# Patient Record
Sex: FEMALE | Race: WHITE | NOT HISPANIC OR LATINO | URBAN - METROPOLITAN AREA
[De-identification: names, ages, dates, MRNs, and addresses within clinical notes are randomized per-mention and may not be internally consistent; named-entity substitution may affect disease eponyms.]

---

## 2017-05-12 ENCOUNTER — EMERGENCY (EMERGENCY)
Facility: HOSPITAL | Age: 72
LOS: 0 days | Discharge: HOME | End: 2017-05-12
Admitting: INTERNAL MEDICINE

## 2017-06-28 DIAGNOSIS — Z79.899 OTHER LONG TERM (CURRENT) DRUG THERAPY: ICD-10-CM

## 2017-06-28 DIAGNOSIS — S69.91XA UNSPECIFIED INJURY OF RIGHT WRIST, HAND AND FINGER(S), INITIAL ENCOUNTER: ICD-10-CM
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
## 2017-06-28 DIAGNOSIS — E11.9 TYPE 2 DIABETES MELLITUS WITHOUT COMPLICATIONS: ICD-10-CM

## 2017-06-28 DIAGNOSIS — S52.501A UNSPECIFIED FRACTURE OF THE LOWER END OF RIGHT RADIUS, INITIAL ENCOUNTER FOR CLOSED FRACTURE: ICD-10-CM

## 2017-06-28 DIAGNOSIS — I10 ESSENTIAL (PRIMARY) HYPERTENSION: ICD-10-CM

## 2017-06-28 DIAGNOSIS — E78.00 PURE HYPERCHOLESTEROLEMIA, UNSPECIFIED: ICD-10-CM

## 2017-06-28 DIAGNOSIS — S52.614A NONDISPLACED FRACTURE OF RIGHT ULNA STYLOID PROCESS, INITIAL ENCOUNTER FOR CLOSED FRACTURE: ICD-10-CM
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
## 2017-11-09 ENCOUNTER — INPATIENT (INPATIENT)
Facility: HOSPITAL | Age: 72
LOS: 1 days | Discharge: HOME | End: 2017-11-11
Attending: INTERNAL MEDICINE

## 2017-11-09 DIAGNOSIS — M54.5 LOW BACK PAIN: ICD-10-CM

## 2017-11-09 DIAGNOSIS — E03.9 HYPOTHYROIDISM, UNSPECIFIED: ICD-10-CM

## 2017-11-09 DIAGNOSIS — R41.82 ALTERED MENTAL STATUS, UNSPECIFIED: ICD-10-CM

## 2017-11-09 DIAGNOSIS — M06.9 RHEUMATOID ARTHRITIS, UNSPECIFIED: ICD-10-CM

## 2017-11-09 DIAGNOSIS — E78.5 HYPERLIPIDEMIA, UNSPECIFIED: ICD-10-CM

## 2017-11-09 DIAGNOSIS — R50.9 FEVER, UNSPECIFIED: ICD-10-CM

## 2017-11-09 DIAGNOSIS — E11.9 TYPE 2 DIABETES MELLITUS WITHOUT COMPLICATIONS: ICD-10-CM

## 2017-11-09 DIAGNOSIS — I10 ESSENTIAL (PRIMARY) HYPERTENSION: ICD-10-CM

## 2017-11-14 DIAGNOSIS — A87.9 VIRAL MENINGITIS, UNSPECIFIED: ICD-10-CM

## 2017-11-14 DIAGNOSIS — Z79.4 LONG TERM (CURRENT) USE OF INSULIN: ICD-10-CM

## 2017-11-14 DIAGNOSIS — E87.1 HYPO-OSMOLALITY AND HYPONATREMIA: ICD-10-CM

## 2017-11-14 DIAGNOSIS — E03.9 HYPOTHYROIDISM, UNSPECIFIED: ICD-10-CM

## 2017-11-14 DIAGNOSIS — R41.82 ALTERED MENTAL STATUS, UNSPECIFIED: ICD-10-CM

## 2017-11-14 DIAGNOSIS — G89.29 OTHER CHRONIC PAIN: ICD-10-CM

## 2017-11-14 DIAGNOSIS — N18.3 CHRONIC KIDNEY DISEASE, STAGE 3 (MODERATE): ICD-10-CM

## 2017-11-14 DIAGNOSIS — F11.20 OPIOID DEPENDENCE, UNCOMPLICATED: ICD-10-CM

## 2017-11-14 DIAGNOSIS — E11.22 TYPE 2 DIABETES MELLITUS WITH DIABETIC CHRONIC KIDNEY DISEASE: ICD-10-CM

## 2017-11-14 DIAGNOSIS — M06.9 RHEUMATOID ARTHRITIS, UNSPECIFIED: ICD-10-CM

## 2017-11-14 DIAGNOSIS — I12.9 HYPERTENSIVE CHRONIC KIDNEY DISEASE WITH STAGE 1 THROUGH STAGE 4 CHRONIC KIDNEY DISEASE, OR UNSPECIFIED CHRONIC KIDNEY DISEASE: ICD-10-CM

## 2017-11-14 DIAGNOSIS — M54.9 DORSALGIA, UNSPECIFIED: ICD-10-CM

## 2017-12-08 ENCOUNTER — OUTPATIENT (OUTPATIENT)
Dept: OUTPATIENT SERVICES | Facility: HOSPITAL | Age: 72
LOS: 1 days | Discharge: HOME | End: 2017-12-08

## 2017-12-08 DIAGNOSIS — E78.2 MIXED HYPERLIPIDEMIA: ICD-10-CM

## 2017-12-08 DIAGNOSIS — D50.8 OTHER IRON DEFICIENCY ANEMIAS: ICD-10-CM

## 2017-12-08 DIAGNOSIS — I10 ESSENTIAL (PRIMARY) HYPERTENSION: ICD-10-CM

## 2017-12-08 DIAGNOSIS — E78.00 PURE HYPERCHOLESTEROLEMIA, UNSPECIFIED: ICD-10-CM

## 2017-12-08 DIAGNOSIS — N39.0 URINARY TRACT INFECTION, SITE NOT SPECIFIED: ICD-10-CM

## 2017-12-08 DIAGNOSIS — E03.9 HYPOTHYROIDISM, UNSPECIFIED: ICD-10-CM

## 2017-12-08 DIAGNOSIS — M54.5 LOW BACK PAIN: ICD-10-CM

## 2017-12-08 DIAGNOSIS — E11.9 TYPE 2 DIABETES MELLITUS WITHOUT COMPLICATIONS: ICD-10-CM

## 2017-12-08 DIAGNOSIS — D50.9 IRON DEFICIENCY ANEMIA, UNSPECIFIED: ICD-10-CM

## 2017-12-08 DIAGNOSIS — Z79.899 OTHER LONG TERM (CURRENT) DRUG THERAPY: ICD-10-CM

## 2017-12-08 DIAGNOSIS — M06.4 INFLAMMATORY POLYARTHROPATHY: ICD-10-CM

## 2017-12-08 DIAGNOSIS — R80.9 PROTEINURIA, UNSPECIFIED: ICD-10-CM

## 2017-12-08 DIAGNOSIS — M06.9 RHEUMATOID ARTHRITIS, UNSPECIFIED: ICD-10-CM

## 2017-12-08 DIAGNOSIS — R41.82 ALTERED MENTAL STATUS, UNSPECIFIED: ICD-10-CM

## 2018-02-07 ENCOUNTER — INPATIENT (INPATIENT)
Facility: HOSPITAL | Age: 73
LOS: 1 days | Discharge: HOME | End: 2018-02-09
Attending: INTERNAL MEDICINE

## 2018-02-07 VITALS
OXYGEN SATURATION: 97 % | RESPIRATION RATE: 20 BRPM | HEART RATE: 91 BPM | TEMPERATURE: 96 F | HEIGHT: 60 IN | DIASTOLIC BLOOD PRESSURE: 91 MMHG | WEIGHT: 139.99 LBS | SYSTOLIC BLOOD PRESSURE: 164 MMHG

## 2018-02-07 DIAGNOSIS — E11.649 TYPE 2 DIABETES MELLITUS WITH HYPOGLYCEMIA WITHOUT COMA: ICD-10-CM

## 2018-02-07 DIAGNOSIS — L93.0 DISCOID LUPUS ERYTHEMATOSUS: ICD-10-CM

## 2018-02-07 DIAGNOSIS — E78.00 PURE HYPERCHOLESTEROLEMIA, UNSPECIFIED: ICD-10-CM

## 2018-02-07 DIAGNOSIS — E11.9 TYPE 2 DIABETES MELLITUS WITHOUT COMPLICATIONS: ICD-10-CM
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
## 2018-02-07 DIAGNOSIS — I10 ESSENTIAL (PRIMARY) HYPERTENSION: ICD-10-CM

## 2018-02-07 LAB
ALBUMIN SERPL ELPH-MCNC: 3.2 G/DL — SIGNIFICANT CHANGE UP (ref 3–5.5)
ALP SERPL-CCNC: 86 U/L — SIGNIFICANT CHANGE UP (ref 30–115)
ALT FLD-CCNC: 23 U/L — SIGNIFICANT CHANGE UP (ref 0–41)
ANION GAP SERPL CALC-SCNC: 9 MMOL/L — SIGNIFICANT CHANGE UP (ref 7–14)
APPEARANCE UR: CLEAR — SIGNIFICANT CHANGE UP
AST SERPL-CCNC: 34 U/L — SIGNIFICANT CHANGE UP (ref 0–41)
BASE EXCESS BLDV CALC-SCNC: 6.2 MMOL/L — HIGH (ref -2–2)
BASOPHILS # BLD AUTO: 0.03 K/UL — SIGNIFICANT CHANGE UP (ref 0–0.2)
BASOPHILS NFR BLD AUTO: 0.2 % — SIGNIFICANT CHANGE UP (ref 0–1)
BILIRUB SERPL-MCNC: 0.6 MG/DL — SIGNIFICANT CHANGE UP (ref 0.2–1.2)
BILIRUB UR-MCNC: NEGATIVE — SIGNIFICANT CHANGE UP
BUN SERPL-MCNC: 15 MG/DL — SIGNIFICANT CHANGE UP (ref 10–20)
CA-I SERPL-SCNC: 1.18 MMOL/L — SIGNIFICANT CHANGE UP (ref 1.12–1.3)
CALCIUM SERPL-MCNC: 8.4 MG/DL — LOW (ref 8.5–10.1)
CHLORIDE SERPL-SCNC: 99 MMOL/L — SIGNIFICANT CHANGE UP (ref 98–110)
CK MB BLD-MCNC: 3 % — SIGNIFICANT CHANGE UP (ref 0–4)
CK MB CFR SERPL CALC: 10 NG/ML — HIGH (ref 0.6–6.3)
CK SERPL-CCNC: 323 U/L — HIGH (ref 0–225)
CO2 SERPL-SCNC: 29 MMOL/L — SIGNIFICANT CHANGE UP (ref 17–32)
COLOR SPEC: YELLOW — SIGNIFICANT CHANGE UP
CREAT SERPL-MCNC: 0.7 MG/DL — SIGNIFICANT CHANGE UP (ref 0.7–1.5)
DIFF PNL FLD: SIGNIFICANT CHANGE UP
EOSINOPHIL # BLD AUTO: 0.03 K/UL — SIGNIFICANT CHANGE UP (ref 0–0.7)
EOSINOPHIL NFR BLD AUTO: 0.2 % — SIGNIFICANT CHANGE UP (ref 0–8)
GAS PNL BLDV: 139 MMOL/L — SIGNIFICANT CHANGE UP (ref 136–145)
GAS PNL BLDV: SIGNIFICANT CHANGE UP
GLUCOSE SERPL-MCNC: 113 MG/DL — HIGH (ref 70–110)
GLUCOSE UR QL: NEGATIVE — SIGNIFICANT CHANGE UP
HCO3 BLDV-SCNC: 34 MMOL/L — HIGH (ref 22–29)
HCT VFR BLD CALC: 30.9 % — LOW (ref 37–47)
HGB BLD-MCNC: 9.8 G/DL — LOW (ref 14–18)
IMM GRANULOCYTES NFR BLD AUTO: 0.8 % — HIGH (ref 0.1–0.3)
KETONES UR-MCNC: NEGATIVE — SIGNIFICANT CHANGE UP
LACTATE BLDV-MCNC: 1.9 MMOL/L — HIGH (ref 0.5–1.6)
LEUKOCYTE ESTERASE UR-ACNC: NEGATIVE — SIGNIFICANT CHANGE UP
LYMPHOCYTES # BLD AUTO: 0.72 K/UL — LOW (ref 1.2–3.4)
LYMPHOCYTES # BLD AUTO: 5.6 % — LOW (ref 20.5–51.1)
MCHC RBC-ENTMCNC: 28.5 PG — SIGNIFICANT CHANGE UP (ref 27–31)
MCHC RBC-ENTMCNC: 31.7 G/DL — LOW (ref 32–37)
MCV RBC AUTO: 89.8 FL — SIGNIFICANT CHANGE UP (ref 81–91)
MONOCYTES # BLD AUTO: 0.47 K/UL — SIGNIFICANT CHANGE UP (ref 0.1–0.6)
MONOCYTES NFR BLD AUTO: 3.7 % — SIGNIFICANT CHANGE UP (ref 1.7–9.3)
NEUTROPHILS # BLD AUTO: 11.41 K/UL — HIGH (ref 1.4–6.5)
NEUTROPHILS NFR BLD AUTO: 89.5 % — HIGH (ref 42.2–75.2)
NITRITE UR-MCNC: NEGATIVE — SIGNIFICANT CHANGE UP
NRBC # BLD: 0 /100 WBCS — SIGNIFICANT CHANGE UP (ref 0–0)
PCO2 BLDV: 60 MMHG — HIGH (ref 41–51)
PH BLDV: 7.36 — SIGNIFICANT CHANGE UP (ref 7.26–7.43)
PH UR: 7 — SIGNIFICANT CHANGE UP (ref 5–8)
PLATELET # BLD AUTO: 322 K/UL — SIGNIFICANT CHANGE UP (ref 130–400)
PO2 BLDV: 30 MMHG — SIGNIFICANT CHANGE UP (ref 20–40)
POTASSIUM BLDV-SCNC: 3.5 MMOL/L — SIGNIFICANT CHANGE UP (ref 3.3–5.6)
POTASSIUM SERPL-MCNC: 4 MMOL/L — SIGNIFICANT CHANGE UP (ref 3.5–5)
POTASSIUM SERPL-SCNC: 4 MMOL/L — SIGNIFICANT CHANGE UP (ref 3.5–5)
PROT SERPL-MCNC: 6.3 G/DL — SIGNIFICANT CHANGE UP (ref 6–8)
PROT UR-MCNC: NEGATIVE — SIGNIFICANT CHANGE UP
RBC # BLD: 3.44 M/UL — LOW (ref 4.2–5.4)
RBC # FLD: 15.1 % — HIGH (ref 11.5–14.5)
SAO2 % BLDV: 52 % — SIGNIFICANT CHANGE UP
SODIUM SERPL-SCNC: 137 MMOL/L — SIGNIFICANT CHANGE UP (ref 135–146)
SP GR SPEC: 1.02 — SIGNIFICANT CHANGE UP (ref 1.01–1.03)
TROPONIN I SERPL-MCNC: 0.02 NG/ML — SIGNIFICANT CHANGE UP (ref 0–0.05)
TROPONIN I SERPL-MCNC: <0.02 NG/ML — SIGNIFICANT CHANGE UP (ref 0–0.05)
UROBILINOGEN FLD QL: 0.2 — SIGNIFICANT CHANGE UP (ref 0.2–0.2)
WBC # BLD: 12.76 K/UL — HIGH (ref 4.8–10.8)
WBC # FLD AUTO: 12.76 K/UL — HIGH (ref 4.8–10.8)

## 2018-02-07 RX ORDER — ASPIRIN/CALCIUM CARB/MAGNESIUM 324 MG
81 TABLET ORAL DAILY
Qty: 0 | Refills: 0 | Status: DISCONTINUED | OUTPATIENT
Start: 2018-02-07 | End: 2018-02-09

## 2018-02-07 RX ORDER — DULOXETINE HYDROCHLORIDE 30 MG/1
60 CAPSULE, DELAYED RELEASE ORAL DAILY
Qty: 0 | Refills: 0 | Status: DISCONTINUED | OUTPATIENT
Start: 2018-02-07 | End: 2018-02-09

## 2018-02-07 RX ORDER — ATORVASTATIN CALCIUM 80 MG/1
80 TABLET, FILM COATED ORAL AT BEDTIME
Qty: 0 | Refills: 0 | Status: DISCONTINUED | OUTPATIENT
Start: 2018-02-07 | End: 2018-02-09

## 2018-02-07 RX ORDER — ENOXAPARIN SODIUM 100 MG/ML
40 INJECTION SUBCUTANEOUS DAILY
Qty: 0 | Refills: 0 | Status: DISCONTINUED | OUTPATIENT
Start: 2018-02-07 | End: 2018-02-09

## 2018-02-07 RX ORDER — METHADONE HYDROCHLORIDE 40 MG/1
20 TABLET ORAL EVERY 12 HOURS
Qty: 0 | Refills: 0 | Status: DISCONTINUED | OUTPATIENT
Start: 2018-02-07 | End: 2018-02-09

## 2018-02-07 RX ORDER — DEXTROSE 50 % IN WATER 50 %
25 SYRINGE (ML) INTRAVENOUS ONCE
Qty: 0 | Refills: 0 | Status: COMPLETED | OUTPATIENT
Start: 2018-02-07 | End: 2018-02-07

## 2018-02-07 RX ORDER — LISINOPRIL 2.5 MG/1
20 TABLET ORAL
Qty: 0 | Refills: 0 | Status: DISCONTINUED | OUTPATIENT
Start: 2018-02-07 | End: 2018-02-09

## 2018-02-07 RX ORDER — DICLOFENAC SODIUM 75 MG/1
75 TABLET, DELAYED RELEASE ORAL
Qty: 0 | Refills: 0 | Status: DISCONTINUED | OUTPATIENT
Start: 2018-02-07 | End: 2018-02-09

## 2018-02-07 RX ORDER — LEVOTHYROXINE SODIUM 125 MCG
100 TABLET ORAL DAILY
Qty: 0 | Refills: 0 | Status: DISCONTINUED | OUTPATIENT
Start: 2018-02-07 | End: 2018-02-09

## 2018-02-07 RX ORDER — SODIUM CHLORIDE 9 MG/ML
1000 INJECTION INTRAMUSCULAR; INTRAVENOUS; SUBCUTANEOUS ONCE
Qty: 0 | Refills: 0 | Status: COMPLETED | OUTPATIENT
Start: 2018-02-07 | End: 2018-02-07

## 2018-02-07 RX ORDER — GABAPENTIN 400 MG/1
400 CAPSULE ORAL EVERY 8 HOURS
Qty: 0 | Refills: 0 | Status: DISCONTINUED | OUTPATIENT
Start: 2018-02-07 | End: 2018-02-09

## 2018-02-07 RX ORDER — SODIUM CHLORIDE 9 MG/ML
1000 INJECTION, SOLUTION INTRAVENOUS
Qty: 0 | Refills: 0 | Status: DISCONTINUED | OUTPATIENT
Start: 2018-02-07 | End: 2018-02-08

## 2018-02-07 RX ORDER — HYDROXYCHLOROQUINE SULFATE 200 MG
400 TABLET ORAL
Qty: 0 | Refills: 0 | Status: DISCONTINUED | OUTPATIENT
Start: 2018-02-07 | End: 2018-02-09

## 2018-02-07 RX ADMIN — SODIUM CHLORIDE 100 MILLILITER(S): 9 INJECTION, SOLUTION INTRAVENOUS at 18:40

## 2018-02-07 RX ADMIN — DICLOFENAC SODIUM 75 MILLIGRAM(S): 75 TABLET, DELAYED RELEASE ORAL at 18:35

## 2018-02-07 RX ADMIN — LISINOPRIL 20 MILLIGRAM(S): 2.5 TABLET ORAL at 18:34

## 2018-02-07 RX ADMIN — DICLOFENAC SODIUM 75 MILLIGRAM(S): 75 TABLET, DELAYED RELEASE ORAL at 20:28

## 2018-02-07 RX ADMIN — Medication 400 MILLIGRAM(S): at 18:33

## 2018-02-07 RX ADMIN — SODIUM CHLORIDE 1000 MILLILITER(S): 9 INJECTION INTRAMUSCULAR; INTRAVENOUS; SUBCUTANEOUS at 11:17

## 2018-02-07 RX ADMIN — METHADONE HYDROCHLORIDE 20 MILLIGRAM(S): 40 TABLET ORAL at 20:58

## 2018-02-07 RX ADMIN — Medication 25 MILLILITER(S): at 12:35

## 2018-02-07 NOTE — H&P ADULT - REASON FOR ADMISSION
Patient is a 72y old  Female who presents with a chief complaint of general weakness and falling on floor.

## 2018-02-07 NOTE — H&P ADULT - NSHPPHYSICALEXAM_GEN_ALL_CORE
PHYSICAL EXAM:    Constitutional: general weakness, clammy and diaphoretic    Eyes: EOMI, Vision Intack    ENMT: WNL    Neck: WNL    Back: Chronic LBP, No tenderness    Respiratory: Clear bilateral lungs    Cardiovascular: R/R/R No murmurs    Gastrointestinal: +BS, Soft, NT, ND    Genitourinary: WNL    Rectal: Deffered    Extremities: No edema, clubbing or cyanosis    Vascular: + 2 Pulses bilateral    Neurological: Grossly WNL    Skin: sweating     Lymph Nodes: NO LAD    Musculoskeletal: Lethargic    Psychiatric: WNL

## 2018-02-07 NOTE — H&P ADULT - ASSESSMENT
72F from home with above PMH and HPI p/w generalized weakness / diaphoresis / fall due to Symptomatic Refractory Hypoglycemia while on chronic Novolin N 35Un qAM for her DM.

## 2018-02-07 NOTE — ED PROVIDER NOTE - OBJECTIVE STATEMENT
72 F PMH HTN, HLD, DM type 1, Rheumatoid arthritis on MTX, pw episode of syncope this morning. unclear if witnessed, lives at home with 2 grandsons who called EMS, but not present in ED. EMS noted low FSBG. Patient denies pain anywhere. Complaining of cold feeling. No SOB, chest pain, headache, vision changes, numbness, tingling, focal weakness. + vomitus on clothes - to be assumed she vomited. Feeling in her normal state of health yesterday. States last insulin used yesterday morning - novolin. Repeat FSBG, improved. given juice.

## 2018-02-07 NOTE — ED PROVIDER NOTE - PHYSICAL EXAMINATION
Vital Signs: I have reviewed the initial vital signs.  Constitutional: well-nourished, appears stated age, no acute distress  Cardiovascular: regular rate, regular rhythm, well-perfused extremities. 2+ b/l DP and radial pulses.  Respiratory: unlabored respiratory effort, clear to auscultation bilaterally  Gastrointestinal: soft, non-tender abdomen, no pulsatile mass  Musculoskeletal: supple neck, nl ROM, left posterior chest with focal ecchymosis, left hip with skin discoloration, nontender, patchy, no induration or warmth. Patient unsure if this is chronic. No lower extremity edema  Integumentary: warm, dry, no rash  Neurologic: awake, alert, oriented x 3, cranial nerves II-XII intact, extremities’ motor 5/5 all 4 ext, normal coordination and speech, sensory functions grossly intact all 4 ext.  Psychiatric: appropriate mood, appropriate affect.

## 2018-02-07 NOTE — H&P ADULT - HISTORY OF PRESENT ILLNESS
72F from home with h/x DM on Novolin N 50 units qAM but has been decreasing the dose and now down to 35 units qAM who presents with general weakness, diaphoresis and falls. Pt was found on the floor by grandson and states couldn't get up due to general fatigue. Pt denies fevers, chills, n/v, cp, dyspnea or focal weakness. Pt is a good historian. In the ER pt was found to have FS 35 and treated with D50 IVPushes and dextrose infusion and given juice and food.  Again at 3:30pm pt had an episode of diaphoresis and fs = 39. Pt given Orange Juice and food and also started on D5 infusion.

## 2018-02-07 NOTE — H&P ADULT - PMH
DM (diabetes mellitus)    Fall, initial encounter    Hypercholesteremia    Hypertension    Hypoglycemia  low glucose 39  Lupus

## 2018-02-07 NOTE — H&P ADULT - NSHPLABSRESULTS_GEN_ALL_CORE
9.8    12.76 )-----------( 322      ( 07 Feb 2018 08:36 )             30.9     02-07    137  |  99  |  15  ----------------------------<  113<H>  4.0   |  29  |  0.7    Ca    8.4<L>      07 Feb 2018 08:36    TPro  6.3  /  Alb  3.2  /  TBili  0.6  /  DBili  x   /  AST  34  /  ALT  23  /  AlkPhos  86  02-07    CTH: WNL  CTNeck: WNL  CXR: WNL  Xrays: NO fractures  EKG: NSR no acute findings

## 2018-02-07 NOTE — H&P ADULT - NSHPREVIEWOFSYSTEMS_GEN_ALL_CORE
REVIEW OF SYSTEMS    General:	weakness  	  Ophthalmologic: WNL  	  ENMT:	WNL    Respiratory and Thorax: WNL  	  Cardiovascular:	diaphoresis, denies dyspnea or chest pain    Gastrointestinal:	WNL    Musculoskeletal:	general weakness, unable to ambulate    Neurological:	general weakness    Psychiatric:	WNL    Endocrine:	dry skin (usual)    Allergic/Immunologic:

## 2018-02-07 NOTE — ED PROVIDER NOTE - MEDICAL DECISION MAKING DETAILS
Most concerned for low FSBG to be cause of LOC, unclear reason for low FSBG. Will eval for other causes of syncope, also eval for trauma sustained in episode. Labs, imaging, fluids, pain control as needed, recheck FSBG after juice, reassess.

## 2018-02-07 NOTE — ED PROVIDER NOTE - NS ED ROS FT
Constitutional: (-) fever  Eyes/ENT: (-) blurry vision, (-) epistaxis  Cardiovascular: (-) chest pain, (+) syncope, - palpitations.  Respiratory: (-) cough, (-) shortness of breath  Gastrointestinal: (+) vomiting, (-) diarrhea  Musculoskeletal: (-) neck pain, (-) back pain, (-) joint pain  Integumentary: (-) rash, (-) edema  Neurological: (-) headache, (-) altered mental status  Psychiatric: (-) hallucinations  Allergic/Immunologic: (-) pruritus

## 2018-02-07 NOTE — ED ADULT TRIAGE NOTE - CHIEF COMPLAINT QUOTE
Pt brought in by ambulance.  As per EMT "Jason found her on the floor." Fingerstick at scene 46. Glucagon 1 mg IM administered.

## 2018-02-08 LAB
ANION GAP SERPL CALC-SCNC: 6 MMOL/L — LOW (ref 7–14)
BUN SERPL-MCNC: 12 MG/DL — SIGNIFICANT CHANGE UP (ref 10–20)
CALCIUM SERPL-MCNC: 8.5 MG/DL — SIGNIFICANT CHANGE UP (ref 8.5–10.1)
CHLORIDE SERPL-SCNC: 101 MMOL/L — SIGNIFICANT CHANGE UP (ref 98–110)
CO2 SERPL-SCNC: 30 MMOL/L — SIGNIFICANT CHANGE UP (ref 17–32)
CREAT SERPL-MCNC: 0.6 MG/DL — LOW (ref 0.7–1.5)
CULTURE RESULTS: SIGNIFICANT CHANGE UP
GLUCOSE SERPL-MCNC: 178 MG/DL — HIGH (ref 70–110)
HCT VFR BLD CALC: 27.4 % — LOW (ref 37–47)
HCT VFR BLD CALC: 28.3 % — LOW (ref 37–47)
HGB BLD-MCNC: 8.7 G/DL — LOW (ref 14–18)
HGB BLD-MCNC: 8.9 G/DL — LOW (ref 14–18)
MCHC RBC-ENTMCNC: 28 PG — SIGNIFICANT CHANGE UP (ref 27–31)
MCHC RBC-ENTMCNC: 28.6 PG — SIGNIFICANT CHANGE UP (ref 27–31)
MCHC RBC-ENTMCNC: 31.4 G/DL — LOW (ref 32–37)
MCHC RBC-ENTMCNC: 31.8 G/DL — LOW (ref 32–37)
MCV RBC AUTO: 89 FL — SIGNIFICANT CHANGE UP (ref 81–91)
MCV RBC AUTO: 90.1 FL — SIGNIFICANT CHANGE UP (ref 81–91)
NRBC # BLD: 0 /100 WBCS — SIGNIFICANT CHANGE UP (ref 0–0)
NRBC # BLD: 0 /100 WBCS — SIGNIFICANT CHANGE UP (ref 0–0)
PLATELET # BLD AUTO: 244 K/UL — SIGNIFICANT CHANGE UP (ref 130–400)
PLATELET # BLD AUTO: 270 K/UL — SIGNIFICANT CHANGE UP (ref 130–400)
POTASSIUM SERPL-MCNC: 3.6 MMOL/L — SIGNIFICANT CHANGE UP (ref 3.5–5)
POTASSIUM SERPL-SCNC: 3.6 MMOL/L — SIGNIFICANT CHANGE UP (ref 3.5–5)
RBC # BLD: 3.04 M/UL — LOW (ref 4.2–5.4)
RBC # BLD: 3.18 M/UL — LOW (ref 4.2–5.4)
RBC # FLD: 15.1 % — HIGH (ref 11.5–14.5)
RBC # FLD: 15.2 % — HIGH (ref 11.5–14.5)
SODIUM SERPL-SCNC: 137 MMOL/L — SIGNIFICANT CHANGE UP (ref 135–146)
SPECIMEN SOURCE: SIGNIFICANT CHANGE UP
WBC # BLD: 4.58 K/UL — LOW (ref 4.8–10.8)
WBC # BLD: 5.37 K/UL — SIGNIFICANT CHANGE UP (ref 4.8–10.8)
WBC # FLD AUTO: 4.58 K/UL — LOW (ref 4.8–10.8)
WBC # FLD AUTO: 5.37 K/UL — SIGNIFICANT CHANGE UP (ref 4.8–10.8)

## 2018-02-08 RX ORDER — OXYCODONE AND ACETAMINOPHEN 5; 325 MG/1; MG/1
1 TABLET ORAL EVERY 6 HOURS
Qty: 0 | Refills: 0 | Status: DISCONTINUED | OUTPATIENT
Start: 2018-02-08 | End: 2018-02-09

## 2018-02-08 RX ORDER — OXYCODONE AND ACETAMINOPHEN 5; 325 MG/1; MG/1
1 TABLET ORAL ONCE
Qty: 0 | Refills: 0 | Status: DISCONTINUED | OUTPATIENT
Start: 2018-02-08 | End: 2018-02-08

## 2018-02-08 RX ADMIN — OXYCODONE AND ACETAMINOPHEN 1 TABLET(S): 5; 325 TABLET ORAL at 17:56

## 2018-02-08 RX ADMIN — Medication 100 MICROGRAM(S): at 05:18

## 2018-02-08 RX ADMIN — METHADONE HYDROCHLORIDE 20 MILLIGRAM(S): 40 TABLET ORAL at 05:17

## 2018-02-08 RX ADMIN — ENOXAPARIN SODIUM 40 MILLIGRAM(S): 100 INJECTION SUBCUTANEOUS at 12:48

## 2018-02-08 RX ADMIN — DICLOFENAC SODIUM 75 MILLIGRAM(S): 75 TABLET, DELAYED RELEASE ORAL at 17:28

## 2018-02-08 RX ADMIN — ATORVASTATIN CALCIUM 80 MILLIGRAM(S): 80 TABLET, FILM COATED ORAL at 21:10

## 2018-02-08 RX ADMIN — DICLOFENAC SODIUM 75 MILLIGRAM(S): 75 TABLET, DELAYED RELEASE ORAL at 05:16

## 2018-02-08 RX ADMIN — DICLOFENAC SODIUM 75 MILLIGRAM(S): 75 TABLET, DELAYED RELEASE ORAL at 07:34

## 2018-02-08 RX ADMIN — ATORVASTATIN CALCIUM 80 MILLIGRAM(S): 80 TABLET, FILM COATED ORAL at 01:03

## 2018-02-08 RX ADMIN — OXYCODONE AND ACETAMINOPHEN 1 TABLET(S): 5; 325 TABLET ORAL at 15:44

## 2018-02-08 RX ADMIN — Medication 400 MILLIGRAM(S): at 17:20

## 2018-02-08 RX ADMIN — GABAPENTIN 400 MILLIGRAM(S): 400 CAPSULE ORAL at 21:10

## 2018-02-08 RX ADMIN — Medication 400 MILLIGRAM(S): at 09:33

## 2018-02-08 RX ADMIN — DULOXETINE HYDROCHLORIDE 60 MILLIGRAM(S): 30 CAPSULE, DELAYED RELEASE ORAL at 12:48

## 2018-02-08 RX ADMIN — GABAPENTIN 400 MILLIGRAM(S): 400 CAPSULE ORAL at 00:15

## 2018-02-08 RX ADMIN — GABAPENTIN 400 MILLIGRAM(S): 400 CAPSULE ORAL at 14:15

## 2018-02-08 RX ADMIN — LISINOPRIL 20 MILLIGRAM(S): 2.5 TABLET ORAL at 05:19

## 2018-02-08 RX ADMIN — GABAPENTIN 400 MILLIGRAM(S): 400 CAPSULE ORAL at 05:21

## 2018-02-08 RX ADMIN — METHADONE HYDROCHLORIDE 20 MILLIGRAM(S): 40 TABLET ORAL at 17:26

## 2018-02-08 RX ADMIN — LISINOPRIL 20 MILLIGRAM(S): 2.5 TABLET ORAL at 17:21

## 2018-02-08 RX ADMIN — Medication 81 MILLIGRAM(S): at 12:49

## 2018-02-08 NOTE — PROGRESS NOTE ADULT - SUBJECTIVE AND OBJECTIVE BOX
LINDSAY ALBERT  72y  Female    Patient is a 72y old  Female who presents with a chief complaint of Patient is a 72y old  Female who presents with a chief complaint of general weakness and falling on floor. (2018 16:55)    INTERVAL HPI/OVERNIGHT EVENTS: pt did okay her  - 120    PAST MEDICAL & SURGICAL HISTORY:  Fall, initial encounter  Hypoglycemia: low glucose 39  Lupus  Hypertension  Hypercholesteremia  DM (diabetes mellitus)  No significant past surgical history      REVIEW OF SYSTEMS:  CONSTITUTIONAL: No fever, weight loss, or fatigue  RESPIRATORY: No cough, wheezing, chills or hemoptysis; No shortness of breath  CARDIOVASCULAR: No chest pain, palpitations, dizziness, or leg swelling  GASTROINTESTINAL: No abdominal or epigastric pain. No nausea, vomiting, or hematemesis; No diarrhea or constipation. No melena or hematochezia.  GENITOURINARY: No dysuria, frequency, hematuria, or incontinence  NEUROLOGICAL: No headaches, memory loss, loss of strength, numbness, or tremors  SKIN: No itching, burning, rashes, or lesions   MUSCULOSKELETAL: No joint pain or swelling; No muscle, back, or extremity pain  PSYCHIATRIC: No depression, anxiety, mood swings, or difficulty sleeping    Vital Signs Last 24 Hrs  T(C): 36.5 (2018 15:31), Max: 37.6 (2018 20:00)  T(F): 97.7 (2018 15:31), Max: 99.6 (2018 20:00)  HR: 75 (2018 15:31) (74 - 83)  BP: 143/67 (2018 15:31) (143/67 - 160/-)  BP(mean): --  RR: 14 (2018 07:00) (14 - 18)    PHYSICAL EXAM:  GENERAL: NAD, well-groomed, well-developed  EYES: EOMI, PERRLA, conjunctiva and sclera clear  NECK: Supple, No JVD, Normal thyroid  NERVOUS SYSTEM:  Alert & Oriented X3, Good concentration; Motor Strength 5/5 B/L upper and lower extremities; DTRs 2+ intact and symmetric  CHEST/LUNG: Clear to percussion bilaterally; No rales, rhonchi, wheezing, or rubs  HEART: Regular rate and rhythm; No murmurs, rubs, or gallops  ABDOMEN: Soft, Nontender, Nondistended; Bowel sounds present  EXTREMITIES:  2+ Peripheral Pulses, No clubbing, cyanosis, or edema  LYMPH: No lymphadenopathy noted  SKIN: No rashes or lesions    Consultant(s) Notes Reviewed:  [x ] YES  Care Discussed with Consultants/Other Providers [ x] YES     LABS:                          8.7    5.37  )-----------( 270      ( 2018 05:21 )             27.4     02    137  |  101  |  12  ----------------------------<  178<H>  3.6   |  30  |  0.6<L>    Ca    8.5      2018 05:21    TPro  6.3  /  Alb  3.2  /  TBili  0.6  /  DBili  x   /  AST  34  /  ALT  23  /  AlkPhos  86  02-07    Urinalysis Basic - ( 2018 17:59 )    Color: Yellow / Appearance: Clear / S.025 / pH: x  Gluc: x / Ketone: Negative  / Bili: Negative / Urobili: 0.2   Blood: x / Protein: Negative / Nitrite: Negative   Leuk Esterase: Negative / RBC: x / WBC x   Sq Epi: x / Non Sq Epi: x / Bacteria: x    PROBLEM LIST:  Fall, initial encounter: Fall, initial encounter  Hypoglycemia associated with diabetes: Hypoglycemia associated with diabetes  Lupus erythematosus, unspecified form: Lupus erythematosus, unspecified form  Essential hypertension: Essential hypertension  Hypercholesteremia: Hypercholesteremia  DM (diabetes mellitus): DM (diabetes mellitus)
patient takes hydrocodone at home. will oder dose of percocet now

## 2018-02-08 NOTE — PROGRESS NOTE ADULT - ASSESSMENT
Pt admitted for Refractory Hypoglycemia for a few days or close 1-2 weeks.  Pt fell down and was unable to get up due to weakness. Pt found with grandsons on floor. FS 30's to now 100-120.

## 2018-02-09 ENCOUNTER — TRANSCRIPTION ENCOUNTER (OUTPATIENT)
Age: 73
End: 2018-02-09

## 2018-02-09 VITALS
DIASTOLIC BLOOD PRESSURE: 80 MMHG | SYSTOLIC BLOOD PRESSURE: 143 MMHG | HEART RATE: 75 BPM | TEMPERATURE: 97 F | RESPIRATION RATE: 16 BRPM

## 2018-02-09 LAB
ANION GAP SERPL CALC-SCNC: 8 MMOL/L — SIGNIFICANT CHANGE UP (ref 7–14)
BUN SERPL-MCNC: 13 MG/DL — SIGNIFICANT CHANGE UP (ref 10–20)
CALCIUM SERPL-MCNC: 8.4 MG/DL — LOW (ref 8.5–10.1)
CHLORIDE SERPL-SCNC: 98 MMOL/L — SIGNIFICANT CHANGE UP (ref 98–110)
CO2 SERPL-SCNC: 28 MMOL/L — SIGNIFICANT CHANGE UP (ref 17–32)
CREAT SERPL-MCNC: 0.8 MG/DL — SIGNIFICANT CHANGE UP (ref 0.7–1.5)
GLUCOSE SERPL-MCNC: 302 MG/DL — HIGH (ref 70–110)
HCT VFR BLD CALC: 27.1 % — LOW (ref 37–47)
HGB BLD-MCNC: 8.7 G/DL — LOW (ref 14–18)
MCHC RBC-ENTMCNC: 28.3 PG — SIGNIFICANT CHANGE UP (ref 27–31)
MCHC RBC-ENTMCNC: 32.1 G/DL — SIGNIFICANT CHANGE UP (ref 32–37)
MCV RBC AUTO: 88.3 FL — SIGNIFICANT CHANGE UP (ref 81–91)
NRBC # BLD: 0 /100 WBCS — SIGNIFICANT CHANGE UP (ref 0–0)
PLATELET # BLD AUTO: 244 K/UL — SIGNIFICANT CHANGE UP (ref 130–400)
POTASSIUM SERPL-MCNC: 3.9 MMOL/L — SIGNIFICANT CHANGE UP (ref 3.5–5)
POTASSIUM SERPL-SCNC: 3.9 MMOL/L — SIGNIFICANT CHANGE UP (ref 3.5–5)
RBC # BLD: 3.07 M/UL — LOW (ref 4.2–5.4)
RBC # FLD: 14.9 % — HIGH (ref 11.5–14.5)
SODIUM SERPL-SCNC: 134 MMOL/L — LOW (ref 135–146)
WBC # BLD: 3.92 K/UL — LOW (ref 4.8–10.8)
WBC # FLD AUTO: 3.92 K/UL — LOW (ref 4.8–10.8)

## 2018-02-09 RX ORDER — DICLOFENAC SODIUM 75 MG/1
1 TABLET, DELAYED RELEASE ORAL
Qty: 0 | Refills: 0 | COMMUNITY

## 2018-02-09 RX ORDER — GABAPENTIN 400 MG/1
400 CAPSULE ORAL
Qty: 0 | Refills: 0 | COMMUNITY

## 2018-02-09 RX ORDER — LEVOTHYROXINE SODIUM 125 MCG
1 TABLET ORAL
Qty: 0 | Refills: 0 | DISCHARGE
Start: 2018-02-09

## 2018-02-09 RX ORDER — INSULIN ASPART 100 [IU]/ML
100 INJECTION, SOLUTION SUBCUTANEOUS
Qty: 1 | Refills: 0
Start: 2018-02-09

## 2018-02-09 RX ORDER — HYDROXYCHLOROQUINE SULFATE 200 MG
2 TABLET ORAL
Qty: 0 | Refills: 0 | DISCHARGE
Start: 2018-02-09

## 2018-02-09 RX ORDER — INSULIN GLARGINE 100 [IU]/ML
20 INJECTION, SOLUTION SUBCUTANEOUS ONCE
Qty: 0 | Refills: 0 | Status: COMPLETED | OUTPATIENT
Start: 2018-02-09 | End: 2018-02-09

## 2018-02-09 RX ORDER — INSULIN ASPART 100 [IU]/ML
35 INJECTION, SOLUTION SUBCUTANEOUS
Qty: 0 | Refills: 0 | COMMUNITY

## 2018-02-09 RX ADMIN — OXYCODONE AND ACETAMINOPHEN 1 TABLET(S): 5; 325 TABLET ORAL at 01:43

## 2018-02-09 RX ADMIN — DICLOFENAC SODIUM 75 MILLIGRAM(S): 75 TABLET, DELAYED RELEASE ORAL at 06:35

## 2018-02-09 RX ADMIN — Medication 400 MILLIGRAM(S): at 08:36

## 2018-02-09 RX ADMIN — DULOXETINE HYDROCHLORIDE 60 MILLIGRAM(S): 30 CAPSULE, DELAYED RELEASE ORAL at 11:22

## 2018-02-09 RX ADMIN — GABAPENTIN 400 MILLIGRAM(S): 400 CAPSULE ORAL at 13:06

## 2018-02-09 RX ADMIN — OXYCODONE AND ACETAMINOPHEN 1 TABLET(S): 5; 325 TABLET ORAL at 00:43

## 2018-02-09 RX ADMIN — OXYCODONE AND ACETAMINOPHEN 1 TABLET(S): 5; 325 TABLET ORAL at 09:03

## 2018-02-09 RX ADMIN — OXYCODONE AND ACETAMINOPHEN 1 TABLET(S): 5; 325 TABLET ORAL at 08:38

## 2018-02-09 RX ADMIN — INSULIN GLARGINE 20 UNIT(S): 100 INJECTION, SOLUTION SUBCUTANEOUS at 13:06

## 2018-02-09 RX ADMIN — LISINOPRIL 20 MILLIGRAM(S): 2.5 TABLET ORAL at 05:35

## 2018-02-09 RX ADMIN — ENOXAPARIN SODIUM 40 MILLIGRAM(S): 100 INJECTION SUBCUTANEOUS at 11:22

## 2018-02-09 RX ADMIN — METHADONE HYDROCHLORIDE 20 MILLIGRAM(S): 40 TABLET ORAL at 05:34

## 2018-02-09 RX ADMIN — Medication 81 MILLIGRAM(S): at 11:22

## 2018-02-09 RX ADMIN — GABAPENTIN 400 MILLIGRAM(S): 400 CAPSULE ORAL at 05:35

## 2018-02-09 RX ADMIN — Medication 100 MICROGRAM(S): at 05:35

## 2018-02-09 RX ADMIN — DICLOFENAC SODIUM 75 MILLIGRAM(S): 75 TABLET, DELAYED RELEASE ORAL at 05:35

## 2018-02-09 NOTE — DISCHARGE NOTE ADULT - SECONDARY DIAGNOSIS.
Fall, initial encounter Essential hypertension Hypercholesteremia Lupus erythematosus, unspecified form

## 2018-02-09 NOTE — DISCHARGE NOTE ADULT - PATIENT PORTAL LINK FT
You can access the [facility] Patient Portal, offered by [facility], by registering with the following website: [email or web address]

## 2018-02-09 NOTE — DISCHARGE NOTE ADULT - CARE PLAN
Principal Discharge DX:	Hypoglycemia associated with diabetes  Goal:	stable blood sugar 100  Assessment and plan of treatment:	Take meds as prescribed  Secondary Diagnosis:	Fall, initial encounter  Goal:	no more falls  Assessment and plan of treatment:	control blood sugar  Secondary Diagnosis:	Essential hypertension  Goal:	good bp 120/70  Assessment and plan of treatment:	take blood pressure meds as prescribed  Secondary Diagnosis:	Hypercholesteremia  Goal:	good levels  Assessment and plan of treatment:	take cholesterol meds as prescribed  Secondary Diagnosis:	Lupus erythematosus, unspecified form  Goal:	controlled  Assessment and plan of treatment:	Hydrochloroquine  follow with rheumatology routinely

## 2018-02-09 NOTE — DISCHARGE NOTE ADULT - CARE PROVIDER_API CALL
Janina Richard), Internal Medicine  [address] [place] [address]  Phone: [phone]  Fax: [phone]

## 2018-02-09 NOTE — DISCHARGE NOTE ADULT - PLAN OF CARE
stable blood sugar 100 Take meds as prescribed no more falls control blood sugar good bp 120/70 take blood pressure meds as prescribed good levels take cholesterol meds as prescribed controlled Hydrochloroquine  follow with rheumatology routinely

## 2018-02-09 NOTE — CONSULT NOTE ADULT - ASSESSMENT
patient admitted with severe neuroglycopenia due to nph insulin, will change to tresiba u-100 20 units in am, i have given her a sliding scale of novolog to take before meals in addition to tresiba. in am, give 20 units  lantus as a stat dose today. she can go home today. pharmacy . please call me with problems .

## 2018-02-09 NOTE — DISCHARGE NOTE ADULT - MEDICATION SUMMARY - MEDICATIONS TO STOP TAKING
I will STOP taking the medications listed below when I get home from the hospital:    NovoLOG 100 units/mL subcutaneous solution  -- 35 unit(s) subcutaneous once a day

## 2018-02-09 NOTE — DISCHARGE NOTE ADULT - MEDICATION SUMMARY - MEDICATIONS TO TAKE
I will START or STAY ON the medications listed below when I get home from the hospital:    HYDROcodone  -- 5 milligram(s) by mouth every 6 hours  -- Indication: For Low back pain - chronic    methadone 5 mg oral tablet  -- 20 milligram(s) by mouth 2 times a day (with meals)  -- Indication: For Low back pain - chronic    aspirin 81 mg oral tablet  -- 1 tab(s) by mouth once a day  -- Indication: For Heart disease prevention    diclofenac sodium 75 mg oral delayed release tablet  -- 1 tab(s) by mouth 2 times a day  -- Indication: For Lupus erythematosus, unspecified form    lisinopril 20 mg oral tablet  -- 1 tab(s) by mouth 2 times a day  -- Indication: For Essential hypertension    gabapentin 400 mg oral capsule  -- 1 cap(s) by mouth 3 times a day  -- Indication: For Lupus erythematosus, unspecified form    Cymbalta 60 mg oral delayed release capsule  -- 1 cap(s) by mouth once a day  -- Indication: For Lupus erythematosus, unspecified form / depression / pain    simvastatin 80 mg oral tablet  -- 1 tab(s) by mouth once a day (at bedtime)  -- Indication: For Hypercholesteremia    hydroxychloroquine 200 mg oral tablet  -- 2 tab(s) by mouth 2 times a day (with meals)  -- Indication: For Lupus erythematosus, unspecified form    methotrexate 2.5 mg oral tablet  -- 12.5 milligram(s) by mouth once a week  -- Indication: For Arthritis    levothyroxine 100 mcg (0.1 mg) oral tablet  -- 1 tab(s) by mouth once a day  -- Indication: For Hypothyroid    folic acid 1 mg oral tablet  -- 1 tab(s) by mouth once a day  -- Indication: For supplement

## 2018-02-09 NOTE — DISCHARGE NOTE ADULT - HOSPITAL COURSE
The patient was admitted for fall due to recurrent hypoglycemia while on NPH at home. Pt was treated with D50s, IV infusion of dextrose solution, and FS checked q2hrs. Sugars improved to 100's - 200's - 300's. Pt was seen by Endocrinology service and recommendations being carried out. Lantus 20Un stat given now and will go home on Triceba 20Un qam and Novolog flex pen per SS with meals.   Pt doing well wants to go home   Will follow with Endocrine next week and record her blood sugars for f/u.   All home meds were continued  NPH was discontinued

## 2018-02-09 NOTE — CONSULT NOTE ADULT - SUBJECTIVE AND OBJECTIVE BOX
Reason for Endocrinology Consult: Diabetes    HPI: 72y Female    Diabetes Type: 1   Duration of Diabetes: many years  Diabetes Complications:  History of DKA?  Eye doctor within past year?  Current Therapy:      Home FSG:  Fasting:  Lunch:  Dinner:  Bedtime:    Hypoglycemia?    Neuroglycopenia within past year?    Outpatient Endocrinologist?  none  PAST MEDICAL & SURGICAL HISTORY:  Fall, initial encounter  Hypoglycemia: low glucose 39  Lupus  Hypertension  Hypercholesteremia  DM (diabetes mellitus)  No significant past surgical history    FAMILY HISTORY:  No pertinent family history in first degree relatives      SH:  Smoking  Etoh:  Recreational Drugs:    Home Medications:  aspirin 81 mg oral tablet: 1 tab(s) orally once a day (2018 17:20)  Cymbalta 60 mg oral delayed release capsule: 1 cap(s) orally once a day (2018 17:20)  diclofenac sodium 75 mg oral delayed release tablet: 1 tab(s) orally 2 times a day (2018 17:20)  folic acid 1 mg oral tablet: 1 tab(s) orally once a day (2018 17:20)  gabapentin 400 mg oral capsule: 1 cap(s) orally 3 times a day (2018 17:20)  gabapentin extended release: 400 milligram(s) orally 3 times a day (before meals) (2018 17:20)  HYDROcodone: 5 milligram(s) orally every 6 hours (2018 17:20)  lisinopril 20 mg oral tablet: 1 tab(s) orally 2 times a day (2018 17:20)  methadone 5 mg oral tablet: 20 milligram(s) orally 2 times a day (with meals) (2018 17:20)  methotrexate 2.5 mg oral tablet: 12.5 milligram(s) orally once a week (2018 17:20)  NovoLin n 35 units daily  simvastatin 80 mg oral tablet: 1 tab(s) orally once a day (at bedtime) (2018 17:20)      Current (Non-Endocrine) Meds:  aspirin  chewable 81 milliGRAM(s) Oral daily  diclofenac 75 milliGRAM(s) Oral two times a day  DULoxetine 60 milliGRAM(s) Oral daily  enoxaparin Injectable 40 milliGRAM(s) SubCutaneous daily  gabapentin 400 milliGRAM(s) Oral every 8 hours  hydroxychloroquine 400 milliGRAM(s) Oral two times a day with meals  lisinopril 20 milliGRAM(s) Oral two times a day  methadone    Tablet 20 milliGRAM(s) Oral every 12 hours  oxyCODONE    5 mG/acetaminophen 325 mG 1 Tablet(s) Oral every 6 hours PRN      Current Endocrine Meds:   atorvastatin 80 milliGRAM(s) Oral at bedtime  levothyroxine 100 MICROGram(s) Oral daily      Allergies:  No Known Allergies      ROS:  Denies the following except as indicated.    General: weight loss/weight gain, decreased appetite, fatigue, fever  Eyes: blurry vision, double vision  ENT: neck swelling, dysphagia, voice changes   CV: palpitations, SOB, chest pain, cough  GI: nausea, vomiting, diarrhea, constipation, abdominal pain  : nocturia,  polyuria, dysuria  Endo: decreased libido, heat/cold intolerance, jitteriness  MSK: arthralgias, myalgias  Skin: rash, dryness, diaphoresis  Neuro: pedal numbness,pedal paresthesias, pedal pain    Height (cm): 152.4 ( @ 20:00)  Weight (kg): 67.4 ( @ 20:00)  BMI (kg/m2): 29 ( @ 20:00)    Vital Signs Last 24 Hrs  T(C): 36.1 (2018 07:00), Max: 36.5 (2018 15:31)  T(F): 96.9 (2018 07:00), Max: 97.7 (2018 15:31)  HR: 75 (2018 07:00) (75 - 80)  BP: 143/80 (2018 07:00) (124/75 - 143/80)  BP(mean): --  RR: 16 (2018 07:00) (16 - 16)  SpO2: --  Constitutional: WN/WD in NAD.   Neck: no thyromegaly or palpable thyroid nodules   Respiratory: lungs CTAB.  Cardiovascular: regular rate and rhythm, normal S1 and S2, no audible murmurs  GI: soft, NT/ND, no masses/HSM appreciated.  Ext: no edema, no ulcers, pedal pulses palpable bilaterally  Neurology: no tremor, monofilament sensation intact in feet  Psychiatric: A&O x 3, normal affect/mood.    CAPILLARY BLOOD GLUCOSE  291 (2018 06:00)  333 (2018 21:15)  273 (2018 16:05)  175 (2018 06:09)  155 (2018 04:21)  140 (2018 02:10)  265 (2018 21:14)  254 (2018 20:00)          LABS:                        8.7    3.92  )-----------( 244      ( 2018 06:19 )             27.1         137  |  101  |  12  ----------------------------<  178<H>  3.6   |  30  |  0.6<L>    Ca    8.5      2018 05:21        Urinalysis Basic - ( 2018 17:59 )    Color: Yellow / Appearance: Clear / S.025 / pH: x  Gluc: x / Ketone: Negative  / Bili: Negative / Urobili: 0.2   Blood: x / Protein: Negative / Nitrite: Negative   Leuk Esterase: Negative / RBC: x / WBC x   Sq Epi: x / Non Sq Epi: x / Bacteria: x      Hemoglobin A1C, Whole Blood: 6.6 ( @ 05:21)                                 RADIOLOGY & ADDITIONAL STUDIES:    A/P:72yFemale    1.  DM 1   For now:  Glargine-  20  units in am  Lispro 6-    units three times a day before meals   Will continue to monitor     At discharge, recommend:      Pt can follow up at discharge with:    Above discussed with resident.

## 2018-02-10 RX ORDER — INSULIN DEGLUDEC 100 U/ML
20 INJECTION, SOLUTION SUBCUTANEOUS
Qty: 1 | Refills: 1
Start: 2018-02-10 | End: 2018-04-10

## 2018-02-13 DIAGNOSIS — L93.0 DISCOID LUPUS ERYTHEMATOSUS: ICD-10-CM

## 2018-02-13 DIAGNOSIS — E78.5 HYPERLIPIDEMIA, UNSPECIFIED: ICD-10-CM

## 2018-02-13 DIAGNOSIS — M06.9 RHEUMATOID ARTHRITIS, UNSPECIFIED: ICD-10-CM

## 2018-02-13 DIAGNOSIS — E10.649 TYPE 1 DIABETES MELLITUS WITH HYPOGLYCEMIA WITHOUT COMA: ICD-10-CM

## 2018-02-13 DIAGNOSIS — Z79.4 LONG TERM (CURRENT) USE OF INSULIN: ICD-10-CM

## 2018-02-13 DIAGNOSIS — I10 ESSENTIAL (PRIMARY) HYPERTENSION: ICD-10-CM

## 2018-02-13 LAB
CULTURE RESULTS: SIGNIFICANT CHANGE UP
CULTURE RESULTS: SIGNIFICANT CHANGE UP
SPECIMEN SOURCE: SIGNIFICANT CHANGE UP
SPECIMEN SOURCE: SIGNIFICANT CHANGE UP

## 2018-02-26 ENCOUNTER — EMERGENCY (EMERGENCY)
Facility: HOSPITAL | Age: 73
LOS: 0 days | Discharge: HOME | End: 2018-02-26
Attending: EMERGENCY MEDICINE

## 2018-02-26 VITALS
DIASTOLIC BLOOD PRESSURE: 74 MMHG | OXYGEN SATURATION: 98 % | RESPIRATION RATE: 18 BRPM | TEMPERATURE: 97 F | SYSTOLIC BLOOD PRESSURE: 158 MMHG | HEART RATE: 86 BPM

## 2018-02-26 VITALS
SYSTOLIC BLOOD PRESSURE: 173 MMHG | OXYGEN SATURATION: 99 % | HEIGHT: 60 IN | HEART RATE: 77 BPM | WEIGHT: 139.99 LBS | RESPIRATION RATE: 19 BRPM | DIASTOLIC BLOOD PRESSURE: 71 MMHG | TEMPERATURE: 96 F

## 2018-02-26 DIAGNOSIS — Z79.82 LONG TERM (CURRENT) USE OF ASPIRIN: ICD-10-CM

## 2018-02-26 DIAGNOSIS — I10 ESSENTIAL (PRIMARY) HYPERTENSION: ICD-10-CM

## 2018-02-26 DIAGNOSIS — Z79.899 OTHER LONG TERM (CURRENT) DRUG THERAPY: ICD-10-CM

## 2018-02-26 DIAGNOSIS — E11.649 TYPE 2 DIABETES MELLITUS WITH HYPOGLYCEMIA WITHOUT COMA: ICD-10-CM

## 2018-02-26 DIAGNOSIS — E78.00 PURE HYPERCHOLESTEROLEMIA, UNSPECIFIED: ICD-10-CM

## 2018-02-26 DIAGNOSIS — Z79.891 LONG TERM (CURRENT) USE OF OPIATE ANALGESIC: ICD-10-CM

## 2018-02-26 DIAGNOSIS — Z79.4 LONG TERM (CURRENT) USE OF INSULIN: ICD-10-CM

## 2018-02-26 RX ORDER — SODIUM CHLORIDE 9 MG/ML
1000 INJECTION, SOLUTION INTRAVENOUS
Qty: 0 | Refills: 0 | Status: DISCONTINUED | OUTPATIENT
Start: 2018-02-26 | End: 2018-02-26

## 2018-02-26 RX ADMIN — SODIUM CHLORIDE 100 MILLILITER(S): 9 INJECTION, SOLUTION INTRAVENOUS at 16:47

## 2018-02-26 NOTE — ED PROVIDER NOTE - CARE PLAN
Principal Discharge DX:	Hypoglycemia  Assessment and plan of treatment:	pt presently on tresiba 20 units a day with sliding scale of novolog pen.  will maintain sliding scale, but will decrease basal tresiba to 16 units

## 2018-02-26 NOTE — ED ADULT TRIAGE NOTE - CHIEF COMPLAINT QUOTE
EMS brought pt in for blood glucose 26. EMS inserted left foot # 20. EMS administered 1 mg of glucagon and 1 amp of D50

## 2018-02-26 NOTE — ED PROVIDER NOTE - PLAN OF CARE
pt presently on tresiba 20 units a day with sliding scale of novolog pen.  will maintain sliding scale, but will decrease basal tresiba to 16 units

## 2018-02-26 NOTE — ED PROVIDER NOTE - PHYSICAL EXAMINATION
VITAL SIGNS: I have reviewed nursing notes and confirm.  CONSTITUTIONAL: Well-developed; well-nourished; in no acute distress.  SKIN: Skin exam is warm and dry, no acute rash.  HEAD: Normocephalic; atraumatic.  EYES: PERRL, EOM intact; conjunctiva and sclera clear.  ENT: No nasal discharge; airway clear. Throat clear.  NECK: Supple; non tender.  No lymphadenopathy.  CARD: S1, S2 normal; no murmurs, gallops, or rubs. Regular rate and rhythm.  RESP: No wheezes, rales or rhonchi.  ABD: Normal bowel sounds; soft; non-distended; non-tender; no hepatosplenomegaly.  EXT: Normal ROM. No clubbing, cyanosis or edema.  NEURO: Alert, oriented. Grossly unremarkable. No focal deficits.  PSYCH: Cooperative, appropriate.

## 2018-02-26 NOTE — ED PROVIDER NOTE - OBJECTIVE STATEMENT
pt BIBA after found unresponsive.  medics gave D50 and pt quickly regained normal sensorium.  pt denies, pain, injury, chest pain, SOB, dizziness.

## 2018-02-26 NOTE — ED PROVIDER NOTE - NS ED ROS FT
Review of Systems:  	•	CONSTITUTIONAL - no fever, no diaphoresis, no weight change  	•	SKIN - no rash  	•	HEMATOLOGIC - no bleeding, no bruising  	•	EYES - no eye pain, no blurred vision  	•	ENT - no change in hearing, no pain  	•	RESPIRATORY - no shortness of breath, no cough  	•	CARDIAC - see HPI  	•	GI - no abd pain, no nausea, no vomiting, no diarrhea, no constipation, no bleeding  	•	GENITO-URINARY - no discharge, no dysuria; no hematuria, LMP-   	•	ENDO - no polydipsia, no polyuria, no heat/no cold intolerance  	•	MUSCULOSKELETAL - no joint paint, no swelling, no redness  	•	NEUROLOGIC - no weakness, no headache, no anesthesia, no paresthesias  	•	PSYCH - no anxiety, non suicidal, non homicidal, no hallucination, no depression  	•	ALLERGY - no rhinitis

## 2018-04-04 ENCOUNTER — OUTPATIENT (OUTPATIENT)
Dept: OUTPATIENT SERVICES | Facility: HOSPITAL | Age: 73
LOS: 1 days | Discharge: HOME | End: 2018-04-04

## 2018-04-04 DIAGNOSIS — M32.9 SYSTEMIC LUPUS ERYTHEMATOSUS, UNSPECIFIED: ICD-10-CM

## 2018-04-04 DIAGNOSIS — M32.10 SYSTEMIC LUPUS ERYTHEMATOSUS, ORGAN OR SYSTEM INVOLVEMENT UNSPECIFIED: ICD-10-CM

## 2018-04-04 DIAGNOSIS — E78.2 MIXED HYPERLIPIDEMIA: ICD-10-CM

## 2018-04-04 DIAGNOSIS — D50.8 OTHER IRON DEFICIENCY ANEMIAS: ICD-10-CM

## 2018-04-04 DIAGNOSIS — N19 UNSPECIFIED KIDNEY FAILURE: ICD-10-CM

## 2018-09-10 ENCOUNTER — RESULT REVIEW (OUTPATIENT)
Age: 73
End: 2018-09-10

## 2018-09-10 ENCOUNTER — OUTPATIENT (OUTPATIENT)
Dept: OUTPATIENT SERVICES | Facility: HOSPITAL | Age: 73
LOS: 1 days | Discharge: HOME | End: 2018-09-10

## 2018-09-10 DIAGNOSIS — M06.9 RHEUMATOID ARTHRITIS, UNSPECIFIED: ICD-10-CM

## 2018-09-10 DIAGNOSIS — M32.10 SYSTEMIC LUPUS ERYTHEMATOSUS, ORGAN OR SYSTEM INVOLVEMENT UNSPECIFIED: ICD-10-CM

## 2018-09-10 DIAGNOSIS — E53.8 DEFICIENCY OF OTHER SPECIFIED B GROUP VITAMINS: ICD-10-CM

## 2018-09-10 DIAGNOSIS — E11.9 TYPE 2 DIABETES MELLITUS WITHOUT COMPLICATIONS: ICD-10-CM

## 2018-09-10 DIAGNOSIS — D50.8 OTHER IRON DEFICIENCY ANEMIAS: ICD-10-CM

## 2018-09-10 DIAGNOSIS — M32.9 SYSTEMIC LUPUS ERYTHEMATOSUS, UNSPECIFIED: ICD-10-CM

## 2018-09-10 DIAGNOSIS — E78.2 MIXED HYPERLIPIDEMIA: ICD-10-CM

## 2018-09-18 ENCOUNTER — OUTPATIENT (OUTPATIENT)
Dept: OUTPATIENT SERVICES | Facility: HOSPITAL | Age: 73
LOS: 1 days | Discharge: HOME | End: 2018-09-18

## 2018-09-18 ENCOUNTER — RESULT REVIEW (OUTPATIENT)
Age: 73
End: 2018-09-18

## 2018-09-18 DIAGNOSIS — M06.4 INFLAMMATORY POLYARTHROPATHY: ICD-10-CM

## 2018-09-18 DIAGNOSIS — E53.8 DEFICIENCY OF OTHER SPECIFIED B GROUP VITAMINS: ICD-10-CM

## 2018-09-18 DIAGNOSIS — D64.9 ANEMIA, UNSPECIFIED: ICD-10-CM

## 2018-09-18 DIAGNOSIS — E11.9 TYPE 2 DIABETES MELLITUS WITHOUT COMPLICATIONS: ICD-10-CM

## 2018-09-24 ENCOUNTER — OUTPATIENT (OUTPATIENT)
Dept: OUTPATIENT SERVICES | Facility: HOSPITAL | Age: 73
LOS: 1 days | Discharge: HOME | End: 2018-09-24

## 2018-09-24 ENCOUNTER — RESULT REVIEW (OUTPATIENT)
Age: 73
End: 2018-09-24

## 2018-09-24 DIAGNOSIS — E11.9 TYPE 2 DIABETES MELLITUS WITHOUT COMPLICATIONS: ICD-10-CM

## 2018-09-24 DIAGNOSIS — E53.8 DEFICIENCY OF OTHER SPECIFIED B GROUP VITAMINS: ICD-10-CM

## 2018-09-24 DIAGNOSIS — M06.9 RHEUMATOID ARTHRITIS, UNSPECIFIED: ICD-10-CM

## 2018-09-24 DIAGNOSIS — D64.9 ANEMIA, UNSPECIFIED: ICD-10-CM

## 2018-09-26 PROBLEM — Z00.00 ENCOUNTER FOR PREVENTIVE HEALTH EXAMINATION: Status: ACTIVE | Noted: 2018-09-26

## 2018-10-26 ENCOUNTER — APPOINTMENT (OUTPATIENT)
Dept: HEMATOLOGY ONCOLOGY | Facility: CLINIC | Age: 73
End: 2018-10-26

## 2018-10-26 ENCOUNTER — LABORATORY RESULT (OUTPATIENT)
Age: 73
End: 2018-10-26

## 2018-10-26 ENCOUNTER — OUTPATIENT (OUTPATIENT)
Dept: OUTPATIENT SERVICES | Facility: HOSPITAL | Age: 73
LOS: 1 days | Discharge: HOME | End: 2018-10-26

## 2018-10-26 VITALS
WEIGHT: 141 LBS | HEIGHT: 60 IN | SYSTOLIC BLOOD PRESSURE: 128 MMHG | BODY MASS INDEX: 27.68 KG/M2 | HEART RATE: 101 BPM | RESPIRATION RATE: 14 BRPM | DIASTOLIC BLOOD PRESSURE: 60 MMHG | TEMPERATURE: 97.9 F

## 2018-10-26 DIAGNOSIS — E10.9 TYPE 1 DIABETES MELLITUS W/OUT COMPLICATIONS: ICD-10-CM

## 2018-10-26 DIAGNOSIS — Z86.2 PERSONAL HISTORY OF DISEASES OF THE BLOOD AND BLOOD-FORMING ORGANS AND CERTAIN DISORDERS INVOLVING THE IMMUNE MECHANISM: ICD-10-CM

## 2018-10-26 DIAGNOSIS — Z63.4 DISAPPEARANCE AND DEATH OF FAMILY MEMBER: ICD-10-CM

## 2018-10-26 DIAGNOSIS — E78.00 PURE HYPERCHOLESTEROLEMIA, UNSPECIFIED: ICD-10-CM

## 2018-10-26 DIAGNOSIS — M32.9 SYSTEMIC LUPUS ERYTHEMATOSUS, UNSPECIFIED: ICD-10-CM

## 2018-10-26 DIAGNOSIS — Z86.73 PERSONAL HISTORY OF TRANSIENT ISCHEMIC ATTACK (TIA), AND CEREBRAL INFARCTION W/OUT RESIDUAL DEFICITS: ICD-10-CM

## 2018-10-26 DIAGNOSIS — I10 ESSENTIAL (PRIMARY) HYPERTENSION: ICD-10-CM

## 2018-10-26 LAB
HCT VFR BLD CALC: 29.4 %
HGB BLD-MCNC: 8.9 G/DL
MCHC RBC-ENTMCNC: 25.9 PG
MCHC RBC-ENTMCNC: 30.3 G/DL
MCV RBC AUTO: 85.7 FL
PLATELET # BLD AUTO: 237 K/UL
PMV BLD: 10.5 FL
RBC # BLD: 3.43 M/UL
RBC # FLD: 16.4 %
RETICS # AUTO: 1.5 %
RETICS AGGREG/RBC NFR: 50.4 K/UL
WBC # FLD AUTO: 5.3 K/UL

## 2018-10-26 SDOH — SOCIAL STABILITY - SOCIAL INSECURITY: DISSAPEARANCE AND DEATH OF FAMILY MEMBER: Z63.4

## 2018-10-29 DIAGNOSIS — D64.9 ANEMIA, UNSPECIFIED: ICD-10-CM

## 2018-10-29 LAB
ALBUMIN MFR SERPL ELPH: 52.9 %
ALBUMIN SERPL ELPH-MCNC: 3.6 G/DL
ALBUMIN SERPL-MCNC: 3.5 G/DL
ALBUMIN/GLOB SERPL: 1.1 RATIO
ALP BLD-CCNC: 147 U/L
ALPHA1 GLOB MFR SERPL ELPH: 5.5 %
ALPHA1 GLOB SERPL ELPH-MCNC: 0.4 G/DL
ALPHA2 GLOB MFR SERPL ELPH: 10.4 %
ALPHA2 GLOB SERPL ELPH-MCNC: 0.7 G/DL
ALT SERPL-CCNC: 13 U/L
ANION GAP SERPL CALC-SCNC: 14 MMOL/L
AST SERPL-CCNC: 17 U/L
B-GLOBULIN MFR SERPL ELPH: 11.6 %
B-GLOBULIN SERPL ELPH-MCNC: 0.8 G/DL
BILIRUB SERPL-MCNC: 0.2 MG/DL
BUN SERPL-MCNC: 10 MG/DL
CALCIUM SERPL-MCNC: 8.8 MG/DL
CHLORIDE SERPL-SCNC: 97 MMOL/L
CO2 SERPL-SCNC: 29 MMOL/L
CREAT SERPL-MCNC: 0.7 MG/DL
DEPRECATED KAPPA LC FREE/LAMBDA SER: 1.51 RATIO
FERRITIN SERPL-MCNC: 24 NG/ML
FOLATE SERPL-MCNC: >20 NG/ML
GAMMA GLOB FLD ELPH-MCNC: 1.3 G/DL
GAMMA GLOB MFR SERPL ELPH: 19.6 %
GLUCOSE SERPL-MCNC: 200 MG/DL
INTERPRETATION SERPL IEP-IMP: NORMAL
KAPPA LC CSF-MCNC: 3.33 MG/DL
KAPPA LC SERPL-MCNC: 5.04 MG/DL
LDH SERPL-CCNC: 241
M PROTEIN SPEC IFE-MCNC: NORMAL
POTASSIUM SERPL-SCNC: 3.4 MMOL/L
PROT SERPL-MCNC: 6.7 G/DL
RHEUMATOID FACT SER QL: <10 IU/ML
SODIUM SERPL-SCNC: 140 MMOL/L
VIT B12 SERPL-MCNC: 608 PG/ML

## 2018-10-30 LAB — METHYLMALONATE SERPL-SCNC: 200 NMOL/L

## 2019-05-09 ENCOUNTER — OUTPATIENT (OUTPATIENT)
Dept: OUTPATIENT SERVICES | Facility: HOSPITAL | Age: 74
LOS: 1 days | Discharge: HOME | End: 2019-05-09

## 2019-05-09 DIAGNOSIS — D50.8 OTHER IRON DEFICIENCY ANEMIAS: ICD-10-CM

## 2019-05-09 DIAGNOSIS — E78.2 MIXED HYPERLIPIDEMIA: ICD-10-CM

## 2019-05-09 DIAGNOSIS — M32.10 SYSTEMIC LUPUS ERYTHEMATOSUS, ORGAN OR SYSTEM INVOLVEMENT UNSPECIFIED: ICD-10-CM

## 2019-06-12 ENCOUNTER — OUTPATIENT (OUTPATIENT)
Dept: OUTPATIENT SERVICES | Facility: HOSPITAL | Age: 74
LOS: 1 days | Discharge: HOME | End: 2019-06-12

## 2019-06-12 DIAGNOSIS — E03.9 HYPOTHYROIDISM, UNSPECIFIED: ICD-10-CM

## 2019-06-12 DIAGNOSIS — D50.9 IRON DEFICIENCY ANEMIA, UNSPECIFIED: ICD-10-CM

## 2019-06-19 ENCOUNTER — INPATIENT (INPATIENT)
Facility: HOSPITAL | Age: 74
LOS: 5 days | Discharge: HOME | End: 2019-06-25
Attending: SURGERY | Admitting: SURGERY
Payer: MEDICARE

## 2019-06-19 VITALS
TEMPERATURE: 98 F | DIASTOLIC BLOOD PRESSURE: 79 MMHG | HEIGHT: 60 IN | WEIGHT: 145.06 LBS | HEART RATE: 86 BPM | RESPIRATION RATE: 18 BRPM | OXYGEN SATURATION: 98 % | SYSTOLIC BLOOD PRESSURE: 183 MMHG

## 2019-06-19 LAB
ALBUMIN SERPL ELPH-MCNC: 3.6 G/DL — SIGNIFICANT CHANGE UP (ref 3.5–5.2)
ALP SERPL-CCNC: 117 U/L — HIGH (ref 30–115)
ALT FLD-CCNC: 11 U/L — SIGNIFICANT CHANGE UP (ref 0–41)
ANION GAP SERPL CALC-SCNC: 10 MMOL/L — SIGNIFICANT CHANGE UP (ref 7–14)
ANION GAP SERPL CALC-SCNC: 11 MMOL/L — SIGNIFICANT CHANGE UP (ref 7–14)
ANION GAP SERPL CALC-SCNC: 9 MMOL/L — SIGNIFICANT CHANGE UP (ref 7–14)
APTT BLD: 30.7 SEC — SIGNIFICANT CHANGE UP (ref 27–39.2)
APTT BLD: 32.1 SEC — SIGNIFICANT CHANGE UP (ref 27–39.2)
APTT BLD: 33.7 SEC — SIGNIFICANT CHANGE UP (ref 27–39.2)
AST SERPL-CCNC: 23 U/L — SIGNIFICANT CHANGE UP (ref 0–41)
BASOPHILS # BLD AUTO: 0.02 K/UL — SIGNIFICANT CHANGE UP (ref 0–0.2)
BASOPHILS # BLD AUTO: 0.02 K/UL — SIGNIFICANT CHANGE UP (ref 0–0.2)
BASOPHILS NFR BLD AUTO: 0.3 % — SIGNIFICANT CHANGE UP (ref 0–1)
BASOPHILS NFR BLD AUTO: 0.3 % — SIGNIFICANT CHANGE UP (ref 0–1)
BILIRUB SERPL-MCNC: 0.3 MG/DL — SIGNIFICANT CHANGE UP (ref 0.2–1.2)
BLD GP AB SCN SERPL QL: SIGNIFICANT CHANGE UP
BUN SERPL-MCNC: 11 MG/DL — SIGNIFICANT CHANGE UP (ref 10–20)
BUN SERPL-MCNC: 13 MG/DL — SIGNIFICANT CHANGE UP (ref 10–20)
BUN SERPL-MCNC: 9 MG/DL — LOW (ref 10–20)
CALCIUM SERPL-MCNC: 8.6 MG/DL — SIGNIFICANT CHANGE UP (ref 8.5–10.1)
CALCIUM SERPL-MCNC: 8.8 MG/DL — SIGNIFICANT CHANGE UP (ref 8.5–10.1)
CALCIUM SERPL-MCNC: 8.8 MG/DL — SIGNIFICANT CHANGE UP (ref 8.5–10.1)
CHLORIDE SERPL-SCNC: 101 MMOL/L — SIGNIFICANT CHANGE UP (ref 98–110)
CHLORIDE SERPL-SCNC: 96 MMOL/L — LOW (ref 98–110)
CHLORIDE SERPL-SCNC: 99 MMOL/L — SIGNIFICANT CHANGE UP (ref 98–110)
CK MB CFR SERPL CALC: 15 NG/ML — HIGH (ref 0.6–6.3)
CK SERPL-CCNC: 416 U/L — HIGH (ref 0–225)
CO2 SERPL-SCNC: 29 MMOL/L — SIGNIFICANT CHANGE UP (ref 17–32)
CO2 SERPL-SCNC: 30 MMOL/L — SIGNIFICANT CHANGE UP (ref 17–32)
CO2 SERPL-SCNC: 31 MMOL/L — SIGNIFICANT CHANGE UP (ref 17–32)
CREAT SERPL-MCNC: 0.5 MG/DL — LOW (ref 0.7–1.5)
CREAT SERPL-MCNC: 0.6 MG/DL — LOW (ref 0.7–1.5)
CREAT SERPL-MCNC: 0.7 MG/DL — SIGNIFICANT CHANGE UP (ref 0.7–1.5)
EOSINOPHIL # BLD AUTO: 0.01 K/UL — SIGNIFICANT CHANGE UP (ref 0–0.7)
EOSINOPHIL # BLD AUTO: 0.03 K/UL — SIGNIFICANT CHANGE UP (ref 0–0.7)
EOSINOPHIL NFR BLD AUTO: 0.1 % — SIGNIFICANT CHANGE UP (ref 0–8)
EOSINOPHIL NFR BLD AUTO: 0.4 % — SIGNIFICANT CHANGE UP (ref 0–8)
ESTIMATED AVERAGE GLUCOSE: 169 MG/DL — HIGH (ref 68–114)
ETHANOL SERPL-MCNC: <10 MG/DL — SIGNIFICANT CHANGE UP
GLUCOSE BLDC GLUCOMTR-MCNC: 112 MG/DL — HIGH (ref 70–99)
GLUCOSE BLDC GLUCOMTR-MCNC: 89 MG/DL — SIGNIFICANT CHANGE UP (ref 70–99)
GLUCOSE SERPL-MCNC: 140 MG/DL — HIGH (ref 70–99)
GLUCOSE SERPL-MCNC: 190 MG/DL — HIGH (ref 70–99)
GLUCOSE SERPL-MCNC: 378 MG/DL — HIGH (ref 70–99)
HBA1C BLD-MCNC: 7.5 % — HIGH (ref 4–5.6)
HCT VFR BLD CALC: 30.4 % — LOW (ref 37–47)
HCT VFR BLD CALC: 32 % — LOW (ref 37–47)
HCT VFR BLD CALC: 33.5 % — LOW (ref 37–47)
HGB BLD-MCNC: 10.3 G/DL — LOW (ref 12–16)
HGB BLD-MCNC: 10.8 G/DL — LOW (ref 12–16)
HGB BLD-MCNC: 9.6 G/DL — LOW (ref 12–16)
IMM GRANULOCYTES NFR BLD AUTO: 0.3 % — SIGNIFICANT CHANGE UP (ref 0.1–0.3)
IMM GRANULOCYTES NFR BLD AUTO: 0.4 % — HIGH (ref 0.1–0.3)
INR BLD: 1.02 RATIO — SIGNIFICANT CHANGE UP (ref 0.65–1.3)
INR BLD: 1.04 RATIO — SIGNIFICANT CHANGE UP (ref 0.65–1.3)
INR BLD: 1.1 RATIO — SIGNIFICANT CHANGE UP (ref 0.65–1.3)
LACTATE SERPL-SCNC: 1.2 MMOL/L — SIGNIFICANT CHANGE UP (ref 0.5–2.2)
LIDOCAIN IGE QN: 7 U/L — SIGNIFICANT CHANGE UP (ref 7–60)
LYMPHOCYTES # BLD AUTO: 0.74 K/UL — LOW (ref 1.2–3.4)
LYMPHOCYTES # BLD AUTO: 1.22 K/UL — SIGNIFICANT CHANGE UP (ref 1.2–3.4)
LYMPHOCYTES # BLD AUTO: 16.9 % — LOW (ref 20.5–51.1)
LYMPHOCYTES # BLD AUTO: 9.5 % — LOW (ref 20.5–51.1)
MAGNESIUM SERPL-MCNC: 1.6 MG/DL — LOW (ref 1.8–2.4)
MAGNESIUM SERPL-MCNC: 1.7 MG/DL — LOW (ref 1.8–2.4)
MCHC RBC-ENTMCNC: 28.2 PG — SIGNIFICANT CHANGE UP (ref 27–31)
MCHC RBC-ENTMCNC: 28.8 PG — SIGNIFICANT CHANGE UP (ref 27–31)
MCHC RBC-ENTMCNC: 29.3 PG — SIGNIFICANT CHANGE UP (ref 27–31)
MCHC RBC-ENTMCNC: 31.6 G/DL — LOW (ref 32–37)
MCHC RBC-ENTMCNC: 32.2 G/DL — SIGNIFICANT CHANGE UP (ref 32–37)
MCHC RBC-ENTMCNC: 32.2 G/DL — SIGNIFICANT CHANGE UP (ref 32–37)
MCV RBC AUTO: 89.4 FL — SIGNIFICANT CHANGE UP (ref 81–99)
MCV RBC AUTO: 89.4 FL — SIGNIFICANT CHANGE UP (ref 81–99)
MCV RBC AUTO: 91 FL — SIGNIFICANT CHANGE UP (ref 81–99)
MONOCYTES # BLD AUTO: 0.26 K/UL — SIGNIFICANT CHANGE UP (ref 0.1–0.6)
MONOCYTES # BLD AUTO: 0.32 K/UL — SIGNIFICANT CHANGE UP (ref 0.1–0.6)
MONOCYTES NFR BLD AUTO: 3.6 % — SIGNIFICANT CHANGE UP (ref 1.7–9.3)
MONOCYTES NFR BLD AUTO: 4.1 % — SIGNIFICANT CHANGE UP (ref 1.7–9.3)
NEUTROPHILS # BLD AUTO: 5.67 K/UL — SIGNIFICANT CHANGE UP (ref 1.4–6.5)
NEUTROPHILS # BLD AUTO: 6.62 K/UL — HIGH (ref 1.4–6.5)
NEUTROPHILS NFR BLD AUTO: 78.8 % — HIGH (ref 42.2–75.2)
NEUTROPHILS NFR BLD AUTO: 85.3 % — HIGH (ref 42.2–75.2)
NRBC # BLD: 0 /100 WBCS — SIGNIFICANT CHANGE UP (ref 0–0)
PHOSPHATE SERPL-MCNC: 2.7 MG/DL — SIGNIFICANT CHANGE UP (ref 2.1–4.9)
PHOSPHATE SERPL-MCNC: 3.1 MG/DL — SIGNIFICANT CHANGE UP (ref 2.1–4.9)
PLATELET # BLD AUTO: 219 K/UL — SIGNIFICANT CHANGE UP (ref 130–400)
PLATELET # BLD AUTO: 235 K/UL — SIGNIFICANT CHANGE UP (ref 130–400)
PLATELET # BLD AUTO: 238 K/UL — SIGNIFICANT CHANGE UP (ref 130–400)
POTASSIUM SERPL-MCNC: 3.4 MMOL/L — LOW (ref 3.5–5)
POTASSIUM SERPL-MCNC: 3.6 MMOL/L — SIGNIFICANT CHANGE UP (ref 3.5–5)
POTASSIUM SERPL-MCNC: 3.9 MMOL/L — SIGNIFICANT CHANGE UP (ref 3.5–5)
POTASSIUM SERPL-SCNC: 3.4 MMOL/L — LOW (ref 3.5–5)
POTASSIUM SERPL-SCNC: 3.6 MMOL/L — SIGNIFICANT CHANGE UP (ref 3.5–5)
POTASSIUM SERPL-SCNC: 3.9 MMOL/L — SIGNIFICANT CHANGE UP (ref 3.5–5)
PROT SERPL-MCNC: 6.3 G/DL — SIGNIFICANT CHANGE UP (ref 6–8)
PROTHROM AB SERPL-ACNC: 11.7 SEC — SIGNIFICANT CHANGE UP (ref 9.95–12.87)
PROTHROM AB SERPL-ACNC: 11.9 SEC — SIGNIFICANT CHANGE UP (ref 9.95–12.87)
PROTHROM AB SERPL-ACNC: 12.6 SEC — SIGNIFICANT CHANGE UP (ref 9.95–12.87)
RBC # BLD: 3.4 M/UL — LOW (ref 4.2–5.4)
RBC # BLD: 3.58 M/UL — LOW (ref 4.2–5.4)
RBC # BLD: 3.68 M/UL — LOW (ref 4.2–5.4)
RBC # FLD: 14 % — SIGNIFICANT CHANGE UP (ref 11.5–14.5)
RBC # FLD: 14 % — SIGNIFICANT CHANGE UP (ref 11.5–14.5)
RBC # FLD: 14.1 % — SIGNIFICANT CHANGE UP (ref 11.5–14.5)
SODIUM SERPL-SCNC: 135 MMOL/L — SIGNIFICANT CHANGE UP (ref 135–146)
SODIUM SERPL-SCNC: 140 MMOL/L — SIGNIFICANT CHANGE UP (ref 135–146)
SODIUM SERPL-SCNC: 141 MMOL/L — SIGNIFICANT CHANGE UP (ref 135–146)
TROPONIN T SERPL-MCNC: <0.01 NG/ML — SIGNIFICANT CHANGE UP
WBC # BLD: 6.24 K/UL — SIGNIFICANT CHANGE UP (ref 4.8–10.8)
WBC # BLD: 7.2 K/UL — SIGNIFICANT CHANGE UP (ref 4.8–10.8)
WBC # BLD: 7.76 K/UL — SIGNIFICANT CHANGE UP (ref 4.8–10.8)
WBC # FLD AUTO: 6.24 K/UL — SIGNIFICANT CHANGE UP (ref 4.8–10.8)
WBC # FLD AUTO: 7.2 K/UL — SIGNIFICANT CHANGE UP (ref 4.8–10.8)
WBC # FLD AUTO: 7.76 K/UL — SIGNIFICANT CHANGE UP (ref 4.8–10.8)

## 2019-06-19 PROCEDURE — 99291 CRITICAL CARE FIRST HOUR: CPT | Mod: 25

## 2019-06-19 PROCEDURE — 99291 CRITICAL CARE FIRST HOUR: CPT

## 2019-06-19 PROCEDURE — 12001 RPR S/N/AX/GEN/TRNK 2.5CM/<: CPT

## 2019-06-19 PROCEDURE — 70450 CT HEAD/BRAIN W/O DYE: CPT | Mod: 26

## 2019-06-19 PROCEDURE — 72125 CT NECK SPINE W/O DYE: CPT | Mod: 26

## 2019-06-19 PROCEDURE — 71045 X-RAY EXAM CHEST 1 VIEW: CPT | Mod: 26

## 2019-06-19 PROCEDURE — 93880 EXTRACRANIAL BILAT STUDY: CPT | Mod: 26

## 2019-06-19 PROCEDURE — 71046 X-RAY EXAM CHEST 2 VIEWS: CPT | Mod: 26

## 2019-06-19 PROCEDURE — 72170 X-RAY EXAM OF PELVIS: CPT | Mod: 26

## 2019-06-19 PROCEDURE — 99292 CRITICAL CARE ADDL 30 MIN: CPT | Mod: 25

## 2019-06-19 RX ORDER — LEVETIRACETAM 250 MG/1
500 TABLET, FILM COATED ORAL EVERY 12 HOURS
Refills: 0 | Status: DISCONTINUED | OUTPATIENT
Start: 2019-06-19 | End: 2019-06-25

## 2019-06-19 RX ORDER — HYDROCHLOROTHIAZIDE 25 MG
50 TABLET ORAL ONCE
Refills: 0 | Status: COMPLETED | OUTPATIENT
Start: 2019-06-19 | End: 2019-06-19

## 2019-06-19 RX ORDER — PANTOPRAZOLE SODIUM 20 MG/1
40 TABLET, DELAYED RELEASE ORAL
Refills: 0 | Status: DISCONTINUED | OUTPATIENT
Start: 2019-06-19 | End: 2019-06-25

## 2019-06-19 RX ORDER — ATORVASTATIN CALCIUM 80 MG/1
40 TABLET, FILM COATED ORAL AT BEDTIME
Refills: 0 | Status: DISCONTINUED | OUTPATIENT
Start: 2019-06-19 | End: 2019-06-25

## 2019-06-19 RX ORDER — POTASSIUM PHOSPHATE, MONOBASIC POTASSIUM PHOSPHATE, DIBASIC 236; 224 MG/ML; MG/ML
15 INJECTION, SOLUTION INTRAVENOUS ONCE
Refills: 0 | Status: COMPLETED | OUTPATIENT
Start: 2019-06-19 | End: 2019-06-19

## 2019-06-19 RX ORDER — LEVOTHYROXINE SODIUM 125 MCG
100 TABLET ORAL DAILY
Refills: 0 | Status: DISCONTINUED | OUTPATIENT
Start: 2019-06-19 | End: 2019-06-25

## 2019-06-19 RX ORDER — MAGNESIUM SULFATE 500 MG/ML
1 VIAL (ML) INJECTION ONCE
Refills: 0 | Status: COMPLETED | OUTPATIENT
Start: 2019-06-19 | End: 2019-06-19

## 2019-06-19 RX ORDER — HYDROCHLOROTHIAZIDE 25 MG
0 TABLET ORAL
Qty: 0 | Refills: 0 | DISCHARGE

## 2019-06-19 RX ORDER — FOLIC ACID 0.8 MG
1 TABLET ORAL DAILY
Refills: 0 | Status: DISCONTINUED | OUTPATIENT
Start: 2019-06-19 | End: 2019-06-25

## 2019-06-19 RX ORDER — TETANUS TOXOID, REDUCED DIPHTHERIA TOXOID AND ACELLULAR PERTUSSIS VACCINE, ADSORBED 5; 2.5; 8; 8; 2.5 [IU]/.5ML; [IU]/.5ML; UG/.5ML; UG/.5ML; UG/.5ML
0.5 SUSPENSION INTRAMUSCULAR ONCE
Refills: 0 | Status: COMPLETED | OUTPATIENT
Start: 2019-06-19 | End: 2019-06-19

## 2019-06-19 RX ORDER — GABAPENTIN 400 MG/1
400 CAPSULE ORAL THREE TIMES A DAY
Refills: 0 | Status: DISCONTINUED | OUTPATIENT
Start: 2019-06-19 | End: 2019-06-25

## 2019-06-19 RX ORDER — GABAPENTIN 400 MG/1
400 CAPSULE ORAL ONCE
Refills: 0 | Status: COMPLETED | OUTPATIENT
Start: 2019-06-19 | End: 2019-06-19

## 2019-06-19 RX ORDER — METHADONE HYDROCHLORIDE 40 MG/1
20 TABLET ORAL
Refills: 0 | Status: DISCONTINUED | OUTPATIENT
Start: 2019-06-19 | End: 2019-06-20

## 2019-06-19 RX ORDER — ACETAMINOPHEN 500 MG
650 TABLET ORAL EVERY 6 HOURS
Refills: 0 | Status: DISCONTINUED | OUTPATIENT
Start: 2019-06-19 | End: 2019-06-20

## 2019-06-19 RX ORDER — INSULIN LISPRO 100/ML
VIAL (ML) SUBCUTANEOUS EVERY 4 HOURS
Refills: 0 | Status: DISCONTINUED | OUTPATIENT
Start: 2019-06-19 | End: 2019-06-21

## 2019-06-19 RX ORDER — LISINOPRIL 2.5 MG/1
20 TABLET ORAL DAILY
Refills: 0 | Status: DISCONTINUED | OUTPATIENT
Start: 2019-06-19 | End: 2019-06-20

## 2019-06-19 RX ORDER — HYDROXYCHLOROQUINE SULFATE 200 MG
200 TABLET ORAL
Refills: 0 | Status: DISCONTINUED | OUTPATIENT
Start: 2019-06-19 | End: 2019-06-25

## 2019-06-19 RX ORDER — HYDROCODONE BITARTRATE 50 MG/1
5 CAPSULE, EXTENDED RELEASE ORAL
Qty: 0 | Refills: 0 | DISCHARGE

## 2019-06-19 RX ORDER — METHADONE HYDROCHLORIDE 40 MG/1
5 TABLET ORAL ONCE
Refills: 0 | Status: DISCONTINUED | OUTPATIENT
Start: 2019-06-19 | End: 2019-06-19

## 2019-06-19 RX ORDER — SENNA PLUS 8.6 MG/1
2 TABLET ORAL AT BEDTIME
Refills: 0 | Status: DISCONTINUED | OUTPATIENT
Start: 2019-06-19 | End: 2019-06-25

## 2019-06-19 RX ORDER — SODIUM CHLORIDE 9 MG/ML
1000 INJECTION, SOLUTION INTRAVENOUS
Refills: 0 | Status: DISCONTINUED | OUTPATIENT
Start: 2019-06-19 | End: 2019-06-21

## 2019-06-19 RX ORDER — OXYCODONE HYDROCHLORIDE 5 MG/1
5 TABLET ORAL ONCE
Refills: 0 | Status: DISCONTINUED | OUTPATIENT
Start: 2019-06-19 | End: 2019-06-20

## 2019-06-19 RX ORDER — DULOXETINE HYDROCHLORIDE 30 MG/1
60 CAPSULE, DELAYED RELEASE ORAL DAILY
Refills: 0 | Status: DISCONTINUED | OUTPATIENT
Start: 2019-06-19 | End: 2019-06-25

## 2019-06-19 RX ORDER — INSULIN HUMAN 100 [IU]/ML
6 INJECTION, SOLUTION SUBCUTANEOUS ONCE
Refills: 0 | Status: COMPLETED | OUTPATIENT
Start: 2019-06-19 | End: 2019-06-19

## 2019-06-19 RX ORDER — INSULIN LISPRO 100/ML
VIAL (ML) SUBCUTANEOUS
Refills: 0 | Status: DISCONTINUED | OUTPATIENT
Start: 2019-06-19 | End: 2019-06-19

## 2019-06-19 RX ORDER — CHLORHEXIDINE GLUCONATE 213 G/1000ML
1 SOLUTION TOPICAL
Refills: 0 | Status: DISCONTINUED | OUTPATIENT
Start: 2019-06-19 | End: 2019-06-25

## 2019-06-19 RX ORDER — METHOTREXATE 2.5 MG/1
2.5 TABLET ORAL
Refills: 0 | Status: DISCONTINUED | OUTPATIENT
Start: 2019-06-21 | End: 2019-06-25

## 2019-06-19 RX ORDER — HYDROXYCHLOROQUINE SULFATE 200 MG
200 TABLET ORAL ONCE
Refills: 0 | Status: COMPLETED | OUTPATIENT
Start: 2019-06-19 | End: 2019-06-19

## 2019-06-19 RX ADMIN — Medication 650 MILLIGRAM(S): at 23:30

## 2019-06-19 RX ADMIN — Medication 200 MILLIGRAM(S): at 14:53

## 2019-06-19 RX ADMIN — INSULIN HUMAN 6 UNIT(S): 100 INJECTION, SOLUTION SUBCUTANEOUS at 10:40

## 2019-06-19 RX ADMIN — SODIUM CHLORIDE 100 MILLILITER(S): 9 INJECTION, SOLUTION INTRAVENOUS at 21:23

## 2019-06-19 RX ADMIN — TETANUS TOXOID, REDUCED DIPHTHERIA TOXOID AND ACELLULAR PERTUSSIS VACCINE, ADSORBED 0.5 MILLILITER(S): 5; 2.5; 8; 8; 2.5 SUSPENSION INTRAMUSCULAR at 10:42

## 2019-06-19 RX ADMIN — Medication 50 GRAM(S): at 18:42

## 2019-06-19 RX ADMIN — Medication 650 MILLIGRAM(S): at 23:01

## 2019-06-19 RX ADMIN — GABAPENTIN 400 MILLIGRAM(S): 400 CAPSULE ORAL at 22:48

## 2019-06-19 RX ADMIN — LEVETIRACETAM 420 MILLIGRAM(S): 250 TABLET, FILM COATED ORAL at 14:55

## 2019-06-19 RX ADMIN — METHADONE HYDROCHLORIDE 5 MILLIGRAM(S): 40 TABLET ORAL at 14:58

## 2019-06-19 RX ADMIN — ATORVASTATIN CALCIUM 40 MILLIGRAM(S): 80 TABLET, FILM COATED ORAL at 22:48

## 2019-06-19 RX ADMIN — Medication 1 MILLIGRAM(S): at 14:53

## 2019-06-19 RX ADMIN — SENNA PLUS 2 TABLET(S): 8.6 TABLET ORAL at 22:49

## 2019-06-19 RX ADMIN — METHADONE HYDROCHLORIDE 20 MILLIGRAM(S): 40 TABLET ORAL at 18:41

## 2019-06-19 RX ADMIN — DULOXETINE HYDROCHLORIDE 60 MILLIGRAM(S): 30 CAPSULE, DELAYED RELEASE ORAL at 14:52

## 2019-06-19 RX ADMIN — LISINOPRIL 20 MILLIGRAM(S): 2.5 TABLET ORAL at 14:52

## 2019-06-19 RX ADMIN — Medication 200 MILLIGRAM(S): at 18:41

## 2019-06-19 RX ADMIN — POTASSIUM PHOSPHATE, MONOBASIC POTASSIUM PHOSPHATE, DIBASIC 63.75 MILLIMOLE(S): 236; 224 INJECTION, SOLUTION INTRAVENOUS at 21:22

## 2019-06-19 RX ADMIN — SODIUM CHLORIDE 100 MILLILITER(S): 9 INJECTION, SOLUTION INTRAVENOUS at 18:41

## 2019-06-19 RX ADMIN — Medication 100 MICROGRAM(S): at 14:53

## 2019-06-19 RX ADMIN — Medication 50 MILLIGRAM(S): at 14:52

## 2019-06-19 RX ADMIN — GABAPENTIN 400 MILLIGRAM(S): 400 CAPSULE ORAL at 14:53

## 2019-06-19 NOTE — H&P ADULT - ASSESSMENT
73F w/ PMHx of HTN, HLD, HLD, TIA, Lupus/RA BIBA s/p fall from standing in the bathroom this am, walking to bathroom, bumped into sink and fell backwards, striking back of head on the tile floor. + HT, No LOC, +ASA 81mg,  Pt found by family standing after fall. Use cane to ambulate. Take 81mg ASA daily. Trauma work up with CTH demonstrating Rt inferior frontal subdural hematoma.     PLAN:  - SICU for q1h neurochecks  - NSx: repeat head ct in am or if MS changes, hold ASA 81mg, No need for platelets,  neuro checks q1h, Keppra 500mg BID.  - Reg diet, IVL  - Restarted home meds. 73F w/ PMHx of HTN, HLD, HLD, TIA, Lupus/RA BIBA s/p fall from standing in the bathroom this am, walking to bathroom, bumped into sink and fell backwards, striking back of head on the tile floor. + HT, No LOC, +ASA 81mg,  Pt found by family standing after fall. Use cane to ambulate. Take 81mg ASA daily. Trauma work up with CTH demonstrating Rt inferior frontal subdural hematoma.     PLAN:  - SICU for q1h neurochecks  - NSx: repeat head ct in am or if MS changes, hold ASA 81mg, No need for platelets,  neuro checks q1h, Keppra 500mg BID.  - f/u C. Duplex due to hx of TIA  - Reg diet, IVL  - Restarted home meds.

## 2019-06-19 NOTE — ED PROVIDER NOTE - OBJECTIVE STATEMENT
Patient fell this am, hit head on sink. No LOC, Found by family standing after fall. patient  denies HA, Chest pain, SOB,. H/o chronic back and leg pain,. Use cane to ambulate. Take 81mg ASA daily. Patient fell this am, hit head on sink. No LOC, Found by family standing after fall. patient  denies HA, Chest pain, SOB,. H/o chronic back and leg pain,. Use cane to ambulate. Take 81mg ASA daily. Patient was walking to bathroom, bumped into sink and fell backwards, striking back of head.

## 2019-06-19 NOTE — H&P ADULT - NSHPPHYSICALEXAM_GEN_ALL_CORE
PHYSICAL EXAM:  General: NAD, AAOx3, calm and cooperative  Cardiac: RRR S1, S2, no Murmurs, rubs or gallops  Respiratory: CTAB, normal respiratory effort, breath sounds equal BL, no wheeze, rhonchi or crackles  Abdomen: Soft, non-distended, non-tender, no rebound, no guarding. +BS.  Musculoskeletal: Strength 5/5 BL UE/LE, ROM intact.  Neuro: Sensation grossly intact and equal throughout, no focal deficits

## 2019-06-19 NOTE — ED PROVIDER NOTE - NSRISKOFTRANSFER_ED_A_ED
Transportation Risk (There is always a risk of traffic delays resulting in deterioration of condition.)/Deterioration of Condition En Route/Death or Disability

## 2019-06-19 NOTE — ED ADULT NURSE REASSESSMENT NOTE - NS ED NURSE REASSESS COMMENT FT1
Received pt at 12:00pm via ambulance from [facility] ER s/p mechanical fall without LOC. Report received from Paramedics. Pt A&O x 4, calm, cooperative, conversing in clear, coherent and age-appropriate speech. Pt c/o pain to b/l shoulder and back of head. Denies dizziness. As per paramedic, pt was given Insulin R 6 units at [facility] ER for serum glucose 378. Repeat FS upon arrival = 238. 18gauge IV catheter x 2 present on right forearm, (+) good blood return, no s/sx of infection/infiltration. AROM x 4 WNL. Neuro VS stable. Pt's grandson at bedside. Fall prevention plan in place. Will continue to monitor pt and provide care as planned.

## 2019-06-19 NOTE — CONSULT NOTE ADULT - SUBJECTIVE AND OBJECTIVE BOX
SICU Consultation Note  =====================================================  HPI: 73y Female with significant PMH listed below, fell this am from standing +HT, - LOC on ASA. Pt reports no HA, Chest pain, SOB palpitations, lightheadedness, dizziness preceding fall. Pt states she falls often 2/2 her knee pain, and that she has a walked but does not use it. Pt has history of TIA in the past with no deficits. Pt has small posterior scalp lac 1-2cm with sutures in place. CT head reveal contrecoup injury with small 0.7cm wide right frontal SDH. No other acute injuries noted. SICU consult for q1 neurochecks.       PAST MEDICAL & SURGICAL HISTORY:  Herniated lumbar intervertebral disc  Stroke: TIA 2002, no deficits  Fall, initial encounter  Lupus  Hypertension  Hypercholesteremia  DM (diabetes mellitus)  No significant past surgical history    Home Meds: Home Medications:  acetaminophen-oxycodone:  (19 Jun 2019 10:04)  aspirin 81 mg oral tablet: 1 tab(s) orally once a day (19 Jun 2019 10:04)  Cymbalta 60 mg oral delayed release capsule: 1 cap(s) orally once a day (19 Jun 2019 10:04)  diclofenac sodium 75 mg oral delayed release tablet: 1 tab(s) orally 2 times a day (19 Jun 2019 10:04)  DULoxetine:  (19 Jun 2019 10:04)  folic acid 1 mg oral tablet: 1 tab(s) orally once a day (19 Jun 2019 10:04)  gabapentin 400 mg oral capsule: 1 cap(s) orally 3 times a day (19 Jun 2019 10:04)  hydroCHLOROthiazide: 50 milligram(s) orally once a day (19 Jun 2019 13:31)  hydroxychloroquine 200 mg oral tablet: 2 tab(s) orally 2 times a day (with meals) (19 Jun 2019 10:04)  levothyroxine 100 mcg (0.1 mg) oral tablet: 1 tab(s) orally once a day (19 Jun 2019 10:04)  lisinopril 20 mg oral tablet: 1 tab(s) orally 2 times a day (19 Jun 2019 10:04)  methadone 5 mg oral tablet: 20 milligram(s) orally 2 times a day (with meals) (19 Jun 2019 10:04)  methotrexate 2.5 mg oral tablet: 12.5 milligram(s) orally once a week (19 Jun 2019 10:04)  simvastatin 80 mg oral tablet: 1 tab(s) orally once a day (at bedtime) (19 Jun 2019 10:04)    Allergies: Allergies    No Known Allergies    Intolerances      Soc:   Advanced Directives: Presumed Full Code     ROS:    REVIEW OF SYSTEMS    [ x] A ten-point review of systems was otherwise negative except as noted.  [ ] Due to altered mental status/intubation, subjective information were not able to be obtained from the patient. History was obtained, to the extent possible, from review of the chart and collateral sources of information.      CURRENT MEDICATIONS:   --------------------------------------------------------------------------------------  Neurologic Medications  acetaminophen   Tablet .. 650 milliGRAM(s) Oral every 6 hours  DULoxetine 60 milliGRAM(s) Oral daily  gabapentin 400 milliGRAM(s) Oral Once  levETIRAcetam  IVPB 500 milliGRAM(s) IV Intermittent every 12 hours  methadone    Tablet 5 milliGRAM(s) Oral Once  oxyCODONE    IR 5 milliGRAM(s) Oral Once PRN Moderate Pain (4 - 6)    Respiratory Medications    Cardiovascular Medications  hydrochlorothiazide 50 milliGRAM(s) Oral Once  lisinopril 20 milliGRAM(s) Oral daily    Gastrointestinal Medications  folic acid 1 milliGRAM(s) Oral daily  pantoprazole    Tablet 40 milliGRAM(s) Oral before breakfast    Genitourinary Medications    Hematologic/Oncologic Medications    Antimicrobial/Immunologic Medications  hydroxychloroquine 200 milliGRAM(s) Oral Once    Endocrine/Metabolic Medications  atorvastatin 40 milliGRAM(s) Oral at bedtime  insulin lispro (HumaLOG) corrective regimen sliding scale   SubCutaneous three times a day before meals  levothyroxine 100 MICROGram(s) Oral daily    Topical/Other Medications  chlorhexidine 4% Liquid 1 Application(s) Topical <User Schedule>    --------------------------------------------------------------------------------------    VITAL SIGNS, INS/OUTS (last 24 hours):  --------------------------------------------------------------------------------------  ICU Vital Signs Last 24 Hrs  T(C): 37.6 (19 Jun 2019 12:00), Max: 37.6 (19 Jun 2019 12:00)  T(F): 99.6 (19 Jun 2019 12:00), Max: 99.6 (19 Jun 2019 12:00)  HR: 80 (19 Jun 2019 13:19) (80 - 86)  BP: 167/73 (19 Jun 2019 13:19) (167/73 - 188/81)  BP(mean): 110 (19 Jun 2019 12:15) (110 - 117)  ABP: --  ABP(mean): --  RR: 18 (19 Jun 2019 12:38) (15 - 18)  SpO2: 98% (19 Jun 2019 12:38) (98% - 99%)    I&O's Summary    --------------------------------------------------------------------------------------    EXAM:  General/Neuro  GCS: 15  Exam: Normal, NAD, alert, oriented x 3, no focal deficits. PERRL  neuro intact, 5/5 motor x4     Respiratory  Exam: Lungs clear to auscultation, Normal expansion/effort.      Cardiovascular  Exam: S1, S2.  Regular rate and rhythm.  no Peripheral edema   Cardiac Rhythm: Normal Sinus Rhythm  ECHO: none    GI  Exam: Abdomen soft, Non-tender, Non-distended.   Current Diet:  NPO    Tubes/Lines/Drains  ***  [x] Peripheral IV    Extremities  Exam: Extremities warm, pink, well-perfused.        Derm:  Exam: Good skin turgor, no skin breakdown.      :   Exam: no Wise catheter in place.     LABS  --------------------------------------------------------------------------------------  Labs:  CAPILLARY BLOOD GLUCOSE      POCT Blood Glucose.: 238 mg/dL (19 Jun 2019 12:37)  POCT Blood Glucose.: 342 mg/dL (19 Jun 2019 09:59)                          10.3   7.20  )-----------( 235      ( 19 Jun 2019 14:08 )             32.0       Auto Neutrophil %: 78.8 % (06-19-19 @ 14:08)  Auto Immature Granulocyte %: 0.3 % (06-19-19 @ 14:08)  Auto Neutrophil %: 85.3 % (06-19-19 @ 11:08)  Auto Immature Granulocyte %: 0.4 % (06-19-19 @ 11:08)    06-19    135  |  96<L>  |  13  ----------------------------<  378<H>  3.9   |  29  |  0.7      Calcium, Total Serum: 8.8 mg/dL (06-19-19 @ 11:08)      LFTs:             6.3  | 0.3  | 23       ------------------[117     ( 19 Jun 2019 11:08 )  3.6  | x    | 11          Lipase:7      Amylase:x         Lactate, Blood: 1.2 mmol/L (06-19-19 @ 11:08)      Coags:     11.90  ----< 1.04    ( 19 Jun 2019 11:08 )     32.1              Alcohol, Blood: <10 mg/dL (06-19-19 @ 11:08)          --------------------------------------------------------------------------------------    OTHER LABS    IMAGING RESULTS      < from: CT Head No Cont (06.19.19 @ 10:23) >  IMPRESSION:     1.  Left posterior parietal extra calvarial soft tissue   swelling/subcutaneous scalp hematoma with contrecoup injury and right   inferior frontal 0.7 cm wide subdural hematoma layering along the right   anterior cranial fossa.    2.  Periventricular and subcortical white matter chronic small vessel   ischemic changes.    3.  No significant acute mass effect or midline shift.    4.  Follow-up as clinically indicated.

## 2019-06-19 NOTE — ED PROVIDER NOTE - PROGRESS NOTE DETAILS
Dr Rees notified of transfer, [facility] TOMMY Min aware, will see patient in ED. Dr Pollack trauma service aware of transfer Authored by Dr Rees: The patient arrived in ED via EMS, appears well, neurologically intact, A&Ox3.  [facility]urelio consult neurosurgery and trauma. Patient seen by PA fellow; neuro exam grossly intact. lunge clear, abd nontender. Patient asymptomatic. HUA. 1.5 cm lac to back of head with stitched in place, well approximated. No midline tenderness. Seen by trauma  and neurology service, admit for observation and further treatment.  patient remains awake and alert, neurologically intact.

## 2019-06-19 NOTE — ED PROVIDER NOTE - ATTENDING CONTRIBUTION TO CARE
Pt asa presenting sp fall with posterior head laceration. ambulatory after. Head struck sink on way down. no loc, no midline neck pain, no change in vision, no focal numbness or weakness, no radicular pain, no chest pain, no abdominal pain, no dyspnea, no pleuritic pain, no dysphagia, no odynophagia,   VITAL SIGNS: I have reviewed nursing notes and confirm.  CONSTITUTIONAL: non-toxic, well appearing, + airway intact, GCS 15  SKIN: no rash, no petechiae. no ecchymosis, +posterior scalp laceration  EYES: PERRL, EOMI,  ENT: nares patent. tongue midline, mucosa atraumatic  NECK: Supple; no cervical-thoracic-lumbar spine tenderness  CARD: RRR, equal radial pulses bilaterally 2+  RESP: CTAB, no respiratory distress, no crepitus over chest wall  ABD: Soft, non-tender, non-distended  PELVIS: stable  EXT: Normal ROM x4. no bony tenderness, equal strength bilaterally  NEURO: Alert, oriented. CN2-12 intact, equal strength bilaterally, nl gait.  PSYCH: Cooperative, appropriate.

## 2019-06-19 NOTE — H&P ADULT - HISTORY OF PRESENT ILLNESS
73F w/ PMHx of HTN, HLD, HLD, TIA, Lupus/RA BIBA s/p fall from standing in the bathroom this am, walking to bathroom, bumped into sink and fell backwards, striking back of head on the tile floor. + HT, No LOC, +ASA 81mg,  Pt found by family standing after fall. Use cane to ambulate. Take 81mg ASA daily.

## 2019-06-19 NOTE — H&P ADULT - NSICDXPASTMEDICALHX_GEN_ALL_CORE_FT
PAST MEDICAL HISTORY:  DM (diabetes mellitus)     Fall, initial encounter     Herniated lumbar intervertebral disc     Hypercholesteremia     Hypertension     Lupus     Stroke TIA 2002, no deficits

## 2019-06-19 NOTE — H&P ADULT - NSHPLABSRESULTS_GEN_ALL_CORE
LAB/STUDIES:                        10.8   7.76  )-----------( 238      ( 19 Jun 2019 11:08 )             33.5     06-19    135  |  96<L>  |  13  ----------------------------<  378<H>  3.9   |  29  |  0.7    Ca    8.8      19 Jun 2019 11:08    TPro  6.3  /  Alb  3.6  /  TBili  0.3  /  DBili  x   /  AST  23  /  ALT  11  /  AlkPhos  117<H>  06-19    PT/INR - ( 19 Jun 2019 11:08 )   PT: 11.90 sec;   INR: 1.04 ratio         PTT - ( 19 Jun 2019 11:08 )  PTT:32.1 sec  LIVER FUNCTIONS - ( 19 Jun 2019 11:08 )  Alb: 3.6 g/dL / Pro: 6.3 g/dL / ALK PHOS: 117 U/L / ALT: 11 U/L / AST: 23 U/L / GGT: x           IMAGING:    CT Head No Cont (06.19.19 @ 10:23) >    1.  Left posterior parietal extra calvarial soft tissue   swelling/subcutaneous scalp hematoma with contrecoup injury and right inferior frontal 0.7 cm wide subdural hematoma layering along the right anterior cranial fossa.  2.  Periventricular and subcortical white matter chronic small vessel   ischemic changes.  3.  No significant acute mass effect or midline shift.  4.  Follow-up as clinically indicated.    CT Cervical Spine No Cont (06.19.19 @ 10:23) >    1.  Diffuse osteoporosis and degenerative changes of the cervical spine C3-4 through C6-7 worse at C5-6 and C6-7 as described above.  2.  Straightening of normal cervical lordosis with mild anterolisthesis   of C4 on C5, C7 on T1 and T1 on T2 with mild retrolisthesis of C6 on C7.  3. No acute fractures or dislocations.

## 2019-06-19 NOTE — CONSULT NOTE ADULT - ASSESSMENT
ASSESSMENT:  73y Female s/p mechanical fall with + small Right frontal SDH and contrecoup injury    PLAN:   Neurologic: s/p fall with + small right frontal SDH, q1 neuro checks, keppra for sz ppx, rpt CTH in am or if mental status change  pain control with ATC tylenol and oxy 5mg prn  hx chronic pain, on home methadone, cymbalta    Respiratory: no acute dx, satting well RA, encourage IS, CXR in am    Cardiovascular: no acute dx  hx HTN/HLD, on home lisinoprol, statin     Gastrointestinal/Nutrition: NPO w/meds/sips/chips until CTH in am, PPI for gi ppx, senna for BR     Renal/Genitourinary: BUN/Cr baseline 13/0.7, monitor UO, monitor/replete elytes prn    Hematologic: Hg stable, monitor h/h, holding sqh and asa per neurosx for SDH    Infectious Disease: no dx, aleukocytosis, afebrile, monitor     Lines/Tubes: PIV    Endocrine: hx DM1 on home insulin, q4 FS while NPO, hx of hypoglycemia, start 1/2 dose basal insulin since NPO tonight  hx of RA vs SLE, on methotrexate and Plaquenil    hx hypothyroidism, on home synthroid     Disposition: upgrade to SICU for q1 neurochecks    --------------------------------------------------------------------------------------    Critical Care Diagnoses: ASSESSMENT:  73y Female s/p mechanical fall with + small Right frontal SDH and contrecoup injury    PLAN:   Neurologic: s/p fall with + small right frontal SDH, q1 neuro checks, keppra for sz ppx, rpt CTH in am or if mental status change  pain control with ATC tylenol and oxy 5mg prn  hx chronic pain, on home methadone, cymbalta    Respiratory: no acute dx, satting well RA, encourage IS, CXR in am    Cardiovascular: no acute dx  hx HTN/HLD, on home lisinoprol, statin   keep normotensive, monitor for tachycardia  cardiac enzymes x3    Gastrointestinal/Nutrition: NPO w/meds/sips/chips until CTH in am, PPI for gi ppx, senna for BR     Renal/Genitourinary: BUN/Cr baseline 13/0.7, monitor UO, monitor/replete elytes prn    Hematologic: Hg stable, monitor h/h, holding sqh and asa per neurosx for SDH    Infectious Disease: no dx, aleukocytosis, afebrile, monitor     Lines/Tubes: PIV    Endocrine: hx DM1 on home insulin, q4 FS while NPO, hx of hypoglycemia, start 1/2 dose basal insulin since NPO tonight  hx of RA vs SLE, on methotrexate and Plaquenil    hx hypothyroidism, on home synthroid     Disposition: upgrade to SICU for q1 neurochecks    --------------------------------------------------------------------------------------    Critical Care Diagnoses:

## 2019-06-19 NOTE — ED PROVIDER NOTE - CRITICAL CARE PROVIDED
interpretation of diagnostic studies/consultation with other physicians/additional history taking/direct patient care (not related to procedure)/documentation/consult w/ pt's family directly relating to pts condition direct patient care (not related to procedure)/consultation with other physicians/additional history taking/interpretation of diagnostic studies/documentation

## 2019-06-19 NOTE — ED ADULT NURSE NOTE - PMH
DM (diabetes mellitus)    Fall, initial encounter    Herniated lumbar intervertebral disc    Hypercholesteremia    Hypertension    Hypoglycemia  low glucose 39  Lupus    Stroke

## 2019-06-19 NOTE — ED PROVIDER NOTE - CROS ED RESP ALL NEG
Patient had gotten a refill of atenolol 50 mg and did not realize she was to take just 1/2  Pill, and she ended up taking a full pill for about a week of the 50's. She has not had any  Side effects. She is now going to run out early. Called her pharmacy and they do not have any  Atenolol on hand. Checked with clinic pharmacy, and we do have some of each strength here.  She can fill here if she would like.  Called patient to notify her this would be sent to clinic pharmacy. (#14) her insurance should cover per pharmacy.  Left message at her contact number this is done.      Marta Ramirez, RN  Triage Nurse   negative...

## 2019-06-19 NOTE — CONSULT NOTE ADULT - SUBJECTIVE AND OBJECTIVE BOX
HPI:  Patient fell this am, hit head on sink. No LOC, Found by family standing after fall. patient  denies HA, Chest pain, SOB,. H/o chronic back and leg pain,. Use cane to ambulate. Take 81mg ASA daily. Patient was walking to bathroom, bumped into sink and fell backwards, striking back of head.      PAST MEDICAL & SURGICAL HISTORY:  Herniated lumbar intervertebral disc  Stroke  Fall, initial encounter  Hypoglycemia: low glucose 39  Lupus  Hypertension  Hypercholesteremia  DM (diabetes mellitus)  No significant past surgical history      Home Medications:  acetaminophen-oxycodone:  (19 Jun 2019 10:04)  aspirin 81 mg oral tablet: 1 tab(s) orally once a day (19 Jun 2019 10:04)  Cymbalta 60 mg oral delayed release capsule: 1 cap(s) orally once a day (19 Jun 2019 10:04)  diclofenac sodium 75 mg oral delayed release tablet: 1 tab(s) orally 2 times a day (19 Jun 2019 10:04)  DULoxetine:  (19 Jun 2019 10:04)  folic acid 1 mg oral tablet: 1 tab(s) orally once a day (19 Jun 2019 10:04)  gabapentin 400 mg oral capsule: 1 cap(s) orally 3 times a day (19 Jun 2019 10:04)  hydroCHLOROthiazide:  (19 Jun 2019 10:04)  hydroxychloroquine 200 mg oral tablet: 2 tab(s) orally 2 times a day (with meals) (19 Jun 2019 10:04)  levothyroxine 100 mcg (0.1 mg) oral tablet: 1 tab(s) orally once a day (19 Jun 2019 10:04)  lisinopril 20 mg oral tablet: 1 tab(s) orally 2 times a day (19 Jun 2019 10:04)  methadone 5 mg oral tablet: 20 milligram(s) orally 2 times a day (with meals) (19 Jun 2019 10:04)  methotrexate 2.5 mg oral tablet: 12.5 milligram(s) orally once a week (19 Jun 2019 10:04)  simvastatin 80 mg oral tablet: 1 tab(s) orally once a day (at bedtime) (19 Jun 2019 10:04)      Allergies    No Known Allergies    Intolerances        MEDICATIONS  (STANDING):    MEDICATIONS  (PRN):      ICU Vital Signs Last 24 Hrs  T(C): 37.6 (19 Jun 2019 12:00), Max: 37.6 (19 Jun 2019 12:00)  T(F): 99.6 (19 Jun 2019 12:00), Max: 99.6 (19 Jun 2019 12:00)  HR: 82 (19 Jun 2019 12:38) (82 - 86)  BP: 169/74 (19 Jun 2019 12:38) (169/74 - 188/81)  BP(mean): 110 (19 Jun 2019 12:15) (110 - 117)  ABP: --  ABP(mean): --  RR: 18 (19 Jun 2019 12:38) (15 - 18)  SpO2: 98% (19 Jun 2019 12:38) (98% - 99%)      I&O's Detail      CBC Full  -  ( 19 Jun 2019 11:08 )  WBC Count : 7.76 K/uL  RBC Count : 3.68 M/uL  Hemoglobin : 10.8 g/dL  Hematocrit : 33.5 %  Platelet Count - Automated : 238 K/uL  Mean Cell Volume : 91.0 fL  Mean Cell Hemoglobin : 29.3 pg  Mean Cell Hemoglobin Concentration : 32.2 g/dL  Auto Neutrophil # : 6.62 K/uL  Auto Lymphocyte # : 0.74 K/uL  Auto Monocyte # : 0.32 K/uL  Auto Eosinophil # : 0.03 K/uL  Auto Basophil # : 0.02 K/uL  Auto Neutrophil % : 85.3 %  Auto Lymphocyte % : 9.5 %  Auto Monocyte % : 4.1 %  Auto Eosinophil % : 0.4 %  Auto Basophil % : 0.3 %    06-19    135  |  96<L>  |  13  ----------------------------<  378<H>  3.9   |  29  |  0.7    Ca    8.8      19 Jun 2019 11:08    TPro  6.3  /  Alb  3.6  /  TBili  0.3  /  DBili  x   /  AST  23  /  ALT  11  /  AlkPhos  117<H>  06-19          Neuro exam:                    Imaging:  < from: CT Head No Cont (06.19.19 @ 10:23) >  IMPRESSION:     1.  Left posterior parietal extra calvarial soft tissue   swelling/subcutaneous scalp hematoma with contrecoup injury and right   inferior frontal 0.7 cm wide subdural hematoma layering along the right   anterior cranial fossa.    2.  Periventricular and subcortical white matter chronic small vessel   ischemic changes.    3.  No significant acute mass effect or midline shift.    4.  Follow-up as clinically indicated.      Spoke with ARIADNA ZARAGOZA on 6/19/2019 at 10:37 AM with readback.    < end of copied text >      Assessment/Plan  neuro checks q1h  hold ASA 81mg  repeat head ct in am or if MS changes  d/w attending HPI:  Patient fell this am, hit head on sink. No LOC, Found by family standing after fall. patient  denies HA, Chest pain, SOB,. H/o chronic back and leg pain,. Use cane to ambulate. Take 81mg ASA daily. Patient was walking to bathroom, bumped into sink and fell backwards, striking back of head.      Pt seen and examine at bedside. Pt denies headache, dizziness, or visual changes at this time. She recalls the fall and what happened after.     PAST MEDICAL & SURGICAL HISTORY:  Herniated lumbar intervertebral disc  Stroke  Fall, initial encounter  Hypoglycemia: low glucose 39  Lupus  Hypertension  Hypercholesteremia  DM (diabetes mellitus)  No significant past surgical history      Home Medications:  acetaminophen-oxycodone:  (19 Jun 2019 10:04)  aspirin 81 mg oral tablet: 1 tab(s) orally once a day (19 Jun 2019 10:04)  Cymbalta 60 mg oral delayed release capsule: 1 cap(s) orally once a day (19 Jun 2019 10:04)  diclofenac sodium 75 mg oral delayed release tablet: 1 tab(s) orally 2 times a day (19 Jun 2019 10:04)  DULoxetine:  (19 Jun 2019 10:04)  folic acid 1 mg oral tablet: 1 tab(s) orally once a day (19 Jun 2019 10:04)  gabapentin 400 mg oral capsule: 1 cap(s) orally 3 times a day (19 Jun 2019 10:04)  hydroCHLOROthiazide:  (19 Jun 2019 10:04)  hydroxychloroquine 200 mg oral tablet: 2 tab(s) orally 2 times a day (with meals) (19 Jun 2019 10:04)  levothyroxine 100 mcg (0.1 mg) oral tablet: 1 tab(s) orally once a day (19 Jun 2019 10:04)  lisinopril 20 mg oral tablet: 1 tab(s) orally 2 times a day (19 Jun 2019 10:04)  methadone 5 mg oral tablet: 20 milligram(s) orally 2 times a day (with meals) (19 Jun 2019 10:04)  methotrexate 2.5 mg oral tablet: 12.5 milligram(s) orally once a week (19 Jun 2019 10:04)  simvastatin 80 mg oral tablet: 1 tab(s) orally once a day (at bedtime) (19 Jun 2019 10:04)      Allergies    No Known Allergies    Intolerances        MEDICATIONS  (STANDING):    MEDICATIONS  (PRN):      ICU Vital Signs Last 24 Hrs  T(C): 37.6 (19 Jun 2019 12:00), Max: 37.6 (19 Jun 2019 12:00)  T(F): 99.6 (19 Jun 2019 12:00), Max: 99.6 (19 Jun 2019 12:00)  HR: 82 (19 Jun 2019 12:38) (82 - 86)  BP: 169/74 (19 Jun 2019 12:38) (169/74 - 188/81)  BP(mean): 110 (19 Jun 2019 12:15) (110 - 117)  ABP: --  ABP(mean): --  RR: 18 (19 Jun 2019 12:38) (15 - 18)  SpO2: 98% (19 Jun 2019 12:38) (98% - 99%)      I&O's Detail      CBC Full  -  ( 19 Jun 2019 11:08 )  WBC Count : 7.76 K/uL  RBC Count : 3.68 M/uL  Hemoglobin : 10.8 g/dL  Hematocrit : 33.5 %  Platelet Count - Automated : 238 K/uL  Mean Cell Volume : 91.0 fL  Mean Cell Hemoglobin : 29.3 pg  Mean Cell Hemoglobin Concentration : 32.2 g/dL  Auto Neutrophil # : 6.62 K/uL  Auto Lymphocyte # : 0.74 K/uL  Auto Monocyte # : 0.32 K/uL  Auto Eosinophil # : 0.03 K/uL  Auto Basophil # : 0.02 K/uL  Auto Neutrophil % : 85.3 %  Auto Lymphocyte % : 9.5 %  Auto Monocyte % : 4.1 %  Auto Eosinophil % : 0.4 %  Auto Basophil % : 0.3 %    06-19    135  |  96<L>  |  13  ----------------------------<  378<H>  3.9   |  29  |  0.7    Ca    8.8      19 Jun 2019 11:08    TPro  6.3  /  Alb  3.6  /  TBili  0.3  /  DBili  x   /  AST  23  /  ALT  11  /  AlkPhos  117<H>  06-19          Neuro exam:  AAOX3. Verbal function intact  tongue midline, facial motions symmetric  PERRLA, EOMI  Pronator Drift: none  Finger to Nose intact  Motor: MAEx4, 5/5 power in b/l UE and LE  Sensation: intact to touch in all extremities          Imaging:  < from: CT Head No Cont (06.19.19 @ 10:23) >  IMPRESSION:     1.  Left posterior parietal extra calvarial soft tissue   swelling/subcutaneous scalp hematoma with contrecoup injury and right   inferior frontal 0.7 cm wide subdural hematoma layering along the right   anterior cranial fossa.    2.  Periventricular and subcortical white matter chronic small vessel   ischemic changes.    3.  No significant acute mass effect or midline shift.    4.  Follow-up as clinically indicated.      Spoke with ARIADNA ZARAGOZA on 6/19/2019 at 10:37 AM with readback.    < end of copied text >      Assessment/Plan  neuro checks q1h  hold ASA 81mg  keppra [id number]  repeat head ct in am or if MS changes  d/w attending

## 2019-06-19 NOTE — H&P ADULT - ATTENDING COMMENTS
neurochecks, keppra  icu   Assessment and plan above were modified and discussed with residents, physician assistants, and nurses.  I have provided 35  min of Critical Care to this patient.

## 2019-06-20 LAB
ANION GAP SERPL CALC-SCNC: 12 MMOL/L — SIGNIFICANT CHANGE UP (ref 7–14)
BUN SERPL-MCNC: 5 MG/DL — LOW (ref 10–20)
CALCIUM SERPL-MCNC: 9.2 MG/DL — SIGNIFICANT CHANGE UP (ref 8.5–10.1)
CHLORIDE SERPL-SCNC: 100 MMOL/L — SIGNIFICANT CHANGE UP (ref 98–110)
CK MB CFR SERPL CALC: 4.7 NG/ML — SIGNIFICANT CHANGE UP (ref 0.6–6.3)
CK MB CFR SERPL CALC: 8.6 NG/ML — HIGH (ref 0.6–6.3)
CK SERPL-CCNC: 287 U/L — HIGH (ref 0–225)
CK SERPL-CCNC: 351 U/L — HIGH (ref 0–225)
CO2 SERPL-SCNC: 28 MMOL/L — SIGNIFICANT CHANGE UP (ref 17–32)
CREAT SERPL-MCNC: 0.5 MG/DL — LOW (ref 0.7–1.5)
GLUCOSE BLDC GLUCOMTR-MCNC: 101 MG/DL — HIGH (ref 70–99)
GLUCOSE BLDC GLUCOMTR-MCNC: 102 MG/DL — HIGH (ref 70–99)
GLUCOSE BLDC GLUCOMTR-MCNC: 108 MG/DL — HIGH (ref 70–99)
GLUCOSE BLDC GLUCOMTR-MCNC: 119 MG/DL — HIGH (ref 70–99)
GLUCOSE BLDC GLUCOMTR-MCNC: 128 MG/DL — HIGH (ref 70–99)
GLUCOSE BLDC GLUCOMTR-MCNC: 76 MG/DL — SIGNIFICANT CHANGE UP (ref 70–99)
GLUCOSE BLDC GLUCOMTR-MCNC: 89 MG/DL — SIGNIFICANT CHANGE UP (ref 70–99)
GLUCOSE BLDC GLUCOMTR-MCNC: 92 MG/DL — SIGNIFICANT CHANGE UP (ref 70–99)
GLUCOSE SERPL-MCNC: 79 MG/DL — SIGNIFICANT CHANGE UP (ref 70–99)
HCV AB S/CO SERPL IA: 0.1 S/CO — SIGNIFICANT CHANGE UP (ref 0–0.99)
HCV AB SERPL-IMP: SIGNIFICANT CHANGE UP
MAGNESIUM SERPL-MCNC: 2 MG/DL — SIGNIFICANT CHANGE UP (ref 1.8–2.4)
POTASSIUM SERPL-MCNC: 4.3 MMOL/L — SIGNIFICANT CHANGE UP (ref 3.5–5)
POTASSIUM SERPL-SCNC: 4.3 MMOL/L — SIGNIFICANT CHANGE UP (ref 3.5–5)
SODIUM SERPL-SCNC: 140 MMOL/L — SIGNIFICANT CHANGE UP (ref 135–146)
TROPONIN T SERPL-MCNC: <0.01 NG/ML — SIGNIFICANT CHANGE UP
TROPONIN T SERPL-MCNC: <0.01 NG/ML — SIGNIFICANT CHANGE UP

## 2019-06-20 PROCEDURE — 99233 SBSQ HOSP IP/OBS HIGH 50: CPT

## 2019-06-20 PROCEDURE — 99223 1ST HOSP IP/OBS HIGH 75: CPT

## 2019-06-20 PROCEDURE — 70450 CT HEAD/BRAIN W/O DYE: CPT | Mod: 26

## 2019-06-20 PROCEDURE — 93306 TTE W/DOPPLER COMPLETE: CPT | Mod: 26

## 2019-06-20 PROCEDURE — 71045 X-RAY EXAM CHEST 1 VIEW: CPT | Mod: 26

## 2019-06-20 RX ORDER — POTASSIUM CHLORIDE 20 MEQ
20 PACKET (EA) ORAL ONCE
Refills: 0 | Status: COMPLETED | OUTPATIENT
Start: 2019-06-20 | End: 2019-06-20

## 2019-06-20 RX ORDER — MAGNESIUM SULFATE 500 MG/ML
1 VIAL (ML) INJECTION ONCE
Refills: 0 | Status: COMPLETED | OUTPATIENT
Start: 2019-06-20 | End: 2019-06-20

## 2019-06-20 RX ORDER — HEPARIN SODIUM 5000 [USP'U]/ML
5000 INJECTION INTRAVENOUS; SUBCUTANEOUS EVERY 8 HOURS
Refills: 0 | Status: DISCONTINUED | OUTPATIENT
Start: 2019-06-20 | End: 2019-06-25

## 2019-06-20 RX ORDER — ACETAMINOPHEN 500 MG
650 TABLET ORAL EVERY 6 HOURS
Refills: 0 | Status: COMPLETED | OUTPATIENT
Start: 2019-06-20 | End: 2019-06-23

## 2019-06-20 RX ORDER — LISINOPRIL 2.5 MG/1
40 TABLET ORAL DAILY
Refills: 0 | Status: DISCONTINUED | OUTPATIENT
Start: 2019-06-21 | End: 2019-06-25

## 2019-06-20 RX ORDER — METHADONE HYDROCHLORIDE 40 MG/1
10 TABLET ORAL
Refills: 0 | Status: DISCONTINUED | OUTPATIENT
Start: 2019-06-21 | End: 2019-06-25

## 2019-06-20 RX ORDER — LISINOPRIL 2.5 MG/1
10 TABLET ORAL ONCE
Refills: 0 | Status: COMPLETED | OUTPATIENT
Start: 2019-06-20 | End: 2019-06-20

## 2019-06-20 RX ADMIN — Medication 650 MILLIGRAM(S): at 17:27

## 2019-06-20 RX ADMIN — LEVETIRACETAM 420 MILLIGRAM(S): 250 TABLET, FILM COATED ORAL at 05:18

## 2019-06-20 RX ADMIN — SENNA PLUS 2 TABLET(S): 8.6 TABLET ORAL at 22:20

## 2019-06-20 RX ADMIN — LEVETIRACETAM 420 MILLIGRAM(S): 250 TABLET, FILM COATED ORAL at 17:28

## 2019-06-20 RX ADMIN — HEPARIN SODIUM 5000 UNIT(S): 5000 INJECTION INTRAVENOUS; SUBCUTANEOUS at 22:22

## 2019-06-20 RX ADMIN — Medication 1 MILLIGRAM(S): at 12:12

## 2019-06-20 RX ADMIN — Medication 650 MILLIGRAM(S): at 05:30

## 2019-06-20 RX ADMIN — Medication 650 MILLIGRAM(S): at 12:12

## 2019-06-20 RX ADMIN — GABAPENTIN 400 MILLIGRAM(S): 400 CAPSULE ORAL at 05:18

## 2019-06-20 RX ADMIN — Medication 50 MILLIEQUIVALENT(S): at 07:38

## 2019-06-20 RX ADMIN — CHLORHEXIDINE GLUCONATE 1 APPLICATION(S): 213 SOLUTION TOPICAL at 05:18

## 2019-06-20 RX ADMIN — METHADONE HYDROCHLORIDE 20 MILLIGRAM(S): 40 TABLET ORAL at 05:18

## 2019-06-20 RX ADMIN — ATORVASTATIN CALCIUM 40 MILLIGRAM(S): 80 TABLET, FILM COATED ORAL at 22:19

## 2019-06-20 RX ADMIN — SODIUM CHLORIDE 100 MILLILITER(S): 9 INJECTION, SOLUTION INTRAVENOUS at 17:29

## 2019-06-20 RX ADMIN — Medication 650 MILLIGRAM(S): at 14:22

## 2019-06-20 RX ADMIN — Medication 650 MILLIGRAM(S): at 05:19

## 2019-06-20 RX ADMIN — Medication 650 MILLIGRAM(S): at 20:23

## 2019-06-20 RX ADMIN — SODIUM CHLORIDE 100 MILLILITER(S): 9 INJECTION, SOLUTION INTRAVENOUS at 12:12

## 2019-06-20 RX ADMIN — Medication 50 MILLIEQUIVALENT(S): at 05:46

## 2019-06-20 RX ADMIN — Medication 100 MICROGRAM(S): at 05:18

## 2019-06-20 RX ADMIN — LISINOPRIL 20 MILLIGRAM(S): 2.5 TABLET ORAL at 05:18

## 2019-06-20 RX ADMIN — Medication 200 MILLIGRAM(S): at 17:27

## 2019-06-20 RX ADMIN — Medication 100 GRAM(S): at 05:46

## 2019-06-20 RX ADMIN — GABAPENTIN 400 MILLIGRAM(S): 400 CAPSULE ORAL at 22:20

## 2019-06-20 RX ADMIN — Medication 200 MILLIGRAM(S): at 05:18

## 2019-06-20 RX ADMIN — LISINOPRIL 10 MILLIGRAM(S): 2.5 TABLET ORAL at 17:27

## 2019-06-20 RX ADMIN — PANTOPRAZOLE SODIUM 40 MILLIGRAM(S): 20 TABLET, DELAYED RELEASE ORAL at 05:18

## 2019-06-20 RX ADMIN — DULOXETINE HYDROCHLORIDE 60 MILLIGRAM(S): 30 CAPSULE, DELAYED RELEASE ORAL at 12:12

## 2019-06-20 NOTE — PROGRESS NOTE ADULT - SUBJECTIVE AND OBJECTIVE BOX
LINDSAY ALBERT  [id number]  73y Female    Indication for ICU admission: fall with small frontal sdh  Admit Date:06-19-19   ICU Date: 6/19    No Known Allergies    PAST MEDICAL & SURGICAL HISTORY:  Herniated lumbar intervertebral disc  Stroke: TIA 2002, no deficits  Fall, initial encounter  Lupus  Hypertension  Hypercholesteremia  DM (diabetes mellitus)  No significant past surgical history    Home Medications:  acetaminophen-oxycodone:  (19 Jun 2019 10:04)  aspirin 81 mg oral tablet: 1 tab(s) orally once a day (19 Jun 2019 10:04)  Cymbalta 60 mg oral delayed release capsule: 1 cap(s) orally once a day (19 Jun 2019 10:04)  diclofenac sodium 75 mg oral delayed release tablet: 1 tab(s) orally 2 times a day (19 Jun 2019 10:04)  DULoxetine:  (19 Jun 2019 10:04)  folic acid 1 mg oral tablet: 1 tab(s) orally once a day (19 Jun 2019 10:04)  gabapentin 400 mg oral capsule: 1 cap(s) orally 3 times a day (19 Jun 2019 10:04)  hydroCHLOROthiazide: 50 milligram(s) orally once a day (19 Jun 2019 13:31)  hydroxychloroquine 200 mg oral tablet: 2 tab(s) orally 2 times a day (with meals) (19 Jun 2019 10:04)  levothyroxine 100 mcg (0.1 mg) oral tablet: 1 tab(s) orally once a day (19 Jun 2019 10:04)  lisinopril 20 mg oral tablet: 1 tab(s) orally 2 times a day (19 Jun 2019 10:04)  methadone 5 mg oral tablet: 20 milligram(s) orally 2 times a day (with meals) (19 Jun 2019 10:04)  methotrexate 2.5 mg oral tablet: 12.5 milligram(s) orally once a week (19 Jun 2019 10:04)  simvastatin 80 mg oral tablet: 1 tab(s) orally once a day (at bedtime) (19 Jun 2019 10:04)        24HRS EVENT:  Neurologic: s/p fall with + small right frontal SDH, q1 neuro checks, keppra for sz ppx, rpt CTH in am or if mental status change  pain control with ATC tylenol and oxy 5mg prn  hx chronic pain, on home methadone, cymbalta, gabapentin     Respiratory: no acute dx, satting well RA, encourage IS, CXR in am    Cardiovascular: no acute dx  hx HTN/HLD, on home lisinoprol, statin   keep normotensive, monitor for tachycardia   CE x 3, first 2 sets negative    Gastrointestinal/Nutrition: NPO w/meds/sips/chips until CTH in am, PPI for gi ppx, senna for BR     Renal/Genitourinary: BUN/Cr baseline 13/0.7, monitor UO, monitor/replete elytes prn    Hematologic: Hg stable, monitor h/h, holding sqh and asa per neurosx for SDH    Infectious Disease: no dx, aleukocytosis, afebrile, monitor     Lines/Tubes: PIV    Endocrine: hx DM1 on home insulin, q4 FS while NPO, hx of hypoglycemia, start 1/2 dose basal insulin since NPO tonight  hx of RA vs SLE, on methotrexate and Plaquenil    hx hypothyroidism, on home synthroid       DVT PTX: holding per nsx for sdh    GI PTX: protonix po    ***Tubes/Lines/Drains  ***  Peripheral IV    REVIEW OF SYSTEMS    [ x] A ten-point review of systems was otherwise negative except as noted. LINDSAY ALBERT  [id number]  73y Female    Indication for ICU admission: fall with 0.7cm frontal sdh  Admit Date:19   ICU Date:     No Known Allergies    PAST MEDICAL & SURGICAL HISTORY:  Herniated lumbar intervertebral disc  Stroke: TIA , no deficits  Fall, initial encounter  Lupus  Hypertension  Hypercholesteremia  DM (diabetes mellitus)  No significant past surgical history    Home Medications:  acetaminophen-oxycodone:  (2019 10:04)  aspirin 81 mg oral tablet: 1 tab(s) orally once a day (2019 10:04)  Cymbalta 60 mg oral delayed release capsule: 1 cap(s) orally once a day (2019 10:04)  diclofenac sodium 75 mg oral delayed release tablet: 1 tab(s) orally 2 times a day (2019 10:04)  DULoxetine:  (2019 10:04)  folic acid 1 mg oral tablet: 1 tab(s) orally once a day (2019 10:04)  gabapentin 400 mg oral capsule: 1 cap(s) orally 3 times a day (2019 10:04)  hydroCHLOROthiazide: 50 milligram(s) orally once a day (2019 13:31)  hydroxychloroquine 200 mg oral tablet: 2 tab(s) orally 2 times a day (with meals) (2019 10:04)  levothyroxine 100 mcg (0.1 mg) oral tablet: 1 tab(s) orally once a day (2019 10:04)  lisinopril 20 mg oral tablet: 1 tab(s) orally 2 times a day (2019 10:04)  methadone 5 mg oral tablet: 20 milligram(s) orally 2 times a day (with meals) (2019 10:04)  methotrexate 2.5 mg oral tablet: 12.5 milligram(s) orally once a week (2019 10:04)  simvastatin 80 mg oral tablet: 1 tab(s) orally once a day (at bedtime) (2019 10:04)        24HRS EVENT: overnight no acute events. rpt CTH thi mahesh    Neurologic: s/p fall with + small right frontal SDH, q1 neuro checks, keppra for sz ppx, rpt CTH in am or if mental status change  pain control with ATC tylenol and oxy 5mg prn - no complaints of acute pain  hx chronic pain, on home methadone, cymbalta, gabapentin     Respiratory: no acute dx, satting well RA, encourage IS, CXR in am    Cardiovascular: no acute dx  hx HTN/HLD, on home lisinoprol, statin   keep normotensive, monitor for tachycardia   CE x 3, first 2 sets negative    Gastrointestinal/Nutrition: NPO w/meds/sips/chips until CTH in am, PPI for gi ppx, senna for BR     Renal/Genitourinary: BUN/Cr baseline 13/0.7> 9/0.5, monitor UO, monitor/replete elytes prn  hypomag 1.6 repleted   hypoK 3.4 repleted   voiding freely     Hematologic: Hg stable, monitor h/h, holding sqh and asa per neurosx for SDH    Infectious Disease: no dx, aleukocytosis, afebrile, monitor     Lines/Tubes: PIV    Endocrine: hx DM1 on home insulin, q4 FS while NPO, hx of hypoglycemia, start 1/2 dose basal insulin since NPO tonight  hx of RA vs SLE, on methotrexate and Plaquenil    hx hypothyroidism, on home synthroid       DVT PTX: holding per nsx for sdh    GI PTX: protonix po    ***Tubes/Lines/Drains  ***  Peripheral IV    REVIEW OF SYSTEMS    [ x] A ten-point review of systems was otherwise negative except as noted.      Daily Height in cm: 152.4 (2019 21:00)    Daily Weight in k.3 (2019 00:00)    Diet, NPO:   Except Medications  With Ice Chips/Sips of Water (19 @ 14:56)      CURRENT MEDS:  Neurologic Medications  acetaminophen   Tablet .. 650 milliGRAM(s) Oral every 6 hours  DULoxetine 60 milliGRAM(s) Oral daily  gabapentin 400 milliGRAM(s) Oral three times a day  levETIRAcetam  IVPB 500 milliGRAM(s) IV Intermittent every 12 hours  methadone    Tablet 20 milliGRAM(s) Oral two times a day  oxyCODONE    IR 5 milliGRAM(s) Oral Once PRN Moderate Pain (4 - 6)    Respiratory Medications    Cardiovascular Medications  lisinopril 20 milliGRAM(s) Oral daily    Gastrointestinal Medications  folic acid 1 milliGRAM(s) Oral daily  lactated ringers. 1000 milliLiter(s) IV Continuous <Continuous>  pantoprazole    Tablet 40 milliGRAM(s) Oral before breakfast  potassium chloride  20 mEq/100 mL IVPB 20 milliEquivalent(s) IV Intermittent once  senna 2 Tablet(s) Oral at bedtime    Genitourinary Medications    Hematologic/Oncologic Medications    Antimicrobial/Immunologic Medications  hydroxychloroquine 200 milliGRAM(s) Oral two times a day    Endocrine/Metabolic Medications  atorvastatin 40 milliGRAM(s) Oral at bedtime  insulin lispro (HumaLOG) corrective regimen sliding scale   SubCutaneous every 4 hours  levothyroxine 100 MICROGram(s) Oral daily    Topical/Other Medications  chlorhexidine 4% Liquid 1 Application(s) Topical <User Schedule>      ICU Vital Signs Last 24 Hrs  T(C): 36.8 (2019 00:00), Max: 37.6 (2019 12:00)  T(F): 98.2 (2019 00:00), Max: 99.6 (2019 12:00)  HR: 62 (2019 04:00) (62 - 86)  BP: 141/65 (2019 04:00) (121/66 - 188/81)  BP(mean): 89 (2019 04:00) (83 - 117)  ABP: --  ABP(mean): --  RR: 11 (2019 04:00) (9 - 20)  SpO2: 96% (2019 04:00) (93% - 99%)      Adult Advanced Hemodynamics Last 24 Hrs  CVP(mm Hg): --  CVP(cm H2O): --  CO: --  CI: --  PA: --  PA(mean): --  PCWP: --  SVR: --  SVRI: --  PVR: --  PVRI: --          I&O's Summary    2019 07:  -  2019 07:00  --------------------------------------------------------  IN: 1150 mL / OUT: 450 mL / NET: 700 mL      I&O's Detail    2019 07:  -  2019 07:00  --------------------------------------------------------  IN:    IV PiggyBack: 250 mL    lactated ringers.: 900 mL  Total IN: 1150 mL    OUT:    Voided: 450 mL  Total OUT: 450 mL    Total NET: 700 mL          PHYSICAL EXAM:    General/Neuro  RASS:             GCS:     = 15  Deficits:                             alert & oriented x 3, no focal deficits, neuro intact  Pupils: PERRL      Lungs:      clear to auscultation, Normal expansion/effort.     Cardiovascular : S1, S2.  Regular rate and rhythm.  Peripheral edema   Cardiac Rhythm: Normal Sinus Rhythm    GI: Abdomen soft, Non-tender, Non-distended.  +flatus    Extremities: Extremities warm, pink, well-perfused. Pulses:Rt     Lt    Derm: Good skin turgor, no skin breakdown.      :     no   Wise catheter in place.      CXR:     LABS:  CAPILLARY BLOOD GLUCOSE      POCT Blood Glucose.: 102 mg/dL (2019 06:50)  POCT Blood Glucose.: 92 mg/dL (2019 05:32)  POCT Blood Glucose.: 108 mg/dL (2019 02:58)  POCT Blood Glucose.: 76 mg/dL (2019 01:57)  POCT Blood Glucose.: 89 mg/dL (2019 22:53)  POCT Blood Glucose.: 112 mg/dL (2019 18:12)  POCT Blood Glucose.: 238 mg/dL (2019 12:37)  POCT Blood Glucose.: 342 mg/dL (2019 09:59)                          9.6    6.24  )-----------( 219      ( 2019 16:35 )             30.4           141  |  101  |  9<L>  ----------------------------<  140<H>  3.4<L>   |  31  |  0.5<L>    Ca    8.6      2019 16:35  Phos  3.1       Mg     1.6         TPro  6.3  /  Alb  3.6  /  TBili  0.3  /  DBili  x   /  AST  23  /  ALT  11  /  AlkPhos  117<H>        PT/INR - ( 2019 16:35 )   PT: 12.60 sec;   INR: 1.10 ratio         PTT - ( 2019 16:35 )  PTT:30.7 sec  CARDIAC MARKERS ( 2019 23:46 )  x     / <0.01 ng/mL / 351 U/L / x     / 8.6 ng/mL  CARDIAC MARKERS ( 2019 16:35 )  x     / <0.01 ng/mL / 416 U/L / x     / 15.0 ng/mL

## 2019-06-20 NOTE — PROGRESS NOTE ADULT - SUBJECTIVE AND OBJECTIVE BOX
Reason for admission: s/p fall with right subdural hemorrhage and contracoux injury    Pt seen and examined at bedside. Pt is alert and neurologically intact. Pt denies any headaches, dizziness, changes in vision or weakness.     PMH:  Herniated lumbar intervertebral disc  Stroke  Fall, initial encounter  Lupus  Hypertension  Hypercholesteremia  DM (diabetes mellitus)  Subdural hematoma  No significant past surgical history      PHYSICAL EXAM:  AO x 3, PERRL, EOM intact  Tongue midline, no facial droop  No pronator drift, Finger to nose movement intact  RAMIREZ x4  Strength in UE 5/5 BL  Strength in LE 5/5 BL                                                 Labs:                        9.6    6.24  )-----------( 219      ( 19 Jun 2019 16:35 )             30.4     06-19    141  |  101  |  9<L>  ----------------------------<  140<H>  3.4<L>   |  31  |  0.5<L>    Ca    8.6      19 Jun 2019 16:35  Phos  3.1     06-19  Mg     1.6     06-19    TPro  6.3  /  Alb  3.6  /  TBili  0.3  /  DBili  x   /  AST  23  /  ALT  11  /  AlkPhos  117<H>  06-19    PT/INR - ( 19 Jun 2019 16:35 )   PT: 12.60 sec;   INR: 1.10 ratio         PTT - ( 19 Jun 2019 16:35 )  PTT:30.7 sec    CARDIAC MARKERS ( 19 Jun 2019 23:46 )  x     / <0.01 ng/mL / 351 U/L / x     / 8.6 ng/mL  CARDIAC MARKERS ( 19 Jun 2019 16:35 )  x     / <0.01 ng/mL / 416 U/L / x     / 15.0 ng/mL      CAPILLARY BLOOD GLUCOSE      POCT Blood Glucose.: 89 mg/dL (20 Jun 2019 09:29)  POCT Blood Glucose.: 102 mg/dL (20 Jun 2019 06:50)  POCT Blood Glucose.: 92 mg/dL (20 Jun 2019 05:32)  POCT Blood Glucose.: 108 mg/dL (20 Jun 2019 02:58)  POCT Blood Glucose.: 76 mg/dL (20 Jun 2019 01:57)  POCT Blood Glucose.: 89 mg/dL (19 Jun 2019 22:53)  POCT Blood Glucose.: 112 mg/dL (19 Jun 2019 18:12)  POCT Blood Glucose.: 238 mg/dL (19 Jun 2019 12:37)    Allergies  No Known Allergies      MEDICATIONS:  Antibiotics:  hydroxychloroquine 200 milliGRAM(s) Oral two times a day    Neuro:  acetaminophen   Tablet .. 650 milliGRAM(s) Oral every 6 hours  DULoxetine 60 milliGRAM(s) Oral daily  gabapentin 400 milliGRAM(s) Oral three times a day  levETIRAcetam  IVPB 500 milliGRAM(s) IV Intermittent every 12 hours  methadone    Tablet 20 milliGRAM(s) Oral two times a day  oxyCODONE    IR 5 milliGRAM(s) Oral Once PRN    Anticoagulation:    OTHER:  atorvastatin 40 milliGRAM(s) Oral at bedtime  chlorhexidine 4% Liquid 1 Application(s) Topical <User Schedule>  insulin lispro (HumaLOG) corrective regimen sliding scale   SubCutaneous every 4 hours  levothyroxine 100 MICROGram(s) Oral daily  lisinopril 20 milliGRAM(s) Oral daily  pantoprazole    Tablet 40 milliGRAM(s) Oral before breakfast  senna 2 Tablet(s) Oral at bedtime    IVF:  folic acid 1 milliGRAM(s) Oral daily  lactated ringers. 1000 milliLiter(s) IV Continuous <Continuous>    CULTURES:      RADIOLOGY & ADDITIONAL TESTS:  < from: CT Head No Cont (06.19.19 @ 10:23) >  IMPRESSION:     1.  Left posterior parietal extra calvarial soft tissue   swelling/subcutaneous scalp hematoma with contrecoup injury and right   inferior frontal 0.7 cm wide subdural hematoma layering along the right   anterior cranial fossa.    2.  Periventricular and subcortical white matter chronic small vessel   ischemic changes.    3.  No significant acute mass effect or midline shift.    4.  Follow-up as clinically indicated.    < end of copied text >      ASSESSMENT:  73y Female s/p fall on ASA 81, CT shows small R subdural with contrecoup injury. No neuro deficits on exam.     PLAN:  f/u repeat head CT  continue Keppra   continue to hold ASA   neuro checks q1   d/w attending

## 2019-06-20 NOTE — PROGRESS NOTE ADULT - ASSESSMENT
ASSESSMENT:  73y Female s/p mechanical fall with + small Right frontal SDH and contrecoup injury    PLAN:   Neurologic: s/p fall with + small right frontal SDH, q1 neuro checks, keppra for sz ppx, rpt CTH   pain control with ATC tylenol and oxy 5mg prn  hx chronic pain, on home methadone, cymbalta    Respiratory: no acute dx, satting well RA, encourage IS,     Cardiovascular: no acute dx  hx HTN/HLD, on home lisinoprol, statin   keep normotensive, monitor for tachycardia  f/u final set CE    Gastrointestinal/Nutrition: NPO w/meds/sips/chips until CTH read and then advance diet, PPI for gi ppx, senna for BR     Renal/Genitourinary: Monitor UO, strict is and os monitor/replete elytes prn    Hematologic: Hg stable, monitor h/h, holding sqh and asa per neurosx for SDH    Infectious Disease: no dx, WBC wnl, afebrile, monitor     Lines/Tubes: PIV    Endocrine: hx DM1 on home insulin, q4 FS while NPO, hx of hypoglycemia, plan to start 1/2 dose basal insulin   hx of RA vs SLE, on methotrexate and Plaquenil    hx hypothyroidism, on home synthroid     Disposition: SICU for neurochecks ASSESSMENT:  73y Female s/p mechanical fall with + small Right frontal SDH and contrecoup injury    PLAN:   Neurologic: s/p fall with + small right frontal SDH, q1 neuro checks, keppra for sz ppx, rpt CTH   pain control with ATC tylenol and oxy 5mg prn  hx chronic pain, on home methadone, cymbalta    Respiratory: no acute dx, satting well RA, encourage IS,     Cardiovascular: no acute dx  hx HTN/HLD, on home lisinoprol, statin   keep normotensive, monitor for tachycardia  CE negative x3, f/u 3rd set    Gastrointestinal/Nutrition: NPO w/meds/sips/chips until CTH read and then advance diet, PPI for gi ppx, senna for BR   IVL when tolerating     Renal/Genitourinary: Monitor UO, strict is and os monitor/replete elytes prn  hypomagnesemia repleted  hypokalemia repleted  f/u 11am labs    Hematologic: Hg stable, monitor h/h, holding sqh and asa per neurosx for SDH    Infectious Disease: no dx, WBC wnl, afebrile, monitor     Lines/Tubes: PIV    Endocrine: hx DM1 on home insulin, q4 FS while NPO, hx of hypoglycemia, plan to start 1/2 dose basal insulin   hx of RA vs SLE, on methotrexate and Plaquenil    hx hypothyroidism, on home synthroid     Disposition: SICU for neurochecks

## 2019-06-20 NOTE — PROGRESS NOTE ADULT - SUBJECTIVE AND OBJECTIVE BOX
Pt seen and examined with PA, please see their note. No gross neurodeficit. Repeat CTH stable. No neurosurgical intervention. Pt should f/u in office in 2 weeks. Please reconsult as needed.

## 2019-06-21 LAB
ANION GAP SERPL CALC-SCNC: 10 MMOL/L — SIGNIFICANT CHANGE UP (ref 7–14)
ANION GAP SERPL CALC-SCNC: 14 MMOL/L — SIGNIFICANT CHANGE UP (ref 7–14)
APTT BLD: 33 SEC — SIGNIFICANT CHANGE UP (ref 27–39.2)
BASOPHILS # BLD AUTO: 0.02 K/UL — SIGNIFICANT CHANGE UP (ref 0–0.2)
BASOPHILS NFR BLD AUTO: 0.3 % — SIGNIFICANT CHANGE UP (ref 0–1)
BUN SERPL-MCNC: 15 MG/DL — SIGNIFICANT CHANGE UP (ref 10–20)
BUN SERPL-MCNC: 8 MG/DL — LOW (ref 10–20)
CALCIUM SERPL-MCNC: 8.3 MG/DL — LOW (ref 8.5–10.1)
CALCIUM SERPL-MCNC: 8.9 MG/DL — SIGNIFICANT CHANGE UP (ref 8.5–10.1)
CHLORIDE SERPL-SCNC: 92 MMOL/L — LOW (ref 98–110)
CHLORIDE SERPL-SCNC: 94 MMOL/L — LOW (ref 98–110)
CO2 SERPL-SCNC: 27 MMOL/L — SIGNIFICANT CHANGE UP (ref 17–32)
CO2 SERPL-SCNC: 28 MMOL/L — SIGNIFICANT CHANGE UP (ref 17–32)
CREAT SERPL-MCNC: 0.6 MG/DL — LOW (ref 0.7–1.5)
CREAT SERPL-MCNC: 0.6 MG/DL — LOW (ref 0.7–1.5)
EOSINOPHIL # BLD AUTO: 0.02 K/UL — SIGNIFICANT CHANGE UP (ref 0–0.7)
EOSINOPHIL NFR BLD AUTO: 0.3 % — SIGNIFICANT CHANGE UP (ref 0–8)
GLUCOSE BLDC GLUCOMTR-MCNC: 112 MG/DL — HIGH (ref 70–99)
GLUCOSE BLDC GLUCOMTR-MCNC: 173 MG/DL — HIGH (ref 70–99)
GLUCOSE BLDC GLUCOMTR-MCNC: 288 MG/DL — HIGH (ref 70–99)
GLUCOSE BLDC GLUCOMTR-MCNC: 379 MG/DL — HIGH (ref 70–99)
GLUCOSE BLDC GLUCOMTR-MCNC: 94 MG/DL — SIGNIFICANT CHANGE UP (ref 70–99)
GLUCOSE BLDC GLUCOMTR-MCNC: 97 MG/DL — SIGNIFICANT CHANGE UP (ref 70–99)
GLUCOSE SERPL-MCNC: 100 MG/DL — HIGH (ref 70–99)
GLUCOSE SERPL-MCNC: 368 MG/DL — HIGH (ref 70–99)
HCT VFR BLD CALC: 27.5 % — LOW (ref 37–47)
HCT VFR BLD CALC: 32.2 % — LOW (ref 37–47)
HGB BLD-MCNC: 10.3 G/DL — LOW (ref 12–16)
HGB BLD-MCNC: 8.9 G/DL — LOW (ref 12–16)
IMM GRANULOCYTES NFR BLD AUTO: 0.4 % — HIGH (ref 0.1–0.3)
INR BLD: 1.18 RATIO — SIGNIFICANT CHANGE UP (ref 0.65–1.3)
LYMPHOCYTES # BLD AUTO: 0.88 K/UL — LOW (ref 1.2–3.4)
LYMPHOCYTES # BLD AUTO: 13.2 % — LOW (ref 20.5–51.1)
MAGNESIUM SERPL-MCNC: 1.7 MG/DL — LOW (ref 1.8–2.4)
MAGNESIUM SERPL-MCNC: 1.9 MG/DL — SIGNIFICANT CHANGE UP (ref 1.8–2.4)
MCHC RBC-ENTMCNC: 28.6 PG — SIGNIFICANT CHANGE UP (ref 27–31)
MCHC RBC-ENTMCNC: 28.7 PG — SIGNIFICANT CHANGE UP (ref 27–31)
MCHC RBC-ENTMCNC: 32 G/DL — SIGNIFICANT CHANGE UP (ref 32–37)
MCHC RBC-ENTMCNC: 32.4 G/DL — SIGNIFICANT CHANGE UP (ref 32–37)
MCV RBC AUTO: 88.4 FL — SIGNIFICANT CHANGE UP (ref 81–99)
MCV RBC AUTO: 89.7 FL — SIGNIFICANT CHANGE UP (ref 81–99)
MONOCYTES # BLD AUTO: 0.51 K/UL — SIGNIFICANT CHANGE UP (ref 0.1–0.6)
MONOCYTES NFR BLD AUTO: 7.6 % — SIGNIFICANT CHANGE UP (ref 1.7–9.3)
NEUTROPHILS # BLD AUTO: 5.21 K/UL — SIGNIFICANT CHANGE UP (ref 1.4–6.5)
NEUTROPHILS NFR BLD AUTO: 78.2 % — HIGH (ref 42.2–75.2)
NRBC # BLD: 0 /100 WBCS — SIGNIFICANT CHANGE UP (ref 0–0)
NRBC # BLD: 0 /100 WBCS — SIGNIFICANT CHANGE UP (ref 0–0)
PHOSPHATE SERPL-MCNC: 2.5 MG/DL — SIGNIFICANT CHANGE UP (ref 2.1–4.9)
PHOSPHATE SERPL-MCNC: 3.9 MG/DL — SIGNIFICANT CHANGE UP (ref 2.1–4.9)
PLATELET # BLD AUTO: 165 K/UL — SIGNIFICANT CHANGE UP (ref 130–400)
PLATELET # BLD AUTO: 205 K/UL — SIGNIFICANT CHANGE UP (ref 130–400)
POTASSIUM SERPL-MCNC: 3.8 MMOL/L — SIGNIFICANT CHANGE UP (ref 3.5–5)
POTASSIUM SERPL-MCNC: 4.3 MMOL/L — SIGNIFICANT CHANGE UP (ref 3.5–5)
POTASSIUM SERPL-SCNC: 3.8 MMOL/L — SIGNIFICANT CHANGE UP (ref 3.5–5)
POTASSIUM SERPL-SCNC: 4.3 MMOL/L — SIGNIFICANT CHANGE UP (ref 3.5–5)
PROTHROM AB SERPL-ACNC: 13.5 SEC — HIGH (ref 9.95–12.87)
RBC # BLD: 3.11 M/UL — LOW (ref 4.2–5.4)
RBC # BLD: 3.59 M/UL — LOW (ref 4.2–5.4)
RBC # FLD: 14.2 % — SIGNIFICANT CHANGE UP (ref 11.5–14.5)
RBC # FLD: 14.2 % — SIGNIFICANT CHANGE UP (ref 11.5–14.5)
SODIUM SERPL-SCNC: 131 MMOL/L — LOW (ref 135–146)
SODIUM SERPL-SCNC: 134 MMOL/L — LOW (ref 135–146)
WBC # BLD: 10.71 K/UL — SIGNIFICANT CHANGE UP (ref 4.8–10.8)
WBC # BLD: 6.67 K/UL — SIGNIFICANT CHANGE UP (ref 4.8–10.8)
WBC # FLD AUTO: 10.71 K/UL — SIGNIFICANT CHANGE UP (ref 4.8–10.8)
WBC # FLD AUTO: 6.67 K/UL — SIGNIFICANT CHANGE UP (ref 4.8–10.8)

## 2019-06-21 PROCEDURE — 99233 SBSQ HOSP IP/OBS HIGH 50: CPT

## 2019-06-21 PROCEDURE — 70450 CT HEAD/BRAIN W/O DYE: CPT | Mod: 26

## 2019-06-21 PROCEDURE — 95819 EEG AWAKE AND ASLEEP: CPT | Mod: 26

## 2019-06-21 RX ORDER — INSULIN LISPRO 100/ML
VIAL (ML) SUBCUTANEOUS
Refills: 0 | Status: DISCONTINUED | OUTPATIENT
Start: 2019-06-21 | End: 2019-06-25

## 2019-06-21 RX ORDER — MAGNESIUM SULFATE 500 MG/ML
2 VIAL (ML) INJECTION ONCE
Refills: 0 | Status: COMPLETED | OUTPATIENT
Start: 2019-06-21 | End: 2019-06-21

## 2019-06-21 RX ADMIN — LEVETIRACETAM 420 MILLIGRAM(S): 250 TABLET, FILM COATED ORAL at 19:01

## 2019-06-21 RX ADMIN — Medication 650 MILLIGRAM(S): at 00:25

## 2019-06-21 RX ADMIN — SENNA PLUS 2 TABLET(S): 8.6 TABLET ORAL at 21:39

## 2019-06-21 RX ADMIN — Medication 10: at 21:38

## 2019-06-21 RX ADMIN — HEPARIN SODIUM 5000 UNIT(S): 5000 INJECTION INTRAVENOUS; SUBCUTANEOUS at 06:45

## 2019-06-21 RX ADMIN — DULOXETINE HYDROCHLORIDE 60 MILLIGRAM(S): 30 CAPSULE, DELAYED RELEASE ORAL at 11:17

## 2019-06-21 RX ADMIN — Medication 200 MILLIGRAM(S): at 06:43

## 2019-06-21 RX ADMIN — Medication 650 MILLIGRAM(S): at 11:17

## 2019-06-21 RX ADMIN — CHLORHEXIDINE GLUCONATE 1 APPLICATION(S): 213 SOLUTION TOPICAL at 06:45

## 2019-06-21 RX ADMIN — Medication 1 MILLIGRAM(S): at 11:18

## 2019-06-21 RX ADMIN — HEPARIN SODIUM 5000 UNIT(S): 5000 INJECTION INTRAVENOUS; SUBCUTANEOUS at 14:14

## 2019-06-21 RX ADMIN — Medication 650 MILLIGRAM(S): at 06:41

## 2019-06-21 RX ADMIN — Medication 200 MILLIGRAM(S): at 17:45

## 2019-06-21 RX ADMIN — LEVETIRACETAM 420 MILLIGRAM(S): 250 TABLET, FILM COATED ORAL at 07:43

## 2019-06-21 RX ADMIN — GABAPENTIN 400 MILLIGRAM(S): 400 CAPSULE ORAL at 14:14

## 2019-06-21 RX ADMIN — PANTOPRAZOLE SODIUM 40 MILLIGRAM(S): 20 TABLET, DELAYED RELEASE ORAL at 06:44

## 2019-06-21 RX ADMIN — Medication 6: at 17:44

## 2019-06-21 RX ADMIN — Medication 25 GRAM(S): at 02:25

## 2019-06-21 RX ADMIN — GABAPENTIN 400 MILLIGRAM(S): 400 CAPSULE ORAL at 21:39

## 2019-06-21 RX ADMIN — LISINOPRIL 40 MILLIGRAM(S): 2.5 TABLET ORAL at 07:43

## 2019-06-21 RX ADMIN — HEPARIN SODIUM 5000 UNIT(S): 5000 INJECTION INTRAVENOUS; SUBCUTANEOUS at 21:39

## 2019-06-21 RX ADMIN — METHADONE HYDROCHLORIDE 10 MILLIGRAM(S): 40 TABLET ORAL at 17:45

## 2019-06-21 RX ADMIN — Medication 650 MILLIGRAM(S): at 17:45

## 2019-06-21 RX ADMIN — METHADONE HYDROCHLORIDE 10 MILLIGRAM(S): 40 TABLET ORAL at 06:44

## 2019-06-21 RX ADMIN — GABAPENTIN 400 MILLIGRAM(S): 400 CAPSULE ORAL at 06:42

## 2019-06-21 RX ADMIN — Medication 650 MILLIGRAM(S): at 01:00

## 2019-06-21 RX ADMIN — Medication 100 MICROGRAM(S): at 06:43

## 2019-06-21 RX ADMIN — METHOTREXATE 2.5 MILLIGRAM(S): 2.5 TABLET ORAL at 11:17

## 2019-06-21 RX ADMIN — Medication 650 MILLIGRAM(S): at 07:01

## 2019-06-21 RX ADMIN — ATORVASTATIN CALCIUM 40 MILLIGRAM(S): 80 TABLET, FILM COATED ORAL at 21:39

## 2019-06-21 NOTE — CHART NOTE - NSCHARTNOTEFT_GEN_A_CORE
SICU Transfer Note    LINDSAY ALBERT  73y (1945)  [id number]      Transfer from: SICU  Transfer to: Surgery-      SICU COURSE:  73y Female HD# 2d    s/p  fall, sdh    Overnight patient's mental exam was waxing and waining, A&ox1, repeat head CT done  < from: CT Head No Cont (06.21.19 @ 00:48) >  In comparison to the previous head CT 6/20/2019:  No significant interval change in the right cerebral convexity and subfrontal subdural hematoma measuring up to 7 mm, with slight interhemispheric fissure extension. Stable slight (2-3 mm) right to left midline shift.  Neurosurgery team aware of overnight event, no neurosurgical intervention at the current time, to f/u outpatient. Patient cleared by NSGY team for downgrade. Patients mental status during 6/21 AM rounds A&Ox2, cleared for downgrade from ICU.        PAST MEDICAL & SURGICAL HISTORY:  Herniated lumbar intervertebral disc  Stroke: TIA 2002, no deficits  Fall, initial encounter  Lupus  Hypertension  Hypercholesteremia  DM (diabetes mellitus)  No significant past surgical history    Allergies    No Known Allergies    Intolerances      MEDICATIONS  (STANDING):  acetaminophen   Tablet .. 650 milliGRAM(s) Oral every 6 hours  atorvastatin 40 milliGRAM(s) Oral at bedtime  chlorhexidine 4% Liquid 1 Application(s) Topical <User Schedule>  DULoxetine 60 milliGRAM(s) Oral daily  folic acid 1 milliGRAM(s) Oral daily  gabapentin 400 milliGRAM(s) Oral three times a day  heparin  Injectable 5000 Unit(s) SubCutaneous every 8 hours  hydroxychloroquine 200 milliGRAM(s) Oral two times a day  insulin lispro (HumaLOG) corrective regimen sliding scale   SubCutaneous every 4 hours  levETIRAcetam  IVPB 500 milliGRAM(s) IV Intermittent every 12 hours  levothyroxine 100 MICROGram(s) Oral daily  lisinopril 40 milliGRAM(s) Oral daily  methadone    Tablet 10 milliGRAM(s) Oral two times a day  methotrexate 2.5 milliGRAM(s) Oral every week  pantoprazole    Tablet 40 milliGRAM(s) Oral before breakfast  senna 2 Tablet(s) Oral at bedtime    MEDICATIONS  (PRN):      Vital Signs Last 24 Hrs  T(C): 37.5 (21 Jun 2019 07:00), Max: 38.1 (20 Jun 2019 16:00)  T(F): 99.5 (21 Jun 2019 07:00), Max: 100.6 (20 Jun 2019 16:00)  HR: 72 (21 Jun 2019 10:00) (72 - 84)  BP: 139/58 (21 Jun 2019 10:00) (126/58 - 191/85)  BP(mean): 87 (21 Jun 2019 10:00) (73 - 124)  RR: 10 (21 Jun 2019 10:00) (10 - 29)  SpO2: 96% (21 Jun 2019 10:00) (93% - 99%)  I&O's Summary    20 Jun 2019 07:01  -  21 Jun 2019 07:00  --------------------------------------------------------  IN: 1950 mL / OUT: 600 mL / NET: 1350 mL        LABS  LABS:  CAPILLARY BLOOD GLUCOSE      POCT Blood Glucose.: 97 mg/dL (21 Jun 2019 10:20)  POCT Blood Glucose.: 94 mg/dL (21 Jun 2019 05:57)  POCT Blood Glucose.: 112 mg/dL (21 Jun 2019 01:57)  POCT Blood Glucose.: 101 mg/dL (20 Jun 2019 22:17)  POCT Blood Glucose.: 119 mg/dL (20 Jun 2019 17:14)  POCT Blood Glucose.: 128 mg/dL (20 Jun 2019 14:34)                          10.3   10.71 )-----------( 205      ( 21 Jun 2019 00:22 )             32.2       06-21    134<L>  |  92<L>  |  8<L>  ----------------------------<  100<H>  4.3   |  28  |  0.6<L>    Ca    8.9      21 Jun 2019 00:22  Phos  3.9     06-21  Mg     1.7     06-21        PT/INR - ( 21 Jun 2019 00:22 )   PT: 13.50 sec;   INR: 1.18 ratio         PTT - ( 21 Jun 2019 00:22 )  PTT:33.0 sec  CARDIAC MARKERS ( 20 Jun 2019 11:01 )  x     / <0.01 ng/mL / 287 U/L / x     / 4.7 ng/mL  CARDIAC MARKERS ( 19 Jun 2019 23:46 )  x     / <0.01 ng/mL / 351 U/L / x     / 8.6 ng/mL  CARDIAC MARKERS ( 19 Jun 2019 16:35 )  x     / <0.01 ng/mL / 416 U/L / x     / 15.0 ng/mL                Assessment & Plan:      73y Female s/p mechanical fall with + small Right frontal SDH and contrecoup injury    PLAN:   Neurologic: s/p fall with + small right frontal SDH, q4 neuro checks, keppra for sz ppx, rpt CTH   pain control with ATC tylenol and oxy 5mg prn  hx chronic pain, on home methadone, cymbalta  Overnight: change in mental status, STAT head CT performed, no increase in midline shift, evolving hyperdensities in SDH. Neurosx called and notified, spoke with TOMMY Santiago.   6/20 afternoon, spoke with Dr. Pierre (radiology) regarding 2nd CT which showed new mls 2-3mm shift. CT stable from intial no acute changes noted.    Respiratory: no acute dx, satting well RA, encourage IS,     Cardiovascular: no acute dx  hx HTN/HLD, on home lisinoprol, statin   keep normotensive, monitor for tachycardia  CE negative x3    Gastrointestinal/Nutrition: Carb consistent diet  PPI    Renal/Genitourinary: Monitor UO, strict is and os monitor/replete elytes prn  hypomagnesemia repleted  Sodium, Serum: 134 mmol/L (06.21.19 @ 00:22)  Potassium, Serum: 4.3: Slighty Hemolyzed use with Caution mmol/L (06.21.19 @ 00:22)  Magnesium, Serum: 1.7 mg/dL (06.21.19 @ 00:22)  Phosphorus Level, Serum: 3.9 mg/dL (06.21.19 @ 00:22)  Renal function Creatinine, Serum: 0.6 mg/dL (06.21.19 @ 00:22)  Blood Urea Nitrogen, Serum: 8 mg/dL (06.21.19 @ 00:22)                  Hematologic: Hg stable, monitor h/h, holding sqh and asa per neurosx for SDH  Hgb 9-->10    Infectious Disease: no dx, WBC wnl, afebrile, monitor   WBC 10  UA pending    Lines/Tubes: PIV    Endocrine: hx DM1 on home insulin, q4 FS while NPO, hx of hypoglycemia, Glu 94  hx of RA vs SLE, on methotrexate and Plaquenil    hx hypothyroidism, on home synthroid     Disposition: Downgrade if no intervention as per NSGY vs transfer to medicine        Follow Up:        Signed out to:  Date:  Time: SICU Transfer Note    LINDSAY ALBERT  73y (1945)  [id number]      Transfer from: SICU  Transfer to: Surgery-Trauma      SICU COURSE:  73y Female HD# 2d    s/p  fall, sdh    Overnight patient's mental exam was waxing and waining, A&ox1, repeat head CT done  < from: CT Head No Cont (06.21.19 @ 00:48) >  In comparison to the previous head CT 6/20/2019:  No significant interval change in the right cerebral convexity and subfrontal subdural hematoma measuring up to 7 mm, with slight interhemispheric fissure extension. Stable slight (2-3 mm) right to left midline shift.  Neurosurgery team aware of overnight event, no neurosurgical intervention at the current time, to f/u outpatient. Patient cleared by NSGY team for downgrade. Patients mental status during 6/21 AM rounds A&Ox3, cleared for downgrade from ICU.        PAST MEDICAL & SURGICAL HISTORY:  Herniated lumbar intervertebral disc  Stroke: TIA 2002, no deficits  Fall, initial encounter  Lupus  Hypertension  Hypercholesteremia  DM (diabetes mellitus)  No significant past surgical history    Allergies    No Known Allergies    Intolerances      MEDICATIONS  (STANDING):  acetaminophen   Tablet .. 650 milliGRAM(s) Oral every 6 hours  atorvastatin 40 milliGRAM(s) Oral at bedtime  chlorhexidine 4% Liquid 1 Application(s) Topical <User Schedule>  DULoxetine 60 milliGRAM(s) Oral daily  folic acid 1 milliGRAM(s) Oral daily  gabapentin 400 milliGRAM(s) Oral three times a day  heparin  Injectable 5000 Unit(s) SubCutaneous every 8 hours  hydroxychloroquine 200 milliGRAM(s) Oral two times a day  insulin lispro (HumaLOG) corrective regimen sliding scale   SubCutaneous every 4 hours  levETIRAcetam  IVPB 500 milliGRAM(s) IV Intermittent every 12 hours  levothyroxine 100 MICROGram(s) Oral daily  lisinopril 40 milliGRAM(s) Oral daily  methadone    Tablet 10 milliGRAM(s) Oral two times a day  methotrexate 2.5 milliGRAM(s) Oral every week  pantoprazole    Tablet 40 milliGRAM(s) Oral before breakfast  senna 2 Tablet(s) Oral at bedtime    MEDICATIONS  (PRN):      Vital Signs Last 24 Hrs  T(C): 37.5 (21 Jun 2019 07:00), Max: 38.1 (20 Jun 2019 16:00)  T(F): 99.5 (21 Jun 2019 07:00), Max: 100.6 (20 Jun 2019 16:00)  HR: 72 (21 Jun 2019 10:00) (72 - 84)  BP: 139/58 (21 Jun 2019 10:00) (126/58 - 191/85)  BP(mean): 87 (21 Jun 2019 10:00) (73 - 124)  RR: 10 (21 Jun 2019 10:00) (10 - 29)  SpO2: 96% (21 Jun 2019 10:00) (93% - 99%)  I&O's Summary    20 Jun 2019 07:01  -  21 Jun 2019 07:00  --------------------------------------------------------  IN: 1950 mL / OUT: 600 mL / NET: 1350 mL        LABS  LABS:  CAPILLARY BLOOD GLUCOSE      POCT Blood Glucose.: 97 mg/dL (21 Jun 2019 10:20)  POCT Blood Glucose.: 94 mg/dL (21 Jun 2019 05:57)  POCT Blood Glucose.: 112 mg/dL (21 Jun 2019 01:57)  POCT Blood Glucose.: 101 mg/dL (20 Jun 2019 22:17)  POCT Blood Glucose.: 119 mg/dL (20 Jun 2019 17:14)  POCT Blood Glucose.: 128 mg/dL (20 Jun 2019 14:34)                          10.3   10.71 )-----------( 205      ( 21 Jun 2019 00:22 )             32.2       06-21    134<L>  |  92<L>  |  8<L>  ----------------------------<  100<H>  4.3   |  28  |  0.6<L>    Ca    8.9      21 Jun 2019 00:22  Phos  3.9     06-21  Mg     1.7     06-21        PT/INR - ( 21 Jun 2019 00:22 )   PT: 13.50 sec;   INR: 1.18 ratio         PTT - ( 21 Jun 2019 00:22 )  PTT:33.0 sec  CARDIAC MARKERS ( 20 Jun 2019 11:01 )  x     / <0.01 ng/mL / 287 U/L / x     / 4.7 ng/mL  CARDIAC MARKERS ( 19 Jun 2019 23:46 )  x     / <0.01 ng/mL / 351 U/L / x     / 8.6 ng/mL  CARDIAC MARKERS ( 19 Jun 2019 16:35 )  x     / <0.01 ng/mL / 416 U/L / x     / 15.0 ng/mL        Assessment & Plan:      73y Female s/p mechanical fall with + small Right frontal SDH and contrecoup injury    PLAN:   Neurologic: s/p fall with + small right frontal SDH, q4 neuro checks, keppra for sz ppx, rpt CTH   pain control with ATC tylenol and oxy 5mg prn  hx chronic pain, on home methadone, cymbalta  Overnight: change in mental status, STAT head CT performed, no increase in midline shift, evolving hyperdensities in SDH. Neurosx called and notified, spoke with TOMMY Santiago.   6/20 afternoon, spoke with Dr. Pierre (radiology) regarding 2nd CT which showed mls 2-3mm shift. Not documented on previous CT. CT stable from initial no acute changes noted.    Respiratory: no acute dx, satting well RA, encourage IS,     Cardiovascular: no acute dx  hx HTN/HLD, on home lisinoprol, statin   keep normotensive, monitor for tachycardia  CE negative x3    Gastrointestinal/Nutrition: Carb consistent diet,PPI    Renal/Genitourinary: Monitor UO,voiding on own    Hematologic: Hg stable, monitor h/h, holding asa as per nsgy, HSQ  Hgb 9-->10    Infectious Disease: no dx, WBC wnl, afebrile, monitor   WBC 10  UA pending    Lines/Tubes: PIV    Endocrine: hx DM1, fsg achs, on iss  hx of RA vs SLE, on methotrexate and Plaquenil    hx hypothyroidism, on home synthroid     Disposition: Downgrade if no intervention as per NSGY, d/w TOMMY Min in AM, NSGY post 3rd ct no intervention, patient will be downgraded to 4b/4c, does not require 3E      Follow Up:  -UA  -2330 labs  -monitor for acute mental status changes      Signed out to: Dr. Juno Mcnair  Date: 6/21/19  Time: 1400

## 2019-06-21 NOTE — PROGRESS NOTE ADULT - SUBJECTIVE AND OBJECTIVE BOX
LINDSAY ALBERT  [id number]  73y Female    Indication for ICU admission: fall with small frontal sdh with contre coup injury  Admit Date:06-19-19   ICU Date: 6/19   OR Date: -    No Known Allergies    PAST MEDICAL & SURGICAL HISTORY:  Herniated lumbar intervertebral disc  Stroke: TIA 2002, no deficits  Fall, initial encounter  Lupus  Hypertension  Hypercholesteremia  DM (diabetes mellitus)  No significant past surgical history    Home Medications:  acetaminophen-oxycodone:  (19 Jun 2019 10:04)  aspirin 81 mg oral tablet: 1 tab(s) orally once a day (19 Jun 2019 10:04)  Cymbalta 60 mg oral delayed release capsule: 1 cap(s) orally once a day (19 Jun 2019 10:04)  diclofenac sodium 75 mg oral delayed release tablet: 1 tab(s) orally 2 times a day (19 Jun 2019 10:04)  DULoxetine:  (19 Jun 2019 10:04)  folic acid 1 mg oral tablet: 1 tab(s) orally once a day (19 Jun 2019 10:04)  gabapentin 400 mg oral capsule: 1 cap(s) orally 3 times a day (19 Jun 2019 10:04)  hydroCHLOROthiazide: 50 milligram(s) orally once a day (19 Jun 2019 13:31)  hydroxychloroquine 200 mg oral tablet: 2 tab(s) orally 2 times a day (with meals) (19 Jun 2019 10:04)  levothyroxine 100 mcg (0.1 mg) oral tablet: 1 tab(s) orally once a day (19 Jun 2019 10:04)  lisinopril 20 mg oral tablet: 1 tab(s) orally 2 times a day (19 Jun 2019 10:04)  methadone 5 mg oral tablet: 20 milligram(s) orally 2 times a day (with meals) (19 Jun 2019 10:04)  methotrexate 2.5 mg oral tablet: 12.5 milligram(s) orally once a week (19 Jun 2019 10:04)  simvastatin 80 mg oral tablet: 1 tab(s) orally once a day (at bedtime) (19 Jun 2019 10:04)        24HRS EVENT:    Overnight: Change in mental status, patient no longer A&Ox3. Abreu not follow commands. Stat head CT performed, no increase in midline shift, but new hyperdensities found on head CT suggesting possible new bleed. Neurosx notified. Repleted Mg. Hemodynamically stable otherwise.     Neurologic: s/p fall with + small right frontal SDH, q1 neuro checks, keppra for sz ppx, rpt CTH in am or if mental status change  +AMS in pm with STAT CTH - STABLE PER OFFICIAL READ AND NSX READ   syncope w/u - EEG / Echo pending read, Carotid duplex negative  pain control with ATC tylenol and oxy 5mg prn - not using d/c'd  hx chronic pain, on home methadone, cymbalta, gabapentin     Respiratory: no acute dx, satting well RA, encourage IS, CXR in am    Cardiovascular: no acute dx  hx HTN/HLD, on home lisinoprol, statin   keep normotensive, monitor for tachycardia   CE x 3 negative  echo 55-60%  mod MR  mild-mod TR  G1DD    Gastrointestinal/Nutrition: NPO w/meds/sips/chips until CTH in am, PPI for gi ppx, senna for BR     Renal/Genitourinary: BUN/Cr baseline 7/0.5, monitor UO,     Hematologic: Hg stable, monitor h/h, holding sqh and asa per neurosx for SDH    Infectious Disease: no dx, leukocytosis, afebrile, monitor     Lines/Tubes: PIV    Endocrine: hx DM1 on home insulin, q4 FS while NPO, hx of hypoglycemia, monitor closely  hx of RA vs SLE, on methotrexate and Plaquenil    hx hypothyroidism, on home synthroid       DVT PTX: holding per nsx for sdh    GI PTX: protonix po    ***Tubes/Lines/Drains  ***  Peripheral IV    REVIEW OF SYSTEMS    [ x] A ten-point review of systems was otherwise negative except as noted. LINDSAY ALBERT  [id number]  73y Female    Indication for ICU admission: fall with small frontal sdh with contre coup injury  Admit Date:19   ICU Date:    OR Date: -    No Known Allergies    PAST MEDICAL & SURGICAL HISTORY:  Herniated lumbar intervertebral disc  Stroke: TIA , no deficits  Fall, initial encounter  Lupus  Hypertension  Hypercholesteremia  DM (diabetes mellitus)  No significant past surgical history    Home Medications:  acetaminophen-oxycodone:  (2019 10:04)  aspirin 81 mg oral tablet: 1 tab(s) orally once a day (2019 10:04)  Cymbalta 60 mg oral delayed release capsule: 1 cap(s) orally once a day (2019 10:04)  diclofenac sodium 75 mg oral delayed release tablet: 1 tab(s) orally 2 times a day (2019 10:04)  DULoxetine:  (2019 10:04)  folic acid 1 mg oral tablet: 1 tab(s) orally once a day (2019 10:04)  gabapentin 400 mg oral capsule: 1 cap(s) orally 3 times a day (2019 10:04)  hydroCHLOROthiazide: 50 milligram(s) orally once a day (2019 13:31)  hydroxychloroquine 200 mg oral tablet: 2 tab(s) orally 2 times a day (with meals) (2019 10:04)  levothyroxine 100 mcg (0.1 mg) oral tablet: 1 tab(s) orally once a day (2019 10:04)  lisinopril 20 mg oral tablet: 1 tab(s) orally 2 times a day (2019 10:04)  methadone 5 mg oral tablet: 20 milligram(s) orally 2 times a day (with meals) (2019 10:04)  methotrexate 2.5 mg oral tablet: 12.5 milligram(s) orally once a week (2019 10:04)  simvastatin 80 mg oral tablet: 1 tab(s) orally once a day (at bedtime) (2019 10:04)        24HRS EVENT:    Overnight: Change in mental status, patient no longer A&Ox3. Abreu not follow commands. Stat head CT performed, no increase in midline shift, but new hyperdensities found on head CT suggesting possible new bleed. Neurosx notified. Repleted Mg. Hemodynamically stable otherwise.     Neurologic: s/p fall with + small right frontal SDH, q1 neuro checks, keppra for sz ppx, rpt CTH in am or if mental status change  +AMS in pm with STAT CTH - STABLE PER OFFICIAL READ AND NSX READ   syncope w/u - EEG / Echo pending read, Carotid duplex negative  pain control with ATC tylenol and oxy 5mg prn - not using d/c'd  hx chronic pain, on home methadone, cymbalta, gabapentin     Respiratory: no acute dx, satting well RA, encourage IS, CXR in am    Cardiovascular: no acute dx  hx HTN/HLD, on home lisinoprol, statin   keep normotensive, monitor for tachycardia   CE x 3 negative  echo 55-60%  mod MR  mild-mod TR  G1DD    Gastrointestinal/Nutrition: NPO w/meds/sips/chips until CTH in am, PPI for gi ppx, senna for BR     Renal/Genitourinary: BUN/Cr baseline 7/0.5, monitor UO,     Hematologic: Hg stable, monitor h/h, holding sqh and asa per neurosx for SDH    Infectious Disease: no dx, leukocytosis, afebrile, monitor     Lines/Tubes: PIV    Endocrine: hx DM1 on home insulin, q4 FS while NPO, hx of hypoglycemia, monitor closely  hx of RA vs SLE, on methotrexate and Plaquenil    hx hypothyroidism, on home synthroid       DVT PTX: holding per nsx for sdh    GI PTX: protonix po    ***Tubes/Lines/Drains  ***  Peripheral IV    REVIEW OF SYSTEMS    Daily     Daily Weight in k.9 (2019 06:00)    Diet, NPO:   Except Medications (19 @ 07:20)      CURRENT MEDS:  Neurologic Medications  acetaminophen   Tablet .. 650 milliGRAM(s) Oral every 6 hours  DULoxetine 60 milliGRAM(s) Oral daily  gabapentin 400 milliGRAM(s) Oral three times a day  levETIRAcetam  IVPB 500 milliGRAM(s) IV Intermittent every 12 hours  methadone    Tablet 10 milliGRAM(s) Oral two times a day    Respiratory Medications    Cardiovascular Medications  lisinopril 40 milliGRAM(s) Oral daily    Gastrointestinal Medications  folic acid 1 milliGRAM(s) Oral daily  lactated ringers. 1000 milliLiter(s) IV Continuous <Continuous>  pantoprazole    Tablet 40 milliGRAM(s) Oral before breakfast  senna 2 Tablet(s) Oral at bedtime    Genitourinary Medications    Hematologic/Oncologic Medications  heparin  Injectable 5000 Unit(s) SubCutaneous every 8 hours  methotrexate 2.5 milliGRAM(s) Oral every week    Antimicrobial/Immunologic Medications  hydroxychloroquine 200 milliGRAM(s) Oral two times a day    Endocrine/Metabolic Medications  atorvastatin 40 milliGRAM(s) Oral at bedtime  insulin lispro (HumaLOG) corrective regimen sliding scale   SubCutaneous every 4 hours  levothyroxine 100 MICROGram(s) Oral daily    Topical/Other Medications  chlorhexidine 4% Liquid 1 Application(s) Topical <User Schedule>      ICU Vital Signs Last 24 Hrs  T(C): 37.1 (2019 04:00), Max: 38.1 (2019 16:00)  T(F): 98.8 (2019 04:00), Max: 100.6 (2019 16:00)  HR: 74 (2019 07:00) (64 - 84)  BP: 157/64 (2019 07:00) (126/58 - 191/85)  BP(mean): 94 (2019 07:00) (73 - 124)  ABP: --  ABP(mean): --  RR: 15 (2019 07:00) (13 - 29)  SpO2: 99% (2019 07:00) (93% - 99%)      Adult Advanced Hemodynamics Last 24 Hrs  CVP(mm Hg): --  CVP(cm H2O): --  CO: --  CI: --  PA: --  PA(mean): --  PCWP: --  SVR: --  SVRI: --  PVR: --  PVRI: --          I&O's Summary    2019 07:  -  2019 07:00  --------------------------------------------------------  IN: 1950 mL / OUT: 600 mL / NET: 1350 mL      I&O's Detail    2019 07:  -  2019 07:00  --------------------------------------------------------  IN:    IV PiggyBack: 50 mL    lactated ringers.: 1900 mL  Total IN: 1950 mL    OUT:    Voided: 600 mL  Total OUT: 600 mL    Total NET: 1350 mL          PHYSICAL EXAM:    General/Neuro  gcs 15, waxing waning  Deficits:                             alert & oriented x 3,   Pupils:    Lungs:      RA no acute distress    Cardiovascular : normotensive    GI: Abdomen soft, Non-tender, Non-distended.        Extremities: Extremities warm, pink, well-perfused. Pulses:b/l radial pulses    Derm: Good skin turgor, no skin breakdown.      : free void    < from: CT Head No Cont (19 @ 00:48) >  IMPRESSION:  In comparison to the previous head CT 2019:    No significant interval change in the right cerebral convexity and   subfrontal subdural hematoma measuring up to 7 mm, with slight   interhemispheric fissure extension. Stable slight (2-3 mm) right to left   midline shift.    < end of copied text >                LABS:  CAPILLARY BLOOD GLUCOSE      POCT Blood Glucose.: 94 mg/dL (2019 05:57)  POCT Blood Glucose.: 112 mg/dL (2019 01:57)  POCT Blood Glucose.: 101 mg/dL (2019 22:17)  POCT Blood Glucose.: 119 mg/dL (2019 17:14)  POCT Blood Glucose.: 128 mg/dL (2019 14:34)  POCT Blood Glucose.: 89 mg/dL (2019 09:29)                          10.3   10.71 )-----------( 205      ( 2019 00:22 )             32.2       PT  13.50 (-)  12.60 (-)  11.70 (-)    INR  1.18 ()  1.10 (-)  1.02 ()    PTT  33.0 (-)  30.7 (-)  33.7 (-)      -    134<L>  |  92<L>  |  8<L>  ----------------------------<  100<H>  4.3   |  28  |  0.6<L>    Ca    8.9      2019 00:22  Phos  3.9     -  Mg     1.7         TPro  6.3  /  Alb  3.6  /  TBili  0.3  /  DBili  x   /  AST  23  /  ALT  11  /  AlkPhos  117<H>  -19      PT/INR - ( 2019 00:22 )   PT: 13.50 sec;   INR: 1.18 ratio         PTT - ( 2019 00:22 )  PTT:33.0 sec  CARDIAC MARKERS ( 2019 11:01 )  x     / <0.01 ng/mL / 287 U/L / x     / 4.7 ng/mL  CARDIAC MARKERS ( 2019 23:46 )  x     / <0.01 ng/mL / 351 U/L / x     / 8.6 ng/mL  CARDIAC MARKERS ( 2019 16:35 )  x     / <0.01 ng/mL / 416 U/L / x     / 15.0 ng/mL              [ x] A ten-point review of systems was otherwise negative except as noted.

## 2019-06-21 NOTE — PROGRESS NOTE ADULT - ASSESSMENT
ASSESSMENT:  73y Female s/p mechanical fall with + small Right frontal SDH and contrecoup injury    PLAN:   Neurologic: s/p fall with + small right frontal SDH, q1 neuro checks, keppra for sz ppx, rpt CTH   pain control with ATC tylenol and oxy 5mg prn  hx chronic pain, on home methadone, cymbalta  Overnight: change in mental status, STAT head CT performed, no increase in midline shift, evolving hyperdensities in SDH. Neurosx called and notified, spoke with TOMMY Santiago.     Respiratory: no acute dx, satting well RA, encourage IS,     Cardiovascular: no acute dx  hx HTN/HLD, on home lisinoprol, statin   keep normotensive, monitor for tachycardia  CE negative x3    Gastrointestinal/Nutrition: Carb consistent diet. IVL. PPI Karla.     Renal/Genitourinary: Monitor UO, strict is and os monitor/replete elytes prn  hypomagnesemia repleted    Hematologic: Hg stable, monitor h/h, holding sqh and asa per neurosx for SDH    Infectious Disease: no dx, WBC wnl, afebrile, monitor     Lines/Tubes: PIV    Endocrine: hx DM1 on home insulin, q4 FS while NPO, hx of hypoglycemia, plan to start 1/2 dose basal insulin   hx of RA vs SLE, on methotrexate and Plaquenil    hx hypothyroidism, on home synthroid     Disposition: SICU for neurochecks ASSESSMENT:  73y Female s/p mechanical fall with + small Right frontal SDH and contrecoup injury    PLAN:   Neurologic: s/p fall with + small right frontal SDH, q4 neuro checks, keppra for sz ppx, rpt CTH   pain control with ATC tylenol and oxy 5mg prn  hx chronic pain, on home methadone, cymbalta  Overnight: change in mental status, STAT head CT performed, no increase in midline shift, evolving hyperdensities in SDH. Neurosx called and notified, spoke with TOMMY Santiago.   6/20 afternoon, spoke with Dr. Pierre (radiology) regarding 2nd CT which showed new mls 2-3mm shift. CT stable from intial no acute changes noted.    Respiratory: no acute dx, satting well RA, encourage IS,     Cardiovascular: no acute dx  hx HTN/HLD, on home lisinoprol, statin   keep normotensive, monitor for tachycardia  CE negative x3    Gastrointestinal/Nutrition: NPO until AM rounds regarding d/w team about new CT    Renal/Genitourinary: Monitor UO, strict is and os monitor/replete elytes prn  hypomagnesemia repleted  Sodium, Serum: 134 mmol/L (06.21.19 @ 00:22)  Potassium, Serum: 4.3: Slighty Hemolyzed use with Caution mmol/L (06.21.19 @ 00:22)  Magnesium, Serum: 1.7 mg/dL (06.21.19 @ 00:22)  Phosphorus Level, Serum: 3.9 mg/dL (06.21.19 @ 00:22)  Renal function Creatinine, Serum: 0.6 mg/dL (06.21.19 @ 00:22)  Blood Urea Nitrogen, Serum: 8 mg/dL (06.21.19 @ 00:22)                  Hematologic: Hg stable, monitor h/h, holding sqh and asa per neurosx for SDH  Hgb 9-->10    Infectious Disease: no dx, WBC wnl, afebrile, monitor   WBC 10  UA pending    Lines/Tubes: PIV    Endocrine: hx DM1 on home insulin, q4 FS while NPO, hx of hypoglycemia, Glu 94  hx of RA vs SLE, on methotrexate and Plaquenil    hx hypothyroidism, on home synthroid     Disposition: Downgrade if no intervention as per NSGY vs transfer to medicine

## 2019-06-22 LAB
ANION GAP SERPL CALC-SCNC: 8 MMOL/L — SIGNIFICANT CHANGE UP (ref 7–14)
BUN SERPL-MCNC: 18 MG/DL — SIGNIFICANT CHANGE UP (ref 10–20)
CALCIUM SERPL-MCNC: 8.2 MG/DL — LOW (ref 8.5–10.1)
CHLORIDE SERPL-SCNC: 96 MMOL/L — LOW (ref 98–110)
CO2 SERPL-SCNC: 29 MMOL/L — SIGNIFICANT CHANGE UP (ref 17–32)
CREAT SERPL-MCNC: 0.8 MG/DL — SIGNIFICANT CHANGE UP (ref 0.7–1.5)
GLUCOSE BLDC GLUCOMTR-MCNC: 301 MG/DL — HIGH (ref 70–99)
GLUCOSE BLDC GLUCOMTR-MCNC: 339 MG/DL — HIGH (ref 70–99)
GLUCOSE BLDC GLUCOMTR-MCNC: 373 MG/DL — HIGH (ref 70–99)
GLUCOSE BLDC GLUCOMTR-MCNC: 380 MG/DL — HIGH (ref 70–99)
GLUCOSE SERPL-MCNC: 312 MG/DL — HIGH (ref 70–99)
HCT VFR BLD CALC: 28.6 % — LOW (ref 37–47)
HCT VFR BLD CALC: 28.6 % — LOW (ref 37–47)
HGB BLD-MCNC: 9.1 G/DL — LOW (ref 12–16)
HGB BLD-MCNC: 9.2 G/DL — LOW (ref 12–16)
MAGNESIUM SERPL-MCNC: 1.8 MG/DL — SIGNIFICANT CHANGE UP (ref 1.8–2.4)
MCHC RBC-ENTMCNC: 28.3 PG — SIGNIFICANT CHANGE UP (ref 27–31)
MCHC RBC-ENTMCNC: 28.4 PG — SIGNIFICANT CHANGE UP (ref 27–31)
MCHC RBC-ENTMCNC: 31.8 G/DL — LOW (ref 32–37)
MCHC RBC-ENTMCNC: 32.2 G/DL — SIGNIFICANT CHANGE UP (ref 32–37)
MCV RBC AUTO: 88.3 FL — SIGNIFICANT CHANGE UP (ref 81–99)
MCV RBC AUTO: 89.1 FL — SIGNIFICANT CHANGE UP (ref 81–99)
NRBC # BLD: 0 /100 WBCS — SIGNIFICANT CHANGE UP (ref 0–0)
NRBC # BLD: 0 /100 WBCS — SIGNIFICANT CHANGE UP (ref 0–0)
PHOSPHATE SERPL-MCNC: 2.4 MG/DL — SIGNIFICANT CHANGE UP (ref 2.1–4.9)
PLATELET # BLD AUTO: 174 K/UL — SIGNIFICANT CHANGE UP (ref 130–400)
PLATELET # BLD AUTO: 189 K/UL — SIGNIFICANT CHANGE UP (ref 130–400)
POTASSIUM SERPL-MCNC: 3.3 MMOL/L — LOW (ref 3.5–5)
POTASSIUM SERPL-SCNC: 3.3 MMOL/L — LOW (ref 3.5–5)
RBC # BLD: 3.21 M/UL — LOW (ref 4.2–5.4)
RBC # BLD: 3.24 M/UL — LOW (ref 4.2–5.4)
RBC # FLD: 14 % — SIGNIFICANT CHANGE UP (ref 11.5–14.5)
RBC # FLD: 14.2 % — SIGNIFICANT CHANGE UP (ref 11.5–14.5)
SODIUM SERPL-SCNC: 133 MMOL/L — LOW (ref 135–146)
WBC # BLD: 7.1 K/UL — SIGNIFICANT CHANGE UP (ref 4.8–10.8)
WBC # BLD: 7.54 K/UL — SIGNIFICANT CHANGE UP (ref 4.8–10.8)
WBC # FLD AUTO: 7.1 K/UL — SIGNIFICANT CHANGE UP (ref 4.8–10.8)
WBC # FLD AUTO: 7.54 K/UL — SIGNIFICANT CHANGE UP (ref 4.8–10.8)

## 2019-06-22 RX ADMIN — METHADONE HYDROCHLORIDE 10 MILLIGRAM(S): 40 TABLET ORAL at 17:23

## 2019-06-22 RX ADMIN — Medication 10: at 08:08

## 2019-06-22 RX ADMIN — PANTOPRAZOLE SODIUM 40 MILLIGRAM(S): 20 TABLET, DELAYED RELEASE ORAL at 05:46

## 2019-06-22 RX ADMIN — GABAPENTIN 400 MILLIGRAM(S): 400 CAPSULE ORAL at 05:42

## 2019-06-22 RX ADMIN — ATORVASTATIN CALCIUM 40 MILLIGRAM(S): 80 TABLET, FILM COATED ORAL at 21:57

## 2019-06-22 RX ADMIN — GABAPENTIN 400 MILLIGRAM(S): 400 CAPSULE ORAL at 21:57

## 2019-06-22 RX ADMIN — HEPARIN SODIUM 5000 UNIT(S): 5000 INJECTION INTRAVENOUS; SUBCUTANEOUS at 21:57

## 2019-06-22 RX ADMIN — Medication 650 MILLIGRAM(S): at 02:10

## 2019-06-22 RX ADMIN — Medication 8: at 17:14

## 2019-06-22 RX ADMIN — Medication 650 MILLIGRAM(S): at 11:42

## 2019-06-22 RX ADMIN — Medication 650 MILLIGRAM(S): at 11:46

## 2019-06-22 RX ADMIN — Medication 650 MILLIGRAM(S): at 23:18

## 2019-06-22 RX ADMIN — HEPARIN SODIUM 5000 UNIT(S): 5000 INJECTION INTRAVENOUS; SUBCUTANEOUS at 05:41

## 2019-06-22 RX ADMIN — SENNA PLUS 2 TABLET(S): 8.6 TABLET ORAL at 21:57

## 2019-06-22 RX ADMIN — Medication 1 MILLIGRAM(S): at 11:42

## 2019-06-22 RX ADMIN — Medication 200 MILLIGRAM(S): at 17:16

## 2019-06-22 RX ADMIN — Medication 650 MILLIGRAM(S): at 17:15

## 2019-06-22 RX ADMIN — HEPARIN SODIUM 5000 UNIT(S): 5000 INJECTION INTRAVENOUS; SUBCUTANEOUS at 13:21

## 2019-06-22 RX ADMIN — DULOXETINE HYDROCHLORIDE 60 MILLIGRAM(S): 30 CAPSULE, DELAYED RELEASE ORAL at 11:43

## 2019-06-22 RX ADMIN — LISINOPRIL 40 MILLIGRAM(S): 2.5 TABLET ORAL at 05:42

## 2019-06-22 RX ADMIN — LEVETIRACETAM 420 MILLIGRAM(S): 250 TABLET, FILM COATED ORAL at 05:46

## 2019-06-22 RX ADMIN — METHADONE HYDROCHLORIDE 10 MILLIGRAM(S): 40 TABLET ORAL at 05:46

## 2019-06-22 RX ADMIN — CHLORHEXIDINE GLUCONATE 1 APPLICATION(S): 213 SOLUTION TOPICAL at 08:08

## 2019-06-22 RX ADMIN — Medication 8: at 21:56

## 2019-06-22 RX ADMIN — Medication 10: at 11:43

## 2019-06-22 RX ADMIN — Medication 650 MILLIGRAM(S): at 05:42

## 2019-06-22 RX ADMIN — GABAPENTIN 400 MILLIGRAM(S): 400 CAPSULE ORAL at 13:20

## 2019-06-22 RX ADMIN — Medication 650 MILLIGRAM(S): at 17:56

## 2019-06-22 RX ADMIN — LEVETIRACETAM 420 MILLIGRAM(S): 250 TABLET, FILM COATED ORAL at 17:23

## 2019-06-22 RX ADMIN — Medication 100 MICROGRAM(S): at 05:42

## 2019-06-22 RX ADMIN — Medication 200 MILLIGRAM(S): at 05:42

## 2019-06-22 NOTE — PROGRESS NOTE ADULT - ASSESSMENT
Assessment:  73y Female patient admitted S/P  Right inferior frontal SDH/ posterior scalp laceration    Plan:  Diet CC  Downgraded   Hypokalemia - repleted  Continue keppra  F/u neurosurgery  Encourage incentive spirometer use

## 2019-06-22 NOTE — PROGRESS NOTE ADULT - SUBJECTIVE AND OBJECTIVE BOX
Patient: LINDSAY ALBERT , 73y (1945)Female     Procedure/Diagnosis: Right inferior frontal SDH/ posterior scalp laceration  Events over 24h: No acute overnight events, patient seen and examined bedside with no active complaints.     Vitals: T(F): 98.9 (06-21-19 @ 23:57), Max: 99.5 (06-21-19 @ 07:00)  HR: 78 (06-21-19 @ 23:57)  BP: 146/65 (06-21-19 @ 23:57) (126/58 - 170/77)  RR: 18 (06-21-19 @ 23:57)  SpO2: 94% (06-21-19 @ 15:25)    In:   06-20-19 @ 07:01  -  06-21-19 @ 07:00  --------------------------------------------------------  IN: 1950 mL    06-21-19 @ 07:01  -  06-22-19 @ 02:20  --------------------------------------------------------  IN: 0 mL      Out:   06-20-19 @ 07:01  -  06-21-19 @ 07:00  --------------------------------------------------------  OUT:    Voided: 600 mL  Total OUT: 600 mL      06-21-19 @ 07:01  -  06-22-19 @ 02:20  --------------------------------------------------------  OUT:    Stool: 1 mL  Total OUT: 1 mL        Net:   06-20-19 @ 07:01  -  06-21-19 @ 07:00  --------------------------------------------------------  NET: 1350 mL    06-21-19 @ 07:01  -  06-22-19 @ 02:20  --------------------------------------------------------  NET: -1 mL      Diet: Diet, Consistent Carbohydrate w/Evening Snack (06-21-19 @ 10:29)    IV Fluids: Type: folic acid 1 milliGRAM(s) Oral daily    Physical Examination:  General Appearance: NAD, alert and cooperative  HEENT: NCAT, WNL  Heart: S1 and S2. No murmurs. RRR  Lungs: CTABL  Abdomen: Soft, nondistended, nontender  Extremities: peripheral pulses 2+, no peripheral edema, full ROM    Medications: [Standing]  acetaminophen   Tablet .. 650 milliGRAM(s) Oral every 6 hours  atorvastatin 40 milliGRAM(s) Oral at bedtime  chlorhexidine 4% Liquid 1 Application(s) Topical <User Schedule>  DULoxetine 60 milliGRAM(s) Oral daily  folic acid 1 milliGRAM(s) Oral daily  gabapentin 400 milliGRAM(s) Oral three times a day  heparin  Injectable 5000 Unit(s) SubCutaneous every 8 hours  hydroxychloroquine 200 milliGRAM(s) Oral two times a day  insulin lispro (HumaLOG) corrective regimen sliding scale   SubCutaneous Before meals and at bedtime  levETIRAcetam  IVPB 500 milliGRAM(s) IV Intermittent every 12 hours  levothyroxine 100 MICROGram(s) Oral daily  lisinopril 40 milliGRAM(s) Oral daily  methadone    Tablet 10 milliGRAM(s) Oral two times a day  methotrexate 2.5 milliGRAM(s) Oral every week  pantoprazole    Tablet 40 milliGRAM(s) Oral before breakfast  senna 2 Tablet(s) Oral at bedtime    DVT Prophylaxis: heparin  Injectable 5000 Unit(s) SubCutaneous every 8 hours    GI Prophylaxis: pantoprazole    Tablet 40 milliGRAM(s) Oral before breakfast    Antibiotics: hydroxychloroquine 200 milliGRAM(s) Oral two times a day    Anticoagulation:   Medications:[PRN]    Labs:                        9.2    7.54  )-----------( 174      ( 22 Jun 2019 01:22 )             28.6     06-21    131<L>  |  94<L>  |  15  ----------------------------<  368<H>  3.8   |  27  |  0.6<L>    Ca    8.3<L>      21 Jun 2019 21:34  Phos  2.5     06-21  Mg     1.9     06-21        PT/INR - ( 21 Jun 2019 00:22 )   PT: 13.50 sec;   INR: 1.18 ratio         PTT - ( 21 Jun 2019 00:22 )  PTT:33.0 sec    CARDIAC MARKERS ( 20 Jun 2019 11:01 )  x     / <0.01 ng/mL / 287 U/L / x     / 4.7 ng/mL

## 2019-06-23 LAB
ANION GAP SERPL CALC-SCNC: 9 MMOL/L — SIGNIFICANT CHANGE UP (ref 7–14)
BASOPHILS # BLD AUTO: 0.03 K/UL — SIGNIFICANT CHANGE UP (ref 0–0.2)
BASOPHILS NFR BLD AUTO: 0.5 % — SIGNIFICANT CHANGE UP (ref 0–1)
BUN SERPL-MCNC: 12 MG/DL — SIGNIFICANT CHANGE UP (ref 10–20)
CALCIUM SERPL-MCNC: 8.4 MG/DL — LOW (ref 8.5–10.1)
CHLORIDE SERPL-SCNC: 99 MMOL/L — SIGNIFICANT CHANGE UP (ref 98–110)
CO2 SERPL-SCNC: 27 MMOL/L — SIGNIFICANT CHANGE UP (ref 17–32)
CREAT SERPL-MCNC: 0.6 MG/DL — LOW (ref 0.7–1.5)
EOSINOPHIL # BLD AUTO: 0.27 K/UL — SIGNIFICANT CHANGE UP (ref 0–0.7)
EOSINOPHIL NFR BLD AUTO: 4.4 % — SIGNIFICANT CHANGE UP (ref 0–8)
GLUCOSE BLDC GLUCOMTR-MCNC: 145 MG/DL — HIGH (ref 70–99)
GLUCOSE BLDC GLUCOMTR-MCNC: 272 MG/DL — HIGH (ref 70–99)
GLUCOSE BLDC GLUCOMTR-MCNC: 290 MG/DL — HIGH (ref 70–99)
GLUCOSE BLDC GLUCOMTR-MCNC: 370 MG/DL — HIGH (ref 70–99)
GLUCOSE BLDC GLUCOMTR-MCNC: 376 MG/DL — HIGH (ref 70–99)
GLUCOSE SERPL-MCNC: 293 MG/DL — HIGH (ref 70–99)
HCT VFR BLD CALC: 29 % — LOW (ref 37–47)
HGB BLD-MCNC: 9.1 G/DL — LOW (ref 12–16)
IMM GRANULOCYTES NFR BLD AUTO: 0.2 % — SIGNIFICANT CHANGE UP (ref 0.1–0.3)
LYMPHOCYTES # BLD AUTO: 1.55 K/UL — SIGNIFICANT CHANGE UP (ref 1.2–3.4)
LYMPHOCYTES # BLD AUTO: 25.3 % — SIGNIFICANT CHANGE UP (ref 20.5–51.1)
MAGNESIUM SERPL-MCNC: 1.6 MG/DL — LOW (ref 1.8–2.4)
MCHC RBC-ENTMCNC: 28.3 PG — SIGNIFICANT CHANGE UP (ref 27–31)
MCHC RBC-ENTMCNC: 31.4 G/DL — LOW (ref 32–37)
MCV RBC AUTO: 90.1 FL — SIGNIFICANT CHANGE UP (ref 81–99)
MONOCYTES # BLD AUTO: 0.63 K/UL — HIGH (ref 0.1–0.6)
MONOCYTES NFR BLD AUTO: 10.3 % — HIGH (ref 1.7–9.3)
NEUTROPHILS # BLD AUTO: 3.63 K/UL — SIGNIFICANT CHANGE UP (ref 1.4–6.5)
NEUTROPHILS NFR BLD AUTO: 59.3 % — SIGNIFICANT CHANGE UP (ref 42.2–75.2)
NRBC # BLD: 0 /100 WBCS — SIGNIFICANT CHANGE UP (ref 0–0)
PHOSPHATE SERPL-MCNC: 2.1 MG/DL — SIGNIFICANT CHANGE UP (ref 2.1–4.9)
PLATELET # BLD AUTO: 206 K/UL — SIGNIFICANT CHANGE UP (ref 130–400)
POTASSIUM SERPL-MCNC: 4.3 MMOL/L — SIGNIFICANT CHANGE UP (ref 3.5–5)
POTASSIUM SERPL-SCNC: 4.3 MMOL/L — SIGNIFICANT CHANGE UP (ref 3.5–5)
RBC # BLD: 3.22 M/UL — LOW (ref 4.2–5.4)
RBC # FLD: 14.2 % — SIGNIFICANT CHANGE UP (ref 11.5–14.5)
SODIUM SERPL-SCNC: 135 MMOL/L — SIGNIFICANT CHANGE UP (ref 135–146)
WBC # BLD: 6.12 K/UL — SIGNIFICANT CHANGE UP (ref 4.8–10.8)
WBC # FLD AUTO: 6.12 K/UL — SIGNIFICANT CHANGE UP (ref 4.8–10.8)

## 2019-06-23 PROCEDURE — 99232 SBSQ HOSP IP/OBS MODERATE 35: CPT

## 2019-06-23 RX ORDER — INSULIN LISPRO 100/ML
10 VIAL (ML) SUBCUTANEOUS ONCE
Refills: 0 | Status: COMPLETED | OUTPATIENT
Start: 2019-06-23 | End: 2019-06-23

## 2019-06-23 RX ORDER — DOCUSATE SODIUM 100 MG
100 CAPSULE ORAL
Refills: 0 | Status: DISCONTINUED | OUTPATIENT
Start: 2019-06-23 | End: 2019-06-25

## 2019-06-23 RX ORDER — POTASSIUM CHLORIDE 20 MEQ
20 PACKET (EA) ORAL
Refills: 0 | Status: COMPLETED | OUTPATIENT
Start: 2019-06-23 | End: 2019-06-23

## 2019-06-23 RX ADMIN — ATORVASTATIN CALCIUM 40 MILLIGRAM(S): 80 TABLET, FILM COATED ORAL at 21:26

## 2019-06-23 RX ADMIN — HEPARIN SODIUM 5000 UNIT(S): 5000 INJECTION INTRAVENOUS; SUBCUTANEOUS at 13:09

## 2019-06-23 RX ADMIN — PANTOPRAZOLE SODIUM 40 MILLIGRAM(S): 20 TABLET, DELAYED RELEASE ORAL at 05:44

## 2019-06-23 RX ADMIN — LISINOPRIL 40 MILLIGRAM(S): 2.5 TABLET ORAL at 05:44

## 2019-06-23 RX ADMIN — Medication 50 MILLIEQUIVALENT(S): at 17:23

## 2019-06-23 RX ADMIN — SENNA PLUS 2 TABLET(S): 8.6 TABLET ORAL at 21:27

## 2019-06-23 RX ADMIN — GABAPENTIN 400 MILLIGRAM(S): 400 CAPSULE ORAL at 05:44

## 2019-06-23 RX ADMIN — Medication 10: at 08:11

## 2019-06-23 RX ADMIN — HEPARIN SODIUM 5000 UNIT(S): 5000 INJECTION INTRAVENOUS; SUBCUTANEOUS at 21:27

## 2019-06-23 RX ADMIN — Medication 50 MILLIEQUIVALENT(S): at 10:39

## 2019-06-23 RX ADMIN — LEVETIRACETAM 420 MILLIGRAM(S): 250 TABLET, FILM COATED ORAL at 05:44

## 2019-06-23 RX ADMIN — METHADONE HYDROCHLORIDE 10 MILLIGRAM(S): 40 TABLET ORAL at 05:47

## 2019-06-23 RX ADMIN — Medication 6: at 12:47

## 2019-06-23 RX ADMIN — DULOXETINE HYDROCHLORIDE 60 MILLIGRAM(S): 30 CAPSULE, DELAYED RELEASE ORAL at 13:09

## 2019-06-23 RX ADMIN — Medication 6: at 21:27

## 2019-06-23 RX ADMIN — Medication 100 MICROGRAM(S): at 05:44

## 2019-06-23 RX ADMIN — LEVETIRACETAM 420 MILLIGRAM(S): 250 TABLET, FILM COATED ORAL at 17:20

## 2019-06-23 RX ADMIN — Medication 650 MILLIGRAM(S): at 05:44

## 2019-06-23 RX ADMIN — Medication 200 MILLIGRAM(S): at 17:21

## 2019-06-23 RX ADMIN — Medication 10 UNIT(S): at 10:40

## 2019-06-23 RX ADMIN — Medication 100 MILLIGRAM(S): at 17:21

## 2019-06-23 RX ADMIN — Medication 50 MILLIEQUIVALENT(S): at 13:09

## 2019-06-23 RX ADMIN — GABAPENTIN 400 MILLIGRAM(S): 400 CAPSULE ORAL at 21:27

## 2019-06-23 RX ADMIN — GABAPENTIN 400 MILLIGRAM(S): 400 CAPSULE ORAL at 13:09

## 2019-06-23 RX ADMIN — METHADONE HYDROCHLORIDE 10 MILLIGRAM(S): 40 TABLET ORAL at 17:23

## 2019-06-23 RX ADMIN — HEPARIN SODIUM 5000 UNIT(S): 5000 INJECTION INTRAVENOUS; SUBCUTANEOUS at 05:45

## 2019-06-23 RX ADMIN — Medication 200 MILLIGRAM(S): at 05:44

## 2019-06-23 RX ADMIN — Medication 1 MILLIGRAM(S): at 13:09

## 2019-06-23 NOTE — PROGRESS NOTE ADULT - ASSESSMENT
73y Female patient admitted S/P  Right inferior frontal SDH/ posterior scalp laceration    Plan:  Diet CC  Continue keppra  dispo  f/u neuro

## 2019-06-23 NOTE — PROGRESS NOTE ADULT - SUBJECTIVE AND OBJECTIVE BOX
GENERAL SURGERY PROGRESS NOTE     LINDSAY ALBERT  73y  Female  Hospital day :4d  POD:  Procedure:   OVERNIGHT EVENTS:  no acute events overnight.     T(F): 98.9 (06-23-19 @ 07:28), Max: 98.9 (06-23-19 @ 07:28)  HR: 75 (06-23-19 @ 07:28) (72 - 76)  BP: 148/70 (06-23-19 @ 07:28) (113/36 - 153/67)  RR: 187 (06-23-19 @ 07:28) (16 - 187)    DIET/FLUIDS: folic acid 1 milliGRAM(s) Oral daily      BM:   06-22-19 @ 07:01  -  06-23-19 @ 07:00  --------------------------------------------------------  OUT: 0 mL       GI proph:  pantoprazole    Tablet 40 milliGRAM(s) Oral before breakfast    AC/ proph: heparin  Injectable 5000 Unit(s) SubCutaneous every 8 hours    ABx: hydroxychloroquine 200 milliGRAM(s) Oral two times a day      PHYSICAL EXAM:  GENERAL: NAD, well-appearing  CHEST/LUNG: Clear to auscultation bilaterally  HEART: Regular rate and rhythm  ABDOMEN: Soft, Nontender, Nondistended;   EXTREMITIES:  No clubbing, cyanosis, or edema      LABS  Labs:  CAPILLARY BLOOD GLUCOSE      POCT Blood Glucose.: 376 mg/dL (23 Jun 2019 08:05)  POCT Blood Glucose.: 301 mg/dL (22 Jun 2019 21:11)  POCT Blood Glucose.: 339 mg/dL (22 Jun 2019 16:16)  POCT Blood Glucose.: 380 mg/dL (22 Jun 2019 11:42)                          9.1    7.10  )-----------( 189      ( 22 Jun 2019 20:48 )             28.6         06-22    133<L>  |  96<L>  |  18  ----------------------------<  312<H>  3.3<L>   |  29  |  0.8      Calcium, Total Serum: 8.2 mg/dL (06-22-19 @ 20:48)      RADIOLOGY & ADDITIONAL TESTS:  < from: CT Head No Cont (06.21.19 @ 00:48) >  IMPRESSION:  In comparison to the previous head CT 6/20/2019:    No significant interval change in the right cerebral convexity and   subfrontal subdural hematoma measuring up to 7 mm, with slight   interhemispheric fissure extension. Stable slight (2-3 mm) right to left   midline shift.      Dr. Abad Marie discussed preliminary findings with NAYELY REAL PA-C   on 6/21/2019 1:33 AM with readback.    < end of copied text >      A/P

## 2019-06-24 LAB
ANION GAP SERPL CALC-SCNC: 8 MMOL/L — SIGNIFICANT CHANGE UP (ref 7–14)
BUN SERPL-MCNC: 10 MG/DL — SIGNIFICANT CHANGE UP (ref 10–20)
CALCIUM SERPL-MCNC: 8.5 MG/DL — SIGNIFICANT CHANGE UP (ref 8.5–10.1)
CHLORIDE SERPL-SCNC: 102 MMOL/L — SIGNIFICANT CHANGE UP (ref 98–110)
CO2 SERPL-SCNC: 29 MMOL/L — SIGNIFICANT CHANGE UP (ref 17–32)
CREAT SERPL-MCNC: 0.7 MG/DL — SIGNIFICANT CHANGE UP (ref 0.7–1.5)
GLUCOSE BLDC GLUCOMTR-MCNC: 115 MG/DL — HIGH (ref 70–99)
GLUCOSE BLDC GLUCOMTR-MCNC: 202 MG/DL — HIGH (ref 70–99)
GLUCOSE BLDC GLUCOMTR-MCNC: 207 MG/DL — HIGH (ref 70–99)
GLUCOSE BLDC GLUCOMTR-MCNC: 377 MG/DL — HIGH (ref 70–99)
GLUCOSE SERPL-MCNC: 208 MG/DL — HIGH (ref 70–99)
HCT VFR BLD CALC: 28.4 % — LOW (ref 37–47)
HGB BLD-MCNC: 8.8 G/DL — LOW (ref 12–16)
MAGNESIUM SERPL-MCNC: 1.9 MG/DL — SIGNIFICANT CHANGE UP (ref 1.8–2.4)
MCHC RBC-ENTMCNC: 27.9 PG — SIGNIFICANT CHANGE UP (ref 27–31)
MCHC RBC-ENTMCNC: 31 G/DL — LOW (ref 32–37)
MCV RBC AUTO: 90.2 FL — SIGNIFICANT CHANGE UP (ref 81–99)
NRBC # BLD: 0 /100 WBCS — SIGNIFICANT CHANGE UP (ref 0–0)
PHOSPHATE SERPL-MCNC: 3.2 MG/DL — SIGNIFICANT CHANGE UP (ref 2.1–4.9)
PLATELET # BLD AUTO: 243 K/UL — SIGNIFICANT CHANGE UP (ref 130–400)
POTASSIUM SERPL-MCNC: 4.2 MMOL/L — SIGNIFICANT CHANGE UP (ref 3.5–5)
POTASSIUM SERPL-SCNC: 4.2 MMOL/L — SIGNIFICANT CHANGE UP (ref 3.5–5)
RBC # BLD: 3.15 M/UL — LOW (ref 4.2–5.4)
RBC # FLD: 14.6 % — HIGH (ref 11.5–14.5)
SODIUM SERPL-SCNC: 139 MMOL/L — SIGNIFICANT CHANGE UP (ref 135–146)
WBC # BLD: 6.1 K/UL — SIGNIFICANT CHANGE UP (ref 4.8–10.8)
WBC # FLD AUTO: 6.1 K/UL — SIGNIFICANT CHANGE UP (ref 4.8–10.8)

## 2019-06-24 PROCEDURE — 99232 SBSQ HOSP IP/OBS MODERATE 35: CPT

## 2019-06-24 PROCEDURE — 99222 1ST HOSP IP/OBS MODERATE 55: CPT

## 2019-06-24 RX ORDER — MAGNESIUM SULFATE 500 MG/ML
2 VIAL (ML) INJECTION ONCE
Refills: 0 | Status: COMPLETED | OUTPATIENT
Start: 2019-06-24 | End: 2019-06-24

## 2019-06-24 RX ORDER — INSULIN GLARGINE 100 [IU]/ML
20 INJECTION, SOLUTION SUBCUTANEOUS EVERY MORNING
Refills: 0 | Status: DISCONTINUED | OUTPATIENT
Start: 2019-06-24 | End: 2019-06-25

## 2019-06-24 RX ADMIN — GABAPENTIN 400 MILLIGRAM(S): 400 CAPSULE ORAL at 05:27

## 2019-06-24 RX ADMIN — Medication 10: at 08:13

## 2019-06-24 RX ADMIN — LEVETIRACETAM 420 MILLIGRAM(S): 250 TABLET, FILM COATED ORAL at 05:26

## 2019-06-24 RX ADMIN — HEPARIN SODIUM 5000 UNIT(S): 5000 INJECTION INTRAVENOUS; SUBCUTANEOUS at 14:42

## 2019-06-24 RX ADMIN — LEVETIRACETAM 420 MILLIGRAM(S): 250 TABLET, FILM COATED ORAL at 17:41

## 2019-06-24 RX ADMIN — Medication 4: at 21:33

## 2019-06-24 RX ADMIN — SENNA PLUS 2 TABLET(S): 8.6 TABLET ORAL at 21:34

## 2019-06-24 RX ADMIN — Medication 100 MILLIGRAM(S): at 17:40

## 2019-06-24 RX ADMIN — Medication 200 MILLIGRAM(S): at 05:27

## 2019-06-24 RX ADMIN — PANTOPRAZOLE SODIUM 40 MILLIGRAM(S): 20 TABLET, DELAYED RELEASE ORAL at 05:29

## 2019-06-24 RX ADMIN — HEPARIN SODIUM 5000 UNIT(S): 5000 INJECTION INTRAVENOUS; SUBCUTANEOUS at 21:34

## 2019-06-24 RX ADMIN — Medication 4: at 12:43

## 2019-06-24 RX ADMIN — DULOXETINE HYDROCHLORIDE 60 MILLIGRAM(S): 30 CAPSULE, DELAYED RELEASE ORAL at 12:43

## 2019-06-24 RX ADMIN — Medication 200 MILLIGRAM(S): at 17:41

## 2019-06-24 RX ADMIN — GABAPENTIN 400 MILLIGRAM(S): 400 CAPSULE ORAL at 21:34

## 2019-06-24 RX ADMIN — METHADONE HYDROCHLORIDE 10 MILLIGRAM(S): 40 TABLET ORAL at 17:41

## 2019-06-24 RX ADMIN — LISINOPRIL 40 MILLIGRAM(S): 2.5 TABLET ORAL at 05:26

## 2019-06-24 RX ADMIN — HEPARIN SODIUM 5000 UNIT(S): 5000 INJECTION INTRAVENOUS; SUBCUTANEOUS at 05:27

## 2019-06-24 RX ADMIN — Medication 100 MICROGRAM(S): at 05:27

## 2019-06-24 RX ADMIN — Medication 1 MILLIGRAM(S): at 12:44

## 2019-06-24 RX ADMIN — Medication 100 MILLIGRAM(S): at 05:27

## 2019-06-24 RX ADMIN — Medication 50 GRAM(S): at 09:15

## 2019-06-24 RX ADMIN — METHADONE HYDROCHLORIDE 10 MILLIGRAM(S): 40 TABLET ORAL at 05:29

## 2019-06-24 RX ADMIN — INSULIN GLARGINE 20 UNIT(S): 100 INJECTION, SOLUTION SUBCUTANEOUS at 12:42

## 2019-06-24 RX ADMIN — GABAPENTIN 400 MILLIGRAM(S): 400 CAPSULE ORAL at 14:42

## 2019-06-24 RX ADMIN — ATORVASTATIN CALCIUM 40 MILLIGRAM(S): 80 TABLET, FILM COATED ORAL at 21:34

## 2019-06-24 NOTE — PROVIDER CONTACT NOTE (OTHER) - SITUATION
Order for 20units of lantus for stat dose. Patient is already scheduled to receive 4 units of insulin as patient's blood glucose is 207

## 2019-06-24 NOTE — PROGRESS NOTE ADULT - SUBJECTIVE AND OBJECTIVE BOX
Progress Note: Surgery  Patient: LINDSAY ALBERT , 73y (1945)Female   MRN: [id number]  Location: [address]  Visit: 06-19-19 Inpatient  Date: 06-24-19 @ 04:20    Procedure/Injury: s/p fall with subacute SDH    Events over 24h: ADÁN, no complaints, afebrile vitals wnl    Vitals: T(F): 98.4 (06-23-19 @ 23:56), Max: 98.9 (06-23-19 @ 07:28)  HR: 78 (06-23-19 @ 23:56)  BP: 147/70 (06-23-19 @ 23:56) (137/64 - 153/67)  RR: 18 (06-23-19 @ 23:56)  SpO2: --    Diet: Diet, Consistent Carbohydrate w/Evening Snack (06-21-19 @ 10:29)    In:   06-22-19 @ 07:01  -  06-23-19 @ 07:00  --------------------------------------------------------  IN: 690 mL    06-23-19 @ 07:01  -  06-24-19 @ 04:20  --------------------------------------------------------  IN: 450 mL      Out:   06-22-19 @ 07:01  -  06-23-19 @ 07:00  --------------------------------------------------------  OUT:    Voided: 1300 mL  Total OUT: 1300 mL      06-23-19 @ 07:01  -  06-24-19 @ 04:20  --------------------------------------------------------  OUT:    Stool: 1 mL  Total OUT: 1 mL        Net:   06-22-19 @ 07:01 - 06-23-19 @ 07:00  --------------------------------------------------------  NET: -610 mL    06-23-19 @ 07:01  -  06-24-19 @ 04:20  --------------------------------------------------------  NET: 449 mL        Physical Examination:  General: NAD, lying in bed comfortably   HEENT: posterior scalp lac s/p repair  Heart: S1 S2, No MRG RRR   Lungs: CTABL +BS Equal BL, No WRC      Medications: [Standing]  atorvastatin 40 milliGRAM(s) Oral at bedtime  chlorhexidine 4% Liquid 1 Application(s) Topical <User Schedule>  docusate sodium 100 milliGRAM(s) Oral two times a day  DULoxetine 60 milliGRAM(s) Oral daily  folic acid 1 milliGRAM(s) Oral daily  gabapentin 400 milliGRAM(s) Oral three times a day  heparin  Injectable 5000 Unit(s) SubCutaneous every 8 hours  hydroxychloroquine 200 milliGRAM(s) Oral two times a day  insulin lispro (HumaLOG) corrective regimen sliding scale   SubCutaneous Before meals and at bedtime  levETIRAcetam  IVPB 500 milliGRAM(s) IV Intermittent every 12 hours  levothyroxine 100 MICROGram(s) Oral daily  lisinopril 40 milliGRAM(s) Oral daily  methadone    Tablet 10 milliGRAM(s) Oral two times a day  methotrexate 2.5 milliGRAM(s) Oral every week  pantoprazole    Tablet 40 milliGRAM(s) Oral before breakfast  senna 2 Tablet(s) Oral at bedtime    Medications:[PRN]    Labs:                        9.1    6.12  )-----------( 206      ( 23 Jun 2019 22:14 )             29.0     06-23    135  |  99  |  12  ----------------------------<  293<H>  4.3   |  27  |  0.6<L>    Ca    8.4<L>      23 Jun 2019 22:14  Phos  2.1     06-23  Mg     1.6     06-23    Date/Time: 06-24-19 @ 04:20

## 2019-06-24 NOTE — CONSULT NOTE ADULT - ASSESSMENT
73F w/ PMHx of HTN, HLD, HLD, TIA, Lupus/RA BIBA s/p fall from standing in the bathroom this am, walking to bathroom, bumped into sink and fell backwards, striking back of head on the tile floor. + HT, No LOC, +ASA 81mg,  Pt found by family standing after fall. Use cane to ambulate. Take 81mg ASA daily. (19 Jun 2019 13:19)    Assessment:    1. Fall 73F w/ PMHx of HTN, HLD, HLD, TIA, Lupus/RA BIBA s/p fall from standing in the bathroom this am, walking to bathroom, bumped into sink and fell backwards, striking back of head on the tile floor. + HT, No LOC, +ASA 81mg,  Pt found by family standing after fall. Use cane to ambulate. Take 81mg ASA daily. (19 Jun 2019 13:19)    Assessment:    1. status post mechanical fall-  - recommend cane to walk  - physical therapy recommended   - continue keppra   - follow up with neurology outpatient in 6 weeks.  - Repeat CT in one month. 73F w/ PMHx of HTN, HLD, HLD, TIA, Lupus/RA BIBA s/p fall from standing in the bathroom this am, walking to bathroom, bumped into sink and fell backwards, striking back of head on the tile floor. + HT, No LOC, +ASA 81mg,  Pt found by family standing after fall. Use cane to ambulate. Take 81mg ASA daily. (19 Jun 2019 13:19)    Assessment:    1. status post mechanical fall-  - recommend cane to walk  - physical therapy recommended   - continue keppra until resolution of subdural  - follow up with neurology outpatient in 6 weeks.  - Repeat CT in one month.

## 2019-06-24 NOTE — PROGRESS NOTE ADULT - ASSESSMENT
73y Female patient admitted S/P  Right inferior frontal SDH/ posterior scalp laceration    Plan:  Diet CC  Continue keppra  dispo  continue blood sugar control   bowel reg  f/u neuro

## 2019-06-24 NOTE — CONSULT NOTE ADULT - ATTENDING COMMENTS
agree with sicu admission for risk of neurologic deterioration  I discussed the patient with the sicu pa by phone and performed my exam next day, confirming the above findings
Abnormal EEG may be related to subdural and irritation of cortex.  No apparently LOC with fall.  Some underlying gait instability and distal sensory loss may be present so falls precautions, use of cane, night lights,  grab bars in bathroom may be warranted.    Outpatient neurology f/u in 6 weeks.  Will likely be able to taper off keppra at that time.  F/u head CT in 4 weeks unless needed sooner due to clinical worsening.

## 2019-06-24 NOTE — CONSULT NOTE ADULT - SUBJECTIVE AND OBJECTIVE BOX
Neurology Consult    Patient is a 73y old  Female who presents with a chief complaint of SDH (21 Jun 2019 02:06)      HPI:  73F w/ PMHx of HTN, HLD, HLD, TIA, Lupus/RA BIBA s/p fall from standing in the bathroom this am, walking to bathroom, bumped into sink and fell backwards, striking back of head on the tile floor. + HT, No LOC, +ASA 81mg,  Pt found by family standing after fall. Use cane to ambulate. Take 81mg ASA daily. (19 Jun 2019 13:19)    Interval History:  Patient is alert and conscious, has not experienced any LOC or similar episode during the interval. Patient denies tongue bite, urinary incontinence, headache, dizziness or any experiencing any symptoms before the fall.     PAST MEDICAL & SURGICAL HISTORY:  Herniated lumbar intervertebral disc  Stroke: TIA 2002, no deficits  Fall, initial encounter  Lupus  Hypertension  Hypercholesteremia  DM (diabetes mellitus)  No significant past surgical history      FAMILY HISTORY:  No pertinent family history in first degree relatives  Hypertension in both parents      Social History: (-) x 3    Allergies    No Known Allergies    Intolerances        MEDICATIONS  (STANDING):  atorvastatin 40 milliGRAM(s) Oral at bedtime  chlorhexidine 4% Liquid 1 Application(s) Topical <User Schedule>  docusate sodium 100 milliGRAM(s) Oral two times a day  DULoxetine 60 milliGRAM(s) Oral daily  folic acid 1 milliGRAM(s) Oral daily  gabapentin 400 milliGRAM(s) Oral three times a day  heparin  Injectable 5000 Unit(s) SubCutaneous every 8 hours  hydroxychloroquine 200 milliGRAM(s) Oral two times a day  insulin lispro (HumaLOG) corrective regimen sliding scale   SubCutaneous Before meals and at bedtime  levETIRAcetam  IVPB 500 milliGRAM(s) IV Intermittent every 12 hours  levothyroxine 100 MICROGram(s) Oral daily  lisinopril 40 milliGRAM(s) Oral daily  methadone    Tablet 10 milliGRAM(s) Oral two times a day  methotrexate 2.5 milliGRAM(s) Oral every week  pantoprazole    Tablet 40 milliGRAM(s) Oral before breakfast  senna 2 Tablet(s) Oral at bedtime    MEDICATIONS  (PRN):      Review of systems:    Constitutional: as per HPI  Eyes: No eye pain or discharge  ENMT:  No difficulty hearing; No sinus or throat pain  Neck: No pain or stiffness  Respiratory: No cough, wheezing, chills or hemoptysis  Cardiovascular: No chest pain, palpitations, shortness of breath, dyspnea on exertion  Gastrointestinal: No abdominal pain, nausea, vomiting or hematemesis; No diarrhea or constipation.   Genitourinary: No dysuria, frequency, hematuria or incontinence  Neurological: As per HPI  Skin: No rashes or lesions   Endocrine: No heat or cold intolerance; No hair loss  Musculoskeletal: No joint pain or swelling  Psychiatric: No depression, anxiety, mood swings  Heme/Lymph: No easy bruising or bleeding gums    Vital Signs Last 24 Hrs  T(C): 36.4 (24 Jun 2019 08:00), Max: 36.9 (23 Jun 2019 23:56)  T(F): 97.6 (24 Jun 2019 08:00), Max: 98.4 (23 Jun 2019 23:56)  HR: 82 (24 Jun 2019 08:00) (78 - 82)  BP: 168/72 (24 Jun 2019 08:00) (137/64 - 168/72)  BP(mean): --  RR: 19 (24 Jun 2019 08:00) (18 - 19)  SpO2: --    Examination:  General:  Appearance is consistent with chronologic age.  No abnormal facies.  Gross skin survey within normal limits.    Cognitive/Language:  The patient is oriented to person, place, time and date.  Recent and remote memory intact.  Fund of knowledge is intact and normal.  Language with normal repetition, comprehension and naming.  Nondysarthric.    Eyes: intact VA, VFF.  EOMI w/o nystagmus, skew or reported double vision.  PERRL.  No ptosis/weakness of eyelid closure.    Face: no facial asymmetry.    Ears/Nose/Throat:  Hearing grossly intact b/l.  .   Motor examination:   Normal tone, bulk and range of motion.  No tenderness, twitching, tremors or involuntary movements.  Formal Muscle Strength Testing: (MRC grade R/L) 5/5 UE; 5/5 LE.        Respiratory:  no audible wheezing or inspiratory stridor.  no use of accessory muscles.   Cardiac: pulse palpable, no audible bruits  Abdomen: supple, no guarding, no TTP    Labs:   CBC Full  -  ( 23 Jun 2019 22:14 )  WBC Count : 6.12 K/uL  RBC Count : 3.22 M/uL  Hemoglobin : 9.1 g/dL  Hematocrit : 29.0 %  Platelet Count - Automated : 206 K/uL  Mean Cell Volume : 90.1 fL  Mean Cell Hemoglobin : 28.3 pg  Mean Cell Hemoglobin Concentration : 31.4 g/dL  Auto Neutrophil # : 3.63 K/uL  Auto Lymphocyte # : 1.55 K/uL  Auto Monocyte # : 0.63 K/uL  Auto Eosinophil # : 0.27 K/uL  Auto Basophil # : 0.03 K/uL  Auto Neutrophil % : 59.3 %  Auto Lymphocyte % : 25.3 %  Auto Monocyte % : 10.3 %  Auto Eosinophil % : 4.4 %  Auto Basophil % : 0.5 %      06-23    135  |  99  |  12  ----------------------------<  293<H>  4.3   |  27  |  0.6<L>    Ca    8.4<L>      23 Jun 2019 22:14  Phos  2.1     06-23  Mg     1.6     06-23                Neuroimaging:  [facility]:     In comparison to the previous head CT 6/20/2019:    No significant interval change in the right cerebral convexity and   subfrontal subdural hematoma measuring up to 7 mm, with slight   interhemispheric fissure extension. Stable slight (2-3 mm) right to left   midline shift.      CT Cervical spine:   (06.19.19 @ 10:23)   1.  Diffuse osteoporosis and degenerative changes of the cervical spine   C3-4 through C6-7 worse at C5-6 and C6-7 as described above.    2.  Straightening of normal cervical lordosis with mild anterolisthesis   of C4 on C5, C7 on T1 and T1 on T2 with mild retrolisthesis of C6 on C7.    3. No acute fractures or dislocations.    < end of copied text >              06-24-19 @ 10:22 Neurology Consult    Patient is a 73y old  Female who presents with a chief complaint of SDH (21 Jun 2019 02:06)      HPI:  73F w/ PMHx of HTN, HLD, HLD, TIA, Lupus/RA BIBA s/p fall from standing in the bathroom this am, walking to bathroom, bumped into sink and fell backwards, striking back of head on the tile floor. + HT, No LOC, +ASA 81mg,  Pt found by family standing after fall. Use cane to ambulate. Take 81mg ASA daily. (19 Jun 2019 13:19)    Interval History:  Patient is alert, oriented and conscious, has not experienced any LOC or similar episode during the interval. Patient denies tongue bite, urinary incontinence, headache, dizziness or any experiencing any symptoms before the fall.     PAST MEDICAL & SURGICAL HISTORY:  Herniated lumbar intervertebral disc  Stroke: TIA 2002, no deficits  Fall, initial encounter  Lupus  Hypertension  Hypercholesteremia  DM (diabetes mellitus)  No significant past surgical history      FAMILY HISTORY:  No pertinent family history in first degree relatives  Hypertension in both parents      Social History: (-) x 3    Allergies    No Known Allergies    Intolerances        MEDICATIONS  (STANDING):  atorvastatin 40 milliGRAM(s) Oral at bedtime  chlorhexidine 4% Liquid 1 Application(s) Topical <User Schedule>  docusate sodium 100 milliGRAM(s) Oral two times a day  DULoxetine 60 milliGRAM(s) Oral daily  folic acid 1 milliGRAM(s) Oral daily  gabapentin 400 milliGRAM(s) Oral three times a day  heparin  Injectable 5000 Unit(s) SubCutaneous every 8 hours  hydroxychloroquine 200 milliGRAM(s) Oral two times a day  insulin lispro (HumaLOG) corrective regimen sliding scale   SubCutaneous Before meals and at bedtime  levETIRAcetam  IVPB 500 milliGRAM(s) IV Intermittent every 12 hours  levothyroxine 100 MICROGram(s) Oral daily  lisinopril 40 milliGRAM(s) Oral daily  methadone    Tablet 10 milliGRAM(s) Oral two times a day  methotrexate 2.5 milliGRAM(s) Oral every week  pantoprazole    Tablet 40 milliGRAM(s) Oral before breakfast  senna 2 Tablet(s) Oral at bedtime    MEDICATIONS  (PRN):      Review of systems:    Constitutional: as per HPI  Eyes: No eye pain or discharge  ENMT:  No difficulty hearing; No sinus or throat pain  Neck: No pain or stiffness  Respiratory: No cough, wheezing, chills or hemoptysis  Cardiovascular: No chest pain, palpitations, shortness of breath, dyspnea on exertion  Gastrointestinal: No abdominal pain, nausea, vomiting or hematemesis; No diarrhea or constipation.   Genitourinary: No dysuria, frequency, hematuria or incontinence  Neurological: As per HPI  Skin: No rashes or lesions   Endocrine: No heat or cold intolerance; No hair loss  Musculoskeletal: No joint pain or swelling  Psychiatric: No depression, anxiety, mood swings  Heme/Lymph: No easy bruising or bleeding gums    Vital Signs Last 24 Hrs  T(C): 36.4 (24 Jun 2019 08:00), Max: 36.9 (23 Jun 2019 23:56)  T(F): 97.6 (24 Jun 2019 08:00), Max: 98.4 (23 Jun 2019 23:56)  HR: 82 (24 Jun 2019 08:00) (78 - 82)  BP: 168/72 (24 Jun 2019 08:00) (137/64 - 168/72)  BP(mean): --  RR: 19 (24 Jun 2019 08:00) (18 - 19)  SpO2: --    Examination:  General:  Appearance is consistent with chronologic age.  No abnormal facies.  Gross skin survey within normal limits.    Cognitive/Language:  The patient is oriented to person, place, time and date.  Recent and remote memory intact.  Fund of knowledge is intact and normal.  Language with normal repetition, comprehension and naming.  Nondysarthric.    Eyes: intact VA, VFF.  EOMI w/o nystagmus, skew or reported double vision.  PERRL.  No ptosis/weakness of eyelid closure.    Face: no facial asymmetry.    Ears/Nose/Throat:  Hearing grossly intact b/l.  .   Motor examination:   Normal tone, bulk and range of motion.  No tenderness, twitching, tremors or involuntary movements.  Formal Muscle Strength Testing: (MRC grade R/L) 5/5 UE; 5/5 LE.        Respiratory:  no audible wheezing or inspiratory stridor.  no use of accessory muscles.   Cardiac: pulse palpable, no audible bruits  Abdomen: supple, no guarding, no TTP    Labs:   CBC Full  -  ( 23 Jun 2019 22:14 )  WBC Count : 6.12 K/uL  RBC Count : 3.22 M/uL  Hemoglobin : 9.1 g/dL  Hematocrit : 29.0 %  Platelet Count - Automated : 206 K/uL  Mean Cell Volume : 90.1 fL  Mean Cell Hemoglobin : 28.3 pg  Mean Cell Hemoglobin Concentration : 31.4 g/dL  Auto Neutrophil # : 3.63 K/uL  Auto Lymphocyte # : 1.55 K/uL  Auto Monocyte # : 0.63 K/uL  Auto Eosinophil # : 0.27 K/uL  Auto Basophil # : 0.03 K/uL  Auto Neutrophil % : 59.3 %  Auto Lymphocyte % : 25.3 %  Auto Monocyte % : 10.3 %  Auto Eosinophil % : 4.4 %  Auto Basophil % : 0.5 %      06-23    135  |  99  |  12  ----------------------------<  293<H>  4.3   |  27  |  0.6<L>    Ca    8.4<L>      23 Jun 2019 22:14  Phos  2.1     06-23  Mg     1.6     06-23                Neuroimaging:  [facility]:     In comparison to the previous head CT 6/20/2019:    No significant interval change in the right cerebral convexity and   subfrontal subdural hematoma measuring up to 7 mm, with slight   interhemispheric fissure extension. Stable slight (2-3 mm) right to left   midline shift.      CT Cervical spine:   (06.19.19 @ 10:23)   1.  Diffuse osteoporosis and degenerative changes of the cervical spine   C3-4 through C6-7 worse at C5-6 and C6-7 as described above.    2.  Straightening of normal cervical lordosis with mild anterolisthesis   of C4 on C5, C7 on T1 and T1 on T2 with mild retrolisthesis of C6 on C7.    3. No acute fractures or dislocations.    < end of copied text >              06-24-19 @ 10:22 Neurology Consult    Patient is a 73y old  Female who presents with a chief complaint of SDH (21 Jun 2019 02:06)      HPI:  73F w/ PMHx of HTN, HLD, HLD, TIA, Lupus/RA BIBA s/p fall from standing in the bathroom this am, walking to bathroom, bumped into sink and fell backwards, striking back of head on the tile floor. + HT, No LOC, +ASA 81mg,  Pt found by family standing after fall. Use cane to ambulate. Take 81mg ASA daily. (19 Jun 2019 13:19)    Interval History:  Patient is alert, oriented and conscious, has not experienced any LOC or similar episode during the interval. Patient denies tongue bite, urinary incontinence, headache, dizziness or any experiencing any symptoms before the fall.     PAST MEDICAL & SURGICAL HISTORY:  Herniated lumbar intervertebral disc  Stroke: TIA 2002, no deficits  Fall, initial encounter  Lupus  Hypertension  Hypercholesteremia  DM (diabetes mellitus)  No significant past surgical history      FAMILY HISTORY:  No pertinent family history in first degree relatives  Hypertension in both parents      Social History: (-) x 3    Allergies    No Known Allergies    Intolerances        MEDICATIONS  (STANDING):  atorvastatin 40 milliGRAM(s) Oral at bedtime  chlorhexidine 4% Liquid 1 Application(s) Topical <User Schedule>  docusate sodium 100 milliGRAM(s) Oral two times a day  DULoxetine 60 milliGRAM(s) Oral daily  folic acid 1 milliGRAM(s) Oral daily  gabapentin 400 milliGRAM(s) Oral three times a day  heparin  Injectable 5000 Unit(s) SubCutaneous every 8 hours  hydroxychloroquine 200 milliGRAM(s) Oral two times a day  insulin lispro (HumaLOG) corrective regimen sliding scale   SubCutaneous Before meals and at bedtime  levETIRAcetam  IVPB 500 milliGRAM(s) IV Intermittent every 12 hours  levothyroxine 100 MICROGram(s) Oral daily  lisinopril 40 milliGRAM(s) Oral daily  methadone    Tablet 10 milliGRAM(s) Oral two times a day  methotrexate 2.5 milliGRAM(s) Oral every week  pantoprazole    Tablet 40 milliGRAM(s) Oral before breakfast  senna 2 Tablet(s) Oral at bedtime    MEDICATIONS  (PRN):      Review of systems:    Constitutional: as per HPI  Eyes: No eye pain or discharge  ENMT:  No difficulty hearing; No sinus or throat pain  Neck: No pain or stiffness  Respiratory: No cough, wheezing, chills or hemoptysis  Cardiovascular: No chest pain, palpitations, shortness of breath, dyspnea on exertion  Gastrointestinal: No abdominal pain, nausea, vomiting or hematemesis; No diarrhea or constipation.   Genitourinary: No dysuria, frequency, hematuria or incontinence  Neurological: As per HPI  Skin: No rashes or lesions   Endocrine: No heat or cold intolerance; No hair loss  Musculoskeletal: No joint pain or swelling  Psychiatric: No depression, anxiety, mood swings  Heme/Lymph: No easy bruising or bleeding gums    Vital Signs Last 24 Hrs  T(C): 36.4 (24 Jun 2019 08:00), Max: 36.9 (23 Jun 2019 23:56)  T(F): 97.6 (24 Jun 2019 08:00), Max: 98.4 (23 Jun 2019 23:56)  HR: 82 (24 Jun 2019 08:00) (78 - 82)  BP: 168/72 (24 Jun 2019 08:00) (137/64 - 168/72)  BP(mean): --  RR: 19 (24 Jun 2019 08:00) (18 - 19)  SpO2: --    Examination:  General:  Appearance is consistent with chronologic age.  No abnormal facies.  Gross skin survey within normal limits.    Cognitive/Language:  The patient is oriented to person, place, time and date.  Recent and remote memory intact.  Fund of knowledge is intact and normal.  Language with normal repetition, comprehension and naming.  Nondysarthric.    Eyes: intact VA, VFF.  EOMI w/o nystagmus, skew or reported double vision.  PERRL.  No ptosis/weakness of eyelid closure.    Face: no facial asymmetry.    Ears/Nose/Throat:  Hearing grossly intact b/l.  .   Motor examination:   Normal tone, bulk and range of motion.  No tenderness, twitching, tremors or involuntary movements.  Formal Muscle Strength Testing: (MRC grade R/L) 5/5 UE; 5/5 LE.        Respiratory:  no audible wheezing or inspiratory stridor.  no use of accessory muscles.   Cardiac: pulse palpable, no audible bruits  Abdomen: supple, no guarding, no TTP    Labs:   CBC Full  -  ( 23 Jun 2019 22:14 )  WBC Count : 6.12 K/uL  RBC Count : 3.22 M/uL  Hemoglobin : 9.1 g/dL  Hematocrit : 29.0 %  Platelet Count - Automated : 206 K/uL  Mean Cell Volume : 90.1 fL  Mean Cell Hemoglobin : 28.3 pg  Mean Cell Hemoglobin Concentration : 31.4 g/dL  Auto Neutrophil # : 3.63 K/uL  Auto Lymphocyte # : 1.55 K/uL  Auto Monocyte # : 0.63 K/uL  Auto Eosinophil # : 0.27 K/uL  Auto Basophil # : 0.03 K/uL  Auto Neutrophil % : 59.3 %  Auto Lymphocyte % : 25.3 %  Auto Monocyte % : 10.3 %  Auto Eosinophil % : 4.4 %  Auto Basophil % : 0.5 %      06-23    135  |  99  |  12  ----------------------------<  293<H>  4.3   |  27  |  0.6<L>    Ca    8.4<L>      23 Jun 2019 22:14  Phos  2.1     06-23  Mg     1.6     06-23                Neuroimaging:  [facility]CT:     In comparison to the previous head CT 6/20/2019:    No significant interval change in the right cerebral convexity and   subfrontal subdural hematoma measuring up to 7 mm, with slight   interhemispheric fissure extension. Stable slight (2-3 mm) right to left   midline shift.      CT Cervical spine:   (06.19.19 @ 10:23)   1.  Diffuse osteoporosis and degenerative changes of the cervical spine   C3-4 through C6-7 worse at C5-6 and C6-7 as described above.    2.  Straightening of normal cervical lordosis with mild anterolisthesis   of C4 on C5, C7 on T1 and T1 on T2 with mild retrolisthesis of C6 on C7.    3. No acute fractures or dislocations.    < end of copied text >    EEG: (06.20.19 @ 14:00) >  Abnormal routine EEG due to the presence of  1-focal and generalized slowing e  2-abnormal interictal activity     < end of copied text >                06-24-19 @ 10:22 Neurology Consult    Patient is a 73y old  Female who presents with a chief complaint of SDH (21 Jun 2019 02:06)      HPI:  73F w/ PMHx of HTN, HLD, HLD, TIA, Lupus/RA BIBA s/p fall from standing in the bathroom this am, walking to bathroom, bumped into sink and fell backwards, striking back of head on the tile floor. + HT, No LOC, +ASA 81mg,  Pt found by family standing after fall. Use cane to ambulate. Take 81mg ASA daily. (19 Jun 2019 13:19)    Interval History:  Patient is alert, oriented and conscious, has not experienced any LOC or similar episode during the interval. Patient denies tongue bite, urinary incontinence, headache, dizziness or any experiencing any symptoms before the fall.     PAST MEDICAL & SURGICAL HISTORY:  Herniated lumbar intervertebral disc  Stroke: TIA 2002, no deficits  Fall, initial encounter  Lupus  Hypertension  Hypercholesteremia  DM (diabetes mellitus)  No significant past surgical history      FAMILY HISTORY:  No pertinent family history in first degree relatives  Hypertension in both parents      Social History: (-) x 3    Allergies    No Known Allergies    Intolerances        MEDICATIONS  (STANDING):  atorvastatin 40 milliGRAM(s) Oral at bedtime  chlorhexidine 4% Liquid 1 Application(s) Topical <User Schedule>  docusate sodium 100 milliGRAM(s) Oral two times a day  DULoxetine 60 milliGRAM(s) Oral daily  folic acid 1 milliGRAM(s) Oral daily  gabapentin 400 milliGRAM(s) Oral three times a day  heparin  Injectable 5000 Unit(s) SubCutaneous every 8 hours  hydroxychloroquine 200 milliGRAM(s) Oral two times a day  insulin lispro (HumaLOG) corrective regimen sliding scale   SubCutaneous Before meals and at bedtime  levETIRAcetam  IVPB 500 milliGRAM(s) IV Intermittent every 12 hours  levothyroxine 100 MICROGram(s) Oral daily  lisinopril 40 milliGRAM(s) Oral daily  methadone    Tablet 10 milliGRAM(s) Oral two times a day  methotrexate 2.5 milliGRAM(s) Oral every week  pantoprazole    Tablet 40 milliGRAM(s) Oral before breakfast  senna 2 Tablet(s) Oral at bedtime    MEDICATIONS  (PRN):      Review of systems:    Constitutional: as per HPI  Eyes: No eye pain or discharge  ENMT:  No difficulty hearing; No sinus or throat pain  Neck: No pain or stiffness  Respiratory: No cough, wheezing, chills or hemoptysis  Cardiovascular: No chest pain, palpitations, shortness of breath, dyspnea on exertion  Gastrointestinal: No abdominal pain, nausea, vomiting or hematemesis; No diarrhea or constipation.   Genitourinary: No dysuria, frequency, hematuria or incontinence  Neurological: As per HPI  Skin: No rashes or lesions   Endocrine: No heat or cold intolerance; No hair loss  Musculoskeletal: No joint pain or swelling  Psychiatric: No depression, anxiety, mood swings  Heme/Lymph: No easy bruising or bleeding gums    Vital Signs Last 24 Hrs  T(C): 36.4 (24 Jun 2019 08:00), Max: 36.9 (23 Jun 2019 23:56)  T(F): 97.6 (24 Jun 2019 08:00), Max: 98.4 (23 Jun 2019 23:56)  HR: 82 (24 Jun 2019 08:00) (78 - 82)  BP: 168/72 (24 Jun 2019 08:00) (137/64 - 168/72)  BP(mean): --  RR: 19 (24 Jun 2019 08:00) (18 - 19)  SpO2: --    Examination:  General:  Appearance is consistent with chronologic age.  No abnormal facies.  Gross skin survey within normal limits.    Cognitive/Language:  The patient is oriented to person, place, time and date.  Recent and remote memory intact.  Fund of knowledge is intact and normal.  Language with normal repetition, comprehension and naming.  Nondysarthric.    Eyes: intact VA, VFF.  EOMI w/o nystagmus, skew or reported double vision.  PERRL.  No ptosis/weakness of eyelid closure.    Face: no facial asymmetry.    Ears/Nose/Throat:  Hearing grossly intact b/l.  .   Motor examination:   Normal tone, bulk and range of motion.  No tenderness, twitching, tremors or involuntary movements.  Formal Muscle Strength Testing: (MRC grade R/L) 5/5 UE; 5/5 LE.        Respiratory:  no audible wheezing or inspiratory stridor.  no use of accessory muscles.   Cardiac: pulse palpable, no audible bruits  Abdomen: supple, no guarding, no TTP    Labs:   CBC Full  -  ( 23 Jun 2019 22:14 )  WBC Count : 6.12 K/uL  RBC Count : 3.22 M/uL  Hemoglobin : 9.1 g/dL  Hematocrit : 29.0 %  Platelet Count - Automated : 206 K/uL  Mean Cell Volume : 90.1 fL  Mean Cell Hemoglobin : 28.3 pg  Mean Cell Hemoglobin Concentration : 31.4 g/dL  Auto Neutrophil # : 3.63 K/uL  Auto Lymphocyte # : 1.55 K/uL  Auto Monocyte # : 0.63 K/uL  Auto Eosinophil # : 0.27 K/uL  Auto Basophil # : 0.03 K/uL  Auto Neutrophil % : 59.3 %  Auto Lymphocyte % : 25.3 %  Auto Monocyte % : 10.3 %  Auto Eosinophil % : 4.4 %  Auto Basophil % : 0.5 %      06-23    135  |  99  |  12  ----------------------------<  293<H>  4.3   |  27  |  0.6<L>    Ca    8.4<L>      23 Jun 2019 22:14  Phos  2.1     06-23  Mg     1.6     06-23                Neuroimaging:  [facility]CT:     In comparison to the previous head CT 6/20/2019:    No significant interval change in the right cerebral convexity and   subfrontal subdural hematoma measuring up to 7 mm, with slight   interhemispheric fissure extension. Stable slight (2-3 mm) right to left   midline shift.      CT Cervical spine:   (06.19.19 @ 10:23)   1.  Diffuse osteoporosis and degenerative changes of the cervical spine   C3-4 through C6-7 worse at C5-6 and C6-7 as described above.    2.  Straightening of normal cervical lordosis with mild anterolisthesis   of C4 on C5, C7 on T1 and T1 on T2 with mild retrolisthesis of C6 on C7.    3. No acute fractures or dislocations.    < end of copied text >    EEG: (06.20.19 @ 14:00) >  Abnormal routine EEG due to the presence of  1-focal and generalized slowing e  2-abnormal interictal activity     < end of copied text >      06-24-19 @ 10:22

## 2019-06-25 ENCOUNTER — TRANSCRIPTION ENCOUNTER (OUTPATIENT)
Age: 74
End: 2019-06-25

## 2019-06-25 VITALS
SYSTOLIC BLOOD PRESSURE: 136 MMHG | HEART RATE: 82 BPM | DIASTOLIC BLOOD PRESSURE: 63 MMHG | TEMPERATURE: 99 F | RESPIRATION RATE: 18 BRPM

## 2019-06-25 LAB
GLUCOSE BLDC GLUCOMTR-MCNC: 177 MG/DL — HIGH (ref 70–99)
GLUCOSE BLDC GLUCOMTR-MCNC: 184 MG/DL — HIGH (ref 70–99)
GLUCOSE BLDC GLUCOMTR-MCNC: 306 MG/DL — HIGH (ref 70–99)

## 2019-06-25 RX ORDER — OXYCODONE AND ACETAMINOPHEN 5; 325 MG/1; MG/1
2 TABLET ORAL EVERY 4 HOURS
Refills: 0 | Status: DISCONTINUED | OUTPATIENT
Start: 2019-06-25 | End: 2019-06-25

## 2019-06-25 RX ORDER — MORPHINE SULFATE 50 MG/1
2 CAPSULE, EXTENDED RELEASE ORAL EVERY 4 HOURS
Refills: 0 | Status: DISCONTINUED | OUTPATIENT
Start: 2019-06-25 | End: 2019-06-25

## 2019-06-25 RX ORDER — SODIUM CHLORIDE 9 MG/ML
1000 INJECTION INTRAMUSCULAR; INTRAVENOUS; SUBCUTANEOUS
Refills: 0 | Status: DISCONTINUED | OUTPATIENT
Start: 2019-06-25 | End: 2019-06-25

## 2019-06-25 RX ORDER — METHOCARBAMOL 500 MG/1
500 TABLET, FILM COATED ORAL EVERY 6 HOURS
Refills: 0 | Status: DISCONTINUED | OUTPATIENT
Start: 2019-06-25 | End: 2019-06-25

## 2019-06-25 RX ORDER — ONDANSETRON 8 MG/1
4 TABLET, FILM COATED ORAL EVERY 6 HOURS
Refills: 0 | Status: DISCONTINUED | OUTPATIENT
Start: 2019-06-25 | End: 2019-06-25

## 2019-06-25 RX ORDER — CEFAZOLIN SODIUM 1 G
1000 VIAL (EA) INJECTION EVERY 8 HOURS
Refills: 0 | Status: DISCONTINUED | OUTPATIENT
Start: 2019-06-25 | End: 2019-06-25

## 2019-06-25 RX ADMIN — Medication 2: at 10:53

## 2019-06-25 RX ADMIN — HEPARIN SODIUM 5000 UNIT(S): 5000 INJECTION INTRAVENOUS; SUBCUTANEOUS at 13:19

## 2019-06-25 RX ADMIN — CHLORHEXIDINE GLUCONATE 1 APPLICATION(S): 213 SOLUTION TOPICAL at 06:07

## 2019-06-25 RX ADMIN — DULOXETINE HYDROCHLORIDE 60 MILLIGRAM(S): 30 CAPSULE, DELAYED RELEASE ORAL at 12:10

## 2019-06-25 RX ADMIN — Medication 100 MILLIGRAM(S): at 06:07

## 2019-06-25 RX ADMIN — PANTOPRAZOLE SODIUM 40 MILLIGRAM(S): 20 TABLET, DELAYED RELEASE ORAL at 06:06

## 2019-06-25 RX ADMIN — INSULIN GLARGINE 20 UNIT(S): 100 INJECTION, SOLUTION SUBCUTANEOUS at 08:31

## 2019-06-25 RX ADMIN — Medication 8: at 08:31

## 2019-06-25 RX ADMIN — Medication 1 MILLIGRAM(S): at 12:10

## 2019-06-25 RX ADMIN — Medication 100 MICROGRAM(S): at 06:06

## 2019-06-25 RX ADMIN — Medication 2: at 16:30

## 2019-06-25 RX ADMIN — HEPARIN SODIUM 5000 UNIT(S): 5000 INJECTION INTRAVENOUS; SUBCUTANEOUS at 06:06

## 2019-06-25 RX ADMIN — Medication 100 MILLIGRAM(S): at 17:47

## 2019-06-25 RX ADMIN — LISINOPRIL 40 MILLIGRAM(S): 2.5 TABLET ORAL at 06:07

## 2019-06-25 RX ADMIN — METHADONE HYDROCHLORIDE 10 MILLIGRAM(S): 40 TABLET ORAL at 17:47

## 2019-06-25 RX ADMIN — Medication 200 MILLIGRAM(S): at 06:07

## 2019-06-25 RX ADMIN — LEVETIRACETAM 420 MILLIGRAM(S): 250 TABLET, FILM COATED ORAL at 06:05

## 2019-06-25 RX ADMIN — Medication 200 MILLIGRAM(S): at 17:47

## 2019-06-25 RX ADMIN — GABAPENTIN 400 MILLIGRAM(S): 400 CAPSULE ORAL at 06:07

## 2019-06-25 RX ADMIN — METHADONE HYDROCHLORIDE 10 MILLIGRAM(S): 40 TABLET ORAL at 06:10

## 2019-06-25 RX ADMIN — GABAPENTIN 400 MILLIGRAM(S): 400 CAPSULE ORAL at 13:19

## 2019-06-25 RX ADMIN — LEVETIRACETAM 420 MILLIGRAM(S): 250 TABLET, FILM COATED ORAL at 17:46

## 2019-06-25 NOTE — PROGRESS NOTE ADULT - ASSESSMENT
73y Female patient admitted s/p right inferior frontal SDH/ posterior scalp laceration    Plan:  Diet CC  Continue keppra  dispo  continue blood sugar control, now on lantus 20  bowel reg  outpatient neurology in 6 weeks  discharge today

## 2019-06-25 NOTE — PHYSICAL THERAPY INITIAL EVALUATION ADULT - AMBULATION SKILLS, REHAB EVAL
Pt has RW however states she does not use it often. PT explained importance of RW to pt./needs device

## 2019-06-25 NOTE — PHYSICAL THERAPY INITIAL EVALUATION ADULT - GAIT DEVIATIONS NOTED, PT EVAL
pt demonstrated BL genu varum during gait R>L, pt reports this has been baseline for her. PT further explained importance of RW for support.

## 2019-06-25 NOTE — DISCHARGE NOTE PROVIDER - HOSPITAL COURSE
73F, PMHx HTN, HLD, TIA, Lupus/RA s/p fall from standing in the bathroom, pt was walking to the bathroom when she bumped into sink and fell backwards, hitting head, no LOC, on ASA. Pt was admitted to the ICU. Patient had one episode of change in mental status, stat CT head showed midline shift 2-3mm. Third CT head is stable. Neurosurgery plan: Continue Keppra for 7 days, and to follow up in 2 weeks in office. Neurology was consulted for abnormal EEG findings, recommended: outpatient follow up in 6 week, and follow up head CT in 4 weeks. Patient is being discharged home. Patient completed their 7 day course of Keppra inpatient.

## 2019-06-25 NOTE — DISCHARGE NOTE PROVIDER - NSDCCPCAREPLAN_GEN_ALL_CORE_FT
PRINCIPAL DISCHARGE DIAGNOSIS  Diagnosis: Subdural hematoma  Assessment and Plan of Treatment:       SECONDARY DISCHARGE DIAGNOSES  Diagnosis: Fall  Assessment and Plan of Treatment:

## 2019-06-25 NOTE — PROGRESS NOTE ADULT - SUBJECTIVE AND OBJECTIVE BOX
GENERAL SURGERY PROGRESS NOTE     LINDSAY ALBERT  73y  Female  Hospital day :6d  POD:  Procedure:   OVERNIGHT EVENTS:  no acute events.    T(F): 97.9 (06-25-19 @ 08:00), Max: 99 (06-24-19 @ 16:00)  HR: 78 (06-25-19 @ 08:00) (78 - 80)  BP: 175/81 (06-25-19 @ 08:00) (132/72 - 175/81)  RR: 17 (06-25-19 @ 08:00) (15 - 19)    DIET/FLUIDS: folic acid 1 milliGRAM(s) Oral daily    GI proph:  pantoprazole    Tablet 40 milliGRAM(s) Oral before breakfast    AC/ proph: heparin  Injectable 5000 Unit(s) SubCutaneous every 8 hours    ABx: hydroxychloroquine 200 milliGRAM(s) Oral two times a day      PHYSICAL EXAM:  GENERAL: NAD, well-appearing  CHEST/LUNG: Clear to auscultation bilaterally  HEART: Regular rate and rhythm  ABDOMEN: Soft, Nontender, Nondistended;   EXTREMITIES:  No clubbing, cyanosis, or edema      LABS  Labs:  CAPILLARY BLOOD GLUCOSE      POCT Blood Glucose.: 177 mg/dL (25 Jun 2019 10:49)  POCT Blood Glucose.: 306 mg/dL (25 Jun 2019 08:28)  POCT Blood Glucose.: 202 mg/dL (24 Jun 2019 21:08)  POCT Blood Glucose.: 115 mg/dL (24 Jun 2019 16:59)  POCT Blood Glucose.: 207 mg/dL (24 Jun 2019 11:59)                          8.8    6.10  )-----------( 243      ( 24 Jun 2019 21:59 )             28.4         06-24    139  |  102  |  10  ----------------------------<  208<H>  4.2   |  29  |  0.7      Calcium, Total Serum: 8.5 mg/dL (06-24-19 @ 21:59)      RADIOLOGY & ADDITIONAL TESTS:  < from: CT Head No Cont (06.21.19 @ 00:48) >  IMPRESSION:  In comparison to the previous head CT 6/20/2019:    No significant interval change in the right cerebral convexity and   subfrontal subdural hematoma measuring up to 7 mm, with slight   interhemispheric fissure extension. Stable slight (2-3 mm) right to left   midline shift.      Dr. Abad Marie discussed preliminary findings with NAYELY REAL PA-C   on 6/21/2019 1:33 AM with readback.    < end of copied text >      A/P

## 2019-06-25 NOTE — DISCHARGE NOTE PROVIDER - CARE PROVIDER_API CALL
Dylan Lau)  [facility]  [address] [place] [address], [address]  [place] [address]  Phone: [phone]  Fax: [phone]  Follow Up Time:     Sony Bill)  Neurology  [facility], [address]  [place] [address]  Phone: [phone]  Fax: [phone]  Follow Up Time:

## 2019-06-25 NOTE — DISCHARGE NOTE NURSING/CASE MANAGEMENT/SOCIAL WORK - NSDCPEPTSTRK_GEN_ALL_CORE
Stroke warning signs and symptoms/Stroke support groups for patients, families, and friends/Need for follow up after discharge/Prescribed medications/Signs and symptoms of stroke/Risk factors for stroke/Stroke education booklet/Call 911 for stroke

## 2019-06-25 NOTE — PROVIDER CONTACT NOTE (OTHER) - ACTION/TREATMENT ORDERED:
Please administer 20 units of lantus in addition to the 4 units of insulin
Please administer scheduled insulin and continue to monitor blood pressure-no intervention at this time

## 2019-06-25 NOTE — DISCHARGE NOTE NURSING/CASE MANAGEMENT/SOCIAL WORK - NSDCDPATPORTLINK_GEN_ALL_CORE
You can access the [facility] Patient Portal, offered by [facility], by registering with the following website: [email or web address]

## 2019-06-25 NOTE — DISCHARGE NOTE PROVIDER - NSDCFUADDINST_GEN_ALL_CORE_FT
Follow up with Dr. Lau (Neurosurgery) in office in 2 weeks. Please call to schedule an appointment. Number is provided below.   Follow up with Dr. Bill (Neurology) in 6 weeks. Please call to schedule an appointment. Number is provided below.   Follow up Head CT in 4 weeks.

## 2019-06-25 NOTE — PHYSICAL THERAPY INITIAL EVALUATION ADULT - GENERAL OBSERVATIONS, REHAB EVAL
pt encountered in bathroom with PCA, pt in NAD, AOx3. discussed the benefits of using RW for fall prevention. pt stated she fell multiple times in past 6 months. pt agreed to use her RW

## 2019-06-25 NOTE — PROGRESS NOTE ADULT - ATTENDING COMMENTS
awaiting dispo
hyperglycemia is uncontrolled, will start insulin and continue frequent checks
starting lantus  dispo according to mobility status
n: tbi with repeat head ct to determine dispo, no neurologic sequelae  chronic pain on home meds    p: no dx    c: htn, hld on home lisinopril and statin  ntntc    gi: npo for 24h initial obs period  if ct head f/u stable will advance diet  ppi    gu: no dx  voiding  hypomg 1.6 repleted  hypok 3.4 repleted    h: 9.6 from 10.3 anemia of chronic disease, no need to transfuse  ppx to start today unless nsgy objects    id: no dx    e: type1 dm on home insulin, restarted home regimen  no hypoglycemia  RA plus/minus lupus restarted home meds  hypothyroid restarted home meds    ms: aat
repeat head ct with unconcerning changes and no mental status changes  downgrade to floor with q4 neurologic checks

## 2019-06-28 ENCOUNTER — INPATIENT (INPATIENT)
Facility: HOSPITAL | Age: 74
LOS: 8 days | Discharge: NURSING, REHAB OR LONG-TERM CARE | End: 2019-07-07
Attending: INTERNAL MEDICINE | Admitting: INTERNAL MEDICINE
Payer: MEDICARE

## 2019-06-28 VITALS
SYSTOLIC BLOOD PRESSURE: 196 MMHG | TEMPERATURE: 100 F | DIASTOLIC BLOOD PRESSURE: 85 MMHG | HEART RATE: 96 BPM | OXYGEN SATURATION: 94 % | RESPIRATION RATE: 18 BRPM

## 2019-06-28 DIAGNOSIS — M32.9 SYSTEMIC LUPUS ERYTHEMATOSUS, UNSPECIFIED: ICD-10-CM

## 2019-06-28 DIAGNOSIS — F32.9 MAJOR DEPRESSIVE DISORDER, SINGLE EPISODE, UNSPECIFIED: ICD-10-CM

## 2019-06-28 DIAGNOSIS — Z79.4 LONG TERM (CURRENT) USE OF INSULIN: ICD-10-CM

## 2019-06-28 DIAGNOSIS — F11.20 OPIOID DEPENDENCE, UNCOMPLICATED: ICD-10-CM

## 2019-06-28 DIAGNOSIS — I10 ESSENTIAL (PRIMARY) HYPERTENSION: ICD-10-CM

## 2019-06-28 DIAGNOSIS — S06.5X0A TRAUMATIC SUBDURAL HEMORRHAGE WITHOUT LOSS OF CONSCIOUSNESS, INITIAL ENCOUNTER: ICD-10-CM

## 2019-06-28 DIAGNOSIS — G81.94 HEMIPLEGIA, UNSPECIFIED AFFECTING LEFT NONDOMINANT SIDE: ICD-10-CM
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
## 2019-06-28 DIAGNOSIS — G89.4 CHRONIC PAIN SYNDROME: ICD-10-CM

## 2019-06-28 DIAGNOSIS — E11.65 TYPE 2 DIABETES MELLITUS WITH HYPERGLYCEMIA: ICD-10-CM

## 2019-06-28 DIAGNOSIS — E78.00 PURE HYPERCHOLESTEROLEMIA, UNSPECIFIED: ICD-10-CM

## 2019-06-28 DIAGNOSIS — E87.1 HYPO-OSMOLALITY AND HYPONATREMIA: ICD-10-CM

## 2019-06-28 DIAGNOSIS — G40.909 EPILEPSY, UNSPECIFIED, NOT INTRACTABLE, WITHOUT STATUS EPILEPTICUS: ICD-10-CM
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
## 2019-06-28 DIAGNOSIS — Z79.899 OTHER LONG TERM (CURRENT) DRUG THERAPY: ICD-10-CM
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
## 2019-06-28 DIAGNOSIS — R50.81 FEVER PRESENTING WITH CONDITIONS CLASSIFIED ELSEWHERE: ICD-10-CM

## 2019-06-28 DIAGNOSIS — G83.84 TODD'S PARALYSIS (POSTEPILEPTIC): ICD-10-CM

## 2019-06-28 DIAGNOSIS — R41.82 ALTERED MENTAL STATUS, UNSPECIFIED: ICD-10-CM

## 2019-06-28 PROCEDURE — 99285 EMERGENCY DEPT VISIT HI MDM: CPT | Mod: 25

## 2019-06-28 PROCEDURE — 62270 DX LMBR SPI PNXR: CPT

## 2019-06-28 NOTE — ED ADULT NURSE NOTE - PMH
DM (diabetes mellitus)    Fall, initial encounter    Herniated lumbar intervertebral disc    Hypercholesteremia    Hypertension    Lupus    Stroke  TIA 2002, no deficits

## 2019-06-29 DIAGNOSIS — E03.9 HYPOTHYROIDISM, UNSPECIFIED: ICD-10-CM

## 2019-06-29 DIAGNOSIS — E78.00 PURE HYPERCHOLESTEROLEMIA, UNSPECIFIED: ICD-10-CM

## 2019-06-29 DIAGNOSIS — S06.5X9A TRAUMATIC SUBDURAL HEMORRHAGE WITH LOSS OF CONSCIOUSNESS OF UNSPECIFIED DURATION, INITIAL ENCOUNTER: ICD-10-CM

## 2019-06-29 DIAGNOSIS — R56.9 UNSPECIFIED CONVULSIONS: ICD-10-CM

## 2019-06-29 DIAGNOSIS — R50.9 FEVER, UNSPECIFIED: ICD-10-CM

## 2019-06-29 DIAGNOSIS — E11.9 TYPE 2 DIABETES MELLITUS WITHOUT COMPLICATIONS: ICD-10-CM

## 2019-06-29 DIAGNOSIS — I10 ESSENTIAL (PRIMARY) HYPERTENSION: ICD-10-CM

## 2019-06-29 DIAGNOSIS — F32.9 MAJOR DEPRESSIVE DISORDER, SINGLE EPISODE, UNSPECIFIED: ICD-10-CM

## 2019-06-29 PROBLEM — I63.9 CEREBRAL INFARCTION, UNSPECIFIED: Chronic | Status: ACTIVE | Noted: 2019-06-19

## 2019-06-29 LAB
ALBUMIN SERPL ELPH-MCNC: 3.9 G/DL — SIGNIFICANT CHANGE UP (ref 3.5–5.2)
ALP SERPL-CCNC: 112 U/L — SIGNIFICANT CHANGE UP (ref 30–115)
ALT FLD-CCNC: 10 U/L — SIGNIFICANT CHANGE UP (ref 0–41)
ANION GAP SERPL CALC-SCNC: 18 MMOL/L — HIGH (ref 7–14)
APPEARANCE CSF: CLEAR — SIGNIFICANT CHANGE UP
APPEARANCE SPUN FLD: COLORLESS — SIGNIFICANT CHANGE UP
APPEARANCE UR: CLEAR — SIGNIFICANT CHANGE UP
AST SERPL-CCNC: 14 U/L — SIGNIFICANT CHANGE UP (ref 0–41)
B-OH-BUTYR SERPL-SCNC: >4.5 MMOL/L — HIGH
BACTERIA # UR AUTO: ABNORMAL
BASE EXCESS BLDV CALC-SCNC: -1 MMOL/L — SIGNIFICANT CHANGE UP (ref -2–2)
BASOPHILS # BLD AUTO: 0.03 K/UL — SIGNIFICANT CHANGE UP (ref 0–0.2)
BASOPHILS NFR BLD AUTO: 0.4 % — SIGNIFICANT CHANGE UP (ref 0–1)
BILIRUB SERPL-MCNC: 0.5 MG/DL — SIGNIFICANT CHANGE UP (ref 0.2–1.2)
BILIRUB UR-MCNC: NEGATIVE — SIGNIFICANT CHANGE UP
BUN SERPL-MCNC: 13 MG/DL — SIGNIFICANT CHANGE UP (ref 10–20)
CA-I SERPL-SCNC: 1.19 MMOL/L — SIGNIFICANT CHANGE UP (ref 1.12–1.3)
CALCIUM SERPL-MCNC: 9.2 MG/DL — SIGNIFICANT CHANGE UP (ref 8.5–10.1)
CHLORIDE SERPL-SCNC: 88 MMOL/L — LOW (ref 98–110)
CO2 SERPL-SCNC: 23 MMOL/L — SIGNIFICANT CHANGE UP (ref 17–32)
COLOR CSF: CLEAR — SIGNIFICANT CHANGE UP
COLOR SPEC: YELLOW — SIGNIFICANT CHANGE UP
CREAT SERPL-MCNC: 0.8 MG/DL — SIGNIFICANT CHANGE UP (ref 0.7–1.5)
CSF PCR RESULT: SIGNIFICANT CHANGE UP
DIFF PNL FLD: ABNORMAL
EOSINOPHIL # BLD AUTO: 0.04 K/UL — SIGNIFICANT CHANGE UP (ref 0–0.7)
EOSINOPHIL NFR BLD AUTO: 0.5 % — SIGNIFICANT CHANGE UP (ref 0–8)
EPI CELLS # UR: ABNORMAL /HPF
GAS PNL BLDV: 131 MMOL/L — LOW (ref 136–145)
GAS PNL BLDV: SIGNIFICANT CHANGE UP
GLUCOSE BLDC GLUCOMTR-MCNC: 119 MG/DL — HIGH (ref 70–99)
GLUCOSE BLDC GLUCOMTR-MCNC: 152 MG/DL — HIGH (ref 70–99)
GLUCOSE BLDC GLUCOMTR-MCNC: 210 MG/DL — HIGH (ref 70–99)
GLUCOSE BLDC GLUCOMTR-MCNC: 304 MG/DL — HIGH (ref 70–99)
GLUCOSE BLDC GLUCOMTR-MCNC: 441 MG/DL — HIGH (ref 70–99)
GLUCOSE BLDC GLUCOMTR-MCNC: 80 MG/DL — SIGNIFICANT CHANGE UP (ref 70–99)
GLUCOSE CSF-MCNC: 399 MG/DL — HIGH (ref 45–75)
GLUCOSE SERPL-MCNC: 639 MG/DL — CRITICAL HIGH (ref 70–99)
GLUCOSE UR QL: 500 MG/DL
GRAM STN FLD: SIGNIFICANT CHANGE UP
HCO3 BLDV-SCNC: 26 MMOL/L — SIGNIFICANT CHANGE UP (ref 22–29)
HCT VFR BLD CALC: 31.6 % — LOW (ref 37–47)
HCT VFR BLDA CALC: 34.3 % — SIGNIFICANT CHANGE UP (ref 34–44)
HGB BLD CALC-MCNC: 11.2 G/DL — LOW (ref 14–18)
HGB BLD-MCNC: 10.1 G/DL — LOW (ref 12–16)
HOROWITZ INDEX BLDV+IHG-RTO: 21 — SIGNIFICANT CHANGE UP
IMM GRANULOCYTES NFR BLD AUTO: 0.3 % — SIGNIFICANT CHANGE UP (ref 0.1–0.3)
KETONES UR-MCNC: 80 — SIGNIFICANT CHANGE UP
LACTATE BLDV-MCNC: 1.5 MMOL/L — SIGNIFICANT CHANGE UP (ref 0.5–1.6)
LACTATE SERPL-SCNC: 1.6 MMOL/L — SIGNIFICANT CHANGE UP (ref 0.5–2.2)
LEUKOCYTE ESTERASE UR-ACNC: NEGATIVE — SIGNIFICANT CHANGE UP
LYMPHOCYTES # BLD AUTO: 0.63 K/UL — LOW (ref 1.2–3.4)
LYMPHOCYTES # BLD AUTO: 8.4 % — LOW (ref 20.5–51.1)
LYMPHOCYTES # CSF: 17 % — SIGNIFICANT CHANGE UP (ref 40–80)
MCHC RBC-ENTMCNC: 28.7 PG — SIGNIFICANT CHANGE UP (ref 27–31)
MCHC RBC-ENTMCNC: 32 G/DL — SIGNIFICANT CHANGE UP (ref 32–37)
MCV RBC AUTO: 89.8 FL — SIGNIFICANT CHANGE UP (ref 81–99)
MONOCYTES # BLD AUTO: 0.37 K/UL — SIGNIFICANT CHANGE UP (ref 0.1–0.6)
MONOCYTES NFR BLD AUTO: 4.9 % — SIGNIFICANT CHANGE UP (ref 1.7–9.3)
MONOS+MACROS NFR CSF: 75 % — HIGH (ref 15–45)
NEUTROPHILS # BLD AUTO: 6.39 K/UL — SIGNIFICANT CHANGE UP (ref 1.4–6.5)
NEUTROPHILS # CSF: 8 % — HIGH (ref 0–6)
NEUTROPHILS NFR BLD AUTO: 85.5 % — HIGH (ref 42.2–75.2)
NITRITE UR-MCNC: NEGATIVE — SIGNIFICANT CHANGE UP
NRBC # BLD: 0 /100 WBCS — SIGNIFICANT CHANGE UP (ref 0–0)
NRBC NFR CSF: 25 /UL — HIGH (ref 0–5)
PCO2 BLDV: 52 MMHG — HIGH (ref 41–51)
PH BLDV: 7.31 — SIGNIFICANT CHANGE UP (ref 7.26–7.43)
PH UR: 6 — SIGNIFICANT CHANGE UP (ref 5–8)
PLATELET # BLD AUTO: 367 K/UL — SIGNIFICANT CHANGE UP (ref 130–400)
PO2 BLDV: 28 MMHG — SIGNIFICANT CHANGE UP (ref 20–40)
POTASSIUM BLDV-SCNC: 4.4 MMOL/L — SIGNIFICANT CHANGE UP (ref 3.3–5.6)
POTASSIUM SERPL-MCNC: 4.5 MMOL/L — SIGNIFICANT CHANGE UP (ref 3.5–5)
POTASSIUM SERPL-SCNC: 4.5 MMOL/L — SIGNIFICANT CHANGE UP (ref 3.5–5)
PROT CSF-MCNC: 429 MG/DL — HIGH (ref 15–45)
PROT SERPL-MCNC: 6.9 G/DL — SIGNIFICANT CHANGE UP (ref 6–8)
PROT UR-MCNC: 30
RBC # BLD: 3.52 M/UL — LOW (ref 4.2–5.4)
RBC # CSF: 0 /UL — SIGNIFICANT CHANGE UP (ref 0–0)
RBC # FLD: 13.9 % — SIGNIFICANT CHANGE UP (ref 11.5–14.5)
RBC CASTS # UR COMP ASSIST: ABNORMAL /HPF
SAO2 % BLDV: 47 % — SIGNIFICANT CHANGE UP
SODIUM SERPL-SCNC: 129 MMOL/L — LOW (ref 135–146)
SP GR SPEC: 1.02 — SIGNIFICANT CHANGE UP (ref 1.01–1.03)
SPECIMEN SOURCE: SIGNIFICANT CHANGE UP
TUBE TYPE: SIGNIFICANT CHANGE UP
UROBILINOGEN FLD QL: 0.2 — SIGNIFICANT CHANGE UP (ref 0.2–0.2)
WBC # BLD: 7.48 K/UL — SIGNIFICANT CHANGE UP (ref 4.8–10.8)
WBC # FLD AUTO: 7.48 K/UL — SIGNIFICANT CHANGE UP (ref 4.8–10.8)
WBC UR QL: SIGNIFICANT CHANGE UP /HPF

## 2019-06-29 PROCEDURE — 70498 CT ANGIOGRAPHY NECK: CPT | Mod: 26

## 2019-06-29 PROCEDURE — 70450 CT HEAD/BRAIN W/O DYE: CPT | Mod: 26,59

## 2019-06-29 PROCEDURE — 99223 1ST HOSP IP/OBS HIGH 75: CPT | Mod: AI

## 2019-06-29 PROCEDURE — 74177 CT ABD & PELVIS W/CONTRAST: CPT | Mod: 26

## 2019-06-29 PROCEDURE — 70496 CT ANGIOGRAPHY HEAD: CPT | Mod: 26

## 2019-06-29 PROCEDURE — 99232 SBSQ HOSP IP/OBS MODERATE 35: CPT

## 2019-06-29 PROCEDURE — 99222 1ST HOSP IP/OBS MODERATE 55: CPT

## 2019-06-29 PROCEDURE — 71045 X-RAY EXAM CHEST 1 VIEW: CPT | Mod: 26

## 2019-06-29 RX ORDER — ACYCLOVIR SODIUM 500 MG
360 VIAL (EA) INTRAVENOUS ONCE
Refills: 0 | Status: COMPLETED | OUTPATIENT
Start: 2019-06-29 | End: 2019-06-29

## 2019-06-29 RX ORDER — SODIUM CHLORIDE 9 MG/ML
1000 INJECTION INTRAMUSCULAR; INTRAVENOUS; SUBCUTANEOUS
Refills: 0 | Status: DISCONTINUED | OUTPATIENT
Start: 2019-06-29 | End: 2019-06-29

## 2019-06-29 RX ORDER — INSULIN HUMAN 100 [IU]/ML
10 INJECTION, SOLUTION SUBCUTANEOUS ONCE
Refills: 0 | Status: COMPLETED | OUTPATIENT
Start: 2019-06-29 | End: 2019-06-29

## 2019-06-29 RX ORDER — ACETAMINOPHEN 500 MG
650 TABLET ORAL ONCE
Refills: 0 | Status: COMPLETED | OUTPATIENT
Start: 2019-06-29 | End: 2019-06-29

## 2019-06-29 RX ORDER — CEFTRIAXONE 500 MG/1
2000 INJECTION, POWDER, FOR SOLUTION INTRAMUSCULAR; INTRAVENOUS ONCE
Refills: 0 | Status: COMPLETED | OUTPATIENT
Start: 2019-06-29 | End: 2019-06-29

## 2019-06-29 RX ORDER — OXYCODONE AND ACETAMINOPHEN 5; 325 MG/1; MG/1
1 TABLET ORAL EVERY 6 HOURS
Refills: 0 | Status: DISCONTINUED | OUTPATIENT
Start: 2019-06-29 | End: 2019-07-04

## 2019-06-29 RX ORDER — ONDANSETRON 8 MG/1
4 TABLET, FILM COATED ORAL ONCE
Refills: 0 | Status: COMPLETED | OUTPATIENT
Start: 2019-06-29 | End: 2019-06-29

## 2019-06-29 RX ORDER — SODIUM CHLORIDE 9 MG/ML
1000 INJECTION INTRAMUSCULAR; INTRAVENOUS; SUBCUTANEOUS
Refills: 0 | Status: DISCONTINUED | OUTPATIENT
Start: 2019-06-29 | End: 2019-07-07

## 2019-06-29 RX ORDER — ACYCLOVIR SODIUM 500 MG
VIAL (EA) INTRAVENOUS
Refills: 0 | Status: DISCONTINUED | OUTPATIENT
Start: 2019-06-29 | End: 2019-06-30

## 2019-06-29 RX ORDER — DEXTROSE 50 % IN WATER 50 %
15 SYRINGE (ML) INTRAVENOUS ONCE
Refills: 0 | Status: DISCONTINUED | OUTPATIENT
Start: 2019-06-29 | End: 2019-07-07

## 2019-06-29 RX ORDER — CHLORHEXIDINE GLUCONATE 213 G/1000ML
1 SOLUTION TOPICAL
Refills: 0 | Status: DISCONTINUED | OUTPATIENT
Start: 2019-06-29 | End: 2019-07-07

## 2019-06-29 RX ORDER — SODIUM CHLORIDE 9 MG/ML
1000 INJECTION INTRAMUSCULAR; INTRAVENOUS; SUBCUTANEOUS ONCE
Refills: 0 | Status: COMPLETED | OUTPATIENT
Start: 2019-06-29 | End: 2019-06-29

## 2019-06-29 RX ORDER — LISINOPRIL 2.5 MG/1
20 TABLET ORAL DAILY
Refills: 0 | Status: DISCONTINUED | OUTPATIENT
Start: 2019-06-29 | End: 2019-07-07

## 2019-06-29 RX ORDER — DULOXETINE HYDROCHLORIDE 30 MG/1
60 CAPSULE, DELAYED RELEASE ORAL DAILY
Refills: 0 | Status: DISCONTINUED | OUTPATIENT
Start: 2019-06-29 | End: 2019-07-07

## 2019-06-29 RX ORDER — GLUCAGON INJECTION, SOLUTION 0.5 MG/.1ML
1 INJECTION, SOLUTION SUBCUTANEOUS ONCE
Refills: 0 | Status: DISCONTINUED | OUTPATIENT
Start: 2019-06-29 | End: 2019-07-07

## 2019-06-29 RX ORDER — HYDROXYCHLOROQUINE SULFATE 200 MG
200 TABLET ORAL
Refills: 0 | Status: DISCONTINUED | OUTPATIENT
Start: 2019-06-29 | End: 2019-07-07

## 2019-06-29 RX ORDER — INSULIN GLARGINE 100 [IU]/ML
20 INJECTION, SOLUTION SUBCUTANEOUS EVERY MORNING
Refills: 0 | Status: DISCONTINUED | OUTPATIENT
Start: 2019-06-29 | End: 2019-06-30

## 2019-06-29 RX ORDER — HYDROCHLOROTHIAZIDE 25 MG
50 TABLET ORAL DAILY
Refills: 0 | Status: DISCONTINUED | OUTPATIENT
Start: 2019-06-29 | End: 2019-07-07

## 2019-06-29 RX ORDER — DEXTROSE 50 % IN WATER 50 %
25 SYRINGE (ML) INTRAVENOUS ONCE
Refills: 0 | Status: DISCONTINUED | OUTPATIENT
Start: 2019-06-29 | End: 2019-07-07

## 2019-06-29 RX ORDER — ACETAMINOPHEN 500 MG
325 TABLET ORAL ONCE
Refills: 0 | Status: COMPLETED | OUTPATIENT
Start: 2019-06-29 | End: 2019-06-29

## 2019-06-29 RX ORDER — METHADONE HYDROCHLORIDE 40 MG/1
20 TABLET ORAL EVERY 12 HOURS
Refills: 0 | Status: DISCONTINUED | OUTPATIENT
Start: 2019-06-29 | End: 2019-07-04

## 2019-06-29 RX ORDER — DEXTROSE 50 % IN WATER 50 %
12.5 SYRINGE (ML) INTRAVENOUS ONCE
Refills: 0 | Status: DISCONTINUED | OUTPATIENT
Start: 2019-06-29 | End: 2019-07-07

## 2019-06-29 RX ORDER — LEVOTHYROXINE SODIUM 125 MCG
100 TABLET ORAL DAILY
Refills: 0 | Status: DISCONTINUED | OUTPATIENT
Start: 2019-06-29 | End: 2019-07-07

## 2019-06-29 RX ORDER — SODIUM CHLORIDE 9 MG/ML
1000 INJECTION, SOLUTION INTRAVENOUS
Refills: 0 | Status: DISCONTINUED | OUTPATIENT
Start: 2019-06-29 | End: 2019-07-07

## 2019-06-29 RX ORDER — INSULIN LISPRO 100/ML
3 VIAL (ML) SUBCUTANEOUS
Refills: 0 | Status: DISCONTINUED | OUTPATIENT
Start: 2019-06-29 | End: 2019-06-30

## 2019-06-29 RX ORDER — ASPIRIN/CALCIUM CARB/MAGNESIUM 324 MG
81 TABLET ORAL DAILY
Refills: 0 | Status: DISCONTINUED | OUTPATIENT
Start: 2019-06-29 | End: 2019-06-29

## 2019-06-29 RX ORDER — GABAPENTIN 400 MG/1
400 CAPSULE ORAL THREE TIMES A DAY
Refills: 0 | Status: DISCONTINUED | OUTPATIENT
Start: 2019-06-29 | End: 2019-07-07

## 2019-06-29 RX ORDER — FOLIC ACID 0.8 MG
1 TABLET ORAL DAILY
Refills: 0 | Status: DISCONTINUED | OUTPATIENT
Start: 2019-06-29 | End: 2019-07-07

## 2019-06-29 RX ORDER — ACETAMINOPHEN 500 MG
650 TABLET ORAL EVERY 6 HOURS
Refills: 0 | Status: DISCONTINUED | OUTPATIENT
Start: 2019-06-29 | End: 2019-07-07

## 2019-06-29 RX ORDER — ACYCLOVIR SODIUM 500 MG
360 VIAL (EA) INTRAVENOUS EVERY 8 HOURS
Refills: 0 | Status: DISCONTINUED | OUTPATIENT
Start: 2019-06-30 | End: 2019-06-30

## 2019-06-29 RX ORDER — SIMVASTATIN 20 MG/1
40 TABLET, FILM COATED ORAL AT BEDTIME
Refills: 0 | Status: DISCONTINUED | OUTPATIENT
Start: 2019-06-29 | End: 2019-07-07

## 2019-06-29 RX ORDER — CEFTRIAXONE 500 MG/1
1000 INJECTION, POWDER, FOR SOLUTION INTRAMUSCULAR; INTRAVENOUS EVERY 24 HOURS
Refills: 0 | Status: DISCONTINUED | OUTPATIENT
Start: 2019-06-30 | End: 2019-07-01

## 2019-06-29 RX ORDER — LEVETIRACETAM 250 MG/1
1000 TABLET, FILM COATED ORAL EVERY 12 HOURS
Refills: 0 | Status: DISCONTINUED | OUTPATIENT
Start: 2019-06-29 | End: 2019-07-01

## 2019-06-29 RX ORDER — INSULIN LISPRO 100/ML
VIAL (ML) SUBCUTANEOUS AT BEDTIME
Refills: 0 | Status: DISCONTINUED | OUTPATIENT
Start: 2019-06-29 | End: 2019-06-30

## 2019-06-29 RX ADMIN — Medication 650 MILLIGRAM(S): at 01:34

## 2019-06-29 RX ADMIN — Medication 325 MILLIGRAM(S): at 05:28

## 2019-06-29 RX ADMIN — ONDANSETRON 4 MILLIGRAM(S): 8 TABLET, FILM COATED ORAL at 04:29

## 2019-06-29 RX ADMIN — LEVETIRACETAM 400 MILLIGRAM(S): 250 TABLET, FILM COATED ORAL at 10:05

## 2019-06-29 RX ADMIN — INSULIN HUMAN 10 UNIT(S): 100 INJECTION, SOLUTION SUBCUTANEOUS at 06:55

## 2019-06-29 RX ADMIN — SODIUM CHLORIDE 2000 MILLILITER(S): 9 INJECTION INTRAMUSCULAR; INTRAVENOUS; SUBCUTANEOUS at 02:35

## 2019-06-29 RX ADMIN — SODIUM CHLORIDE 2000 MILLILITER(S): 9 INJECTION INTRAMUSCULAR; INTRAVENOUS; SUBCUTANEOUS at 00:31

## 2019-06-29 RX ADMIN — Medication 1 MILLIGRAM(S): at 11:19

## 2019-06-29 RX ADMIN — CEFTRIAXONE 100 MILLIGRAM(S): 500 INJECTION, POWDER, FOR SOLUTION INTRAMUSCULAR; INTRAVENOUS at 05:16

## 2019-06-29 RX ADMIN — LEVETIRACETAM 400 MILLIGRAM(S): 250 TABLET, FILM COATED ORAL at 17:47

## 2019-06-29 RX ADMIN — Medication 650 MILLIGRAM(S): at 22:09

## 2019-06-29 RX ADMIN — SODIUM CHLORIDE 75 MILLILITER(S): 9 INJECTION INTRAMUSCULAR; INTRAVENOUS; SUBCUTANEOUS at 17:47

## 2019-06-29 RX ADMIN — Medication 650 MILLIGRAM(S): at 06:55

## 2019-06-29 RX ADMIN — Medication 650 MILLIGRAM(S): at 05:15

## 2019-06-29 RX ADMIN — INSULIN HUMAN 10 UNIT(S): 100 INJECTION, SOLUTION SUBCUTANEOUS at 04:29

## 2019-06-29 RX ADMIN — ONDANSETRON 4 MILLIGRAM(S): 8 TABLET, FILM COATED ORAL at 02:05

## 2019-06-29 RX ADMIN — Medication 650 MILLIGRAM(S): at 05:27

## 2019-06-29 RX ADMIN — INSULIN GLARGINE 20 UNIT(S): 100 INJECTION, SOLUTION SUBCUTANEOUS at 08:10

## 2019-06-29 RX ADMIN — Medication 107.2 MILLIGRAM(S): at 23:40

## 2019-06-29 RX ADMIN — Medication 650 MILLIGRAM(S): at 23:40

## 2019-06-29 NOTE — H&P ADULT - PROBLEM SELECTOR PLAN 1
Name band; pt with HX of recent fall with SDH was admitted to hospital and during her treatement went to ICU had a fever was consulted by neurosurgery and infectious disease. Now Fever again.  LP done in Ed F/u result  need to contact ID for urgent consult due to fever from unknown origin and possible neuro related or fall related

## 2019-06-29 NOTE — ED PROVIDER NOTE - ATTENDING CONTRIBUTION TO CARE
pt pw ams, hyperglycemia. recent dc from [facility] for subdural hemm, ems sts she hasn't been taking her insulin. pt is poor historian. unable to provide further details. shes alert, knows her name and . but unable to keep focus on questions. ncat, perrl, eomi, neck sup, ctab, rrr, ab soft, nt, nd. no focal def. FS >600. will get labs, fluids, imaging.

## 2019-06-29 NOTE — ED PROVIDER NOTE - CLINICAL SUMMARY MEDICAL DECISION MAKING FREE TEXT BOX
pt with fever, ams, hyperglycemia, ct head no change from prior, labs done, LP done, ua neg. admitted for further eval.

## 2019-06-29 NOTE — H&P ADULT - ATTENDING COMMENTS
73 year old female discharged from [facility] on June 25 where she was (No family at bedside and no answer or voice mail on calls to emergency contact) 73 year old female discharged from [facility] on June 25 where she was found to have (post fall) SDH was sent to ER due to an altered mental status (lethargic). In the ER she was found to be oriented x 1 but she also had fevers and vomiting. (Currently not vomiting) Work-up in ER included L-P. On current exam patient is very lethargic but does follow commands. Physical HEENT- there is a right sided gaze preference though can look at examiner when prompted. LUNGS- cta ABDOMEN- is soft NEURO- holds left arm flexed at elbow. Lifts right arm about 30 degrees when asked but doesn't move left arm. Also moves right lower extremity slightly upward when asked to but not moving left lower extremity. Labs and Ct scans reviewed (including head and abdomen views) Spoke to neurology on call and based on my description of neuro findings his concern is possible Shayan's paralysis along with thrombosis event. He suggested increase Keppra to 1000 mg BID from 500 mg (I ordered this IVPB) and to get CT angio of head and neck (ordered pending placement of 18 g IV catheter. (NS infusion though hyponatremia probably due to hyperglycemia) In addition to neuro concerns, patient has fever (source not clear but will continue Rocephin started in the ER with ID consult) and poorly controlled Diabetes (Since patient appears too lethargic to eat will not order her Lantus but will order FSBS with coverage as indicated

## 2019-06-29 NOTE — H&P ADULT - HISTORY OF PRESENT ILLNESS
· Chief Complaint: The patient is a 73y Female complaining of hyperglycemia.	  · HPI Objective Statement: 76y F w/ PMH of DM, HTN, HLD, and CVA w/ no residual deficits presents for hyperglycemia. pt had was admitted recently due to  fall on June 19 from standing in the bathroom this am, walking to bathroom, bumped into sink and fell backwards, striking back of head on the tile floor. + HT, No LOC, +ASA 81mg,  Pt found by family standing after fall. Use cane to ambulate. Take 81mg ASA daily. . · Chief Complaint: The patient is a 73y Female complaining of hyperglycemia.	  · HPI Objective Statement: 76y F w/ PMH of DM, HTN, HLD, and CVA w/ no residual deficits presents for hyperglycemia. pt had was admitted recently due to  fall on June 19 from standing in the bathroom this am, walking to bathroom, bumped into sink and fell backwards, striking back of head on the tile floor. + HT, No LOC, +ASA 81mg,  Pt found by family standing after fall. Use cane to ambulate. Take 81mg ASA daily.

## 2019-06-29 NOTE — ED PROVIDER NOTE - OBJECTIVE STATEMENT
76y F w/ PMH of DM, HTN, HLD, and CVA w/ no residual deficits presents for hyperglycemia. Per EMS, pt had not taken insulin and was brought over for evaluation. 76y F w/ PMH of DM, HTN, HLD, and CVA w/ no residual deficits presents for hyperglycemia. Per EMS, pt had not taken insulin and was brought over for evaluation.  pt is poor historian.

## 2019-06-29 NOTE — ED PROVIDER NOTE - PROGRESS NOTE DETAILS
CT shows stable subdural.  UA neg, cxr clear. ab soft, but now she is guarding, moaning when pressing right side. will do ct ap.

## 2019-06-29 NOTE — ED PROVIDER NOTE - PHYSICAL EXAMINATION
CONSTITUTIONAL: Frail appearing  SKIN: hot, dry  HEAD: Normocephalic; atraumatic.  EYES: PERRL, EOMI, no conjunctival erythema  ENT: No nasal discharge; airway clear.  NECK: Supple; non tender.  CARD: S1, S2 normal; no murmurs, gallops, or rubs. Regular rate and rhythm.   RESP: No wheezes, rales or rhonchi.  ABD: soft ntnd  EXT: Normal ROM.  No clubbing, cyanosis or edema. moving all extremities spontaneously.  LYMPH: No acute cervical adenopathy.  NEURO: AO x 1. CONSTITUTIONAL: Frail appearing  SKIN: hot, dry  HEAD: Normocephalic; atraumatic.  EYES: PERRL, EOMI, no conjunctival erythema  ENT: No nasal discharge; airway clear.  NECK: Supple; non tender.  CARD: S1, S2 normal; no murmurs, gallops, or rubs. Regular rate and rhythm.   RESP: No wheezes, rales or rhonchi.  ABD: tenderness to palpation of the RUQ and RLQ.  EXT: Normal ROM.  No clubbing, cyanosis or edema. moving all extremities spontaneously.  LYMPH: No acute cervical adenopathy.  NEURO: AO x 1.

## 2019-06-29 NOTE — PATIENT PROFILE ADULT - HOW PATIENT ADDRESSED, PROFILE
Anna

## 2019-06-29 NOTE — CHART NOTE - NSCHARTNOTEFT_GEN_A_CORE
Patient completed CTA of head and neck and it was negative for large vessel occlusion.  While her symptoms of left sided weakness could still be from a stroke she was outside the window  for intravenous TPA (last known normal at 2:30 p.m.) and without a large vessel occlusion she is not  a neurointervention candidate.  Given her history of recent subdural hematoma (which is stable on CT)  a seizure and Shayan's paralysis is more likely.  Will put patient on video EEG monitoring and increase  anticonvulsants. Continue patient on seizure precautions.  I discussed plan with medical team.    Please give levetiracetam 750 mg IV x 1 now and change standing dose to 750 mg IV q12 hours. Patient completed CTA of head and neck and it was negative for large vessel occlusion.  While her symptoms of left sided weakness could still be from a stroke she was outside the window  for intravenous TPA (last known normal at 2:30 p.m.) and without a large vessel occlusion she is not  a neurointervention candidate.  Given her history of recent subdural hematoma (which is stable on CT)  a seizure and Shayan's paralysis is more likely.  Will put patient on video EEG monitoring.   I discussed plan with medical team.    Note that patient had been on keppra 500 mg bid.  As of this morning dose was increased to 1,000 mg   IV q12H.

## 2019-06-29 NOTE — H&P ADULT - ASSESSMENT
· Chief Complaint: The patient is a 73y Female complaining of hyperglycemia and fever	  · HPI Objective Statement: 76y F w/ PMH of DM, HTN, HLD, and CVA w/ no residual deficits presents for hyperglycemia. Per EMS, pt had not taken insulin and was brought over for evaluation.  pt is poor historian. · Chief Complaint: The patient is a 73y Female complaining of hyperglycemia and fever	  · HPI Objective Statement: 76y F w/ PMH of DM, HTN, HLD, and CVA w/ no residual deficits presents for hyperglycemia.  note from Dr Cheng from June 21 cut and past ewith dx and assement :with new finding on CT Neurologic: s/p fall with + small right frontal SDH, q4 neuro checks, keppra for sz ppx, rpt CTH  pain control with ATC tylenol and oxy 5mg prn hx chronic pain, on home methadone, cymbalta Overnight: change in mental status, STAT head CT performed, no increase in midline shift, evolving hyperdensities in SDH. Neurosx called and notified, spoke with TOMMY Santiago.  6/20 afternoon, spoke with Dr. Pierre (radiology) regarding 2nd CT which showed new mls 2-3mm shift. CT stable from intial no acute changes noted.

## 2019-06-29 NOTE — H&P ADULT - NSHPPHYSICALEXAM_GEN_ALL_CORE
GENERAL:  74y/o Female NAD, resting comfortably.  HEAD:  Atraumatic, Normocephalic  EYES: EOMI, PERRLA, conjunctiva and sclera clear  NECK: Supple, No JVD, no cervical lymphadenopathy, non-tender  CHEST/LUNG: Clear to auscultation bilaterally; No wheeze, rhonchi, or rales  HEART: Regular rate and rhythm; S1&S2  ABDOMEN: Soft, Nontender, Nondistended x 4 quadrants; Bowel sounds present  EXTREMITIES:   Peripheral Pulses Present, No clubbing, no cyanosis, or no edema, no calf tenderness  PSYCH: AAOx3, cooperative, appropriate  NEUROLOGY: WNL  SKIN: WNL GENERAL:  72y/o Female NAD, resting comfortably.  HEAD:  scalp laceraton from fall june 19  EYES: EOMI, PERRLA, conjunctiva and sclera clear  NECK: Supple, No JVD, no cervical lymphadenopathy, non-tender  CHEST/LUNG: Clear to auscultation bilaterally; No wheeze, rhonchi, or rales  HEART: Regular rate and rhythm; S1&S2  ABDOMEN: Soft, Nontender, Nondistended x 4 quadrants; Bowel sounds present  EXTREMITIES:   Peripheral Pulses Present, No clubbing, no cyanosis, or no edema, no calf tenderness  PSYCH: AAOx3, cooperative, appropriate  NEUROLOGY: WNL  SKIN: WNL

## 2019-06-29 NOTE — CHART NOTE - NSCHARTNOTEFT_GEN_A_CORE
Patient cannot confirm medications though she was just discharged from [facility]. Until more awake oral meds may need to be changed to parental form or held as needed

## 2019-06-29 NOTE — CONSULT NOTE ADULT - ASSESSMENT
ASSESSMENT  73y Female DM, arthritis on plaquenil, ?SLE, HTN, HLD, with h/o recent right hemispheric subdural hematoma secondary to fall now presenting with confusion and left sided weakness and fever    PROBLEMS  #Fever, Tm 102.1 with Hypertension, likely central fever. CSF with WBC 25 mostly Macrophages, 8% NP, 17% lymphs. CSF gluc 399 prot 429    WBC 7    UA neg    CT AP neg  #Recent subdural, per Neuro concerning for Shayan's paralysis secondary to right hemispheric seizures.    RECOMMENDATIONS  - F/u CSF PCR  - Monitor off abx  - f/u EEG  - Low suspicion for herpes as cause of findings given recent subdural , if continued fevers could add IV ACV while pending CSF PCR    Spectra [id number] ASSESSMENT  73y Female DM, arthritis on plaquenil, ?SLE, HTN, HLD, with h/o recent right hemispheric subdural hematoma secondary to fall now presenting with confusion and left sided weakness and fever    PROBLEMS  #Fever, Tm 102.1 with Hypertension, likely central fever. CSF with WBC 25 mostly Macrophages, 8% NP, 17% lymphs. CSF gluc 399 prot 429    WBC 7    UA neg    CT AP neg  #Recent subdural, per Neuro concerning for Shayan's paralysis secondary to right hemispheric seizures.  #Severe DKA elevated beta-hydroxybutarate    RECOMMENDATIONS  - F/u CSF PCR  - ACV IV 5mg/kg q8h while pending HSV PCR, If negative--> D/C  - f/u EEG  - FSG control, DKA management     Spectra [id number]

## 2019-06-30 LAB
ALBUMIN SERPL ELPH-MCNC: 3.3 G/DL — LOW (ref 3.5–5.2)
ALP SERPL-CCNC: 83 U/L — SIGNIFICANT CHANGE UP (ref 30–115)
ALT FLD-CCNC: 8 U/L — SIGNIFICANT CHANGE UP (ref 0–41)
ANION GAP SERPL CALC-SCNC: 13 MMOL/L — SIGNIFICANT CHANGE UP (ref 7–14)
AST SERPL-CCNC: 19 U/L — SIGNIFICANT CHANGE UP (ref 0–41)
BILIRUB SERPL-MCNC: 0.3 MG/DL — SIGNIFICANT CHANGE UP (ref 0.2–1.2)
BUN SERPL-MCNC: 15 MG/DL — SIGNIFICANT CHANGE UP (ref 10–20)
CALCIUM SERPL-MCNC: 8.4 MG/DL — LOW (ref 8.5–10.1)
CHLORIDE SERPL-SCNC: 99 MMOL/L — SIGNIFICANT CHANGE UP (ref 98–110)
CO2 SERPL-SCNC: 24 MMOL/L — SIGNIFICANT CHANGE UP (ref 17–32)
CREAT SERPL-MCNC: 0.6 MG/DL — LOW (ref 0.7–1.5)
GLUCOSE BLDC GLUCOMTR-MCNC: 241 MG/DL — HIGH (ref 70–99)
GLUCOSE BLDC GLUCOMTR-MCNC: 252 MG/DL — HIGH (ref 70–99)
GLUCOSE BLDC GLUCOMTR-MCNC: 274 MG/DL — HIGH (ref 70–99)
GLUCOSE BLDC GLUCOMTR-MCNC: 291 MG/DL — HIGH (ref 70–99)
GLUCOSE BLDC GLUCOMTR-MCNC: 356 MG/DL — HIGH (ref 70–99)
GLUCOSE BLDC GLUCOMTR-MCNC: 383 MG/DL — HIGH (ref 70–99)
GLUCOSE SERPL-MCNC: 296 MG/DL — HIGH (ref 70–99)
HCT VFR BLD CALC: 28.4 % — LOW (ref 37–47)
HGB BLD-MCNC: 9.1 G/DL — LOW (ref 12–16)
MCHC RBC-ENTMCNC: 29.3 PG — SIGNIFICANT CHANGE UP (ref 27–31)
MCHC RBC-ENTMCNC: 32 G/DL — SIGNIFICANT CHANGE UP (ref 32–37)
MCV RBC AUTO: 91.3 FL — SIGNIFICANT CHANGE UP (ref 81–99)
NRBC # BLD: 0 /100 WBCS — SIGNIFICANT CHANGE UP (ref 0–0)
PLATELET # BLD AUTO: 324 K/UL — SIGNIFICANT CHANGE UP (ref 130–400)
POTASSIUM SERPL-MCNC: 4 MMOL/L — SIGNIFICANT CHANGE UP (ref 3.5–5)
POTASSIUM SERPL-SCNC: 4 MMOL/L — SIGNIFICANT CHANGE UP (ref 3.5–5)
PROT SERPL-MCNC: 5.8 G/DL — LOW (ref 6–8)
RBC # BLD: 3.11 M/UL — LOW (ref 4.2–5.4)
RBC # FLD: 15.1 % — HIGH (ref 11.5–14.5)
SODIUM SERPL-SCNC: 136 MMOL/L — SIGNIFICANT CHANGE UP (ref 135–146)
WBC # BLD: 10.25 K/UL — SIGNIFICANT CHANGE UP (ref 4.8–10.8)
WBC # FLD AUTO: 10.25 K/UL — SIGNIFICANT CHANGE UP (ref 4.8–10.8)

## 2019-06-30 PROCEDURE — 95951: CPT | Mod: 26

## 2019-06-30 PROCEDURE — 99233 SBSQ HOSP IP/OBS HIGH 50: CPT

## 2019-06-30 RX ORDER — INSULIN LISPRO 100/ML
VIAL (ML) SUBCUTANEOUS
Refills: 0 | Status: DISCONTINUED | OUTPATIENT
Start: 2019-06-30 | End: 2019-07-07

## 2019-06-30 RX ORDER — INSULIN LISPRO 100/ML
8 VIAL (ML) SUBCUTANEOUS ONCE
Refills: 0 | Status: COMPLETED | OUTPATIENT
Start: 2019-06-30 | End: 2019-06-30

## 2019-06-30 RX ORDER — INSULIN GLARGINE 100 [IU]/ML
20 INJECTION, SOLUTION SUBCUTANEOUS EVERY MORNING
Refills: 0 | Status: DISCONTINUED | OUTPATIENT
Start: 2019-06-30 | End: 2019-07-01

## 2019-06-30 RX ORDER — INSULIN GLARGINE 100 [IU]/ML
20 INJECTION, SOLUTION SUBCUTANEOUS ONCE
Refills: 0 | Status: COMPLETED | OUTPATIENT
Start: 2019-06-30 | End: 2019-06-30

## 2019-06-30 RX ORDER — INSULIN LISPRO 100/ML
10 VIAL (ML) SUBCUTANEOUS ONCE
Refills: 0 | Status: COMPLETED | OUTPATIENT
Start: 2019-06-30 | End: 2019-06-30

## 2019-06-30 RX ADMIN — Medication 8 UNIT(S): at 22:00

## 2019-06-30 RX ADMIN — SODIUM CHLORIDE 75 MILLILITER(S): 9 INJECTION INTRAMUSCULAR; INTRAVENOUS; SUBCUTANEOUS at 05:01

## 2019-06-30 RX ADMIN — INSULIN GLARGINE 20 UNIT(S): 100 INJECTION, SOLUTION SUBCUTANEOUS at 17:18

## 2019-06-30 RX ADMIN — Medication 10 UNIT(S): at 14:48

## 2019-06-30 RX ADMIN — Medication 200 MILLIGRAM(S): at 17:26

## 2019-06-30 RX ADMIN — LEVETIRACETAM 400 MILLIGRAM(S): 250 TABLET, FILM COATED ORAL at 17:17

## 2019-06-30 RX ADMIN — Medication 107.2 MILLIGRAM(S): at 05:02

## 2019-06-30 RX ADMIN — Medication 1 MILLIGRAM(S): at 14:49

## 2019-06-30 RX ADMIN — DULOXETINE HYDROCHLORIDE 60 MILLIGRAM(S): 30 CAPSULE, DELAYED RELEASE ORAL at 14:49

## 2019-06-30 RX ADMIN — GABAPENTIN 400 MILLIGRAM(S): 400 CAPSULE ORAL at 21:51

## 2019-06-30 RX ADMIN — CEFTRIAXONE 100 MILLIGRAM(S): 500 INJECTION, POWDER, FOR SOLUTION INTRAMUSCULAR; INTRAVENOUS at 05:03

## 2019-06-30 RX ADMIN — CHLORHEXIDINE GLUCONATE 1 APPLICATION(S): 213 SOLUTION TOPICAL at 05:03

## 2019-06-30 RX ADMIN — SIMVASTATIN 40 MILLIGRAM(S): 20 TABLET, FILM COATED ORAL at 21:51

## 2019-06-30 RX ADMIN — GABAPENTIN 400 MILLIGRAM(S): 400 CAPSULE ORAL at 14:50

## 2019-06-30 RX ADMIN — LEVETIRACETAM 400 MILLIGRAM(S): 250 TABLET, FILM COATED ORAL at 05:02

## 2019-06-30 RX ADMIN — Medication 10: at 17:16

## 2019-06-30 NOTE — SPEECH LANGUAGE PATHOLOGY EVALUATION - SLP PERTINENT HISTORY OF CURRENT PROBLEM
Patient with Hx of a recent fall with SDH and was admitted to the hospital. During her treatment went to ICU due to a fever and was consulted by neurosurgery and infectious disease. Now patient presents with another fever. According to MD, given recent SDH, a seizure and Shayan's paralysis seem more likely.

## 2019-06-30 NOTE — SWALLOW BEDSIDE ASSESSMENT ADULT - ORAL PHASE
Decreased anterior-posterior movement of the bolus/Delayed oral transit time/Lingual stasis/moderate Within functional limits Decreased anterior-posterior movement of the bolus/Delayed oral transit time/Lingual stasis/mild Lingual stasis/Decreased anterior-posterior movement of the bolus/Delayed oral transit time

## 2019-06-30 NOTE — SWALLOW BEDSIDE ASSESSMENT ADULT - SWALLOW EVAL: DIAGNOSIS
Mild to moderate oral impairment with no suspected pharyngeal impairment noted during the clinical bedside swallow evaluation.

## 2019-06-30 NOTE — SWALLOW BEDSIDE ASSESSMENT ADULT - ASR SWALLOW ASPIRATION MONITOR
oral hygiene/pneumonia/change of breathing pattern/position upright (90Y)/cough/gurgly voice/fever/throat clearing/upper respiratory infection

## 2019-06-30 NOTE — SWALLOW BEDSIDE ASSESSMENT ADULT - MODE OF PRESENTATION
cup/self fed spoon/fed by clinician/Patient attempted to feed self, however required more assistance when trialing solids./self fed spoon/fed by clinician self fed

## 2019-06-30 NOTE — PROGRESS NOTE ADULT - SUBJECTIVE AND OBJECTIVE BOX
[id number]  LINDSAY ALBERT  73y    Female    Allergies: No Known Allergies      Medications: acetaminophen  Suppository .. 650 milliGRAM(s) Rectal every 6 hours PRN  cefTRIAXone   IVPB 1000 milliGRAM(s) IV Intermittent every 24 hours  chlorhexidine 4% Liquid 1 Application(s) Topical <User Schedule>  dextrose 40% Gel 15 Gram(s) Oral once PRN  dextrose 5%. 1000 milliLiter(s) IV Continuous <Continuous>  dextrose 50% Injectable 12.5 Gram(s) IV Push once  dextrose 50% Injectable 25 Gram(s) IV Push once  dextrose 50% Injectable 25 Gram(s) IV Push once  DULoxetine 60 milliGRAM(s) Oral daily  folic acid 1 milliGRAM(s) Oral daily  gabapentin 400 milliGRAM(s) Oral three times a day  glucagon  Injectable 1 milliGRAM(s) IntraMuscular once PRN  hydrochlorothiazide 50 milliGRAM(s) Oral daily  hydroxychloroquine 200 milliGRAM(s) Oral two times a day  insulin glargine Injectable (LANTUS) 20 Unit(s) SubCutaneous every morning  insulin lispro (HumaLOG) corrective regimen sliding scale   SubCutaneous at bedtime  insulin lispro Injectable (HumaLOG) 3 Unit(s) SubCutaneous before breakfast  levETIRAcetam  IVPB 1000 milliGRAM(s) IV Intermittent every 12 hours  levothyroxine 100 MICROGram(s) Oral daily  lisinopril 20 milliGRAM(s) Oral daily  methadone    Tablet 20 milliGRAM(s) Oral every 12 hours  oxyCODONE    5 mG/acetaminophen 325 mG 1 Tablet(s) Oral every 6 hours PRN  simvastatin Oral Tab/Cap - Peds 40 milliGRAM(s) Oral at bedtime  sodium chloride 0.9%. 1000 milliLiter(s) IV Continuous <Continuous>  sodium chloride 0.9%. 1000 milliLiter(s) IV Continuous <Continuous>      T(C): 37.8 (06-30-19 @ 05:00), Max: 38.7 (06-29-19 @ 21:45)  HR: 79 (06-30-19 @ 05:00) (79 - 86)  BP: 151/67 (06-30-19 @ 05:00) (131/68 - 151/67)  RR: 16 (06-30-19 @ 05:00) (16 - 18)    Did well overnight. Slept, no episodes    VEEG shows well organized background with right hemispheric slowing. No epileptiform activity or electrographic seizures.    PHYSICAL EXAM:    Awake and alert, in NAD    Neurological: CN II-XII in tact. No nystagmus. Persistent left extremity weakness

## 2019-06-30 NOTE — PROGRESS NOTE ADULT - SUBJECTIVE AND OBJECTIVE BOX
LINDSAY ALBERT  73y  Female    Patient is a 73y old  Female who presents with a chief complaint of complaining of hyperglycemia , fever. (2019 09:52)    INTERVAL HPI/OVERNIGHT EVENTS: Pt denies any pain, sob, rash, itching or having bed-bugs at home. She is on VEEG monitor.     Brief: Pt is here for generalized weakness and AMS initially with negative CTH. + unexplained Lt Antonio. Suspected to have bedbugs? noted by night nurse however bug not caught. ID involved - no evidence of bed bugs. Exam no evidence of bedbugs. Pt had hyperglycemia and fever? on ceftriaxone until Blood cxs report available. s/p ID eval     PAST MEDICAL & SURGICAL HISTORY:  Seizure: keppra  Herniated lumbar intervertebral disc: as per hx  Stroke: TIA , no deficits  Fall, initial encounter: as  hx  Lupus: as per hx  Hypertension: hctz  Hypercholesteremia: statin  DM (diabetes mellitus): insulin  fs achs  No significant past surgical history    Vitals:   T(F): 100 (19 @ 05:00), Max: 101.7 (19 @ 21:45)  HR: 79 (19 @ 05:00) (79 - 86)  BP: 151/67 (19 @ 05:00) (131/68 - 151/67)  RR: 16 (19 @ 05:00) (16 - 18)    PHYSICAL EXAM:  GENERAL: NAD, well-developed  HEAD:  Atraumatic, Normocephalic  EYES: EOMI, PERRLA, conjunctiva and sclera clear  NECK: Supple, No JVD  CHEST/LUNG: Clear to auscultation bilaterally; No wheeze  HEART: Regular rate and rhythm; No murmurs, rubs, or gallops  ABDOMEN: Soft, Nontender, Nondistended; Bowel sounds present  EXTREMITIES:  2+ Peripheral Pulses, No clubbing, cyanosis, or edema  PSYCH: AAOx2  NEUROLOGY: Left Antonio   SKIN: No rashes or lesions    LABS:                          9.1    10.25 )-----------( 324      ( 2019 06:08 )             28.4       06-    136  |  99  |  15  ----------------------------<  296<H>  4.0   |  24  |  0.6<L>    Ca    8.4<L>      2019 06:08    TPro  5.8<L>  /  Alb  3.3<L>  /  TBili  0.3  /  DBili  x   /  AST  19  /  ALT  8   /  AlkPhos  83      Urinalysis Basic - ( 2019 01:45 )    Color: Yellow / Appearance: Clear / S.020 / pH: x  Gluc: x / Ketone: 80  / Bili: Negative / Urobili: 0.2   Blood: x / Protein: 30 / Nitrite: Negative   Leuk Esterase: Negative / RBC: 3-5 /HPF / WBC 1-2 /HPF   Sq Epi: x / Non Sq Epi: Few /HPF / Bacteria: Few    PROBLEM LIST:  HEALTH ISSUES - PROBLEM Dx:  Seizure: Seizure  Hypercholesteremia: Hypercholesteremia  Hypothyroid: Hypothyroid  DM (diabetes mellitus): DM (diabetes mellitus)  Hypertension: Hypertension  Depression: Depression  Fever: Fever  SDH: (subdural hematoma): SDH (subdural hematoma)

## 2019-06-30 NOTE — SPEECH LANGUAGE PATHOLOGY EVALUATION - SLP SUCCESSFUL AUDITORY STRATEGIES
repetition/verbal cues/with commands and yes/no questions as patient presented with decrease attention to task.

## 2019-06-30 NOTE — PROGRESS NOTE ADULT - ASSESSMENT
73F PMH SDH (subdural hematoma).    - HX of recent fall with SDH   - Neurology consulted     # Fever  - r/o Infection  - ID consultation   - Ceftriaxone for now until blood cxs back  - Other wise no source identified for infection at this time       # Depression - cymbalta.       # Hypertension. - Lisinopril / HCTZ         # DM (diabetes mellitus)  - insulin  - fs achs.   - Diabetic Diet  - Lantus / RI         # Hypothyroid - synthroid.      # Hypercholesteremia.    - statin.     Dispo: Acute

## 2019-06-30 NOTE — SPEECH LANGUAGE PATHOLOGY EVALUATION - 2-STEP
required multiple cues and repetition of instructions, was able to get 1 correctly and was unable to participate in further complex directions. Patient presented with decrease in attention to task./no

## 2019-06-30 NOTE — SWALLOW BEDSIDE ASSESSMENT ADULT - ASR SWALLOW LINGUAL MOBILITY
impaired right lateral movement/impaired anterior elevation/decrease ROM and agility/impaired left lateral movement

## 2019-06-30 NOTE — PROVIDER CONTACT NOTE (OTHER) - SITUATION
, Dr. Soliz notified.
, Dr. Soliz notified.
, Pt noted with no active diet order, Dr. Osman notified
Pt confused, pulling at lines and EEG leads, 1:1 sit placed at bedside for safety. Dr. Soliz notified.

## 2019-06-30 NOTE — SWALLOW BEDSIDE ASSESSMENT ADULT - H & P REVIEW
yes/76 y.o. female  admitted for generalized weakness, AMS, initially with negative CTH and unexplained left yareli. CT head 6/29/19 shows interval evolution/redistribution of the right frontal subdural subacute hematoma, now measuring up to 6mm in width. Stable 2mm right to left midline shift and no new areas of hemorrhage. Chest X-ray 6/29/19 show no radiographic evidence of acute cardiopuolmonary disease.

## 2019-06-30 NOTE — SPEECH LANGUAGE PATHOLOGY EVALUATION - SLP CONVERSATIONAL SPEECH
Patient able to answer simple WH questions with one word responses, sometimes phrases, however did not provide any elaboration unless cued by clinician. Overall, patient's responses where slow, required some extra time for processing.

## 2019-06-30 NOTE — SWALLOW BEDSIDE ASSESSMENT ADULT - SLP GENERAL OBSERVATIONS
Patient is AOx2, however weak and lethargic with poor attention to task, and received in bed. Patient was positioned and provided with oral care prior to beginning PO trials.

## 2019-06-30 NOTE — SPEECH LANGUAGE PATHOLOGY EVALUATION - H & P REVIEW
yes/76 y.o. female  admitted for generalized weakness, AMS, initially with negative CTH and unexplained left yareli. CT head 6/29/19 shows interval evolution/redistribution of the right frontal subdural subacute hematoma, now measuring up to 6mm in width. Stable 2mm right to left midline shift and no new areas of hemorrhage.

## 2019-06-30 NOTE — SPEECH LANGUAGE PATHOLOGY EVALUATION - YES/NO QUESTIONS: COMPLEX
no/Patient required repetition of questions. Often repeated the question back without answering, even when provided cues.

## 2019-06-30 NOTE — PROGRESS NOTE ADULT - SUBJECTIVE AND OBJECTIVE BOX
LINDSAY ALBERT  73y, Female  Allergy: No Known Allergies      CHIEF COMPLAINT: complaining of hyperglycemia , fever. (2019 08:58)      INTERVAL EVENTS/HPI  - CSF PCR neg, neg HSV  - T(F): , Max: 101.7 (19 @ 21:45)  - WBC Count: 10.25 K/uL (19 @ 06:08)      ROS  Negative except as per noted above in HPI  Denies cough  Denies diarrhea  Denies dysuria   Denies pain at any IV site     FH noncontributory    VITALS:  T(F): 100, Max: 101.7 (19 @ 21:45)  HR: 79  BP: 151/67  RR: 16Vital Signs Last 24 Hrs  T(C): 37.8 (2019 05:00), Max: 38.7 (2019 21:45)  T(F): 100 (2019 05:00), Max: 101.7 (2019 21:45)  HR: 79 (2019 05:00) (79 - 86)  BP: 151/67 (2019 05:00) (131/68 - 151/67)  BP(mean): --  RR: 16 (2019 05:00) (16 - 18)  SpO2: --    PHYSICAL EXAM:  Gen: NAD, resting in bed EEG  HEENT: Normocephalic, atraumatic  Neck: supple, no lymphadenopathy  CV: Regular rate & regular rhythm  Lungs: decreased BS at bases, no fremitus  Abdomen: Soft, BS present  Ext: Warm, well perfused  Neuro: non focal, more alert today and awake oriented to self and place. L extre weakness  Skin: no rash, no erythema  Lines: no phlebitis      TESTS & MEASUREMENTS:                        9.1    10.25 )-----------( 324      ( 2019 06:08 )             28.4     06-30    136  |  99  |  15  ----------------------------<  296<H>  4.0   |  24  |  0.6<L>    Ca    8.4<L>      2019 06:08    TPro  5.8<L>  /  Alb  3.3<L>  /  TBili  0.3  /  DBili  x   /  AST  19  /  ALT  8   /  AlkPhos  83  -    eGFR if Non African American: 91 mL/min/1.73M2 (19 @ 06:08)  eGFR if : 106 mL/min/1.73M2 (19 @ 06:08)    LIVER FUNCTIONS - ( 2019 06:08 )  Alb: 3.3 g/dL / Pro: 5.8 g/dL / ALK PHOS: 83 U/L / ALT: 8 U/L / AST: 19 U/L / GGT: x           Urinalysis Basic - ( 2019 01:45 )    Color: Yellow / Appearance: Clear / S.020 / pH: x  Gluc: x / Ketone: 80  / Bili: Negative / Urobili: 0.2   Blood: x / Protein: 30 / Nitrite: Negative   Leuk Esterase: Negative / RBC: 3-5 /HPF / WBC 1-2 /HPF   Sq Epi: x / Non Sq Epi: Few /HPF / Bacteria: Few        Culture - CSF with Gram Stain (collected 19 @ 05:00)  Source: .CSF CSF  Gram Stain (19 @ 22:40):    polymorphonuclear leukocytes seen per low power field    No organisms seen by cytocentrifuge        Blood Gas Venous - Lactate: 1.5 mmoL/L (19 @ 00:55)  Lactate, Blood: 1.6 mmol/L (19 @ 00:20)      INFECTIOUS DISEASES TESTING      RADIOLOGY & ADDITIONAL TESTS:  I have personally reviewed the last Chest xray  CXR      CT  CT Abdomen and Pelvis w/ IV Cont:   EXAM:  CT ABDOMEN AND PELVIS IC            PROCEDURE DATE:  2019            INTERPRETATION:  CLINICAL STATEMENT: Abdominal pain.      TECHNIQUE: Contiguous axial CT images were obtained from the lower chest   to the pubic symphysis following administration of 100cc Optiray 320   intravenous contrast.  Oral contrast was not administered.  Reformatted   images in the coronal and sagittal planes were acquired.    Comparison made with CT abdomen and pelvis 2017.    FINDINGS:    LOWER CHEST: Small hiatal hernia..    HEPATOBILIARY: Liver unremarkable. Evidence of gallbladder sludge and/or   layering stones. No biliary dilation.    SPLEEN: Unremarkable.    PANCREAS: Unremarkable.    ADRENAL GLANDS: Unremarkable.    KIDNEYS: No hydronephrosis. Bilateral nonobstructive renal calculi..    ABDOMINOPELVIC NODES: Unremarkable.    PELVIC ORGANS: Calcified uterine fibroids. Urinary bladder unremarkable.    PERITONEUM/MESENTERY/BOWEL: Unremarkable.    BONES/SOFT TISSUES: Degenerative change, lumbar spine.. Grade 1   anterolisthesis of L3 on L4 and L4 on L5.    OTHER: Vascular calcifications.      IMPRESSION:   No evidence of acute intra-abdominal pathology.                  KENTON VELASQUEZ M.D., ATTENDING RADIOLOGIST  This document has been electronically signed. 2019  4:05AM             (19 @ 03:57)      CARDIOLOGY TESTING  12 Lead ECG:   Ventricular Rate 98 BPM    Atrial Rate 98 BPM    P-R Interval 132 ms    QRS Duration 68 ms    Q-T Interval 358 ms    QTC Calculation(Bezet) 457 ms    P Axis 29 degrees    R Axis -41 degrees    T Axis -26 degrees    Diagnosis Line Normal sinus rhythm  Left axis deviation  Incomplete right bundle branch block  Confirmed by LUAN YORK MD ([id number]) on 2019 9:16:34 AM (19 @ 02:29)  Transthoracic Echocardiogram:    EXAM:  2-D ECHO (TTE) COMPLETE        PROCEDURE DATE:  2019      INTERPRETATION:  REPORT:    TRANSTHORACIC ECHOCARDIOGRAM REPORT         Patient Name:   LINDSAY ALBERT Accession #: [id number]  Medical Rec #:  [id number]           Height: 60.0 in 152.4 cm  YOB: 1945          Weight:      145.0 lb 65.77 kg  Patient Age:    73 years           BSA:         1.63 m²  Patient Gender: F                  BP:          171/71 mmHg       Date of Exam:        2019 9:14:29 AM  Referring Physician: [id number] LEA TIERNEY  Sonographer:         Yasmine Madden  Reading Physician:   Mamadou Patel M.D.    Procedure:   2D Echo/Doppler/Color Doppler Complete.  Indications: R55 - Syncope and Collapse  Diagnosis:   Syncope and collapse - R55         Summary:   1. Left ventricular ejection fraction, by visual estimation, is 55 to   60%.   2. Spectral Doppler shows impaired relaxation pattern of left   ventricular myocardial filling (Grade I diastolic dysfunction).   3. Mitral annularcalcification.   4. Moderate mitral valve regurgitation.   5. Thickening and calcification of the anterior and posterior mitral   valve leaflets.   6. Mild-moderate tricuspid regurgitation.   7. Mild aortic regurgitation.    PHYSICIAN INTERPRETATION:  Left Ventricle: The left ventricular internal cavity size is normal. Left   ventricular wall thickness is normal. Left ventricular ejection fraction,   by visual estimation, is 55 to 60%. Spectral Doppler shows impaired   relaxation pattern of left ventricular myocardial filling (Grade I   diastolic dysfunction).  Right Ventricle: Normal right ventricular size and function.  Left Atrium: Left atrial enlargement.  Right Atrium: Normal right atrial size.  Pericardium: There is no evidence of pericardial effusion.  Mitral Valve: The mitral valve is normal in structure. Thickening and   calcification of the anterior and posterior mitral valve leaflets. There   is mitral annular calcification. No evidence of mitral stenosis. Moderate   mitral valve regurgitation is seen.  Tricuspid Valve: The tricuspid valve is normal in structure.   Mild-moderate tricuspid regurgitation is visualized.  Aortic Valve: The aortic valve is trileaflet. No evidence of aortic   stenosis. Aortic valve thickening with normal leaflet opening. Mild   aortic valve regurgitation is seen.  Pulmonic Valve: The pulmonic valve is normal. Trace pulmonic valve   regurgitation.  Aorta: The aortic root and ascending aorta are structurally normal, with   no evidence of dilitation.  Pulmonary Artery: The main pulmonary artery is normal in size.       2D AND M-MODE MEASUREMENTS (normal ranges within parentheses):  Left                  Normal   Aorta/Left             Normal  Ventricle:                     Atrium:  IVSd (2D):  1.31 cm  (0.7-1.1) AoV Cusp       1.32  (1.5-2.6)  LVPWd       1.17 cm  (0.7-1.1) Separation:     cm  (2D):                          Left Atrium    4.51  (1.9-4.0)  LVIDd       4.95 cm  (3.4-5.7) (Mmode):        cm  (2D):                          LA Volume      49.4  LVIDs       3.21 cm            Index         ml/m²  (2D):                          Right  LV FS       35.2 %    (>25%)   Ventricle:  (2D):                          RVd (2D):      2.39 cm  IVSd        0.94 cm  (0.7-1.1)  (Mmode):  LVPWd       1.20 cm  (0.7-1.1)  (Mmode):  LVIDd       4.25 cm  (3.4-5.7)  (Mmode):  LVIDs       2.14 cm  (Mmode):  LV FS       49.6 %    (>25%)  (Mmode):  Relative     0.47     (<0.42)  Wall  Thickness  Rel. Wall    0.57     (<0.42)  Thickness  Mm  LV Mass    94.3 g/m²  Index:  Mmode    SPECTRAL DOPPLER ANALYSIS:  LV DIASTOLIC FUNCTION:  MV Peak E: 1.03 m/s Decel Time: 311 msec  MV Peak A: 1.27 m/s  E/A Ratio: 0.81    Aortic Valve:  AoV VMax:    1.75 m/s  AoV Area, Vmax: 1.93 cm² Vmax Indx: 1.19 cm²/m²  AoV Pk Grad: 12.3 mmHg    LVOT Vmax: 1.03 m/s  LVOT VTI:  0.22 m  LVOT Diam: 2.05 cm    Aortic Insufficiency:  AI Half-time:  581 msec  AI Decel Rate: 2.14 m/s²    Mitral Valve:  MV P1/2 Time: 90.31 msec  MV Area, PHT: 2.44 cm²    Mitral Insufficiency by PISA:  MR Volume: 66.49 ml MR Flow Rate: 182.69 ml/s MR EROA: 32.81 mm²    Tricuspid Valve and PA/RV Systolic Pressure: TR Max Velocity: 2.33 m/s RA   Pressure: 5 mmHg RVSP/PASP: 26.7 mmHg       [id number] Mamadou Patel M.D., Electronically signed on 2019 at 7:30:13 PM              *** Final ***                    MAMADOU PATEL MD  This document has been electronically signed. 2019  9:14AM             (19 @ 09:14)      MEDICATIONS  cefTRIAXone   IVPB 1000  chlorhexidine 4% Liquid 1  dextrose 5%. 1000  dextrose 50% Injectable 12.5  dextrose 50% Injectable 25  dextrose 50% Injectable 25  DULoxetine 60  folic acid 1  gabapentin 400  hydrochlorothiazide 50  hydroxychloroquine 200  insulin glargine Injectable (LANTUS) 20  insulin lispro (HumaLOG) corrective regimen sliding scale   insulin lispro Injectable (HumaLOG) 3  levETIRAcetam  IVPB 1000  levothyroxine 100  lisinopril 20  methadone    Tablet 20  simvastatin Oral Tab/Cap - Peds 40  sodium chloride 0.9%. 1000  sodium chloride 0.9%. 1000      ANTIBIOTICS:  cefTRIAXone   IVPB 1000 milliGRAM(s) IV Intermittent every 24 hours  hydroxychloroquine 200 milliGRAM(s) Oral two times a day LINDSAY ALBERT  73y, Female  Allergy: No Known Allergies      CHIEF COMPLAINT: complaining of hyperglycemia , fever. (2019 08:58)      INTERVAL EVENTS/HPI  - CSF PCR neg, neg HSV  - T(F): , Max: 101.7 (19 @ 21:45)  - WBC Count: 10.25 K/uL (19 @ 06:08)      ROS  Negative except as per noted above in HPI  Denies cough  Denies diarrhea  Denies dysuria   Denies pain at any IV site     FH noncontributory    VITALS:  T(F): 100, Max: 101.7 (19 @ 21:45)  HR: 79  BP: 151/67  RR: 16Vital Signs Last 24 Hrs  T(C): 37.8 (2019 05:00), Max: 38.7 (2019 21:45)  T(F): 100 (2019 05:00), Max: 101.7 (2019 21:45)  HR: 79 (2019 05:00) (79 - 86)  BP: 151/67 (2019 05:00) (131/68 - 151/67)  BP(mean): --  RR: 16 (2019 05:00) (16 - 18)  SpO2: --    PHYSICAL EXAM:  Gen: NAD, resting in bed EEG  HEENT: Normocephalic, atraumatic  Neck: supple, no lymphadenopathy  CV: Regular rate & regular rhythm  Lungs: decreased BS at bases, no fremitus  Abdomen: Soft, BS present  Ext: Warm, well perfused  Neuro: non focal, more alert today and awake oriented to self and place. L extre weakness  Skin: no rash, no erythema, no bug bite marks  Lines: no phlebitis      TESTS & MEASUREMENTS:                        9.1    10.25 )-----------( 324      ( 2019 06:08 )             28.4     06-30    136  |  99  |  15  ----------------------------<  296<H>  4.0   |  24  |  0.6<L>    Ca    8.4<L>      2019 06:08    TPro  5.8<L>  /  Alb  3.3<L>  /  TBili  0.3  /  DBili  x   /  AST  19  /  ALT  8   /  AlkPhos  83  -    eGFR if Non African American: 91 mL/min/1.73M2 (19 @ 06:08)  eGFR if : 106 mL/min/1.73M2 (19 @ 06:08)    LIVER FUNCTIONS - ( 2019 06:08 )  Alb: 3.3 g/dL / Pro: 5.8 g/dL / ALK PHOS: 83 U/L / ALT: 8 U/L / AST: 19 U/L / GGT: x           Urinalysis Basic - ( 2019 01:45 )    Color: Yellow / Appearance: Clear / S.020 / pH: x  Gluc: x / Ketone: 80  / Bili: Negative / Urobili: 0.2   Blood: x / Protein: 30 / Nitrite: Negative   Leuk Esterase: Negative / RBC: 3-5 /HPF / WBC 1-2 /HPF   Sq Epi: x / Non Sq Epi: Few /HPF / Bacteria: Few        Culture - CSF with Gram Stain (collected 19 @ 05:00)  Source: .CSF CSF  Gram Stain (19 @ 22:40):    polymorphonuclear leukocytes seen per low power field    No organisms seen by cytocentrifuge        Blood Gas Venous - Lactate: 1.5 mmoL/L (19 @ 00:55)  Lactate, Blood: 1.6 mmol/L (19 @ 00:20)      INFECTIOUS DISEASES TESTING      RADIOLOGY & ADDITIONAL TESTS:  I have personally reviewed the last Chest xray  CXR      CT  CT Abdomen and Pelvis w/ IV Cont:   EXAM:  CT ABDOMEN AND PELVIS IC            PROCEDURE DATE:  2019            INTERPRETATION:  CLINICAL STATEMENT: Abdominal pain.      TECHNIQUE: Contiguous axial CT images were obtained from the lower chest   to the pubic symphysis following administration of 100cc Optiray 320   intravenous contrast.  Oral contrast was not administered.  Reformatted   images in the coronal and sagittal planes were acquired.    Comparison made with CT abdomen and pelvis 2017.    FINDINGS:    LOWER CHEST: Small hiatal hernia..    HEPATOBILIARY: Liver unremarkable. Evidence of gallbladder sludge and/or   layering stones. No biliary dilation.    SPLEEN: Unremarkable.    PANCREAS: Unremarkable.    ADRENAL GLANDS: Unremarkable.    KIDNEYS: No hydronephrosis. Bilateral nonobstructive renal calculi..    ABDOMINOPELVIC NODES: Unremarkable.    PELVIC ORGANS: Calcified uterine fibroids. Urinary bladder unremarkable.    PERITONEUM/MESENTERY/BOWEL: Unremarkable.    BONES/SOFT TISSUES: Degenerative change, lumbar spine.. Grade 1   anterolisthesis of L3 on L4 and L4 on L5.    OTHER: Vascular calcifications.      IMPRESSION:   No evidence of acute intra-abdominal pathology.                  KENTON VELASQUEZ M.D., ATTENDING RADIOLOGIST  This document has been electronically signed. 2019  4:05AM             (19 @ 03:57)      CARDIOLOGY TESTING  12 Lead ECG:   Ventricular Rate 98 BPM    Atrial Rate 98 BPM    P-R Interval 132 ms    QRS Duration 68 ms    Q-T Interval 358 ms    QTC Calculation(Bezet) 457 ms    P Axis 29 degrees    R Axis -41 degrees    T Axis -26 degrees    Diagnosis Line Normal sinus rhythm  Left axis deviation  Incomplete right bundle branch block  Confirmed by LUAN YORK MD ([id number]) on 2019 9:16:34 AM (19 @ 02:29)  Transthoracic Echocardiogram:    EXAM:  2-D ECHO (TTE) COMPLETE        PROCEDURE DATE:  2019      INTERPRETATION:  REPORT:    TRANSTHORACIC ECHOCARDIOGRAM REPORT         Patient Name:   LINDSAY ALBERT Accession #: [id number]  Medical Rec #:  [id number]           Height: 60.0 in 152.4 cm  YOB: 1945          Weight:      145.0 lb 65.77 kg  Patient Age:    73 years           BSA:         1.63 m²  Patient Gender: F                  BP:          171/71 mmHg       Date of Exam:        2019 9:14:29 AM  Referring Physician: [id number] LEA TIERNEY  Sonographer:         Yasmine Madden  Reading Physician:   Mamadou Patel M.D.    Procedure:   2D Echo/Doppler/Color Doppler Complete.  Indications: R55 - Syncope and Collapse  Diagnosis:   Syncope and collapse - R55         Summary:   1. Left ventricular ejection fraction, by visual estimation, is 55 to   60%.   2. Spectral Doppler shows impaired relaxation pattern of left   ventricular myocardial filling (Grade I diastolic dysfunction).   3. Mitral annularcalcification.   4. Moderate mitral valve regurgitation.   5. Thickening and calcification of the anterior and posterior mitral   valve leaflets.   6. Mild-moderate tricuspid regurgitation.   7. Mild aortic regurgitation.    PHYSICIAN INTERPRETATION:  Left Ventricle: The left ventricular internal cavity size is normal. Left   ventricular wall thickness is normal. Left ventricular ejection fraction,   by visual estimation, is 55 to 60%. Spectral Doppler shows impaired   relaxation pattern of left ventricular myocardial filling (Grade I   diastolic dysfunction).  Right Ventricle: Normal right ventricular size and function.  Left Atrium: Left atrial enlargement.  Right Atrium: Normal right atrial size.  Pericardium: There is no evidence of pericardial effusion.  Mitral Valve: The mitral valve is normal in structure. Thickening and   calcification of the anterior and posterior mitral valve leaflets. There   is mitral annular calcification. No evidence of mitral stenosis. Moderate   mitral valve regurgitation is seen.  Tricuspid Valve: The tricuspid valve is normal in structure.   Mild-moderate tricuspid regurgitation is visualized.  Aortic Valve: The aortic valve is trileaflet. No evidence of aortic   stenosis. Aortic valve thickening with normal leaflet opening. Mild   aortic valve regurgitation is seen.  Pulmonic Valve: The pulmonic valve is normal. Trace pulmonic valve   regurgitation.  Aorta: The aortic root and ascending aorta are structurally normal, with   no evidence of dilitation.  Pulmonary Artery: The main pulmonary artery is normal in size.       2D AND M-MODE MEASUREMENTS (normal ranges within parentheses):  Left                  Normal   Aorta/Left             Normal  Ventricle:                     Atrium:  IVSd (2D):  1.31 cm  (0.7-1.1) AoV Cusp       1.32  (1.5-2.6)  LVPWd       1.17 cm  (0.7-1.1) Separation:     cm  (2D):                          Left Atrium    4.51  (1.9-4.0)  LVIDd       4.95 cm  (3.4-5.7) (Mmode):        cm  (2D):                          LA Volume      49.4  LVIDs       3.21 cm            Index         ml/m²  (2D):                          Right  LV FS       35.2 %    (>25%)   Ventricle:  (2D):                          RVd (2D):      2.39 cm  IVSd        0.94 cm  (0.7-1.1)  (Mmode):  LVPWd       1.20 cm  (0.7-1.1)  (Mmode):  LVIDd       4.25 cm  (3.4-5.7)  (Mmode):  LVIDs       2.14 cm  (Mmode):  LV FS       49.6 %    (>25%)  (Mmode):  Relative     0.47     (<0.42)  Wall  Thickness  Rel. Wall    0.57     (<0.42)  Thickness  Mm  LV Mass    94.3 g/m²  Index:  Mmode    SPECTRAL DOPPLER ANALYSIS:  LV DIASTOLIC FUNCTION:  MV Peak E: 1.03 m/s Decel Time: 311 msec  MV Peak A: 1.27 m/s  E/A Ratio: 0.81    Aortic Valve:  AoV VMax:    1.75 m/s  AoV Area, Vmax: 1.93 cm² Vmax Indx: 1.19 cm²/m²  AoV Pk Grad: 12.3 mmHg    LVOT Vmax: 1.03 m/s  LVOT VTI:  0.22 m  LVOT Diam: 2.05 cm    Aortic Insufficiency:  AI Half-time:  581 msec  AI Decel Rate: 2.14 m/s²    Mitral Valve:  MV P1/2 Time: 90.31 msec  MV Area, PHT: 2.44 cm²    Mitral Insufficiency by PISA:  MR Volume: 66.49 ml MR Flow Rate: 182.69 ml/s MR EROA: 32.81 mm²    Tricuspid Valve and PA/RV Systolic Pressure: TR Max Velocity: 2.33 m/s RA   Pressure: 5 mmHg RVSP/PASP: 26.7 mmHg       [id number] Mamadou Patel M.D., Electronically signed on 2019 at 7:30:13 PM              *** Final ***                    MAMADOU PATEL MD  This document has been electronically signed. 2019  9:14AM             (19 @ 09:14)      MEDICATIONS  cefTRIAXone   IVPB 1000  chlorhexidine 4% Liquid 1  dextrose 5%. 1000  dextrose 50% Injectable 12.5  dextrose 50% Injectable 25  dextrose 50% Injectable 25  DULoxetine 60  folic acid 1  gabapentin 400  hydrochlorothiazide 50  hydroxychloroquine 200  insulin glargine Injectable (LANTUS) 20  insulin lispro (HumaLOG) corrective regimen sliding scale   insulin lispro Injectable (HumaLOG) 3  levETIRAcetam  IVPB 1000  levothyroxine 100  lisinopril 20  methadone    Tablet 20  simvastatin Oral Tab/Cap - Peds 40  sodium chloride 0.9%. 1000  sodium chloride 0.9%. 1000      ANTIBIOTICS:  cefTRIAXone   IVPB 1000 milliGRAM(s) IV Intermittent every 24 hours  hydroxychloroquine 200 milliGRAM(s) Oral two times a day

## 2019-06-30 NOTE — CHART NOTE - NSCHARTNOTEFT_GEN_A_CORE
Patient seen at bedside, clinically much improved then on arrival to medical floor almost 2 days ago. She is wide awake and even moving left sided extremities upon request. As a side note, I had stopped her listed aspirin due to concern about new stroke with recent history of SDH. Would discuss resumption of this medication (and review current medication if not already done since patient is now awake and can take oral meds) during daytime

## 2019-06-30 NOTE — PROGRESS NOTE ADULT - ASSESSMENT
73 year old female history of acute left hemiplegia of unclear etiology, EEG to assess for subclinical seizures. Will continue VEEG and current dose of Keppra.

## 2019-07-01 DIAGNOSIS — S06.5X0A TRAUMATIC SUBDURAL HEMORRHAGE WITHOUT LOSS OF CONSCIOUSNESS, INITIAL ENCOUNTER: ICD-10-CM
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
## 2019-07-01 DIAGNOSIS — Z79.891 LONG TERM (CURRENT) USE OF OPIATE ANALGESIC: ICD-10-CM

## 2019-07-01 DIAGNOSIS — M32.9 SYSTEMIC LUPUS ERYTHEMATOSUS, UNSPECIFIED: ICD-10-CM
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
## 2019-07-01 DIAGNOSIS — S01.01XA LACERATION WITHOUT FOREIGN BODY OF SCALP, INITIAL ENCOUNTER: ICD-10-CM
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
## 2019-07-01 DIAGNOSIS — M89.29 OTHER DISORDERS OF BONE DEVELOPMENT AND GROWTH, MULTIPLE SITES: ICD-10-CM

## 2019-07-01 DIAGNOSIS — E03.9 HYPOTHYROIDISM, UNSPECIFIED: ICD-10-CM

## 2019-07-01 DIAGNOSIS — E10.9 TYPE 1 DIABETES MELLITUS WITHOUT COMPLICATIONS: ICD-10-CM

## 2019-07-01 DIAGNOSIS — E87.6 HYPOKALEMIA: ICD-10-CM

## 2019-07-01 DIAGNOSIS — M51.26 OTHER INTERVERTEBRAL DISC DISPLACEMENT, LUMBAR REGION: ICD-10-CM

## 2019-07-01 DIAGNOSIS — D63.8 ANEMIA IN OTHER CHRONIC DISEASES CLASSIFIED ELSEWHERE: ICD-10-CM

## 2019-07-01 DIAGNOSIS — Z86.73 PERSONAL HISTORY OF TRANSIENT ISCHEMIC ATTACK (TIA), AND CEREBRAL INFARCTION WITHOUT RESIDUAL DEFICITS: ICD-10-CM
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
## 2019-07-01 DIAGNOSIS — Z79.4 LONG TERM (CURRENT) USE OF INSULIN: ICD-10-CM

## 2019-07-01 DIAGNOSIS — E78.00 PURE HYPERCHOLESTEROLEMIA, UNSPECIFIED: ICD-10-CM

## 2019-07-01 DIAGNOSIS — M06.9 RHEUMATOID ARTHRITIS, UNSPECIFIED: ICD-10-CM

## 2019-07-01 DIAGNOSIS — E83.42 HYPOMAGNESEMIA: ICD-10-CM

## 2019-07-01 LAB
ANION GAP SERPL CALC-SCNC: 11 MMOL/L — SIGNIFICANT CHANGE UP (ref 7–14)
BUN SERPL-MCNC: 13 MG/DL — SIGNIFICANT CHANGE UP (ref 10–20)
CALCIUM SERPL-MCNC: 8.2 MG/DL — LOW (ref 8.5–10.1)
CHLORIDE SERPL-SCNC: 100 MMOL/L — SIGNIFICANT CHANGE UP (ref 98–110)
CO2 SERPL-SCNC: 26 MMOL/L — SIGNIFICANT CHANGE UP (ref 17–32)
CREAT SERPL-MCNC: 0.5 MG/DL — LOW (ref 0.7–1.5)
ERYTHROCYTE [SEDIMENTATION RATE] IN BLOOD: 25 MM/HR — HIGH (ref 0–20)
GLUCOSE BLDC GLUCOMTR-MCNC: 122 MG/DL — HIGH (ref 70–99)
GLUCOSE BLDC GLUCOMTR-MCNC: 267 MG/DL — HIGH (ref 70–99)
GLUCOSE BLDC GLUCOMTR-MCNC: 312 MG/DL — HIGH (ref 70–99)
GLUCOSE BLDC GLUCOMTR-MCNC: 341 MG/DL — HIGH (ref 70–99)
GLUCOSE SERPL-MCNC: 109 MG/DL — HIGH (ref 70–99)
HCT VFR BLD CALC: 28.7 % — LOW (ref 37–47)
HGB BLD-MCNC: 9.1 G/DL — LOW (ref 12–16)
MCHC RBC-ENTMCNC: 28.7 PG — SIGNIFICANT CHANGE UP (ref 27–31)
MCHC RBC-ENTMCNC: 31.7 G/DL — LOW (ref 32–37)
MCV RBC AUTO: 90.5 FL — SIGNIFICANT CHANGE UP (ref 81–99)
NRBC # BLD: 0 /100 WBCS — SIGNIFICANT CHANGE UP (ref 0–0)
PLATELET # BLD AUTO: 331 K/UL — SIGNIFICANT CHANGE UP (ref 130–400)
POTASSIUM SERPL-MCNC: 3 MMOL/L — LOW (ref 3.5–5)
POTASSIUM SERPL-SCNC: 3 MMOL/L — LOW (ref 3.5–5)
RBC # BLD: 3.17 M/UL — LOW (ref 4.2–5.4)
RBC # FLD: 14.7 % — HIGH (ref 11.5–14.5)
SODIUM SERPL-SCNC: 137 MMOL/L — SIGNIFICANT CHANGE UP (ref 135–146)
WBC # BLD: 7.38 K/UL — SIGNIFICANT CHANGE UP (ref 4.8–10.8)
WBC # FLD AUTO: 7.38 K/UL — SIGNIFICANT CHANGE UP (ref 4.8–10.8)

## 2019-07-01 PROCEDURE — 95951: CPT | Mod: 26

## 2019-07-01 PROCEDURE — 99232 SBSQ HOSP IP/OBS MODERATE 35: CPT

## 2019-07-01 PROCEDURE — 99233 SBSQ HOSP IP/OBS HIGH 50: CPT

## 2019-07-01 RX ORDER — LEVETIRACETAM 250 MG/1
500 TABLET, FILM COATED ORAL ONCE
Refills: 0 | Status: COMPLETED | OUTPATIENT
Start: 2019-07-01 | End: 2019-07-01

## 2019-07-01 RX ORDER — INSULIN GLARGINE 100 [IU]/ML
16 INJECTION, SOLUTION SUBCUTANEOUS EVERY MORNING
Refills: 0 | Status: DISCONTINUED | OUTPATIENT
Start: 2019-07-01 | End: 2019-07-07

## 2019-07-01 RX ORDER — LEVETIRACETAM 250 MG/1
1250 TABLET, FILM COATED ORAL EVERY 12 HOURS
Refills: 0 | Status: DISCONTINUED | OUTPATIENT
Start: 2019-07-01 | End: 2019-07-02

## 2019-07-01 RX ADMIN — Medication 6: at 11:57

## 2019-07-01 RX ADMIN — GABAPENTIN 400 MILLIGRAM(S): 400 CAPSULE ORAL at 05:09

## 2019-07-01 RX ADMIN — LEVETIRACETAM 420 MILLIGRAM(S): 250 TABLET, FILM COATED ORAL at 09:44

## 2019-07-01 RX ADMIN — CEFTRIAXONE 100 MILLIGRAM(S): 500 INJECTION, POWDER, FOR SOLUTION INTRAMUSCULAR; INTRAVENOUS at 05:08

## 2019-07-01 RX ADMIN — Medication 200 MILLIGRAM(S): at 17:16

## 2019-07-01 RX ADMIN — Medication 1 MILLIGRAM(S): at 11:53

## 2019-07-01 RX ADMIN — DULOXETINE HYDROCHLORIDE 60 MILLIGRAM(S): 30 CAPSULE, DELAYED RELEASE ORAL at 11:52

## 2019-07-01 RX ADMIN — METHADONE HYDROCHLORIDE 20 MILLIGRAM(S): 40 TABLET ORAL at 17:20

## 2019-07-01 RX ADMIN — GABAPENTIN 400 MILLIGRAM(S): 400 CAPSULE ORAL at 14:30

## 2019-07-01 RX ADMIN — Medication 200 MILLIGRAM(S): at 05:09

## 2019-07-01 RX ADMIN — GABAPENTIN 400 MILLIGRAM(S): 400 CAPSULE ORAL at 21:28

## 2019-07-01 RX ADMIN — LEVETIRACETAM 400 MILLIGRAM(S): 250 TABLET, FILM COATED ORAL at 17:20

## 2019-07-01 RX ADMIN — SIMVASTATIN 40 MILLIGRAM(S): 20 TABLET, FILM COATED ORAL at 21:28

## 2019-07-01 RX ADMIN — LEVETIRACETAM 400 MILLIGRAM(S): 250 TABLET, FILM COATED ORAL at 05:09

## 2019-07-01 RX ADMIN — Medication 100 MICROGRAM(S): at 05:09

## 2019-07-01 RX ADMIN — Medication 50 MILLIGRAM(S): at 05:08

## 2019-07-01 RX ADMIN — SODIUM CHLORIDE 75 MILLILITER(S): 9 INJECTION INTRAMUSCULAR; INTRAVENOUS; SUBCUTANEOUS at 05:08

## 2019-07-01 RX ADMIN — Medication 8: at 17:13

## 2019-07-01 RX ADMIN — SODIUM CHLORIDE 75 MILLILITER(S): 9 INJECTION INTRAMUSCULAR; INTRAVENOUS; SUBCUTANEOUS at 21:29

## 2019-07-01 RX ADMIN — CHLORHEXIDINE GLUCONATE 1 APPLICATION(S): 213 SOLUTION TOPICAL at 05:13

## 2019-07-01 RX ADMIN — LISINOPRIL 20 MILLIGRAM(S): 2.5 TABLET ORAL at 05:09

## 2019-07-01 RX ADMIN — INSULIN GLARGINE 16 UNIT(S): 100 INJECTION, SOLUTION SUBCUTANEOUS at 10:10

## 2019-07-01 NOTE — PROGRESS NOTE ADULT - SUBJECTIVE AND OBJECTIVE BOX
Patient is a 73y old  Female who presents with a chief complaint of complaining of hyperglycemia , fever. (01 Jul 2019 16:44)      INTERVAL HPI/OVERNIGHT EVENTS:    REVIEW OF SYSTEMS:  CONSTITUTIONAL: No fever, weight loss, or fatigue  EYES: No eye pain, visual disturbances, or discharge  ENMT:  No difficulty hearing, tinnitus, vertigo; No sinus or throat pain  NECK: No pain or stiffness  BREASTS: No pain, masses, or nipple discharge  RESPIRATORY: No cough, wheezing, chills or hemoptysis; No shortness of breath  CARDIOVASCULAR: No chest pain, palpitations, dizziness, or leg swelling  GASTROINTESTINAL: No abdominal or epigastric pain. No nausea, vomiting, or hematemesis; No diarrhea or constipation. No melena or hematochezia.  GENITOURINARY: No dysuria, frequency, hematuria, or incontinence  NEUROLOGICAL: No headaches, memory loss, loss of strength, numbness, or tremors  SKIN: No itching, burning, rashes, or lesions   LYMPH NODES: No enlarged glands  ENDOCRINE: No heat or cold intolerance; No hair loss  MUSCULOSKELETAL: No joint pain or swelling; No muscle, back, or extremity pain  PSYCHIATRIC: No depression, anxiety, mood swings, or difficulty sleeping  HEME/LYMPH: No easy bruising, or bleeding gums  ALLERGY AND IMMUNOLOGIC: No hives or eczema      T(C): 37.6 (07-01-19 @ 21:00), Max: 37.6 (07-01-19 @ 21:00)  HR: 80 (07-01-19 @ 21:00) (77 - 86)  BP: 143/65 (07-01-19 @ 21:00) (135/74 - 145/66)  RR: 16 (07-01-19 @ 21:00) (14 - 16)  SpO2: 94% (07-01-19 @ 06:20) (94% - 94%)    PHYSICAL EXAM:  GENERAL: NAD, well-groomed, well-developed  HEAD:  Atraumatic, Normocephalic  EYES: EOMI, PERRLA, conjunctiva and sclera clear  ENMT: No tonsillar erythema, exudates, or enlargement; Moist mucous membranes, Good dentition, No lesions  NECK: Supple, No JVD, Normal thyroid  NERVOUS SYSTEM:  Alert & Oriented X3, Good concentration; Motor Strength 5/5 B/L upper and lower extremities; DTRs 2+ intact and symmetric  CHEST/LUNG: Clear to percussion bilaterally; No rales, rhonchi, wheezing, or rubs  HEART: Regular rate and rhythm; No murmurs, rubs, or gallops  ABDOMEN: Soft, Nontender, Nondistended; Bowel sounds present  EXTREMITIES:  2+ Peripheral Pulses, No clubbing, cyanosis, or edema  LYMPH: No lymphadenopathy noted  SKIN: No rashes or lesions      MEDICATIONS  (STANDING):  chlorhexidine 4% Liquid 1 Application(s) Topical <User Schedule>  dextrose 5%. 1000 milliLiter(s) (50 mL/Hr) IV Continuous <Continuous>  dextrose 50% Injectable 12.5 Gram(s) IV Push once  dextrose 50% Injectable 25 Gram(s) IV Push once  dextrose 50% Injectable 25 Gram(s) IV Push once  DULoxetine 60 milliGRAM(s) Oral daily  folic acid 1 milliGRAM(s) Oral daily  gabapentin 400 milliGRAM(s) Oral three times a day  hydrochlorothiazide 50 milliGRAM(s) Oral daily  hydroxychloroquine 200 milliGRAM(s) Oral two times a day  insulin glargine Injectable (LANTUS) 16 Unit(s) SubCutaneous every morning  insulin lispro (HumaLOG) corrective regimen sliding scale   SubCutaneous three times a day before meals  levETIRAcetam  IVPB 1250 milliGRAM(s) IV Intermittent every 12 hours  levothyroxine 100 MICROGram(s) Oral daily  lisinopril 20 milliGRAM(s) Oral daily  methadone    Tablet 20 milliGRAM(s) Oral every 12 hours  simvastatin Oral Tab/Cap - Peds 40 milliGRAM(s) Oral at bedtime  sodium chloride 0.9%. 1000 milliLiter(s) (75 mL/Hr) IV Continuous <Continuous>  sodium chloride 0.9%. 1000 milliLiter(s) (75 mL/Hr) IV Continuous <Continuous>    MEDICATIONS  (PRN):  acetaminophen  Suppository .. 650 milliGRAM(s) Rectal every 6 hours PRN Temp greater or equal to 38C (100.4F), Mild Pain (1 - 3)  dextrose 40% Gel 15 Gram(s) Oral once PRN Blood Glucose LESS THAN 70 milliGRAM(s)/deciliter  glucagon  Injectable 1 milliGRAM(s) IntraMuscular once PRN Glucose LESS THAN 70 milligrams/deciliter  oxyCODONE    5 mG/acetaminophen 325 mG 1 Tablet(s) Oral every 6 hours PRN Severe Pain (7 - 10)        Allergies    No Known Allergies    Intolerances            LABS:                        9.1    7.38  )-----------( 331      ( 01 Jul 2019 07:34 )             28.7     07-01    137  |  100  |  13  ----------------------------<  109<H>  3.0<L>   |  26  |  0.5<L>    Ca    8.2<L>      01 Jul 2019 07:34    TPro  5.8<L>  /  Alb  3.3<L>  /  TBili  0.3  /  DBili  x   /  AST  19  /  ALT  8   /  AlkPhos  83  06-30        CAPILLARY BLOOD GLUCOSE      POCT Blood Glucose.: 269 mg/dL (01 Jul 2019 21:16)  POCT Blood Glucose.: 341 mg/dL (01 Jul 2019 16:31)  POCT Blood Glucose.: 267 mg/dL (01 Jul 2019 11:10)  POCT Blood Glucose.: 122 mg/dL (01 Jul 2019 07:42)  POCT Blood Glucose.: 241 mg/dL (30 Jun 2019 23:47)      RADIOLOGY & ADDITIONAL TESTS:    Imaging Personally Reviewed:  [ ] YES  [ ] NO    Consultant(s) Notes Reviewed:  [ ] YES  [ ] NO Patient is seen and examined at the bed side, is afebrile.        REVIEW OF SYSTEMS: All other review systems are negative         T(C): 37.6 (07-01-19 @ 21:00), Max: 37.6 (07-01-19 @ 21:00)  HR: 80 (07-01-19 @ 21:00) (77 - 86)  BP: 143/65 (07-01-19 @ 21:00) (135/74 - 145/66)  RR: 16 (07-01-19 @ 21:00) (14 - 16)  SpO2: 94% (07-01-19 @ 06:20) (94% - 94%)        PHYSICAL EXAM:  GENERAL: Not in distress  CHEST/LUNG: Air entry bilaterally  HEART: s1 and s2 present   ABDOMEN:  Nontender AND  Nondistended  EXTREMITIES: No pedal  edema  CNS: Awake and alert        MEDICATIONS  (STANDING):  chlorhexidine 4% Liquid 1 Application(s) Topical <User Schedule>  dextrose 5%. 1000 milliLiter(s) (50 mL/Hr) IV Continuous <Continuous>  dextrose 50% Injectable 12.5 Gram(s) IV Push once  dextrose 50% Injectable 25 Gram(s) IV Push once  dextrose 50% Injectable 25 Gram(s) IV Push once  DULoxetine 60 milliGRAM(s) Oral daily  folic acid 1 milliGRAM(s) Oral daily  gabapentin 400 milliGRAM(s) Oral three times a day  hydrochlorothiazide 50 milliGRAM(s) Oral daily  hydroxychloroquine 200 milliGRAM(s) Oral two times a day  insulin glargine Injectable (LANTUS) 16 Unit(s) SubCutaneous every morning  insulin lispro (HumaLOG) corrective regimen sliding scale   SubCutaneous three times a day before meals  levETIRAcetam  IVPB 1250 milliGRAM(s) IV Intermittent every 12 hours  levothyroxine 100 MICROGram(s) Oral daily  lisinopril 20 milliGRAM(s) Oral daily  methadone    Tablet 20 milliGRAM(s) Oral every 12 hours  simvastatin Oral Tab/Cap - Peds 40 milliGRAM(s) Oral at bedtime  sodium chloride 0.9%. 1000 milliLiter(s) (75 mL/Hr) IV Continuous <Continuous>  sodium chloride 0.9%. 1000 milliLiter(s) (75 mL/Hr) IV Continuous <Continuous>    MEDICATIONS  (PRN):  acetaminophen  Suppository .. 650 milliGRAM(s) Rectal every 6 hours PRN Temp greater or equal to 38C (100.4F), Mild Pain (1 - 3)  dextrose 40% Gel 15 Gram(s) Oral once PRN Blood Glucose LESS THAN 70 milliGRAM(s)/deciliter  glucagon  Injectable 1 milliGRAM(s) IntraMuscular once PRN Glucose LESS THAN 70 milligrams/deciliter  oxyCODONE    5 mG/acetaminophen 325 mG 1 Tablet(s) Oral every 6 hours PRN Severe Pain (7 - 10)        Allergies    No Known Allergies          LABS:                        9.1    7.38  )-----------( 331      ( 01 Jul 2019 07:34 )             28.7           07-01    137  |  100  |  13  ----------------------------<  109<H>  3.0<L>   |  26  |  0.5<L>    Ca    8.2<L>      01 Jul 2019 07:34    TPro  5.8<L>  /  Alb  3.3<L>  /  TBili  0.3  /  DBili  x   /  AST  19  /  ALT  8   /  AlkPhos  83  06-30        CAPILLARY BLOOD GLUCOSE  POCT Blood Glucose.: 269 mg/dL (01 Jul 2019 21:16  POCT Blood Glucose.: 341 mg/dL (01 Jul 2019 16:31)  POCT Blood Glucose.: 267 mg/dL (01 Jul 2019 11:10)  POCT Blood Glucose.: 122 mg/dL (01 Jul 2019 07:42)  POCT Blood Glucose.: 241 mg/dL (30 Jun 2019 23:47)      RADIOLOGY & ADDITIONAL TESTS:      < from: CT Angio Neck w/ IV Cont (06.29.19 @ 12:19) >    Unremarkable CTA of the head and neck.    < end of copied text >      < from: CT Abdomen and Pelvis w/ IV Cont (06.29.19 @ 03:57) >  No evidence of acute intra-abdominal pathology.    < end of copied text >

## 2019-07-01 NOTE — PROGRESS NOTE ADULT - SUBJECTIVE AND OBJECTIVE BOX
Epilepsy Attending Note:     JOSE MANUELLINDSAY ALFRED    73y Female  MRN [id number]    Vital Signs Last 24 Hrs  T(C): 35.6 (01 Jul 2019 05:00), Max: 36.6 (30 Jun 2019 22:00)  T(F): 96.1 (01 Jul 2019 05:00), Max: 97.8 (30 Jun 2019 22:00)  HR: 77 (01 Jul 2019 05:00) (75 - 78)  BP: 145/66 (01 Jul 2019 05:00) (142/66 - 147/62)  BP(mean): --  RR: 14 (01 Jul 2019 05:00) (14 - 16)  SpO2: 94% (01 Jul 2019 06:20) (94% - 94%)                          9.1    7.38  )-----------( 331      ( 01 Jul 2019 07:34 )             28.7       07-01    137  |  100  |  13  ----------------------------<  109<H>  3.0<L>   |  26  |  0.5<L>    Ca    8.2<L>      01 Jul 2019 07:34    TPro  5.8<L>  /  Alb  3.3<L>  /  TBili  0.3  /  DBili  x   /  AST  19  /  ALT  8   /  AlkPhos  83  06-30      MEDICATIONS  (STANDING):  cefTRIAXone   IVPB 1000 milliGRAM(s) IV Intermittent every 24 hours  chlorhexidine 4% Liquid 1 Application(s) Topical <User Schedule>  dextrose 5%. 1000 milliLiter(s) (50 mL/Hr) IV Continuous <Continuous>  dextrose 50% Injectable 12.5 Gram(s) IV Push once  dextrose 50% Injectable 25 Gram(s) IV Push once  dextrose 50% Injectable 25 Gram(s) IV Push once  DULoxetine 60 milliGRAM(s) Oral daily  folic acid 1 milliGRAM(s) Oral daily  gabapentin 400 milliGRAM(s) Oral three times a day  hydrochlorothiazide 50 milliGRAM(s) Oral daily  hydroxychloroquine 200 milliGRAM(s) Oral two times a day  insulin glargine Injectable (LANTUS) 16 Unit(s) SubCutaneous every morning  insulin lispro (HumaLOG) corrective regimen sliding scale   SubCutaneous three times a day before meals  levETIRAcetam  IVPB 1250 milliGRAM(s) IV Intermittent every 12 hours  levothyroxine 100 MICROGram(s) Oral daily  lisinopril 20 milliGRAM(s) Oral daily  methadone    Tablet 20 milliGRAM(s) Oral every 12 hours  simvastatin Oral Tab/Cap - Peds 40 milliGRAM(s) Oral at bedtime  sodium chloride 0.9%. 1000 milliLiter(s) (75 mL/Hr) IV Continuous <Continuous>  sodium chloride 0.9%. 1000 milliLiter(s) (75 mL/Hr) IV Continuous <Continuous>    MEDICATIONS  (PRN):  acetaminophen  Suppository .. 650 milliGRAM(s) Rectal every 6 hours PRN Temp greater or equal to 38C (100.4F), Mild Pain (1 - 3)  dextrose 40% Gel 15 Gram(s) Oral once PRN Blood Glucose LESS THAN 70 milliGRAM(s)/deciliter  glucagon  Injectable 1 milliGRAM(s) IntraMuscular once PRN Glucose LESS THAN 70 milligrams/deciliter  oxyCODONE    5 mG/acetaminophen 325 mG 1 Tablet(s) Oral every 6 hours PRN Severe Pain (7 - 10)            VEEG in the last 24 hours:    Background-------7-8 hz. slightly less than optimally organized    Focal and generalized slowing----1--mld to moderate bifrontal Rt>>left focal slowing.                                                   2-mild generalized slowing                                                     Interictal activity----------1-small number of independent left hemispheric sharps                                      2- large to frequent number of right frontal/anterior temporal sharps and spikes     Events-------brief runs of right hemispheric anterior quadrant sharply contoured theta  admixed with spikes    Seizures----brief electrographycal  events as above    Impression: abnormal as above    Plan - Discussed with the neurology           suggested W/U for the etiology R/O encephalitis infectious Vs douglas-immune

## 2019-07-01 NOTE — PROGRESS NOTE ADULT - ASSESSMENT
73F PMH SDH (subdural hematoma).    - HX of recent fall with SDH   - Neurology consulted     # Left Hemiparesis   - Shayan's Paralysis (improved)  - From Rt Hemispheric Seizures - correlation   - Now resolved    # Seizure Disorder (acute dx)  - Epilepsy care and f/u appreciated  - c/w tiration of Keppra  - MRI Brain w/wo contrast  - Autoimmune w/up  - WBC elevated       # Fever   - Infection ruled out   - ID consultation done   - Ceftriaxone for now until blood cxs back with final negative report then will d/c   - Other wise no source identified for infection at this time  - Likely Central Fever   - Possible Autoimmune etiology - Dr. Hampton consulted ?dx of SLE?       # Depression - cymbalta.       # Hypertension. - Lisinopril / HCTZ         # DM (diabetes mellitus)  - insulin  - fs achs.   - Diabetic Diet  - Lantus / RI       # Hypothyroid - synthroid.      # Hypercholesteremia.    - statin.     Dispo: Acute

## 2019-07-01 NOTE — CHART NOTE - NSCHARTNOTEFT_GEN_A_CORE
Night nurse  had advised me last night  that 1:1 is being removed. This morning I received a call from RN to actually discontinue the sit (patient has now purposely pulled off her EEG leads).

## 2019-07-01 NOTE — PROGRESS NOTE ADULT - ASSESSMENT
73y Female DM, arthritis on plaquenil, ?SLE, HTN, HLD, with h/o recent right hemispheric subdural hematoma secondary to fall now presenting with confusion and left sided weakness and fever    PROBLEMS  #Fever, Tm 102.1 with Hypertension, likely central fever. CSF with WBC 25 mostly Macrophages, 8% NP, 17% lymphs. CSF gluc 399 prot 429. CSF PCR is negative    WBC 7    UA neg    CT AP neg  #Acute L hemiplegia. 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    VEEG shows well organized background with right hemispheric slowing. No epileptiform activity or electrographic seizures.  #Severe DKA elevated beta-hydroxybutarate    RECOMMENDATIONS  - D/C ACV as  HSV PCR negative  - FSG control, DKA management   - On Ceftriaxone, consider D/C if BCX NG  - Neurology following  - No bed bugs or bites seen on my exam 73y Female DM, arthritis on plaquenil, ?SLE, HTN, HLD, with h/o recent right hemispheric subdural hematoma secondary to fall now presenting with confusion and left sided weakness and fever    PROBLEMS  #Fever, Tm 102.1 with Hypertension, likely central fever. CSF with WBC 25 mostly Macrophages, 8% NP, 17% lymphs. CSF gluc 399 prot 429. CSF PCR is negative    WBC 7    UA neg    CT AP neg  #Acute L hemiplegia. 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    VEEG shows well organized background with right hemispheric slowing. No epileptiform activity or electrographic seizures.  #Severe DKA elevated beta-hydroxybutarate    Would recommend:    1. Monitor oFF ABx  2. Follow up VEEG result    -will follow up

## 2019-07-01 NOTE — PROGRESS NOTE ADULT - SUBJECTIVE AND OBJECTIVE BOX
LINDSAY ALBERT  73y  Female    Patient is a 73y old  Female who presents with a chief complaint of complaining of hyperglycemia , fever. (2019 09:52)    INTERVAL HPI/OVERNIGHT EVENTS: Pt denies any complaints she feels good and no distress. AOx3 today lying in bed connected to VEEG     Brief: Pt is here for generalized weakness and AMS initially with negative CTH. + unexplained Lt Antonio. Suspected to have bedbugs? noted by night nurse however bug not caught. ID involved - no evidence of bed bugs. Exam no evidence of bedbugs. Pt had hyperglycemia and fever? on ceftriaxone until Blood cxs report available. s/p ID eval     PAST MEDICAL & SURGICAL HISTORY:  Seizure: keppra  Herniated lumbar intervertebral disc: as per hx  Stroke: TIA , no deficits  Fall, initial encounter: as  hx  Lupus: as per hx  Hypertension: hctz  Hypercholesteremia: statin  DM (diabetes mellitus): insulin  fs achs  No significant past surgical history    Vital Signs Last 24 Hrs  T(C): 36.8 (2019 14:55), Max: 36.8 (2019 14:55)  T(F): 98.2 (2019 14:55), Max: 98.2 (2019 14:55)  HR: 86 (2019 14:55) (75 - 86)  BP: 135/74 (2019 14:55) (135/74 - 147/62)  RR: 16 (2019 14:55) (14 - 16)  SpO2: 94% (2019 06:20) (94% - 94%))    PHYSICAL EXAM:  GENERAL: NAD, well-developed  HEAD:  Atraumatic, Normocephalic  EYES: EOMI, PERRLA, conjunctiva and sclera clear  NECK: Supple, No JVD  CHEST/LUNG: Clear to auscultation bilaterally; No wheeze  HEART: Regular rate and rhythm; No murmurs, rubs, or gallops  ABDOMEN: Soft, Nontender, Nondistended; Bowel sounds present  EXTREMITIES:  2+ Peripheral Pulses, No clubbing, cyanosis, or edema  PSYCH: AAOx2  NEUROLOGY: Left Antonio   SKIN: No rashes or lesions    LABS:                          9.1    7.38  )-----------( 331      ( 2019 07:34 )             28.7       07-01    137  |  100  |  13  ----------------------------<  109<H>  3.0<L>   |  26  |  0.5<L>    Ca    8.2<L>      2019 07:34    TPro  5.8<L>  /  Alb  3.3<L>  /  TBili  0.3  /  DBili  x   /  AST  19  /  ALT  8   /  AlkPhos  83  06-30    Urinalysis Basic - ( 2019 01:45 )    Color: Yellow / Appearance: Clear / S.020 / pH: x  Gluc: x / Ketone: 80  / Bili: Negative / Urobili: 0.2   Blood: x / Protein: 30 / Nitrite: Negative   Leuk Esterase: Negative / RBC: 3-5 /HPF / WBC 1-2 /HPF   Sq Epi: x / Non Sq Epi: Few /HPF / Bacteria: Few    PROBLEM LIST:  HEALTH ISSUES - PROBLEM Dx:  Seizure: Seizure  Hypercholesteremia: Hypercholesteremia  Hypothyroid: Hypothyroid  DM (diabetes mellitus): DM (diabetes mellitus)  Hypertension: Hypertension  Depression: Depression  Fever: Fever  SDH: (subdural hematoma): SDH (subdural hematoma)  History of recent dz of SLE?? (Dr Hampton)

## 2019-07-01 NOTE — PROGRESS NOTE ADULT - SUBJECTIVE AND OBJECTIVE BOX
Neurology Follow up note    Name  LINDSAY ALBERT    HPI:  · Chief Complaint: The patient is a 73y Female complaining of hyperglycemia.	  · HPI Objective Statement: 76y F w/ PMH of DM, HTN, HLD, and CVA w/ no residual deficits presents for hyperglycemia. pt had was admitted recently due to  fall on June 19 from standing in the bathroom this am, walking to bathroom, bumped into sink and fell backwards, striking back of head on the tile floor. + HT, No LOC, +ASA 81mg,  Pt found by family standing after fall. Use cane to ambulate. Take 81mg ASA daily. (29 Jun 2019 05:26)      NEURO INTERVAL:  DISCUSSED THE CASE WITH DR BRUCE AFTER REVIEW OF VEEG. EEG IS QUITE ACTIVE WITH R PREDOMINANT SPIKES/POLYSPIKES (PLEASE SEE OFFICIAL REPORT)  THESE FINDINGS IN CONJUNCTION WITH CSF PROFILE OF PROFOUNDLY ELEVATED PROTEIN AND WBC OF 25. DESPITE ELEVATED GLUCOSE,  WOULD BE CONCERNING FOR PICTURE OF ENCEPHALITIS, POSSIBLY NON INFECTIOUS GIVEN HER HX OF SLE        Medications  acetaminophen  Suppository .. 650 milliGRAM(s) Rectal every 6 hours PRN  cefTRIAXone   IVPB 1000 milliGRAM(s) IV Intermittent every 24 hours  chlorhexidine 4% Liquid 1 Application(s) Topical <User Schedule>  dextrose 40% Gel 15 Gram(s) Oral once PRN  dextrose 5%. 1000 milliLiter(s) IV Continuous <Continuous>  dextrose 50% Injectable 12.5 Gram(s) IV Push once  dextrose 50% Injectable 25 Gram(s) IV Push once  dextrose 50% Injectable 25 Gram(s) IV Push once  DULoxetine 60 milliGRAM(s) Oral daily  folic acid 1 milliGRAM(s) Oral daily  gabapentin 400 milliGRAM(s) Oral three times a day  glucagon  Injectable 1 milliGRAM(s) IntraMuscular once PRN  hydrochlorothiazide 50 milliGRAM(s) Oral daily  hydroxychloroquine 200 milliGRAM(s) Oral two times a day  insulin glargine Injectable (LANTUS) 20 Unit(s) SubCutaneous every morning  insulin lispro (HumaLOG) corrective regimen sliding scale   SubCutaneous three times a day before meals  levETIRAcetam  IVPB 1000 milliGRAM(s) IV Intermittent every 12 hours  levothyroxine 100 MICROGram(s) Oral daily  lisinopril 20 milliGRAM(s) Oral daily  methadone    Tablet 20 milliGRAM(s) Oral every 12 hours  oxyCODONE    5 mG/acetaminophen 325 mG 1 Tablet(s) Oral every 6 hours PRN  simvastatin Oral Tab/Cap - Peds 40 milliGRAM(s) Oral at bedtime  sodium chloride 0.9%. 1000 milliLiter(s) IV Continuous <Continuous>  sodium chloride 0.9%. 1000 milliLiter(s) IV Continuous <Continuous>      Lab  06-30    136  |  99  |  15  ----------------------------<  296<H>  4.0   |  24  |  0.6<L>    Ca    8.4<L>      30 Jun 2019 06:08    TPro  5.8<L>  /  Alb  3.3<L>  /  TBili  0.3  /  DBili  x   /  AST  19  /  ALT  8   /  AlkPhos  83  06-30                          9.1    10.25 )-----------( 324      ( 30 Jun 2019 06:08 )             28.4     LIVER FUNCTIONS - ( 30 Jun 2019 06:08 )  Alb: 3.3 g/dL / Pro: 5.8 g/dL / ALK PHOS: 83 U/L / ALT: 8 U/L / AST: 19 U/L / GGT: x                   Radiology      Assessment: 72 YO FEMALE ADM WITH HYPERGLYCEMIA, FEVER. L HP ON EXAM. HX OF RECENT SDH.   VEEG REMAINS EXTREMELY ACTIVE. PATIENT WITH CSF PROFILE AS PER HPI  NEED TO COMPLETE EVALUATION FOR ENCEPHALITIS, INCLUDING NON INFECTIOUS ETIOLOGY    Plan:  INCREASE KEPPRA TO 1250 MG Q 12  MRI BRAIN WWO GADO IF NO CONTRAINDICATION  CONT VEEG MONITORING  ADD WNV STUDY TO CSF IF POSSIBLE AND NOT DONE ALREADY  CHECK SEROLOGY INCLUDING ANTI RENETTA, NMDA, VGKC AND TPO AUTOANTIBODIES, ESR/CRP  PARANEOPLASTIC PANEL  WILL FOLLOW AND PLEASE CALL WITH ANY QUESTIONS

## 2019-07-02 DIAGNOSIS — R50.9 FEVER, UNSPECIFIED: ICD-10-CM

## 2019-07-02 LAB
CRP SERPL-MCNC: 2.14 MG/DL — HIGH (ref 0–0.4)
CULTURE RESULTS: NO GROWTH — SIGNIFICANT CHANGE UP
MYELOPEROXIDASE AB SER-ACNC: <5 UNITS — SIGNIFICANT CHANGE UP
MYELOPEROXIDASE CELLS FLD QL: NEGATIVE — SIGNIFICANT CHANGE UP
SPECIMEN SOURCE: SIGNIFICANT CHANGE UP

## 2019-07-02 PROCEDURE — 99231 SBSQ HOSP IP/OBS SF/LOW 25: CPT

## 2019-07-02 PROCEDURE — 99233 SBSQ HOSP IP/OBS HIGH 50: CPT

## 2019-07-02 PROCEDURE — 95951: CPT | Mod: 26

## 2019-07-02 PROCEDURE — 70553 MRI BRAIN STEM W/O & W/DYE: CPT | Mod: 26

## 2019-07-02 RX ORDER — LEVETIRACETAM 250 MG/1
1500 TABLET, FILM COATED ORAL EVERY 12 HOURS
Refills: 0 | Status: DISCONTINUED | OUTPATIENT
Start: 2019-07-02 | End: 2019-07-05

## 2019-07-02 RX ORDER — QUETIAPINE FUMARATE 200 MG/1
12.5 TABLET, FILM COATED ORAL EVERY 24 HOURS
Refills: 0 | Status: DISCONTINUED | OUTPATIENT
Start: 2019-07-02 | End: 2019-07-02

## 2019-07-02 RX ORDER — LACOSAMIDE 50 MG/1
50 TABLET ORAL
Refills: 0 | Status: DISCONTINUED | OUTPATIENT
Start: 2019-07-02 | End: 2019-07-04

## 2019-07-02 RX ORDER — LEVETIRACETAM 250 MG/1
250 TABLET, FILM COATED ORAL ONCE
Refills: 0 | Status: COMPLETED | OUTPATIENT
Start: 2019-07-02 | End: 2019-07-02

## 2019-07-02 RX ORDER — QUETIAPINE FUMARATE 200 MG/1
12.5 TABLET, FILM COATED ORAL DAILY
Refills: 0 | Status: DISCONTINUED | OUTPATIENT
Start: 2019-07-02 | End: 2019-07-03

## 2019-07-02 RX ORDER — LACOSAMIDE 50 MG/1
50 TABLET ORAL ONCE
Refills: 0 | Status: DISCONTINUED | OUTPATIENT
Start: 2019-07-02 | End: 2019-07-02

## 2019-07-02 RX ADMIN — Medication 6: at 08:00

## 2019-07-02 RX ADMIN — Medication 100 MICROGRAM(S): at 05:35

## 2019-07-02 RX ADMIN — LISINOPRIL 20 MILLIGRAM(S): 2.5 TABLET ORAL at 05:35

## 2019-07-02 RX ADMIN — CHLORHEXIDINE GLUCONATE 1 APPLICATION(S): 213 SOLUTION TOPICAL at 05:34

## 2019-07-02 RX ADMIN — Medication 1 MILLIGRAM(S): at 10:43

## 2019-07-02 RX ADMIN — Medication 2 MILLIGRAM(S): at 16:42

## 2019-07-02 RX ADMIN — GABAPENTIN 400 MILLIGRAM(S): 400 CAPSULE ORAL at 14:17

## 2019-07-02 RX ADMIN — Medication 2: at 12:00

## 2019-07-02 RX ADMIN — SODIUM CHLORIDE 75 MILLILITER(S): 9 INJECTION INTRAMUSCULAR; INTRAVENOUS; SUBCUTANEOUS at 14:31

## 2019-07-02 RX ADMIN — LACOSAMIDE 110 MILLIGRAM(S): 50 TABLET ORAL at 20:41

## 2019-07-02 RX ADMIN — INSULIN GLARGINE 16 UNIT(S): 100 INJECTION, SOLUTION SUBCUTANEOUS at 08:00

## 2019-07-02 RX ADMIN — QUETIAPINE FUMARATE 12.5 MILLIGRAM(S): 200 TABLET, FILM COATED ORAL at 14:16

## 2019-07-02 RX ADMIN — Medication 50 MILLIGRAM(S): at 05:35

## 2019-07-02 RX ADMIN — LEVETIRACETAM 400 MILLIGRAM(S): 250 TABLET, FILM COATED ORAL at 05:34

## 2019-07-02 RX ADMIN — LACOSAMIDE 50 MILLIGRAM(S): 50 TABLET ORAL at 10:41

## 2019-07-02 RX ADMIN — Medication 1 MILLIGRAM(S): at 00:50

## 2019-07-02 RX ADMIN — LEVETIRACETAM 250 MILLIGRAM(S): 250 TABLET, FILM COATED ORAL at 10:41

## 2019-07-02 RX ADMIN — Medication 200 MILLIGRAM(S): at 05:35

## 2019-07-02 RX ADMIN — GABAPENTIN 400 MILLIGRAM(S): 400 CAPSULE ORAL at 05:34

## 2019-07-02 RX ADMIN — METHADONE HYDROCHLORIDE 20 MILLIGRAM(S): 40 TABLET ORAL at 05:39

## 2019-07-02 RX ADMIN — DULOXETINE HYDROCHLORIDE 60 MILLIGRAM(S): 30 CAPSULE, DELAYED RELEASE ORAL at 10:42

## 2019-07-02 RX ADMIN — SODIUM CHLORIDE 75 MILLILITER(S): 9 INJECTION INTRAMUSCULAR; INTRAVENOUS; SUBCUTANEOUS at 05:33

## 2019-07-02 RX ADMIN — LEVETIRACETAM 400 MILLIGRAM(S): 250 TABLET, FILM COATED ORAL at 21:41

## 2019-07-02 NOTE — CONSULT NOTE ADULT - ASSESSMENT
74 yo female with hx as above with recent subdural hematoma s/p fall complicated with fevers, Shayan's paralysis/ seizure activity - now on EEG monitoring. Vague hx of SLE not on meds for years. MIR/ dsDNA both pending already. Please call us with results. Also, please speak to Dr. Hampton to confirm history.

## 2019-07-02 NOTE — PROGRESS NOTE ADULT - SUBJECTIVE AND OBJECTIVE BOX
Epilepsy Attending Note:     LINDSAY ALBERT    73y Female  MRN [id number]    Vital Signs Last 24 Hrs  T(C): 35.9 (02 Jul 2019 05:11), Max: 37.6 (01 Jul 2019 21:00)  T(F): 96.7 (02 Jul 2019 05:11), Max: 99.6 (01 Jul 2019 21:00)  HR: 69 (02 Jul 2019 05:11) (69 - 86)  BP: 143/62 (02 Jul 2019 05:11) (135/74 - 143/65)  BP(mean): --  RR: 16 (02 Jul 2019 05:11) (16 - 16)  SpO2: --                          9.1    7.38  )-----------( 331      ( 01 Jul 2019 07:34 )             28.7       07-01    137  |  100  |  13  ----------------------------<  109<H>  3.0<L>   |  26  |  0.5<L>    Ca    8.2<L>      01 Jul 2019 07:34        MEDICATIONS  (STANDING):  chlorhexidine 4% Liquid 1 Application(s) Topical <User Schedule>  dextrose 5%. 1000 milliLiter(s) (50 mL/Hr) IV Continuous <Continuous>  dextrose 50% Injectable 12.5 Gram(s) IV Push once  dextrose 50% Injectable 25 Gram(s) IV Push once  dextrose 50% Injectable 25 Gram(s) IV Push once  DULoxetine 60 milliGRAM(s) Oral daily  folic acid 1 milliGRAM(s) Oral daily  gabapentin 400 milliGRAM(s) Oral three times a day  hydrochlorothiazide 50 milliGRAM(s) Oral daily  hydroxychloroquine 200 milliGRAM(s) Oral two times a day  insulin glargine Injectable (LANTUS) 16 Unit(s) SubCutaneous every morning  insulin lispro (HumaLOG) corrective regimen sliding scale   SubCutaneous three times a day before meals  levETIRAcetam  IVPB 1250 milliGRAM(s) IV Intermittent every 12 hours  levothyroxine 100 MICROGram(s) Oral daily  lisinopril 20 milliGRAM(s) Oral daily  methadone    Tablet 20 milliGRAM(s) Oral every 12 hours  simvastatin Oral Tab/Cap - Peds 40 milliGRAM(s) Oral at bedtime  sodium chloride 0.9%. 1000 milliLiter(s) (75 mL/Hr) IV Continuous <Continuous>  sodium chloride 0.9%. 1000 milliLiter(s) (75 mL/Hr) IV Continuous <Continuous>    MEDICATIONS  (PRN):  acetaminophen  Suppository .. 650 milliGRAM(s) Rectal every 6 hours PRN Temp greater or equal to 38C (100.4F), Mild Pain (1 - 3)  dextrose 40% Gel 15 Gram(s) Oral once PRN Blood Glucose LESS THAN 70 milliGRAM(s)/deciliter  glucagon  Injectable 1 milliGRAM(s) IntraMuscular once PRN Glucose LESS THAN 70 milligrams/deciliter  oxyCODONE    5 mG/acetaminophen 325 mG 1 Tablet(s) Oral every 6 hours PRN Severe Pain (7 - 10)            VEEG in the last 24 hours: patient is OX 3 follows commands . however at times appear slightly angry  and not very rational(Wet but refusing to change ...)    Background--------7-8 hz    Focal and generalized slowing---mild generalized slowing                                                  mild to moderate right hemispheric focal slowing over the anterior quadrants at times expressed more diffusely over the left hemisphere    Interictal activity-----frequent right anterior quadrants spikes and sharps occasionally i psuedo-periodic pattern    Events---- electrographycal events presented as runs of right or bifrontal rhythmic sharply contoured theta admixed with spikes. This pattern is occasionally  followed by attenuation of the BG    Seizures--as above    Impression:-- abnormal as above    Plan - will DC when ready for MRI           suggest 12.5 mg Seroquel before going for MRI            PRN ativan if agitated for MRI

## 2019-07-02 NOTE — PROGRESS NOTE ADULT - SUBJECTIVE AND OBJECTIVE BOX
Subjective:    Patient is a 73y old  Female who presents with a chief complaint of complaining of hyperglycemia , fever. (02 Jul 2019 09:31). No acute events overnight. Pt is resting comfortably.    HPI:  · Chief Complaint: The patient is a 73y Female complaining of hyperglycemia.	  · HPI Objective Statement: 76y F w/ PMH of DM, HTN, HLD, and CVA w/ no residual deficits presents for hyperglycemia. pt had was admitted recently due to  fall on June 19 from standing in the bathroom this am, walking to bathroom, bumped into sink and fell backwards, striking back of head on the tile floor. + HT, No LOC, +ASA 81mg,  Pt found by family standing after fall. Use cane to ambulate. Take 81mg ASA daily. (29 Jun 2019 05:26)    PAST MEDICAL & SURGICAL HISTORY:  Seizure: keppra  Herniated lumbar intervertebral disc: as per hx  Stroke: TIA 2002, no deficits  Fall, initial encounter: as  hx  Lupus: as per hx  Hypertension: hctz  Hypercholesteremia: statin  DM (diabetes mellitus): insulin  fs achs  No significant past surgical history    MEDICATIONS  (STANDING):  chlorhexidine 4% Liquid 1 Application(s) Topical <User Schedule>  dextrose 5%. 1000 milliLiter(s) (50 mL/Hr) IV Continuous <Continuous>  dextrose 50% Injectable 12.5 Gram(s) IV Push once  dextrose 50% Injectable 25 Gram(s) IV Push once  dextrose 50% Injectable 25 Gram(s) IV Push once  DULoxetine 60 milliGRAM(s) Oral daily  folic acid 1 milliGRAM(s) Oral daily  gabapentin 400 milliGRAM(s) Oral three times a day  hydrochlorothiazide 50 milliGRAM(s) Oral daily  hydroxychloroquine 200 milliGRAM(s) Oral two times a day  insulin glargine Injectable (LANTUS) 16 Unit(s) SubCutaneous every morning  insulin lispro (HumaLOG) corrective regimen sliding scale   SubCutaneous three times a day before meals  lacosamide 50 milliGRAM(s) Oral two times a day  levETIRAcetam  IVPB 1500 milliGRAM(s) IV Intermittent every 12 hours  levothyroxine 100 MICROGram(s) Oral daily  lisinopril 20 milliGRAM(s) Oral daily  methadone    Tablet 20 milliGRAM(s) Oral every 12 hours  QUEtiapine 12.5 milliGRAM(s) Oral daily  simvastatin Oral Tab/Cap - Peds 40 milliGRAM(s) Oral at bedtime  sodium chloride 0.9%. 1000 milliLiter(s) (75 mL/Hr) IV Continuous <Continuous>  sodium chloride 0.9%. 1000 milliLiter(s) (75 mL/Hr) IV Continuous <Continuous>    MEDICATIONS  (PRN):  acetaminophen  Suppository .. 650 milliGRAM(s) Rectal every 6 hours PRN Temp greater or equal to 38C (100.4F), Mild Pain (1 - 3)  dextrose 40% Gel 15 Gram(s) Oral once PRN Blood Glucose LESS THAN 70 milliGRAM(s)/deciliter  glucagon  Injectable 1 milliGRAM(s) IntraMuscular once PRN Glucose LESS THAN 70 milligrams/deciliter  LORazepam   Injectable 2 milliGRAM(s) IV Push once PRN agitation during MRI  oxyCODONE    5 mG/acetaminophen 325 mG 1 Tablet(s) Oral every 6 hours PRN Severe Pain (7 - 10)    Objective:     Vital Signs Last 24 Hrs  T(C): 37.1 (02 Jul 2019 14:06), Max: 37.6 (01 Jul 2019 21:00)  T(F): 98.8 (02 Jul 2019 14:06), Max: 99.6 (01 Jul 2019 21:00)  HR: 89 (02 Jul 2019 14:06) (69 - 89)  BP: 129/59 (02 Jul 2019 14:06) (129/59 - 143/65)  RR: 16 (02 Jul 2019 14:06) (16 - 16)                        9.1    7.38  )-----------( 331      ( 01 Jul 2019 07:34 )             28.7       07-01    137  |  100  |  13  ----------------------------<  109<H>  3.0<L>   |  26  |  0.5<L>    Ca    8.2<L>      01 Jul 2019 07:34    Assessment and Plan:    73F PMH SDH (subdural hematoma).    - HX of recent fall with SDH   - Neurology consulted     # Left Hemiparesis   - Shayan's Paralysis (improved)  - From Rt Hemispheric Seizures - correlation   - Now resolved    # Seizure Disorder (acute dx)  - Epilepsy care and f/u appreciated  - c/w tiration of Keppra  - MRI Brain w/wo contrast ordered - f/u report       # Fever   - Infection ruled out, Blood Cx Neg to date  - Abx D/C  - ID consultation done   - Other wise no source identified for infection at this time  - Likely Central Fever?? Infection ruled out   - Possible Autoimmune etiology - Dr. Hampton consulted ?dx of SLE?  - Autoimmune w/up - sent f/u results   - Rhematology on board pls let them know about w/up results and MIR/dsDNA      # Depression - cymbalta.       # Hypertension. - Lisinopril / HCTZ         # DM (diabetes mellitus)  - insulin  - fs achs.   - Diabetic Diet  - Lantus / RI       # Hypothyroid - synthroid.      # Hypercholesteremia.    - statin.    #Progress Note Handoff  Pending  Consults: Rheumatology and Neurology final dispos   Tests: routine   Test Results: All autoimmune w/up sent / MRI brain w/wo contrast   Family Discussion: Case d/w family Robin son   Future Disposition: SNF

## 2019-07-02 NOTE — PROGRESS NOTE ADULT - SUBJECTIVE AND OBJECTIVE BOX
Subjective:    Patient is a 73y old  Female who presents with a chief complaint of complaining of hyperglycemia , fever. (02 Jul 2019 09:31). No acute events overnight. Pt is resting comfortably.      HPI:  · Chief Complaint: The patient is a 73y Female complaining of hyperglycemia.	  · HPI Objective Statement: 76y F w/ PMH of DM, HTN, HLD, and CVA w/ no residual deficits presents for hyperglycemia. pt had was admitted recently due to  fall on June 19 from standing in the bathroom this am, walking to bathroom, bumped into sink and fell backwards, striking back of head on the tile floor. + HT, No LOC, +ASA 81mg,  Pt found by family standing after fall. Use cane to ambulate. Take 81mg ASA daily. (29 Jun 2019 05:26)      PAST MEDICAL & SURGICAL HISTORY:  Seizure: keppra  Herniated lumbar intervertebral disc: as per hx  Stroke: TIA 2002, no deficits  Fall, initial encounter: as  hx  Lupus: as per hx  Hypertension: hctz  Hypercholesteremia: statin  DM (diabetes mellitus): insulin  fs achs  No significant past surgical history      Allergies    No Known Allergies    Intolerances        MEDICATIONS  (STANDING):  chlorhexidine 4% Liquid 1 Application(s) Topical <User Schedule>  dextrose 5%. 1000 milliLiter(s) (50 mL/Hr) IV Continuous <Continuous>  dextrose 50% Injectable 12.5 Gram(s) IV Push once  dextrose 50% Injectable 25 Gram(s) IV Push once  dextrose 50% Injectable 25 Gram(s) IV Push once  DULoxetine 60 milliGRAM(s) Oral daily  folic acid 1 milliGRAM(s) Oral daily  gabapentin 400 milliGRAM(s) Oral three times a day  hydrochlorothiazide 50 milliGRAM(s) Oral daily  hydroxychloroquine 200 milliGRAM(s) Oral two times a day  insulin glargine Injectable (LANTUS) 16 Unit(s) SubCutaneous every morning  insulin lispro (HumaLOG) corrective regimen sliding scale   SubCutaneous three times a day before meals  lacosamide 50 milliGRAM(s) Oral two times a day  levETIRAcetam  IVPB 1500 milliGRAM(s) IV Intermittent every 12 hours  levothyroxine 100 MICROGram(s) Oral daily  lisinopril 20 milliGRAM(s) Oral daily  methadone    Tablet 20 milliGRAM(s) Oral every 12 hours  QUEtiapine 12.5 milliGRAM(s) Oral daily  simvastatin Oral Tab/Cap - Peds 40 milliGRAM(s) Oral at bedtime  sodium chloride 0.9%. 1000 milliLiter(s) (75 mL/Hr) IV Continuous <Continuous>  sodium chloride 0.9%. 1000 milliLiter(s) (75 mL/Hr) IV Continuous <Continuous>    MEDICATIONS  (PRN):  acetaminophen  Suppository .. 650 milliGRAM(s) Rectal every 6 hours PRN Temp greater or equal to 38C (100.4F), Mild Pain (1 - 3)  dextrose 40% Gel 15 Gram(s) Oral once PRN Blood Glucose LESS THAN 70 milliGRAM(s)/deciliter  glucagon  Injectable 1 milliGRAM(s) IntraMuscular once PRN Glucose LESS THAN 70 milligrams/deciliter  LORazepam   Injectable 2 milliGRAM(s) IV Push once PRN agitation during MRI  oxyCODONE    5 mG/acetaminophen 325 mG 1 Tablet(s) Oral every 6 hours PRN Severe Pain (7 - 10)        Objective:     Vital Signs Last 24 Hrs  T(C): 37.1 (02 Jul 2019 14:06), Max: 37.6 (01 Jul 2019 21:00)  T(F): 98.8 (02 Jul 2019 14:06), Max: 99.6 (01 Jul 2019 21:00)  HR: 89 (02 Jul 2019 14:06) (69 - 89)  BP: 129/59 (02 Jul 2019 14:06) (129/59 - 143/65)  BP(mean): --  RR: 16 (02 Jul 2019 14:06) (16 - 16)  SpO2: --    Physical Exam:  Constitutional:   HEENT:  Heart:  Lungs:  GI:  Extremities:  Neuro:                          9.1    7.38  )-----------( 331      ( 01 Jul 2019 07:34 )             28.7       07-01    137  |  100  |  13  ----------------------------<  109<H>  3.0<L>   |  26  |  0.5<L>    Ca    8.2<L>      01 Jul 2019 07:34              Assessment and Plan:    73F PMH SDH (subdural hematoma).    - HX of recent fall with SDH   - Neurology consulted     # Left Hemiparesis   - Shayan's Paralysis (improved)  - From Rt Hemispheric Seizures - correlation   - Now resolved    # Seizure Disorder (acute dx)  - F/U VEEG  - Epilepsy care and f/u appreciated  - c/w tiration of Keppra  - MRI Brain w/wo contrast  - Autoimmune w/up  - WBC elevated     # Fever   - Infection ruled out, Blood Cx Neg to date  - Abx D/C  - ID consultation done   - Other wise no source identified for infection at this time  - Likely Central Fever   - Possible Autoimmune etiology - Dr. Hampton consulted ?dx of SLE?       # Depression - cymbalta.       # Hypertension. - Lisinopril / HCTZ         # DM (diabetes mellitus)  - insulin  - fs achs.   - Diabetic Diet  - Lantus / RI       # Hypothyroid - synthroid.      # Hypercholesteremia.    - statin.

## 2019-07-02 NOTE — PROGRESS NOTE ADULT - SUBJECTIVE AND OBJECTIVE BOX
LINDSAY ALBERT  73y, Female  Allergy: No Known Allergies      CHIEF COMPLAINT: complaining of hyperglycemia , fever. (01 Jul 2019 22:57)      INTERVAL EVENTS/HPI  - No acute events overnight  - T(F): , Max: 99.6 (07-01-19 @ 21:00)  - Denies any worsening symptoms  - Tolerating medication    ROS  10 system review - neg     SOCIAL HISTORY - not relevant     Substance Use (  ) never used  (  ) IVDU (  ) Other:  Tobacco Usage:  (   ) never smoked   (   ) former smoker   (   ) current smoker   Alcohol Usage: (   ) social  (   ) daily use (   ) denies  Sexual History:       FH noncontributory     VITALS:  T(F): 96.7, Max: 99.6 (07-01-19 @ 21:00)  HR: 69  BP: 143/62  RR: 16Vital Signs Last 24 Hrs  T(C): 35.9 (02 Jul 2019 05:11), Max: 37.6 (01 Jul 2019 21:00)  T(F): 96.7 (02 Jul 2019 05:11), Max: 99.6 (01 Jul 2019 21:00)  HR: 69 (02 Jul 2019 05:11) (69 - 86)  BP: 143/62 (02 Jul 2019 05:11) (135/74 - 143/65)  BP(mean): --  RR: 16 (02 Jul 2019 05:11) (16 - 16)  SpO2: --    PHYSICAL EXAM:  Gen: NAD, resting in bed  HEENT: Normocephalic, atraumatic  Neck: supple, no lymphadenopathy  CV: s1 s2 +   Chest: clear   Abdomen: Soft, BS present  Ext: Warm, well perfused  Neuro: non focal, awake  Skin: no rash      TESTS & MEASUREMENTS:                        9.1    7.38  )-----------( 331      ( 01 Jul 2019 07:34 )             28.7     07-01    137  |  100  |  13  ----------------------------<  109<H>  3.0<L>   |  26  |  0.5<L>    Ca    8.2<L>      01 Jul 2019 07:34      eGFR if Non African American: 97 mL/min/1.73M2 (07-01-19 @ 07:34)  eGFR if : 112 mL/min/1.73M2 (07-01-19 @ 07:34)          Culture - Fungal, CSF (collected 06-29-19 @ 05:00)  Source: .CSF CSF  Preliminary Report (07-01-19 @ 11:05):    Testing in progress    Culture - CSF with Gram Stain (collected 06-29-19 @ 05:00)  Source: .CSF CSF  Gram Stain (06-29-19 @ 22:40):    polymorphonuclear leukocytes seen per low power field    No organisms seen by cytocentrifuge  Preliminary Report (06-30-19 @ 14:46):    No growth    Culture - Blood (collected 06-29-19 @ 02:00)  Source: .Blood Blood  Preliminary Report (06-30-19 @ 21:01):    No growth to date.    Culture - Blood (collected 06-29-19 @ 02:00)  Source: .Blood Blood  Preliminary Report (06-30-19 @ 21:01):    No growth to date.        Blood Gas Venous - Lactate: 1.5 mmoL/L (06-29-19 @ 00:55)  Lactate, Blood: 1.6 mmol/L (06-29-19 @ 00:20)      INFECTIOUS DISEASES TESTING  Hepatitis C Virus Interpretation: Nonreact (06-19-19 @ 23:46)      RADIOLOGY & ADDITIONAL TESTS:      CARDIOLOGY TESTING  12 Lead ECG:   Ventricular Rate 98 BPM    Atrial Rate 98 BPM    P-R Interval 132 ms    QRS Duration 68 ms    Q-T Interval 358 ms    QTC Calculation(Bezet) 457 ms    P Axis 29 degrees    R Axis -41 degrees    T Axis -26 degrees    Diagnosis Line Normal sinus rhythm  Left axis deviation  Incomplete right bundle branch block  Confirmed by LUAN YORK MD ([id number]) on 6/29/2019 9:16:34 AM (06-29-19 @ 02:29)  Transthoracic Echocardiogram:    EXAM:  2-D ECHO (TTE) COMPLETE        PROCEDURE DATE:  06/20/2019      INTERPRETATION:  REPORT:    TRANSTHORACIC ECHOCARDIOGRAM REPORT         Patient Name:   LINDSAY ALBERT Accession #: [id number]  Medical Rec #:  [id number]           Height: 60.0 in 152.4 cm  YOB: 1945          Weight:      145.0 lb 65.77 kg  Patient Age:    73 years           BSA:         1.63 m²  Patient Gender: F                  BP:          171/71 mmHg       Date of Exam:        6/20/2019 9:14:29 AM  Referring Physician: VANGIE MENJIVARELLO  Sonographer:         Yasmine Madden  Reading Physician:   Mamadou Patel M.D.    Procedure:   2D Echo/Doppler/Color Doppler Complete.  Indications: R55 - Syncope and Collapse  Diagnosis:   Syncope and collapse - R55         Summary:   1. Left ventricular ejection fraction, by visual estimation, is 55 to   60%.   2. Spectral Doppler shows impaired relaxation pattern of left   ventricular myocardial filling (Grade I diastolic dysfunction).   3. Mitral annularcalcification.   4. Moderate mitral valve regurgitation.   5. Thickening and calcification of the anterior and posterior mitral   valve leaflets.   6. Mild-moderate tricuspid regurgitation.   7. Mild aortic regurgitation.    PHYSICIAN INTERPRETATION:  Left Ventricle: The left ventricular internal cavity size is normal. Left   ventricular wall thickness is normal. Left ventricular ejection fraction,   by visual estimation, is 55 to 60%. Spectral Doppler shows impaired   relaxation pattern of left ventricular myocardial filling (Grade I   diastolic dysfunction).  Right Ventricle: Normal right ventricular size and function.  Left Atrium: Left atrial enlargement.  Right Atrium: Normal right atrial size.  Pericardium: There is no evidence of pericardial effusion.  Mitral Valve: The mitral valve is normal in structure. Thickening and   calcification of the anterior and posterior mitral valve leaflets. There   is mitral annular calcification. No evidence of mitral stenosis. Moderate   mitral valve regurgitation is seen.  Tricuspid Valve: The tricuspid valve is normal in structure.   Mild-moderate tricuspid regurgitation is visualized.  Aortic Valve: The aortic valve is trileaflet. No evidence of aortic   stenosis. Aortic valve thickening with normal leaflet opening. Mild   aortic valve regurgitation is seen.  Pulmonic Valve: The pulmonic valve is normal. Trace pulmonic valve   regurgitation.  Aorta: The aortic root and ascending aorta are structurally normal, with   no evidence of dilitation.  Pulmonary Artery: The main pulmonary artery is normal in size.       2D AND M-MODE MEASUREMENTS (normal ranges within parentheses):  Left                  Normal   Aorta/Left             Normal  Ventricle:                     Atrium:  IVSd (2D):  1.31 cm  (0.7-1.1) AoV Cusp       1.32  (1.5-2.6)  LVPWd       1.17 cm  (0.7-1.1) Separation:     cm  (2D):                          Left Atrium    4.51  (1.9-4.0)  LVIDd       4.95 cm  (3.4-5.7) (Mmode):        cm  (2D):                          LA Volume      49.4  LVIDs       3.21 cm            Index         ml/m²  (2D):                          Right  LV FS       35.2 %    (>25%)   Ventricle:  (2D):                          RVd (2D):      2.39 cm  IVSd        0.94 cm  (0.7-1.1)  (Mmode):  LVPWd       1.20 cm  (0.7-1.1)  (Mmode):  LVIDd       4.25 cm  (3.4-5.7)  (Mmode):  LVIDs       2.14 cm  (Mmode):  LV FS       49.6 %    (>25%)  (Mmode):  Relative     0.47     (<0.42)  Wall  Thickness  Rel. Wall    0.57     (<0.42)  Thickness  Mm  LV Mass    94.3 g/m²  Index:  Mmode    SPECTRAL DOPPLER ANALYSIS:  LV DIASTOLIC FUNCTION:  MV Peak E: 1.03 m/s Decel Time: 311 msec  MV Peak A: 1.27 m/s  E/A Ratio: 0.81    Aortic Valve:  AoV VMax:    1.75 m/s  AoV Area, Vmax: 1.93 cm² Vmax Indx: 1.19 cm²/m²  AoV Pk Grad: 12.3 mmHg    LVOT Vmax: 1.03 m/s  LVOT VTI:  0.22 m  LVOT Diam: 2.05 cm    Aortic Insufficiency:  AI Half-time:  581 msec  AI Decel Rate: 2.14 m/s²    Mitral Valve:  MV P1/2 Time: 90.31 msec  MV Area, PHT: 2.44 cm²    Mitral Insufficiency by PISA:  MR Volume: 66.49 ml MR Flow Rate: 182.69 ml/s MR EROA: 32.81 mm²    Tricuspid Valve and PA/RV Systolic Pressure: TR Max Velocity: 2.33 m/s RA   Pressure: 5 mmHg RVSP/PASP: 26.7 mmHg       [id number] Mamadou Patel M.D., Electronically signed on 6/20/2019 at 7:30:13 PM              *** Final ***                    MAMADOU PATEL MD  This document has been electronically signed. Jun 20 2019  9:14AM             (06-20-19 @ 09:14)      MEDICATIONS  chlorhexidine 4% Liquid 1  dextrose 5%. 1000  dextrose 50% Injectable 12.5  dextrose 50% Injectable 25  dextrose 50% Injectable 25  DULoxetine 60  folic acid 1  gabapentin 400  hydrochlorothiazide 50  hydroxychloroquine 200  insulin glargine Injectable (LANTUS) 16  insulin lispro (HumaLOG) corrective regimen sliding scale   levETIRAcetam  IVPB 1250  levothyroxine 100  lisinopril 20  methadone    Tablet 20  simvastatin Oral Tab/Cap - Peds 40  sodium chloride 0.9%. 1000  sodium chloride 0.9%. 1000      ANTIBIOTICS:  hydroxychloroquine 200 milliGRAM(s) Oral two times a day

## 2019-07-02 NOTE — PROGRESS NOTE ADULT - SUBJECTIVE AND OBJECTIVE BOX
Neurology Follow up note    Name  LINDSAY ALBERT    HPI:  · Chief Complaint: The patient is a 73y Female complaining of hyperglycemia.	  · HPI Objective Statement: 76y F w/ PMH of DM, HTN, HLD, and CVA w/ no residual deficits presents for hyperglycemia. pt had was admitted recently due to  fall on June 19 from standing in the bathroom this am, walking to bathroom, bumped into sink and fell backwards, striking back of head on the tile floor. + HT, No LOC, +ASA 81mg,  Pt found by family standing after fall. Use cane to ambulate. Take 81mg ASA daily. (29 Jun 2019 05:26)      Interval History:    patient is stable  afebrile  remains intermittently confused  veeg with significant  r sided epileptiform focus , see VEEG report    Vital Signs Last 24 Hrs  T(C): 37.1 (02 Jul 2019 14:06), Max: 37.6 (01 Jul 2019 21:00)  T(F): 98.8 (02 Jul 2019 14:06), Max: 99.6 (01 Jul 2019 21:00)  HR: 89 (02 Jul 2019 16:40) (69 - 89)  BP: 154/74 (02 Jul 2019 16:40) (129/59 - 154/74)  BP(mean): --  RR: 20 (02 Jul 2019 16:40) (16 - 20)  SpO2: --    Neurological Exam:   Mental status: Awake, alert and oriented x3.  decreased speech output follows all commands  Cranial nerves: Pupils equally round and reactive to light, visual fields full, no nystagmus, extraocular muscles intact, V1 through V3 intact bilaterally and symmetric, face symmetric, hearing intact to finger rub, palate elevation symmetric, tongue was midline.  Motor:  mild L hp    Sensation: Intact to light touch, proprioception, and pinprick.   Coordination: No dysmetria on finger-to-nose and heel-to-shin.  No dysdiadokinesia.  Reflexes: 2+ in bilateral UE/LE, downgoing toes bilaterally. (-) Benitez.    Medications  acetaminophen  Suppository .. 650 milliGRAM(s) Rectal every 6 hours PRN  chlorhexidine 4% Liquid 1 Application(s) Topical <User Schedule>  dextrose 40% Gel 15 Gram(s) Oral once PRN  dextrose 5%. 1000 milliLiter(s) IV Continuous <Continuous>  dextrose 50% Injectable 12.5 Gram(s) IV Push once  dextrose 50% Injectable 25 Gram(s) IV Push once  dextrose 50% Injectable 25 Gram(s) IV Push once  DULoxetine 60 milliGRAM(s) Oral daily  folic acid 1 milliGRAM(s) Oral daily  gabapentin 400 milliGRAM(s) Oral three times a day  glucagon  Injectable 1 milliGRAM(s) IntraMuscular once PRN  hydrochlorothiazide 50 milliGRAM(s) Oral daily  hydroxychloroquine 200 milliGRAM(s) Oral two times a day  insulin glargine Injectable (LANTUS) 16 Unit(s) SubCutaneous every morning  insulin lispro (HumaLOG) corrective regimen sliding scale   SubCutaneous three times a day before meals  lacosamide 50 milliGRAM(s) Oral two times a day  levETIRAcetam  IVPB 1500 milliGRAM(s) IV Intermittent every 12 hours  levothyroxine 100 MICROGram(s) Oral daily  lisinopril 20 milliGRAM(s) Oral daily  methadone    Tablet 20 milliGRAM(s) Oral every 12 hours  oxyCODONE    5 mG/acetaminophen 325 mG 1 Tablet(s) Oral every 6 hours PRN  QUEtiapine 12.5 milliGRAM(s) Oral daily  simvastatin Oral Tab/Cap - Peds 40 milliGRAM(s) Oral at bedtime  sodium chloride 0.9%. 1000 milliLiter(s) IV Continuous <Continuous>  sodium chloride 0.9%. 1000 milliLiter(s) IV Continuous <Continuous>      Lab  07-01    137  |  100  |  13  ----------------------------<  109<H>  3.0<L>   |  26  |  0.5<L>    Ca    8.2<L>      01 Jul 2019 07:34                            9.1    7.38  )-----------( 331      ( 01 Jul 2019 07:34 )             28.7               Radiology      Assessment: 72 yo female with acute AMS secondary to sz of unclear etiology  evaluating patient for encephalitis/cerebritis based on csf findings    Plan:  mri wwo gado pending  remainder of recs as per my pervious note  will follow Neurology Follow up note    Name  LINDSAY ALBERT    HPI:  · Chief Complaint: The patient is a 73y Female complaining of hyperglycemia.	  · HPI Objective Statement: 76y F w/ PMH of DM, HTN, HLD, and CVA w/ no residual deficits presents for hyperglycemia. pt had was admitted recently due to  fall on June 19 from standing in the bathroom this am, walking to bathroom, bumped into sink and fell backwards, striking back of head on the tile floor. + HT, No LOC, +ASA 81mg,  Pt found by family standing after fall. Use cane to ambulate. Take 81mg ASA daily. (29 Jun 2019 05:26)      Interval History:    patient is stable  afebrile  remains intermittently confused  veeg with significant  r sided epileptiform focus , see VEEG report    Vital Signs Last 24 Hrs  T(C): 37.1 (02 Jul 2019 14:06), Max: 37.6 (01 Jul 2019 21:00)  T(F): 98.8 (02 Jul 2019 14:06), Max: 99.6 (01 Jul 2019 21:00)  HR: 89 (02 Jul 2019 16:40) (69 - 89)  BP: 154/74 (02 Jul 2019 16:40) (129/59 - 154/74)  BP(mean): --  RR: 20 (02 Jul 2019 16:40) (16 - 20)  SpO2: --    Neurological Exam:   Mental status: Awake, alert and oriented x3.  decreased speech output follows all commands  Cranial nerves: Pupils equally round and reactive to light, visual fields full, no nystagmus, extraocular muscles intact, V1 through V3 intact bilaterally and symmetric, face symmetric, hearing intact to finger rub, palate elevation symmetric, tongue was midline.  Motor:  mild L hp    Sensation: Intact to light touch, proprioception, and pinprick.   Coordination: No dysmetria on finger-to-nose and heel-to-shin.  No dysdiadokinesia.  Reflexes: 2+ in bilateral UE/LE, downgoing toes bilaterally. (-) Benitez.    Medications  acetaminophen  Suppository .. 650 milliGRAM(s) Rectal every 6 hours PRN  chlorhexidine 4% Liquid 1 Application(s) Topical <User Schedule>  dextrose 40% Gel 15 Gram(s) Oral once PRN  dextrose 5%. 1000 milliLiter(s) IV Continuous <Continuous>  dextrose 50% Injectable 12.5 Gram(s) IV Push once  dextrose 50% Injectable 25 Gram(s) IV Push once  dextrose 50% Injectable 25 Gram(s) IV Push once  DULoxetine 60 milliGRAM(s) Oral daily  folic acid 1 milliGRAM(s) Oral daily  gabapentin 400 milliGRAM(s) Oral three times a day  glucagon  Injectable 1 milliGRAM(s) IntraMuscular once PRN  hydrochlorothiazide 50 milliGRAM(s) Oral daily  hydroxychloroquine 200 milliGRAM(s) Oral two times a day  insulin glargine Injectable (LANTUS) 16 Unit(s) SubCutaneous every morning  insulin lispro (HumaLOG) corrective regimen sliding scale   SubCutaneous three times a day before meals  lacosamide 50 milliGRAM(s) Oral two times a day  levETIRAcetam  IVPB 1500 milliGRAM(s) IV Intermittent every 12 hours  levothyroxine 100 MICROGram(s) Oral daily  lisinopril 20 milliGRAM(s) Oral daily  methadone    Tablet 20 milliGRAM(s) Oral every 12 hours  oxyCODONE    5 mG/acetaminophen 325 mG 1 Tablet(s) Oral every 6 hours PRN  QUEtiapine 12.5 milliGRAM(s) Oral daily  simvastatin Oral Tab/Cap - Peds 40 milliGRAM(s) Oral at bedtime  sodium chloride 0.9%. 1000 milliLiter(s) IV Continuous <Continuous>  sodium chloride 0.9%. 1000 milliLiter(s) IV Continuous <Continuous>      Lab  07-01    137  |  100  |  13  ----------------------------<  109<H>  3.0<L>   |  26  |  0.5<L>    Ca    8.2<L>      01 Jul 2019 07:34                            9.1    7.38  )-----------( 331      ( 01 Jul 2019 07:34 )             28.7               Radiology      Assessment: 72 yo female with acute AMS secondary to sz of unclear etiology  evaluating patient for encephalitis/cerebritis based on csf findings    Plan:  mri wwo gado pending  cont keppra and vimpat at current dosage  maintain mg >2  remainder of recs as per my pervious note  will follow

## 2019-07-03 LAB
ANA PAT FLD IF-IMP: ABNORMAL
ANA TITR SER: ABNORMAL
GLUCOSE BLDC GLUCOMTR-MCNC: 136 MG/DL — HIGH (ref 70–99)
GLUCOSE BLDC GLUCOMTR-MCNC: 80 MG/DL — SIGNIFICANT CHANGE UP (ref 70–99)
STREP DNASE B TITR SER: < 95 U/ML — SIGNIFICANT CHANGE UP
THYROPEROXIDASE AB SERPL-ACNC: 728 IU/ML — HIGH (ref 0–34.9)

## 2019-07-03 PROCEDURE — 99233 SBSQ HOSP IP/OBS HIGH 50: CPT

## 2019-07-03 PROCEDURE — 99232 SBSQ HOSP IP/OBS MODERATE 35: CPT

## 2019-07-03 RX ADMIN — SODIUM CHLORIDE 75 MILLILITER(S): 9 INJECTION INTRAMUSCULAR; INTRAVENOUS; SUBCUTANEOUS at 05:43

## 2019-07-03 RX ADMIN — Medication 4: at 12:29

## 2019-07-03 RX ADMIN — LEVETIRACETAM 400 MILLIGRAM(S): 250 TABLET, FILM COATED ORAL at 21:54

## 2019-07-03 RX ADMIN — LACOSAMIDE 50 MILLIGRAM(S): 50 TABLET ORAL at 05:46

## 2019-07-03 RX ADMIN — Medication 100 MICROGRAM(S): at 05:44

## 2019-07-03 RX ADMIN — INSULIN GLARGINE 16 UNIT(S): 100 INJECTION, SOLUTION SUBCUTANEOUS at 12:30

## 2019-07-03 RX ADMIN — Medication 1 MILLIGRAM(S): at 12:30

## 2019-07-03 RX ADMIN — SIMVASTATIN 40 MILLIGRAM(S): 20 TABLET, FILM COATED ORAL at 21:54

## 2019-07-03 RX ADMIN — DULOXETINE HYDROCHLORIDE 60 MILLIGRAM(S): 30 CAPSULE, DELAYED RELEASE ORAL at 12:28

## 2019-07-03 RX ADMIN — Medication 50 MILLIGRAM(S): at 05:44

## 2019-07-03 RX ADMIN — GABAPENTIN 400 MILLIGRAM(S): 400 CAPSULE ORAL at 05:44

## 2019-07-03 RX ADMIN — CHLORHEXIDINE GLUCONATE 1 APPLICATION(S): 213 SOLUTION TOPICAL at 12:26

## 2019-07-03 RX ADMIN — METHADONE HYDROCHLORIDE 20 MILLIGRAM(S): 40 TABLET ORAL at 18:01

## 2019-07-03 RX ADMIN — LISINOPRIL 20 MILLIGRAM(S): 2.5 TABLET ORAL at 05:44

## 2019-07-03 RX ADMIN — GABAPENTIN 400 MILLIGRAM(S): 400 CAPSULE ORAL at 21:54

## 2019-07-03 RX ADMIN — LACOSAMIDE 50 MILLIGRAM(S): 50 TABLET ORAL at 18:01

## 2019-07-03 RX ADMIN — LEVETIRACETAM 400 MILLIGRAM(S): 250 TABLET, FILM COATED ORAL at 14:20

## 2019-07-03 RX ADMIN — Medication 200 MILLIGRAM(S): at 05:44

## 2019-07-03 RX ADMIN — GABAPENTIN 400 MILLIGRAM(S): 400 CAPSULE ORAL at 14:22

## 2019-07-03 RX ADMIN — Medication 200 MILLIGRAM(S): at 17:57

## 2019-07-03 NOTE — PROGRESS NOTE ADULT - SUBJECTIVE AND OBJECTIVE BOX
HPI:  · Chief Complaint: The patient is a 73y Female complaining of hyperglycemia.	  · HPI Objective Statement: 76y F w/ PMH of DM, HTN, HLD, and CVA w/ no residual deficits presents for hyperglycemia. pt had was admitted recently due to  fall on June 19 from standing in the bathroom this am, walking to bathroom, bumped into sink and fell backwards, striking back of head on the tile floor. + HT, No LOC, +ASA 81mg,  Pt found by family standing after fall. Use cane to ambulate. Take 81mg ASA daily. (29 Jun 2019 05:26)    Subjective:  Patient is a 73y old  Female who presents with a chief complaint of complaining of hyperglycemia , fever. (02 Jul 2019 09:31). No acute events overnight. Pt is resting comfortably.    PAST MEDICAL & SURGICAL HISTORY:  Seizure: keppra  Herniated lumbar intervertebral disc: as per hx  Stroke: TIA 2002, no deficits  Fall, initial encounter: as  hx  Lupus: as per hx  Hypertension: hctz  Hypercholesteremia: statin  DM (diabetes mellitus): insulin  fs achs  No significant past surgical history    MEDICATIONS  (STANDING):  chlorhexidine 4% Liquid 1 Application(s) Topical <User Schedule>  dextrose 5%. 1000 milliLiter(s) (50 mL/Hr) IV Continuous <Continuous>  dextrose 50% Injectable 12.5 Gram(s) IV Push once  dextrose 50% Injectable 25 Gram(s) IV Push once  dextrose 50% Injectable 25 Gram(s) IV Push once  DULoxetine 60 milliGRAM(s) Oral daily  folic acid 1 milliGRAM(s) Oral daily  gabapentin 400 milliGRAM(s) Oral three times a day  hydrochlorothiazide 50 milliGRAM(s) Oral daily  hydroxychloroquine 200 milliGRAM(s) Oral two times a day  insulin glargine Injectable (LANTUS) 16 Unit(s) SubCutaneous every morning  insulin lispro (HumaLOG) corrective regimen sliding scale   SubCutaneous three times a day before meals  lacosamide 50 milliGRAM(s) Oral two times a day  levETIRAcetam  IVPB 1500 milliGRAM(s) IV Intermittent every 12 hours  levothyroxine 100 MICROGram(s) Oral daily  lisinopril 20 milliGRAM(s) Oral daily  methadone    Tablet 20 milliGRAM(s) Oral every 12 hours  QUEtiapine 12.5 milliGRAM(s) Oral daily  simvastatin Oral Tab/Cap - Peds 40 milliGRAM(s) Oral at bedtime  sodium chloride 0.9%. 1000 milliLiter(s) (75 mL/Hr) IV Continuous <Continuous>  sodium chloride 0.9%. 1000 milliLiter(s) (75 mL/Hr) IV Continuous <Continuous>    MEDICATIONS  (PRN):  acetaminophen  Suppository .. 650 milliGRAM(s) Rectal every 6 hours PRN Temp greater or equal to 38C (100.4F), Mild Pain (1 - 3)  dextrose 40% Gel 15 Gram(s) Oral once PRN Blood Glucose LESS THAN 70 milliGRAM(s)/deciliter  glucagon  Injectable 1 milliGRAM(s) IntraMuscular once PRN Glucose LESS THAN 70 milligrams/deciliter  LORazepam   Injectable 2 milliGRAM(s) IV Push once PRN agitation during MRI  oxyCODONE    5 mG/acetaminophen 325 mG 1 Tablet(s) Oral every 6 hours PRN Severe Pain (7 - 10)    Objective:     Vital Signs Last 24 Hrs  T(C): 37.1 (02 Jul 2019 14:06), Max: 37.6 (01 Jul 2019 21:00)  T(F): 98.8 (02 Jul 2019 14:06), Max: 99.6 (01 Jul 2019 21:00)  HR: 89 (02 Jul 2019 14:06) (69 - 89)  BP: 129/59 (02 Jul 2019 14:06) (129/59 - 143/65)  RR: 16 (02 Jul 2019 14:06) (16 - 16)    General: elderly lady, NAD  Neurology: A&Ox3, nonfocal,   HEENT: NC/AT, MMM  Respiratory: CTA B/L, No wheezing, rales, rhonchi  CV: RRR, S1S2, no murmurs, rubs or gallops  Abdominal: Soft, NT, ND +BS,   Extremities: No edema, + peripheral pulses  Skin: No Rashes, Hematoma, Ecchymosis    LABS:  none

## 2019-07-03 NOTE — PROGRESS NOTE ADULT - SUBJECTIVE AND OBJECTIVE BOX
Neurology Follow up note    Name  LINDSAY ALBERT    HPI:  · Chief Complaint: The patient is a 73y Female complaining of hyperglycemia.	  · HPI Objective Statement: 76y F w/ PMH of DM, HTN, HLD, and CVA w/ no residual deficits presents for hyperglycemia. pt had was admitted recently due to  fall on June 19 from standing in the bathroom this am, walking to bathroom, bumped into sink and fell backwards, striking back of head on the tile floor. + HT, No LOC, +ASA 81mg,  Pt found by family standing after fall. Use cane to ambulate. Take 81mg ASA daily. (29 Jun 2019 05:26)      Interval History:    patient is stable  was agitated before mri yesterday and needed ativan  now at normal baseline  mri brain:  Subacute subdural hematoma over the right cerebral convexity and   subfrontal region measuring up to 4 mm in width. Stable mild (3 mm) right   to left midline shift. Small amount of subarachnoid extension of   hemorrhage is noted.    2.  Mild smooth asymmetric dural enhancement over the right cerebral   convexity, interhemispheric fissure and tentorium (corresponding to areas   of current and recent subdural hemorrhages), likely reactive to the   hemorrhages. Recommend a follow-up study in 4-6 weeks to confirm   appropriate resolution.          Vital Signs Last 24 Hrs  T(C): 37.1 (03 Jul 2019 05:00), Max: 37.1 (02 Jul 2019 14:06)  T(F): 98.8 (03 Jul 2019 05:00), Max: 98.8 (02 Jul 2019 14:06)  HR: 84 (03 Jul 2019 05:00) (83 - 95)  BP: 147/68 (03 Jul 2019 05:00) (129/59 - 162/79)  BP(mean): --  RR: 18 (03 Jul 2019 05:00) (16 - 20)  SpO2: --    Neurological Exam:   Mental status: Awake, alert and oriented x3.  Recent and remote memory intact.  Naming, repetition and comprehension intact.  Attention/concentration intact.  No dysarthria, no aphasia.  Fund of knowledge appropriate.    Cranial nerves: Pupils equally round and reactive to light, visual fields full, no nystagmus, extraocular muscles intact, V1 through V3 intact bilaterally and symmetric, face symmetric, hearing intact to finger rub, palate elevation symmetric, tongue was midline.  Motor:  MRC grading 5/5 b/l UE/LE.   strength 5/5.  Normal tone and bulk.  No abnormal movements.    Sensation: Intact to light touch, proprioception, and pinprick.   Coordination: No dysmetria on finger-to-nose and heel-to-shin.  No dysdiadokinesia.  Reflexes: 2+ in bilateral UE/LE, downgoing toes bilaterally. (-) Benitez.      Medications  acetaminophen  Suppository .. 650 milliGRAM(s) Rectal every 6 hours PRN  chlorhexidine 4% Liquid 1 Application(s) Topical <User Schedule>  dextrose 40% Gel 15 Gram(s) Oral once PRN  dextrose 5%. 1000 milliLiter(s) IV Continuous <Continuous>  dextrose 50% Injectable 12.5 Gram(s) IV Push once  dextrose 50% Injectable 25 Gram(s) IV Push once  dextrose 50% Injectable 25 Gram(s) IV Push once  DULoxetine 60 milliGRAM(s) Oral daily  folic acid 1 milliGRAM(s) Oral daily  gabapentin 400 milliGRAM(s) Oral three times a day  glucagon  Injectable 1 milliGRAM(s) IntraMuscular once PRN  hydrochlorothiazide 50 milliGRAM(s) Oral daily  hydroxychloroquine 200 milliGRAM(s) Oral two times a day  insulin glargine Injectable (LANTUS) 16 Unit(s) SubCutaneous every morning  insulin lispro (HumaLOG) corrective regimen sliding scale   SubCutaneous three times a day before meals  lacosamide 50 milliGRAM(s) Oral two times a day  levETIRAcetam  IVPB 1500 milliGRAM(s) IV Intermittent every 12 hours  levothyroxine 100 MICROGram(s) Oral daily  lisinopril 20 milliGRAM(s) Oral daily  methadone    Tablet 20 milliGRAM(s) Oral every 12 hours  oxyCODONE    5 mG/acetaminophen 325 mG 1 Tablet(s) Oral every 6 hours PRN  simvastatin Oral Tab/Cap - Peds 40 milliGRAM(s) Oral at bedtime  sodium chloride 0.9%. 1000 milliLiter(s) IV Continuous <Continuous>  sodium chloride 0.9%. 1000 milliLiter(s) IV Continuous <Continuous>      Lab                    Radiology      Assessment: 74 yo female with recent r traumatic sdh p/w acute ams  patient with florid epileptiform discharges on veeg, r sided.  mri as per hpi  patient is at baseline this am but with fluctuating MS    Plan:  RECONNECT TO VEEG TODAY. DO NOT SUSPECT ACTIVE SZ BUT WILL MONITOR  F/U AI SEROLOGIES AS PER MY PREVIOUS NOTE  PARANEOPLASTIC PANEL  WILL LIKELY NEED TO REPEAT LP AS WE DO NOT HAVE AN EXPLANATION FOR ELEVATED WBC in CSF  F/U WNV STUDIES  CONT KEPPRA AND VIMPAT AT CURRENT DOSAGE  SZ PRECAUTIONS AND MAINTAIN MG >2  MAY NEED TO REPEAT MRI AS OUTPT  PLEASE CALL WITH ANY QUESTIONS

## 2019-07-03 NOTE — PROGRESS NOTE ADULT - ASSESSMENT
Assessment and Plan:    73F PMH SDH (subdural hematoma) due to fall, admitted for seizure and on VEEG      # Left Hemiparesis   - Shayan's Paralysis (improved)  - From Rt Hemispheric Seizures - correlation   - Now resolved    # Seizure Disorder (acute dx)  - Epilepsy care and f/u appreciated  - c/w tiration of Keppra  - MRI Brain w/wo contrast reviewed with neurologists and recommendations noted and ordered. Will restart VEEG and AED modification per neuro      # Fever   - Infection ruled out, Blood Cx Neg to date  - Abx D/C  - ID consultation done   - Other wise no source identified for infection at this time  - Likely Central Fever?? Infection ruled out   - Possible Autoimmune etiology - Dr. Hampton consulted ?dx of SLE?  - Autoimmune w/up - sent f/u results   - Rhematology on board pls let them know about w/up results and MIR/dsDNA      # Depression - cymbalta.       # Hypertension. - Lisinopril / HCTZ         # DM (diabetes mellitus)  - insulin  - fs achs.   - Diabetic Diet  - Lantus / RI       # Hypothyroid - synthroid.      # Hypercholesteremia.    - statin.    #Progress Note Handoff  Tests: routine   Test Results: All autoimmune w/up sent  Family Discussion: none today  Future Disposition: SNF

## 2019-07-04 LAB
CULTURE RESULTS: SIGNIFICANT CHANGE UP
CULTURE RESULTS: SIGNIFICANT CHANGE UP
GLUCOSE BLDC GLUCOMTR-MCNC: 150 MG/DL — HIGH (ref 70–99)
GLUCOSE BLDC GLUCOMTR-MCNC: 189 MG/DL — HIGH (ref 70–99)
GLUCOSE BLDC GLUCOMTR-MCNC: 212 MG/DL — HIGH (ref 70–99)
GLUCOSE BLDC GLUCOMTR-MCNC: 305 MG/DL — HIGH (ref 70–99)
SPECIMEN SOURCE: SIGNIFICANT CHANGE UP
SPECIMEN SOURCE: SIGNIFICANT CHANGE UP

## 2019-07-04 PROCEDURE — 99232 SBSQ HOSP IP/OBS MODERATE 35: CPT

## 2019-07-04 PROCEDURE — 95951: CPT | Mod: 26

## 2019-07-04 PROCEDURE — 99231 SBSQ HOSP IP/OBS SF/LOW 25: CPT | Mod: 25

## 2019-07-04 RX ORDER — METHADONE HYDROCHLORIDE 40 MG/1
10 TABLET ORAL EVERY 12 HOURS
Refills: 0 | Status: DISCONTINUED | OUTPATIENT
Start: 2019-07-06 | End: 2019-07-07

## 2019-07-04 RX ORDER — METHADONE HYDROCHLORIDE 40 MG/1
5 TABLET ORAL EVERY 12 HOURS
Refills: 0 | Status: DISCONTINUED | OUTPATIENT
Start: 2019-07-07 | End: 2019-07-07

## 2019-07-04 RX ORDER — METHADONE HYDROCHLORIDE 40 MG/1
TABLET ORAL
Refills: 0 | Status: DISCONTINUED | OUTPATIENT
Start: 2019-07-05 | End: 2019-07-07

## 2019-07-04 RX ORDER — METHADONE HYDROCHLORIDE 40 MG/1
15 TABLET ORAL ONCE
Refills: 0 | Status: DISCONTINUED | OUTPATIENT
Start: 2019-07-04 | End: 2019-07-04

## 2019-07-04 RX ORDER — LACOSAMIDE 50 MG/1
100 TABLET ORAL
Refills: 0 | Status: DISCONTINUED | OUTPATIENT
Start: 2019-07-04 | End: 2019-07-07

## 2019-07-04 RX ORDER — METHADONE HYDROCHLORIDE 40 MG/1
15 TABLET ORAL EVERY 12 HOURS
Refills: 0 | Status: DISCONTINUED | OUTPATIENT
Start: 2019-07-05 | End: 2019-07-05

## 2019-07-04 RX ADMIN — Medication 2: at 07:43

## 2019-07-04 RX ADMIN — SODIUM CHLORIDE 75 MILLILITER(S): 9 INJECTION INTRAMUSCULAR; INTRAVENOUS; SUBCUTANEOUS at 07:00

## 2019-07-04 RX ADMIN — LEVETIRACETAM 400 MILLIGRAM(S): 250 TABLET, FILM COATED ORAL at 21:15

## 2019-07-04 RX ADMIN — LISINOPRIL 20 MILLIGRAM(S): 2.5 TABLET ORAL at 05:25

## 2019-07-04 RX ADMIN — LACOSAMIDE 100 MILLIGRAM(S): 50 TABLET ORAL at 17:01

## 2019-07-04 RX ADMIN — GABAPENTIN 400 MILLIGRAM(S): 400 CAPSULE ORAL at 05:25

## 2019-07-04 RX ADMIN — Medication 50 MILLIGRAM(S): at 05:25

## 2019-07-04 RX ADMIN — LEVETIRACETAM 400 MILLIGRAM(S): 250 TABLET, FILM COATED ORAL at 09:25

## 2019-07-04 RX ADMIN — Medication 200 MILLIGRAM(S): at 17:01

## 2019-07-04 RX ADMIN — Medication 1 MILLIGRAM(S): at 11:19

## 2019-07-04 RX ADMIN — LACOSAMIDE 50 MILLIGRAM(S): 50 TABLET ORAL at 05:26

## 2019-07-04 RX ADMIN — Medication 200 MILLIGRAM(S): at 05:25

## 2019-07-04 RX ADMIN — METHADONE HYDROCHLORIDE 20 MILLIGRAM(S): 40 TABLET ORAL at 05:26

## 2019-07-04 RX ADMIN — DULOXETINE HYDROCHLORIDE 60 MILLIGRAM(S): 30 CAPSULE, DELAYED RELEASE ORAL at 11:19

## 2019-07-04 RX ADMIN — GABAPENTIN 400 MILLIGRAM(S): 400 CAPSULE ORAL at 21:16

## 2019-07-04 RX ADMIN — CHLORHEXIDINE GLUCONATE 1 APPLICATION(S): 213 SOLUTION TOPICAL at 05:25

## 2019-07-04 RX ADMIN — Medication 8: at 16:25

## 2019-07-04 RX ADMIN — SIMVASTATIN 40 MILLIGRAM(S): 20 TABLET, FILM COATED ORAL at 21:15

## 2019-07-04 RX ADMIN — SODIUM CHLORIDE 75 MILLILITER(S): 9 INJECTION INTRAMUSCULAR; INTRAVENOUS; SUBCUTANEOUS at 21:15

## 2019-07-04 RX ADMIN — INSULIN GLARGINE 16 UNIT(S): 100 INJECTION, SOLUTION SUBCUTANEOUS at 07:43

## 2019-07-04 RX ADMIN — GABAPENTIN 400 MILLIGRAM(S): 400 CAPSULE ORAL at 13:41

## 2019-07-04 RX ADMIN — METHADONE HYDROCHLORIDE 15 MILLIGRAM(S): 40 TABLET ORAL at 21:16

## 2019-07-04 RX ADMIN — Medication 100 MICROGRAM(S): at 05:25

## 2019-07-04 NOTE — PROGRESS NOTE ADULT - ASSESSMENT
Assessment and Plan:    73F PMH SDH (subdural hematoma) due to fall, admitted for seizure and on VEEG      # Left Hemiparesis   - Shayan's Paralysis (improved)  - From Rt Hemispheric Seizures - correlation       # Seizure Disorder (acute dx)  -Per neuro; recommend increasing Vimpat to 100 mg BID. Continue current dose Keppra  Will continue VEEG      # Fever   - Infection ruled out, Blood Cx Neg to date  - Likely Central Fever?? Infection ruled out   - Possible Autoimmune etiology       # Depression   - cymbalta.       # Hypertension.   - Lisinopril / HCTZ         # DM (diabetes mellitus)  - insulin  - fsg mon.   - Lantus / RI       # Hypothyroid   - synthroid.      # Hypercholesteremia.    - statin.    # Chronic pain   - methadone  - neuro recommends methadone taper. Detox consulted and recommendations noted.     #Progress Note Handoff  Tests: routine   Test Results: All autoimmune w/up sent  Family Discussion: none today  Future Disposition: SNF [facility]

## 2019-07-04 NOTE — CONSULT NOTE ADULT - REASON FOR ADMISSION
complaining of hyperglycemia , fever.

## 2019-07-04 NOTE — PROGRESS NOTE ADULT - SUBJECTIVE AND OBJECTIVE BOX
HPI:  · Chief Complaint: The patient is a 73y Female complaining of hyperglycemia.	  · HPI Objective Statement: 76y F w/ PMH of DM, HTN, HLD, and CVA w/ no residual deficits presents for hyperglycemia. pt had was admitted recently due to  fall on June 19 from standing in the bathroom this am, walking to bathroom, bumped into sink and fell backwards, striking back of head on the tile floor. + HT, No LOC, +ASA 81mg,  Pt found by family standing after fall. Use cane to ambulate. Take 81mg ASA daily. (29 Jun 2019 05:26)    Subjective:  Patient seen and evaluated earlier this morning, she is more alert and awake and denied any complaint. Still on VEEG.     PAST MEDICAL & SURGICAL HISTORY:  Seizure: keppra  Herniated lumbar intervertebral disc: as per hx  Stroke: TIA 2002, no deficits  Fall, initial encounter: as  hx  Lupus: as per hx  Hypertension: hctz  Hypercholesteremia: statin  DM (diabetes mellitus): insulin  fs achs  No significant past surgical history    MEDICATIONS  (STANDING):  chlorhexidine 4% Liquid 1 Application(s) Topical <User Schedule>  dextrose 5%. 1000 milliLiter(s) (50 mL/Hr) IV Continuous <Continuous>  dextrose 50% Injectable 12.5 Gram(s) IV Push once  dextrose 50% Injectable 25 Gram(s) IV Push once  dextrose 50% Injectable 25 Gram(s) IV Push once  DULoxetine 60 milliGRAM(s) Oral daily  folic acid 1 milliGRAM(s) Oral daily  gabapentin 400 milliGRAM(s) Oral three times a day  hydrochlorothiazide 50 milliGRAM(s) Oral daily  hydroxychloroquine 200 milliGRAM(s) Oral two times a day  insulin glargine Injectable (LANTUS) 16 Unit(s) SubCutaneous every morning  insulin lispro (HumaLOG) corrective regimen sliding scale   SubCutaneous three times a day before meals  lacosamide 50 milliGRAM(s) Oral two times a day  levETIRAcetam  IVPB 1500 milliGRAM(s) IV Intermittent every 12 hours  levothyroxine 100 MICROGram(s) Oral daily  lisinopril 20 milliGRAM(s) Oral daily  methadone    Tablet 20 milliGRAM(s) Oral every 12 hours  QUEtiapine 12.5 milliGRAM(s) Oral daily  simvastatin Oral Tab/Cap - Peds 40 milliGRAM(s) Oral at bedtime  sodium chloride 0.9%. 1000 milliLiter(s) (75 mL/Hr) IV Continuous <Continuous>  sodium chloride 0.9%. 1000 milliLiter(s) (75 mL/Hr) IV Continuous <Continuous>    MEDICATIONS  (PRN):  acetaminophen  Suppository .. 650 milliGRAM(s) Rectal every 6 hours PRN Temp greater or equal to 38C (100.4F), Mild Pain (1 - 3)  dextrose 40% Gel 15 Gram(s) Oral once PRN Blood Glucose LESS THAN 70 milliGRAM(s)/deciliter  glucagon  Injectable 1 milliGRAM(s) IntraMuscular once PRN Glucose LESS THAN 70 milligrams/deciliter  LORazepam   Injectable 2 milliGRAM(s) IV Push once PRN agitation during MRI  oxyCODONE    5 mG/acetaminophen 325 mG 1 Tablet(s) Oral every 6 hours PRN Severe Pain (7 - 10)    Objective:   Vital Signs Last 24 Hrs  T(C): 36.7 (04 Jul 2019 05:00), Max: 37.4 (03 Jul 2019 21:00)  T(F): 98.1 (04 Jul 2019 05:00), Max: 99.3 (03 Jul 2019 21:00)  HR: 87 (04 Jul 2019 05:00) (85 - 90)  BP: 140/69 (04 Jul 2019 05:00) (131/65 - 146/66)  BP(mean): --  RR: 16 (04 Jul 2019 05:00) (16 - 16)  SpO2: --    Physical exam  General: elderly lady, NAD  Neurology: A&Ox3, nonfocal,   HEENT: NC/AT, MMM  Respiratory: CTA B/L, No wheezing, rales, rhonchi  CV: RRR, S1S2, no murmurs, rubs or gallops  Abdominal: Soft, NT, ND +BS,   Extremities: No edema, + peripheral pulses  Skin: No Rashes, Hematoma, Ecchymosis    LABS:  none

## 2019-07-04 NOTE — CONSULT NOTE ADULT - SUBJECTIVE AND OBJECTIVE BOX
LINDSAY ALBERT  73y, Female  Allergy: No Known Allergies      CHIEF COMPLAINT: complaining of hyperglycemia , fever. (2019 10:46)      HPI:  · Chief Complaint: The patient is a 73y Female complaining of hyperglycemia.	  · HPI Objective Statement: 76y F w/ PMH of DM, HTN, HLD, and CVA w/ no residual deficits presents for hyperglycemia. pt had was admitted recently due to  fall on  from standing in the bathroom this am, walking to bathroom, bumped into sink and fell backwards, striking back of head on the tile floor. + HT, No LOC, +ASA 81mg,  Pt found by family standing after fall. Use cane to ambulate. Take 81mg ASA daily. (2019 05:26)    Recent diagnosis of subdural hematoma, on d/c issues with ambulating. Now with worsening mental status, fever. CTH showing stable bleed.     FAMILY HISTORY:  No pertinent family history in first degree relatives    PAST MEDICAL & SURGICAL HISTORY:  Seizure: keppra  Herniated lumbar intervertebral disc: as per hx  Stroke: TIA , no deficits  Fall, initial encounter: as  hx  Lupus: as per hx  Hypertension: hctz  Hypercholesteremia: statin  DM (diabetes mellitus): insulin  fs achs  No significant past surgical history      SOCIAL HISTORY  unable to obtain ROS secondary to sedation or patient's mental status       ROS  unable to obtain ROS secondary to sedation or patient's mental status     VITALS:  T(F): 98.6, Max: 102.4 (19 @ 05:23)  HR: 86  BP: 142/65  RR: 18Vital Signs Last 24 Hrs  T(C): 37 (2019 14:20), Max: 39.1 (2019 05:23)  T(F): 98.6 (2019 14:20), Max: 102.4 (2019 05:23)  HR: 86 (2019 14:20) (86 - 109)  BP: 142/65 (2019 14:20) (141/63 - 196/85)  BP(mean): --  RR: 18 (2019 14:20) (18 - 18)  SpO2: 97% (2019 05:23) (94% - 97%)    PHYSICAL EXAM:  Gen: Elderly F on EEG, obtunded  HEENT: Normocephalic, atraumatic. L pupil >R? reactive  Neck: supple, no stiffness  CV: Regular rate & regular rhythm  Lungs: decreased BS at bases, no fremitus  Abdomen: Soft, BS present  Ext: Warm, well perfused, LUE appears more rigid>RUE, ?droop  Neuro: obtunded  Skin: no rash, no erythema  Lines: no phlebitis    TESTS & MEASUREMENTS:                        10.1   7.48  )-----------( 367      ( 2019 00:20 )             31.6         129<L>  |  88<L>  |  13  ----------------------------<  639<HH>  4.5   |  23  |  0.8    Ca    9.2      2019 00:20    TPro  6.9  /  Alb  3.9  /  TBili  0.5  /  DBili  x   /  AST  14  /  ALT  10  /  AlkPhos  112      eGFR if Non African American: 74 mL/min/1.73M2 (19 @ 00:20)  eGFR if : 85 mL/min/1.73M2 (19 @ 00:20)    LIVER FUNCTIONS - ( 2019 00:20 )  Alb: 3.9 g/dL / Pro: 6.9 g/dL / ALK PHOS: 112 U/L / ALT: 10 U/L / AST: 14 U/L / GGT: x           Urinalysis Basic - ( 2019 01:45 )    Color: Yellow / Appearance: Clear / S.020 / pH: x  Gluc: x / Ketone: 80  / Bili: Negative / Urobili: 0.2   Blood: x / Protein: 30 / Nitrite: Negative   Leuk Esterase: Negative / RBC: 3-5 /HPF / WBC 1-2 /HPF   Sq Epi: x / Non Sq Epi: Few /HPF / Bacteria: Few          Blood Gas Venous - Lactate: 1.5 mmoL/L (19 @ 00:55)  Lactate, Blood: 1.6 mmol/L (19 @ 00:20)      INFECTIOUS DISEASES TESTING      RADIOLOGY & ADDITIONAL TESTS:  I have personally reviewed the last Chest xray  CXR      CT  CT Abdomen and Pelvis w/ IV Cont:   EXAM:  CT ABDOMEN AND PELVIS IC            PROCEDURE DATE:  2019            INTERPRETATION:  CLINICAL STATEMENT: Abdominal pain.      TECHNIQUE: Contiguous axial CT images were obtained from the lower chest   to the pubic symphysis following administration of 100cc Optiray 320   intravenous contrast.  Oral contrast was not administered.  Reformatted   images in the coronal and sagittal planes were acquired.    Comparison made with CT abdomen and pelvis 2017.    FINDINGS:    LOWER CHEST: Small hiatal hernia..    HEPATOBILIARY: Liver unremarkable. Evidence of gallbladder sludge and/or   layering stones. No biliary dilation.    SPLEEN: Unremarkable.    PANCREAS: Unremarkable.    ADRENAL GLANDS: Unremarkable.    KIDNEYS: No hydronephrosis. Bilateral nonobstructive renal calculi..    ABDOMINOPELVIC NODES: Unremarkable.    PELVIC ORGANS: Calcified uterine fibroids. Urinary bladder unremarkable.    PERITONEUM/MESENTERY/BOWEL: Unremarkable.    BONES/SOFT TISSUES: Degenerative change, lumbar spine.. Grade 1   anterolisthesis of L3 on L4 and L4 on L5.    OTHER: Vascular calcifications.      IMPRESSION:   No evidence of acute intra-abdominal pathology.                  KENTON VELASQUEZ M.D., ATTENDING RADIOLOGIST  This document has been electronically signed. 2019  4:05AM             (19 @ 03:57)      CARDIOLOGY TESTING  12 Lead ECG:   Ventricular Rate 98 BPM    Atrial Rate 98 BPM    P-R Interval 132 ms    QRS Duration 68 ms    Q-T Interval 358 ms    QTC Calculation(Bezet) 457 ms    P Axis 29 degrees    R Axis -41 degrees    T Axis -26 degrees    Diagnosis Line Normal sinus rhythm  Left axis deviation  Incomplete right bundle branch block  Confirmed by LUAN YORK MD ([id number]) on 2019 9:16:34 AM (19 @ 02:29)  Transthoracic Echocardiogram:    EXAM:  2-D ECHO (TTE) COMPLETE        PROCEDURE DATE:  2019      INTERPRETATION:  REPORT:    TRANSTHORACIC ECHOCARDIOGRAM REPORT         Patient Name:   LINDSAY ALBERT Accession #: [id number]  Medical Rec #:  [id number]           Height: 60.0 in 152.4 cm  YOB: 1945          Weight:      145.0 lb 65.77 kg  Patient Age:    73 years           BSA:         1.63 m²  Patient Gender: F                  BP:          171/71 mmHg       Date of Exam:        2019 9:14:29 AM  Referring Physician: [id number] LEA TIERNEY  Sonographer:         Yasmine Madden  Reading Physician:   Mamadou Patel M.D.    Procedure:   2D Echo/Doppler/Color Doppler Complete.  Indications: R55 - Syncope and Collapse  Diagnosis:   Syncope and collapse - R55         Summary:   1. Left ventricular ejection fraction, by visual estimation, is 55 to   60%.   2. Spectral Doppler shows impaired relaxation pattern of left   ventricular myocardial filling (Grade I diastolic dysfunction).   3. Mitral annularcalcification.   4. Moderate mitral valve regurgitation.   5. Thickening and calcification of the anterior and posterior mitral   valve leaflets.   6. Mild-moderate tricuspid regurgitation.   7. Mild aortic regurgitation.    PHYSICIAN INTERPRETATION:  Left Ventricle: The left ventricular internal cavity size is normal. Left   ventricular wall thickness is normal. Left ventricular ejection fraction,   by visual estimation, is 55 to 60%. Spectral Doppler shows impaired   relaxation pattern of left ventricular myocardial filling (Grade I   diastolic dysfunction).  Right Ventricle: Normal right ventricular size and function.  Left Atrium: Left atrial enlargement.  Right Atrium: Normal right atrial size.  Pericardium: There is no evidence of pericardial effusion.  Mitral Valve: The mitral valve is normal in structure. Thickening and   calcification of the anterior and posterior mitral valve leaflets. There   is mitral annular calcification. No evidence of mitral stenosis. Moderate   mitral valve regurgitation is seen.  Tricuspid Valve: The tricuspid valve is normal in structure.   Mild-moderate tricuspid regurgitation is visualized.  Aortic Valve: The aortic valve is trileaflet. No evidence of aortic   stenosis. Aortic valve thickening with normal leaflet opening. Mild   aortic valve regurgitation is seen.  Pulmonic Valve: The pulmonic valve is normal. Trace pulmonic valve   regurgitation.  Aorta: The aortic root and ascending aorta are structurally normal, with   no evidence of dilitation.  Pulmonary Artery: The main pulmonary artery is normal in size.       2D AND M-MODE MEASUREMENTS (normal ranges within parentheses):  Left                  Normal   Aorta/Left             Normal  Ventricle:                     Atrium:  IVSd (2D):  1.31 cm  (0.7-1.1) AoV Cusp       1.32  (1.5-2.6)  LVPWd       1.17 cm  (0.7-1.1) Separation:     cm  (2D):                          Left Atrium    4.51  (1.9-4.0)  LVIDd       4.95 cm  (3.4-5.7) (Mmode):        cm  (2D):                          LA Volume      49.4  LVIDs       3.21 cm            Index         ml/m²  (2D):                          Right  LV FS       35.2 %    (>25%)   Ventricle:  (2D):                          RVd (2D):      2.39 cm  IVSd        0.94 cm  (0.7-1.1)  (Mmode):  LVPWd       1.20 cm  (0.7-1.1)  (Mmode):  LVIDd       4.25 cm  (3.4-5.7)  (Mmode):  LVIDs       2.14 cm  (Mmode):  LV FS       49.6 %    (>25%)  (Mmode):  Relative     0.47     (<0.42)  Wall  Thickness  Rel. Wall    0.57     (<0.42)  Thickness  Mm  LV Mass    94.3 g/m²  Index:  Mmode    SPECTRAL DOPPLER ANALYSIS:  LV DIASTOLIC FUNCTION:  MV Peak E: 1.03 m/s Decel Time: 311 msec  MV Peak A: 1.27 m/s  E/A Ratio: 0.81    Aortic Valve:  AoV VMax:    1.75 m/s  AoV Area, Vmax: 1.93 cm² Vmax Indx: 1.19 cm²/m²  AoV Pk Grad: 12.3 mmHg    LVOT Vmax: 1.03 m/s  LVOT VTI:  0.22 m  LVOT Diam: 2.05 cm    Aortic Insufficiency:  AI Half-time:  581 msec  AI Decel Rate: 2.14 m/s²    Mitral Valve:  MV P1/2 Time: 90.31 msec  MV Area, PHT: 2.44 cm²    Mitral Insufficiency by PISA:  MR Volume: 66.49 ml MR Flow Rate: 182.69 ml/s MR EROA: 32.81 mm²    Tricuspid Valve and PA/RV Systolic Pressure: TR Max Velocity: 2.33 m/s RA   Pressure: 5 mmHg RVSP/PASP: 26.7 mmHg       [id number] Mamadou Patel M.D., Electronically signed on 2019 at 7:30:13 PM              *** Final ***                    MAMADOU PATEL MD  This document has been electronically signed. 2019  9:14AM             (19 @ 09:14)      MEDICATIONS  chlorhexidine 4% Liquid 1  dextrose 5%. 1000  dextrose 50% Injectable 12.5  dextrose 50% Injectable 25  dextrose 50% Injectable 25  DULoxetine 60  folic acid 1  gabapentin 400  hydrochlorothiazide 50  hydroxychloroquine 200  insulin glargine Injectable (LANTUS) 20  insulin lispro (HumaLOG) corrective regimen sliding scale   insulin lispro Injectable (HumaLOG) 3  levETIRAcetam  IVPB 1000  levothyroxine 100  lisinopril 20  methadone    Tablet 20  simvastatin Oral Tab/Cap - Peds 40  sodium chloride 0.9%. 1000      ANTIBIOTICS:  hydroxychloroquine 200 milliGRAM(s) Oral two times a day      No Known Allergies
72yo F w/ PMHx of type 2 DM, HTN, HLD, and CVA w/ no residual deficits admitted with hyperglycemia. As per chart notes, pt had a fall on June 19 with resulting subdural hematoma (no surgical intervention) - discharged with keppra. Once home, was found babbling incoherently and taken to ED. CT then showed stable subdural.  Also with fevers, had LP with high protein/ glucose, and elevated BGMs 300s. Pt with fevers Tmax of 102.F with worry of central origin given no infectious etiology identifiable. Did have a CTA of head and neck - was negative for large vessel occlusion. 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  Currently on continuous EEG monitoring.  Last fever 100.7 on june 30th.  Pt carries hx of questionable lupus based on MIR+, was followed by Dr. Hampton for few years, but has not been seen for several years (from what she remembers). Recalls being given plaquenil for joint pain, but again, off for years.  Denies red hot swollen joints, sicca symptoms, patchy hair loss, raynauds, photosensitivity, skin rashes (currently or in the near past).  No DVTs/ PEs. Single child, no fam hx of CTD.       PAST MEDICAL & SURGICAL HISTORY:  Seizure: keppra  Herniated lumbar intervertebral disc: as per hx  Stroke: TIA 2002, no deficits  Fall, initial encounter: as  hx  Lupus: as per hx  Hypertension: hctz  Hypercholesteremia: statin  DM (diabetes mellitus): insulin  fs achs  No significant past surgical history      FAMILY HISTORY:  No pertinent family history in first degree relatives      Social History: (-) x 3  Allergies No Known Allergies    VS  T 96.7  HR 69  / 62  RR 16    PE:   General: elderly female sitting in bed eating breakfast  HEENT: moist mucous membranes, no sores appreciated  Cardio: s1 s2+ RRR  Lungs: CTA b/l  Abd: soft, non tender, non distended  MSK/ skin: no rashes, heberden nodes on digits, no synovitis/ warmth, no TTP of joints    ESR 25  wbc 7.38   H/H 9.1/ 28.7  platelts 331    Na 137/ K 3.0,  BUN/ ct 13/ 0.5   glucose  protein 428, clear, colorless, monocytes 75, neurophils 8, CSF lymphocytes 17, LDH 84  imaging - reviewed      CT of the head dated 6/21/2019:    1.  Interval evolution/redistribution of the right frontal subdural   subacute hematoma, now measuring up to 6 mm in width (previously 7 mm).    2.  Stable 2 mm right to left midline shift.    3.  No new areas of hemorrhage.
Detox consult    Pt interviewed and chart reviewed  Under video EEG  admitted for fever hyperglycemia  Has been on pain meds methadone x few months for chronic pain syndrome/spine disc issues from pain management  H/O lupus, old CVA, sz, s/p recent fall  She wants to stop and get off methadone, is determined about it  Just wants to rely on non opioid meds  Exam   hooked up to VEEG  labs reviewed    Imp: Opioid dependency,         Chr pain syndrome         SLE/Sz    Advised pt that she should undergo a slow taper from her pain management group  over several weeks, otherwise she will be very uncomfortable on a short detox protocol.  She seems adamant about it.  Could place on a meth taper protocol on medical floor.
Neurology Consult    Patient is a 73y old  Female who presents with a chief complaint of complaining of hyperglycemia , fever. (2019 05:26)      HPI:  · Chief Complaint: The patient is a 73y Female complaining of hyperglycemia.	  · HPI Objective Statement: 76y F w/ PMH of DM, HTN, HLD, and CVA w/ no residual deficits presents for hyperglycemia. pt had was admitted recently due to  fall on  from standing in the bathroom this am, walking to bathroom, bumped into sink and fell backwards, striking back of head on the tile floor. + HT, No LOC, +ASA 81mg,  Pt found by family standing after fall. Use cane to ambulate. Take 81mg ASA daily. (2019 05:26)      ***  Called this morning to see patient for left sided weakness.  I was told her eyes were deviated to right and she wasn't moving her left side.  I asked that CT angiogram be performed to rule out large vessel occlusion  but unfortunately she was moving in scanner and they sent her back up w/o completing the study.    I was able to talk to her grandson Navdeep who lives with her and saw her yesterday and he was able to inform me he saw her when he came home from work at 2:30 p.m. and she was at table eating a sandwhich.  He says she is fairly independent able to prepare meals  and do shopping and dress and bath herself.  Only recently due to falls started using a cane.  She had recent subdural hematoma that did not require intervention and was discharged from hospital a few days ago on Keppra.  At 5 p.m. he found her standing by door babbling like  someone with dementia a drastic change from baseline.  She was taken to ED where she had head CT done showing stable small subdural on the right.  She had LP to r/o infection (this showed high protein and glucose) her blood sugars were high in 300's.  This morning medical team rounded and found her to have eyes deviated to right and not moving her left side so contacted neurology. No tonic clonic activity was reported.      PAST MEDICAL & SURGICAL HISTORY:  Seizure: keppra  Herniated lumbar intervertebral disc: as per hx  Stroke: TIA , no deficits  Fall, initial encounter: as  hx  Lupus: as per hx  Hypertension: hctz  Hypercholesteremia: statin  DM (diabetes mellitus): insulin  fs achs  No significant past surgical history      FAMILY HISTORY:  No pertinent family history in first degree relatives      Social History: (-) x 3    Allergies    No Known Allergies    Intolerances        MEDICATIONS  (STANDING):  chlorhexidine 4% Liquid 1 Application(s) Topical <User Schedule>  dextrose 5%. 1000 milliLiter(s) (50 mL/Hr) IV Continuous <Continuous>  dextrose 50% Injectable 12.5 Gram(s) IV Push once  dextrose 50% Injectable 25 Gram(s) IV Push once  dextrose 50% Injectable 25 Gram(s) IV Push once  DULoxetine 60 milliGRAM(s) Oral daily  folic acid 1 milliGRAM(s) Oral daily  gabapentin 400 milliGRAM(s) Oral three times a day  hydrochlorothiazide 50 milliGRAM(s) Oral daily  hydroxychloroquine 200 milliGRAM(s) Oral two times a day  insulin glargine Injectable (LANTUS) 20 Unit(s) SubCutaneous every morning  insulin lispro (HumaLOG) corrective regimen sliding scale   SubCutaneous at bedtime  insulin lispro Injectable (HumaLOG) 3 Unit(s) SubCutaneous before breakfast  levETIRAcetam  IVPB 1000 milliGRAM(s) IV Intermittent every 12 hours  levothyroxine 100 MICROGram(s) Oral daily  lisinopril 20 milliGRAM(s) Oral daily  methadone    Tablet 20 milliGRAM(s) Oral every 12 hours  simvastatin Oral Tab/Cap - Peds 40 milliGRAM(s) Oral at bedtime  sodium chloride 0.9%. 1000 milliLiter(s) (75 mL/Hr) IV Continuous <Continuous>    MEDICATIONS  (PRN):  dextrose 40% Gel 15 Gram(s) Oral once PRN Blood Glucose LESS THAN 70 milliGRAM(s)/deciliter  glucagon  Injectable 1 milliGRAM(s) IntraMuscular once PRN Glucose LESS THAN 70 milligrams/deciliter  oxyCODONE    5 mG/acetaminophen 325 mG 1 Tablet(s) Oral every 6 hours PRN Severe Pain (7 - 10)      Review of systems:    Constitutional: No fever, weight loss or fatigue    Eyes: No eye pain or discharge  ENMT:  No difficulty hearing; No sinus or throat pain  Neck: No pain or stiffness  Respiratory: No cough, wheezing, chills or hemoptysis  Cardiovascular: No chest pain, palpitations, shortness of breath, dyspnea on exertion  Gastrointestinal: No abdominal pain, nausea, vomiting or hematemesis; No diarrhea or constipation.   Genitourinary: No dysuria, frequency, hematuria or incontinence  Neurological: As per HPI  Skin: No rashes or lesions   Endocrine: No heat or cold intolerance; No hair loss  Musculoskeletal: No joint pain or swelling  Psychiatric: No depression, anxiety, mood swings  Heme/Lymph: No easy bruising or bleeding gums    Vital Signs Last 24 Hrs  T(C): 38.9 (2019 06:44), Max: 39.1 (2019 05:23)  T(F): 102.1 (2019 06:44), Max: 102.4 (2019 05:23)  HR: 109 (2019 06:44) (96 - 109)  BP: 141/63 (2019 06:44) (141/63 - 196/85)  BP(mean): --  RR: 18 (2019 06:44) (18 - 18)  SpO2: 97% (2019 05:23) (94% - 97%)    Neurologic Examination:  General:  Appearance is consistent with chronologic age.    General: The patient is unable to speak but can follow a few commands.  Cranial nerves: Decreased respose to threat from left.  + left lower facial droop.  Motor:  mild left arm drift before 10 sec, left leg drift to bed.  RUE, RLE strong  Sensory examination:   very difficult to assess pinprick tested and patient seemed confused and unable to clearly determine left-right sensory changes.  Cerebellum:  no ataxia out of proportion to weakness.    NIH:  16    1a:  1  1b: 2  1c:  0  2:  2  3:  1  4: 2  5a: 1  5b: 0  6a: 2  6b: 0  7: 0  8: 0  9:  2  10: 2  11: 1  ________  16    Labs:   CBC Full  -  ( 2019 00:20 )  WBC Count : 7.48 K/uL  RBC Count : 3.52 M/uL  Hemoglobin : 10.1 g/dL  Hematocrit : 31.6 %  Platelet Count - Automated : 367 K/uL  Mean Cell Volume : 89.8 fL  Mean Cell Hemoglobin : 28.7 pg  Mean Cell Hemoglobin Concentration : 32.0 g/dL  Auto Neutrophil # : 6.39 K/uL  Auto Lymphocyte # : 0.63 K/uL  Auto Monocyte # : 0.37 K/uL  Auto Eosinophil # : 0.04 K/uL  Auto Basophil # : 0.03 K/uL  Auto Neutrophil % : 85.5 %  Auto Lymphocyte % : 8.4 %  Auto Monocyte % : 4.9 %  Auto Eosinophil % : 0.5 %  Auto Basophil % : 0.4 %        129<L>  |  88<L>  |  13  ----------------------------<  639<HH>  4.5   |  23  |  0.8    Ca    9.2      2019 00:20    TPro  6.9  /  Alb  3.9  /  TBili  0.5  /  DBili  x   /  AST  14  /  ALT  10  /  AlkPhos  112      LIVER FUNCTIONS - ( 2019 00:20 )  Alb: 3.9 g/dL / Pro: 6.9 g/dL / ALK PHOS: 112 U/L / ALT: 10 U/L / AST: 14 U/L / GGT: x             Urinalysis Basic - ( 2019 01:45 )    Color: Yellow / Appearance: Clear / S.020 / pH: x  Gluc: x / Ketone: 80  / Bili: Negative / Urobili: 0.2   Blood: x / Protein: 30 / Nitrite: Negative   Leuk Esterase: Negative / RBC: 3-5 /HPF / WBC 1-2 /HPF   Sq Epi: x / Non Sq Epi: Few /HPF / Bacteria: Few      CSF Appearance: Clear (19 @ 05:00)  CSF Color: Clear (19 @ 05:00)  CSF Segmented Neutrophils: 8 % (19 @ 05:00)  CSF Lymphocytes: 17 % (19 @ 05:00)  Glucose, CSF: 399 mg/dL (19 @ 05:00)  Protein, CSF: 429 mg/dL (19 @ 05:00)      Neuroimaging:  NCHCT: CT Head No Cont:   EXAM:  CT BRAIN            PROCEDURE DATE:  2019            INTERPRETATION:  CLINICAL HISTORY / REASON FOR EXAM: Altered mental   status.    TECHNIQUE: Multiple axial CT images of the head were obtained from the   base of the skull to the vertex without the administration of IV   contrast. Sagittal and coronal reformats were submitted.    COMPARISON: CT head without contrast from 2019.      FINDINGS:    Stable enlargement of the ventricles and cortical sulci, consistent with   parenchymal volume loss in a patient of this stated age.    There is interval evolution and redistribution of the right frontal   subdural subacute hematoma, now measuring up to 6 mm in width (previously   7 mm), which appears increasingly hypodense. Stablesmall hyperdense   portion inferiorly.    There is stable 2 mm right to left midline shift.    No new areas of hemorrhage.    Gray-white differentiation is maintained. There are patchy subcortical   white matter hypodensities consistent with mild chronic microvascular   ischemic changes.    The bones are intact. Mastoid air cells are well aerated bilaterally. The   visualized portions of the paranasal sinuses are unremarkable.    Beam hardening artifact is noted overlying the brain stem and posterior   fossa which is inherent to CT in this location.      IMPRESSION:     Since CT of the head dated 2019:    1.  Interval evolution/redistribution of the right frontal subdural   subacute hematoma, now measuring up to 6 mm in width (previously7 mm).    2.  Stable 2 mm right to left midline shift.    3.  No new areas of hemorrhage.        EILEEN EDOUARD M.D., RESIDENT RADIOLOGIST  This document has been electronically signed.  KENTON VELASQUEZ M.D., ATTENDING RADIOLOGIST  This document has been electronically signed. 2019  1:51AM             (19 @ 00:55)    CT Angiography/Perfusion:  MRI Brain NC:  MRA Head/Neck:  EEG:    Assessment:  This is a 73y Female with h/o recent right hemispheric subdural hematoma secondary to fall now presenting with confusion and left sided weakness.  She is ~16 hours out from last known well.  She has likely increased left sided weakness compared to presentation last night but cannot establish this with certainty.  Need to exclude large vessel occlusion which would trigger transfer for neurointervention.  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    Plan:   1.  Stat CTA head and neck to exclude large vessel occlusion.  2.  24 hour video EEG if CTA negative for LVO.  3.  Discussed above with Dr. Navarro and precautions due to patient being on methadone and potential for respiratory suppression.  Ativan being given as necessary to complete CTA as LVO could prompt transfer   [facility] for clot extraction depending on location of occluded vessel.    19 @ 10:47

## 2019-07-04 NOTE — PROGRESS NOTE ADULT - SUBJECTIVE AND OBJECTIVE BOX
Epilepsy Attending Progress Note    [id number]  LINDSAY ALBERT  73y    Female    Allergies: No Known Allergies      Medications:   folic acid 1 milliGRAM(s) Oral daily  gabapentin 400 milliGRAM(s) Oral three times a day  hydrochlorothiazide 50 milliGRAM(s) Oral daily  hydroxychloroquine 200 milliGRAM(s) Oral two times a day  lacosamide 50 milliGRAM(s) Oral two times a day  levETIRAcetam  IVPB 1500 milliGRAM(s) IV Intermittent every 12 hours  levothyroxine 100 MICROGram(s) Oral daily  lisinopril 20 milliGRAM(s) Oral daily  methadone    Tablet 20 milliGRAM(s) Oral every 12 hours  oxyCODONE    5 mG/acetaminophen 325 mG 1 Tablet(s) Oral every 6 hours PRN  simvastatin Oral Tab/Cap - Peds 40 milliGRAM(s) Oral at bedtime        T(C): 36.7 (07-04-19 @ 05:00), Max: 37.4 (07-03-19 @ 21:00)  HR: 87 (07-04-19 @ 05:00) (85 - 90)  BP: 140/69 (07-04-19 @ 05:00) (131/65 - 146/66)  RR: 16 (07-04-19 @ 05:00) (16 - 16)    Did well overnight. States no complaints this AM. Eating breakfast.    VEEG shows well organized background. There are very frequent right frontal and frequent synchronous bifrontal epileptiform sharp waves. No electrographic seizures.    PHYSICAL EXAM:    Awake and conversive. Oriented to person and place but not time. Clear speech. Follows directions well    Neurological: CN II-XII in tact. No nystagmus. Motor full strength x4.

## 2019-07-04 NOTE — PROGRESS NOTE ADULT - ASSESSMENT
73 year old female partial seizures. EEG continues to show significant epileptiform activity in right greater than left frontal region.     Recommend increasing Vimpat to 100 mg BID. Continue current dose Keppra  Will continue VEEG

## 2019-07-05 ENCOUNTER — TRANSCRIPTION ENCOUNTER (OUTPATIENT)
Age: 74
End: 2019-07-05

## 2019-07-05 LAB
GLUCOSE BLDC GLUCOMTR-MCNC: 313 MG/DL — HIGH (ref 70–99)
GLUCOSE BLDC GLUCOMTR-MCNC: 359 MG/DL — HIGH (ref 70–99)

## 2019-07-05 PROCEDURE — 99233 SBSQ HOSP IP/OBS HIGH 50: CPT

## 2019-07-05 PROCEDURE — 95951: CPT | Mod: 26

## 2019-07-05 PROCEDURE — 99232 SBSQ HOSP IP/OBS MODERATE 35: CPT

## 2019-07-05 RX ORDER — LACOSAMIDE 50 MG/1
1 TABLET ORAL
Qty: 60 | Refills: 3
Start: 2019-07-05 | End: 2019-11-01

## 2019-07-05 RX ORDER — LEVETIRACETAM 250 MG/1
1500 TABLET, FILM COATED ORAL EVERY 12 HOURS
Refills: 0 | Status: DISCONTINUED | OUTPATIENT
Start: 2019-07-05 | End: 2019-07-07

## 2019-07-05 RX ORDER — LEVETIRACETAM 250 MG/1
2 TABLET, FILM COATED ORAL
Qty: 120 | Refills: 3
Start: 2019-07-05 | End: 2019-11-01

## 2019-07-05 RX ADMIN — GABAPENTIN 400 MILLIGRAM(S): 400 CAPSULE ORAL at 22:07

## 2019-07-05 RX ADMIN — SIMVASTATIN 40 MILLIGRAM(S): 20 TABLET, FILM COATED ORAL at 22:07

## 2019-07-05 RX ADMIN — Medication 1 MILLIGRAM(S): at 11:14

## 2019-07-05 RX ADMIN — LEVETIRACETAM 1500 MILLIGRAM(S): 250 TABLET, FILM COATED ORAL at 10:05

## 2019-07-05 RX ADMIN — LACOSAMIDE 100 MILLIGRAM(S): 50 TABLET ORAL at 05:52

## 2019-07-05 RX ADMIN — GABAPENTIN 400 MILLIGRAM(S): 400 CAPSULE ORAL at 14:47

## 2019-07-05 RX ADMIN — LISINOPRIL 20 MILLIGRAM(S): 2.5 TABLET ORAL at 05:52

## 2019-07-05 RX ADMIN — LACOSAMIDE 100 MILLIGRAM(S): 50 TABLET ORAL at 17:06

## 2019-07-05 RX ADMIN — Medication 200 MILLIGRAM(S): at 17:06

## 2019-07-05 RX ADMIN — CHLORHEXIDINE GLUCONATE 1 APPLICATION(S): 213 SOLUTION TOPICAL at 05:52

## 2019-07-05 RX ADMIN — Medication 200 MILLIGRAM(S): at 05:52

## 2019-07-05 RX ADMIN — DULOXETINE HYDROCHLORIDE 60 MILLIGRAM(S): 30 CAPSULE, DELAYED RELEASE ORAL at 11:14

## 2019-07-05 RX ADMIN — LEVETIRACETAM 1500 MILLIGRAM(S): 250 TABLET, FILM COATED ORAL at 22:08

## 2019-07-05 RX ADMIN — GABAPENTIN 400 MILLIGRAM(S): 400 CAPSULE ORAL at 05:52

## 2019-07-05 RX ADMIN — INSULIN GLARGINE 16 UNIT(S): 100 INJECTION, SOLUTION SUBCUTANEOUS at 08:23

## 2019-07-05 RX ADMIN — Medication 8: at 12:02

## 2019-07-05 RX ADMIN — Medication 50 MILLIGRAM(S): at 05:52

## 2019-07-05 RX ADMIN — Medication 100 MICROGRAM(S): at 05:52

## 2019-07-05 RX ADMIN — Medication 2: at 08:23

## 2019-07-05 RX ADMIN — Medication 10: at 16:44

## 2019-07-05 NOTE — PHYSICAL THERAPY INITIAL EVALUATION ADULT - GENERAL OBSERVATIONS, REHAB EVAL
14:00 - 14:20. Chart reviewed. Patient available to be seen for physical therapy, confirmed with nurse. Patient encountered already out of bed in chair. Denies pain at rest, agreeable for PT evaluation

## 2019-07-05 NOTE — DISCHARGE NOTE PROVIDER - CARE PROVIDERS DIRECT ADDRESSES
[email or web address] [email or web address]

## 2019-07-05 NOTE — DISCHARGE NOTE PROVIDER - HOSPITAL COURSE
HPI: Chief Complaint: The patient is a 73y Female complaining of hyperglycemia.	    · HPI Objective Statement: 76y F w/ PMH of DM, HTN, HLD, and CVA w/ no residual deficits presents for hyperglycemia. pt had was admitted recently due to  fall on June 19 from standing in the bathroom this am, walking to bathroom, bumped into sink and fell backwards, striking back of head on the tile floor. + HT, No LOC, +ASA 81mg,  Pt found by family standing after fall. Use cane to ambulate. Take 81mg ASA daily. (29 Jun 2019 05:26        On admission she had LP and placed on VEEG monitoring. LP results were inconclusive of any underlying cause of her condition and autoimmune w/u was done and for outpatient f/u with immunologist. Patient had MRI brain that revealed 1.  Subacute subdural hematoma over the right cerebral convexity and     subfrontal region measuring up to 4 mm in width. Stable mild (3 mm) right     to left midline shift. Small amount of subarachnoid extension of     hemorrhage is noted.        2.  Mild smooth asymmetric dural enhancement over the right cerebral     convexity, interhemispheric fissure and tentorium (corresponding to areas     of current and recent subdural hemorrhages), likely reactive to the     hemorrhages. Recommend a follow-up study in 4-6 weeks to confirm     appropriate resolution.        3.  Moderate chronic microvascular changes.        Patient was placed on VEEG monitoring with AED. VEEG showed   VEEG in the last 24 hours:        Background-------reaching frequencies in the range of 8-9 hz        Focal and generalized slowing---right hemispheric focal slowing.            Interictal activity-- large/frequent  number of right anterior quadrants  spikes at times appearing in psuedo-periodic  pattern        Events------none        Seizures---none                    # Left Hemiparesis     - Shayan's Paralysis (improved)    - From Rt Hemispheric Seizures - correlation             # Seizure Disorder (acute dx)    -Per neuro; recommend increasing Vimpat to 100 mg BID. Continue current dose Keppra    -Discontinue VEEG and discharge home today per my discussion with neurologist. recommends outpatient f/u with Dr zepeda in 1 week and outpatient w/u            # Fever     - Infection ruled out, Blood Cx Neg to date    - Likely Central Fever?? Infection ruled out     - Possible Autoimmune etiology             # Depression     - cymbalta.             # Hypertension.     - Lisinopril / HCTZ                 # DM (diabetes mellitus)    - insulin    - fsg mon.     - Lantus / RI             # Hypothyroid     - synthroid.            # Hypercholesteremia.      - statin.        # Chronic pain     - methadone    - neuro recommends methadone taper. Detox consulted and recommendations noted.             Impression:  abnormal as above

## 2019-07-05 NOTE — PROGRESS NOTE ADULT - SUBJECTIVE AND OBJECTIVE BOX
Neurology Follow up note    Name  LINDSAY ALBERT    HPI:  · Chief Complaint: The patient is a 73y Female complaining of hyperglycemia.	  · HPI Objective Statement: 76y F w/ PMH of DM, HTN, HLD, and CVA w/ no residual deficits presents for hyperglycemia. pt had was admitted recently due to  fall on June 19 from standing in the bathroom this am, walking to bathroom, bumped into sink and fell backwards, striking back of head on the tile floor. + HT, No LOC, +ASA 81mg,  Pt found by family standing after fall. Use cane to ambulate. Take 81mg ASA daily. (29 Jun 2019 05:26)      NEURO:  PATIENT IS STABLE WITH NORMAL MS  NOW EVENTS OVERNIGHT  PATIENT UNSTEADY WHEN AMBULATING THIS AM      Vital Signs Last 24 Hrs  T(C): 36.6 (05 Jul 2019 05:00), Max: 38.1 (04 Jul 2019 14:28)  T(F): 97.9 (05 Jul 2019 05:00), Max: 100.5 (04 Jul 2019 14:28)  HR: 80 (05 Jul 2019 05:00) (80 - 95)  BP: 162/74 (05 Jul 2019 05:00) (117/45 - 162/74)  BP(mean): --  RR: 16 (05 Jul 2019 05:00) (16 - 16)  SpO2: --    Neurological Exam:   Mental status: Awake, alert and oriented x3.  Recent and remote memory intact.  Naming, repetition and comprehension intact.  Attention/concentration intact.  No dysarthria, no aphasia.  Fund of knowledge appropriate.    Cranial nerves: Pupils equally round and reactive to light, visual fields full, no nystagmus, extraocular muscles intact, V1 through V3 intact bilaterally and symmetric, face symmetric, hearing intact to finger rub, palate elevation symmetric, tongue was midline.  Motor:  MRC grading 5/5 b/l UE/LE.   strength 5/5.  Normal tone and bulk.  No abnormal movements.    Sensation: Intact to light touch, proprioception, and pinprick.   Coordination: No dysmetria on finger-to-nose and heel-to-shin.  No dysdiadokinesia.  Reflexes: 2+ in bilateral UE/LE, downgoing toes bilaterally. (-) Benitez.  UNSTEADY NEEDS 2 PERSON ASST    Medications  acetaminophen  Suppository .. 650 milliGRAM(s) Rectal every 6 hours PRN  chlorhexidine 4% Liquid 1 Application(s) Topical <User Schedule>  dextrose 40% Gel 15 Gram(s) Oral once PRN  dextrose 5%. 1000 milliLiter(s) IV Continuous <Continuous>  dextrose 50% Injectable 12.5 Gram(s) IV Push once  dextrose 50% Injectable 25 Gram(s) IV Push once  dextrose 50% Injectable 25 Gram(s) IV Push once  DULoxetine 60 milliGRAM(s) Oral daily  folic acid 1 milliGRAM(s) Oral daily  gabapentin 400 milliGRAM(s) Oral three times a day  glucagon  Injectable 1 milliGRAM(s) IntraMuscular once PRN  hydrochlorothiazide 50 milliGRAM(s) Oral daily  hydroxychloroquine 200 milliGRAM(s) Oral two times a day  insulin glargine Injectable (LANTUS) 16 Unit(s) SubCutaneous every morning  insulin lispro (HumaLOG) corrective regimen sliding scale   SubCutaneous three times a day before meals  lacosamide 100 milliGRAM(s) Oral two times a day  levETIRAcetam 1500 milliGRAM(s) Oral every 12 hours  levothyroxine 100 MICROGram(s) Oral daily  lisinopril 20 milliGRAM(s) Oral daily  methadone    Tablet 15 milliGRAM(s) Oral every 12 hours  simvastatin Oral Tab/Cap - Peds 40 milliGRAM(s) Oral at bedtime  sodium chloride 0.9%. 1000 milliLiter(s) IV Continuous <Continuous>  sodium chloride 0.9%. 1000 milliLiter(s) IV Continuous <Continuous>      Lab                    Radiology      Assessment: 72 YO FEMALE WITH RECENT SDH, HX OF LUPUS ADM WITH ACUTE AMS. INITIAL EEG WITH R SIDED EPILEPTIFORM ACTIVITY, BLEED POSSIBLE FOCUS AS THERE WAS ALSO SAH NOTED. PATIENT IMPROVED WITH KEPPRA 1500 MG Q 12 AND VIMPAT 100 MG Q 12. VEEG D/C'D. MRI WITH EVIDENCE OF SUBACUTE SDH. NO EVIDENCE OF OTHER ACUTE CHANGE.   LP ABN WITH HIGH PROTEIN AND MILD PLEOCYTOSIS  TPO Abs elevated at 728. ? HASHIMOTOS ENCEPHALITIS ALTHOUGH LESS LIKELY AS PATIENT DRAMATICALLY IMPROVED WITHOUT STEROID TX  MIR 1:1280    Plan:  PT AND POSSIBLE REHAB  WILL REPEAT MRI BRAIN AND POSSIBLE FUNCTIONAL NEUROIMAGING AS OUTPT  CONSIDER REPEAT LP BUT CAN HOLD FOR NOW AS PATIENT, AGAIN IS IMPROVED  REPEAT TPO ABs  f/u remainder of AI serology  f/u with rheum  please call with any questions

## 2019-07-05 NOTE — DISCHARGE NOTE PROVIDER - NSDCCPCAREPLAN_GEN_ALL_CORE_FT
PRINCIPAL DISCHARGE DIAGNOSIS  Diagnosis: Seizure  Assessment and Plan of Treatment: continue AED as recommended by neurologist and f/u with neuro in 1 week

## 2019-07-05 NOTE — DISCHARGE NOTE PROVIDER - CARE PROVIDER_API CALL
Heidi Aguiar)  Neurology  [address], [address]  [place] [address]  Phone: [phone]  Fax: [phone]  Follow Up Time: 1 week    Clem Camacho)  Internal Medicine  [address] [place] [address]  Phone: [phone]  Fax: [phone]  Follow Up Time: 2 weeks

## 2019-07-05 NOTE — PHYSICAL THERAPY INITIAL EVALUATION ADULT - PHYSICAL ASSIST/NONPHYSICAL ASSIST: GAIT, REHAB EVAL
for step length, posture, parminder, base of support, safety / direction/verbal cues/1 person assist

## 2019-07-05 NOTE — PROGRESS NOTE ADULT - SUBJECTIVE AND OBJECTIVE BOX
HPI:  · Chief Complaint: The patient is a 73y Female complaining of hyperglycemia.	  · HPI Objective Statement: 76y F w/ PMH of DM, HTN, HLD, and CVA w/ no residual deficits presents for hyperglycemia. pt had was admitted recently due to  fall on June 19 from standing in the bathroom this am, walking to bathroom, bumped into sink and fell backwards, striking back of head on the tile floor. + HT, No LOC, +ASA 81mg,  Pt found by family standing after fall. Use cane to ambulate. Take 81mg ASA daily. (29 Jun 2019 05:26)    Subjective:  Patient seen and evaluated earlier this morning, mental status back to baseline and has no complaints.     PAST MEDICAL & SURGICAL HISTORY:  Seizure: keppra  Herniated lumbar intervertebral disc: as per hx  Stroke: TIA 2002, no deficits  Fall, initial encounter: as  hx  Lupus: as per hx  Hypertension: hctz  Hypercholesteremia: statin  DM (diabetes mellitus): insulin  fs achs  No significant past surgical history    MEDICATIONS  (STANDING):  chlorhexidine 4% Liquid 1 Application(s) Topical <User Schedule>  dextrose 5%. 1000 milliLiter(s) (50 mL/Hr) IV Continuous <Continuous>  dextrose 50% Injectable 12.5 Gram(s) IV Push once  dextrose 50% Injectable 25 Gram(s) IV Push once  dextrose 50% Injectable 25 Gram(s) IV Push once  DULoxetine 60 milliGRAM(s) Oral daily  folic acid 1 milliGRAM(s) Oral daily  gabapentin 400 milliGRAM(s) Oral three times a day  hydrochlorothiazide 50 milliGRAM(s) Oral daily  hydroxychloroquine 200 milliGRAM(s) Oral two times a day  insulin glargine Injectable (LANTUS) 16 Unit(s) SubCutaneous every morning  insulin lispro (HumaLOG) corrective regimen sliding scale   SubCutaneous three times a day before meals  lacosamide 50 milliGRAM(s) Oral two times a day  levETIRAcetam  IVPB 1500 milliGRAM(s) IV Intermittent every 12 hours  levothyroxine 100 MICROGram(s) Oral daily  lisinopril 20 milliGRAM(s) Oral daily  methadone    Tablet 20 milliGRAM(s) Oral every 12 hours  QUEtiapine 12.5 milliGRAM(s) Oral daily  simvastatin Oral Tab/Cap - Peds 40 milliGRAM(s) Oral at bedtime  sodium chloride 0.9%. 1000 milliLiter(s) (75 mL/Hr) IV Continuous <Continuous>  sodium chloride 0.9%. 1000 milliLiter(s) (75 mL/Hr) IV Continuous <Continuous>    MEDICATIONS  (PRN):  acetaminophen  Suppository .. 650 milliGRAM(s) Rectal every 6 hours PRN Temp greater or equal to 38C (100.4F), Mild Pain (1 - 3)  dextrose 40% Gel 15 Gram(s) Oral once PRN Blood Glucose LESS THAN 70 milliGRAM(s)/deciliter  glucagon  Injectable 1 milliGRAM(s) IntraMuscular once PRN Glucose LESS THAN 70 milligrams/deciliter  LORazepam   Injectable 2 milliGRAM(s) IV Push once PRN agitation during MRI  oxyCODONE    5 mG/acetaminophen 325 mG 1 Tablet(s) Oral every 6 hours PRN Severe Pain (7 - 10)    Objective:   Vital Signs Last 24 Hrs  T(C): 36.7 (04 Jul 2019 05:00), Max: 37.4 (03 Jul 2019 21:00)  T(F): 98.1 (04 Jul 2019 05:00), Max: 99.3 (03 Jul 2019 21:00)  HR: 87 (04 Jul 2019 05:00) (85 - 90)  BP: 140/69 (04 Jul 2019 05:00) (131/65 - 146/66)  BP(mean): --  RR: 16 (04 Jul 2019 05:00) (16 - 16)  SpO2: --    Physical exam  General: elderly lady, NAD  Neurology: A&Ox3, nonfocal,   HEENT: NC/AT, MMM  Respiratory: CTA B/L, No wheezing, rales, rhonchi  CV: RRR, S1S2, no murmurs, rubs or gallops  Abdominal: Soft, NT, ND +BS,   Extremities: No edema, + peripheral pulses  Skin: No Rashes, Hematoma, Ecchymosis    LABS:  none

## 2019-07-05 NOTE — PROGRESS NOTE ADULT - PROVIDER SPECIALTY LIST ADULT
Hospitalist
Infectious Disease
Infectious Disease
Intervent Cardiology
Neurology

## 2019-07-05 NOTE — PHYSICAL THERAPY INITIAL EVALUATION ADULT - TRANSFER SAFETY CONCERNS NOTED: SIT/STAND, REHAB EVAL
decreased weight-shifting ability/decreased safety awareness/losing balance/decreased balance during turns/decreased step length

## 2019-07-05 NOTE — CHART NOTE - NSCHARTNOTEFT_GEN_A_CORE
Neurology/Epilepsy NP:    Vimpat 100[facility]12hrs [facility] (see confirmation in the chart).  Patient and son made aware.

## 2019-07-05 NOTE — PROGRESS NOTE ADULT - ASSESSMENT
Assessment and Plan:    73F PMH SDH (subdural hematoma) due to fall, admitted for seizure and on VEEG      # Left Hemiparesis   - Shayan's Paralysis (improved)  - From Rt Hemispheric Seizures - correlation       # Seizure Disorder (acute dx)  -Per neuro; recommend increasing Vimpat to 100 mg BID. Continue current dose Keppra  -Discontinue VEEG and discharge home today per my discussion with neurologist. recommends outpatient f/u with Dr zepeda in 1 week and outpatient w/u      # Fever   - Infection ruled out, Blood Cx Neg to date  - Likely Central Fever?? Infection ruled out   - Possible Autoimmune etiology       # Depression   - cymbalta.       # Hypertension.   - Lisinopril / HCTZ         # DM (diabetes mellitus)  - insulin  - fsg mon.   - Lantus / RI       # Hypothyroid   - synthroid.      # Hypercholesteremia.    - statin.    # Chronic pain   - methadone  - neuro recommends methadone taper. Detox consulted and recommendations noted.     #Progress Note Handoff  Tests: routine   Test Results: All autoimmune w/up sent  Family Discussion: none today  Future Disposition: Home

## 2019-07-05 NOTE — PROGRESS NOTE ADULT - SUBJECTIVE AND OBJECTIVE BOX
Epilepsy Attending Note:     LINDSAY ALBERT    73y Female  MRN [id number]    Vital Signs Last 24 Hrs  T(C): 36.6 (05 Jul 2019 05:00), Max: 38.1 (04 Jul 2019 14:28)  T(F): 97.9 (05 Jul 2019 05:00), Max: 100.5 (04 Jul 2019 14:28)  HR: 80 (05 Jul 2019 05:00) (80 - 95)  BP: 162/74 (05 Jul 2019 05:00) (117/45 - 162/74)  BP(mean): --  RR: 16 (05 Jul 2019 05:00) (16 - 16)  SpO2: --                MEDICATIONS  (STANDING):  chlorhexidine 4% Liquid 1 Application(s) Topical <User Schedule>  dextrose 5%. 1000 milliLiter(s) (50 mL/Hr) IV Continuous <Continuous>  dextrose 50% Injectable 12.5 Gram(s) IV Push once  dextrose 50% Injectable 25 Gram(s) IV Push once  dextrose 50% Injectable 25 Gram(s) IV Push once  DULoxetine 60 milliGRAM(s) Oral daily  folic acid 1 milliGRAM(s) Oral daily  gabapentin 400 milliGRAM(s) Oral three times a day  hydrochlorothiazide 50 milliGRAM(s) Oral daily  hydroxychloroquine 200 milliGRAM(s) Oral two times a day  insulin glargine Injectable (LANTUS) 16 Unit(s) SubCutaneous every morning  insulin lispro (HumaLOG) corrective regimen sliding scale   SubCutaneous three times a day before meals  lacosamide 100 milliGRAM(s) Oral two times a day  levETIRAcetam  IVPB 1500 milliGRAM(s) IV Intermittent every 12 hours  levothyroxine 100 MICROGram(s) Oral daily  lisinopril 20 milliGRAM(s) Oral daily  methadone    Tablet 15 milliGRAM(s) Oral every 12 hours  simvastatin Oral Tab/Cap - Peds 40 milliGRAM(s) Oral at bedtime  sodium chloride 0.9%. 1000 milliLiter(s) (75 mL/Hr) IV Continuous <Continuous>  sodium chloride 0.9%. 1000 milliLiter(s) (75 mL/Hr) IV Continuous <Continuous>    MEDICATIONS  (PRN):  acetaminophen  Suppository .. 650 milliGRAM(s) Rectal every 6 hours PRN Temp greater or equal to 38C (100.4F), Mild Pain (1 - 3)  dextrose 40% Gel 15 Gram(s) Oral once PRN Blood Glucose LESS THAN 70 milliGRAM(s)/deciliter  glucagon  Injectable 1 milliGRAM(s) IntraMuscular once PRN Glucose LESS THAN 70 milligrams/deciliter            VEEG in the last 24 hours:    Background-------reaching frequencies in the range of 8-9 hz    Focal and generalized slowing---right hemispheric focal slowing.      Interictal activity-- large/frequent  number of right anterior quadrants  spikes at times appearing in psuedo-periodic  pattern    Events------none    Seizures---none    Impression:  abnormal as above    Plan - Discussed with the patient and the neurology team          will DC the monitoring

## 2019-07-05 NOTE — PHYSICAL THERAPY INITIAL EVALUATION ADULT - GAIT DEVIATIONS NOTED, PT EVAL
decreased parminder/decreased step length/decreased weight-shifting ability/increased time in double stance/decreased heel strike / push off, narrow base of support, stooped posture, unsteady gait

## 2019-07-05 NOTE — DISCHARGE NOTE PROVIDER - PROVIDER TOKENS
PROVIDER:[TOKEN:[[id number]],FOLLOWUP:[1 week]],PROVIDER:[TOKEN:[[id number]],FOLLOWUP:[2 weeks]]

## 2019-07-05 NOTE — PHYSICAL THERAPY INITIAL EVALUATION ADULT - IMPAIRMENTS FOUND, PT EVAL
muscle strength/posture/gait, locomotion, and balance/aerobic capacity/endurance/cognitive impairment

## 2019-07-05 NOTE — PHYSICAL THERAPY INITIAL EVALUATION ADULT - IMPAIRED TRANSFERS: SIT/STAND, REHAB EVAL
impaired postural control/impaired balance/decreased strength/narrow base of support/decreased endurance

## 2019-07-06 LAB
GLUCOSE BLDC GLUCOMTR-MCNC: 319 MG/DL — HIGH (ref 70–99)
GLUCOSE BLDC GLUCOMTR-MCNC: 356 MG/DL — HIGH (ref 70–99)
GLUCOSE BLDC GLUCOMTR-MCNC: 453 MG/DL — CRITICAL HIGH (ref 70–99)
GLUCOSE BLDC GLUCOMTR-MCNC: 507 MG/DL — CRITICAL HIGH (ref 70–99)
GLUCOSE BLDC GLUCOMTR-MCNC: 588 MG/DL — CRITICAL HIGH (ref 70–99)
GLUCOSE BLDC GLUCOMTR-MCNC: 599 MG/DL — CRITICAL HIGH (ref 70–99)
GLUCOSE BLDC GLUCOMTR-MCNC: >600 MG/DL — CRITICAL HIGH (ref 70–99)

## 2019-07-06 PROCEDURE — 99233 SBSQ HOSP IP/OBS HIGH 50: CPT

## 2019-07-06 RX ORDER — POLYETHYLENE GLYCOL 3350 17 G/17G
17 POWDER, FOR SOLUTION ORAL
Qty: 0 | Refills: 0 | DISCHARGE
Start: 2019-07-06

## 2019-07-06 RX ORDER — INSULIN LISPRO 100/ML
12 VIAL (ML) SUBCUTANEOUS ONCE
Refills: 0 | Status: COMPLETED | OUTPATIENT
Start: 2019-07-06 | End: 2019-07-06

## 2019-07-06 RX ORDER — DICLOFENAC SODIUM 75 MG/1
1 TABLET, DELAYED RELEASE ORAL
Qty: 0 | Refills: 0 | DISCHARGE

## 2019-07-06 RX ORDER — METHADONE HYDROCHLORIDE 40 MG/1
20 TABLET ORAL
Qty: 0 | Refills: 0 | DISCHARGE

## 2019-07-06 RX ORDER — NYSTATIN CREAM 100000 [USP'U]/G
1 CREAM TOPICAL THREE TIMES A DAY
Refills: 0 | Status: DISCONTINUED | OUTPATIENT
Start: 2019-07-06 | End: 2019-07-07

## 2019-07-06 RX ORDER — OXYCODONE AND ACETAMINOPHEN 5; 325 MG/1; MG/1
0 TABLET ORAL
Qty: 0 | Refills: 0 | DISCHARGE

## 2019-07-06 RX ORDER — POLYETHYLENE GLYCOL 3350 17 G/17G
17 POWDER, FOR SOLUTION ORAL ONCE
Refills: 0 | Status: COMPLETED | OUTPATIENT
Start: 2019-07-06 | End: 2019-07-06

## 2019-07-06 RX ORDER — POLYETHYLENE GLYCOL 3350 17 G/17G
17 POWDER, FOR SOLUTION ORAL DAILY
Refills: 0 | Status: DISCONTINUED | OUTPATIENT
Start: 2019-07-06 | End: 2019-07-07

## 2019-07-06 RX ADMIN — POLYETHYLENE GLYCOL 3350 17 GRAM(S): 17 POWDER, FOR SOLUTION ORAL at 17:33

## 2019-07-06 RX ADMIN — SIMVASTATIN 40 MILLIGRAM(S): 20 TABLET, FILM COATED ORAL at 21:57

## 2019-07-06 RX ADMIN — DULOXETINE HYDROCHLORIDE 60 MILLIGRAM(S): 30 CAPSULE, DELAYED RELEASE ORAL at 11:12

## 2019-07-06 RX ADMIN — INSULIN GLARGINE 16 UNIT(S): 100 INJECTION, SOLUTION SUBCUTANEOUS at 08:09

## 2019-07-06 RX ADMIN — Medication 12: at 08:09

## 2019-07-06 RX ADMIN — Medication 1 MILLIGRAM(S): at 11:12

## 2019-07-06 RX ADMIN — METHADONE HYDROCHLORIDE 10 MILLIGRAM(S): 40 TABLET ORAL at 11:10

## 2019-07-06 RX ADMIN — Medication 12 UNIT(S): at 21:56

## 2019-07-06 RX ADMIN — Medication 200 MILLIGRAM(S): at 19:24

## 2019-07-06 RX ADMIN — NYSTATIN CREAM 1 APPLICATION(S): 100000 CREAM TOPICAL at 21:57

## 2019-07-06 RX ADMIN — Medication 200 MILLIGRAM(S): at 05:59

## 2019-07-06 RX ADMIN — Medication 100 MICROGRAM(S): at 06:00

## 2019-07-06 RX ADMIN — LEVETIRACETAM 1500 MILLIGRAM(S): 250 TABLET, FILM COATED ORAL at 21:57

## 2019-07-06 RX ADMIN — METHADONE HYDROCHLORIDE 10 MILLIGRAM(S): 40 TABLET ORAL at 21:57

## 2019-07-06 RX ADMIN — GABAPENTIN 400 MILLIGRAM(S): 400 CAPSULE ORAL at 05:59

## 2019-07-06 RX ADMIN — LEVETIRACETAM 1500 MILLIGRAM(S): 250 TABLET, FILM COATED ORAL at 11:11

## 2019-07-06 RX ADMIN — CHLORHEXIDINE GLUCONATE 1 APPLICATION(S): 213 SOLUTION TOPICAL at 05:59

## 2019-07-06 RX ADMIN — GABAPENTIN 400 MILLIGRAM(S): 400 CAPSULE ORAL at 17:34

## 2019-07-06 RX ADMIN — NYSTATIN CREAM 1 APPLICATION(S): 100000 CREAM TOPICAL at 07:08

## 2019-07-06 RX ADMIN — Medication 8: at 16:45

## 2019-07-06 RX ADMIN — Medication 10: at 11:55

## 2019-07-06 RX ADMIN — GABAPENTIN 400 MILLIGRAM(S): 400 CAPSULE ORAL at 21:57

## 2019-07-06 RX ADMIN — LACOSAMIDE 100 MILLIGRAM(S): 50 TABLET ORAL at 06:00

## 2019-07-06 RX ADMIN — Medication 50 MILLIGRAM(S): at 05:59

## 2019-07-06 RX ADMIN — LISINOPRIL 20 MILLIGRAM(S): 2.5 TABLET ORAL at 06:00

## 2019-07-06 RX ADMIN — LACOSAMIDE 100 MILLIGRAM(S): 50 TABLET ORAL at 19:26

## 2019-07-06 RX ADMIN — NYSTATIN CREAM 1 APPLICATION(S): 100000 CREAM TOPICAL at 13:26

## 2019-07-06 NOTE — DIETITIAN INITIAL EVALUATION ADULT. - PHYSICAL APPEARANCE
Patient noted as alert/confused however able to answer questions appropriately during assessment. Skin intact. Patient reports ht 61 inches, wt stable at 140#. BMI 26. Moderate muscle wasting to temporal region.

## 2019-07-06 NOTE — DIETITIAN INITIAL EVALUATION ADULT. - PROBLEM SELECTOR PLAN 1
pt with HX of recent fall with SDH was admitted to hospital and during her treatement went to ICU had a fever was consulted by neurosurgery and infectious disease. Now Fever again.  LP done in Ed F/u result  need to contact ID for urgent consult due to fever from unknown origin and possible neuro related or fall related

## 2019-07-06 NOTE — DIETITIAN INITIAL EVALUATION ADULT. - DIET TYPE
As above Recommend adding DASH/TLC and carb consistent (evening snack) diet modifiers to current diet order.

## 2019-07-06 NOTE — DIETITIAN INITIAL EVALUATION ADULT. - PERTINENT MEDS FT
hydroxychloroquine, IVF, insulin, methadone, keppra, lacosamide, tylenol, cymbalta, folic acid, neurontin, hydrrochlorothiazine, synthroid, lisinopril, simvastatin

## 2019-07-06 NOTE — DIETITIAN INITIAL EVALUATION ADULT. - ENERGY NEEDS
Calories: MSJ x 1.2-1.3 Calories: MSJ x 1.2-1.3= 5738-4090 kcal/day  Protein: 1.0-1.2 g/kg= 64-76 g/day  Fluid: 1 mL/kcal

## 2019-07-06 NOTE — PROGRESS NOTE ADULT - SUBJECTIVE AND OBJECTIVE BOX
Progress Note:  Provider Speciality                            Hospitalist      LINDSAY ALBERT [id number] 73y Female     CHIEF PRESENTING COMPLAINT:  Patient is a 73y old  Female who presents with a chief complaint of complaining of hyperglycemia , fever. (05 Jul 2019 14:13)        SUBJECTIVE:  Patient was seen and examined at bedside. Reports improvement in  presenting complaint. No significant overnight events reported.     HISTORY OF PRESENTING ILLNESS:  HPI:  · Chief Complaint: The patient is a 73y Female complaining of hyperglycemia.	  · HPI Objective Statement: 76y F w/ PMH of DM, HTN, HLD, and CVA w/ no residual deficits presents for hyperglycemia. pt had was admitted recently due to  fall on June 19 from standing in the bathroom this am, walking to bathroom, bumped into sink and fell backwards, striking back of head on the tile floor. + HT, No LOC, +ASA 81mg,  Pt found by family standing after fall. Use cane to ambulate. Take 81mg ASA daily. (29 Jun 2019 05:26)      PAST MEDICAL & SURGICAL HISTORY:  PAST MEDICAL & SURGICAL HISTORY:  Seizure: keppra  Herniated lumbar intervertebral disc: as per hx  Stroke: TIA 2002, no deficits  Fall, initial encounter: as  hx  Lupus: as per hx  Hypertension: hctz  Hypercholesteremia: statin  DM (diabetes mellitus): insulin  fs achs  No significant past surgical history      VITAL SIGNS:  Vital Signs Last 24 Hrs  T(C): 36.7 (06 Jul 2019 05:10), Max: 37.3 (05 Jul 2019 14:21)  T(F): 98.1 (06 Jul 2019 05:10), Max: 99.2 (05 Jul 2019 14:21)  HR: 93 (06 Jul 2019 05:10) (85 - 93)  BP: 145/66 (06 Jul 2019 05:10) (122/59 - 156/72)  BP(mean): --  RR: 16 (06 Jul 2019 05:10) (16 - 16)  SpO2: --    PHYSICAL EXAMINATION:  Not in acute distress  General: No pallor, or icterus, afebrile  HEENT:  DAHLIA, EOMI, no JVD, no Bruit.  Heart: S1+S2 audible, no murmur  Lungs: bilateral  fair air entry, no wheezing, no crepitations.  Abdomen: Soft, non-tender, non-distended , no  rigidity or guarding.  CNS: AAOx3, CN  grossly intact.  Extremities:  No edema          REVIEW OF SYSTEMS:  Patient denies any headache, any vision complaints, runny nose, fever, chills, sore throat. Denies chest pain, shortness of breath, palpitation. Denies nausea, vomiting, abdominal pain, diarrhoea, Denies urinary burning, urgency, frequency, dysuria. Denies weakness in any part of the body or numbness.   At least 10 systems were reviewed in ROS. All systems reviewed  are within normal limits except for the complaints as described in Subjective.    CONSULTS:  Consultant(s) Notes Reviewed by me.   Care Discussed with Consultants/Other Providers where required.        MEDICATIONS:  MEDICATIONS  (STANDING):  chlorhexidine 4% Liquid 1 Application(s) Topical <User Schedule>  dextrose 5%. 1000 milliLiter(s) (50 mL/Hr) IV Continuous <Continuous>  dextrose 50% Injectable 12.5 Gram(s) IV Push once  dextrose 50% Injectable 25 Gram(s) IV Push once  dextrose 50% Injectable 25 Gram(s) IV Push once  DULoxetine 60 milliGRAM(s) Oral daily  folic acid 1 milliGRAM(s) Oral daily  gabapentin 400 milliGRAM(s) Oral three times a day  hydrochlorothiazide 50 milliGRAM(s) Oral daily  hydroxychloroquine 200 milliGRAM(s) Oral two times a day  insulin glargine Injectable (LANTUS) 16 Unit(s) SubCutaneous every morning  insulin lispro (HumaLOG) corrective regimen sliding scale   SubCutaneous three times a day before meals  lacosamide 100 milliGRAM(s) Oral two times a day  levETIRAcetam 1500 milliGRAM(s) Oral every 12 hours  levothyroxine 100 MICROGram(s) Oral daily  lisinopril 20 milliGRAM(s) Oral daily  methadone    Tablet 10 milliGRAM(s) Oral every 12 hours  methadone    Tablet   Oral   nystatin Powder 1 Application(s) Topical three times a day  polyethylene glycol 3350 17 Gram(s) Oral once  simvastatin Oral Tab/Cap - Peds 40 milliGRAM(s) Oral at bedtime  sodium chloride 0.9%. 1000 milliLiter(s) (75 mL/Hr) IV Continuous <Continuous>  sodium chloride 0.9%. 1000 milliLiter(s) (75 mL/Hr) IV Continuous <Continuous>    MEDICATIONS  (PRN):  acetaminophen  Suppository .. 650 milliGRAM(s) Rectal every 6 hours PRN Temp greater or equal to 38C (100.4F), Mild Pain (1 - 3)  dextrose 40% Gel 15 Gram(s) Oral once PRN Blood Glucose LESS THAN 70 milliGRAM(s)/deciliter  glucagon  Injectable 1 milliGRAM(s) IntraMuscular once PRN Glucose LESS THAN 70 milligrams/deciliter  polyethylene glycol 3350 17 Gram(s) Oral daily PRN Constipation      LABOROTORY DATA/MICROBIOLOGY/I & O's:              CAPILLARY BLOOD GLUCOSE      POCT Blood Glucose.: 356 mg/dL (06 Jul 2019 11:24)  POCT Blood Glucose.: 453 mg/dL (06 Jul 2019 09:49)  POCT Blood Glucose.: 599 mg/dL (06 Jul 2019 07:42)  POCT Blood Glucose.: 359 mg/dL (05 Jul 2019 16:16)  POCT Blood Glucose.: 313 mg/dL (05 Jul 2019 11:53)                        ASSESSMENT:    73 year old woman with history of  SDH (subdural hematoma) due to fall, admitted for seizure and on VEEG    ASSESSMENT:  Recent fall with subacute SDH   Seizure Disorder  Hypertension   Diabetes mellitis type 2   depression  Dyslipidemia   Hypothyroidism   Hyperglycemia      PLAN:      MRi with subacute SDH  Left Hemiparesis   -Shayan's Paralysis (improved)      # Seizure Disorder (acute dx)  -Per neuro; recommend increasing Vimpat to 100 mg BID. Continue current dose Keppra  -Discontinue VEEG and discharge home today per my discussion with neurologist. recommends outpatient f/u with Dr zepeda in 1 week and outpatient w/u      # Fever   - Infection ruled out, Blood Cx Neg to date  - Likely Central Fever?? Infection ruled out   - Possible Autoimmune etiology       # Depression   - cymbalta.       # Hypertension.   - Lisinopril / HCTZ         # DM (diabetes mellitus)  - insulin  - fsg mon.   - Lantus / RI       # Hypothyroid   - synthroid.      # Hypercholesteremia.    - statin.    # Chronic pain   - methadone  - neuro recommends methadone taper. Detox consulted and recommendations noted.     #Progress Note Handoff  Tests: routine   Test Results: All autoimmune w/up sent  Family Discussion: none today  Future Disposition: STR

## 2019-07-06 NOTE — DIETITIAN INITIAL EVALUATION ADULT. - ADD RECOMMEND
Goals: Patient to continue to consume >75% of trays upon f/u in 7 days, 1 BM/day. RD to monitor diet order, energy intake, body composition (wt trends), nutrition focused physical findings (BM), glucose/endocrine profile.

## 2019-07-07 ENCOUNTER — TRANSCRIPTION ENCOUNTER (OUTPATIENT)
Age: 74
End: 2019-07-07

## 2019-07-07 VITALS
DIASTOLIC BLOOD PRESSURE: 63 MMHG | RESPIRATION RATE: 16 BRPM | HEART RATE: 84 BPM | SYSTOLIC BLOOD PRESSURE: 145 MMHG | TEMPERATURE: 98 F

## 2019-07-07 LAB
GLUCOSE BLDC GLUCOMTR-MCNC: 231 MG/DL — HIGH (ref 70–99)
GLUCOSE BLDC GLUCOMTR-MCNC: 276 MG/DL — HIGH (ref 70–99)
GLUCOSE BLDC GLUCOMTR-MCNC: 313 MG/DL — HIGH (ref 70–99)
VOLTAGE-GATED K CHANNEL AB RESULT: 17 PMOL/L — SIGNIFICANT CHANGE UP (ref 0–31)

## 2019-07-07 PROCEDURE — 99239 HOSP IP/OBS DSCHRG MGMT >30: CPT

## 2019-07-07 RX ORDER — METHADONE HYDROCHLORIDE 40 MG/1
20 TABLET ORAL
Qty: 0 | Refills: 0 | DISCHARGE

## 2019-07-07 RX ORDER — DULOXETINE HYDROCHLORIDE 30 MG/1
0 CAPSULE, DELAYED RELEASE ORAL
Qty: 0 | Refills: 0 | DISCHARGE

## 2019-07-07 RX ORDER — INSULIN GLARGINE 100 [IU]/ML
10 INJECTION, SOLUTION SUBCUTANEOUS AT BEDTIME
Refills: 0 | Status: DISCONTINUED | OUTPATIENT
Start: 2019-07-07 | End: 2019-07-07

## 2019-07-07 RX ORDER — INSULIN HUMAN 100 [IU]/ML
12 INJECTION, SOLUTION SUBCUTANEOUS ONCE
Refills: 0 | Status: COMPLETED | OUTPATIENT
Start: 2019-07-07 | End: 2019-07-07

## 2019-07-07 RX ORDER — INSULIN LISPRO 100/ML
5 VIAL (ML) SUBCUTANEOUS
Refills: 0 | Status: DISCONTINUED | OUTPATIENT
Start: 2019-07-07 | End: 2019-07-07

## 2019-07-07 RX ADMIN — LACOSAMIDE 100 MILLIGRAM(S): 50 TABLET ORAL at 06:37

## 2019-07-07 RX ADMIN — INSULIN GLARGINE 16 UNIT(S): 100 INJECTION, SOLUTION SUBCUTANEOUS at 08:33

## 2019-07-07 RX ADMIN — DULOXETINE HYDROCHLORIDE 60 MILLIGRAM(S): 30 CAPSULE, DELAYED RELEASE ORAL at 10:33

## 2019-07-07 RX ADMIN — GABAPENTIN 400 MILLIGRAM(S): 400 CAPSULE ORAL at 06:35

## 2019-07-07 RX ADMIN — Medication 4: at 08:31

## 2019-07-07 RX ADMIN — NYSTATIN CREAM 1 APPLICATION(S): 100000 CREAM TOPICAL at 06:35

## 2019-07-07 RX ADMIN — Medication 200 MILLIGRAM(S): at 06:35

## 2019-07-07 RX ADMIN — Medication 1 MILLIGRAM(S): at 10:33

## 2019-07-07 RX ADMIN — METHADONE HYDROCHLORIDE 10 MILLIGRAM(S): 40 TABLET ORAL at 08:49

## 2019-07-07 RX ADMIN — Medication 50 MILLIGRAM(S): at 06:35

## 2019-07-07 RX ADMIN — INSULIN HUMAN 12 UNIT(S): 100 INJECTION, SOLUTION SUBCUTANEOUS at 00:10

## 2019-07-07 RX ADMIN — Medication 100 MICROGRAM(S): at 06:35

## 2019-07-07 RX ADMIN — LISINOPRIL 20 MILLIGRAM(S): 2.5 TABLET ORAL at 06:35

## 2019-07-07 RX ADMIN — Medication 5 UNIT(S): at 11:53

## 2019-07-07 RX ADMIN — LEVETIRACETAM 1500 MILLIGRAM(S): 250 TABLET, FILM COATED ORAL at 10:34

## 2019-07-07 NOTE — PROGRESS NOTE ADULT - REASON FOR ADMISSION
complaining of hyperglycemia , fever.

## 2019-07-07 NOTE — PROGRESS NOTE ADULT - SUBJECTIVE AND OBJECTIVE BOX
Progress Note:  Provider Speciality                            Hospitalist      LINDSAY ALBERT [id number] 73y Female     CHIEF PRESENTING COMPLAINT:  Patient is a 73y old  Female who presents with a chief complaint of complaining of hyperglycemia , fever. (05 Jul 2019 14:13)        SUBJECTIVE:  Patient was seen and examined at bedside. Reports improvement in  presenting complaint. No significant overnight events reported.     HISTORY OF PRESENTING ILLNESS:  HPI:  · Chief Complaint: The patient is a 73y Female complaining of hyperglycemia.	  · HPI Objective Statement: 76y F w/ PMH of DM, HTN, HLD, and CVA w/ no residual deficits presents for hyperglycemia. pt had was admitted recently due to  fall on June 19 from standing in the bathroom this am, walking to bathroom, bumped into sink and fell backwards, striking back of head on the tile floor. + HT, No LOC, +ASA 81mg,  Pt found by family standing after fall. Use cane to ambulate. Take 81mg ASA daily. (29 Jun 2019 05:26)      PAST MEDICAL & SURGICAL HISTORY:  PAST MEDICAL & SURGICAL HISTORY:  Seizure: keppra  Herniated lumbar intervertebral disc: as per hx  Stroke: TIA 2002, no deficits  Fall, initial encounter: as  hx  Lupus: as per hx  Hypertension: hctz  Hypercholesteremia: statin  DM (diabetes mellitus): insulin  fs achs  No significant past surgical history      VITAL SIGNS:  Vital Signs Last 24 Hrs  T(C): 36.7 (06 Jul 2019 05:10), Max: 37.3 (05 Jul 2019 14:21)  T(F): 98.1 (06 Jul 2019 05:10), Max: 99.2 (05 Jul 2019 14:21)  HR: 93 (06 Jul 2019 05:10) (85 - 93)  BP: 145/66 (06 Jul 2019 05:10) (122/59 - 156/72)  BP(mean): --  RR: 16 (06 Jul 2019 05:10) (16 - 16)  SpO2: --    PHYSICAL EXAMINATION:  Not in acute distress  General: No pallor, or icterus, afebrile  HEENT:  DAHLIA, EOMI, no JVD, no Bruit.  Heart: S1+S2 audible, no murmur  Lungs: bilateral  fair air entry, no wheezing, no crepitations.  Abdomen: Soft, non-tender, non-distended , no  rigidity or guarding.  CNS: AAOx3, CN  grossly intact.  Extremities:  No edema          REVIEW OF SYSTEMS:  Patient denies any headache, any vision complaints, runny nose, fever, chills, sore throat. Denies chest pain, shortness of breath, palpitation. Denies nausea, vomiting, abdominal pain, diarrhoea, Denies urinary burning, urgency, frequency, dysuria. Denies weakness in any part of the body or numbness.   At least 10 systems were reviewed in ROS. All systems reviewed  are within normal limits except for the complaints as described in Subjective.    CONSULTS:  Consultant(s) Notes Reviewed by me.   Care Discussed with Consultants/Other Providers where required.        MEDICATIONS:  MEDICATIONS  (STANDING):  chlorhexidine 4% Liquid 1 Application(s) Topical <User Schedule>  dextrose 5%. 1000 milliLiter(s) (50 mL/Hr) IV Continuous <Continuous>  dextrose 50% Injectable 12.5 Gram(s) IV Push once  dextrose 50% Injectable 25 Gram(s) IV Push once  dextrose 50% Injectable 25 Gram(s) IV Push once  DULoxetine 60 milliGRAM(s) Oral daily  folic acid 1 milliGRAM(s) Oral daily  gabapentin 400 milliGRAM(s) Oral three times a day  hydrochlorothiazide 50 milliGRAM(s) Oral daily  hydroxychloroquine 200 milliGRAM(s) Oral two times a day  insulin glargine Injectable (LANTUS) 16 Unit(s) SubCutaneous every morning  insulin lispro (HumaLOG) corrective regimen sliding scale   SubCutaneous three times a day before meals  lacosamide 100 milliGRAM(s) Oral two times a day  levETIRAcetam 1500 milliGRAM(s) Oral every 12 hours  levothyroxine 100 MICROGram(s) Oral daily  lisinopril 20 milliGRAM(s) Oral daily  methadone    Tablet 10 milliGRAM(s) Oral every 12 hours  methadone    Tablet   Oral   nystatin Powder 1 Application(s) Topical three times a day  polyethylene glycol 3350 17 Gram(s) Oral once  simvastatin Oral Tab/Cap - Peds 40 milliGRAM(s) Oral at bedtime  sodium chloride 0.9%. 1000 milliLiter(s) (75 mL/Hr) IV Continuous <Continuous>  sodium chloride 0.9%. 1000 milliLiter(s) (75 mL/Hr) IV Continuous <Continuous>    MEDICATIONS  (PRN):  acetaminophen  Suppository .. 650 milliGRAM(s) Rectal every 6 hours PRN Temp greater or equal to 38C (100.4F), Mild Pain (1 - 3)  dextrose 40% Gel 15 Gram(s) Oral once PRN Blood Glucose LESS THAN 70 milliGRAM(s)/deciliter  glucagon  Injectable 1 milliGRAM(s) IntraMuscular once PRN Glucose LESS THAN 70 milligrams/deciliter  polyethylene glycol 3350 17 Gram(s) Oral daily PRN Constipation      LABOROTORY DATA/MICROBIOLOGY/I & O's:              CAPILLARY BLOOD GLUCOSE      POCT Blood Glucose.: 356 mg/dL (06 Jul 2019 11:24)  POCT Blood Glucose.: 453 mg/dL (06 Jul 2019 09:49)  POCT Blood Glucose.: 599 mg/dL (06 Jul 2019 07:42)  POCT Blood Glucose.: 359 mg/dL (05 Jul 2019 16:16)  POCT Blood Glucose.: 313 mg/dL (05 Jul 2019 11:53)                        ASSESSMENT:    73 year old woman with history of  SDH (subdural hematoma) due to fall, admitted for seizure and on VEEG    ASSESSMENT:  Recent fall with subacute SDH   Seizure Disorder  Hypertension   Diabetes mellitis type 2   depression  Dyslipidemia   Hypothyroidism   Hyperglycemia      PLAN:      MRi with subacute SDH  Left Hemiparesis   -Shayan's Paralysis (improved)      # Seizure Disorder (acute dx)  -Per neuro; recommend increasing Vimpat to 100 mg BID. Continue current dose Keppra  -Discontinue VEEG and discharge home today per my discussion with neurologist. recommends outpatient f/u with Dr zepeda in 1 week and outpatient w/u      # Fever   - Infection ruled out, Blood Cx Neg to date  - Likely Central Fever?? Infection ruled out   - Possible Autoimmune etiology       # Depression   - cymbalta.       # Hypertension.   - Lisinopril / HCTZ         # DM (diabetes mellitus)  - insulin  - fsg mon.   - Lantus / RI       # Hypothyroid   - synthroid.      # Hypercholesteremia.    - statin.    # Chronic pain   - methadone  - neuro recommends methadone taper. Detox consulted and recommendations noted.     #Progress Note Handoff  Tests: routine   Test Results: All autoimmune w/up sent  Family Discussion: none today  Future Disposition: STR  Possibly today

## 2019-07-07 NOTE — DISCHARGE NOTE NURSING/CASE MANAGEMENT/SOCIAL WORK - NSDCDPATPORTLINK_GEN_ALL_CORE
You can access the [facility], offered by [facility], by registering with the following website: [email or web address]

## 2019-07-08 ENCOUNTER — OUTPATIENT (OUTPATIENT)
Dept: OUTPATIENT SERVICES | Facility: HOSPITAL | Age: 74
LOS: 1 days | Discharge: HOME | End: 2019-07-08

## 2019-07-08 PROBLEM — M51.26 OTHER INTERVERTEBRAL DISC DISPLACEMENT, LUMBAR REGION: Chronic | Status: ACTIVE | Noted: 2019-06-19

## 2019-07-08 PROBLEM — I10 ESSENTIAL (PRIMARY) HYPERTENSION: Chronic | Status: ACTIVE | Noted: 2018-02-07

## 2019-07-08 PROBLEM — E78.00 PURE HYPERCHOLESTEROLEMIA, UNSPECIFIED: Chronic | Status: ACTIVE | Noted: 2018-02-07

## 2019-07-08 PROBLEM — L93.0 DISCOID LUPUS ERYTHEMATOSUS: Chronic | Status: ACTIVE | Noted: 2018-02-07

## 2019-07-08 PROBLEM — E11.9 TYPE 2 DIABETES MELLITUS WITHOUT COMPLICATIONS: Chronic | Status: ACTIVE | Noted: 2018-02-07

## 2019-07-08 PROBLEM — R56.9 UNSPECIFIED CONVULSIONS: Chronic | Status: ACTIVE | Noted: 2019-06-29
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
## 2019-07-09 DIAGNOSIS — R79.9 ABNORMAL FINDING OF BLOOD CHEMISTRY, UNSPECIFIED: ICD-10-CM

## 2019-07-13 ENCOUNTER — INPATIENT (INPATIENT)
Facility: HOSPITAL | Age: 74
LOS: 2 days | Discharge: NURSING, REHAB OR LONG-TERM CARE | End: 2019-07-16
Attending: HOSPITALIST | Admitting: HOSPITALIST
Payer: MEDICARE

## 2019-07-13 VITALS
OXYGEN SATURATION: 98 % | SYSTOLIC BLOOD PRESSURE: 180 MMHG | TEMPERATURE: 99 F | HEART RATE: 92 BPM | RESPIRATION RATE: 20 BRPM | DIASTOLIC BLOOD PRESSURE: 83 MMHG

## 2019-07-13 PROCEDURE — 99285 EMERGENCY DEPT VISIT HI MDM: CPT
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
## 2019-07-14 LAB
ALBUMIN SERPL ELPH-MCNC: 3.6 G/DL — SIGNIFICANT CHANGE UP (ref 3.5–5.2)
ALP SERPL-CCNC: 115 U/L — SIGNIFICANT CHANGE UP (ref 30–115)
ALT FLD-CCNC: 23 U/L — SIGNIFICANT CHANGE UP (ref 0–41)
ANION GAP SERPL CALC-SCNC: 12 MMOL/L — SIGNIFICANT CHANGE UP (ref 7–14)
AST SERPL-CCNC: 34 U/L — SIGNIFICANT CHANGE UP (ref 0–41)
BASOPHILS # BLD AUTO: 0.04 K/UL — SIGNIFICANT CHANGE UP (ref 0–0.2)
BASOPHILS NFR BLD AUTO: 0.6 % — SIGNIFICANT CHANGE UP (ref 0–1)
BILIRUB SERPL-MCNC: 0.3 MG/DL — SIGNIFICANT CHANGE UP (ref 0.2–1.2)
BUN SERPL-MCNC: 19 MG/DL — SIGNIFICANT CHANGE UP (ref 10–20)
CALCIUM SERPL-MCNC: 9.4 MG/DL — SIGNIFICANT CHANGE UP (ref 8.5–10.1)
CHLORIDE SERPL-SCNC: 94 MMOL/L — LOW (ref 98–110)
CO2 SERPL-SCNC: 29 MMOL/L — SIGNIFICANT CHANGE UP (ref 17–32)
CREAT SERPL-MCNC: 0.8 MG/DL — SIGNIFICANT CHANGE UP (ref 0.7–1.5)
EOSINOPHIL # BLD AUTO: 0.2 K/UL — SIGNIFICANT CHANGE UP (ref 0–0.7)
EOSINOPHIL NFR BLD AUTO: 3 % — SIGNIFICANT CHANGE UP (ref 0–8)
GLUCOSE BLDC GLUCOMTR-MCNC: 241 MG/DL — HIGH (ref 70–99)
GLUCOSE BLDC GLUCOMTR-MCNC: 269 MG/DL — HIGH (ref 70–99)
GLUCOSE BLDC GLUCOMTR-MCNC: 416 MG/DL — HIGH (ref 70–99)
GLUCOSE SERPL-MCNC: 198 MG/DL — HIGH (ref 70–99)
HCT VFR BLD CALC: 33.7 % — LOW (ref 37–47)
HGB BLD-MCNC: 10.7 G/DL — LOW (ref 12–16)
IMM GRANULOCYTES NFR BLD AUTO: 0.3 % — SIGNIFICANT CHANGE UP (ref 0.1–0.3)
LYMPHOCYTES # BLD AUTO: 1.62 K/UL — SIGNIFICANT CHANGE UP (ref 1.2–3.4)
LYMPHOCYTES # BLD AUTO: 24.4 % — SIGNIFICANT CHANGE UP (ref 20.5–51.1)
MCHC RBC-ENTMCNC: 28.5 PG — SIGNIFICANT CHANGE UP (ref 27–31)
MCHC RBC-ENTMCNC: 31.8 G/DL — LOW (ref 32–37)
MCV RBC AUTO: 89.6 FL — SIGNIFICANT CHANGE UP (ref 81–99)
MONOCYTES # BLD AUTO: 0.46 K/UL — SIGNIFICANT CHANGE UP (ref 0.1–0.6)
MONOCYTES NFR BLD AUTO: 6.9 % — SIGNIFICANT CHANGE UP (ref 1.7–9.3)
NEUTROPHILS # BLD AUTO: 4.3 K/UL — SIGNIFICANT CHANGE UP (ref 1.4–6.5)
NEUTROPHILS NFR BLD AUTO: 64.8 % — SIGNIFICANT CHANGE UP (ref 42.2–75.2)
NRBC # BLD: 0 /100 WBCS — SIGNIFICANT CHANGE UP (ref 0–0)
PLATELET # BLD AUTO: 328 K/UL — SIGNIFICANT CHANGE UP (ref 130–400)
POTASSIUM SERPL-MCNC: 4.2 MMOL/L — SIGNIFICANT CHANGE UP (ref 3.5–5)
POTASSIUM SERPL-SCNC: 4.2 MMOL/L — SIGNIFICANT CHANGE UP (ref 3.5–5)
PROT SERPL-MCNC: 7.1 G/DL — SIGNIFICANT CHANGE UP (ref 6–8)
RBC # BLD: 3.76 M/UL — LOW (ref 4.2–5.4)
RBC # FLD: 14.3 % — SIGNIFICANT CHANGE UP (ref 11.5–14.5)
SODIUM SERPL-SCNC: 135 MMOL/L — SIGNIFICANT CHANGE UP (ref 135–146)
WBC # BLD: 6.64 K/UL — SIGNIFICANT CHANGE UP (ref 4.8–10.8)
WBC # FLD AUTO: 6.64 K/UL — SIGNIFICANT CHANGE UP (ref 4.8–10.8)

## 2019-07-14 PROCEDURE — 70450 CT HEAD/BRAIN W/O DYE: CPT | Mod: 26

## 2019-07-14 PROCEDURE — 93010 ELECTROCARDIOGRAM REPORT: CPT

## 2019-07-14 PROCEDURE — 73080 X-RAY EXAM OF ELBOW: CPT | Mod: 26,LT

## 2019-07-14 PROCEDURE — 72125 CT NECK SPINE W/O DYE: CPT | Mod: 26

## 2019-07-14 PROCEDURE — 73562 X-RAY EXAM OF KNEE 3: CPT | Mod: 26,LT

## 2019-07-14 PROCEDURE — 71045 X-RAY EXAM CHEST 1 VIEW: CPT | Mod: 26

## 2019-07-14 PROCEDURE — 72170 X-RAY EXAM OF PELVIS: CPT | Mod: 26

## 2019-07-14 PROCEDURE — 99223 1ST HOSP IP/OBS HIGH 75: CPT | Mod: AI

## 2019-07-14 RX ORDER — DEXTROSE 50 % IN WATER 50 %
15 SYRINGE (ML) INTRAVENOUS ONCE
Refills: 0 | Status: DISCONTINUED | OUTPATIENT
Start: 2019-07-14 | End: 2019-07-15

## 2019-07-14 RX ORDER — HEPARIN SODIUM 5000 [USP'U]/ML
5000 INJECTION INTRAVENOUS; SUBCUTANEOUS EVERY 12 HOURS
Refills: 0 | Status: DISCONTINUED | OUTPATIENT
Start: 2019-07-14 | End: 2019-07-16

## 2019-07-14 RX ORDER — DEXTROSE 50 % IN WATER 50 %
12.5 SYRINGE (ML) INTRAVENOUS ONCE
Refills: 0 | Status: DISCONTINUED | OUTPATIENT
Start: 2019-07-14 | End: 2019-07-15

## 2019-07-14 RX ORDER — DULOXETINE HYDROCHLORIDE 30 MG/1
60 CAPSULE, DELAYED RELEASE ORAL DAILY
Refills: 0 | Status: DISCONTINUED | OUTPATIENT
Start: 2019-07-14 | End: 2019-07-16

## 2019-07-14 RX ORDER — LISINOPRIL 2.5 MG/1
20 TABLET ORAL EVERY 12 HOURS
Refills: 0 | Status: DISCONTINUED | OUTPATIENT
Start: 2019-07-14 | End: 2019-07-16

## 2019-07-14 RX ORDER — LEVOTHYROXINE SODIUM 125 MCG
100 TABLET ORAL DAILY
Refills: 0 | Status: DISCONTINUED | OUTPATIENT
Start: 2019-07-14 | End: 2019-07-16

## 2019-07-14 RX ORDER — SIMVASTATIN 20 MG/1
80 TABLET, FILM COATED ORAL AT BEDTIME
Refills: 0 | Status: DISCONTINUED | OUTPATIENT
Start: 2019-07-14 | End: 2019-07-16

## 2019-07-14 RX ORDER — DICLOFENAC SODIUM 75 MG/1
75 TABLET, DELAYED RELEASE ORAL
Refills: 0 | Status: DISCONTINUED | OUTPATIENT
Start: 2019-07-14 | End: 2019-07-16

## 2019-07-14 RX ORDER — LEVETIRACETAM 250 MG/1
1500 TABLET, FILM COATED ORAL EVERY 12 HOURS
Refills: 0 | Status: DISCONTINUED | OUTPATIENT
Start: 2019-07-14 | End: 2019-07-16

## 2019-07-14 RX ORDER — INSULIN LISPRO 100/ML
6 VIAL (ML) SUBCUTANEOUS
Refills: 0 | Status: DISCONTINUED | OUTPATIENT
Start: 2019-07-14 | End: 2019-07-15

## 2019-07-14 RX ORDER — HYDROXYCHLOROQUINE SULFATE 200 MG
400 TABLET ORAL DAILY
Refills: 0 | Status: DISCONTINUED | OUTPATIENT
Start: 2019-07-14 | End: 2019-07-16

## 2019-07-14 RX ORDER — GLUCAGON INJECTION, SOLUTION 0.5 MG/.1ML
1 INJECTION, SOLUTION SUBCUTANEOUS ONCE
Refills: 0 | Status: DISCONTINUED | OUTPATIENT
Start: 2019-07-14 | End: 2019-07-15

## 2019-07-14 RX ORDER — LACOSAMIDE 50 MG/1
100 TABLET ORAL EVERY 12 HOURS
Refills: 0 | Status: DISCONTINUED | OUTPATIENT
Start: 2019-07-14 | End: 2019-07-16

## 2019-07-14 RX ORDER — FOLIC ACID 0.8 MG
1 TABLET ORAL DAILY
Refills: 0 | Status: DISCONTINUED | OUTPATIENT
Start: 2019-07-14 | End: 2019-07-16

## 2019-07-14 RX ORDER — DEXTROSE 50 % IN WATER 50 %
25 SYRINGE (ML) INTRAVENOUS ONCE
Refills: 0 | Status: DISCONTINUED | OUTPATIENT
Start: 2019-07-14 | End: 2019-07-15

## 2019-07-14 RX ORDER — INSULIN GLARGINE 100 [IU]/ML
20 INJECTION, SOLUTION SUBCUTANEOUS EVERY MORNING
Refills: 0 | Status: DISCONTINUED | OUTPATIENT
Start: 2019-07-15 | End: 2019-07-15

## 2019-07-14 RX ORDER — INSULIN LISPRO 100/ML
VIAL (ML) SUBCUTANEOUS
Refills: 0 | Status: DISCONTINUED | OUTPATIENT
Start: 2019-07-14 | End: 2019-07-15

## 2019-07-14 RX ORDER — OXYCODONE AND ACETAMINOPHEN 5; 325 MG/1; MG/1
1 TABLET ORAL EVERY 8 HOURS
Refills: 0 | Status: DISCONTINUED | OUTPATIENT
Start: 2019-07-14 | End: 2019-07-16

## 2019-07-14 RX ORDER — HYDROCHLOROTHIAZIDE 25 MG
50 TABLET ORAL DAILY
Refills: 0 | Status: DISCONTINUED | OUTPATIENT
Start: 2019-07-14 | End: 2019-07-16

## 2019-07-14 RX ORDER — GABAPENTIN 400 MG/1
400 CAPSULE ORAL THREE TIMES A DAY
Refills: 0 | Status: DISCONTINUED | OUTPATIENT
Start: 2019-07-14 | End: 2019-07-16

## 2019-07-14 RX ORDER — SODIUM CHLORIDE 9 MG/ML
1000 INJECTION, SOLUTION INTRAVENOUS
Refills: 0 | Status: DISCONTINUED | OUTPATIENT
Start: 2019-07-14 | End: 2019-07-15

## 2019-07-14 RX ORDER — ASPIRIN/CALCIUM CARB/MAGNESIUM 324 MG
81 TABLET ORAL DAILY
Refills: 0 | Status: DISCONTINUED | OUTPATIENT
Start: 2019-07-14 | End: 2019-07-16

## 2019-07-14 RX ADMIN — OXYCODONE AND ACETAMINOPHEN 1 TABLET(S): 5; 325 TABLET ORAL at 16:28

## 2019-07-14 RX ADMIN — DULOXETINE HYDROCHLORIDE 60 MILLIGRAM(S): 30 CAPSULE, DELAYED RELEASE ORAL at 15:23

## 2019-07-14 RX ADMIN — LACOSAMIDE 100 MILLIGRAM(S): 50 TABLET ORAL at 17:30

## 2019-07-14 RX ADMIN — Medication 12: at 16:51

## 2019-07-14 RX ADMIN — Medication 81 MILLIGRAM(S): at 15:23

## 2019-07-14 RX ADMIN — LISINOPRIL 20 MILLIGRAM(S): 2.5 TABLET ORAL at 17:30

## 2019-07-14 RX ADMIN — Medication 100 MICROGRAM(S): at 15:23

## 2019-07-14 RX ADMIN — Medication 400 MILLIGRAM(S): at 15:23

## 2019-07-14 RX ADMIN — GABAPENTIN 400 MILLIGRAM(S): 400 CAPSULE ORAL at 21:40

## 2019-07-14 RX ADMIN — SIMVASTATIN 80 MILLIGRAM(S): 20 TABLET, FILM COATED ORAL at 21:40

## 2019-07-14 RX ADMIN — Medication 50 MILLIGRAM(S): at 15:23

## 2019-07-14 RX ADMIN — Medication 6 UNIT(S): at 16:50

## 2019-07-14 RX ADMIN — HEPARIN SODIUM 5000 UNIT(S): 5000 INJECTION INTRAVENOUS; SUBCUTANEOUS at 17:30

## 2019-07-14 RX ADMIN — GABAPENTIN 400 MILLIGRAM(S): 400 CAPSULE ORAL at 15:23

## 2019-07-14 RX ADMIN — Medication 1 MILLIGRAM(S): at 15:23

## 2019-07-14 RX ADMIN — LEVETIRACETAM 1500 MILLIGRAM(S): 250 TABLET, FILM COATED ORAL at 17:30

## 2019-07-14 RX ADMIN — OXYCODONE AND ACETAMINOPHEN 1 TABLET(S): 5; 325 TABLET ORAL at 15:28

## 2019-07-14 NOTE — ED ADULT NURSE NOTE - CHIEF COMPLAINT QUOTE
Pt sent in by NH for S/P fall with L side pain and skin tear to L elbow [Dear  ___] : Dear  [unfilled], [I had the pleasure of evaluating your patient, [unfilled].] : I had the pleasure of evaluating your patient, [unfilled]. [FreeTextEntry2] : Malinda Mendosa MD\par [address], [address]\[place] [address] [FreeTextEntry1] : \par After evaluating your patient and reviewing the studies presented we have come to a mutually agreeable plan. \par \par Please see my note below.\par \par Should you have further questions, please feel free to call and discuss the care of your patient.\par \par Thank you for the confidence of your referral.\par \par Sincerely, \par \par Rickey Quiroz MD \par Musculoskeletal Oncology \par [address], [address], \[place] [address] \par [phone]

## 2019-07-14 NOTE — CONSULT NOTE ADULT - SUBJECTIVE AND OBJECTIVE BOX
Neurology Consultation note    Name  LINDSAY ALBERT    HPI:  Pt is a 74 y/o female transferred from NH, s/p fall yesterday. Pt states she stood up from recliner, felt weak and fell onto her left side and she did hit her head. Pt denies dizziness or other associated complaints. She reports having an initial fall about 1 month ago and a few since. (14 Jul 2019 11:45)      Interval History:        Vital Signs Last 24 Hrs  T(C): 36.2 (14 Jul 2019 10:00), Max: 37.1 (13 Jul 2019 23:33)  T(F): 97.1 (14 Jul 2019 10:00), Max: 98.7 (13 Jul 2019 23:33)  HR: 80 (14 Jul 2019 10:00) (80 - 92)  BP: 148/65 (14 Jul 2019 10:00) (148/65 - 180/83)  BP(mean): --  RR: 20 (13 Jul 2019 23:33) (20 - 20)  SpO2: 98% (13 Jul 2019 23:33) (98% - 98%)    Neurological Exam:   Mental status: Awake, alert and oriented x3.  Recent and remote memory intact.  Naming, repetition and comprehension intact.  Attention/concentration intact.  No dysarthria, no aphasia.  Fund of knowledge appropriate.    Cranial nerves: Pupils equally round and reactive to light, visual fields full, no nystagmus, extraocular muscles intact, V1 through V3 intact bilaterally and symmetric, face symmetric, hearing intact to finger rub, palate elevation symmetric, tongue was midline.  Motor:  MRC grading 5/5 b/l UE/LE.   strength 5/5.  Normal tone and bulk.  No abnormal movements.    Sensation: Intact to light touch, proprioception, and pinprick.   Coordination: No dysmetria on finger-to-nose and heel-to-shin.  No dysdiadokinesia.  Reflexes: 2+ in bilateral UE/LE, downgoing toes bilaterally. (-) Benitez.  Gait: Narrow and steady. No ataxia.  Romberg negative    Medications  aspirin  chewable 81 milliGRAM(s) Oral daily  diclofenac 75 milliGRAM(s) Oral two times a day PRN  DULoxetine 60 milliGRAM(s) Oral daily  folic acid 1 milliGRAM(s) Oral daily  gabapentin 400 milliGRAM(s) Oral three times a day  hydrochlorothiazide 50 milliGRAM(s) Oral daily  hydroxychloroquine 400 milliGRAM(s) Oral daily  lacosamide 100 milliGRAM(s) Oral every 12 hours  levETIRAcetam 1500 milliGRAM(s) Oral every 12 hours  levothyroxine 100 MICROGram(s) Oral daily  lisinopril 20 milliGRAM(s) Oral every 12 hours  oxyCODONE    5 mG/acetaminophen 325 mG 1 Tablet(s) Oral every 8 hours PRN  simvastatin 80 milliGRAM(s) Oral at bedtime      Lab  07-14    135  |  94<L>  |  19  ----------------------------<  198<H>  4.2   |  29  |  0.8    Ca    9.4      14 Jul 2019 00:30    TPro  7.1  /  Alb  3.6  /  TBili  0.3  /  DBili  x   /  AST  34  /  ALT  23  /  AlkPhos  115  07-14                          10.7   6.64  )-----------( 328      ( 14 Jul 2019 00:30 )             33.7     LIVER FUNCTIONS - ( 14 Jul 2019 00:30 )  Alb: 3.6 g/dL / Pro: 7.1 g/dL / ALK PHOS: 115 U/L / ALT: 23 U/L / AST: 34 U/L / GGT: x                   Radiology  CT Head No Cont:   EXAM:  CT BRAIN            PROCEDURE DATE:  07/14/2019            INTERPRETATION:  Clinical History: Fall. Trauma.    Technique: Multiple contiguous axial CT images of the head were obtained    from the base of the skull to the vertex without administration of   intravenous contrast. Coronal and sagittal reformats were obtained.    Comparison: Noncontrast CT head performed 6/29/2019.      Findings:     The ventricles, basal cisterns and sulcal pattern are prominent   consistent with parenchymalvolume loss.    There are patchy areas of hypoattenuation in the periventricular and   subcortical white matter compatible with chronic microvascular ischemic   changes.    Grey-white differentiation is grossly well preserved.    There is no acute mass effect, midline shift or intracranial hemorrhage.      The calvarium is intact. There are no acute fractures or dislocations. No   significant soft tissue swelling is noted extracranially.    The visualized paranasal sinuses and bilateral mastoid complexes are   unremarkable.       IMPRESSION:     1.  No CT evidence for acute fractures or intracranial pathology.    2.  Chronic microvascular ischemic changes.              BK LIM M.D., RESIDENT RADIOLOGIST  This document has been electronically signed.  CAMRYN KENNEDY M.D., ATTENDING RADIOLOGIST  This document has been electronically signed. Jul 14 2019  8:29AM             (07-14-19 @ 05:50)      Assessment:    Plan: Neurology Consultation note    Name  LINDSAY ALBERT    HPI:  Pt is a 72 y/o female transferred from NH, s/p fall yesterday. Pt states she stood up from recliner, felt weak and fell onto her left side and she did hit her head. Pt denies dizziness or other associated complaints. She reports having an initial fall about 1 month ago and a few since. (14 Jul 2019 11:45)  patient with recent adm for ams 2/2 sz related to SDH.   patient had LP AT THE TIME WITH ELEVATED PROTEIN AND PLEOCYTOSIS OF UNCLEAR ETIOLOGY. CLEARED BY ID.  PATIENT WITH HX OF LUPUS. AI PROFILE SENT AND TPO ABS WERE ELEVATED BUT SUSPICION OF HASHIMOTOS ENCEPHALOPATHY WAS LOW AT THE TIME  THE PLAN FOR HER WAS TO F/U WITH ME AS AN OUTPT FOR REPEAT MRI TO ENSURE RESOLUTION OF SDH AND TPO ABS AND POSSIBLE FUNCTIONAL NEUROIMAGING      Vital Signs Last 24 Hrs  T(C): 36.2 (14 Jul 2019 10:00), Max: 37.1 (13 Jul 2019 23:33)  T(F): 97.1 (14 Jul 2019 10:00), Max: 98.7 (13 Jul 2019 23:33)  HR: 80 (14 Jul 2019 10:00) (80 - 92)  BP: 148/65 (14 Jul 2019 10:00) (148/65 - 180/83)  BP(mean): --  RR: 20 (13 Jul 2019 23:33) (20 - 20)  SpO2: 98% (13 Jul 2019 23:33) (98% - 98%)    Neurological Exam:   Mental status: Awake, alert and oriented x3.  Recent and remote memory intact.  Naming, repetition and comprehension intact.  Attention/concentration intact.  No dysarthria, no aphasia.  Fund of knowledge appropriate.    Cranial nerves: Pupils equally round and reactive to light, visual fields full, no nystagmus, extraocular muscles intact, V1 through V3 intact bilaterally and symmetric, face symmetric, hearing intact to finger rub, palate elevation symmetric, tongue was midline.  Motor:  MRC grading 5/5 b/l UE/LE.   strength 5/5.  Normal tone and bulk.  No abnormal movements.    Sensation: Intact to light touch, proprioception, and pinprick.   Coordination: No dysmetria on finger-to-nose and heel-to-shin.  No dysdiadokinesia.  Reflexes: 2+ in bilateral UE/LE, downgoing toes bilaterally. (-) Benitez.  Gait: Narrow and steady. No ataxia.  Romberg negative    Medications  aspirin  chewable 81 milliGRAM(s) Oral daily  diclofenac 75 milliGRAM(s) Oral two times a day PRN  DULoxetine 60 milliGRAM(s) Oral daily  folic acid 1 milliGRAM(s) Oral daily  gabapentin 400 milliGRAM(s) Oral three times a day  hydrochlorothiazide 50 milliGRAM(s) Oral daily  hydroxychloroquine 400 milliGRAM(s) Oral daily  lacosamide 100 milliGRAM(s) Oral every 12 hours  levETIRAcetam 1500 milliGRAM(s) Oral every 12 hours  levothyroxine 100 MICROGram(s) Oral daily  lisinopril 20 milliGRAM(s) Oral every 12 hours  oxyCODONE    5 mG/acetaminophen 325 mG 1 Tablet(s) Oral every 8 hours PRN  simvastatin 80 milliGRAM(s) Oral at bedtime      Lab  07-14    135  |  94<L>  |  19  ----------------------------<  198<H>  4.2   |  29  |  0.8    Ca    9.4      14 Jul 2019 00:30    TPro  7.1  /  Alb  3.6  /  TBili  0.3  /  DBili  x   /  AST  34  /  ALT  23  /  AlkPhos  115  07-14                          10.7   6.64  )-----------( 328      ( 14 Jul 2019 00:30 )             33.7     LIVER FUNCTIONS - ( 14 Jul 2019 00:30 )  Alb: 3.6 g/dL / Pro: 7.1 g/dL / ALK PHOS: 115 U/L / ALT: 23 U/L / AST: 34 U/L / GGT: x                   Radiology  CT Head No Cont:   EXAM:  CT BRAIN            PROCEDURE DATE:  07/14/2019            INTERPRETATION:  Clinical History: Fall. Trauma.    Technique: Multiple contiguous axial CT images of the head were obtained    from the base of the skull to the vertex without administration of   intravenous contrast. Coronal and sagittal reformats were obtained.    Comparison: Noncontrast CT head performed 6/29/2019.      Findings:     The ventricles, basal cisterns and sulcal pattern are prominent   consistent with parenchymalvolume loss.    There are patchy areas of hypoattenuation in the periventricular and   subcortical white matter compatible with chronic microvascular ischemic   changes.    Grey-white differentiation is grossly well preserved.    There is no acute mass effect, midline shift or intracranial hemorrhage.      The calvarium is intact. There are no acute fractures or dislocations. No   significant soft tissue swelling is noted extracranially.    The visualized paranasal sinuses and bilateral mastoid complexes are   unremarkable.       IMPRESSION:     1.  No CT evidence for acute fractures or intracranial pathology.    2.  Chronic microvascular ischemic changes.                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 DO NOT SUSPECT SZ ON THIS ADM  TPO ABS INCIDENTALLY FOUND TO BE ELEVATED    Plan:  REEG  MRI BRAIN NC  RECHECK TPO ABS  PT/REHAB  PLEASE CALL WITH ANY QUESTIONS Neurology Consultation note    Name  LINDSAY ALBERT    HPI:  Pt is a 74 y/o female transferred from NH, s/p fall yesterday. Pt states she stood up from recliner, felt weak and fell onto her left side and she did hit her head. Pt denies dizziness or other associated complaints. She reports having an initial fall about 1 month ago and a few since. (14 Jul 2019 11:45)  patient with recent adm for ams 2/2 sz related to SDH.   patient had LP AT THE TIME WITH ELEVATED PROTEIN AND PLEOCYTOSIS OF UNCLEAR ETIOLOGY. CLEARED BY ID.  PATIENT WITH HX OF LUPUS. AI PROFILE SENT AND TPO ABS WERE ELEVATED BUT SUSPICION OF HASHIMOTOS ENCEPHALOPATHY WAS LOW AT THE TIME  THE PLAN FOR HER WAS TO F/U WITH ME AS AN OUTPT FOR REPEAT MRI TO ENSURE RESOLUTION OF SDH AND TPO ABS AND POSSIBLE FUNCTIONAL NEUROIMAGING      Vital Signs Last 24 Hrs  T(C): 36.2 (14 Jul 2019 10:00), Max: 37.1 (13 Jul 2019 23:33)  T(F): 97.1 (14 Jul 2019 10:00), Max: 98.7 (13 Jul 2019 23:33)  HR: 80 (14 Jul 2019 10:00) (80 - 92)  BP: 148/65 (14 Jul 2019 10:00) (148/65 - 180/83)  BP(mean): --  RR: 20 (13 Jul 2019 23:33) (20 - 20)  SpO2: 98% (13 Jul 2019 23:33) (98% - 98%)    Neurological Exam:   Mental status: Awake, alert and oriented x3.  Recent and remote memory intact.  Naming, repetition and comprehension intact.  Attention/concentration intact.  No dysarthria, no aphasia.  Fund of knowledge appropriate.    Cranial nerves: Pupils equally round and reactive to light, visual fields full, no nystagmus, extraocular muscles intact, V1 through V3 intact bilaterally and symmetric, face symmetric, hearing intact to finger rub, palate elevation symmetric, tongue was midline.  Motor:  MRC grading 5/5 b/l UE/LE.   strength 5/5.  Normal tone and bulk.  No abnormal movements.    Sensation: Intact to light touch, proprioception, and pinprick.   Coordination: No dysmetria on finger-to-nose and heel-to-shin.  No dysdiadokinesia.  Reflexes: 2+ in bilateral UE/LE, downgoing toes bilaterally. (-) Benitez.  Gait: Narrow and steady. No ataxia.  Romberg negative    Medications  aspirin  chewable 81 milliGRAM(s) Oral daily  diclofenac 75 milliGRAM(s) Oral two times a day PRN  DULoxetine 60 milliGRAM(s) Oral daily  folic acid 1 milliGRAM(s) Oral daily  gabapentin 400 milliGRAM(s) Oral three times a day  hydrochlorothiazide 50 milliGRAM(s) Oral daily  hydroxychloroquine 400 milliGRAM(s) Oral daily  lacosamide 100 milliGRAM(s) Oral every 12 hours  levETIRAcetam 1500 milliGRAM(s) Oral every 12 hours  levothyroxine 100 MICROGram(s) Oral daily  lisinopril 20 milliGRAM(s) Oral every 12 hours  oxyCODONE    5 mG/acetaminophen 325 mG 1 Tablet(s) Oral every 8 hours PRN  simvastatin 80 milliGRAM(s) Oral at bedtime      Lab  07-14    135  |  94<L>  |  19  ----------------------------<  198<H>  4.2   |  29  |  0.8    Ca    9.4      14 Jul 2019 00:30    TPro  7.1  /  Alb  3.6  /  TBili  0.3  /  DBili  x   /  AST  34  /  ALT  23  /  AlkPhos  115  07-14                          10.7   6.64  )-----------( 328      ( 14 Jul 2019 00:30 )             33.7     LIVER FUNCTIONS - ( 14 Jul 2019 00:30 )  Alb: 3.6 g/dL / Pro: 7.1 g/dL / ALK PHOS: 115 U/L / ALT: 23 U/L / AST: 34 U/L / GGT: x                   Radiology  CT Head No Cont:   EXAM:  CT BRAIN            PROCEDURE DATE:  07/14/2019            INTERPRETATION:  Clinical History: Fall. Trauma.    Technique: Multiple contiguous axial CT images of the head were obtained    from the base of the skull to the vertex without administration of   intravenous contrast. Coronal and sagittal reformats were obtained.    Comparison: Noncontrast CT head performed 6/29/2019.      Findings:     The ventricles, basal cisterns and sulcal pattern are prominent   consistent with parenchymalvolume loss.    There are patchy areas of hypoattenuation in the periventricular and   subcortical white matter compatible with chronic microvascular ischemic   changes.    Grey-white differentiation is grossly well preserved.    There is no acute mass effect, midline shift or intracranial hemorrhage.      The calvarium is intact. There are no acute fractures or dislocations. No   significant soft tissue swelling is noted extracranially.    The visualized paranasal sinuses and bilateral mastoid complexes are   unremarkable.       IMPRESSION:     1.  No CT evidence for acute fractures or intracranial pathology.    2.  Chronic microvascular ischemic changes.                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 DO NOT SUSPECT SZ ON THIS ADM  TPO ABS INCIDENTALLY FOUND TO BE ELEVATED    Plan:  REEG  MRI BRAIN NC  RECHECK TPO ABS  cont KEPPRA AND VIMPAT AT CURRENT DOSAGES  PT/REHAB  PLEASE CALL WITH ANY QUESTIONS

## 2019-07-14 NOTE — H&P ADULT - NSHPPHYSICALEXAM_GEN_ALL_CORE
Gen NAD, A&Ox3  CV +S1 and S2, RR  Resp +air entry B/L  Abd soft, NT, ND  Ext mild swelling and eccymosis left knee, No CT, +small abrasion left elbow, Full ROM intact    Vital Signs Last 24 Hrs  T(C): 36.2 (14 Jul 2019 10:00), Max: 37.1 (13 Jul 2019 23:33)  T(F): 97.1 (14 Jul 2019 10:00), Max: 98.7 (13 Jul 2019 23:33)  HR: 80 (14 Jul 2019 10:00) (80 - 92)  BP: 148/65 (14 Jul 2019 10:00) (148/65 - 180/83)  BP(mean): --  RR: 20 (13 Jul 2019 23:33) (20 - 20)  SpO2: 98% (13 Jul 2019 23:33) (98% - 98%) PHYSICAL EXAM:  GENERAL: NAD, well-groomed, well-developed  HEAD:  Atraumatic, Normocephalic  EYES: EOMI, PERRLA, conjunctiva and sclera clear  NERVOUS SYSTEM:  Alert & Oriented X 4, Good concentration; Motor Strength 5/5 B/L upper and lower extremities; DTRs 2+ intact and symmetric  CHEST/LUNG: Clear to percussion bilaterally; No rales, rhonchi, wheezing, or rubs  HEART: Regular rate and rhythm; No murmurs, rubs, or gallops  ABDOMEN: Soft, Nontender, Nondistended; Bowel sounds present  EXTREMITIES:  mild swelling and ecchymosis left knee, +small abrasion left elbow, Full ROM intact  SKIN: No rashes or lesions    Vital Signs Last 24 Hrs  T(C): 36.2 (14 Jul 2019 10:00), Max: 37.1 (13 Jul 2019 23:33)  T(F): 97.1 (14 Jul 2019 10:00), Max: 98.7 (13 Jul 2019 23:33)  HR: 80 (14 Jul 2019 10:00) (80 - 92)  BP: 148/65 (14 Jul 2019 10:00) (148/65 - 180/83)  BP(mean): --  RR: 20 (13 Jul 2019 23:33) (20 - 20)  SpO2: 98% (13 Jul 2019 23:33) (98% - 98%)

## 2019-07-14 NOTE — H&P ADULT - ATTENDING COMMENTS
patient seen and examined independently   agree with pa's assessment and plan except where edited by me

## 2019-07-14 NOTE — H&P ADULT - NSICDXPASTMEDICALHX_GEN_ALL_CORE_FT
PAST MEDICAL HISTORY:  DM (diabetes mellitus) insulin  fs achs    Fall, initial encounter as  hx    Herniated lumbar intervertebral disc as per hx    Hypercholesteremia statin    Hypertension hctz    Lupus as per hx    Seizure keppra    Stroke TIA 2002, no deficits

## 2019-07-14 NOTE — ED PROVIDER NOTE - PHYSICAL EXAMINATION
Constitutional: Well developed, well nourished. NAD. Good general hygiene  Head: No ecchymosis, contusions.  Eyes: PERRLA. EOMI without discomfort.   ENT: No nasal discharge. Mucous membranes moist.  Neck: Supple. Painless ROM.  Cardiovascular: Regular rhythm. Regular rate. Normal S1 and S2. No murmurs. 2+ pulses in all extremities.   Pulmonary: Normal respiratory rate and effort. Lungs clear to auscultation bilaterally. No wheezing, rales, or rhonchi. Bilateral, equal lung expansion.   Abdominal: Soft. Nondistended. Nontender. No rebound or guarding.   Extremities: Abrasion on posterior aspect of proximal forearm. ROM intact w/o difficulty, no TTP. Contusion on left knee, ROM intact, not TTP.  Skin: No rashes.   Neuro: AAOx3. CN 2-12 intact. 5/5 strength all extremities. No pronator drift. moving all extremities.

## 2019-07-14 NOTE — PROVIDER CONTACT NOTE (OTHER) - SITUATION
Pt FS was 216 after lunch, FS AC dinner 416. Pt states she gets 20 units lantus in am and then lispro ac meals.

## 2019-07-14 NOTE — ED PROVIDER NOTE - CLINICAL SUMMARY MEDICAL DECISION MAKING FREE TEXT BOX
pw fall from recliner. head injury on ASA. Imaging negative for acute fracture or intracranial process. Her safe disposition was d/w family and patient at great length. Determined she would like to enter a different [facility]. Patient to be admitted to an inpatient floor at [facility] by pt preference. D/w admitting physician

## 2019-07-14 NOTE — ED ADULT NURSE NOTE - PMH
DM (diabetes mellitus)  insulin  fs achs  Fall, initial encounter  as  hx  Herniated lumbar intervertebral disc  as per hx  Hypercholesteremia  statin  Hypertension  hctz  Lupus  as per hx  Seizure  keppra  Stroke  TIA 2002, no deficits

## 2019-07-14 NOTE — ED ADULT NURSE NOTE - OBJECTIVE STATEMENT
Patient presents to ED from [facility] s/p fall. As per patient she was sitting on a chair and slipped out of chair. Reports hitting her head. Denies any current pain or discomfort. No SOB or distress noted.

## 2019-07-14 NOTE — H&P ADULT - HISTORY OF PRESENT ILLNESS
Pt is a 74 y/o female transferred from NH, s/p fall yesterday. Pt states she stood up from recliner, felt weak and fell onto her left side and she did hit her head. Pt denies dizziness or other associated complaints. She reports having an initial fall about 1 month ago and a few since. Pt is a 72 y/o female transferred from NH, s/p fall yesterday. Pt states she stood up from recliner, felt weak and fell onto her left side and she did hit her head. Pt denies dizziness or other associated complaints. She reports having an initial fall about 1 month ago and a few since.  She further says that she does not want to return to [facility] and wants to try another SNF.

## 2019-07-14 NOTE — H&P ADULT - NSHPLABSRESULTS_GEN_ALL_CORE
10.7   6.64  )-----------( 328      ( 14 Jul 2019 00:30 )             33.7       07-14    135  |  94<L>  |  19  ----------------------------<  198<H>  4.2   |  29  |  0.8    Ca    9.4      14 Jul 2019 00:30    TPro  7.1  /  Alb  3.6  /  TBili  0.3  /  DBili  x   /  AST  34  /  ALT  23  /  AlkPhos  115  07-14                Culture Results:   No growth (06-29 @ 05:00)  Culture Results:   No growth (06-29 @ 05:00)  Culture Results:   No growth at 5 days. (06-29 @ 02:00)  Culture Results:   No growth at 5 days. (06-29 @ 02:00)    < from: CT Cervical Spine No Cont (07.14.19 @ 05:50) >    IMPRESSION:    1.  No evidence of a cervical spine fracture or dislocation.    2.  Moderate multilevel degenerative changes of the cervical spine, not   significantly changed since 6/19/2019.    3.  Straightening of the normal cervical lordosis with mild   anterolisthesis of C2 on C3, C4 on C5 and T1 on T2.    < end of copied text >  < from: CT Head No Cont (07.14.19 @ 05:50) >    IMPRESSION:     1.  No CT evidence for acute fractures or intracranial pathology.    2.  Chronic microvascular ischemic changes.        < end of copied text >    < from: Xray Pelvis AP only (07.14.19 @ 00:51) >    Impression:     No acute fractures or dislocations. Stable degenerative changes of the   bilateral hips and lower lumbar spine.    < from: Xray Knee 3 Views, Left (07.14.19 @ 00:51) >    Impression:    Severe degenerative changes of the left knee, including severe   tricompartment joint space narrowing, with subchondral sclerosis and   osteophytosis most severe within the medial compartment. No evidence of   acute fracture or dislocation. Alignment is normal. No inadvertent   radiopaque foreign body.        < end of copied text > 10.7   6.64  )-----------( 328      ( 14 Jul 2019 00:30 )             33.7       07-14    135  |  94<L>  |  19  ----------------------------<  198<H>  4.2   |  29  |  0.8    Ca    9.4      14 Jul 2019 00:30    TPro  7.1  /  Alb  3.6  /  TBili  0.3  /  DBili  x   /  AST  34  /  ALT  23  /  AlkPhos  115  07-14         Culture Results:   No growth (06-29 @ 05:00)  Culture Results:   No growth (06-29 @ 05:00)  Culture Results:   No growth at 5 days. (06-29 @ 02:00)  Culture Results:   No growth at 5 days. (06-29 @ 02:00)    < from: CT Cervical Spine No Cont (07.14.19 @ 05:50) >    IMPRESSION:    1.  No evidence of a cervical spine fracture or dislocation.    2.  Moderate multilevel degenerative changes of the cervical spine, not   significantly changed since 6/19/2019.    3.  Straightening of the normal cervical lordosis with mild   anterolisthesis of C2 on C3, C4 on C5 and T1 on T2.    < end of copied text >  < from: CT Head No Cont (07.14.19 @ 05:50) >    IMPRESSION:   1.  No CT evidence for acute fractures or intracranial pathology.  2.  Chronic microvascular ischemic changes.  < end of copied text >    < from: Xray Pelvis AP only (07.14.19 @ 00:51) >    Impression:     No acute fractures or dislocations. Stable degenerative changes of the   bilateral hips and lower lumbar spine.    < from: Xray Knee 3 Views, Left (07.14.19 @ 00:51) >    Impression:    Severe degenerative changes of the left knee, including severe   tricompartment joint space narrowing, with subchondral sclerosis and   osteophytosis most severe within the medial compartment. No evidence of   acute fracture or dislocation. Alignment is normal. No inadvertent   radiopaque foreign body.        < end of copied text >

## 2019-07-14 NOTE — ED PROVIDER NOTE - OBJECTIVE STATEMENT
73F h/o subdural, epilepsy, CAD on aspirin, DM Type 2 p/w fall from sitting in recliner. Pt is in [place], fell forward from sitting. Pt doesn't remember how she fell. Denies LOC, vomiting, neck pain, CP, SOB, abd pain. Pt admits to abrasion on left elbow.

## 2019-07-14 NOTE — ED PROVIDER NOTE - ATTENDING CONTRIBUTION TO CARE
73 y F PMH subdural, epilepsy, CAD on aspirin, DM Type 2 p/w fall from sitting in recliner. fell likely 2/2 deconditioning from her recliner as she attempted to rise to standing without assistance, fell onto left side, with some left knee pain, small abrasion to left elbow, and head injury. Denies LOC.   Exam: NAD, NCAT, HEENT: mmm, EOMI, PERRLA, Neck: supple, nontender, nl ROM, Heart: RRR, no murmur, Lungs: BCTA, no signs of increased WOB, Abd: NTND, no guarding or rebound, no hernia palpated, no CVAT. MSK: chest, back, and ext nontender, nl rom, no deformity. Neuro: A&Ox3, normal strength, nl sensation throughout, normal speech.  A/P: Eval for traumatic injury sustained in her fall. Spoke at length with patient and family regarding safe dispo. Patient does not wish to return to [facility], but called multiple family members, unable to manage pt at home given not ambulatory. Will likely need admission in the absence of injury from fall for deconditioning and new [facility] placement.

## 2019-07-14 NOTE — ED PROVIDER NOTE - NS ED ROS FT
General: no fever, chills.  Eyes:  No visual changes, eye pain or discharge.  ENMT:  No hearing changes, pain, no sore throat or runny nose, no difficulty swallowing  Cardiac:  No chest pain, SOB or edema. No chest pain with exertion.  Respiratory:  No cough or respiratory distress. No hemoptysis. No history of asthma or RAD.  GI:  No nausea, vomiting, diarrhea or abdominal pain.  :  No dysuria, frequency or burning.  MS:  No myalgia, muscle weakness, joint pain or back pain.  Neuro:  No headache or weakness.  No LOC.  Skin:  No skin rash.

## 2019-07-14 NOTE — H&P ADULT - ASSESSMENT
Pt is a 72 y/o female s/p recurrent falls/Hx Seizures/DM/HTN/Hypercholesterolemia    Rehab/PT Consult  Neurology Consult  Cont current meds  Monitor FS Pt is a 74 y/o female s/p recurrent falls/Hx Seizures/DM/HTN/Hypercholesterolemia    #falls  Rehab/PT Consult  fall precautions  Neurology Consult as pt has history of seizures and is a poor historian regarding her fall    #HTN/Dyslipidemia  continue current tx  monitor bp    #DMII  start insulin   check fs qac and qhs    #Seizure disorder  seizure precautions  continue keppra and vimpat  neuro eval    #Lupus  -continue methotrexate and plaquenil       Progress Note Handoff  Pending Consults: neuro, pt  Pending Tests: pt  Pending Results: none  Family Discussion: discussed with pt - in agreement  Disposition: Home_____/SNF_x_____/Other_____/Unknown at this time_____

## 2019-07-15 ENCOUNTER — TRANSCRIPTION ENCOUNTER (OUTPATIENT)
Age: 74
End: 2019-07-15

## 2019-07-15 LAB
ANION GAP SERPL CALC-SCNC: 11 MMOL/L — SIGNIFICANT CHANGE UP (ref 7–14)
BUN SERPL-MCNC: 12 MG/DL — SIGNIFICANT CHANGE UP (ref 10–20)
CALCIUM SERPL-MCNC: 8.4 MG/DL — LOW (ref 8.5–10.1)
CHLORIDE SERPL-SCNC: 96 MMOL/L — LOW (ref 98–110)
CO2 SERPL-SCNC: 27 MMOL/L — SIGNIFICANT CHANGE UP (ref 17–32)
CREAT SERPL-MCNC: 0.7 MG/DL — SIGNIFICANT CHANGE UP (ref 0.7–1.5)
GLUCOSE BLDC GLUCOMTR-MCNC: 128 MG/DL — HIGH (ref 70–99)
GLUCOSE BLDC GLUCOMTR-MCNC: 141 MG/DL — HIGH (ref 70–99)
GLUCOSE BLDC GLUCOMTR-MCNC: 149 MG/DL — HIGH (ref 70–99)
GLUCOSE BLDC GLUCOMTR-MCNC: 267 MG/DL — HIGH (ref 70–99)
GLUCOSE BLDC GLUCOMTR-MCNC: 420 MG/DL — HIGH (ref 70–99)
GLUCOSE SERPL-MCNC: 460 MG/DL — CRITICAL HIGH (ref 70–99)
HCT VFR BLD CALC: 32.4 % — LOW (ref 37–47)
HGB BLD-MCNC: 10.4 G/DL — LOW (ref 12–16)
MCHC RBC-ENTMCNC: 28.3 PG — SIGNIFICANT CHANGE UP (ref 27–31)
MCHC RBC-ENTMCNC: 32.1 G/DL — SIGNIFICANT CHANGE UP (ref 32–37)
MCV RBC AUTO: 88 FL — SIGNIFICANT CHANGE UP (ref 81–99)
NRBC # BLD: 0 /100 WBCS — SIGNIFICANT CHANGE UP (ref 0–0)
PLATELET # BLD AUTO: 275 K/UL — SIGNIFICANT CHANGE UP (ref 130–400)
POTASSIUM SERPL-MCNC: 4.8 MMOL/L — SIGNIFICANT CHANGE UP (ref 3.5–5)
POTASSIUM SERPL-SCNC: 4.8 MMOL/L — SIGNIFICANT CHANGE UP (ref 3.5–5)
RBC # BLD: 3.68 M/UL — LOW (ref 4.2–5.4)
RBC # FLD: 14 % — SIGNIFICANT CHANGE UP (ref 11.5–14.5)
SODIUM SERPL-SCNC: 134 MMOL/L — LOW (ref 135–146)
WBC # BLD: 5.86 K/UL — SIGNIFICANT CHANGE UP (ref 4.8–10.8)
WBC # FLD AUTO: 5.86 K/UL — SIGNIFICANT CHANGE UP (ref 4.8–10.8)

## 2019-07-15 PROCEDURE — 99233 SBSQ HOSP IP/OBS HIGH 50: CPT

## 2019-07-15 PROCEDURE — 99222 1ST HOSP IP/OBS MODERATE 55: CPT

## 2019-07-15 RX ORDER — DEXTROSE 50 % IN WATER 50 %
12.5 SYRINGE (ML) INTRAVENOUS ONCE
Refills: 0 | Status: DISCONTINUED | OUTPATIENT
Start: 2019-07-15 | End: 2019-07-16

## 2019-07-15 RX ORDER — INSULIN GLARGINE 100 [IU]/ML
25 INJECTION, SOLUTION SUBCUTANEOUS EVERY MORNING
Refills: 0 | Status: DISCONTINUED | OUTPATIENT
Start: 2019-07-16 | End: 2019-07-16

## 2019-07-15 RX ORDER — DEXTROSE 50 % IN WATER 50 %
15 SYRINGE (ML) INTRAVENOUS ONCE
Refills: 0 | Status: DISCONTINUED | OUTPATIENT
Start: 2019-07-15 | End: 2019-07-16

## 2019-07-15 RX ORDER — DEXTROSE 50 % IN WATER 50 %
25 SYRINGE (ML) INTRAVENOUS ONCE
Refills: 0 | Status: DISCONTINUED | OUTPATIENT
Start: 2019-07-15 | End: 2019-07-16

## 2019-07-15 RX ORDER — INSULIN LISPRO 100/ML
8 VIAL (ML) SUBCUTANEOUS
Refills: 0 | Status: DISCONTINUED | OUTPATIENT
Start: 2019-07-15 | End: 2019-07-16

## 2019-07-15 RX ORDER — SODIUM CHLORIDE 9 MG/ML
1000 INJECTION, SOLUTION INTRAVENOUS
Refills: 0 | Status: DISCONTINUED | OUTPATIENT
Start: 2019-07-15 | End: 2019-07-16

## 2019-07-15 RX ORDER — GLUCAGON INJECTION, SOLUTION 0.5 MG/.1ML
1 INJECTION, SOLUTION SUBCUTANEOUS ONCE
Refills: 0 | Status: DISCONTINUED | OUTPATIENT
Start: 2019-07-15 | End: 2019-07-16

## 2019-07-15 RX ORDER — INSULIN LISPRO 100/ML
VIAL (ML) SUBCUTANEOUS
Refills: 0 | Status: DISCONTINUED | OUTPATIENT
Start: 2019-07-15 | End: 2019-07-16

## 2019-07-15 RX ORDER — NYSTATIN CREAM 100000 [USP'U]/G
1 CREAM TOPICAL
Refills: 0 | Status: DISCONTINUED | OUTPATIENT
Start: 2019-07-15 | End: 2019-07-16

## 2019-07-15 RX ADMIN — Medication 6: at 11:38

## 2019-07-15 RX ADMIN — Medication 100 MICROGRAM(S): at 05:40

## 2019-07-15 RX ADMIN — LEVETIRACETAM 1500 MILLIGRAM(S): 250 TABLET, FILM COATED ORAL at 17:51

## 2019-07-15 RX ADMIN — GABAPENTIN 400 MILLIGRAM(S): 400 CAPSULE ORAL at 21:24

## 2019-07-15 RX ADMIN — SIMVASTATIN 80 MILLIGRAM(S): 20 TABLET, FILM COATED ORAL at 21:24

## 2019-07-15 RX ADMIN — Medication 1 MILLIGRAM(S): at 11:21

## 2019-07-15 RX ADMIN — Medication 8 UNIT(S): at 11:38

## 2019-07-15 RX ADMIN — INSULIN GLARGINE 20 UNIT(S): 100 INJECTION, SOLUTION SUBCUTANEOUS at 07:46

## 2019-07-15 RX ADMIN — Medication 6 UNIT(S): at 07:48

## 2019-07-15 RX ADMIN — LISINOPRIL 20 MILLIGRAM(S): 2.5 TABLET ORAL at 17:51

## 2019-07-15 RX ADMIN — LACOSAMIDE 100 MILLIGRAM(S): 50 TABLET ORAL at 05:43

## 2019-07-15 RX ADMIN — GABAPENTIN 400 MILLIGRAM(S): 400 CAPSULE ORAL at 14:14

## 2019-07-15 RX ADMIN — LEVETIRACETAM 1500 MILLIGRAM(S): 250 TABLET, FILM COATED ORAL at 05:40

## 2019-07-15 RX ADMIN — NYSTATIN CREAM 1 APPLICATION(S): 100000 CREAM TOPICAL at 17:52

## 2019-07-15 RX ADMIN — Medication 12: at 07:48

## 2019-07-15 RX ADMIN — GABAPENTIN 400 MILLIGRAM(S): 400 CAPSULE ORAL at 05:41

## 2019-07-15 RX ADMIN — LISINOPRIL 20 MILLIGRAM(S): 2.5 TABLET ORAL at 05:40

## 2019-07-15 RX ADMIN — LACOSAMIDE 100 MILLIGRAM(S): 50 TABLET ORAL at 17:56

## 2019-07-15 RX ADMIN — Medication 50 MILLIGRAM(S): at 05:41

## 2019-07-15 RX ADMIN — Medication 8 UNIT(S): at 17:50

## 2019-07-15 RX ADMIN — NYSTATIN CREAM 1 APPLICATION(S): 100000 CREAM TOPICAL at 06:46

## 2019-07-15 RX ADMIN — DULOXETINE HYDROCHLORIDE 60 MILLIGRAM(S): 30 CAPSULE, DELAYED RELEASE ORAL at 11:21

## 2019-07-15 RX ADMIN — HEPARIN SODIUM 5000 UNIT(S): 5000 INJECTION INTRAVENOUS; SUBCUTANEOUS at 05:42

## 2019-07-15 RX ADMIN — Medication 400 MILLIGRAM(S): at 11:20

## 2019-07-15 RX ADMIN — Medication 81 MILLIGRAM(S): at 11:20

## 2019-07-15 RX ADMIN — HEPARIN SODIUM 5000 UNIT(S): 5000 INJECTION INTRAVENOUS; SUBCUTANEOUS at 17:51

## 2019-07-15 NOTE — DISCHARGE NOTE PROVIDER - CARE PROVIDER_API CALL
Primary medical doctor,   Phone: (   )    -  Fax: (   )    -  Follow Up Time: 1 week Primary medical doctor,   Phone: (   )    -  Fax: (   )    -  Follow Up Time: 1 week    Endocrinologist,   Phone: (   )    -  Fax: (   )    -  Follow Up Time: 1 week Endocrinologist,   Phone: (   )    -  Fax: (   )    -  Follow Up Time:     Clem Camacho)  Internal Medicine  [address] [place] [address]  Phone: [phone]  Fax: [phone]  Follow Up Time: 1 week

## 2019-07-15 NOTE — DISCHARGE NOTE PROVIDER - HOSPITAL COURSE
Pt is a 74 y/o female transferred from NH, s/p fall yesterday. Pt states she stood up from recliner, felt weak and fell onto her left side and she did hit her head. Pt denies dizziness or other associated complaints. She reports having an initial fall about 1 month ago and a few since.  She further says that she does not want to return to [facility] and wants to try another SNF.        Patient seen by rehab - did well with PT - ambulated 100 feet with supervision.  She did not want to return to a nursing home and wanted to go home.    Patient is medically stable for d/c home today    d/c planning took over 50 min Pt is a 74 y/o female transferred from NH, s/p fall yesterday. Pt states she stood up from recliner, felt weak and fell onto her left side and she did hit her head. Pt denies dizziness or other associated complaints. She reports having an initial fall about 1 month ago and a few since.  She further says that she does not want to return to [facility] and wants to try another SNF.        Patient seen by rehab - did well with PT - ambulated 100 feet with supervision.  She did not want to return to a nursing home and wanted to go home.    Patient was also seen by neuro who recommended an eeg and mri.    EEG and MRI were unremarkable for any acute events.    Patient is medically stable for d/c home today    d/c planning took over 50 min

## 2019-07-15 NOTE — PHYSICAL THERAPY INITIAL EVALUATION ADULT - PLANNED THERAPY INTERVENTIONS, PT EVAL
gait training/motor coordination training/bed mobility training/strengthening/prosthetic fitting/training/transfer training

## 2019-07-15 NOTE — PROGRESS NOTE ADULT - ASSESSMENT
Pt is a 72 y/o female s/p recurrent falls/Hx Seizures/DM/HTN/Hypercholesterolemia    #falls  Rehab/PT Consult appreciated   fall precautions  Neurology Consult as pt has history of seizures and is a poor historian regarding her fall    #HTN/Dyslipidemia  continue current tx  monitor bp    #DMII  increase insulin   follow up HbA1c  check fs qac and qhs    #Seizure disorder  seizure precautions  continue keppra and vimpat  neuro eval    #Lupus  -continue methotrexate and plaquenil       Progress Note Handoff  Pending Consults: neuro  Pending Tests: hba1c  Pending Results: none  Family Discussion: discussed with pt - in agreement - wants to return home  Disposition: Home_____/SNF______/Other_____/Unknown at this time_____

## 2019-07-15 NOTE — DISCHARGE NOTE PROVIDER - PROVIDER TOKENS
FREE:[LAST:[Primary medical doctor],PHONE:[(   )    -],FAX:[(   )    -],FOLLOWUP:[1 week]] FREE:[LAST:[Primary medical doctor],PHONE:[(   )    -],FAX:[(   )    -],FOLLOWUP:[1 week]],FREE:[LAST:[Endocrinologist],PHONE:[(   )    -],FAX:[(   )    -],FOLLOWUP:[1 week]] FREE:[LAST:[Endocrinologist],PHONE:[(   )    -],FAX:[(   )    -]],PROVIDER:[TOKEN:[[id number]],FOLLOWUP:[1 week]]

## 2019-07-15 NOTE — CONSULT NOTE ADULT - SUBJECTIVE AND OBJECTIVE BOX
HPI:  Pt is a 74 y/o female transferred from NH, s/p fall yesterday. Pt states she stood up from recliner, felt weak and fell onto her left side and she did hit her head. Pt denies dizziness or other associated complaints. She reports having an initial fall about 1 month ago and a few since.  She further says that she does not want to return to [facility] and wants to try another SNF. (14 Jul 2019 11:45)      PAST MEDICAL & SURGICAL HISTORY:  Seizure: keppra  Herniated lumbar intervertebral disc: as per hx  Stroke: TIA 2002, no deficits  Fall, initial encounter: as  hx  Lupus: as per hx  Hypertension: hctz  Hypercholesteremia: statin  DM (diabetes mellitus): insulin  fs achs  No significant past surgical history      Hospital Course:  Hospitalized with fall at SNF. Lives with 2 grandsons. Did pretty well with PT amb 100 ft with a walker with sup. having an EEG.   TODAY'S SUBJECTIVE & REVIEW OF SYMPTOMS:     Constitutional WNL   Cardio WNL   Resp WNL   GI WNL  Heme WNL  Endo WNL  Skin WNL  MSK WNL  Neuro WNL  Cognitive WNL  Psych WNL      MEDICATIONS  (STANDING):  aspirin  chewable 81 milliGRAM(s) Oral daily  dextrose 5%. 1000 milliLiter(s) (50 mL/Hr) IV Continuous <Continuous>  dextrose 50% Injectable 12.5 Gram(s) IV Push once  dextrose 50% Injectable 25 Gram(s) IV Push once  dextrose 50% Injectable 25 Gram(s) IV Push once  DULoxetine 60 milliGRAM(s) Oral daily  folic acid 1 milliGRAM(s) Oral daily  gabapentin 400 milliGRAM(s) Oral three times a day  heparin  Injectable 5000 Unit(s) SubCutaneous every 12 hours  hydrochlorothiazide 50 milliGRAM(s) Oral daily  hydroxychloroquine 400 milliGRAM(s) Oral daily  insulin lispro (HumaLOG) corrective regimen sliding scale   SubCutaneous three times a day before meals  insulin lispro Injectable (HumaLOG) 8 Unit(s) SubCutaneous before breakfast  insulin lispro Injectable (HumaLOG) 8 Unit(s) SubCutaneous before lunch  insulin lispro Injectable (HumaLOG) 8 Unit(s) SubCutaneous before dinner  lacosamide 100 milliGRAM(s) Oral every 12 hours  levETIRAcetam 1500 milliGRAM(s) Oral every 12 hours  levothyroxine 100 MICROGram(s) Oral daily  lisinopril 20 milliGRAM(s) Oral every 12 hours  nystatin Powder 1 Application(s) Topical two times a day  simvastatin 80 milliGRAM(s) Oral at bedtime    MEDICATIONS  (PRN):  dextrose 40% Gel 15 Gram(s) Oral once PRN Blood Glucose LESS THAN 70 milliGRAM(s)/deciliter  diclofenac 75 milliGRAM(s) Oral two times a day PRN Moderate Pain (4 - 6)  glucagon  Injectable 1 milliGRAM(s) IntraMuscular once PRN Glucose LESS THAN 70 milligrams/deciliter  oxyCODONE    5 mG/acetaminophen 325 mG 1 Tablet(s) Oral every 8 hours PRN Severe Pain (7 - 10)      FAMILY HISTORY:  No pertinent family history in first degree relatives      Allergies    No Known Allergies    Intolerances        SOCIAL HISTORY:    [  ] Etoh  [  ] Smoking  [  ] Substance abuse     Home Environment:  [  ] Home Alone  [ x ] Lives with Family  [  ] Home Health Aid    Dwelling:  [  ] Apartment  [  ] Private House  [  ] Adult Home  [  ] Skilled Nursing Facility      [  ] Short Term  [  ] Long Term  [  ] Stairs       Elevator [  ]    FUNCTIONAL STATUS PTA: (Check all that apply)  Ambulation: [ x  ]Independent    [  ] Dependent     [  ] Non-Ambulatory  Assistive Device: [  ] SA Cane  [  ]  Q Cane  [x  ] Walker  [  ]  Wheelchair  ADL : [  ] Independent  [  ]  Dependent       Vital Signs Last 24 Hrs  T(C): 36.9 (15 Jul 2019 14:13), Max: 36.9 (15 Jul 2019 14:13)  T(F): 98.5 (15 Jul 2019 14:13), Max: 98.5 (15 Jul 2019 14:13)  HR: 94 (15 Jul 2019 14:13) (64 - 94)  BP: 164/89 (15 Jul 2019 14:13) (121/63 - 176/66)  BP(mean): --  RR: 16 (15 Jul 2019 05:22) (16 - 16)  SpO2: --      PHYSICAL EXAM: Alert & Oriented X3  GENERAL: NAD, well-groomed, well-developed  HEAD:  Atraumatic, Normocephalic  EYES: EOMI, PERRLA, conjunctiva and sclera clear  NECK: Supple, No JVD, Normal thyroid  CHEST/LUNG: Clear to percussion bilaterally; No rales, rhonchi, wheezing, or rubs  HEART: Regular rate and rhythm; No murmurs, rubs, or gallops  ABDOMEN: Soft, Nontender, Nondistended; Bowel sounds present  EXTREMITIES:  2+ Peripheral Pulses, No clubbing, cyanosis, or edema    NERVOUS SYSTEM:  Cranial Nerves 2-12 intact [  ] Abnormal  [  ]  ROM: WFL all extremities [x ]  Abnormal [  ]  Motor Strength: WFL all extremities  [ x ]  Abnormal [  ]  Sensation: intact to light touch [  ] Abnormal [  ]  Reflexes: Symmetric [  ]  Abnormal [  ]    FUNCTIONAL STATUS:  Bed Mobility: Independent [  ]  Supervision [  ]  Needs Assistance [  ]  N/A [  ]  Transfers: Independent [  ]  Supervision [  ]  Needs Assistance [  ]  N/A [  ]   Ambulation: Independent [  ]  Supervision [  ]  Needs Assistance [  ]  N/A [  ]  ADL: Independent [  ] Requires Assistance [  ] N/A [  ]      LABS:                        10.4   5.86  )-----------( 275      ( 15 Jul 2019 07:31 )             32.4     07-15    134<L>  |  96<L>  |  12  ----------------------------<  460<HH>  4.8   |  27  |  0.7    Ca    8.4<L>      15 Jul 2019 07:31    TPro  7.1  /  Alb  3.6  /  TBili  0.3  /  DBili  x   /  AST  34  /  ALT  23  /  AlkPhos  115  07-14          RADIOLOGY & ADDITIONAL STUDIES:    Assesment:

## 2019-07-15 NOTE — CONSULT NOTE ADULT - ASSESSMENT
IMPRESSION: Rehab of ataxia, S/P SAH/SDH, improved imaging study, neuro following, having an EEG    PRECAUTIONS: [ x ] Cardiac  [  ] Respiratory  [  ] Seizures [  ] Contact Isolation  [  ] Droplet Isolation  [  ] Other    Weight Bearing Status:     RECOMMENDATION:    Out of Bed to Chair     DVT/Decubiti Prophylaxis    REHAB PLAN:     [ xx  ] Bedside P/T 3-5 times a week   [   ]   Bedside O/T  2-3 times a week             [   ] No Rehab Therapy Indicated                   [   ]  Speech Therapy   Conditioning/ROM                                    ADL  Bed Mobility                                               Conditioning/ROM  Transfers                                                     Bed Mobility  Sitting /Standing Balance                         Transfers                                        Gait Training                                               Sitting/Standing Balance  Stair Training [   ]Applicable                    Home equipment Eval                                                                        Splinting  [   ] Only      GOALS:   ADL   [ x  ]   Independent                    Transfers  [ x  ] Independent                          Ambulation  [x   ] Independent     [ x   ] With device                            [   ]  CG                                                         [   ]  CG                                                                  [   ] CG                            [    ] Min A                                                   [   ] Min A                                                              [   ] Min  A          DISCHARGE PLAN:   [   ]  Good candidate for Intensive Rehabilitation/Hospital based-4A [facility]                                             Will tolerate 3hrs Intensive Rehab Daily                                       [ xx   ]  Short Term Rehab in Skilled Nursing Facility                                                          VS                                     [ xx   ]  Home with Outpatient or VN services                                         [    ]  Possible Candidate for Intensive Hospital based Rehab

## 2019-07-15 NOTE — DISCHARGE NOTE PROVIDER - NSDCCPCAREPLAN_GEN_ALL_CORE_FT
PRINCIPAL DISCHARGE DIAGNOSIS  Diagnosis: Fall  Assessment and Plan of Treatment: fall precautions PRINCIPAL DISCHARGE DIAGNOSIS  Diagnosis: Fall  Assessment and Plan of Treatment: fall precautions  follow up with pmd  get up slowly from a sitting position

## 2019-07-15 NOTE — DISCHARGE NOTE PROVIDER - CARE PROVIDERS DIRECT ADDRESSES
,DirectAddress_Unknown ,DirectAddress_Unknown,DirectAddress_Unknown ,DirectAddress_Unknown,[email or web address]

## 2019-07-16 ENCOUNTER — TRANSCRIPTION ENCOUNTER (OUTPATIENT)
Age: 74
End: 2019-07-16

## 2019-07-16 VITALS
SYSTOLIC BLOOD PRESSURE: 144 MMHG | TEMPERATURE: 100 F | RESPIRATION RATE: 16 BRPM | DIASTOLIC BLOOD PRESSURE: 60 MMHG | HEART RATE: 74 BPM

## 2019-07-16 LAB
ACRM MODULATING ANTIBODY: SIGNIFICANT CHANGE UP
AMPA-RECEPTOR ANTIBODY BY CBA, SERUM: SIGNIFICANT CHANGE UP
AMPHIPHYSIN AB TITR SER: SIGNIFICANT CHANGE UP
CASPR2-IGG CBA, SERUM: SIGNIFICANT CHANGE UP
CRMP-5-IGG WESTERN BLOT: SIGNIFICANT CHANGE UP
ESTIMATED AVERAGE GLUCOSE: 209 MG/DL — HIGH (ref 68–114)
GABA-B-RECEPTOR ANTIBODY BY CBA, SERUM: SIGNIFICANT CHANGE UP
GAD65 AB SER-MCNC: SIGNIFICANT CHANGE UP
GLIAL NUC TYPE 1 AB TITR SER: SIGNIFICANT CHANGE UP
GLUCOSE BLDC GLUCOMTR-MCNC: 164 MG/DL — HIGH (ref 70–99)
GLUCOSE BLDC GLUCOMTR-MCNC: 164 MG/DL — HIGH (ref 70–99)
GLUCOSE BLDC GLUCOMTR-MCNC: 298 MG/DL — HIGH (ref 70–99)
HBA1C BLD-MCNC: 8.9 % — HIGH (ref 4–5.6)
HU1 AB TITR SER: SIGNIFICANT CHANGE UP
HU2 AB TITR SER IF: SIGNIFICANT CHANGE UP
HU3 AB TITR SER: SIGNIFICANT CHANGE UP
IMMUNOLOGIST REVIEW: SIGNIFICANT CHANGE UP
LGI1-IGG CBA, SERUM: SIGNIFICANT CHANGE UP
NACHR AB SER-SCNC: SIGNIFICANT CHANGE UP
NMDA-RECEPTOR ANTIBODY BY CBA, SERUM: SIGNIFICANT CHANGE UP
P/Q TYPE ANTIBODY: SIGNIFICANT CHANGE UP
PCA-1 AB TITR SER: SIGNIFICANT CHANGE UP
PCA-2 AB TITR SER: SIGNIFICANT CHANGE UP
PCA-TR AB TITR SER: SIGNIFICANT CHANGE UP
REFLEX ADDED: SIGNIFICANT CHANGE UP
VGCC-N BIND AB SER-SCNC: SIGNIFICANT CHANGE UP
VGKC AB SER-SCNC: SIGNIFICANT CHANGE UP

## 2019-07-16 PROCEDURE — 99239 HOSP IP/OBS DSCHRG MGMT >30: CPT

## 2019-07-16 PROCEDURE — 70551 MRI BRAIN STEM W/O DYE: CPT | Mod: 26

## 2019-07-16 RX ORDER — GABAPENTIN 400 MG/1
1 CAPSULE ORAL
Qty: 0 | Refills: 0 | DISCHARGE

## 2019-07-16 RX ORDER — INSULIN DEGLUDEC 100 U/ML
20 INJECTION, SOLUTION SUBCUTANEOUS
Qty: 0 | Refills: 0 | DISCHARGE

## 2019-07-16 RX ORDER — DICLOFENAC SODIUM 75 MG/1
1 TABLET, DELAYED RELEASE ORAL
Qty: 0 | Refills: 0 | DISCHARGE

## 2019-07-16 RX ORDER — DULOXETINE HYDROCHLORIDE 30 MG/1
1 CAPSULE, DELAYED RELEASE ORAL
Qty: 0 | Refills: 0 | DISCHARGE

## 2019-07-16 RX ORDER — METHOTREXATE 2.5 MG/1
12.5 TABLET ORAL
Qty: 0 | Refills: 0 | DISCHARGE

## 2019-07-16 RX ORDER — SIMVASTATIN 20 MG/1
1 TABLET, FILM COATED ORAL
Qty: 0 | Refills: 0 | DISCHARGE

## 2019-07-16 RX ORDER — METHADONE HYDROCHLORIDE 40 MG/1
5 TABLET ORAL
Qty: 0 | Refills: 0 | DISCHARGE

## 2019-07-16 RX ORDER — HYDROXYCHLOROQUINE SULFATE 200 MG
2 TABLET ORAL
Qty: 0 | Refills: 0 | DISCHARGE

## 2019-07-16 RX ORDER — HYDROCHLOROTHIAZIDE 25 MG
50 TABLET ORAL
Qty: 0 | Refills: 0 | DISCHARGE

## 2019-07-16 RX ORDER — LISINOPRIL 2.5 MG/1
1 TABLET ORAL
Qty: 0 | Refills: 0 | DISCHARGE

## 2019-07-16 RX ORDER — FOLIC ACID 0.8 MG
1 TABLET ORAL
Qty: 0 | Refills: 0 | DISCHARGE

## 2019-07-16 RX ORDER — INSULIN ASPART 100 [IU]/ML
0 INJECTION, SOLUTION SUBCUTANEOUS
Qty: 0 | Refills: 0 | DISCHARGE

## 2019-07-16 RX ADMIN — Medication 2: at 14:48

## 2019-07-16 RX ADMIN — Medication 1 MILLIGRAM(S): at 14:45

## 2019-07-16 RX ADMIN — Medication 400 MILLIGRAM(S): at 14:44

## 2019-07-16 RX ADMIN — Medication 8 UNIT(S): at 16:41

## 2019-07-16 RX ADMIN — LEVETIRACETAM 1500 MILLIGRAM(S): 250 TABLET, FILM COATED ORAL at 05:22

## 2019-07-16 RX ADMIN — DULOXETINE HYDROCHLORIDE 60 MILLIGRAM(S): 30 CAPSULE, DELAYED RELEASE ORAL at 14:44

## 2019-07-16 RX ADMIN — Medication 8 UNIT(S): at 07:55

## 2019-07-16 RX ADMIN — INSULIN GLARGINE 25 UNIT(S): 100 INJECTION, SOLUTION SUBCUTANEOUS at 07:56

## 2019-07-16 RX ADMIN — Medication 6: at 07:56

## 2019-07-16 RX ADMIN — LISINOPRIL 20 MILLIGRAM(S): 2.5 TABLET ORAL at 05:22

## 2019-07-16 RX ADMIN — Medication 100 MICROGRAM(S): at 05:22

## 2019-07-16 RX ADMIN — Medication 2: at 16:41

## 2019-07-16 RX ADMIN — LACOSAMIDE 100 MILLIGRAM(S): 50 TABLET ORAL at 18:03

## 2019-07-16 RX ADMIN — Medication 81 MILLIGRAM(S): at 14:45

## 2019-07-16 RX ADMIN — Medication 50 MILLIGRAM(S): at 05:23

## 2019-07-16 RX ADMIN — LEVETIRACETAM 1500 MILLIGRAM(S): 250 TABLET, FILM COATED ORAL at 18:02

## 2019-07-16 RX ADMIN — GABAPENTIN 400 MILLIGRAM(S): 400 CAPSULE ORAL at 14:45

## 2019-07-16 RX ADMIN — LISINOPRIL 20 MILLIGRAM(S): 2.5 TABLET ORAL at 18:03

## 2019-07-16 RX ADMIN — LACOSAMIDE 100 MILLIGRAM(S): 50 TABLET ORAL at 05:29

## 2019-07-16 RX ADMIN — HEPARIN SODIUM 5000 UNIT(S): 5000 INJECTION INTRAVENOUS; SUBCUTANEOUS at 18:02

## 2019-07-16 RX ADMIN — GABAPENTIN 400 MILLIGRAM(S): 400 CAPSULE ORAL at 05:23

## 2019-07-16 RX ADMIN — Medication 8 UNIT(S): at 14:48

## 2019-07-16 RX ADMIN — NYSTATIN CREAM 1 APPLICATION(S): 100000 CREAM TOPICAL at 05:29

## 2019-07-16 RX ADMIN — NYSTATIN CREAM 1 APPLICATION(S): 100000 CREAM TOPICAL at 18:03

## 2019-07-16 RX ADMIN — HEPARIN SODIUM 5000 UNIT(S): 5000 INJECTION INTRAVENOUS; SUBCUTANEOUS at 05:23

## 2019-07-16 NOTE — PROGRESS NOTE ADULT - SUBJECTIVE AND OBJECTIVE BOX
JOSE MANUELBARBARAMONICA LINDSAY  73y  Female      Patient is a 73y old  Female who presents with a chief complaint of s/p falls (14 Jul 2019 13:03)      INTERVAL HPI/OVERNIGHT EVENTS:  Patient seen and examined earlier this morning.  Lying comfortably in bed.  No complaints.  Asking to go home.      REVIEW OF SYSTEMS:  CONSTITUTIONAL: No fever, weight loss, or fatigue  EYES: No eye pain, visual disturbances, or discharge  ENMT:  No difficulty hearing, tinnitus, vertigo; No sinus or throat pain  NECK: No pain or stiffness  RESPIRATORY: No cough, wheezing, chills or hemoptysis; No shortness of breath  CARDIOVASCULAR: No chest pain, palpitations, dizziness, or leg swelling  GASTROINTESTINAL: No abdominal or epigastric pain. No nausea, vomiting, or hematemesis; No diarrhea or constipation. No melena or hematochezia.  GENITOURINARY: No dysuria, frequency, hematuria, or incontinence  NEUROLOGICAL: No headaches, memory loss, loss of strength, numbness, or tremors  SKIN: No itching, burning, rashes, or lesions   LYMPH NODES: No enlarged glands  ENDOCRINE: No heat or cold intolerance; No hair loss  MUSCULOSKELETAL: No joint pain or swelling; No muscle, back, or extremity pain  PSYCHIATRIC: No depression, anxiety, mood swings, or difficulty sleeping  HEME/LYMPH: No easy bruising, or bleeding gums  ALLERY AND IMMUNOLOGIC: No hives or eczema    Vital Signs Last 24 Hrs  T(C): 36.6 (16 Jul 2019 05:05), Max: 36.9 (15 Jul 2019 14:13)  T(F): 97.8 (16 Jul 2019 05:05), Max: 98.5 (15 Jul 2019 14:13)  HR: 82 (16 Jul 2019 05:05) (82 - 94)  BP: 151/80 (16 Jul 2019 05:05) (133/69 - 164/89)  BP(mean): --  RR: 16 (16 Jul 2019 05:05) (16 - 16)  SpO2: --    PHYSICAL EXAM:  GENERAL: NAD, well-groomed, well-developed  HEAD:  Atraumatic, Normocephalic  EYES: EOMI, PERRLA, conjunctiva and sclera clear  ENMT: No tonsillar erythema, exudates, or enlargement; Moist mucous membranes, Good dentition, No lesions  NECK: Supple, No JVD, Normal thyroid  NERVOUS SYSTEM:  Alert & Oriented X3, Good concentration; Motor Strength 5/5 B/L upper and lower extremities; DTRs 2+ intact and symmetric  CHEST/LUNG: Clear to percussion bilaterally; No rales, rhonchi, wheezing, or rubs  HEART: Regular rate and rhythm; No murmurs, rubs, or gallops  ABDOMEN: Soft, Nontender, Nondistended; Bowel sounds present  EXTREMITIES:  2+ Peripheral Pulses, No clubbing, cyanosis, or edema  LYMPH: No lymphadenopathy noted  SKIN: No rashes or lesions    Consultant(s) Notes Reviewed:  [x ] YES  [ ] NO  Care Discussed with Consultants/Other Providers [ x] YES  [ ] NO    LAB:                        10.4   5.86  )-----------( 275      ( 15 Jul 2019 07:31 )             32.4     07-15    134<L>  |  96<L>  |  12  ----------------------------<  460<HH>  4.8   |  27  |  0.7    Ca    8.4<L>      15 Jul 2019 07:31    TPro  7.1  /  Alb  3.6  /  TBili  0.3  /  DBili  x   /  AST  34  /  ALT  23  /  AlkPhos  115  07-14    LIVER FUNCTIONS - ( 14 Jul 2019 00:30 )  Alb: 3.6 g/dL / Pro: 7.1 g/dL / ALK PHOS: 115 U/L / ALT: 23 U/L / AST: 34 U/L / GGT: x             CAPILLARY BLOOD GLUCOSE  POCT Blood Glucose.: 298 mg/dL (16 Jul 2019 07:23)  POCT Blood Glucose.: 149 mg/dL (15 Jul 2019 20:56)  POCT Blood Glucose.: 128 mg/dL (15 Jul 2019 17:46)  POCT Blood Glucose.: 141 mg/dL (15 Jul 2019 16:27)  POCT Blood Glucose.: 267 mg/dL (15 Jul 2019 11:23)    Drug Dosing Weight  Height (cm): 152.4 (14 Jul 2019 10:00)  Weight (kg): 62.3 (14 Jul 2019 10:00)  BMI (kg/m2): 26.8 (14 Jul 2019 10:00)  BSA (m2): 1.59 (14 Jul 2019 10:00)        RADIOLOGY & ADDITIONAL TESTS:  Imaging Personally Reviewed:  [x] YES  [ ] NO    HEALTH ISSUES - PROBLEM Dx:  Fall, sequela: Fall, sequela      MEDICATIONS  (STANDING):  aspirin  chewable 81 milliGRAM(s) Oral daily  dextrose 5%. 1000 milliLiter(s) (50 mL/Hr) IV Continuous <Continuous>  dextrose 50% Injectable 12.5 Gram(s) IV Push once  dextrose 50% Injectable 25 Gram(s) IV Push once  dextrose 50% Injectable 25 Gram(s) IV Push once  DULoxetine 60 milliGRAM(s) Oral daily  folic acid 1 milliGRAM(s) Oral daily  gabapentin 400 milliGRAM(s) Oral three times a day  heparin  Injectable 5000 Unit(s) SubCutaneous every 12 hours  hydrochlorothiazide 50 milliGRAM(s) Oral daily  hydroxychloroquine 400 milliGRAM(s) Oral daily  insulin glargine Injectable (LANTUS) 25 Unit(s) SubCutaneous every morning  insulin lispro (HumaLOG) corrective regimen sliding scale   SubCutaneous three times a day before meals  insulin lispro Injectable (HumaLOG) 8 Unit(s) SubCutaneous before breakfast  insulin lispro Injectable (HumaLOG) 8 Unit(s) SubCutaneous before lunch  insulin lispro Injectable (HumaLOG) 8 Unit(s) SubCutaneous before dinner  lacosamide 100 milliGRAM(s) Oral every 12 hours  levETIRAcetam 1500 milliGRAM(s) Oral every 12 hours  levothyroxine 100 MICROGram(s) Oral daily  lisinopril 20 milliGRAM(s) Oral every 12 hours  nystatin Powder 1 Application(s) Topical two times a day  simvastatin 80 milliGRAM(s) Oral at bedtime    MEDICATIONS  (PRN):  dextrose 40% Gel 15 Gram(s) Oral once PRN Blood Glucose LESS THAN 70 milliGRAM(s)/deciliter  diclofenac 75 milliGRAM(s) Oral two times a day PRN Moderate Pain (4 - 6)  glucagon  Injectable 1 milliGRAM(s) IntraMuscular once PRN Glucose LESS THAN 70 milligrams/deciliter  oxyCODONE    5 mG/acetaminophen 325 mG 1 Tablet(s) Oral every 8 hours PRN Severe Pain (7 - 10)

## 2019-07-16 NOTE — PROGRESS NOTE ADULT - ASSESSMENT
Pt is a 72 y/o female s/p recurrent falls/Hx Seizures/DM/HTN/Hypercholesterolemia    #falls  Rehab/PT Consult appreciated - pt ambulated well - stable for home d/c - pt refusing SNF placement   fall precautions  Neurology Consult appreciated - rec: EEG and MRI - ordered     #HTN/Dyslipidemia  continue current tx  monitor bp    #DMII  increased insulin yesterday   follow up HbA1c  check fs qac and qhs    #Seizure disorder  seizure precautions  continue keppra and vimpat  neuro eval appreciated - MRI brain today    #Lupus  -continue methotrexate and plaquenil       Progress Note Handoff  Pending Consults: none  Pending Tests: hba1c, mri  Pending Results: mri, eeg  Family Discussion: discussed with pt - in agreement - wants to return home and not snf - anticipate d/c in am  Disposition: Home___x__/SNF______/Other_____/Unknown at this time_____

## 2019-07-17 LAB — THYROPEROXIDASE AB SERPL-ACNC: 529 IU/ML — HIGH (ref 0–34.9)

## 2019-07-23 DIAGNOSIS — E11.9 TYPE 2 DIABETES MELLITUS WITHOUT COMPLICATIONS: ICD-10-CM

## 2019-07-23 DIAGNOSIS — R29.6 REPEATED FALLS: ICD-10-CM

## 2019-07-23 DIAGNOSIS — Z79.4 LONG TERM (CURRENT) USE OF INSULIN: ICD-10-CM

## 2019-07-23 DIAGNOSIS — M51.26 OTHER INTERVERTEBRAL DISC DISPLACEMENT, LUMBAR REGION: ICD-10-CM

## 2019-07-23 DIAGNOSIS — E78.5 HYPERLIPIDEMIA, UNSPECIFIED: ICD-10-CM

## 2019-07-23 DIAGNOSIS — R27.0 ATAXIA, UNSPECIFIED: ICD-10-CM

## 2019-07-23 DIAGNOSIS — Z86.73 PERSONAL HISTORY OF TRANSIENT ISCHEMIC ATTACK (TIA), AND CEREBRAL INFARCTION WITHOUT RESIDUAL DEFICITS: ICD-10-CM

## 2019-07-23 DIAGNOSIS — I25.10 ATHEROSCLEROTIC HEART DISEASE OF NATIVE CORONARY ARTERY WITHOUT ANGINA PECTORIS: ICD-10-CM

## 2019-07-23 DIAGNOSIS — I10 ESSENTIAL (PRIMARY) HYPERTENSION: ICD-10-CM

## 2019-07-23 DIAGNOSIS — Z79.82 LONG TERM (CURRENT) USE OF ASPIRIN: ICD-10-CM

## 2019-07-23 DIAGNOSIS — G40.909 EPILEPSY, UNSPECIFIED, NOT INTRACTABLE, WITHOUT STATUS EPILEPTICUS: ICD-10-CM

## 2019-07-23 DIAGNOSIS — L93.2 OTHER LOCAL LUPUS ERYTHEMATOSUS: ICD-10-CM

## 2019-07-31 LAB
CULTURE RESULTS: SIGNIFICANT CHANGE UP
SPECIMEN SOURCE: SIGNIFICANT CHANGE UP

## 2019-08-13 ENCOUNTER — INPATIENT (INPATIENT)
Facility: HOSPITAL | Age: 74
LOS: 2 days | Discharge: NURSING, REHAB OR LONG-TERM CARE | End: 2019-08-16
Attending: STUDENT IN AN ORGANIZED HEALTH CARE EDUCATION/TRAINING PROGRAM | Admitting: STUDENT IN AN ORGANIZED HEALTH CARE EDUCATION/TRAINING PROGRAM
Payer: MEDICARE

## 2019-08-13 VITALS
DIASTOLIC BLOOD PRESSURE: 76 MMHG | TEMPERATURE: 97 F | OXYGEN SATURATION: 98 % | HEART RATE: 76 BPM | RESPIRATION RATE: 20 BRPM | WEIGHT: 119.93 LBS | SYSTOLIC BLOOD PRESSURE: 155 MMHG

## 2019-08-13 DIAGNOSIS — G92 TOXIC ENCEPHALOPATHY: ICD-10-CM

## 2019-08-13 LAB
ALBUMIN SERPL ELPH-MCNC: 3.4 G/DL — LOW (ref 3.5–5.2)
ALP SERPL-CCNC: 94 U/L — SIGNIFICANT CHANGE UP (ref 30–115)
ALT FLD-CCNC: 12 U/L — SIGNIFICANT CHANGE UP (ref 0–41)
ANION GAP SERPL CALC-SCNC: 11 MMOL/L — SIGNIFICANT CHANGE UP (ref 7–14)
APPEARANCE UR: CLEAR — SIGNIFICANT CHANGE UP
APTT BLD: 32.4 SEC — SIGNIFICANT CHANGE UP (ref 27–39.2)
AST SERPL-CCNC: 43 U/L — HIGH (ref 0–41)
BACTERIA # UR AUTO: ABNORMAL
BASOPHILS # BLD AUTO: 0.02 K/UL — SIGNIFICANT CHANGE UP (ref 0–0.2)
BASOPHILS NFR BLD AUTO: 0.3 % — SIGNIFICANT CHANGE UP (ref 0–1)
BILIRUB SERPL-MCNC: 0.3 MG/DL — SIGNIFICANT CHANGE UP (ref 0.2–1.2)
BILIRUB UR-MCNC: ABNORMAL
BUN SERPL-MCNC: 22 MG/DL — HIGH (ref 10–20)
CALCIUM SERPL-MCNC: 8.8 MG/DL — SIGNIFICANT CHANGE UP (ref 8.5–10.1)
CHLORIDE SERPL-SCNC: 99 MMOL/L — SIGNIFICANT CHANGE UP (ref 98–110)
CO2 SERPL-SCNC: 28 MMOL/L — SIGNIFICANT CHANGE UP (ref 17–32)
COLOR SPEC: YELLOW — SIGNIFICANT CHANGE UP
CREAT SERPL-MCNC: 0.8 MG/DL — SIGNIFICANT CHANGE UP (ref 0.7–1.5)
DIFF PNL FLD: ABNORMAL
EOSINOPHIL # BLD AUTO: 0.08 K/UL — SIGNIFICANT CHANGE UP (ref 0–0.7)
EOSINOPHIL NFR BLD AUTO: 1.1 % — SIGNIFICANT CHANGE UP (ref 0–8)
EPI CELLS # UR: ABNORMAL /HPF
GLUCOSE SERPL-MCNC: 173 MG/DL — HIGH (ref 70–99)
GLUCOSE UR QL: NEGATIVE MG/DL — SIGNIFICANT CHANGE UP
HCT VFR BLD CALC: 31.1 % — LOW (ref 37–47)
HGB BLD-MCNC: 9.7 G/DL — LOW (ref 12–16)
IMM GRANULOCYTES NFR BLD AUTO: 0.1 % — SIGNIFICANT CHANGE UP (ref 0.1–0.3)
INR BLD: 1.08 RATIO — SIGNIFICANT CHANGE UP (ref 0.65–1.3)
KETONES UR-MCNC: ABNORMAL
LEUKOCYTE ESTERASE UR-ACNC: ABNORMAL
LYMPHOCYTES # BLD AUTO: 1.57 K/UL — SIGNIFICANT CHANGE UP (ref 1.2–3.4)
LYMPHOCYTES # BLD AUTO: 21.3 % — SIGNIFICANT CHANGE UP (ref 20.5–51.1)
MAGNESIUM SERPL-MCNC: 2 MG/DL — SIGNIFICANT CHANGE UP (ref 1.8–2.4)
MCHC RBC-ENTMCNC: 28 PG — SIGNIFICANT CHANGE UP (ref 27–31)
MCHC RBC-ENTMCNC: 31.2 G/DL — LOW (ref 32–37)
MCV RBC AUTO: 89.9 FL — SIGNIFICANT CHANGE UP (ref 81–99)
MONOCYTES # BLD AUTO: 0.29 K/UL — SIGNIFICANT CHANGE UP (ref 0.1–0.6)
MONOCYTES NFR BLD AUTO: 3.9 % — SIGNIFICANT CHANGE UP (ref 1.7–9.3)
NEUTROPHILS # BLD AUTO: 5.39 K/UL — SIGNIFICANT CHANGE UP (ref 1.4–6.5)
NEUTROPHILS NFR BLD AUTO: 73.3 % — SIGNIFICANT CHANGE UP (ref 42.2–75.2)
NITRITE UR-MCNC: NEGATIVE — SIGNIFICANT CHANGE UP
NRBC # BLD: 0 /100 WBCS — SIGNIFICANT CHANGE UP (ref 0–0)
PH UR: 5.5 — SIGNIFICANT CHANGE UP (ref 5–8)
PLATELET # BLD AUTO: 118 K/UL — LOW (ref 130–400)
POTASSIUM SERPL-MCNC: 4.9 MMOL/L — SIGNIFICANT CHANGE UP (ref 3.5–5)
POTASSIUM SERPL-SCNC: 4.9 MMOL/L — SIGNIFICANT CHANGE UP (ref 3.5–5)
PROT SERPL-MCNC: 6.4 G/DL — SIGNIFICANT CHANGE UP (ref 6–8)
PROT UR-MCNC: 100 MG/DL
PROTHROM AB SERPL-ACNC: 12.4 SEC — SIGNIFICANT CHANGE UP (ref 9.95–12.87)
RBC # BLD: 3.46 M/UL — LOW (ref 4.2–5.4)
RBC # FLD: 15 % — HIGH (ref 11.5–14.5)
RBC CASTS # UR COMP ASSIST: SIGNIFICANT CHANGE UP /HPF
SODIUM SERPL-SCNC: 138 MMOL/L — SIGNIFICANT CHANGE UP (ref 135–146)
SP GR SPEC: >=1.03 (ref 1.01–1.03)
TROPONIN T SERPL-MCNC: <0.01 NG/ML — SIGNIFICANT CHANGE UP
UROBILINOGEN FLD QL: 0.2 MG/DL — SIGNIFICANT CHANGE UP (ref 0.2–0.2)
WBC # BLD: 7.36 K/UL — SIGNIFICANT CHANGE UP (ref 4.8–10.8)
WBC # FLD AUTO: 7.36 K/UL — SIGNIFICANT CHANGE UP (ref 4.8–10.8)
WBC UR QL: ABNORMAL /HPF

## 2019-08-13 PROCEDURE — 99285 EMERGENCY DEPT VISIT HI MDM: CPT

## 2019-08-13 PROCEDURE — 71045 X-RAY EXAM CHEST 1 VIEW: CPT | Mod: 26

## 2019-08-13 PROCEDURE — 70450 CT HEAD/BRAIN W/O DYE: CPT | Mod: 26

## 2019-08-13 RX ORDER — INSULIN LISPRO 100/ML
VIAL (ML) SUBCUTANEOUS
Refills: 0 | Status: DISCONTINUED | OUTPATIENT
Start: 2019-08-13 | End: 2019-08-16

## 2019-08-13 RX ORDER — ATORVASTATIN CALCIUM 80 MG/1
40 TABLET, FILM COATED ORAL AT BEDTIME
Refills: 0 | Status: DISCONTINUED | OUTPATIENT
Start: 2019-08-13 | End: 2019-08-16

## 2019-08-13 RX ORDER — CEFTRIAXONE 500 MG/1
1000 INJECTION, POWDER, FOR SOLUTION INTRAMUSCULAR; INTRAVENOUS ONCE
Refills: 0 | Status: COMPLETED | OUTPATIENT
Start: 2019-08-13 | End: 2019-08-13

## 2019-08-13 RX ORDER — FOLIC ACID 0.8 MG
1 TABLET ORAL DAILY
Refills: 0 | Status: DISCONTINUED | OUTPATIENT
Start: 2019-08-13 | End: 2019-08-16

## 2019-08-13 RX ORDER — LEVOTHYROXINE SODIUM 125 MCG
100 TABLET ORAL DAILY
Refills: 0 | Status: DISCONTINUED | OUTPATIENT
Start: 2019-08-13 | End: 2019-08-16

## 2019-08-13 RX ORDER — ENOXAPARIN SODIUM 100 MG/ML
40 INJECTION SUBCUTANEOUS DAILY
Refills: 0 | Status: DISCONTINUED | OUTPATIENT
Start: 2019-08-13 | End: 2019-08-16

## 2019-08-13 RX ORDER — ASPIRIN/CALCIUM CARB/MAGNESIUM 324 MG
81 TABLET ORAL DAILY
Refills: 0 | Status: DISCONTINUED | OUTPATIENT
Start: 2019-08-13 | End: 2019-08-16

## 2019-08-13 RX ORDER — SODIUM CHLORIDE 9 MG/ML
1000 INJECTION, SOLUTION INTRAVENOUS
Refills: 0 | Status: DISCONTINUED | OUTPATIENT
Start: 2019-08-13 | End: 2019-08-14

## 2019-08-13 RX ORDER — DULOXETINE HYDROCHLORIDE 30 MG/1
60 CAPSULE, DELAYED RELEASE ORAL DAILY
Refills: 0 | Status: DISCONTINUED | OUTPATIENT
Start: 2019-08-13 | End: 2019-08-16

## 2019-08-13 RX ORDER — LISINOPRIL 2.5 MG/1
20 TABLET ORAL DAILY
Refills: 0 | Status: DISCONTINUED | OUTPATIENT
Start: 2019-08-13 | End: 2019-08-16

## 2019-08-13 RX ORDER — CEFTRIAXONE 500 MG/1
1000 INJECTION, POWDER, FOR SOLUTION INTRAMUSCULAR; INTRAVENOUS EVERY 24 HOURS
Refills: 0 | Status: DISCONTINUED | OUTPATIENT
Start: 2019-08-13 | End: 2019-08-15

## 2019-08-13 RX ORDER — SODIUM CHLORIDE 9 MG/ML
1000 INJECTION, SOLUTION INTRAVENOUS
Refills: 0 | Status: DISCONTINUED | OUTPATIENT
Start: 2019-08-13 | End: 2019-08-16

## 2019-08-13 RX ORDER — INSULIN GLARGINE 100 [IU]/ML
5 INJECTION, SOLUTION SUBCUTANEOUS EVERY MORNING
Refills: 0 | Status: DISCONTINUED | OUTPATIENT
Start: 2019-08-13 | End: 2019-08-16

## 2019-08-13 RX ORDER — INSULIN GLARGINE 100 [IU]/ML
5 INJECTION, SOLUTION SUBCUTANEOUS AT BEDTIME
Refills: 0 | Status: DISCONTINUED | OUTPATIENT
Start: 2019-08-13 | End: 2019-08-13

## 2019-08-13 RX ORDER — HYDROCHLOROTHIAZIDE 25 MG
50 TABLET ORAL DAILY
Refills: 0 | Status: DISCONTINUED | OUTPATIENT
Start: 2019-08-13 | End: 2019-08-16

## 2019-08-13 RX ORDER — GABAPENTIN 400 MG/1
400 CAPSULE ORAL THREE TIMES A DAY
Refills: 0 | Status: DISCONTINUED | OUTPATIENT
Start: 2019-08-13 | End: 2019-08-16

## 2019-08-13 RX ORDER — LACOSAMIDE 50 MG/1
100 TABLET ORAL
Refills: 0 | Status: DISCONTINUED | OUTPATIENT
Start: 2019-08-13 | End: 2019-08-16

## 2019-08-13 RX ORDER — HYDROXYCHLOROQUINE SULFATE 200 MG
200 TABLET ORAL
Refills: 0 | Status: DISCONTINUED | OUTPATIENT
Start: 2019-08-13 | End: 2019-08-16

## 2019-08-13 RX ORDER — LEVETIRACETAM 250 MG/1
750 TABLET, FILM COATED ORAL
Refills: 0 | Status: DISCONTINUED | OUTPATIENT
Start: 2019-08-13 | End: 2019-08-14

## 2019-08-13 RX ADMIN — CEFTRIAXONE 100 MILLIGRAM(S): 500 INJECTION, POWDER, FOR SOLUTION INTRAMUSCULAR; INTRAVENOUS at 21:43

## 2019-08-13 NOTE — ED PROVIDER NOTE - OBJECTIVE STATEMENT
74yo F BIBA from home. As per EMS, pt grandson is EMS and noted that she has had slurred speech over the last few days. Pt has no complaints, no CP, SOB, nausea, vomiting, leg pain, weakness, no urinary symptoms. Pt states she usually ambulates on her own but fell about 1 month ago and has not been walking so much. 72yo F BIBA from home. As per EMS, pt grandson is EMS and noted that she has had slurred speech over the last few days. Pt has no complaints, no CP, SOB, nausea, vomiting, leg pain, weakness, no urinary symptoms. Pt states she usually ambulates on her own but fell about 1 month ago and has not been walking so much. patient poor historian. patient with hx of anemia, SLE, diabetes, HTN, CRF, seizure disorder on keppra

## 2019-08-13 NOTE — H&P ADULT - NSHPLABSRESULTS_GEN_ALL_CORE
9.7    7.36  )-----------( 118      ( 13 Aug 2019 18:00 )             31.1     08-13    138  |  99  |  22<H>  ----------------------------<  173<H>  4.9   |  28  |  0.8    Ca    8.8      13 Aug 2019 18:00  Mg     2.0     08-13    TPro  6.4  /  Alb  3.4<L>  /  TBili  0.3  /  DBili  x   /  AST  43<H>  /  ALT  12  /  AlkPhos  94  08-13    Urinalysis Basic - ( 13 Aug 2019 20:23 )    Color: Yellow / Appearance: Clear / SG: >=1.030 / pH: x  Gluc: x / Ketone: Trace  / Bili: Small / Urobili: 0.2 mg/dL   Blood: x / Protein: 100 mg/dL / Nitrite: Negative   Leuk Esterase: Small / RBC: 1-2 /HPF / WBC 10-25 /HPF   Sq Epi: x / Non Sq Epi: Few /HPF / Bacteria: Moderate      EXAM:  CT BRAIN          PROCEDURE DATE:  08/13/2019    INTERPRETATION:  Clinical History / Reason for exam: Altered mental status    Technique: Multiple contiguous axial CT images of the head were obtained   from the base of the skull tothe vertex without administration of   intravenous contrast. Sagittal and coronal reformations were also   obtained.    Comparison: 7/14/2019    FINDINGS:    The ventricles and sulci are prominent, consistent with parenchymal   volume loss.      There is no acute mass effect, territorial infarct or intracranial   hemorrhage.      The grey white differentiation is maintained throughout.    There are patchy white matter hypodensities, consistent with chronic   microvascular ischemic changes.    No evidence of depressed skull fracture.    The visualized paranasal sinuses are well aerated.    The bilateral mastoid complexes, globes and orbits are grossly within   normal limits.    IMPRESSION:     No CT evidence for acute intracranial pathology.

## 2019-08-13 NOTE — H&P ADULT - NSHPSOURCEINFOTX_GEN_ALL_CORE
HX and Physical Limited. PT not accompanied by family at time of admission. HX obtained from healthcare providers and chart. Attempts to reach family members listed in the chart were unsuccessful.

## 2019-08-13 NOTE — H&P ADULT - NSHPPHYSICALEXAM_GEN_ALL_CORE
Vital Signs Last 24 Hrs  T(C): 37.1 (13 Aug 2019 19:20), Max: 37.1 (13 Aug 2019 19:20)  T(F): 98.7 (13 Aug 2019 19:20), Max: 98.7 (13 Aug 2019 19:20)  HR: 78 (13 Aug 2019 21:33) (76 - 78)  BP: 154/70 (13 Aug 2019 21:33) (154/70 - 155/76)  BP(mean): --  RR: 18 (13 Aug 2019 21:33) (18 - 20)  SpO2: 96% (13 Aug 2019 21:33) (96% - 98%)    General: WN/WD NAD  Neurology: A&Ox1 (name), nonfocal, RAMIREZ x 4  Eyes: PERRL/EOMI  ENT/Neck: Neck supple, trachea midline, No JVD  Respiratory: CTA B/L, No wheezing, rales, rhonchi  CV: RRR, S1S2, no murmurs, rubs or gallops  Abdominal: Soft, NT, ND +BS,   Extremities: No edema, + peripheral pulses  Skin: No Rashes, Hematoma, Ecchymosis

## 2019-08-13 NOTE — H&P ADULT - ASSESSMENT
A/P:    1. Weakness and confusion likely related to UTI in the elderly  -CTH NEG  -No new neurological symptoms  -Urinalysis positive for infection  -Send Urine culture  -Start IV ABXs (Ceftriaxone)  -IVFs (LR)    2. Normocytic anemia, chronic  -No signs of overt bleeding  -Repeat CBC in the AM, needs out-pt eval    3. Thrombocytopenia  -Will repeat CBC in the AM  -No signs of bleeding/bruising    4. Mild metabolic alkalosis likely 2/2 dehydration  -Urine with high specific gravity  -IVFs (LR)   -Repeat BMP ion the AM    5. Hypoalbuminemia due to poor oral intake  -Nutrition eval    6. HX of DM  -Insulin sliding-scale while admitted to the hospital with a basal dose  -Hold oral hypoglycemics    7. Hx of SZ DS  -Home meds while in the hospital    8. HX of SLE  -Home meds while in the hospital    GI and DVT PPX  FULL CODE

## 2019-08-13 NOTE — ED ADULT TRIAGE NOTE - CHIEF COMPLAINT QUOTE
per EMS grandbryce sts pt has not been acting normal since yesterday pt answering questions to time place aware of surroundings

## 2019-08-13 NOTE — H&P ADULT - HISTORY OF PRESENT ILLNESS
73y 72 YO F with a PMH of DM, SLE, SZ DS, CVA in 02' with no residual weakness, and HTN who was BIBEMS for eval of increasing weakness and slurring of speech for the past couple days. Of note, pt was recently seen here in July for eval of falls and refused SNF placement. The pt denies any symptoms at present and states that she just wants to go home.     In ED, pt had a CTH which was NEG but they found a UTI to be present.

## 2019-08-13 NOTE — ED PROVIDER NOTE - PHYSICAL EXAMINATION
Vital Signs: I have reviewed the initial vital signs.  Constitutional: well-nourished, no acute distress, normocephalic  Eyes: PERRLA, EOMI, no nystagmus, clear conjunctiva  ENT: MMM, TM b/l clear , no nasal congestion  Cardiovascular: regular rate, regular rhythm, no murmur appreciated  Respiratory: unlabored respiratory effort, clear to auscultation bilaterally  Gastrointestinal: soft, non-tender, non-distended  abdomen, no pulsatile mass  Musculoskeletal: supple neck, no lower extremity edema, no bony tenderness  Integumentary: warm, dry, no rash  Neurologic: awake, alert, cranial nerves II-XII grossly intact, extremities’ motor and sensory functions grossly intact, no focal deficits, GCS 15  Psychiatric: appropriate mood, appropriate affect Vital Signs: I have reviewed the initial vital signs.  Constitutional: well-nourished, no acute distress,   Eyes: PERRLA, EOMI,  ENT: MMM, TM b/l clear ,no tongue deviation  Cardiovascular: regular rate, regular rhythm, no murmur appreciated  Respiratory: unlabored respiratory effort, clear to auscultation bilaterally  Gastrointestinal: soft, non-tender, non-distended  abdomen, no pulsatile mass  Musculoskeletal: supple neck, no lower extremity edema, no bony tenderness  Integumentary: warm, dry, no rash  Neurologic: awake, alert, cranial nerves II-XII grossly intact, extremities’ motor and sensory functions grossly intact, no focal deficits, GCS 15  Psychiatric: appropriate mood, appropriate affect

## 2019-08-13 NOTE — ED ADULT NURSE NOTE - INTERVENTIONS DEFINITIONS
Call bell, personal items and telephone within reach/[place] to call system/Monitor gait and stability/Monitor for mental status changes and reorient to person, place, and time/Instruct patient to call for assistance

## 2019-08-13 NOTE — ED PROVIDER NOTE - NS ED ROS FT
Review of Systems    Constitutional: (-) fever/ chills (-) weight loss  Eyes/ENT: (-) blurry vision, (-) epistaxis (-) sore throat (-) ear pain  Cardiovascular: (-) chest pain, (-) syncope (-) palpitations  Respiratory: (-) cough, (-) shortness of breath  Gastrointestinal: (-) vomiting, (-) diarrhea (-) abdominal pain  Musculoskeletal: (-) neck pain, (-) back pain, (-) joint pain (-) pedal edema   Integumentary: (-) rash, (-) swelling  Neurological: (-) headache, (-) altered mental status

## 2019-08-13 NOTE — ED PROVIDER NOTE - CLINICAL SUMMARY MEDICAL DECISION MAKING FREE TEXT BOX
73f w AMS/confusion for the past few days. nontoxic appearing, n/v intact. Labs, EKG, & imaging reviewed. Abx & IV fluids given. Patient admitted for further care and management.

## 2019-08-13 NOTE — ED PROVIDER NOTE - ATTENDING CONTRIBUTION TO CARE
73f w a hx of HTN, HLD, DM, SLE, prior CVA, & seizures. Pt BIB EMS for eval of AMS and slurred speech for the past few days. Symptoms are moderate, constant, no exacerbating/alleviating. No fall, no trauma. Pt reports "I feel well" and denies all symptoms at this time.    Review of Systems  Constitutional:  No fever or chills.   Eyes:  Negative.   ENMT:  No nasal congestion, discharge, or throat pain.   Cardiac:  No chest pain, syncope, or edema.  Respiratory:  No dyspnea, wheezing, or cough. No hemoptysis.  GI:  No vomiting, diarrhea, or abdominal pain. No melena or hematochezia.  :  No dysuria or hematuria.   Musculoskeletal:  No joint swelling, joint pain, or back pain.  Skin:  No skin rash, jaundice, or lesions.  Neuro:  No headache, loss of sensation, or focal weakness.  +Change in mental status.     Physical Exam  General: Awake, alert, NAD, elderly/frail, non-toxic appearing, NCAT  Eyes: PERRL, EOMI, no icterus, lids and conjunctivae are normal  ENT: External inspection normal, pink/moist membranes, pharynx normal  CV: S1S2, regular rate and rhythm, no murmur/gallops/rubs, no JVD, 2+ pulses b/l, no edema/cords/homans, warm/well-perfused  Respiratory: Normal respiratory rate/effort, no respiratory distress, normal voice, speaking full sentences, lungs clear to auscultation b/l, no wheezing/rales/rhonchi, no retractions, no stridor  Abdomen: Soft abdomen, no tender/distended/guarding/rebound, no CVA tender  Musculoskeletal: FROM all 4 extremities, N/V intact  Neck: FROM neck, supple, no meningismus, trachea midline, no JVD  Integumentary: Color normal for race, warm and dry, no rash  Neuro: Oriented x1 (person), CN 2-12 intact, 5/5 motor, normal sensory, normal cerebellar    73f w AMS/confusion for the past few days. nontoxic appearing, n/v intact. --Labs, EKG, CXR, CT head, IV fluids, Abx as needed, admit

## 2019-08-14 LAB
ANION GAP SERPL CALC-SCNC: 9 MMOL/L — SIGNIFICANT CHANGE UP (ref 7–14)
BASOPHILS # BLD AUTO: 0.03 K/UL — SIGNIFICANT CHANGE UP (ref 0–0.2)
BASOPHILS NFR BLD AUTO: 0.4 % — SIGNIFICANT CHANGE UP (ref 0–1)
BUN SERPL-MCNC: 14 MG/DL — SIGNIFICANT CHANGE UP (ref 10–20)
CALCIUM SERPL-MCNC: 8.5 MG/DL — SIGNIFICANT CHANGE UP (ref 8.5–10.1)
CHLORIDE SERPL-SCNC: 102 MMOL/L — SIGNIFICANT CHANGE UP (ref 98–110)
CO2 SERPL-SCNC: 29 MMOL/L — SIGNIFICANT CHANGE UP (ref 17–32)
CREAT SERPL-MCNC: 0.5 MG/DL — LOW (ref 0.7–1.5)
EOSINOPHIL # BLD AUTO: 0.12 K/UL — SIGNIFICANT CHANGE UP (ref 0–0.7)
EOSINOPHIL NFR BLD AUTO: 1.8 % — SIGNIFICANT CHANGE UP (ref 0–8)
GLUCOSE BLDC GLUCOMTR-MCNC: 170 MG/DL — HIGH (ref 70–99)
GLUCOSE BLDC GLUCOMTR-MCNC: 170 MG/DL — HIGH (ref 70–99)
GLUCOSE BLDC GLUCOMTR-MCNC: 58 MG/DL — LOW (ref 70–99)
GLUCOSE BLDC GLUCOMTR-MCNC: 89 MG/DL — SIGNIFICANT CHANGE UP (ref 70–99)
GLUCOSE SERPL-MCNC: 60 MG/DL — LOW (ref 70–99)
HCT VFR BLD CALC: 29.5 % — LOW (ref 37–47)
HGB BLD-MCNC: 9.5 G/DL — LOW (ref 12–16)
IMM GRANULOCYTES NFR BLD AUTO: 0.1 % — SIGNIFICANT CHANGE UP (ref 0.1–0.3)
LYMPHOCYTES # BLD AUTO: 1.6 K/UL — SIGNIFICANT CHANGE UP (ref 1.2–3.4)
LYMPHOCYTES # BLD AUTO: 23.8 % — SIGNIFICANT CHANGE UP (ref 20.5–51.1)
MCHC RBC-ENTMCNC: 28.7 PG — SIGNIFICANT CHANGE UP (ref 27–31)
MCHC RBC-ENTMCNC: 32.2 G/DL — SIGNIFICANT CHANGE UP (ref 32–37)
MCV RBC AUTO: 89.1 FL — SIGNIFICANT CHANGE UP (ref 81–99)
MONOCYTES # BLD AUTO: 0.43 K/UL — SIGNIFICANT CHANGE UP (ref 0.1–0.6)
MONOCYTES NFR BLD AUTO: 6.4 % — SIGNIFICANT CHANGE UP (ref 1.7–9.3)
NEUTROPHILS # BLD AUTO: 4.53 K/UL — SIGNIFICANT CHANGE UP (ref 1.4–6.5)
NEUTROPHILS NFR BLD AUTO: 67.5 % — SIGNIFICANT CHANGE UP (ref 42.2–75.2)
NRBC # BLD: 0 /100 WBCS — SIGNIFICANT CHANGE UP (ref 0–0)
PLATELET # BLD AUTO: 243 K/UL — SIGNIFICANT CHANGE UP (ref 130–400)
POTASSIUM SERPL-MCNC: 3.2 MMOL/L — LOW (ref 3.5–5)
POTASSIUM SERPL-SCNC: 3.2 MMOL/L — LOW (ref 3.5–5)
RBC # BLD: 3.31 M/UL — LOW (ref 4.2–5.4)
RBC # FLD: 14.7 % — HIGH (ref 11.5–14.5)
SODIUM SERPL-SCNC: 140 MMOL/L — SIGNIFICANT CHANGE UP (ref 135–146)
WBC # BLD: 6.72 K/UL — SIGNIFICANT CHANGE UP (ref 4.8–10.8)
WBC # FLD AUTO: 6.72 K/UL — SIGNIFICANT CHANGE UP (ref 4.8–10.8)

## 2019-08-14 PROCEDURE — 99233 SBSQ HOSP IP/OBS HIGH 50: CPT

## 2019-08-14 PROCEDURE — 99222 1ST HOSP IP/OBS MODERATE 55: CPT

## 2019-08-14 RX ORDER — POTASSIUM CHLORIDE 20 MEQ
20 PACKET (EA) ORAL
Refills: 0 | Status: COMPLETED | OUTPATIENT
Start: 2019-08-14 | End: 2019-08-14

## 2019-08-14 RX ORDER — LEVETIRACETAM 250 MG/1
1000 TABLET, FILM COATED ORAL
Refills: 0 | Status: DISCONTINUED | OUTPATIENT
Start: 2019-08-14 | End: 2019-08-16

## 2019-08-14 RX ORDER — LEVETIRACETAM 250 MG/1
500 TABLET, FILM COATED ORAL
Refills: 0 | Status: DISCONTINUED | OUTPATIENT
Start: 2019-08-14 | End: 2019-08-16

## 2019-08-14 RX ADMIN — Medication 200 MILLIGRAM(S): at 17:14

## 2019-08-14 RX ADMIN — LACOSAMIDE 100 MILLIGRAM(S): 50 TABLET ORAL at 07:12

## 2019-08-14 RX ADMIN — Medication 50 MILLIGRAM(S): at 06:44

## 2019-08-14 RX ADMIN — Medication 20 MILLIEQUIVALENT(S): at 20:27

## 2019-08-14 RX ADMIN — SODIUM CHLORIDE 50 MILLILITER(S): 9 INJECTION, SOLUTION INTRAVENOUS at 00:51

## 2019-08-14 RX ADMIN — Medication 200 MILLIGRAM(S): at 06:44

## 2019-08-14 RX ADMIN — Medication 1 MILLIGRAM(S): at 11:12

## 2019-08-14 RX ADMIN — Medication 81 MILLIGRAM(S): at 11:14

## 2019-08-14 RX ADMIN — Medication 100 MICROGRAM(S): at 06:44

## 2019-08-14 RX ADMIN — GABAPENTIN 400 MILLIGRAM(S): 400 CAPSULE ORAL at 13:22

## 2019-08-14 RX ADMIN — LACOSAMIDE 100 MILLIGRAM(S): 50 TABLET ORAL at 17:59

## 2019-08-14 RX ADMIN — ENOXAPARIN SODIUM 40 MILLIGRAM(S): 100 INJECTION SUBCUTANEOUS at 11:13

## 2019-08-14 RX ADMIN — LEVETIRACETAM 1000 MILLIGRAM(S): 250 TABLET, FILM COATED ORAL at 17:11

## 2019-08-14 RX ADMIN — DULOXETINE HYDROCHLORIDE 60 MILLIGRAM(S): 30 CAPSULE, DELAYED RELEASE ORAL at 11:12

## 2019-08-14 RX ADMIN — INSULIN GLARGINE 5 UNIT(S): 100 INJECTION, SOLUTION SUBCUTANEOUS at 09:02

## 2019-08-14 RX ADMIN — CEFTRIAXONE 100 MILLIGRAM(S): 500 INJECTION, POWDER, FOR SOLUTION INTRAMUSCULAR; INTRAVENOUS at 21:07

## 2019-08-14 RX ADMIN — LISINOPRIL 20 MILLIGRAM(S): 2.5 TABLET ORAL at 06:45

## 2019-08-14 RX ADMIN — Medication 2: at 17:09

## 2019-08-14 RX ADMIN — GABAPENTIN 400 MILLIGRAM(S): 400 CAPSULE ORAL at 06:44

## 2019-08-14 RX ADMIN — Medication 20 MILLIEQUIVALENT(S): at 17:09

## 2019-08-14 RX ADMIN — Medication 2: at 12:46

## 2019-08-14 RX ADMIN — LEVETIRACETAM 750 MILLIGRAM(S): 250 TABLET, FILM COATED ORAL at 06:44

## 2019-08-14 RX ADMIN — LEVETIRACETAM 500 MILLIGRAM(S): 250 TABLET, FILM COATED ORAL at 17:11

## 2019-08-14 NOTE — PHYSICAL THERAPY INITIAL EVALUATION ADULT - GENERAL OBSERVATIONS, REHAB EVAL
9:25 - 9:48. Chart reviewed. Patient available to be seen for physical therapy, confirmed with nurse. Patient encountered semi-reclined in bed, +IV. Denies pain at rest, agreeable for PT.

## 2019-08-14 NOTE — PROGRESS NOTE ADULT - SUBJECTIVE AND OBJECTIVE BOX
Progress Note:  Provider Speciality                            Hospitalist      LINDSAY ALBERT [id number] 73y Female     CHIEF PRESENTING COMPLAINT:  Patient is a 73y old  Female who presents with a chief complaint of Weakness and Confusion (14 Aug 2019 15:46)        SUBJECTIVE:  Patient was seen and examined at bedside.   Reports doing ok / feels gen. weakness/ as per grandson/ patient was very confused on Sunday and concerned about another seizure episode.    No significant overnight events reported.     HISTORY OF PRESENTING ILLNESS:  HPI:  72 YO F with a PMH of DM, SLE, SZ DS, CVA in 02' with no residual weakness, and HTN who was BIBEMS for eval of increasing weakness and slurring of speech for the past couple days. Of note, pt was recently seen here in July for eval of falls and refused SNF placement. The pt denies any symptoms at present and states that she just wants to go home.     In ED, pt had a CTH which was NEG but they found a UTI to be present. (13 Aug 2019 22:10)      PAST MEDICAL & SURGICAL HISTORY:  PAST MEDICAL & SURGICAL HISTORY:  Seizure: keppra  Herniated lumbar intervertebral disc: as per hx  Stroke: TIA 2002, no deficits  Fall, initial encounter: as  hx  Lupus: as per hx  Hypertension: hctz  Hypercholesteremia: statin  DM (diabetes mellitus): insulin  fs achs  No significant past surgical history      VITAL SIGNS:  Vital Signs Last 24 Hrs  T(C): 37.1 (14 Aug 2019 14:44), Max: 37.2 (13 Aug 2019 23:15)  T(F): 98.8 (14 Aug 2019 14:44), Max: 98.9 (13 Aug 2019 23:15)  HR: 81 (14 Aug 2019 14:44) (74 - 81)  BP: 140/77 (14 Aug 2019 14:44) (140/77 - 170/77)  BP(mean): --  RR: 18 (14 Aug 2019 14:44) (16 - 20)  SpO2: 96% (13 Aug 2019 23:15) (96% - 98%)    PHYSICAL EXAMINATION:  General: Awake, alert+ , Not in acute distress  HEENT:  DAHLIA, EOMI  Heart: S1+S2 audible, no murmur  Lungs: bilateral  fair air entry, no wheezing, no crepitations.  Abdomen: Soft, non-tender, non-distended  CNS: no focal deficits   Extremities:  No edema          REVIEW OF SYSTEMS:    At least 10 systems were reviewed in ROS. All systems reviewed  are within normal limits except for the complaints as described in Subjective.    CONSULTS:  Consultant(s) Notes Reviewed by me.   Care Discussed with Consultants/Other Providers where required.        MEDICATIONS:  MEDICATIONS  (STANDING):  aspirin  chewable 81 milliGRAM(s) Oral daily  atorvastatin 40 milliGRAM(s) Oral at bedtime  cefTRIAXone   IVPB 1000 milliGRAM(s) IV Intermittent every 24 hours  dextrose 5%. 1000 milliLiter(s) (50 mL/Hr) IV Continuous <Continuous>  DULoxetine 60 milliGRAM(s) Oral daily  enoxaparin Injectable 40 milliGRAM(s) SubCutaneous daily  folic acid 1 milliGRAM(s) Oral daily  gabapentin 400 milliGRAM(s) Oral three times a day  hydrochlorothiazide 50 milliGRAM(s) Oral daily  hydroxychloroquine 200 milliGRAM(s) Oral two times a day  insulin glargine Injectable (LANTUS) 5 Unit(s) SubCutaneous every morning  insulin lispro (HumaLOG) corrective regimen sliding scale   SubCutaneous three times a day before meals  lacosamide 100 milliGRAM(s) Oral two times a day  levETIRAcetam 1000 milliGRAM(s) Oral two times a day  levETIRAcetam 500 milliGRAM(s) Oral two times a day  levothyroxine 100 MICROGram(s) Oral daily  lisinopril 20 milliGRAM(s) Oral daily  potassium chloride    Tablet ER 20 milliEquivalent(s) Oral every 2 hours    MEDICATIONS  (PRN):      LABOROTORY DATA/MICROBIOLOGY/I & O's:                        9.5    6.72  )-----------( 243      ( 14 Aug 2019 07:10 )             29.5     08-14    140  |  102  |  14  ----------------------------<  60<L>  3.2<L>   |  29  |  0.5<L>    Ca    8.5      14 Aug 2019 07:10  Mg     2.0     08-13    TPro  6.4  /  Alb  3.4<L>  /  TBili  0.3  /  DBili  x   /  AST  43<H>  /  ALT  12  /  AlkPhos  94  08-13    PT/INR - ( 13 Aug 2019 18:00 )   PT: 12.40 sec;   INR: 1.08 ratio         PTT - ( 13 Aug 2019 18:00 )  PTT:32.4 sec  Urinalysis Basic - ( 13 Aug 2019 20:23 )    Color: Yellow / Appearance: Clear / SG: >=1.030 / pH: x  Gluc: x / Ketone: Trace  / Bili: Small / Urobili: 0.2 mg/dL   Blood: x / Protein: 100 mg/dL / Nitrite: Negative   Leuk Esterase: Small / RBC: 1-2 /HPF / WBC 10-25 /HPF   Sq Epi: x / Non Sq Epi: Few /HPF / Bacteria: Moderate      CAPILLARY BLOOD GLUCOSE      POCT Blood Glucose.: 170 mg/dL (14 Aug 2019 16:32)  POCT Blood Glucose.: 170 mg/dL (14 Aug 2019 11:48)  POCT Blood Glucose.: 89 mg/dL (14 Aug 2019 08:38)  POCT Blood Glucose.: 58 mg/dL (14 Aug 2019 07:38)        Urinalysis Basic - ( 13 Aug 2019 20:23 )    Color: Yellow / Appearance: Clear / SG: >=1.030 / pH: x  Gluc: x / Ketone: Trace  / Bili: Small / Urobili: 0.2 mg/dL   Blood: x / Protein: 100 mg/dL / Nitrite: Negative   Leuk Esterase: Small / RBC: 1-2 /HPF / WBC 10-25 /HPF   Sq Epi: x / Non Sq Epi: Few /HPF / Bacteria: Moderate            08-13-19 @ 07:01  -  08-14-19 @ 07:00  --------------------------------------------------------  IN: 0 mL / OUT: 1000 mL / NET: -1000 mL              ASSESSMENT:  72 YO F with a PMH of DM, SLE, SZ DS, CVA in 02' with no residual weakness, and HTN who was BIBEMS for eval of increasing weakness and confusion .     1. Weakness and confusion likely related to UTI in the elderly/ rule out seizure episode  -CTH NEG  -c/w rocephin  fu UCX  neuro consult appreciated/ REEg- ordered  c/w vimpat current dose/ increase keppra to 1500mg bid as per neuro   dc IVF's/ has good oral intake.     2. Normocytic anemia, chronic  -No signs of overt bleeding  H/H stable     3. Thrombocytopenia  platelet count stable with repeat labs     4. Mild metabolic alkalosis likely 2/2 dehydration  improved with hydration     5. Hypoalbuminemia due to poor oral intake  -Nutrition eval    6. HX of DM  FSG monitoring/ ISS+  -Hold oral hypoglycemics    7. Hx of SZ DS  c/w current AED's  REEG  neuro eval appreciated     8. HX of SLE  -Home meds while in the hospital    PT/physiatry consult     GI and DVT PPX  FULL CODE      #Progress Note Handoff  Pending :  UCX/ REEG/ physiatry consult   Disposition:  unknown at this time   Discussion: Discussed with patient  in AM rounds/with Grandson over phone - satisfied

## 2019-08-14 NOTE — PHYSICAL THERAPY INITIAL EVALUATION ADULT - GAIT DEVIATIONS NOTED, PT EVAL
increased time in double stance/decreased parminder/decreased weight-shifting ability/decreased step length/crouched posture, BLE buckling, decreased heel strike / push off

## 2019-08-14 NOTE — PHYSICAL THERAPY INITIAL EVALUATION ADULT - IMPAIRMENTS FOUND, PT EVAL
posture/muscle strength/aerobic capacity/endurance/ergonomics and body mechanics/gait, locomotion, and balance

## 2019-08-15 LAB
ANION GAP SERPL CALC-SCNC: 13 MMOL/L — SIGNIFICANT CHANGE UP (ref 7–14)
BASOPHILS # BLD AUTO: 0.03 K/UL — SIGNIFICANT CHANGE UP (ref 0–0.2)
BASOPHILS NFR BLD AUTO: 0.5 % — SIGNIFICANT CHANGE UP (ref 0–1)
BUN SERPL-MCNC: 13 MG/DL — SIGNIFICANT CHANGE UP (ref 10–20)
CALCIUM SERPL-MCNC: 8.6 MG/DL — SIGNIFICANT CHANGE UP (ref 8.5–10.1)
CHLORIDE SERPL-SCNC: 95 MMOL/L — LOW (ref 98–110)
CO2 SERPL-SCNC: 28 MMOL/L — SIGNIFICANT CHANGE UP (ref 17–32)
CREAT SERPL-MCNC: 0.5 MG/DL — LOW (ref 0.7–1.5)
CULTURE RESULTS: SIGNIFICANT CHANGE UP
EOSINOPHIL # BLD AUTO: 0.1 K/UL — SIGNIFICANT CHANGE UP (ref 0–0.7)
EOSINOPHIL NFR BLD AUTO: 1.5 % — SIGNIFICANT CHANGE UP (ref 0–8)
GLUCOSE BLDC GLUCOMTR-MCNC: 200 MG/DL — HIGH (ref 70–99)
GLUCOSE BLDC GLUCOMTR-MCNC: 232 MG/DL — HIGH (ref 70–99)
GLUCOSE BLDC GLUCOMTR-MCNC: 237 MG/DL — HIGH (ref 70–99)
GLUCOSE BLDC GLUCOMTR-MCNC: 255 MG/DL — HIGH (ref 70–99)
GLUCOSE SERPL-MCNC: 263 MG/DL — HIGH (ref 70–99)
HCT VFR BLD CALC: 31.3 % — LOW (ref 37–47)
HGB BLD-MCNC: 9.9 G/DL — LOW (ref 12–16)
IMM GRANULOCYTES NFR BLD AUTO: 0.3 % — SIGNIFICANT CHANGE UP (ref 0.1–0.3)
LYMPHOCYTES # BLD AUTO: 1.29 K/UL — SIGNIFICANT CHANGE UP (ref 1.2–3.4)
LYMPHOCYTES # BLD AUTO: 19.8 % — LOW (ref 20.5–51.1)
MCHC RBC-ENTMCNC: 27.8 PG — SIGNIFICANT CHANGE UP (ref 27–31)
MCHC RBC-ENTMCNC: 31.6 G/DL — LOW (ref 32–37)
MCV RBC AUTO: 87.9 FL — SIGNIFICANT CHANGE UP (ref 81–99)
MONOCYTES # BLD AUTO: 0.48 K/UL — SIGNIFICANT CHANGE UP (ref 0.1–0.6)
MONOCYTES NFR BLD AUTO: 7.4 % — SIGNIFICANT CHANGE UP (ref 1.7–9.3)
NEUTROPHILS # BLD AUTO: 4.61 K/UL — SIGNIFICANT CHANGE UP (ref 1.4–6.5)
NEUTROPHILS NFR BLD AUTO: 70.5 % — SIGNIFICANT CHANGE UP (ref 42.2–75.2)
NRBC # BLD: 0 /100 WBCS — SIGNIFICANT CHANGE UP (ref 0–0)
PLATELET # BLD AUTO: 258 K/UL — SIGNIFICANT CHANGE UP (ref 130–400)
POTASSIUM SERPL-MCNC: 3.7 MMOL/L — SIGNIFICANT CHANGE UP (ref 3.5–5)
POTASSIUM SERPL-SCNC: 3.7 MMOL/L — SIGNIFICANT CHANGE UP (ref 3.5–5)
RBC # BLD: 3.56 M/UL — LOW (ref 4.2–5.4)
RBC # FLD: 14.1 % — SIGNIFICANT CHANGE UP (ref 11.5–14.5)
SODIUM SERPL-SCNC: 136 MMOL/L — SIGNIFICANT CHANGE UP (ref 135–146)
SPECIMEN SOURCE: SIGNIFICANT CHANGE UP
WBC # BLD: 6.53 K/UL — SIGNIFICANT CHANGE UP (ref 4.8–10.8)
WBC # FLD AUTO: 6.53 K/UL — SIGNIFICANT CHANGE UP (ref 4.8–10.8)

## 2019-08-15 PROCEDURE — 99233 SBSQ HOSP IP/OBS HIGH 50: CPT

## 2019-08-15 RX ORDER — CIPROFLOXACIN LACTATE 400MG/40ML
500 VIAL (ML) INTRAVENOUS EVERY 12 HOURS
Refills: 0 | Status: DISCONTINUED | OUTPATIENT
Start: 2019-08-15 | End: 2019-08-16

## 2019-08-15 RX ADMIN — Medication 50 MILLIGRAM(S): at 05:16

## 2019-08-15 RX ADMIN — ENOXAPARIN SODIUM 40 MILLIGRAM(S): 100 INJECTION SUBCUTANEOUS at 12:44

## 2019-08-15 RX ADMIN — Medication 200 MILLIGRAM(S): at 17:44

## 2019-08-15 RX ADMIN — LISINOPRIL 20 MILLIGRAM(S): 2.5 TABLET ORAL at 05:16

## 2019-08-15 RX ADMIN — Medication 1 MILLIGRAM(S): at 12:45

## 2019-08-15 RX ADMIN — Medication 81 MILLIGRAM(S): at 12:44

## 2019-08-15 RX ADMIN — INSULIN GLARGINE 5 UNIT(S): 100 INJECTION, SOLUTION SUBCUTANEOUS at 09:39

## 2019-08-15 RX ADMIN — GABAPENTIN 400 MILLIGRAM(S): 400 CAPSULE ORAL at 13:12

## 2019-08-15 RX ADMIN — Medication 500 MILLIGRAM(S): at 18:58

## 2019-08-15 RX ADMIN — LACOSAMIDE 100 MILLIGRAM(S): 50 TABLET ORAL at 05:20

## 2019-08-15 RX ADMIN — Medication 2: at 17:11

## 2019-08-15 RX ADMIN — GABAPENTIN 400 MILLIGRAM(S): 400 CAPSULE ORAL at 20:57

## 2019-08-15 RX ADMIN — DULOXETINE HYDROCHLORIDE 60 MILLIGRAM(S): 30 CAPSULE, DELAYED RELEASE ORAL at 12:44

## 2019-08-15 RX ADMIN — GABAPENTIN 400 MILLIGRAM(S): 400 CAPSULE ORAL at 05:17

## 2019-08-15 RX ADMIN — LACOSAMIDE 100 MILLIGRAM(S): 50 TABLET ORAL at 18:04

## 2019-08-15 RX ADMIN — Medication 200 MILLIGRAM(S): at 05:17

## 2019-08-15 RX ADMIN — LEVETIRACETAM 1000 MILLIGRAM(S): 250 TABLET, FILM COATED ORAL at 18:01

## 2019-08-15 RX ADMIN — LEVETIRACETAM 1000 MILLIGRAM(S): 250 TABLET, FILM COATED ORAL at 05:16

## 2019-08-15 RX ADMIN — ATORVASTATIN CALCIUM 40 MILLIGRAM(S): 80 TABLET, FILM COATED ORAL at 20:57

## 2019-08-15 RX ADMIN — Medication 100 MICROGRAM(S): at 05:16

## 2019-08-15 RX ADMIN — LEVETIRACETAM 500 MILLIGRAM(S): 250 TABLET, FILM COATED ORAL at 05:16

## 2019-08-15 RX ADMIN — LEVETIRACETAM 500 MILLIGRAM(S): 250 TABLET, FILM COATED ORAL at 17:43

## 2019-08-15 NOTE — DIETITIAN INITIAL EVALUATION ADULT. - OTHER INFO
73 year old female with hx of DM, SLE, seizure d/o, CVA, HTN BIBA for evaluation of weakness with slurring of speech, CTH wnls, found to have + UTI,  mild metabolic alkalosis 2/2 dehydration with possible underlying dementia,  Poor po intake noted, pt denies any recent wt changes, states ate well PTA, no family at bedside

## 2019-08-15 NOTE — PROGRESS NOTE ADULT - SUBJECTIVE AND OBJECTIVE BOX
Progress Note:  Provider Speciality                            Hospitalist      LINDSAY ALBERT [id number] 73y Female     CHIEF PRESENTING COMPLAINT:  Patient is a 73y old  Female who presents with a chief complaint of Weakness and Confusion (14 Aug 2019 15:46)        SUBJECTIVE:  Patient was seen and examined at bedside.  sleepy in the morning today/reportedly was agitated last night and did not sleep well last night/  reportedly not eating anything either today / so insulin lispro held to avoid drop of BS with poor oral intake.    No significant overnight events reported.     HISTORY OF PRESENTING ILLNESS:  HPI:  74 YO F with a PMH of DM, SLE, SZ DS, CVA in 02' with no residual weakness, and HTN who was BIBEMS for eval of increasing weakness and slurring of speech for the past couple days. Of note, pt was recently seen here in July for eval of falls and refused SNF placement. The pt denies any symptoms at present and states that she just wants to go home.     In ED, pt had a CTH which was NEG but they found a UTI to be present. (13 Aug 2019 22:10)      PAST MEDICAL & SURGICAL HISTORY:  PAST MEDICAL & SURGICAL HISTORY:  Seizure: keppra  Herniated lumbar intervertebral disc: as per hx  Stroke: TIA 2002, no deficits  Fall, initial encounter: as  hx  Lupus: as per hx  Hypertension: hctz  Hypercholesteremia: statin  DM (diabetes mellitus): insulin  fs achs  No significant past surgical history      Vital Signs Last 24 Hrs  T(C): 36.5 (15 Aug 2019 05:15), Max: 37.1 (14 Aug 2019 14:44)  T(F): 97.7 (15 Aug 2019 05:15), Max: 98.8 (14 Aug 2019 14:44)  HR: 63 (15 Aug 2019 05:15) (63 - 84)  BP: 127/62 (15 Aug 2019 05:15) (127/62 - 170/88)  BP(mean): --  RR: 16 (15 Aug 2019 05:15) (16 - 18)  SpO2: --    PHYSICAL EXAMINATION:  General: sleepy but arousable to verbal command , Not in acute distress  HEENT:  DAHLIA, EOMI  Heart: S1+S2 audible, no murmur  Lungs: bilateral  fair air entry, no wheezing, no crepitations.  Abdomen: Soft, non-tender, non-distended  CNS: no focal deficits   Extremities:  No edema          REVIEW OF SYSTEMS:    At least 10 systems were reviewed in ROS. All systems reviewed  are within normal limits except for the complaints as described in Subjective.    CONSULTS:  Consultant(s) Notes Reviewed by me.   Care Discussed with Consultants/Other Providers where required.        MEDICATIONS:  MEDICATIONS  (STANDING):  aspirin  chewable 81 milliGRAM(s) Oral daily  atorvastatin 40 milliGRAM(s) Oral at bedtime  cefTRIAXone   IVPB 1000 milliGRAM(s) IV Intermittent every 24 hours  dextrose 5%. 1000 milliLiter(s) (50 mL/Hr) IV Continuous <Continuous>  DULoxetine 60 milliGRAM(s) Oral daily  enoxaparin Injectable 40 milliGRAM(s) SubCutaneous daily  folic acid 1 milliGRAM(s) Oral daily  gabapentin 400 milliGRAM(s) Oral three times a day  hydrochlorothiazide 50 milliGRAM(s) Oral daily  hydroxychloroquine 200 milliGRAM(s) Oral two times a day  insulin glargine Injectable (LANTUS) 5 Unit(s) SubCutaneous every morning  insulin lispro (HumaLOG) corrective regimen sliding scale   SubCutaneous three times a day before meals  lacosamide 100 milliGRAM(s) Oral two times a day  levETIRAcetam 1000 milliGRAM(s) Oral two times a day  levETIRAcetam 500 milliGRAM(s) Oral two times a day  levothyroxine 100 MICROGram(s) Oral daily  lisinopril 20 milliGRAM(s) Oral daily  potassium chloride    Tablet ER 20 milliEquivalent(s) Oral every 2 hours    MEDICATIONS  (PRN):      LABOROTORY DATA/MICROBIOLOGY/I & O's:                        9.5    6.72  )-----------( 243      ( 14 Aug 2019 07:10 )             29.5     08-14    140  |  102  |  14  ----------------------------<  60<L>  3.2<L>   |  29  |  0.5<L>    Ca    8.5      14 Aug 2019 07:10  Mg     2.0     08-13    TPro  6.4  /  Alb  3.4<L>  /  TBili  0.3  /  DBili  x   /  AST  43<H>  /  ALT  12  /  AlkPhos  94  08-13    PT/INR - ( 13 Aug 2019 18:00 )   PT: 12.40 sec;   INR: 1.08 ratio         PTT - ( 13 Aug 2019 18:00 )  PTT:32.4 sec  Urinalysis Basic - ( 13 Aug 2019 20:23 )    Color: Yellow / Appearance: Clear / SG: >=1.030 / pH: x  Gluc: x / Ketone: Trace  / Bili: Small / Urobili: 0.2 mg/dL   Blood: x / Protein: 100 mg/dL / Nitrite: Negative   Leuk Esterase: Small / RBC: 1-2 /HPF / WBC 10-25 /HPF   Sq Epi: x / Non Sq Epi: Few /HPF / Bacteria: Moderate      CAPILLARY BLOOD GLUCOSE      POCT Blood Glucose.: 170 mg/dL (14 Aug 2019 16:32)  POCT Blood Glucose.: 170 mg/dL (14 Aug 2019 11:48)  POCT Blood Glucose.: 89 mg/dL (14 Aug 2019 08:38)  POCT Blood Glucose.: 58 mg/dL (14 Aug 2019 07:38)        Urinalysis Basic - ( 13 Aug 2019 20:23 )    Color: Yellow / Appearance: Clear / SG: >=1.030 / pH: x  Gluc: x / Ketone: Trace  / Bili: Small / Urobili: 0.2 mg/dL   Blood: x / Protein: 100 mg/dL / Nitrite: Negative   Leuk Esterase: Small / RBC: 1-2 /HPF / WBC 10-25 /HPF   Sq Epi: x / Non Sq Epi: Few /HPF / Bacteria: Moderate            08-13-19 @ 07:01  -  08-14-19 @ 07:00  --------------------------------------------------------  IN: 0 mL / OUT: 1000 mL / NET: -1000 mL              ASSESSMENT:  74 YO F with a PMH of DM, SLE, SZ DS, CVA in 02' with no residual weakness, and HTN who was BIBEMS for eval of increasing weakness and confusion .     1. Weakness and confusion likely related to UTI in the elderly/ rule out seizure episode  -CTH NEG  -switch to oral ABX cipro  UCX- NTD  neuro consult appreciate   naldo SANTIAGO  c/w vimpat current dose/c/w  keppra to 1500mg bid as per neuro   off IVF's       2. Normocytic anemia, chronic  -No signs of overt bleeding  H/H stable     3. Thrombocytopenia  platelet count stable with repeat labs     4. Mild metabolic alkalosis likely 2/2 dehydration  improved with hydration     5. Hypoalbuminemia due to poor oral intake  -Nutrition eval    6. HX of DM  FSG monitoring/ ISS+  -Hold oral hypoglycemics    7. Hx of SZ DS  c/w current AED's  REEG  neuro eval appreciated     8. HX of SLE  -Home meds while in the hospital    PT/physiatry consult     GI and DVT PPX  FULL CODE      #Progress Note Handoff  Pending :  REEG/ physiatry consult   Disposition:  possible STR/ very unsteady  Discussion: none today/ discussed with  Grandson over phone yesterday  - satisfied Progress Note:  Provider Speciality                            Hospitalist      LINDSAY ALBERT [id number] 73y Female     CHIEF PRESENTING COMPLAINT:  Patient is a 73y old  Female who presents with a chief complaint of Weakness and Confusion (14 Aug 2019 15:46)        SUBJECTIVE:  Patient was seen and examined at bedside.  sleepy in the morning today/reportedly was agitated last night and did not sleep well last night/  reportedly not eating anything either today / so insulin lispro held to avoid drop of BS with poor oral intake.    No significant overnight events reported.     HISTORY OF PRESENTING ILLNESS:  HPI:  74 YO F with a PMH of DM, SLE, SZ DS, CVA in 02' with no residual weakness, and HTN who was BIBEMS for eval of increasing weakness and slurring of speech for the past couple days. Of note, pt was recently seen here in July for eval of falls and refused SNF placement. The pt denies any symptoms at present and states that she just wants to go home.     In ED, pt had a CTH which was NEG but they found a UTI to be present. (13 Aug 2019 22:10)      PAST MEDICAL & SURGICAL HISTORY:  PAST MEDICAL & SURGICAL HISTORY:  Seizure: keppra  Herniated lumbar intervertebral disc: as per hx  Stroke: TIA 2002, no deficits  Fall, initial encounter: as  hx  Lupus: as per hx  Hypertension: hctz  Hypercholesteremia: statin  DM (diabetes mellitus): insulin  fs achs  No significant past surgical history      Vital Signs Last 24 Hrs  T(C): 36.5 (15 Aug 2019 05:15), Max: 37.1 (14 Aug 2019 14:44)  T(F): 97.7 (15 Aug 2019 05:15), Max: 98.8 (14 Aug 2019 14:44)  HR: 63 (15 Aug 2019 05:15) (63 - 84)  BP: 127/62 (15 Aug 2019 05:15) (127/62 - 170/88)  BP(mean): --  RR: 16 (15 Aug 2019 05:15) (16 - 18)  SpO2: --    PHYSICAL EXAMINATION:  General: sleepy but arousable to verbal command , Not in acute distress  HEENT:  DAHLIA, EOMI  Heart: S1+S2 audible, no murmur  Lungs: bilateral  fair air entry, no wheezing, no crepitations.  Abdomen: Soft, non-tender, non-distended  CNS: no focal deficits   Extremities:  No edema          REVIEW OF SYSTEMS:    At least 10 systems were reviewed in ROS. All systems reviewed  are within normal limits except for the complaints as described in Subjective.    CONSULTS:  Consultant(s) Notes Reviewed by me.   Care Discussed with Consultants/Other Providers where required.        MEDICATIONS:  MEDICATIONS  (STANDING):  aspirin  chewable 81 milliGRAM(s) Oral daily  atorvastatin 40 milliGRAM(s) Oral at bedtime  cefTRIAXone   IVPB 1000 milliGRAM(s) IV Intermittent every 24 hours  dextrose 5%. 1000 milliLiter(s) (50 mL/Hr) IV Continuous <Continuous>  DULoxetine 60 milliGRAM(s) Oral daily  enoxaparin Injectable 40 milliGRAM(s) SubCutaneous daily  folic acid 1 milliGRAM(s) Oral daily  gabapentin 400 milliGRAM(s) Oral three times a day  hydrochlorothiazide 50 milliGRAM(s) Oral daily  hydroxychloroquine 200 milliGRAM(s) Oral two times a day  insulin glargine Injectable (LANTUS) 5 Unit(s) SubCutaneous every morning  insulin lispro (HumaLOG) corrective regimen sliding scale   SubCutaneous three times a day before meals  lacosamide 100 milliGRAM(s) Oral two times a day  levETIRAcetam 1000 milliGRAM(s) Oral two times a day  levETIRAcetam 500 milliGRAM(s) Oral two times a day  levothyroxine 100 MICROGram(s) Oral daily  lisinopril 20 milliGRAM(s) Oral daily  potassium chloride    Tablet ER 20 milliEquivalent(s) Oral every 2 hours    MEDICATIONS  (PRN):      LABOROTORY DATA/MICROBIOLOGY/I & O's:                        9.5    6.72  )-----------( 243      ( 14 Aug 2019 07:10 )             29.5     08-14    140  |  102  |  14  ----------------------------<  60<L>  3.2<L>   |  29  |  0.5<L>    Ca    8.5      14 Aug 2019 07:10  Mg     2.0     08-13    TPro  6.4  /  Alb  3.4<L>  /  TBili  0.3  /  DBili  x   /  AST  43<H>  /  ALT  12  /  AlkPhos  94  08-13    PT/INR - ( 13 Aug 2019 18:00 )   PT: 12.40 sec;   INR: 1.08 ratio         PTT - ( 13 Aug 2019 18:00 )  PTT:32.4 sec  Urinalysis Basic - ( 13 Aug 2019 20:23 )    Color: Yellow / Appearance: Clear / SG: >=1.030 / pH: x  Gluc: x / Ketone: Trace  / Bili: Small / Urobili: 0.2 mg/dL   Blood: x / Protein: 100 mg/dL / Nitrite: Negative   Leuk Esterase: Small / RBC: 1-2 /HPF / WBC 10-25 /HPF   Sq Epi: x / Non Sq Epi: Few /HPF / Bacteria: Moderate      CAPILLARY BLOOD GLUCOSE      POCT Blood Glucose.: 170 mg/dL (14 Aug 2019 16:32)  POCT Blood Glucose.: 170 mg/dL (14 Aug 2019 11:48)  POCT Blood Glucose.: 89 mg/dL (14 Aug 2019 08:38)  POCT Blood Glucose.: 58 mg/dL (14 Aug 2019 07:38)        Urinalysis Basic - ( 13 Aug 2019 20:23 )    Color: Yellow / Appearance: Clear / SG: >=1.030 / pH: x  Gluc: x / Ketone: Trace  / Bili: Small / Urobili: 0.2 mg/dL   Blood: x / Protein: 100 mg/dL / Nitrite: Negative   Leuk Esterase: Small / RBC: 1-2 /HPF / WBC 10-25 /HPF   Sq Epi: x / Non Sq Epi: Few /HPF / Bacteria: Moderate            08-13-19 @ 07:01  -  08-14-19 @ 07:00  --------------------------------------------------------  IN: 0 mL / OUT: 1000 mL / NET: -1000 mL              ASSESSMENT:  74 YO F with a PMH of DM, SLE, SZ DS, CVA in 02' with no residual weakness, and HTN who was BIBEMS for eval of increasing weakness and confusion .     1. Weakness and confusion likely related to UTI in the elderly with possibility of underlying dementia / rule out seizure episode  -CTH NEG  -switch to oral ABX cipro  UCX- NTD  neuro consult appreciate   naldo SANTIAGO  c/w vimpat current dose/c/w  keppra to 1500mg bid as per neuro   off IVF's       2. Normocytic anemia, chronic  -No signs of overt bleeding  H/H stable     3. Thrombocytopenia  platelet count stable with repeat labs     4. Mild metabolic alkalosis likely 2/2 dehydration  improved with hydration     5. Hypoalbuminemia due to poor oral intake  -Nutrition eval    6. HX of DM  FSG monitoring/ ISS+  -Hold oral hypoglycemics    7. Hx of SZ DS  c/w current AED's  REEG  neuro eval appreciated     8. HX of SLE  -Home meds while in the hospital    PT/physiatry consult     GI and DVT PPX  FULL CODE      #Progress Note Handoff  Pending :  REEG/ physiatry consult   Disposition:  possible STR/ very unsteady  Discussion: discussed with Grandson again today, HCP is Pablo wu: [phone]

## 2019-08-16 ENCOUNTER — TRANSCRIPTION ENCOUNTER (OUTPATIENT)
Age: 74
End: 2019-08-16

## 2019-08-16 VITALS
TEMPERATURE: 99 F | RESPIRATION RATE: 16 BRPM | HEART RATE: 76 BPM | SYSTOLIC BLOOD PRESSURE: 127 MMHG | DIASTOLIC BLOOD PRESSURE: 63 MMHG

## 2019-08-16 LAB
GLUCOSE BLDC GLUCOMTR-MCNC: 206 MG/DL — HIGH (ref 70–99)
GLUCOSE BLDC GLUCOMTR-MCNC: 262 MG/DL — HIGH (ref 70–99)
GLUCOSE BLDC GLUCOMTR-MCNC: 313 MG/DL — HIGH (ref 70–99)
GLUCOSE BLDC GLUCOMTR-MCNC: 328 MG/DL — HIGH (ref 70–99)

## 2019-08-16 PROCEDURE — 99239 HOSP IP/OBS DSCHRG MGMT >30: CPT

## 2019-08-16 PROCEDURE — 95819 EEG AWAKE AND ASLEEP: CPT | Mod: 26

## 2019-08-16 RX ORDER — INSULIN HUMAN 100 [IU]/ML
4 INJECTION, SOLUTION SUBCUTANEOUS ONCE
Refills: 0 | Status: COMPLETED | OUTPATIENT
Start: 2019-08-16 | End: 2019-08-16

## 2019-08-16 RX ORDER — LISINOPRIL 2.5 MG/1
1 TABLET ORAL
Qty: 0 | Refills: 0 | DISCHARGE

## 2019-08-16 RX ORDER — LEVETIRACETAM 250 MG/1
1 TABLET, FILM COATED ORAL
Qty: 0 | Refills: 0 | DISCHARGE
Start: 2019-08-16

## 2019-08-16 RX ORDER — LACOSAMIDE 50 MG/1
1 TABLET ORAL
Qty: 0 | Refills: 0 | DISCHARGE
Start: 2019-08-16

## 2019-08-16 RX ORDER — CIPROFLOXACIN LACTATE 400MG/40ML
1 VIAL (ML) INTRAVENOUS
Qty: 6 | Refills: 0
Start: 2019-08-16 | End: 2019-08-18

## 2019-08-16 RX ORDER — LISINOPRIL 2.5 MG/1
1 TABLET ORAL
Qty: 0 | Refills: 0 | DISCHARGE
Start: 2019-08-16

## 2019-08-16 RX ORDER — LEVETIRACETAM 250 MG/1
2 TABLET, FILM COATED ORAL
Qty: 0 | Refills: 0 | DISCHARGE

## 2019-08-16 RX ADMIN — Medication 200 MILLIGRAM(S): at 17:01

## 2019-08-16 RX ADMIN — Medication 500 MILLIGRAM(S): at 05:11

## 2019-08-16 RX ADMIN — LEVETIRACETAM 1000 MILLIGRAM(S): 250 TABLET, FILM COATED ORAL at 17:01

## 2019-08-16 RX ADMIN — LACOSAMIDE 100 MILLIGRAM(S): 50 TABLET ORAL at 05:13

## 2019-08-16 RX ADMIN — Medication 8: at 16:38

## 2019-08-16 RX ADMIN — Medication 8: at 12:05

## 2019-08-16 RX ADMIN — LEVETIRACETAM 1000 MILLIGRAM(S): 250 TABLET, FILM COATED ORAL at 05:10

## 2019-08-16 RX ADMIN — LEVETIRACETAM 500 MILLIGRAM(S): 250 TABLET, FILM COATED ORAL at 17:01

## 2019-08-16 RX ADMIN — DULOXETINE HYDROCHLORIDE 60 MILLIGRAM(S): 30 CAPSULE, DELAYED RELEASE ORAL at 12:04

## 2019-08-16 RX ADMIN — LISINOPRIL 20 MILLIGRAM(S): 2.5 TABLET ORAL at 05:11

## 2019-08-16 RX ADMIN — LACOSAMIDE 100 MILLIGRAM(S): 50 TABLET ORAL at 17:01

## 2019-08-16 RX ADMIN — Medication 200 MILLIGRAM(S): at 05:11

## 2019-08-16 RX ADMIN — ENOXAPARIN SODIUM 40 MILLIGRAM(S): 100 INJECTION SUBCUTANEOUS at 12:04

## 2019-08-16 RX ADMIN — GABAPENTIN 400 MILLIGRAM(S): 400 CAPSULE ORAL at 16:40

## 2019-08-16 RX ADMIN — Medication 50 MILLIGRAM(S): at 05:11

## 2019-08-16 RX ADMIN — GABAPENTIN 400 MILLIGRAM(S): 400 CAPSULE ORAL at 05:11

## 2019-08-16 RX ADMIN — Medication 81 MILLIGRAM(S): at 12:04

## 2019-08-16 RX ADMIN — LEVETIRACETAM 500 MILLIGRAM(S): 250 TABLET, FILM COATED ORAL at 05:10

## 2019-08-16 RX ADMIN — Medication 1 MILLIGRAM(S): at 12:04

## 2019-08-16 RX ADMIN — Medication 100 MICROGRAM(S): at 05:11

## 2019-08-16 RX ADMIN — INSULIN HUMAN 4 UNIT(S): 100 INJECTION, SOLUTION SUBCUTANEOUS at 05:50

## 2019-08-16 RX ADMIN — Medication 500 MILLIGRAM(S): at 17:02

## 2019-08-16 NOTE — CONSULT NOTE ADULT - ASSESSMENT
72 YO F with a PMH of DM, SLE, CVA in 02' with no residual weakness, HTN, w/ complex seizures currently readmitted for generalized weakness currently improving found with UTI likely toxic/metabolic encephalopathy.  Per pt chart keppra dose 1500 mg BID and vimpat 100 BID.      Plan:  - continue vimpat 100 mg BID  - increase keppra to 1500 mg BID  - check REEG  - treat UTI  - PT/OT  - if EEG (-), no further inpt neurologic w/u and f/u with Dr. Brown in 4 weeks
IMPRESSION: Rehab of 74 y/o  f  rehab  for GD sp  fall hx  of  CVA    PRECAUTIONS: [  ] Cardiac  [  ] Respiratory  [  ] Seizures [  ] Contact Isolation  [  ] Droplet Isolation  [ FALL ] Other    Weight Bearing Status:     RECOMMENDATION:    Out of Bed to Chair     DVT/Decubiti Prophylaxis    REHAB PLAN:     [  xx ] Bedside P/T 3-5 times a week   [   ]   Bedside O/T  2-3 times a week             [   ] No Rehab Therapy Indicated                   [   ]  Speech Therapy   Conditioning/ROM                                    ADL  Bed Mobility                                               Conditioning/ROM  Transfers                                                     Bed Mobility  Sitting /Standing Balance                         Transfers                                        Gait Training                                               Sitting/Standing Balance  Stair Training [   ]Applicable                    Home equipment Eval                                                                        Splinting  [   ] Only      GOALS:   ADL   [  x ]   Independent                    Transfers  [x   ] Independent                          Ambulation  [   x] Independent     [   x ] With device                            [  x ]  CG                                                         [x   ]  CG                                                                  [ x  ] CG                            [    ] Min A                                                   [   ] Min A                                                              [   ] Min  A          DISCHARGE PLAN:   [   ]  Good candidate for Intensive Rehabilitation/Hospital based-4A [facility]                                             Will tolerate 3hrs Intensive Rehab Daily                                       [  xx  x]  Short Term Rehab in Skilled Nursing Facility                                       [    ]  Home with Outpatient or VN services                                         [    ]  Possible Candidate for Intensive Hospital based Rehab

## 2019-08-16 NOTE — DISCHARGE NOTE PROVIDER - CARE PROVIDER_API CALL
Heidi Aguiar)  Neurology  [address] [place] [address], [address]  [place] [address]  Phone: [phone]  Fax: [phone]  Follow Up Time: 1 month

## 2019-08-16 NOTE — DISCHARGE NOTE PROVIDER - HOSPITAL COURSE
74 YO F with a PMH of DM, SLE, SZ DS, CVA in 02' with no residual weakness, and HTN who was BIBEMS for eval of increasing weakness and confusion.         Patient was admitted for UTI and PT eval. She was treated with IV ABX for UTI and switched to po ABX with clinical improvement. Neuro also evaluated patient and suggested REEG to r/o seizure episode contributing to confusion. REEG is normal/ neuro suggests no further workup .  Patient might have underlying dementia component contributing to confusion.         Pt and physiatry evaluated - for STR .         Patient was seen and examined at bedside, awake, alert, no new complaints, looked comfortable.         Vital Signs Last 24 Hrs    T(C): 37.4 (16 Aug 2019 14:03), Max: 37.4 (16 Aug 2019 14:03)    T(F): 99.3 (16 Aug 2019 14:03), Max: 99.3 (16 Aug 2019 14:03)    HR: 76 (16 Aug 2019 14:03) (72 - 80)    BP: 127/63 (16 Aug 2019 14:03) (127/63 - 141/67)    BP(mean): --    RR: 16 (16 Aug 2019 14:03) (16 - 16)    SpO2: --        Physical Examination:    General: AAO x 2   , NAD.    HEENT: PERRLA, EOMI. NO JVD.    CVS:  S1 & S2 appreciated, regular rate and rhythm, no murmurs.     Lungs: clear to auscultation, no wheezing, no rales.     Abdominal Examination: soft, nontender, nondistended, +ve BS.    Neuro:  no focal deficits.     Extremity Examination: No edema peripherally.        clinically stable for discharge to NH

## 2019-08-16 NOTE — CONSULT NOTE ADULT - SUBJECTIVE AND OBJECTIVE BOX
Neurology Consult    Patient is a 73y old  Female who presents with a chief complaint of Weakness and Confusion (13 Aug 2019 22:10)      HPI:  72 YO F with a PMH of DM, SLE, CVA in 02' with no residual weakness, HTN, recently discharged after status partialis treated on VEEG with extensive encephlopathy w/u currently readmitted with increasing generalized weakness and dysarthria.  Pt was recommended to SNF but refused and brought by family with worsening condition.   Family not at bedside.  Pt thinks she's at [facility] but otherwise aware of her past medical history, able to tell August 2019, [facility] [place] mayor.  Currently denies any HA, weakness, numbness, abnormal movements.      PAST MEDICAL & SURGICAL HISTORY:  Seizure: keppra  Herniated lumbar intervertebral disc: as per hx  Stroke: TIA 2002, no deficits  Fall, initial encounter: as  hx  Lupus: as per hx  Hypertension: hctz  Hypercholesteremia: statin  DM (diabetes mellitus): insulin  fs achs  No significant past surgical history    FAMILY HISTORY:  No pertinent family history in first degree relatives    Social History: (-) x 3    Allergies    No Known Allergies    Intolerances    MEDICATIONS  (STANDING):  aspirin  chewable 81 milliGRAM(s) Oral daily  atorvastatin 40 milliGRAM(s) Oral at bedtime  cefTRIAXone   IVPB 1000 milliGRAM(s) IV Intermittent every 24 hours  dextrose 5%. 1000 milliLiter(s) (50 mL/Hr) IV Continuous <Continuous>  DULoxetine 60 milliGRAM(s) Oral daily  enoxaparin Injectable 40 milliGRAM(s) SubCutaneous daily  folic acid 1 milliGRAM(s) Oral daily  gabapentin 400 milliGRAM(s) Oral three times a day  hydrochlorothiazide 50 milliGRAM(s) Oral daily  hydroxychloroquine 200 milliGRAM(s) Oral two times a day  insulin glargine Injectable (LANTUS) 5 Unit(s) SubCutaneous every morning  insulin lispro (HumaLOG) corrective regimen sliding scale   SubCutaneous three times a day before meals  lacosamide 100 milliGRAM(s) Oral two times a day  levETIRAcetam 750 milliGRAM(s) Oral two times a day  levothyroxine 100 MICROGram(s) Oral daily  lisinopril 20 milliGRAM(s) Oral daily    MEDICATIONS  (PRN):      Review of systems:    Constitutional: as per HPI  Eyes: No eye pain or discharge  ENMT:  No difficulty hearing; No sinus or throat pain  Neck: No pain or stiffness  Respiratory: No cough, wheezing, chills or hemoptysis  Cardiovascular: No chest pain, palpitations, shortness of breath, dyspnea on exertion  Gastrointestinal: No abdominal pain, nausea, vomiting or hematemesis; No diarrhea or constipation.   Genitourinary: No dysuria, frequency, hematuria or incontinence  Neurological: As per HPI  Skin: No rashes or lesions   Endocrine: No heat or cold intolerance; No hair loss  Musculoskeletal: No joint pain or swelling  Psychiatric: No depression, anxiety, mood swings  Heme/Lymph: No easy bruising or bleeding gums    Vital Signs Last 24 Hrs  T(C): 37.1 (14 Aug 2019 14:44), Max: 37.2 (13 Aug 2019 23:15)  T(F): 98.8 (14 Aug 2019 14:44), Max: 98.9 (13 Aug 2019 23:15)  HR: 81 (14 Aug 2019 14:44) (74 - 81)  BP: 140/77 (14 Aug 2019 14:44) (140/77 - 170/77)  BP(mean): --  RR: 18 (14 Aug 2019 14:44) (16 - 20)  SpO2: 96% (13 Aug 2019 23:15) (96% - 98%)    Examination:  General:  Appearance is consistent with chronologic age.  No abnormal facies.  Gross skin survey within normal limits.    Cognitive/Language:  The patient is oriented to person, time and date.  Knows [place]OT[place], mayor.  thinks shes in [place].  Language with normal repetition, comprehension and naming.  Nondysarthric.    Eyes: intact VA, VFF.  EOMI w/o nystagmus, skew or reported double vision.  PERRL.  No ptosis/weakness of eyelid closure.    Face:  Facial sensation normal V1 - 3, no facial asymmetry.    Ears/Nose/Throat:  Hearing grossly intact b/l.  Palate elevates midline.  Tongue and uvula midline.   Motor examination:   Normal tone, bulk and range of motion.  mild asterixis  Formal Muscle Strength Testing: (MRC grade R/L) 5/5 UE; 5/5 LE.  No observable drift.  Reflexes:   2+ b/l pectoralis, biceps, triceps, brachioradialis, patella and Achilles.  Plantar response downgoing b/l.  Jaw jerk, Benitez, clonus absent.  Sensory examination:   Intact to light touch and pinprick, pain, temperature and proprioception and vibration in all extremities.  Cerebellum:   FTN/HKS intact with normal RILEY in all limbs.  No dysmetria or dysdiadokinesia.  Gait narrow based and normal.    Abdomen: supple, no guarding, no TTP    Labs:   CBC Full  -  ( 14 Aug 2019 07:10 )  WBC Count : 6.72 K/uL  RBC Count : 3.31 M/uL  Hemoglobin : 9.5 g/dL  Hematocrit : 29.5 %  Platelet Count - Automated : 243 K/uL  Mean Cell Volume : 89.1 fL  Mean Cell Hemoglobin : 28.7 pg  Mean Cell Hemoglobin Concentration : 32.2 g/dL  Auto Neutrophil # : 4.53 K/uL  Auto Lymphocyte # : 1.60 K/uL  Auto Monocyte # : 0.43 K/uL  Auto Eosinophil # : 0.12 K/uL  Auto Basophil # : 0.03 K/uL  Auto Neutrophil % : 67.5 %  Auto Lymphocyte % : 23.8 %  Auto Monocyte % : 6.4 %  Auto Eosinophil % : 1.8 %  Auto Basophil % : 0.4 %    08-14    140  |  102  |  14  ----------------------------<  60<L>  3.2<L>   |  29  |  0.5<L>    Ca    8.5      14 Aug 2019 07:10  Mg     2.0     08-13    TPro  6.4  /  Alb  3.4<L>  /  TBili  0.3  /  DBili  x   /  AST  43<H>  /  ALT  12  /  AlkPhos  94  08-13    LIVER FUNCTIONS - ( 13 Aug 2019 18:00 )  Alb: 3.4 g/dL / Pro: 6.4 g/dL / ALK PHOS: 94 U/L / ALT: 12 U/L / AST: 43 U/L / GGT: x           PT/INR - ( 13 Aug 2019 18:00 )   PT: 12.40 sec;   INR: 1.08 ratio         PTT - ( 13 Aug 2019 18:00 )  PTT:32.4 sec  Urinalysis Basic - ( 13 Aug 2019 20:23 )    Color: Yellow / Appearance: Clear / SG: >=1.030 / pH: x  Gluc: x / Ketone: Trace  / Bili: Small / Urobili: 0.2 mg/dL   Blood: x / Protein: 100 mg/dL / Nitrite: Negative   Leuk Esterase: Small / RBC: 1-2 /HPF / WBC 10-25 /HPF   Sq Epi: x / Non Sq Epi: Few /HPF / Bacteria: Moderate    Neuroimaging:  NCHCT: CT Head No Cont:   EXAM:  CT BRAIN          PROCEDURE DATE:  08/13/2019      INTERPRETATION:  Clinical History / Reason for exam: Altered mental status    Technique: Multiple contiguous axial CT images of the head were obtained   from the base of the skull tothe vertex without administration of   intravenous contrast. Sagittal and coronal reformations were also   obtained.    Comparison: 7/14/2019    FINDINGS:    The ventricles and sulci are prominent, consistent with parenchymal   volume loss.      There is no acute mass effect, territorial infarct or intracranial   hemorrhage.      The grey white differentiation is maintained throughout.    There are patchy white matter hypodensities, consistent with chronic   microvascular ischemic changes.    No evidence of depressed skull fracture.    The visualized paranasal sinuses are well aerated.    The bilateral mastoid complexes, globes and orbits are grossly within   normal limits.    IMPRESSION:     No CT evidence for acute intracranial pathology.    OLIVA HINTON M.D., RESIDENT RADIOLOGIST  This document has been electronically signed.  VARUN RODRIGUEZ M.D., ATTENDING RADIOLOGIST  This document has been electronically signed. Aug 13 2019  6:22PM       (08-13-19 @ 17:48)      08-14-19 @ 15:47
HPI:  72 YO F with a PMH of DM, SLE, SZ DS, CVA in 02' with no residual weakness, and HTN who was BIBEMS for eval of increasing weakness and slurring of speech for the past couple days. Of note, pt was recently seen here in July for eval of falls and refused SNF placement. The pt denies any symptoms at present and states that she just wants to go home.   In ED, pt had a CTH which was NEG but they found a UTI to be present.      PTN  REFERRED TO ACUTE  REHAB  FOR  EVAL AND  TX   PAST MEDICAL & SURGICAL HISTORY:  Seizure: keppra  Herniated lumbar intervertebral disc: as per hx  Stroke: TIA 2002, no deficits  Fall, initial encounter: as  hx  Lupus: as per hx  Hypertension: hctz  Hypercholesteremia: statin  DM (diabetes mellitus): insulin  fs achs  No significant past surgical history      Hospital Course:    TODAY'S SUBJECTIVE & REVIEW OF SYMPTOMS:     Constitutional WNL   Cardio WNL   Resp WNL   GI WNL  Heme WNL  Endo WNL  Skin WNL  MSK WNL  Neuro WNL  Cognitive WNL  Psych WNL      MEDICATIONS  (STANDING):  aspirin  chewable 81 milliGRAM(s) Oral daily  atorvastatin 40 milliGRAM(s) Oral at bedtime  ciprofloxacin     Tablet 500 milliGRAM(s) Oral every 12 hours  dextrose 5%. 1000 milliLiter(s) (50 mL/Hr) IV Continuous <Continuous>  DULoxetine 60 milliGRAM(s) Oral daily  enoxaparin Injectable 40 milliGRAM(s) SubCutaneous daily  folic acid 1 milliGRAM(s) Oral daily  gabapentin 400 milliGRAM(s) Oral three times a day  hydrochlorothiazide 50 milliGRAM(s) Oral daily  hydroxychloroquine 200 milliGRAM(s) Oral two times a day  insulin glargine Injectable (LANTUS) 5 Unit(s) SubCutaneous every morning  insulin lispro (HumaLOG) corrective regimen sliding scale   SubCutaneous three times a day before meals  lacosamide 100 milliGRAM(s) Oral two times a day  levETIRAcetam 1000 milliGRAM(s) Oral two times a day  levETIRAcetam 500 milliGRAM(s) Oral two times a day  levothyroxine 100 MICROGram(s) Oral daily  lisinopril 20 milliGRAM(s) Oral daily    MEDICATIONS  (PRN):      FAMILY HISTORY:  No pertinent family history in first degree relatives      Allergies    No Known Allergies    Intolerances        SOCIAL HISTORY:    [  ] Etoh  [  ] Smoking  [  ] Substance abuse     Home Environment:  [  ] Home Alone  [ x ] Lives with Family  [  ] Home Health Aid    Dwelling:  [  ] Apartment  [ x ] Private House  [  ] Adult Home  [  ] Skilled Nursing Facility      [  ] Short Term  [  ] Long Term  [ x ] Stairs       Elevator [  ]    FUNCTIONAL STATUS PTA: (Check all that apply)  Ambulation: [  x ]Independent    [  ] Dependent     [  ] Non-Ambulatory  Assistive Device: [  ] SA Cane  [  ]  Q Cane  [ x ] Walker  [  ]  Wheelchair  ADL : [  x] Independent  [  ]  Dependent       Vital Signs Last 24 Hrs  T(C): 36.9 (16 Aug 2019 04:53), Max: 36.9 (15 Aug 2019 21:58)  T(F): 98.5 (16 Aug 2019 04:53), Max: 98.5 (15 Aug 2019 21:58)  HR: 72 (16 Aug 2019 04:53) (72 - 80)  BP: 141/67 (16 Aug 2019 04:53) (131/67 - 141/67)  BP(mean): --  RR: 16 (16 Aug 2019 04:53) (16 - 16)  SpO2: --      PHYSICAL EXAM: Alert & Oriented X3  GENERAL: NAD, well-groomed, well-developed  HEAD:  Atraumatic, Normocephalic  EYES: EOMI, PERRLA, conjunctiva and sclera clear  NECK: Supple, No JVD, Normal thyroid  CHEST/LUNG: Clear to percussion bilaterally; No rales, rhonchi, wheezing, or rubs  HEART: Regular rate and rhythm; No murmurs, rubs, or gallops  ABDOMEN: Soft, Nontender, Nondistended; Bowel sounds present  EXTREMITIES:  2+ Peripheral Pulses, No clubbing, cyanosis, or edema    NERVOUS SYSTEM:  Cranial Nerves 2-12 intact [ x ] Abnormal  [  ]  ROM: WFL all extremities [  ]  Abnormal [ x ]  Motor Strength: WFL all extremities  [  ]  Abnormal [x  ]  Sensation: intact to light touch [  ] Abnormal [x  ]  Reflexes: Symmetric [  ]  Abnormal [  ]    FUNCTIONAL STATUS:  Bed Mobility: Independent [  ]  Supervision [  ]  Needs Assistance [ x ]  N/A [  ]  Transfers: Independent [  ]  Supervision [  ]  Needs Assistance [ x ]  N/A [  ]   Ambulation: Independent [  ]  Supervision [  ]  Needs Assistance [ x ]  N/A [  ]  ADL: Independent [  ] Requires Assistance [  ] N/A [x ]  SEE PT/OT IE NOTES    LABS:                        9.9    6.53  )-----------( 258      ( 15 Aug 2019 06:42 )             31.3     08-15    136  |  95<L>  |  13  ----------------------------<  263<H>  3.7   |  28  |  0.5<L>    Ca    8.6      15 Aug 2019 06:42            RADIOLOGY & ADDITIONAL STUDIES:< from: CT Head No Cont (08.13.19 @ 17:48) >    No CT evidence for acute intracranial pathology.      < end of copied text >      Assesment:

## 2019-08-16 NOTE — DISCHARGE NOTE PROVIDER - NSDCCPCAREPLAN_GEN_ALL_CORE_FT
PRINCIPAL DISCHARGE DIAGNOSIS  Diagnosis: Weakness  Assessment and Plan of Treatment: possibly deconditioning/continue PT as tolerated at STR.      SECONDARY DISCHARGE DIAGNOSES  Diagnosis: Seizure  Assessment and Plan of Treatment: evaluted by neuro/ REEG is normal/ continue AED's as prescribed/fu neuro as OP.    Diagnosis: Altered mental status  Assessment and Plan of Treatment: possibly due to some underlying dementia component with superimposed UTI /complete ABX for 3 more days for UTI/mentation improved - at baseline mental status .

## 2019-08-16 NOTE — DISCHARGE NOTE NURSING/CASE MANAGEMENT/SOCIAL WORK - NSTRANSFERBELONGINGSDISPO_GEN_A_NUR
Problem: Eating Disorder  Intervention: Encourage use of coping skills   09/27/17 0921   Eating Disorder   Encourage use of coping skills Done     Intervention: Encourage and provide positive reinforcement for healthy change behaviors   09/27/17 0921   Eating Disorder   Encourage and provide positive reinforcement for healthy change behaviors Done       Goal: Demonstrates willingness to comply with meal plan  Outcome: Outcome Met, Continue evaluating goal progress toward completion   09/26/17 1700 09/26/17 2030   Nutrition Intake   Percent Meals Eaten (%) 100 % --    Nutrition   Appetite --  Good     Goal: Reduce or eliminate restricting  Outcome: Outcome Met, Continue evaluating goal progress toward completion   09/27/17 0821   Eating Disorder Behaviors   Restricting Behaviors None reported;None observed  (while supervised by staff)       Problem: Pain  Goal: #Acceptable pain/comfort level is achieved/maintained with activity (based on self-report using NRS/FACES)  This goal if intended for use with patients who are not achieving acceptable pain control during activity.   Outcome: Outcome Met, Continue evaluating goal progress toward completion   09/27/17 0730   Pain Evaluation with Activity   Comfort/Function (Pain) SCORE with Activity 0   Pain Evaluation with Activity Activity pain level acceptable, continue plan of care          not applicable

## 2019-08-17 ENCOUNTER — OUTPATIENT (OUTPATIENT)
Dept: OUTPATIENT SERVICES | Facility: HOSPITAL | Age: 74
LOS: 1 days | Discharge: HOME | End: 2019-08-17

## 2019-08-17 DIAGNOSIS — E11.9 TYPE 2 DIABETES MELLITUS WITHOUT COMPLICATIONS: ICD-10-CM

## 2019-08-17 DIAGNOSIS — G40.89 OTHER SEIZURES: ICD-10-CM

## 2019-08-17 DIAGNOSIS — N39.0 URINARY TRACT INFECTION, SITE NOT SPECIFIED: ICD-10-CM

## 2019-08-17 DIAGNOSIS — R41.82 ALTERED MENTAL STATUS, UNSPECIFIED: ICD-10-CM

## 2019-08-17 DIAGNOSIS — I10 ESSENTIAL (PRIMARY) HYPERTENSION: ICD-10-CM

## 2019-08-19 LAB
CULTURE RESULTS: SIGNIFICANT CHANGE UP
SPECIMEN SOURCE: SIGNIFICANT CHANGE UP

## 2019-08-19 NOTE — PROGRESS NOTE ADULT - ASSESSMENT
ASSESSMENT  73y Female DM, arthritis on plaquenil, ?SLE, HTN, HLD, with h/o recent right hemispheric subdural hematoma secondary to fall now presenting with confusion and left sided weakness and fever    PROBLEMS  #Fever, Tm 102.1 with Hypertension, likely central fever. CSF with WBC 25 mostly Macrophages, 8% NP, 17% lymphs. CSF gluc 399 prot 429. CSF PCR is negative    WBC 7    UA neg    CT AP neg  #Acute L hemiplegia. 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    VEEG shows well organized background with right hemispheric slowing. No epileptiform activity or electrographic seizures.  #Severe DKA elevated beta-hydroxybutarate    RECOMMENDATIONS  - D/C ACV as  HSV PCR negative  - FSG control, DKA management   - On Ceftriaxone, consider D/C if BCX NG  - Neurology following    Spectra [id number] ASSESSMENT  73y Female DM, arthritis on plaquenil, ?SLE, HTN, HLD, with h/o recent right hemispheric subdural hematoma secondary to fall now presenting with confusion and left sided weakness and fever    PROBLEMS  #Fever, Tm 102.1 with Hypertension, likely central fever. CSF with WBC 25 mostly Macrophages, 8% NP, 17% lymphs. CSF gluc 399 prot 429. CSF PCR is negative    WBC 7    UA neg    CT AP neg  #Acute L hemiplegia. 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    VEEG shows well organized background with right hemispheric slowing. No epileptiform activity or electrographic seizures.  #Severe DKA elevated beta-hydroxybutarate    RECOMMENDATIONS  - D/C ACV as  HSV PCR negative  - FSG control, DKA management   - On Ceftriaxone, consider D/C if BCX NG  - Neurology following  - No bed bugs or bites seen on my exam    Spectra [id number] Deep Sutures: 5-0 Polysorb

## 2019-08-20 ENCOUNTER — OUTPATIENT (OUTPATIENT)
Dept: OUTPATIENT SERVICES | Facility: HOSPITAL | Age: 74
LOS: 1 days | Discharge: HOME | End: 2019-08-20

## 2019-08-20 DIAGNOSIS — E11.9 TYPE 2 DIABETES MELLITUS WITHOUT COMPLICATIONS: ICD-10-CM

## 2019-08-20 DIAGNOSIS — E87.6 HYPOKALEMIA: ICD-10-CM

## 2019-08-21 DIAGNOSIS — F03.90 UNSPECIFIED DEMENTIA WITHOUT BEHAVIORAL DISTURBANCE: ICD-10-CM

## 2019-08-21 DIAGNOSIS — D69.6 THROMBOCYTOPENIA, UNSPECIFIED: ICD-10-CM

## 2019-08-21 DIAGNOSIS — E11.9 TYPE 2 DIABETES MELLITUS WITHOUT COMPLICATIONS: ICD-10-CM

## 2019-08-21 DIAGNOSIS — R26.9 UNSPECIFIED ABNORMALITIES OF GAIT AND MOBILITY: ICD-10-CM

## 2019-08-21 DIAGNOSIS — M32.9 SYSTEMIC LUPUS ERYTHEMATOSUS, UNSPECIFIED: ICD-10-CM

## 2019-08-21 DIAGNOSIS — G40.909 EPILEPSY, UNSPECIFIED, NOT INTRACTABLE, WITHOUT STATUS EPILEPTICUS: ICD-10-CM

## 2019-08-21 DIAGNOSIS — E88.09 OTHER DISORDERS OF PLASMA-PROTEIN METABOLISM, NOT ELSEWHERE CLASSIFIED: ICD-10-CM

## 2019-08-21 DIAGNOSIS — D64.9 ANEMIA, UNSPECIFIED: ICD-10-CM

## 2019-08-21 DIAGNOSIS — E86.0 DEHYDRATION: ICD-10-CM

## 2019-08-21 DIAGNOSIS — N39.0 URINARY TRACT INFECTION, SITE NOT SPECIFIED: ICD-10-CM

## 2019-08-21 DIAGNOSIS — I10 ESSENTIAL (PRIMARY) HYPERTENSION: ICD-10-CM

## 2019-08-21 DIAGNOSIS — Z79.4 LONG TERM (CURRENT) USE OF INSULIN: ICD-10-CM

## 2019-08-21 DIAGNOSIS — R47.1 DYSARTHRIA AND ANARTHRIA: ICD-10-CM

## 2019-08-21 DIAGNOSIS — R53.1 WEAKNESS: ICD-10-CM

## 2019-08-21 DIAGNOSIS — E87.3 ALKALOSIS: ICD-10-CM

## 2019-08-21 DIAGNOSIS — Z86.73 PERSONAL HISTORY OF TRANSIENT ISCHEMIC ATTACK (TIA), AND CEREBRAL INFARCTION WITHOUT RESIDUAL DEFICITS: ICD-10-CM

## 2019-08-21 DIAGNOSIS — M51.26 OTHER INTERVERTEBRAL DISC DISPLACEMENT, LUMBAR REGION: ICD-10-CM

## 2019-08-21 DIAGNOSIS — R63.8 OTHER SYMPTOMS AND SIGNS CONCERNING FOOD AND FLUID INTAKE: ICD-10-CM

## 2019-08-21 DIAGNOSIS — E78.5 HYPERLIPIDEMIA, UNSPECIFIED: ICD-10-CM

## 2019-08-21 DIAGNOSIS — G92 TOXIC ENCEPHALOPATHY: ICD-10-CM

## 2019-08-22 ENCOUNTER — OUTPATIENT (OUTPATIENT)
Dept: OUTPATIENT SERVICES | Facility: HOSPITAL | Age: 74
LOS: 1 days | Discharge: HOME | End: 2019-08-22

## 2019-08-22 DIAGNOSIS — E87.6 HYPOKALEMIA: ICD-10-CM

## 2019-11-14 ENCOUNTER — OUTPATIENT (OUTPATIENT)
Dept: OUTPATIENT SERVICES | Facility: HOSPITAL | Age: 74
LOS: 1 days | Discharge: HOME | End: 2019-11-14

## 2019-11-14 DIAGNOSIS — E11.9 TYPE 2 DIABETES MELLITUS WITHOUT COMPLICATIONS: ICD-10-CM

## 2019-11-14 DIAGNOSIS — R80.9 PROTEINURIA, UNSPECIFIED: ICD-10-CM

## 2019-11-14 DIAGNOSIS — E03.9 HYPOTHYROIDISM, UNSPECIFIED: ICD-10-CM

## 2019-11-14 DIAGNOSIS — D50.9 IRON DEFICIENCY ANEMIA, UNSPECIFIED: ICD-10-CM

## 2019-11-14 DIAGNOSIS — Z79.899 OTHER LONG TERM (CURRENT) DRUG THERAPY: ICD-10-CM

## 2019-11-14 DIAGNOSIS — N39.0 URINARY TRACT INFECTION, SITE NOT SPECIFIED: ICD-10-CM

## 2019-11-14 DIAGNOSIS — E78.00 PURE HYPERCHOLESTEROLEMIA, UNSPECIFIED: ICD-10-CM

## 2020-03-19 NOTE — PATIENT PROFILE ADULT. - LIMITATIONS ON VISITORS/PHONE CALLS
Chief Complaint   Patient presents with   • Vaginal Itching   • Rash   Pt to triage in NAD.  Pt reports vaginal rash/itching x 3-4 days.  Pt reports recent treatment with antibiotics for UTI.  Pt educated on triage process and instructed to notify triage RN of any change in status.          
none

## 2021-01-01 ENCOUNTER — INPATIENT (INPATIENT)
Facility: HOSPITAL | Age: 76
LOS: 20 days | End: 2021-11-12
Attending: HOSPITALIST | Admitting: HOSPITALIST
Payer: MEDICARE

## 2021-01-01 ENCOUNTER — TRANSCRIPTION ENCOUNTER (OUTPATIENT)
Age: 76
End: 2021-01-01

## 2021-01-01 ENCOUNTER — EMERGENCY (EMERGENCY)
Facility: HOSPITAL | Age: 76
LOS: 0 days | Discharge: LEFT BEFORE DISCHARGE | End: 2021-08-07
Attending: EMERGENCY MEDICINE | Admitting: EMERGENCY MEDICINE
Payer: MEDICARE

## 2021-01-01 ENCOUNTER — INPATIENT (INPATIENT)
Facility: HOSPITAL | Age: 76
LOS: 6 days | Discharge: HOME | End: 2021-10-02
Attending: STUDENT IN AN ORGANIZED HEALTH CARE EDUCATION/TRAINING PROGRAM | Admitting: STUDENT IN AN ORGANIZED HEALTH CARE EDUCATION/TRAINING PROGRAM
Payer: MEDICARE

## 2021-01-01 ENCOUNTER — INPATIENT (INPATIENT)
Facility: HOSPITAL | Age: 76
LOS: 2 days | Discharge: HOME | End: 2021-10-21
Attending: INTERNAL MEDICINE | Admitting: INTERNAL MEDICINE
Payer: MEDICARE

## 2021-01-01 VITALS
HEIGHT: 60 IN | DIASTOLIC BLOOD PRESSURE: 81 MMHG | HEART RATE: 104 BPM | RESPIRATION RATE: 20 BRPM | SYSTOLIC BLOOD PRESSURE: 150 MMHG | WEIGHT: 149.91 LBS | OXYGEN SATURATION: 98 %

## 2021-01-01 VITALS — DIASTOLIC BLOOD PRESSURE: 55 MMHG | HEART RATE: 68 BPM | SYSTOLIC BLOOD PRESSURE: 104 MMHG | OXYGEN SATURATION: 98 %

## 2021-01-01 VITALS
OXYGEN SATURATION: 94 % | HEART RATE: 68 BPM | SYSTOLIC BLOOD PRESSURE: 105 MMHG | TEMPERATURE: 96 F | DIASTOLIC BLOOD PRESSURE: 52 MMHG | RESPIRATION RATE: 18 BRPM

## 2021-01-01 VITALS
SYSTOLIC BLOOD PRESSURE: 186 MMHG | DIASTOLIC BLOOD PRESSURE: 90 MMHG | RESPIRATION RATE: 18 BRPM | HEART RATE: 95 BPM | OXYGEN SATURATION: 94 %

## 2021-01-01 VITALS — HEART RATE: 73 BPM | SYSTOLIC BLOOD PRESSURE: 112 MMHG | RESPIRATION RATE: 30 BRPM | DIASTOLIC BLOOD PRESSURE: 84 MMHG

## 2021-01-01 VITALS
HEIGHT: 60 IN | DIASTOLIC BLOOD PRESSURE: 89 MMHG | TEMPERATURE: 99 F | SYSTOLIC BLOOD PRESSURE: 180 MMHG | OXYGEN SATURATION: 97 % | RESPIRATION RATE: 16 BRPM | WEIGHT: 145.06 LBS | HEART RATE: 95 BPM

## 2021-01-01 VITALS — TEMPERATURE: 98 F | RESPIRATION RATE: 17 BRPM | DIASTOLIC BLOOD PRESSURE: 58 MMHG | SYSTOLIC BLOOD PRESSURE: 114 MMHG

## 2021-01-01 VITALS
WEIGHT: 145.06 LBS | DIASTOLIC BLOOD PRESSURE: 87 MMHG | OXYGEN SATURATION: 98 % | HEIGHT: 61 IN | HEART RATE: 116 BPM | RESPIRATION RATE: 28 BRPM | SYSTOLIC BLOOD PRESSURE: 135 MMHG

## 2021-01-01 DIAGNOSIS — G40.909 EPILEPSY, UNSPECIFIED, NOT INTRACTABLE, WITHOUT STATUS EPILEPTICUS: ICD-10-CM

## 2021-01-01 DIAGNOSIS — I34.0 NONRHEUMATIC MITRAL (VALVE) INSUFFICIENCY: ICD-10-CM

## 2021-01-01 DIAGNOSIS — Z79.4 LONG TERM (CURRENT) USE OF INSULIN: ICD-10-CM

## 2021-01-01 DIAGNOSIS — R00.0 TACHYCARDIA, UNSPECIFIED: ICD-10-CM

## 2021-01-01 DIAGNOSIS — J96.02 ACUTE RESPIRATORY FAILURE WITH HYPERCAPNIA: ICD-10-CM

## 2021-01-01 DIAGNOSIS — Z87.2 PERSONAL HISTORY OF DISEASES OF THE SKIN AND SUBCUTANEOUS TISSUE: ICD-10-CM

## 2021-01-01 DIAGNOSIS — G47.33 OBSTRUCTIVE SLEEP APNEA (ADULT) (PEDIATRIC): ICD-10-CM

## 2021-01-01 DIAGNOSIS — E78.00 PURE HYPERCHOLESTEROLEMIA, UNSPECIFIED: ICD-10-CM

## 2021-01-01 DIAGNOSIS — Z91.81 HISTORY OF FALLING: ICD-10-CM

## 2021-01-01 DIAGNOSIS — E78.5 HYPERLIPIDEMIA, UNSPECIFIED: ICD-10-CM

## 2021-01-01 DIAGNOSIS — Z86.718 PERSONAL HISTORY OF OTHER VENOUS THROMBOSIS AND EMBOLISM: ICD-10-CM

## 2021-01-01 DIAGNOSIS — M32.9 SYSTEMIC LUPUS ERYTHEMATOSUS, UNSPECIFIED: ICD-10-CM

## 2021-01-01 DIAGNOSIS — E11.649 TYPE 2 DIABETES MELLITUS WITH HYPOGLYCEMIA WITHOUT COMA: ICD-10-CM

## 2021-01-01 DIAGNOSIS — I11.0 HYPERTENSIVE HEART DISEASE WITH HEART FAILURE: ICD-10-CM

## 2021-01-01 DIAGNOSIS — J15.212 PNEUMONIA DUE TO METHICILLIN RESISTANT STAPHYLOCOCCUS AUREUS: ICD-10-CM

## 2021-01-01 DIAGNOSIS — R05 COUGH: ICD-10-CM

## 2021-01-01 DIAGNOSIS — Z86.73 PERSONAL HISTORY OF TRANSIENT ISCHEMIC ATTACK (TIA), AND CEREBRAL INFARCTION WITHOUT RESIDUAL DEFICITS: ICD-10-CM

## 2021-01-01 DIAGNOSIS — E11.65 TYPE 2 DIABETES MELLITUS WITH HYPERGLYCEMIA: ICD-10-CM

## 2021-01-01 DIAGNOSIS — M79.89 OTHER SPECIFIED SOFT TISSUE DISORDERS: ICD-10-CM

## 2021-01-01 DIAGNOSIS — E87.2 ACIDOSIS: ICD-10-CM

## 2021-01-01 DIAGNOSIS — I21.A1 MYOCARDIAL INFARCTION TYPE 2: ICD-10-CM

## 2021-01-01 DIAGNOSIS — R06.2 WHEEZING: ICD-10-CM

## 2021-01-01 DIAGNOSIS — M51.26 OTHER INTERVERTEBRAL DISC DISPLACEMENT, LUMBAR REGION: ICD-10-CM

## 2021-01-01 DIAGNOSIS — R06.01 ORTHOPNEA: ICD-10-CM

## 2021-01-01 DIAGNOSIS — Z79.82 LONG TERM (CURRENT) USE OF ASPIRIN: ICD-10-CM

## 2021-01-01 DIAGNOSIS — G89.29 OTHER CHRONIC PAIN: ICD-10-CM

## 2021-01-01 DIAGNOSIS — I31.3 PERICARDIAL EFFUSION (NONINFLAMMATORY): ICD-10-CM

## 2021-01-01 DIAGNOSIS — D63.8 ANEMIA IN OTHER CHRONIC DISEASES CLASSIFIED ELSEWHERE: ICD-10-CM

## 2021-01-01 DIAGNOSIS — Z79.890 HORMONE REPLACEMENT THERAPY: ICD-10-CM

## 2021-01-01 DIAGNOSIS — R56.9 UNSPECIFIED CONVULSIONS: ICD-10-CM

## 2021-01-01 DIAGNOSIS — I10 ESSENTIAL (PRIMARY) HYPERTENSION: ICD-10-CM

## 2021-01-01 DIAGNOSIS — I21.4 NON-ST ELEVATION (NSTEMI) MYOCARDIAL INFARCTION: ICD-10-CM

## 2021-01-01 DIAGNOSIS — E11.9 TYPE 2 DIABETES MELLITUS WITHOUT COMPLICATIONS: ICD-10-CM

## 2021-01-01 DIAGNOSIS — E11.22 TYPE 2 DIABETES MELLITUS WITH DIABETIC CHRONIC KIDNEY DISEASE: ICD-10-CM

## 2021-01-01 DIAGNOSIS — E87.70 FLUID OVERLOAD, UNSPECIFIED: ICD-10-CM

## 2021-01-01 DIAGNOSIS — I08.3 COMBINED RHEUMATIC DISORDERS OF MITRAL, AORTIC AND TRICUSPID VALVES: ICD-10-CM

## 2021-01-01 DIAGNOSIS — Z20.822 CONTACT WITH AND (SUSPECTED) EXPOSURE TO COVID-19: ICD-10-CM

## 2021-01-01 DIAGNOSIS — E03.9 HYPOTHYROIDISM, UNSPECIFIED: ICD-10-CM

## 2021-01-01 DIAGNOSIS — I50.33 ACUTE ON CHRONIC DIASTOLIC (CONGESTIVE) HEART FAILURE: ICD-10-CM

## 2021-01-01 DIAGNOSIS — N17.9 ACUTE KIDNEY FAILURE, UNSPECIFIED: ICD-10-CM

## 2021-01-01 DIAGNOSIS — N18.9 CHRONIC KIDNEY DISEASE, UNSPECIFIED: ICD-10-CM

## 2021-01-01 DIAGNOSIS — J96.01 ACUTE RESPIRATORY FAILURE WITH HYPOXIA: ICD-10-CM

## 2021-01-01 DIAGNOSIS — I27.20 PULMONARY HYPERTENSION, UNSPECIFIED: ICD-10-CM

## 2021-01-01 DIAGNOSIS — I49.1 ATRIAL PREMATURE DEPOLARIZATION: ICD-10-CM

## 2021-01-01 DIAGNOSIS — R65.21 SEVERE SEPSIS WITH SEPTIC SHOCK: ICD-10-CM

## 2021-01-01 DIAGNOSIS — E87.1 HYPO-OSMOLALITY AND HYPONATREMIA: ICD-10-CM

## 2021-01-01 DIAGNOSIS — A41.9 SEPSIS, UNSPECIFIED ORGANISM: ICD-10-CM

## 2021-01-01 DIAGNOSIS — G93.41 METABOLIC ENCEPHALOPATHY: ICD-10-CM

## 2021-01-01 DIAGNOSIS — Z91.14 PATIENT'S OTHER NONCOMPLIANCE WITH MEDICATION REGIMEN: ICD-10-CM

## 2021-01-01 DIAGNOSIS — D50.9 IRON DEFICIENCY ANEMIA, UNSPECIFIED: ICD-10-CM

## 2021-01-01 DIAGNOSIS — I46.9 CARDIAC ARREST, CAUSE UNSPECIFIED: ICD-10-CM

## 2021-01-01 DIAGNOSIS — Z79.899 OTHER LONG TERM (CURRENT) DRUG THERAPY: ICD-10-CM

## 2021-01-01 DIAGNOSIS — I13.0 HYPERTENSIVE HEART AND CHRONIC KIDNEY DISEASE WITH HEART FAILURE AND STAGE 1 THROUGH STAGE 4 CHRONIC KIDNEY DISEASE, OR UNSPECIFIED CHRONIC KIDNEY DISEASE: ICD-10-CM

## 2021-01-01 DIAGNOSIS — F11.29 OPIOID DEPENDENCE WITH UNSPECIFIED OPIOID-INDUCED DISORDER: ICD-10-CM

## 2021-01-01 DIAGNOSIS — Z87.39 PERSONAL HISTORY OF OTHER DISEASES OF THE MUSCULOSKELETAL SYSTEM AND CONNECTIVE TISSUE: ICD-10-CM

## 2021-01-01 DIAGNOSIS — R06.02 SHORTNESS OF BREATH: ICD-10-CM

## 2021-01-01 DIAGNOSIS — G47.34 IDIOPATHIC SLEEP RELATED NONOBSTRUCTIVE ALVEOLAR HYPOVENTILATION: ICD-10-CM

## 2021-01-01 DIAGNOSIS — I50.30 UNSPECIFIED DIASTOLIC (CONGESTIVE) HEART FAILURE: ICD-10-CM

## 2021-01-01 LAB
% ALBUMIN: 48 % — SIGNIFICANT CHANGE UP
% ALPHA 1: 8.2 % — SIGNIFICANT CHANGE UP
% ALPHA 2: 12.6 % — SIGNIFICANT CHANGE UP
% BETA: 11.8 % — SIGNIFICANT CHANGE UP
% GAMMA: 19.4 % — SIGNIFICANT CHANGE UP
-  AMOXICILLIN/CLAVULANIC ACID: SIGNIFICANT CHANGE UP
-  AMPICILLIN/SULBACTAM: SIGNIFICANT CHANGE UP
-  AMPICILLIN: SIGNIFICANT CHANGE UP
-  CEFAZOLIN: SIGNIFICANT CHANGE UP
-  CEFTRIAXONE: SIGNIFICANT CHANGE UP
-  CIPROFLOXACIN: SIGNIFICANT CHANGE UP
-  CLINDAMYCIN: SIGNIFICANT CHANGE UP
-  DAPTOMYCIN: SIGNIFICANT CHANGE UP
-  ERYTHROMYCIN: SIGNIFICANT CHANGE UP
-  GENTAMICIN: SIGNIFICANT CHANGE UP
-  LEVOFLOXACIN: SIGNIFICANT CHANGE UP
-  LINEZOLID: SIGNIFICANT CHANGE UP
-  MEROPENEM: SIGNIFICANT CHANGE UP
-  MOXIFLOXACIN(AEROBIC): SIGNIFICANT CHANGE UP
-  OXACILLIN: SIGNIFICANT CHANGE UP
-  PENICILLIN: SIGNIFICANT CHANGE UP
-  RIFAMPIN: SIGNIFICANT CHANGE UP
-  TETRACYCLINE: SIGNIFICANT CHANGE UP
-  TRIMETHOPRIM/SULFAMETHOXAZOLE: SIGNIFICANT CHANGE UP
-  VANCOMYCIN: SIGNIFICANT CHANGE UP
A1C WITH ESTIMATED AVERAGE GLUCOSE RESULT: 8.1 % — HIGH (ref 4–5.6)
ALBUMIN SERPL ELPH-MCNC: 2.4 G/DL — LOW (ref 3.5–5.2)
ALBUMIN SERPL ELPH-MCNC: 2.4 G/DL — LOW (ref 3.5–5.2)
ALBUMIN SERPL ELPH-MCNC: 2.5 G/DL — LOW (ref 3.5–5.2)
ALBUMIN SERPL ELPH-MCNC: 2.5 G/DL — LOW (ref 3.5–5.2)
ALBUMIN SERPL ELPH-MCNC: 2.6 G/DL — LOW (ref 3.5–5.2)
ALBUMIN SERPL ELPH-MCNC: 2.7 G/DL — LOW (ref 3.5–5.2)
ALBUMIN SERPL ELPH-MCNC: 2.7 G/DL — LOW (ref 3.6–5.5)
ALBUMIN SERPL ELPH-MCNC: 2.8 G/DL — LOW (ref 3.5–5.2)
ALBUMIN SERPL ELPH-MCNC: 2.8 G/DL — LOW (ref 3.5–5.2)
ALBUMIN SERPL ELPH-MCNC: 2.9 G/DL — LOW (ref 3.5–5.2)
ALBUMIN SERPL ELPH-MCNC: 3 G/DL — LOW (ref 3.5–5.2)
ALBUMIN SERPL ELPH-MCNC: 3.1 G/DL — LOW (ref 3.5–5.2)
ALBUMIN SERPL ELPH-MCNC: 3.2 G/DL — LOW (ref 3.5–5.2)
ALBUMIN SERPL ELPH-MCNC: 3.3 G/DL — LOW (ref 3.5–5.2)
ALBUMIN SERPL ELPH-MCNC: 3.5 G/DL — SIGNIFICANT CHANGE UP (ref 3.5–5.2)
ALBUMIN SERPL ELPH-MCNC: 3.5 G/DL — SIGNIFICANT CHANGE UP (ref 3.5–5.2)
ALBUMIN SERPL ELPH-MCNC: 3.7 G/DL — SIGNIFICANT CHANGE UP (ref 3.5–5.2)
ALBUMIN SERPL ELPH-MCNC: 3.8 G/DL — SIGNIFICANT CHANGE UP (ref 3.5–5.2)
ALBUMIN/GLOB SERPL ELPH: 0.9 RATIO — SIGNIFICANT CHANGE UP
ALP SERPL-CCNC: 100 U/L — SIGNIFICANT CHANGE UP (ref 30–115)
ALP SERPL-CCNC: 107 U/L — SIGNIFICANT CHANGE UP (ref 30–115)
ALP SERPL-CCNC: 110 U/L — SIGNIFICANT CHANGE UP (ref 30–115)
ALP SERPL-CCNC: 114 U/L — SIGNIFICANT CHANGE UP (ref 30–115)
ALP SERPL-CCNC: 119 U/L — HIGH (ref 30–115)
ALP SERPL-CCNC: 122 U/L — HIGH (ref 30–115)
ALP SERPL-CCNC: 132 U/L — HIGH (ref 30–115)
ALP SERPL-CCNC: 167 U/L — HIGH (ref 30–115)
ALP SERPL-CCNC: 171 U/L — HIGH (ref 30–115)
ALP SERPL-CCNC: 173 U/L — HIGH (ref 30–115)
ALP SERPL-CCNC: 175 U/L — HIGH (ref 30–115)
ALP SERPL-CCNC: 176 U/L — HIGH (ref 30–115)
ALP SERPL-CCNC: 178 U/L — HIGH (ref 30–115)
ALP SERPL-CCNC: 182 U/L — HIGH (ref 30–115)
ALP SERPL-CCNC: 188 U/L — HIGH (ref 30–115)
ALP SERPL-CCNC: 191 U/L — HIGH (ref 30–115)
ALP SERPL-CCNC: 193 U/L — HIGH (ref 30–115)
ALP SERPL-CCNC: 197 U/L — HIGH (ref 30–115)
ALP SERPL-CCNC: 206 U/L — HIGH (ref 30–115)
ALP SERPL-CCNC: 209 U/L — HIGH (ref 30–115)
ALP SERPL-CCNC: 214 U/L — HIGH (ref 30–115)
ALP SERPL-CCNC: 218 U/L — HIGH (ref 30–115)
ALP SERPL-CCNC: 68 U/L — SIGNIFICANT CHANGE UP (ref 30–115)
ALP SERPL-CCNC: 70 U/L — SIGNIFICANT CHANGE UP (ref 30–115)
ALP SERPL-CCNC: 70 U/L — SIGNIFICANT CHANGE UP (ref 30–115)
ALP SERPL-CCNC: 71 U/L — SIGNIFICANT CHANGE UP (ref 30–115)
ALP SERPL-CCNC: 72 U/L — SIGNIFICANT CHANGE UP (ref 30–115)
ALP SERPL-CCNC: 73 U/L — SIGNIFICANT CHANGE UP (ref 30–115)
ALP SERPL-CCNC: 80 U/L — SIGNIFICANT CHANGE UP (ref 30–115)
ALP SERPL-CCNC: 81 U/L — SIGNIFICANT CHANGE UP (ref 30–115)
ALP SERPL-CCNC: 83 U/L — SIGNIFICANT CHANGE UP (ref 30–115)
ALP SERPL-CCNC: 94 U/L — SIGNIFICANT CHANGE UP (ref 30–115)
ALP SERPL-CCNC: 95 U/L — SIGNIFICANT CHANGE UP (ref 30–115)
ALP SERPL-CCNC: 97 U/L — SIGNIFICANT CHANGE UP (ref 30–115)
ALP SERPL-CCNC: 97 U/L — SIGNIFICANT CHANGE UP (ref 30–115)
ALP SERPL-CCNC: 99 U/L — SIGNIFICANT CHANGE UP (ref 30–115)
ALPHA1 GLOB SERPL ELPH-MCNC: 0.5 G/DL — HIGH (ref 0.1–0.4)
ALPHA2 GLOB SERPL ELPH-MCNC: 0.7 G/DL — SIGNIFICANT CHANGE UP (ref 0.5–1)
ALT FLD-CCNC: 10 U/L — SIGNIFICANT CHANGE UP (ref 0–41)
ALT FLD-CCNC: 109 U/L — HIGH (ref 0–41)
ALT FLD-CCNC: 12 U/L — SIGNIFICANT CHANGE UP (ref 0–41)
ALT FLD-CCNC: 126 U/L — HIGH (ref 0–41)
ALT FLD-CCNC: 13 U/L — SIGNIFICANT CHANGE UP (ref 0–41)
ALT FLD-CCNC: 177 U/L — HIGH (ref 0–41)
ALT FLD-CCNC: 201 U/L — HIGH (ref 0–41)
ALT FLD-CCNC: 277 U/L — HIGH (ref 0–41)
ALT FLD-CCNC: 279 U/L — HIGH (ref 0–41)
ALT FLD-CCNC: 28 U/L — SIGNIFICANT CHANGE UP (ref 0–41)
ALT FLD-CCNC: 29 U/L — SIGNIFICANT CHANGE UP (ref 0–41)
ALT FLD-CCNC: 293 U/L — HIGH (ref 0–41)
ALT FLD-CCNC: 31 U/L — SIGNIFICANT CHANGE UP (ref 0–41)
ALT FLD-CCNC: 31 U/L — SIGNIFICANT CHANGE UP (ref 0–41)
ALT FLD-CCNC: 34 U/L — SIGNIFICANT CHANGE UP (ref 0–41)
ALT FLD-CCNC: 36 U/L — SIGNIFICANT CHANGE UP (ref 0–41)
ALT FLD-CCNC: 36 U/L — SIGNIFICANT CHANGE UP (ref 0–41)
ALT FLD-CCNC: 38 U/L — SIGNIFICANT CHANGE UP (ref 0–41)
ALT FLD-CCNC: 38 U/L — SIGNIFICANT CHANGE UP (ref 0–41)
ALT FLD-CCNC: 39 U/L — SIGNIFICANT CHANGE UP (ref 0–41)
ALT FLD-CCNC: 41 U/L — SIGNIFICANT CHANGE UP (ref 0–41)
ALT FLD-CCNC: 43 U/L — HIGH (ref 0–41)
ALT FLD-CCNC: 444 U/L — HIGH (ref 0–41)
ALT FLD-CCNC: 49 U/L — HIGH (ref 0–41)
ALT FLD-CCNC: 63 U/L — HIGH (ref 0–41)
ALT FLD-CCNC: 63 U/L — HIGH (ref 0–41)
ALT FLD-CCNC: 7 U/L — SIGNIFICANT CHANGE UP (ref 0–41)
ALT FLD-CCNC: 72 U/L — HIGH (ref 0–41)
ALT FLD-CCNC: 85 U/L — HIGH (ref 0–41)
ALT FLD-CCNC: 85 U/L — HIGH (ref 0–41)
ALT FLD-CCNC: 87 U/L — HIGH (ref 0–41)
ALT FLD-CCNC: 89 U/L — HIGH (ref 0–41)
ALT FLD-CCNC: 9 U/L — SIGNIFICANT CHANGE UP (ref 0–41)
ALT FLD-CCNC: 90 U/L — HIGH (ref 0–41)
ANA PAT FLD IF-IMP: ABNORMAL
ANA PAT FLD IF-IMP: ABNORMAL
ANA TITR SER: ABNORMAL
ANA TITR SER: ABNORMAL
ANION GAP SERPL CALC-SCNC: 10 MMOL/L — SIGNIFICANT CHANGE UP (ref 7–14)
ANION GAP SERPL CALC-SCNC: 11 MMOL/L — SIGNIFICANT CHANGE UP (ref 7–14)
ANION GAP SERPL CALC-SCNC: 12 MMOL/L — SIGNIFICANT CHANGE UP (ref 7–14)
ANION GAP SERPL CALC-SCNC: 14 MMOL/L — SIGNIFICANT CHANGE UP (ref 7–14)
ANION GAP SERPL CALC-SCNC: 15 MMOL/L — HIGH (ref 7–14)
ANION GAP SERPL CALC-SCNC: 16 MMOL/L — HIGH (ref 7–14)
ANION GAP SERPL CALC-SCNC: 17 MMOL/L — HIGH (ref 7–14)
ANION GAP SERPL CALC-SCNC: 18 MMOL/L — HIGH (ref 7–14)
ANION GAP SERPL CALC-SCNC: 20 MMOL/L — HIGH (ref 7–14)
ANION GAP SERPL CALC-SCNC: 22 MMOL/L — HIGH (ref 7–14)
ANION GAP SERPL CALC-SCNC: 6 MMOL/L — LOW (ref 7–14)
ANION GAP SERPL CALC-SCNC: 7 MMOL/L — SIGNIFICANT CHANGE UP (ref 7–14)
ANION GAP SERPL CALC-SCNC: 7 MMOL/L — SIGNIFICANT CHANGE UP (ref 7–14)
ANION GAP SERPL CALC-SCNC: 8 MMOL/L — SIGNIFICANT CHANGE UP (ref 7–14)
ANION GAP SERPL CALC-SCNC: 8 MMOL/L — SIGNIFICANT CHANGE UP (ref 7–14)
ANION GAP SERPL CALC-SCNC: 9 MMOL/L — SIGNIFICANT CHANGE UP (ref 7–14)
ANISOCYTOSIS BLD QL: SLIGHT — SIGNIFICANT CHANGE UP
APPEARANCE UR: ABNORMAL
APPEARANCE UR: CLEAR — SIGNIFICANT CHANGE UP
APPEARANCE UR: CLEAR — SIGNIFICANT CHANGE UP
APTT BLD: 29.2 SEC — SIGNIFICANT CHANGE UP (ref 27–39.2)
AST SERPL-CCNC: 102 U/L — HIGH (ref 0–41)
AST SERPL-CCNC: 17 U/L — SIGNIFICANT CHANGE UP (ref 0–41)
AST SERPL-CCNC: 18 U/L — SIGNIFICANT CHANGE UP (ref 0–41)
AST SERPL-CCNC: 19 U/L — SIGNIFICANT CHANGE UP (ref 0–41)
AST SERPL-CCNC: 19 U/L — SIGNIFICANT CHANGE UP (ref 0–41)
AST SERPL-CCNC: 190 U/L — HIGH (ref 0–41)
AST SERPL-CCNC: 196 U/L — HIGH (ref 0–41)
AST SERPL-CCNC: 20 U/L — SIGNIFICANT CHANGE UP (ref 0–41)
AST SERPL-CCNC: 204 U/L — HIGH (ref 0–41)
AST SERPL-CCNC: 21 U/L — SIGNIFICANT CHANGE UP (ref 0–41)
AST SERPL-CCNC: 23 U/L — SIGNIFICANT CHANGE UP (ref 0–41)
AST SERPL-CCNC: 23 U/L — SIGNIFICANT CHANGE UP (ref 0–41)
AST SERPL-CCNC: 26 U/L — SIGNIFICANT CHANGE UP (ref 0–41)
AST SERPL-CCNC: 31 U/L — SIGNIFICANT CHANGE UP (ref 0–41)
AST SERPL-CCNC: 36 U/L — SIGNIFICANT CHANGE UP (ref 0–41)
AST SERPL-CCNC: 38 U/L — SIGNIFICANT CHANGE UP (ref 0–41)
AST SERPL-CCNC: 40 U/L — SIGNIFICANT CHANGE UP (ref 0–41)
AST SERPL-CCNC: 41 U/L — SIGNIFICANT CHANGE UP (ref 0–41)
AST SERPL-CCNC: 42 U/L — HIGH (ref 0–41)
AST SERPL-CCNC: 46 U/L — HIGH (ref 0–41)
AST SERPL-CCNC: 54 U/L — HIGH (ref 0–41)
AST SERPL-CCNC: 54 U/L — HIGH (ref 0–41)
AST SERPL-CCNC: 55 U/L — HIGH (ref 0–41)
AST SERPL-CCNC: 57 U/L — HIGH (ref 0–41)
AST SERPL-CCNC: 61 U/L — HIGH (ref 0–41)
AST SERPL-CCNC: 65 U/L — HIGH (ref 0–41)
AST SERPL-CCNC: 674 U/L — HIGH (ref 0–41)
AST SERPL-CCNC: 72 U/L — HIGH (ref 0–41)
AST SERPL-CCNC: 73 U/L — HIGH (ref 0–41)
AST SERPL-CCNC: 73 U/L — HIGH (ref 0–41)
AST SERPL-CCNC: 74 U/L — HIGH (ref 0–41)
AST SERPL-CCNC: 85 U/L — HIGH (ref 0–41)
AST SERPL-CCNC: 93 U/L — HIGH (ref 0–41)
AST SERPL-CCNC: 94 U/L — HIGH (ref 0–41)
B-GLOBULIN SERPL ELPH-MCNC: 0.7 G/DL — SIGNIFICANT CHANGE UP (ref 0.5–1)
B-OH-BUTYR SERPL-SCNC: 1.3 MMOL/L — HIGH
BACTERIA # UR AUTO: ABNORMAL
BACTERIA # UR AUTO: ABNORMAL
BASE EXCESS BLDA CALC-SCNC: -0.8 MMOL/L — SIGNIFICANT CHANGE UP (ref -2–3)
BASE EXCESS BLDA CALC-SCNC: -1.5 MMOL/L — SIGNIFICANT CHANGE UP (ref -2–3)
BASE EXCESS BLDA CALC-SCNC: 11.5 MMOL/L — HIGH (ref -2–3)
BASE EXCESS BLDA CALC-SCNC: 12.6 MMOL/L — HIGH (ref -2–3)
BASE EXCESS BLDA CALC-SCNC: 2.4 MMOL/L — SIGNIFICANT CHANGE UP (ref -2–3)
BASE EXCESS BLDA CALC-SCNC: 3.2 MMOL/L — HIGH (ref -2–3)
BASE EXCESS BLDA CALC-SCNC: 3.4 MMOL/L — HIGH (ref -2–3)
BASE EXCESS BLDA CALC-SCNC: 4.5 MMOL/L — HIGH (ref -2–3)
BASE EXCESS BLDA CALC-SCNC: 4.7 MMOL/L — HIGH (ref -2–3)
BASE EXCESS BLDA CALC-SCNC: 4.9 MMOL/L — HIGH (ref -2–3)
BASE EXCESS BLDA CALC-SCNC: 6.1 MMOL/L — HIGH (ref -2–3)
BASE EXCESS BLDA CALC-SCNC: 6.2 MMOL/L — HIGH (ref -2–3)
BASE EXCESS BLDV CALC-SCNC: -2.7 MMOL/L — LOW (ref -2–3)
BASE EXCESS BLDV CALC-SCNC: -5.3 MMOL/L — LOW (ref -2–3)
BASE EXCESS BLDV CALC-SCNC: 6.2 MMOL/L — HIGH (ref -2–3)
BASOPHILS # BLD AUTO: 0.01 K/UL — SIGNIFICANT CHANGE UP (ref 0–0.2)
BASOPHILS # BLD AUTO: 0.02 K/UL — SIGNIFICANT CHANGE UP (ref 0–0.2)
BASOPHILS # BLD AUTO: 0.03 K/UL — SIGNIFICANT CHANGE UP (ref 0–0.2)
BASOPHILS # BLD AUTO: 0.04 K/UL — SIGNIFICANT CHANGE UP (ref 0–0.2)
BASOPHILS # BLD AUTO: 0.05 K/UL — SIGNIFICANT CHANGE UP (ref 0–0.2)
BASOPHILS # BLD AUTO: 0.06 K/UL — SIGNIFICANT CHANGE UP (ref 0–0.2)
BASOPHILS # BLD AUTO: 0.06 K/UL — SIGNIFICANT CHANGE UP (ref 0–0.2)
BASOPHILS # BLD AUTO: 0.08 K/UL — SIGNIFICANT CHANGE UP (ref 0–0.2)
BASOPHILS NFR BLD AUTO: 0.1 % — SIGNIFICANT CHANGE UP (ref 0–1)
BASOPHILS NFR BLD AUTO: 0.2 % — SIGNIFICANT CHANGE UP (ref 0–1)
BASOPHILS NFR BLD AUTO: 0.3 % — SIGNIFICANT CHANGE UP (ref 0–1)
BASOPHILS NFR BLD AUTO: 0.4 % — SIGNIFICANT CHANGE UP (ref 0–1)
BASOPHILS NFR BLD AUTO: 0.5 % — SIGNIFICANT CHANGE UP (ref 0–1)
BASOPHILS NFR BLD AUTO: 0.6 % — SIGNIFICANT CHANGE UP (ref 0–1)
BASOPHILS NFR BLD AUTO: 0.6 % — SIGNIFICANT CHANGE UP (ref 0–1)
BASOPHILS NFR BLD AUTO: 0.7 % — SIGNIFICANT CHANGE UP (ref 0–1)
BASOPHILS NFR BLD AUTO: 0.7 % — SIGNIFICANT CHANGE UP (ref 0–1)
BASOPHILS NFR BLD AUTO: 0.8 % — SIGNIFICANT CHANGE UP (ref 0–1)
BILIRUB SERPL-MCNC: 0.2 MG/DL — SIGNIFICANT CHANGE UP (ref 0.2–1.2)
BILIRUB SERPL-MCNC: 0.2 MG/DL — SIGNIFICANT CHANGE UP (ref 0.2–1.2)
BILIRUB SERPL-MCNC: 0.3 MG/DL — SIGNIFICANT CHANGE UP (ref 0.2–1.2)
BILIRUB SERPL-MCNC: 0.4 MG/DL — SIGNIFICANT CHANGE UP (ref 0.2–1.2)
BILIRUB SERPL-MCNC: 0.5 MG/DL — SIGNIFICANT CHANGE UP (ref 0.2–1.2)
BILIRUB SERPL-MCNC: 0.6 MG/DL — SIGNIFICANT CHANGE UP (ref 0.2–1.2)
BILIRUB SERPL-MCNC: 0.7 MG/DL — SIGNIFICANT CHANGE UP (ref 0.2–1.2)
BILIRUB UR-MCNC: ABNORMAL
BILIRUB UR-MCNC: NEGATIVE — SIGNIFICANT CHANGE UP
BILIRUB UR-MCNC: NEGATIVE — SIGNIFICANT CHANGE UP
BLD GP AB SCN SERPL QL: SIGNIFICANT CHANGE UP
BUN SERPL-MCNC: 15 MG/DL — SIGNIFICANT CHANGE UP (ref 10–20)
BUN SERPL-MCNC: 16 MG/DL — SIGNIFICANT CHANGE UP (ref 10–20)
BUN SERPL-MCNC: 18 MG/DL — SIGNIFICANT CHANGE UP (ref 10–20)
BUN SERPL-MCNC: 20 MG/DL — SIGNIFICANT CHANGE UP (ref 10–20)
BUN SERPL-MCNC: 21 MG/DL — HIGH (ref 10–20)
BUN SERPL-MCNC: 25 MG/DL — HIGH (ref 10–20)
BUN SERPL-MCNC: 27 MG/DL — HIGH (ref 10–20)
BUN SERPL-MCNC: 28 MG/DL — HIGH (ref 10–20)
BUN SERPL-MCNC: 28 MG/DL — HIGH (ref 10–20)
BUN SERPL-MCNC: 31 MG/DL — HIGH (ref 10–20)
BUN SERPL-MCNC: 33 MG/DL — HIGH (ref 10–20)
BUN SERPL-MCNC: 37 MG/DL — HIGH (ref 10–20)
BUN SERPL-MCNC: 38 MG/DL — HIGH (ref 10–20)
BUN SERPL-MCNC: 39 MG/DL — HIGH (ref 10–20)
BUN SERPL-MCNC: 42 MG/DL — HIGH (ref 10–20)
BUN SERPL-MCNC: 44 MG/DL — HIGH (ref 10–20)
BUN SERPL-MCNC: 44 MG/DL — HIGH (ref 10–20)
BUN SERPL-MCNC: 46 MG/DL — HIGH (ref 10–20)
BUN SERPL-MCNC: 49 MG/DL — HIGH (ref 10–20)
BUN SERPL-MCNC: 55 MG/DL — HIGH (ref 10–20)
BUN SERPL-MCNC: 59 MG/DL — HIGH (ref 10–20)
BUN SERPL-MCNC: 60 MG/DL — HIGH (ref 10–20)
BUN SERPL-MCNC: 60 MG/DL — HIGH (ref 10–20)
BUN SERPL-MCNC: 62 MG/DL — CRITICAL HIGH (ref 10–20)
BUN SERPL-MCNC: 63 MG/DL — CRITICAL HIGH (ref 10–20)
BUN SERPL-MCNC: 67 MG/DL — CRITICAL HIGH (ref 10–20)
BUN SERPL-MCNC: 68 MG/DL — CRITICAL HIGH (ref 10–20)
BUN SERPL-MCNC: 68 MG/DL — CRITICAL HIGH (ref 10–20)
BUN SERPL-MCNC: 71 MG/DL — CRITICAL HIGH (ref 10–20)
BUN SERPL-MCNC: 72 MG/DL — CRITICAL HIGH (ref 10–20)
BUN SERPL-MCNC: 72 MG/DL — CRITICAL HIGH (ref 10–20)
BUN SERPL-MCNC: 73 MG/DL — CRITICAL HIGH (ref 10–20)
BUN SERPL-MCNC: 75 MG/DL — CRITICAL HIGH (ref 10–20)
BUN SERPL-MCNC: 77 MG/DL — CRITICAL HIGH (ref 10–20)
BUN SERPL-MCNC: 79 MG/DL — CRITICAL HIGH (ref 10–20)
BUN SERPL-MCNC: 82 MG/DL — CRITICAL HIGH (ref 10–20)
BUN SERPL-MCNC: 83 MG/DL — CRITICAL HIGH (ref 10–20)
C DIFF BY PCR RESULT: NEGATIVE — SIGNIFICANT CHANGE UP
C DIFF TOX GENS STL QL NAA+PROBE: SIGNIFICANT CHANGE UP
C3 SERPL-MCNC: 92 MG/DL — SIGNIFICANT CHANGE UP (ref 81–157)
CA-I SERPL-SCNC: 1.16 MMOL/L — SIGNIFICANT CHANGE UP (ref 1.15–1.33)
CA-I SERPL-SCNC: 1.18 MMOL/L — SIGNIFICANT CHANGE UP (ref 1.15–1.33)
CA-I SERPL-SCNC: 1.24 MMOL/L — SIGNIFICANT CHANGE UP (ref 1.15–1.33)
CALCIUM SERPL-MCNC: 7.6 MG/DL — LOW (ref 8.5–10.1)
CALCIUM SERPL-MCNC: 7.7 MG/DL — LOW (ref 8.5–10.1)
CALCIUM SERPL-MCNC: 7.9 MG/DL — LOW (ref 8.5–10.1)
CALCIUM SERPL-MCNC: 7.9 MG/DL — LOW (ref 8.5–10.1)
CALCIUM SERPL-MCNC: 8 MG/DL — LOW (ref 8.5–10.1)
CALCIUM SERPL-MCNC: 8 MG/DL — LOW (ref 8.5–10.1)
CALCIUM SERPL-MCNC: 8.1 MG/DL — LOW (ref 8.5–10.1)
CALCIUM SERPL-MCNC: 8.2 MG/DL — LOW (ref 8.5–10.1)
CALCIUM SERPL-MCNC: 8.3 MG/DL — LOW (ref 8.5–10.1)
CALCIUM SERPL-MCNC: 8.3 MG/DL — LOW (ref 8.5–10.1)
CALCIUM SERPL-MCNC: 8.4 MG/DL — LOW (ref 8.5–10.1)
CALCIUM SERPL-MCNC: 8.5 MG/DL — SIGNIFICANT CHANGE UP (ref 8.5–10.1)
CALCIUM SERPL-MCNC: 8.6 MG/DL — SIGNIFICANT CHANGE UP (ref 8.5–10.1)
CALCIUM SERPL-MCNC: 8.7 MG/DL — SIGNIFICANT CHANGE UP (ref 8.5–10.1)
CALCIUM SERPL-MCNC: 8.8 MG/DL — SIGNIFICANT CHANGE UP (ref 8.5–10.1)
CALCIUM SERPL-MCNC: 8.9 MG/DL — SIGNIFICANT CHANGE UP (ref 8.5–10.1)
CALCIUM SERPL-MCNC: 9 MG/DL — SIGNIFICANT CHANGE UP (ref 8.5–10.1)
CALCIUM SERPL-MCNC: 9.3 MG/DL — SIGNIFICANT CHANGE UP (ref 8.5–10.1)
CALCIUM SERPL-MCNC: 9.5 MG/DL — SIGNIFICANT CHANGE UP (ref 8.5–10.1)
CHLORIDE SERPL-SCNC: 100 MMOL/L — SIGNIFICANT CHANGE UP (ref 98–110)
CHLORIDE SERPL-SCNC: 101 MMOL/L — SIGNIFICANT CHANGE UP (ref 98–110)
CHLORIDE SERPL-SCNC: 102 MMOL/L — SIGNIFICANT CHANGE UP (ref 98–110)
CHLORIDE SERPL-SCNC: 103 MMOL/L — SIGNIFICANT CHANGE UP (ref 98–110)
CHLORIDE SERPL-SCNC: 103 MMOL/L — SIGNIFICANT CHANGE UP (ref 98–110)
CHLORIDE SERPL-SCNC: 105 MMOL/L — SIGNIFICANT CHANGE UP (ref 98–110)
CHLORIDE SERPL-SCNC: 106 MMOL/L — SIGNIFICANT CHANGE UP (ref 98–110)
CHLORIDE SERPL-SCNC: 91 MMOL/L — LOW (ref 98–110)
CHLORIDE SERPL-SCNC: 92 MMOL/L — LOW (ref 98–110)
CHLORIDE SERPL-SCNC: 93 MMOL/L — LOW (ref 98–110)
CHLORIDE SERPL-SCNC: 94 MMOL/L — LOW (ref 98–110)
CHLORIDE SERPL-SCNC: 95 MMOL/L — LOW (ref 98–110)
CHLORIDE SERPL-SCNC: 95 MMOL/L — LOW (ref 98–110)
CHLORIDE SERPL-SCNC: 96 MMOL/L — LOW (ref 98–110)
CHLORIDE SERPL-SCNC: 97 MMOL/L — LOW (ref 98–110)
CHLORIDE SERPL-SCNC: 98 MMOL/L — SIGNIFICANT CHANGE UP (ref 98–110)
CHLORIDE SERPL-SCNC: 99 MMOL/L — SIGNIFICANT CHANGE UP (ref 98–110)
CHLORIDE UR-SCNC: 69 — SIGNIFICANT CHANGE UP
CK MB CFR SERPL CALC: 17.5 NG/ML — HIGH (ref 0.6–6.3)
CK MB CFR SERPL CALC: 2 NG/ML — SIGNIFICANT CHANGE UP (ref 0.6–6.3)
CK MB CFR SERPL CALC: 7.9 NG/ML — HIGH (ref 0.6–6.3)
CK SERPL-CCNC: 178 U/L — SIGNIFICANT CHANGE UP (ref 0–225)
CO2 SERPL-SCNC: 18 MMOL/L — SIGNIFICANT CHANGE UP (ref 17–32)
CO2 SERPL-SCNC: 21 MMOL/L — SIGNIFICANT CHANGE UP (ref 17–32)
CO2 SERPL-SCNC: 22 MMOL/L — SIGNIFICANT CHANGE UP (ref 17–32)
CO2 SERPL-SCNC: 22 MMOL/L — SIGNIFICANT CHANGE UP (ref 17–32)
CO2 SERPL-SCNC: 23 MMOL/L — SIGNIFICANT CHANGE UP (ref 17–32)
CO2 SERPL-SCNC: 24 MMOL/L — SIGNIFICANT CHANGE UP (ref 17–32)
CO2 SERPL-SCNC: 25 MMOL/L — SIGNIFICANT CHANGE UP (ref 17–32)
CO2 SERPL-SCNC: 26 MMOL/L — SIGNIFICANT CHANGE UP (ref 17–32)
CO2 SERPL-SCNC: 27 MMOL/L — SIGNIFICANT CHANGE UP (ref 17–32)
CO2 SERPL-SCNC: 28 MMOL/L — SIGNIFICANT CHANGE UP (ref 17–32)
CO2 SERPL-SCNC: 29 MMOL/L — SIGNIFICANT CHANGE UP (ref 17–32)
CO2 SERPL-SCNC: 31 MMOL/L — SIGNIFICANT CHANGE UP (ref 17–32)
CO2 SERPL-SCNC: 32 MMOL/L — SIGNIFICANT CHANGE UP (ref 17–32)
CO2 SERPL-SCNC: 33 MMOL/L — HIGH (ref 17–32)
CO2 SERPL-SCNC: 33 MMOL/L — HIGH (ref 17–32)
CO2 SERPL-SCNC: 35 MMOL/L — HIGH (ref 17–32)
CO2 SERPL-SCNC: 35 MMOL/L — HIGH (ref 17–32)
COD CRY URNS QL: NEGATIVE — SIGNIFICANT CHANGE UP
COLOR SPEC: YELLOW — SIGNIFICANT CHANGE UP
COMMENT - URINE: SIGNIFICANT CHANGE UP
CORTICOSTEROID BINDING GLOBULIN RESULT: 2.1 MG/DL — SIGNIFICANT CHANGE UP
CORTIS F/TOTAL MFR SERPL: 26 % — SIGNIFICANT CHANGE UP
CORTIS SERPL-MCNC: 18 UG/DL — SIGNIFICANT CHANGE UP
CORTISOL, FREE RESULT: 4.6 UG/DL — HIGH
COVID-19 SPIKE DOMAIN AB INTERP: POSITIVE
COVID-19 SPIKE DOMAIN ANTIBODY RESULT: >250 U/ML — HIGH
CREAT ?TM UR-MCNC: 56 MG/DL — SIGNIFICANT CHANGE UP
CREAT SERPL-MCNC: 0.7 MG/DL — SIGNIFICANT CHANGE UP (ref 0.7–1.5)
CREAT SERPL-MCNC: 0.7 MG/DL — SIGNIFICANT CHANGE UP (ref 0.7–1.5)
CREAT SERPL-MCNC: 0.8 MG/DL — SIGNIFICANT CHANGE UP (ref 0.7–1.5)
CREAT SERPL-MCNC: 0.9 MG/DL — SIGNIFICANT CHANGE UP (ref 0.7–1.5)
CREAT SERPL-MCNC: 1 MG/DL — SIGNIFICANT CHANGE UP (ref 0.7–1.5)
CREAT SERPL-MCNC: 1 MG/DL — SIGNIFICANT CHANGE UP (ref 0.7–1.5)
CREAT SERPL-MCNC: 1.1 MG/DL — SIGNIFICANT CHANGE UP (ref 0.7–1.5)
CREAT SERPL-MCNC: 1.2 MG/DL — SIGNIFICANT CHANGE UP (ref 0.7–1.5)
CREAT SERPL-MCNC: 1.3 MG/DL — SIGNIFICANT CHANGE UP (ref 0.7–1.5)
CREAT SERPL-MCNC: 1.4 MG/DL — SIGNIFICANT CHANGE UP (ref 0.7–1.5)
CREAT SERPL-MCNC: 1.5 MG/DL — SIGNIFICANT CHANGE UP (ref 0.7–1.5)
CREAT SERPL-MCNC: 1.5 MG/DL — SIGNIFICANT CHANGE UP (ref 0.7–1.5)
CREAT SERPL-MCNC: 1.6 MG/DL — HIGH (ref 0.7–1.5)
CREAT SERPL-MCNC: 1.7 MG/DL — HIGH (ref 0.7–1.5)
CREAT SERPL-MCNC: 1.7 MG/DL — HIGH (ref 0.7–1.5)
CREAT SERPL-MCNC: 1.8 MG/DL — HIGH (ref 0.7–1.5)
CREAT SERPL-MCNC: 2.4 MG/DL — HIGH (ref 0.7–1.5)
CREAT SERPL-MCNC: 2.9 MG/DL — HIGH (ref 0.7–1.5)
CREAT SERPL-MCNC: 2.9 MG/DL — HIGH (ref 0.7–1.5)
CRP SERPL-MCNC: 94 MG/L — HIGH
CRP SERPL-MCNC: 98 MG/L — HIGH
CULTURE RESULTS: NO GROWTH — SIGNIFICANT CHANGE UP
CULTURE RESULTS: SIGNIFICANT CHANGE UP
D DIMER BLD IA.RAPID-MCNC: 353 NG/ML DDU — HIGH (ref 0–230)
DIFF PNL FLD: ABNORMAL
DIFF PNL FLD: ABNORMAL
DIFF PNL FLD: NEGATIVE — SIGNIFICANT CHANGE UP
DSDNA AB SER-ACNC: <12 IU/ML — SIGNIFICANT CHANGE UP
EOSINOPHIL # BLD AUTO: 0 K/UL — SIGNIFICANT CHANGE UP (ref 0–0.7)
EOSINOPHIL # BLD AUTO: 0.01 K/UL — SIGNIFICANT CHANGE UP (ref 0–0.7)
EOSINOPHIL # BLD AUTO: 0.02 K/UL — SIGNIFICANT CHANGE UP (ref 0–0.7)
EOSINOPHIL # BLD AUTO: 0.03 K/UL — SIGNIFICANT CHANGE UP (ref 0–0.7)
EOSINOPHIL # BLD AUTO: 0.07 K/UL — SIGNIFICANT CHANGE UP (ref 0–0.7)
EOSINOPHIL # BLD AUTO: 0.12 K/UL — SIGNIFICANT CHANGE UP (ref 0–0.7)
EOSINOPHIL # BLD AUTO: 0.14 K/UL — SIGNIFICANT CHANGE UP (ref 0–0.7)
EOSINOPHIL # BLD AUTO: 0.19 K/UL — SIGNIFICANT CHANGE UP (ref 0–0.7)
EOSINOPHIL # BLD AUTO: 0.23 K/UL — SIGNIFICANT CHANGE UP (ref 0–0.7)
EOSINOPHIL # BLD AUTO: 0.24 K/UL — SIGNIFICANT CHANGE UP (ref 0–0.7)
EOSINOPHIL # BLD AUTO: 0.24 K/UL — SIGNIFICANT CHANGE UP (ref 0–0.7)
EOSINOPHIL # BLD AUTO: 0.25 K/UL — SIGNIFICANT CHANGE UP (ref 0–0.7)
EOSINOPHIL # BLD AUTO: 0.26 K/UL — SIGNIFICANT CHANGE UP (ref 0–0.7)
EOSINOPHIL # BLD AUTO: 0.28 K/UL — SIGNIFICANT CHANGE UP (ref 0–0.7)
EOSINOPHIL # BLD AUTO: 0.29 K/UL — SIGNIFICANT CHANGE UP (ref 0–0.7)
EOSINOPHIL # BLD AUTO: 0.3 K/UL — SIGNIFICANT CHANGE UP (ref 0–0.7)
EOSINOPHIL # BLD AUTO: 0.3 K/UL — SIGNIFICANT CHANGE UP (ref 0–0.7)
EOSINOPHIL # BLD AUTO: 0.32 K/UL — SIGNIFICANT CHANGE UP (ref 0–0.7)
EOSINOPHIL NFR BLD AUTO: 0 % — SIGNIFICANT CHANGE UP (ref 0–8)
EOSINOPHIL NFR BLD AUTO: 0.3 % — SIGNIFICANT CHANGE UP (ref 0–8)
EOSINOPHIL NFR BLD AUTO: 0.4 % — SIGNIFICANT CHANGE UP (ref 0–8)
EOSINOPHIL NFR BLD AUTO: 0.4 % — SIGNIFICANT CHANGE UP (ref 0–8)
EOSINOPHIL NFR BLD AUTO: 0.7 % — SIGNIFICANT CHANGE UP (ref 0–8)
EOSINOPHIL NFR BLD AUTO: 0.9 % — SIGNIFICANT CHANGE UP (ref 0–8)
EOSINOPHIL NFR BLD AUTO: 2.7 % — SIGNIFICANT CHANGE UP (ref 0–8)
EOSINOPHIL NFR BLD AUTO: 2.7 % — SIGNIFICANT CHANGE UP (ref 0–8)
EOSINOPHIL NFR BLD AUTO: 2.8 % — SIGNIFICANT CHANGE UP (ref 0–8)
EOSINOPHIL NFR BLD AUTO: 3.3 % — SIGNIFICANT CHANGE UP (ref 0–8)
EOSINOPHIL NFR BLD AUTO: 3.9 % — SIGNIFICANT CHANGE UP (ref 0–8)
EOSINOPHIL NFR BLD AUTO: 4 % — SIGNIFICANT CHANGE UP (ref 0–8)
EOSINOPHIL NFR BLD AUTO: 4.2 % — SIGNIFICANT CHANGE UP (ref 0–8)
EOSINOPHIL NFR BLD AUTO: 4.4 % — SIGNIFICANT CHANGE UP (ref 0–8)
EOSINOPHIL NFR BLD AUTO: 4.4 % — SIGNIFICANT CHANGE UP (ref 0–8)
EOSINOPHIL NFR BLD AUTO: 4.6 % — SIGNIFICANT CHANGE UP (ref 0–8)
EOSINOPHIL NFR BLD AUTO: 5.2 % — SIGNIFICANT CHANGE UP (ref 0–8)
EPI CELLS # UR: ABNORMAL /HPF
ERYTHROCYTE [SEDIMENTATION RATE] IN BLOOD: 87 MM/HR — HIGH (ref 0–20)
ESTIMATED AVERAGE GLUCOSE: 186 MG/DL — HIGH (ref 68–114)
FERRITIN SERPL-MCNC: 41 NG/ML — SIGNIFICANT CHANGE UP (ref 15–150)
FUNGITELL: <31 PG/ML — SIGNIFICANT CHANGE UP
FUNGITELL: <31 PG/ML — SIGNIFICANT CHANGE UP
GAMMA GLOBULIN: 1.1 G/DL — SIGNIFICANT CHANGE UP (ref 0.6–1.6)
GAS PNL BLDA: SIGNIFICANT CHANGE UP
GAS PNL BLDV: 136 MMOL/L — SIGNIFICANT CHANGE UP (ref 136–145)
GAS PNL BLDV: 138 MMOL/L — SIGNIFICANT CHANGE UP (ref 136–145)
GAS PNL BLDV: 139 MMOL/L — SIGNIFICANT CHANGE UP (ref 136–145)
GAS PNL BLDV: SIGNIFICANT CHANGE UP
GGT SERPL-CCNC: 145 U/L — HIGH (ref 1–40)
GLUCOSE BLDC GLUCOMTR-MCNC: 101 MG/DL — HIGH (ref 70–99)
GLUCOSE BLDC GLUCOMTR-MCNC: 102 MG/DL — HIGH (ref 70–99)
GLUCOSE BLDC GLUCOMTR-MCNC: 103 MG/DL — HIGH (ref 70–99)
GLUCOSE BLDC GLUCOMTR-MCNC: 104 MG/DL — HIGH (ref 70–99)
GLUCOSE BLDC GLUCOMTR-MCNC: 105 MG/DL — HIGH (ref 70–99)
GLUCOSE BLDC GLUCOMTR-MCNC: 109 MG/DL — HIGH (ref 70–99)
GLUCOSE BLDC GLUCOMTR-MCNC: 109 MG/DL — HIGH (ref 70–99)
GLUCOSE BLDC GLUCOMTR-MCNC: 111 MG/DL — HIGH (ref 70–99)
GLUCOSE BLDC GLUCOMTR-MCNC: 111 MG/DL — HIGH (ref 70–99)
GLUCOSE BLDC GLUCOMTR-MCNC: 112 MG/DL — HIGH (ref 70–99)
GLUCOSE BLDC GLUCOMTR-MCNC: 113 MG/DL — HIGH (ref 70–99)
GLUCOSE BLDC GLUCOMTR-MCNC: 117 MG/DL — HIGH (ref 70–99)
GLUCOSE BLDC GLUCOMTR-MCNC: 118 MG/DL — HIGH (ref 70–99)
GLUCOSE BLDC GLUCOMTR-MCNC: 119 MG/DL — HIGH (ref 70–99)
GLUCOSE BLDC GLUCOMTR-MCNC: 121 MG/DL — HIGH (ref 70–99)
GLUCOSE BLDC GLUCOMTR-MCNC: 121 MG/DL — HIGH (ref 70–99)
GLUCOSE BLDC GLUCOMTR-MCNC: 122 MG/DL — HIGH (ref 70–99)
GLUCOSE BLDC GLUCOMTR-MCNC: 122 MG/DL — HIGH (ref 70–99)
GLUCOSE BLDC GLUCOMTR-MCNC: 124 MG/DL — HIGH (ref 70–99)
GLUCOSE BLDC GLUCOMTR-MCNC: 124 MG/DL — HIGH (ref 70–99)
GLUCOSE BLDC GLUCOMTR-MCNC: 125 MG/DL — HIGH (ref 70–99)
GLUCOSE BLDC GLUCOMTR-MCNC: 126 MG/DL — HIGH (ref 70–99)
GLUCOSE BLDC GLUCOMTR-MCNC: 126 MG/DL — HIGH (ref 70–99)
GLUCOSE BLDC GLUCOMTR-MCNC: 127 MG/DL — HIGH (ref 70–99)
GLUCOSE BLDC GLUCOMTR-MCNC: 128 MG/DL — HIGH (ref 70–99)
GLUCOSE BLDC GLUCOMTR-MCNC: 129 MG/DL — HIGH (ref 70–99)
GLUCOSE BLDC GLUCOMTR-MCNC: 130 MG/DL — HIGH (ref 70–99)
GLUCOSE BLDC GLUCOMTR-MCNC: 131 MG/DL — HIGH (ref 70–99)
GLUCOSE BLDC GLUCOMTR-MCNC: 132 MG/DL — HIGH (ref 70–99)
GLUCOSE BLDC GLUCOMTR-MCNC: 133 MG/DL — HIGH (ref 70–99)
GLUCOSE BLDC GLUCOMTR-MCNC: 134 MG/DL — HIGH (ref 70–99)
GLUCOSE BLDC GLUCOMTR-MCNC: 137 MG/DL — HIGH (ref 70–99)
GLUCOSE BLDC GLUCOMTR-MCNC: 138 MG/DL — HIGH (ref 70–99)
GLUCOSE BLDC GLUCOMTR-MCNC: 138 MG/DL — HIGH (ref 70–99)
GLUCOSE BLDC GLUCOMTR-MCNC: 139 MG/DL — HIGH (ref 70–99)
GLUCOSE BLDC GLUCOMTR-MCNC: 139 MG/DL — HIGH (ref 70–99)
GLUCOSE BLDC GLUCOMTR-MCNC: 140 MG/DL — HIGH (ref 70–99)
GLUCOSE BLDC GLUCOMTR-MCNC: 140 MG/DL — HIGH (ref 70–99)
GLUCOSE BLDC GLUCOMTR-MCNC: 141 MG/DL — HIGH (ref 70–99)
GLUCOSE BLDC GLUCOMTR-MCNC: 143 MG/DL — HIGH (ref 70–99)
GLUCOSE BLDC GLUCOMTR-MCNC: 145 MG/DL — HIGH (ref 70–99)
GLUCOSE BLDC GLUCOMTR-MCNC: 145 MG/DL — HIGH (ref 70–99)
GLUCOSE BLDC GLUCOMTR-MCNC: 146 MG/DL — HIGH (ref 70–99)
GLUCOSE BLDC GLUCOMTR-MCNC: 147 MG/DL — HIGH (ref 70–99)
GLUCOSE BLDC GLUCOMTR-MCNC: 148 MG/DL — HIGH (ref 70–99)
GLUCOSE BLDC GLUCOMTR-MCNC: 148 MG/DL — HIGH (ref 70–99)
GLUCOSE BLDC GLUCOMTR-MCNC: 149 MG/DL — HIGH (ref 70–99)
GLUCOSE BLDC GLUCOMTR-MCNC: 150 MG/DL — HIGH (ref 70–99)
GLUCOSE BLDC GLUCOMTR-MCNC: 152 MG/DL — HIGH (ref 70–99)
GLUCOSE BLDC GLUCOMTR-MCNC: 153 MG/DL — HIGH (ref 70–99)
GLUCOSE BLDC GLUCOMTR-MCNC: 154 MG/DL — HIGH (ref 70–99)
GLUCOSE BLDC GLUCOMTR-MCNC: 154 MG/DL — HIGH (ref 70–99)
GLUCOSE BLDC GLUCOMTR-MCNC: 155 MG/DL — HIGH (ref 70–99)
GLUCOSE BLDC GLUCOMTR-MCNC: 156 MG/DL — HIGH (ref 70–99)
GLUCOSE BLDC GLUCOMTR-MCNC: 158 MG/DL — HIGH (ref 70–99)
GLUCOSE BLDC GLUCOMTR-MCNC: 159 MG/DL — HIGH (ref 70–99)
GLUCOSE BLDC GLUCOMTR-MCNC: 160 MG/DL — HIGH (ref 70–99)
GLUCOSE BLDC GLUCOMTR-MCNC: 161 MG/DL — HIGH (ref 70–99)
GLUCOSE BLDC GLUCOMTR-MCNC: 161 MG/DL — HIGH (ref 70–99)
GLUCOSE BLDC GLUCOMTR-MCNC: 162 MG/DL — HIGH (ref 70–99)
GLUCOSE BLDC GLUCOMTR-MCNC: 162 MG/DL — HIGH (ref 70–99)
GLUCOSE BLDC GLUCOMTR-MCNC: 163 MG/DL — HIGH (ref 70–99)
GLUCOSE BLDC GLUCOMTR-MCNC: 164 MG/DL — HIGH (ref 70–99)
GLUCOSE BLDC GLUCOMTR-MCNC: 164 MG/DL — HIGH (ref 70–99)
GLUCOSE BLDC GLUCOMTR-MCNC: 165 MG/DL — HIGH (ref 70–99)
GLUCOSE BLDC GLUCOMTR-MCNC: 165 MG/DL — HIGH (ref 70–99)
GLUCOSE BLDC GLUCOMTR-MCNC: 166 MG/DL — HIGH (ref 70–99)
GLUCOSE BLDC GLUCOMTR-MCNC: 168 MG/DL — HIGH (ref 70–99)
GLUCOSE BLDC GLUCOMTR-MCNC: 169 MG/DL — HIGH (ref 70–99)
GLUCOSE BLDC GLUCOMTR-MCNC: 170 MG/DL — HIGH (ref 70–99)
GLUCOSE BLDC GLUCOMTR-MCNC: 170 MG/DL — HIGH (ref 70–99)
GLUCOSE BLDC GLUCOMTR-MCNC: 171 MG/DL — HIGH (ref 70–99)
GLUCOSE BLDC GLUCOMTR-MCNC: 172 MG/DL — HIGH (ref 70–99)
GLUCOSE BLDC GLUCOMTR-MCNC: 172 MG/DL — HIGH (ref 70–99)
GLUCOSE BLDC GLUCOMTR-MCNC: 173 MG/DL — HIGH (ref 70–99)
GLUCOSE BLDC GLUCOMTR-MCNC: 174 MG/DL — HIGH (ref 70–99)
GLUCOSE BLDC GLUCOMTR-MCNC: 175 MG/DL — HIGH (ref 70–99)
GLUCOSE BLDC GLUCOMTR-MCNC: 176 MG/DL — HIGH (ref 70–99)
GLUCOSE BLDC GLUCOMTR-MCNC: 177 MG/DL — HIGH (ref 70–99)
GLUCOSE BLDC GLUCOMTR-MCNC: 178 MG/DL — HIGH (ref 70–99)
GLUCOSE BLDC GLUCOMTR-MCNC: 179 MG/DL — HIGH (ref 70–99)
GLUCOSE BLDC GLUCOMTR-MCNC: 180 MG/DL — HIGH (ref 70–99)
GLUCOSE BLDC GLUCOMTR-MCNC: 180 MG/DL — HIGH (ref 70–99)
GLUCOSE BLDC GLUCOMTR-MCNC: 181 MG/DL — HIGH (ref 70–99)
GLUCOSE BLDC GLUCOMTR-MCNC: 182 MG/DL — HIGH (ref 70–99)
GLUCOSE BLDC GLUCOMTR-MCNC: 182 MG/DL — HIGH (ref 70–99)
GLUCOSE BLDC GLUCOMTR-MCNC: 183 MG/DL — HIGH (ref 70–99)
GLUCOSE BLDC GLUCOMTR-MCNC: 185 MG/DL — HIGH (ref 70–99)
GLUCOSE BLDC GLUCOMTR-MCNC: 186 MG/DL — HIGH (ref 70–99)
GLUCOSE BLDC GLUCOMTR-MCNC: 188 MG/DL — HIGH (ref 70–99)
GLUCOSE BLDC GLUCOMTR-MCNC: 189 MG/DL — HIGH (ref 70–99)
GLUCOSE BLDC GLUCOMTR-MCNC: 190 MG/DL — HIGH (ref 70–99)
GLUCOSE BLDC GLUCOMTR-MCNC: 192 MG/DL — HIGH (ref 70–99)
GLUCOSE BLDC GLUCOMTR-MCNC: 192 MG/DL — HIGH (ref 70–99)
GLUCOSE BLDC GLUCOMTR-MCNC: 193 MG/DL — HIGH (ref 70–99)
GLUCOSE BLDC GLUCOMTR-MCNC: 195 MG/DL — HIGH (ref 70–99)
GLUCOSE BLDC GLUCOMTR-MCNC: 196 MG/DL — HIGH (ref 70–99)
GLUCOSE BLDC GLUCOMTR-MCNC: 197 MG/DL — HIGH (ref 70–99)
GLUCOSE BLDC GLUCOMTR-MCNC: 199 MG/DL — HIGH (ref 70–99)
GLUCOSE BLDC GLUCOMTR-MCNC: 200 MG/DL — HIGH (ref 70–99)
GLUCOSE BLDC GLUCOMTR-MCNC: 200 MG/DL — HIGH (ref 70–99)
GLUCOSE BLDC GLUCOMTR-MCNC: 203 MG/DL — HIGH (ref 70–99)
GLUCOSE BLDC GLUCOMTR-MCNC: 203 MG/DL — HIGH (ref 70–99)
GLUCOSE BLDC GLUCOMTR-MCNC: 204 MG/DL — HIGH (ref 70–99)
GLUCOSE BLDC GLUCOMTR-MCNC: 205 MG/DL — HIGH (ref 70–99)
GLUCOSE BLDC GLUCOMTR-MCNC: 207 MG/DL — HIGH (ref 70–99)
GLUCOSE BLDC GLUCOMTR-MCNC: 208 MG/DL — HIGH (ref 70–99)
GLUCOSE BLDC GLUCOMTR-MCNC: 210 MG/DL — HIGH (ref 70–99)
GLUCOSE BLDC GLUCOMTR-MCNC: 211 MG/DL — HIGH (ref 70–99)
GLUCOSE BLDC GLUCOMTR-MCNC: 212 MG/DL — HIGH (ref 70–99)
GLUCOSE BLDC GLUCOMTR-MCNC: 213 MG/DL — HIGH (ref 70–99)
GLUCOSE BLDC GLUCOMTR-MCNC: 215 MG/DL — HIGH (ref 70–99)
GLUCOSE BLDC GLUCOMTR-MCNC: 217 MG/DL — HIGH (ref 70–99)
GLUCOSE BLDC GLUCOMTR-MCNC: 217 MG/DL — HIGH (ref 70–99)
GLUCOSE BLDC GLUCOMTR-MCNC: 218 MG/DL — HIGH (ref 70–99)
GLUCOSE BLDC GLUCOMTR-MCNC: 218 MG/DL — HIGH (ref 70–99)
GLUCOSE BLDC GLUCOMTR-MCNC: 219 MG/DL — HIGH (ref 70–99)
GLUCOSE BLDC GLUCOMTR-MCNC: 220 MG/DL — HIGH (ref 70–99)
GLUCOSE BLDC GLUCOMTR-MCNC: 220 MG/DL — HIGH (ref 70–99)
GLUCOSE BLDC GLUCOMTR-MCNC: 221 MG/DL — HIGH (ref 70–99)
GLUCOSE BLDC GLUCOMTR-MCNC: 222 MG/DL — HIGH (ref 70–99)
GLUCOSE BLDC GLUCOMTR-MCNC: 224 MG/DL — HIGH (ref 70–99)
GLUCOSE BLDC GLUCOMTR-MCNC: 224 MG/DL — HIGH (ref 70–99)
GLUCOSE BLDC GLUCOMTR-MCNC: 225 MG/DL — HIGH (ref 70–99)
GLUCOSE BLDC GLUCOMTR-MCNC: 226 MG/DL — HIGH (ref 70–99)
GLUCOSE BLDC GLUCOMTR-MCNC: 227 MG/DL — HIGH (ref 70–99)
GLUCOSE BLDC GLUCOMTR-MCNC: 228 MG/DL — HIGH (ref 70–99)
GLUCOSE BLDC GLUCOMTR-MCNC: 228 MG/DL — HIGH (ref 70–99)
GLUCOSE BLDC GLUCOMTR-MCNC: 229 MG/DL — HIGH (ref 70–99)
GLUCOSE BLDC GLUCOMTR-MCNC: 229 MG/DL — HIGH (ref 70–99)
GLUCOSE BLDC GLUCOMTR-MCNC: 230 MG/DL — HIGH (ref 70–99)
GLUCOSE BLDC GLUCOMTR-MCNC: 230 MG/DL — HIGH (ref 70–99)
GLUCOSE BLDC GLUCOMTR-MCNC: 231 MG/DL — HIGH (ref 70–99)
GLUCOSE BLDC GLUCOMTR-MCNC: 232 MG/DL — HIGH (ref 70–99)
GLUCOSE BLDC GLUCOMTR-MCNC: 232 MG/DL — HIGH (ref 70–99)
GLUCOSE BLDC GLUCOMTR-MCNC: 233 MG/DL — HIGH (ref 70–99)
GLUCOSE BLDC GLUCOMTR-MCNC: 236 MG/DL — HIGH (ref 70–99)
GLUCOSE BLDC GLUCOMTR-MCNC: 236 MG/DL — HIGH (ref 70–99)
GLUCOSE BLDC GLUCOMTR-MCNC: 237 MG/DL — HIGH (ref 70–99)
GLUCOSE BLDC GLUCOMTR-MCNC: 238 MG/DL — HIGH (ref 70–99)
GLUCOSE BLDC GLUCOMTR-MCNC: 238 MG/DL — HIGH (ref 70–99)
GLUCOSE BLDC GLUCOMTR-MCNC: 245 MG/DL — HIGH (ref 70–99)
GLUCOSE BLDC GLUCOMTR-MCNC: 245 MG/DL — HIGH (ref 70–99)
GLUCOSE BLDC GLUCOMTR-MCNC: 246 MG/DL — HIGH (ref 70–99)
GLUCOSE BLDC GLUCOMTR-MCNC: 246 MG/DL — HIGH (ref 70–99)
GLUCOSE BLDC GLUCOMTR-MCNC: 247 MG/DL — HIGH (ref 70–99)
GLUCOSE BLDC GLUCOMTR-MCNC: 249 MG/DL — HIGH (ref 70–99)
GLUCOSE BLDC GLUCOMTR-MCNC: 25 MG/DL — CRITICAL LOW (ref 70–99)
GLUCOSE BLDC GLUCOMTR-MCNC: 252 MG/DL — HIGH (ref 70–99)
GLUCOSE BLDC GLUCOMTR-MCNC: 252 MG/DL — HIGH (ref 70–99)
GLUCOSE BLDC GLUCOMTR-MCNC: 254 MG/DL — HIGH (ref 70–99)
GLUCOSE BLDC GLUCOMTR-MCNC: 256 MG/DL — HIGH (ref 70–99)
GLUCOSE BLDC GLUCOMTR-MCNC: 257 MG/DL — HIGH (ref 70–99)
GLUCOSE BLDC GLUCOMTR-MCNC: 257 MG/DL — HIGH (ref 70–99)
GLUCOSE BLDC GLUCOMTR-MCNC: 258 MG/DL — HIGH (ref 70–99)
GLUCOSE BLDC GLUCOMTR-MCNC: 258 MG/DL — HIGH (ref 70–99)
GLUCOSE BLDC GLUCOMTR-MCNC: 259 MG/DL — HIGH (ref 70–99)
GLUCOSE BLDC GLUCOMTR-MCNC: 261 MG/DL — HIGH (ref 70–99)
GLUCOSE BLDC GLUCOMTR-MCNC: 263 MG/DL — HIGH (ref 70–99)
GLUCOSE BLDC GLUCOMTR-MCNC: 264 MG/DL — HIGH (ref 70–99)
GLUCOSE BLDC GLUCOMTR-MCNC: 265 MG/DL — HIGH (ref 70–99)
GLUCOSE BLDC GLUCOMTR-MCNC: 266 MG/DL — HIGH (ref 70–99)
GLUCOSE BLDC GLUCOMTR-MCNC: 268 MG/DL — HIGH (ref 70–99)
GLUCOSE BLDC GLUCOMTR-MCNC: 269 MG/DL — HIGH (ref 70–99)
GLUCOSE BLDC GLUCOMTR-MCNC: 272 MG/DL — HIGH (ref 70–99)
GLUCOSE BLDC GLUCOMTR-MCNC: 272 MG/DL — HIGH (ref 70–99)
GLUCOSE BLDC GLUCOMTR-MCNC: 273 MG/DL — HIGH (ref 70–99)
GLUCOSE BLDC GLUCOMTR-MCNC: 273 MG/DL — HIGH (ref 70–99)
GLUCOSE BLDC GLUCOMTR-MCNC: 276 MG/DL — HIGH (ref 70–99)
GLUCOSE BLDC GLUCOMTR-MCNC: 278 MG/DL — HIGH (ref 70–99)
GLUCOSE BLDC GLUCOMTR-MCNC: 278 MG/DL — HIGH (ref 70–99)
GLUCOSE BLDC GLUCOMTR-MCNC: 280 MG/DL — HIGH (ref 70–99)
GLUCOSE BLDC GLUCOMTR-MCNC: 286 MG/DL — HIGH (ref 70–99)
GLUCOSE BLDC GLUCOMTR-MCNC: 288 MG/DL — HIGH (ref 70–99)
GLUCOSE BLDC GLUCOMTR-MCNC: 291 MG/DL — HIGH (ref 70–99)
GLUCOSE BLDC GLUCOMTR-MCNC: 291 MG/DL — HIGH (ref 70–99)
GLUCOSE BLDC GLUCOMTR-MCNC: 293 MG/DL — HIGH (ref 70–99)
GLUCOSE BLDC GLUCOMTR-MCNC: 293 MG/DL — HIGH (ref 70–99)
GLUCOSE BLDC GLUCOMTR-MCNC: 296 MG/DL — HIGH (ref 70–99)
GLUCOSE BLDC GLUCOMTR-MCNC: 298 MG/DL — HIGH (ref 70–99)
GLUCOSE BLDC GLUCOMTR-MCNC: 298 MG/DL — HIGH (ref 70–99)
GLUCOSE BLDC GLUCOMTR-MCNC: 304 MG/DL — HIGH (ref 70–99)
GLUCOSE BLDC GLUCOMTR-MCNC: 308 MG/DL — HIGH (ref 70–99)
GLUCOSE BLDC GLUCOMTR-MCNC: 309 MG/DL — HIGH (ref 70–99)
GLUCOSE BLDC GLUCOMTR-MCNC: 314 MG/DL — HIGH (ref 70–99)
GLUCOSE BLDC GLUCOMTR-MCNC: 316 MG/DL — HIGH (ref 70–99)
GLUCOSE BLDC GLUCOMTR-MCNC: 319 MG/DL — HIGH (ref 70–99)
GLUCOSE BLDC GLUCOMTR-MCNC: 325 MG/DL — HIGH (ref 70–99)
GLUCOSE BLDC GLUCOMTR-MCNC: 330 MG/DL — HIGH (ref 70–99)
GLUCOSE BLDC GLUCOMTR-MCNC: 331 MG/DL — HIGH (ref 70–99)
GLUCOSE BLDC GLUCOMTR-MCNC: 336 MG/DL — HIGH (ref 70–99)
GLUCOSE BLDC GLUCOMTR-MCNC: 336 MG/DL — HIGH (ref 70–99)
GLUCOSE BLDC GLUCOMTR-MCNC: 341 MG/DL — HIGH (ref 70–99)
GLUCOSE BLDC GLUCOMTR-MCNC: 343 MG/DL — HIGH (ref 70–99)
GLUCOSE BLDC GLUCOMTR-MCNC: 346 MG/DL — HIGH (ref 70–99)
GLUCOSE BLDC GLUCOMTR-MCNC: 346 MG/DL — HIGH (ref 70–99)
GLUCOSE BLDC GLUCOMTR-MCNC: 356 MG/DL — HIGH (ref 70–99)
GLUCOSE BLDC GLUCOMTR-MCNC: 370 MG/DL — HIGH (ref 70–99)
GLUCOSE BLDC GLUCOMTR-MCNC: 370 MG/DL — HIGH (ref 70–99)
GLUCOSE BLDC GLUCOMTR-MCNC: 373 MG/DL — HIGH (ref 70–99)
GLUCOSE BLDC GLUCOMTR-MCNC: 374 MG/DL — HIGH (ref 70–99)
GLUCOSE BLDC GLUCOMTR-MCNC: 376 MG/DL — HIGH (ref 70–99)
GLUCOSE BLDC GLUCOMTR-MCNC: 377 MG/DL — HIGH (ref 70–99)
GLUCOSE BLDC GLUCOMTR-MCNC: 392 MG/DL — HIGH (ref 70–99)
GLUCOSE BLDC GLUCOMTR-MCNC: 399 MG/DL — HIGH (ref 70–99)
GLUCOSE BLDC GLUCOMTR-MCNC: 400 MG/DL — HIGH (ref 70–99)
GLUCOSE BLDC GLUCOMTR-MCNC: 401 MG/DL — HIGH (ref 70–99)
GLUCOSE BLDC GLUCOMTR-MCNC: 414 MG/DL — HIGH (ref 70–99)
GLUCOSE BLDC GLUCOMTR-MCNC: 418 MG/DL — HIGH (ref 70–99)
GLUCOSE BLDC GLUCOMTR-MCNC: 42 MG/DL — CRITICAL LOW (ref 70–99)
GLUCOSE BLDC GLUCOMTR-MCNC: 425 MG/DL — HIGH (ref 70–99)
GLUCOSE BLDC GLUCOMTR-MCNC: 432 MG/DL — HIGH (ref 70–99)
GLUCOSE BLDC GLUCOMTR-MCNC: 433 MG/DL — HIGH (ref 70–99)
GLUCOSE BLDC GLUCOMTR-MCNC: 437 MG/DL — HIGH (ref 70–99)
GLUCOSE BLDC GLUCOMTR-MCNC: 451 MG/DL — CRITICAL HIGH (ref 70–99)
GLUCOSE BLDC GLUCOMTR-MCNC: 462 MG/DL — CRITICAL HIGH (ref 70–99)
GLUCOSE BLDC GLUCOMTR-MCNC: 462 MG/DL — CRITICAL HIGH (ref 70–99)
GLUCOSE BLDC GLUCOMTR-MCNC: 472 MG/DL — CRITICAL HIGH (ref 70–99)
GLUCOSE BLDC GLUCOMTR-MCNC: 478 MG/DL — CRITICAL HIGH (ref 70–99)
GLUCOSE BLDC GLUCOMTR-MCNC: 64 MG/DL — LOW (ref 70–99)
GLUCOSE BLDC GLUCOMTR-MCNC: 65 MG/DL — LOW (ref 70–99)
GLUCOSE BLDC GLUCOMTR-MCNC: 65 MG/DL — LOW (ref 70–99)
GLUCOSE BLDC GLUCOMTR-MCNC: 67 MG/DL — LOW (ref 70–99)
GLUCOSE BLDC GLUCOMTR-MCNC: 68 MG/DL — LOW (ref 70–99)
GLUCOSE BLDC GLUCOMTR-MCNC: 68 MG/DL — LOW (ref 70–99)
GLUCOSE BLDC GLUCOMTR-MCNC: 72 MG/DL — SIGNIFICANT CHANGE UP (ref 70–99)
GLUCOSE BLDC GLUCOMTR-MCNC: 74 MG/DL — SIGNIFICANT CHANGE UP (ref 70–99)
GLUCOSE BLDC GLUCOMTR-MCNC: 77 MG/DL — SIGNIFICANT CHANGE UP (ref 70–99)
GLUCOSE BLDC GLUCOMTR-MCNC: 77 MG/DL — SIGNIFICANT CHANGE UP (ref 70–99)
GLUCOSE BLDC GLUCOMTR-MCNC: 81 MG/DL — SIGNIFICANT CHANGE UP (ref 70–99)
GLUCOSE BLDC GLUCOMTR-MCNC: 83 MG/DL — SIGNIFICANT CHANGE UP (ref 70–99)
GLUCOSE BLDC GLUCOMTR-MCNC: 83 MG/DL — SIGNIFICANT CHANGE UP (ref 70–99)
GLUCOSE BLDC GLUCOMTR-MCNC: 85 MG/DL — SIGNIFICANT CHANGE UP (ref 70–99)
GLUCOSE BLDC GLUCOMTR-MCNC: 86 MG/DL — SIGNIFICANT CHANGE UP (ref 70–99)
GLUCOSE BLDC GLUCOMTR-MCNC: 87 MG/DL — SIGNIFICANT CHANGE UP (ref 70–99)
GLUCOSE BLDC GLUCOMTR-MCNC: 87 MG/DL — SIGNIFICANT CHANGE UP (ref 70–99)
GLUCOSE BLDC GLUCOMTR-MCNC: 88 MG/DL — SIGNIFICANT CHANGE UP (ref 70–99)
GLUCOSE BLDC GLUCOMTR-MCNC: 91 MG/DL — SIGNIFICANT CHANGE UP (ref 70–99)
GLUCOSE BLDC GLUCOMTR-MCNC: 92 MG/DL — SIGNIFICANT CHANGE UP (ref 70–99)
GLUCOSE BLDC GLUCOMTR-MCNC: 93 MG/DL — SIGNIFICANT CHANGE UP (ref 70–99)
GLUCOSE BLDC GLUCOMTR-MCNC: 95 MG/DL — SIGNIFICANT CHANGE UP (ref 70–99)
GLUCOSE BLDC GLUCOMTR-MCNC: 96 MG/DL — SIGNIFICANT CHANGE UP (ref 70–99)
GLUCOSE BLDC GLUCOMTR-MCNC: 97 MG/DL — SIGNIFICANT CHANGE UP (ref 70–99)
GLUCOSE BLDC GLUCOMTR-MCNC: 98 MG/DL — SIGNIFICANT CHANGE UP (ref 70–99)
GLUCOSE SERPL-MCNC: 105 MG/DL — HIGH (ref 70–99)
GLUCOSE SERPL-MCNC: 109 MG/DL — HIGH (ref 70–99)
GLUCOSE SERPL-MCNC: 112 MG/DL — HIGH (ref 70–99)
GLUCOSE SERPL-MCNC: 123 MG/DL — HIGH (ref 70–99)
GLUCOSE SERPL-MCNC: 139 MG/DL — HIGH (ref 70–99)
GLUCOSE SERPL-MCNC: 142 MG/DL — HIGH (ref 70–99)
GLUCOSE SERPL-MCNC: 148 MG/DL — HIGH (ref 70–99)
GLUCOSE SERPL-MCNC: 148 MG/DL — HIGH (ref 70–99)
GLUCOSE SERPL-MCNC: 155 MG/DL — HIGH (ref 70–99)
GLUCOSE SERPL-MCNC: 163 MG/DL — HIGH (ref 70–99)
GLUCOSE SERPL-MCNC: 167 MG/DL — HIGH (ref 70–99)
GLUCOSE SERPL-MCNC: 167 MG/DL — HIGH (ref 70–99)
GLUCOSE SERPL-MCNC: 168 MG/DL — HIGH (ref 70–99)
GLUCOSE SERPL-MCNC: 170 MG/DL — HIGH (ref 70–99)
GLUCOSE SERPL-MCNC: 178 MG/DL — HIGH (ref 70–99)
GLUCOSE SERPL-MCNC: 205 MG/DL — HIGH (ref 70–99)
GLUCOSE SERPL-MCNC: 209 MG/DL — HIGH (ref 70–99)
GLUCOSE SERPL-MCNC: 212 MG/DL — HIGH (ref 70–99)
GLUCOSE SERPL-MCNC: 221 MG/DL — HIGH (ref 70–99)
GLUCOSE SERPL-MCNC: 228 MG/DL — HIGH (ref 70–99)
GLUCOSE SERPL-MCNC: 233 MG/DL — HIGH (ref 70–99)
GLUCOSE SERPL-MCNC: 233 MG/DL — HIGH (ref 70–99)
GLUCOSE SERPL-MCNC: 246 MG/DL — HIGH (ref 70–99)
GLUCOSE SERPL-MCNC: 246 MG/DL — HIGH (ref 70–99)
GLUCOSE SERPL-MCNC: 252 MG/DL — HIGH (ref 70–99)
GLUCOSE SERPL-MCNC: 278 MG/DL — HIGH (ref 70–99)
GLUCOSE SERPL-MCNC: 293 MG/DL — HIGH (ref 70–99)
GLUCOSE SERPL-MCNC: 297 MG/DL — HIGH (ref 70–99)
GLUCOSE SERPL-MCNC: 342 MG/DL — HIGH (ref 70–99)
GLUCOSE SERPL-MCNC: 386 MG/DL — HIGH (ref 70–99)
GLUCOSE SERPL-MCNC: 392 MG/DL — HIGH (ref 70–99)
GLUCOSE SERPL-MCNC: 398 MG/DL — HIGH (ref 70–99)
GLUCOSE SERPL-MCNC: 401 MG/DL — HIGH (ref 70–99)
GLUCOSE SERPL-MCNC: 419 MG/DL — HIGH (ref 70–99)
GLUCOSE SERPL-MCNC: 421 MG/DL — HIGH (ref 70–99)
GLUCOSE SERPL-MCNC: 436 MG/DL — HIGH (ref 70–99)
GLUCOSE SERPL-MCNC: 56 MG/DL — LOW (ref 70–99)
GLUCOSE SERPL-MCNC: 81 MG/DL — SIGNIFICANT CHANGE UP (ref 70–99)
GLUCOSE SERPL-MCNC: 85 MG/DL — SIGNIFICANT CHANGE UP (ref 70–99)
GLUCOSE SERPL-MCNC: 99 MG/DL — SIGNIFICANT CHANGE UP (ref 70–99)
GLUCOSE SERPL-MCNC: 99 MG/DL — SIGNIFICANT CHANGE UP (ref 70–99)
GLUCOSE UR QL: 250 MG/DL
GLUCOSE UR QL: NEGATIVE MG/DL — SIGNIFICANT CHANGE UP
GLUCOSE UR QL: NEGATIVE MG/DL — SIGNIFICANT CHANGE UP
GRAM STN FLD: SIGNIFICANT CHANGE UP
GRAM STN FLD: SIGNIFICANT CHANGE UP
GRAN CASTS # UR COMP ASSIST: NEGATIVE — SIGNIFICANT CHANGE UP
HAPTOGLOB SERPL-MCNC: 107 MG/DL — SIGNIFICANT CHANGE UP (ref 34–200)
HCO3 BLDA-SCNC: 21 MMOL/L — SIGNIFICANT CHANGE UP (ref 21–28)
HCO3 BLDA-SCNC: 22 MMOL/L — SIGNIFICANT CHANGE UP (ref 21–28)
HCO3 BLDA-SCNC: 25 MMOL/L — SIGNIFICANT CHANGE UP (ref 21–28)
HCO3 BLDA-SCNC: 27 MMOL/L — SIGNIFICANT CHANGE UP (ref 21–28)
HCO3 BLDA-SCNC: 27 MMOL/L — SIGNIFICANT CHANGE UP (ref 21–28)
HCO3 BLDA-SCNC: 28 MMOL/L — SIGNIFICANT CHANGE UP (ref 21–28)
HCO3 BLDA-SCNC: 28 MMOL/L — SIGNIFICANT CHANGE UP (ref 21–28)
HCO3 BLDA-SCNC: 29 MMOL/L — HIGH (ref 21–28)
HCO3 BLDA-SCNC: 30 MMOL/L — HIGH (ref 21–28)
HCO3 BLDA-SCNC: 31 MMOL/L — HIGH (ref 21–28)
HCO3 BLDA-SCNC: 37 MMOL/L — HIGH (ref 21–28)
HCO3 BLDA-SCNC: 37 MMOL/L — HIGH (ref 21–28)
HCO3 BLDV-SCNC: 23 MMOL/L — SIGNIFICANT CHANGE UP (ref 22–29)
HCO3 BLDV-SCNC: 25 MMOL/L — SIGNIFICANT CHANGE UP (ref 22–29)
HCO3 BLDV-SCNC: 32 MMOL/L — HIGH (ref 22–29)
HCT VFR BLD CALC: 24.1 % — LOW (ref 37–47)
HCT VFR BLD CALC: 24.4 % — LOW (ref 37–47)
HCT VFR BLD CALC: 24.5 % — LOW (ref 37–47)
HCT VFR BLD CALC: 25.1 % — LOW (ref 37–47)
HCT VFR BLD CALC: 25.2 % — LOW (ref 37–47)
HCT VFR BLD CALC: 25.5 % — LOW (ref 37–47)
HCT VFR BLD CALC: 25.5 % — LOW (ref 37–47)
HCT VFR BLD CALC: 25.6 % — LOW (ref 37–47)
HCT VFR BLD CALC: 25.7 % — LOW (ref 37–47)
HCT VFR BLD CALC: 25.8 % — LOW (ref 37–47)
HCT VFR BLD CALC: 25.9 % — LOW (ref 37–47)
HCT VFR BLD CALC: 25.9 % — LOW (ref 37–47)
HCT VFR BLD CALC: 26.2 % — LOW (ref 37–47)
HCT VFR BLD CALC: 26.3 % — LOW (ref 37–47)
HCT VFR BLD CALC: 26.4 % — LOW (ref 37–47)
HCT VFR BLD CALC: 26.9 % — LOW (ref 37–47)
HCT VFR BLD CALC: 27 % — LOW (ref 37–47)
HCT VFR BLD CALC: 27.1 % — LOW (ref 37–47)
HCT VFR BLD CALC: 27.6 % — LOW (ref 37–47)
HCT VFR BLD CALC: 27.8 % — LOW (ref 37–47)
HCT VFR BLD CALC: 28.2 % — LOW (ref 37–47)
HCT VFR BLD CALC: 28.8 % — LOW (ref 37–47)
HCT VFR BLD CALC: 29.1 % — LOW (ref 37–47)
HCT VFR BLD CALC: 29.2 % — LOW (ref 37–47)
HCT VFR BLD CALC: 29.2 % — LOW (ref 37–47)
HCT VFR BLD CALC: 29.6 % — LOW (ref 37–47)
HCT VFR BLD CALC: 29.7 % — LOW (ref 37–47)
HCT VFR BLD CALC: 29.9 % — LOW (ref 37–47)
HCT VFR BLD CALC: 30.4 % — LOW (ref 37–47)
HCT VFR BLD CALC: 30.5 % — LOW (ref 37–47)
HCT VFR BLD CALC: 32.4 % — LOW (ref 37–47)
HCT VFR BLD CALC: 34.7 % — LOW (ref 37–47)
HCT VFR BLDA CALC: 17 % — CRITICAL LOW (ref 34.5–46.5)
HCT VFR BLDA CALC: 31 % — LOW (ref 34.5–46.5)
HCT VFR BLDA CALC: 32 % — LOW (ref 34.5–46.5)
HGB BLD CALC-MCNC: 10.4 G/DL — LOW (ref 11.7–16.1)
HGB BLD CALC-MCNC: 10.5 G/DL — LOW (ref 11.7–16.1)
HGB BLD CALC-MCNC: 5.8 G/DL — CRITICAL LOW (ref 11.7–16.1)
HGB BLD-MCNC: 10.1 G/DL — LOW (ref 12–16)
HGB BLD-MCNC: 10.2 G/DL — LOW (ref 12–16)
HGB BLD-MCNC: 7.5 G/DL — LOW (ref 12–16)
HGB BLD-MCNC: 7.7 G/DL — LOW (ref 12–16)
HGB BLD-MCNC: 7.7 G/DL — LOW (ref 12–16)
HGB BLD-MCNC: 7.8 G/DL — LOW (ref 12–16)
HGB BLD-MCNC: 7.8 G/DL — LOW (ref 12–16)
HGB BLD-MCNC: 7.9 G/DL — LOW (ref 12–16)
HGB BLD-MCNC: 8 G/DL — LOW (ref 12–16)
HGB BLD-MCNC: 8.1 G/DL — LOW (ref 12–16)
HGB BLD-MCNC: 8.2 G/DL — LOW (ref 12–16)
HGB BLD-MCNC: 8.2 G/DL — LOW (ref 12–16)
HGB BLD-MCNC: 8.3 G/DL — LOW (ref 12–16)
HGB BLD-MCNC: 8.3 G/DL — LOW (ref 12–16)
HGB BLD-MCNC: 8.4 G/DL — LOW (ref 12–16)
HGB BLD-MCNC: 8.6 G/DL — LOW (ref 12–16)
HGB BLD-MCNC: 8.6 G/DL — LOW (ref 12–16)
HGB BLD-MCNC: 8.7 G/DL — LOW (ref 12–16)
HGB BLD-MCNC: 8.8 G/DL — LOW (ref 12–16)
HGB BLD-MCNC: 8.9 G/DL — LOW (ref 12–16)
HGB BLD-MCNC: 8.9 G/DL — LOW (ref 12–16)
HGB BLD-MCNC: 9 G/DL — LOW (ref 12–16)
HGB BLD-MCNC: 9.1 G/DL — LOW (ref 12–16)
HGB BLD-MCNC: 9.1 G/DL — LOW (ref 12–16)
HGB BLD-MCNC: 9.3 G/DL — LOW (ref 12–16)
HGB BLD-MCNC: 9.4 G/DL — LOW (ref 12–16)
HGB BLD-MCNC: 9.6 G/DL — LOW (ref 12–16)
HOROWITZ INDEX BLDA+IHG-RTO: 100 — SIGNIFICANT CHANGE UP
HOROWITZ INDEX BLDA+IHG-RTO: 100 — SIGNIFICANT CHANGE UP
HOROWITZ INDEX BLDA+IHG-RTO: 30 — SIGNIFICANT CHANGE UP
HOROWITZ INDEX BLDA+IHG-RTO: 35 — SIGNIFICANT CHANGE UP
HOROWITZ INDEX BLDA+IHG-RTO: 40 — SIGNIFICANT CHANGE UP
HOROWITZ INDEX BLDA+IHG-RTO: 60 — SIGNIFICANT CHANGE UP
HOROWITZ INDEX BLDA+IHG-RTO: 80 — SIGNIFICANT CHANGE UP
HOROWITZ INDEX BLDV+IHG-RTO: 100 — SIGNIFICANT CHANGE UP
HOROWITZ INDEX BLDV+IHG-RTO: 100 — SIGNIFICANT CHANGE UP
HYALINE CASTS # UR AUTO: ABNORMAL /LPF
HYALINE CASTS # UR AUTO: SIGNIFICANT CHANGE UP /LPF
HYPOCHROMIA BLD QL: SLIGHT — SIGNIFICANT CHANGE UP
IMM GRANULOCYTES NFR BLD AUTO: 0.2 % — SIGNIFICANT CHANGE UP (ref 0.1–0.3)
IMM GRANULOCYTES NFR BLD AUTO: 0.3 % — SIGNIFICANT CHANGE UP (ref 0.1–0.3)
IMM GRANULOCYTES NFR BLD AUTO: 0.4 % — HIGH (ref 0.1–0.3)
IMM GRANULOCYTES NFR BLD AUTO: 0.5 % — HIGH (ref 0.1–0.3)
IMM GRANULOCYTES NFR BLD AUTO: 0.5 % — HIGH (ref 0.1–0.3)
IMM GRANULOCYTES NFR BLD AUTO: 0.6 % — HIGH (ref 0.1–0.3)
IMM GRANULOCYTES NFR BLD AUTO: 0.6 % — HIGH (ref 0.1–0.3)
IMM GRANULOCYTES NFR BLD AUTO: 0.7 % — HIGH (ref 0.1–0.3)
IMM GRANULOCYTES NFR BLD AUTO: 0.7 % — HIGH (ref 0.1–0.3)
IMM GRANULOCYTES NFR BLD AUTO: 0.8 % — HIGH (ref 0.1–0.3)
IMM GRANULOCYTES NFR BLD AUTO: 0.8 % — HIGH (ref 0.1–0.3)
IMM GRANULOCYTES NFR BLD AUTO: 0.9 % — HIGH (ref 0.1–0.3)
IMM GRANULOCYTES NFR BLD AUTO: 1.3 % — HIGH (ref 0.1–0.3)
INR BLD: 1.04 RATIO — SIGNIFICANT CHANGE UP (ref 0.65–1.3)
INR BLD: 1.47 RATIO — HIGH (ref 0.65–1.3)
INTERPRETATION SERPL IFE-IMP: SIGNIFICANT CHANGE UP
IRON SATN MFR SERPL: 23 UG/DL — LOW (ref 35–150)
IRON SATN MFR SERPL: 37 % — SIGNIFICANT CHANGE UP (ref 15–50)
IRON SATN MFR SERPL: 9 % — LOW (ref 15–50)
IRON SATN MFR SERPL: 98 UG/DL — SIGNIFICANT CHANGE UP (ref 35–150)
KAPPA LC SER QL IFE: 6.96 MG/DL — HIGH (ref 0.33–1.94)
KAPPA/LAMBDA FREE LIGHT CHAIN RATIO, SERUM: 1.25 RATIO — SIGNIFICANT CHANGE UP (ref 0.26–1.65)
KETONES UR-MCNC: 15
KETONES UR-MCNC: 15
KETONES UR-MCNC: NEGATIVE — SIGNIFICANT CHANGE UP
LACTATE BLDV-MCNC: 1.3 MMOL/L — SIGNIFICANT CHANGE UP (ref 0.5–2)
LACTATE BLDV-MCNC: 7.9 MMOL/L — CRITICAL HIGH (ref 0.5–2)
LACTATE BLDV-MCNC: 9.3 MMOL/L — CRITICAL HIGH (ref 0.5–2)
LACTATE SERPL-SCNC: 1.6 MMOL/L — SIGNIFICANT CHANGE UP (ref 0.7–2)
LACTATE SERPL-SCNC: 2.6 MMOL/L — HIGH (ref 0.7–2)
LACTATE SERPL-SCNC: 3.4 MMOL/L — HIGH (ref 0.7–2)
LAMBDA LC SER QL IFE: 5.55 MG/DL — HIGH (ref 0.57–2.63)
LEUKOCYTE ESTERASE UR-ACNC: ABNORMAL
LEUKOCYTE ESTERASE UR-ACNC: ABNORMAL
LEUKOCYTE ESTERASE UR-ACNC: NEGATIVE — SIGNIFICANT CHANGE UP
LYMPHOCYTES # BLD AUTO: 0.55 K/UL — LOW (ref 1.2–3.4)
LYMPHOCYTES # BLD AUTO: 0.82 K/UL — LOW (ref 1.2–3.4)
LYMPHOCYTES # BLD AUTO: 0.91 K/UL — LOW (ref 1.2–3.4)
LYMPHOCYTES # BLD AUTO: 0.94 K/UL — LOW (ref 1.2–3.4)
LYMPHOCYTES # BLD AUTO: 1.03 K/UL — LOW (ref 1.2–3.4)
LYMPHOCYTES # BLD AUTO: 1.1 K/UL — LOW (ref 1.2–3.4)
LYMPHOCYTES # BLD AUTO: 1.2 K/UL — SIGNIFICANT CHANGE UP (ref 1.2–3.4)
LYMPHOCYTES # BLD AUTO: 1.2 K/UL — SIGNIFICANT CHANGE UP (ref 1.2–3.4)
LYMPHOCYTES # BLD AUTO: 1.24 K/UL — SIGNIFICANT CHANGE UP (ref 1.2–3.4)
LYMPHOCYTES # BLD AUTO: 1.3 K/UL — SIGNIFICANT CHANGE UP (ref 1.2–3.4)
LYMPHOCYTES # BLD AUTO: 1.32 K/UL — SIGNIFICANT CHANGE UP (ref 1.2–3.4)
LYMPHOCYTES # BLD AUTO: 1.32 K/UL — SIGNIFICANT CHANGE UP (ref 1.2–3.4)
LYMPHOCYTES # BLD AUTO: 1.34 K/UL — SIGNIFICANT CHANGE UP (ref 1.2–3.4)
LYMPHOCYTES # BLD AUTO: 1.38 K/UL — SIGNIFICANT CHANGE UP (ref 1.2–3.4)
LYMPHOCYTES # BLD AUTO: 1.41 K/UL — SIGNIFICANT CHANGE UP (ref 1.2–3.4)
LYMPHOCYTES # BLD AUTO: 1.49 K/UL — SIGNIFICANT CHANGE UP (ref 1.2–3.4)
LYMPHOCYTES # BLD AUTO: 1.55 K/UL — SIGNIFICANT CHANGE UP (ref 1.2–3.4)
LYMPHOCYTES # BLD AUTO: 1.6 K/UL — SIGNIFICANT CHANGE UP (ref 1.2–3.4)
LYMPHOCYTES # BLD AUTO: 1.63 K/UL — SIGNIFICANT CHANGE UP (ref 1.2–3.4)
LYMPHOCYTES # BLD AUTO: 1.63 K/UL — SIGNIFICANT CHANGE UP (ref 1.2–3.4)
LYMPHOCYTES # BLD AUTO: 1.67 K/UL — SIGNIFICANT CHANGE UP (ref 1.2–3.4)
LYMPHOCYTES # BLD AUTO: 1.79 K/UL — SIGNIFICANT CHANGE UP (ref 1.2–3.4)
LYMPHOCYTES # BLD AUTO: 1.81 K/UL — SIGNIFICANT CHANGE UP (ref 1.2–3.4)
LYMPHOCYTES # BLD AUTO: 10.2 % — LOW (ref 20.5–51.1)
LYMPHOCYTES # BLD AUTO: 10.3 % — LOW (ref 20.5–51.1)
LYMPHOCYTES # BLD AUTO: 10.4 % — LOW (ref 20.5–51.1)
LYMPHOCYTES # BLD AUTO: 10.7 % — LOW (ref 20.5–51.1)
LYMPHOCYTES # BLD AUTO: 11 % — LOW (ref 20.5–51.1)
LYMPHOCYTES # BLD AUTO: 11.9 % — LOW (ref 20.5–51.1)
LYMPHOCYTES # BLD AUTO: 12.8 % — LOW (ref 20.5–51.1)
LYMPHOCYTES # BLD AUTO: 16.7 % — LOW (ref 20.5–51.1)
LYMPHOCYTES # BLD AUTO: 19.2 % — LOW (ref 20.5–51.1)
LYMPHOCYTES # BLD AUTO: 21 % — SIGNIFICANT CHANGE UP (ref 20.5–51.1)
LYMPHOCYTES # BLD AUTO: 21.6 % — SIGNIFICANT CHANGE UP (ref 20.5–51.1)
LYMPHOCYTES # BLD AUTO: 23 % — SIGNIFICANT CHANGE UP (ref 20.5–51.1)
LYMPHOCYTES # BLD AUTO: 25.2 % — SIGNIFICANT CHANGE UP (ref 20.5–51.1)
LYMPHOCYTES # BLD AUTO: 25.5 % — SIGNIFICANT CHANGE UP (ref 20.5–51.1)
LYMPHOCYTES # BLD AUTO: 26.4 % — SIGNIFICANT CHANGE UP (ref 20.5–51.1)
LYMPHOCYTES # BLD AUTO: 27.8 % — SIGNIFICANT CHANGE UP (ref 20.5–51.1)
LYMPHOCYTES # BLD AUTO: 27.9 % — SIGNIFICANT CHANGE UP (ref 20.5–51.1)
LYMPHOCYTES # BLD AUTO: 29.3 % — SIGNIFICANT CHANGE UP (ref 20.5–51.1)
LYMPHOCYTES # BLD AUTO: 4.27 K/UL — HIGH (ref 1.2–3.4)
LYMPHOCYTES # BLD AUTO: 4.9 % — LOW (ref 20.5–51.1)
LYMPHOCYTES # BLD AUTO: 5.9 % — LOW (ref 20.5–51.1)
LYMPHOCYTES # BLD AUTO: 7.2 % — LOW (ref 20.5–51.1)
LYMPHOCYTES # BLD AUTO: 8 % — LOW (ref 20.5–51.1)
LYMPHOCYTES # BLD AUTO: 8.1 % — LOW (ref 20.5–51.1)
LYMPHOCYTES # BLD AUTO: 8.2 % — LOW (ref 20.5–51.1)
MAGNESIUM SERPL-MCNC: 1.6 MG/DL — LOW (ref 1.8–2.4)
MAGNESIUM SERPL-MCNC: 1.6 MG/DL — LOW (ref 1.8–2.4)
MAGNESIUM SERPL-MCNC: 1.7 MG/DL — LOW (ref 1.8–2.4)
MAGNESIUM SERPL-MCNC: 1.7 MG/DL — LOW (ref 1.8–2.4)
MAGNESIUM SERPL-MCNC: 1.8 MG/DL — SIGNIFICANT CHANGE UP (ref 1.8–2.4)
MAGNESIUM SERPL-MCNC: 1.8 MG/DL — SIGNIFICANT CHANGE UP (ref 1.8–2.4)
MAGNESIUM SERPL-MCNC: 1.9 MG/DL — SIGNIFICANT CHANGE UP (ref 1.8–2.4)
MAGNESIUM SERPL-MCNC: 2 MG/DL — SIGNIFICANT CHANGE UP (ref 1.8–2.4)
MAGNESIUM SERPL-MCNC: 2 MG/DL — SIGNIFICANT CHANGE UP (ref 1.8–2.4)
MAGNESIUM SERPL-MCNC: 2.1 MG/DL — SIGNIFICANT CHANGE UP (ref 1.8–2.4)
MAGNESIUM SERPL-MCNC: 2.2 MG/DL — SIGNIFICANT CHANGE UP (ref 1.8–2.4)
MAGNESIUM SERPL-MCNC: 2.3 MG/DL — SIGNIFICANT CHANGE UP (ref 1.8–2.4)
MAGNESIUM SERPL-MCNC: 2.4 MG/DL — SIGNIFICANT CHANGE UP (ref 1.8–2.4)
MAGNESIUM SERPL-MCNC: 2.6 MG/DL — HIGH (ref 1.8–2.4)
MAGNESIUM SERPL-MCNC: 2.6 MG/DL — HIGH (ref 1.8–2.4)
MANUAL SMEAR VERIFICATION: SIGNIFICANT CHANGE UP
MCHC RBC-ENTMCNC: 26.4 PG — LOW (ref 27–31)
MCHC RBC-ENTMCNC: 26.6 PG — LOW (ref 27–31)
MCHC RBC-ENTMCNC: 26.7 PG — LOW (ref 27–31)
MCHC RBC-ENTMCNC: 26.8 PG — LOW (ref 27–31)
MCHC RBC-ENTMCNC: 26.9 PG — LOW (ref 27–31)
MCHC RBC-ENTMCNC: 27 PG — SIGNIFICANT CHANGE UP (ref 27–31)
MCHC RBC-ENTMCNC: 27.1 PG — SIGNIFICANT CHANGE UP (ref 27–31)
MCHC RBC-ENTMCNC: 27.2 PG — SIGNIFICANT CHANGE UP (ref 27–31)
MCHC RBC-ENTMCNC: 27.3 PG — SIGNIFICANT CHANGE UP (ref 27–31)
MCHC RBC-ENTMCNC: 27.3 PG — SIGNIFICANT CHANGE UP (ref 27–31)
MCHC RBC-ENTMCNC: 27.4 PG — SIGNIFICANT CHANGE UP (ref 27–31)
MCHC RBC-ENTMCNC: 27.5 PG — SIGNIFICANT CHANGE UP (ref 27–31)
MCHC RBC-ENTMCNC: 27.6 PG — SIGNIFICANT CHANGE UP (ref 27–31)
MCHC RBC-ENTMCNC: 27.7 PG — SIGNIFICANT CHANGE UP (ref 27–31)
MCHC RBC-ENTMCNC: 27.8 PG — SIGNIFICANT CHANGE UP (ref 27–31)
MCHC RBC-ENTMCNC: 27.9 PG — SIGNIFICANT CHANGE UP (ref 27–31)
MCHC RBC-ENTMCNC: 28 PG — SIGNIFICANT CHANGE UP (ref 27–31)
MCHC RBC-ENTMCNC: 28.7 PG — SIGNIFICANT CHANGE UP (ref 27–31)
MCHC RBC-ENTMCNC: 29.1 G/DL — LOW (ref 32–37)
MCHC RBC-ENTMCNC: 29.7 G/DL — LOW (ref 32–37)
MCHC RBC-ENTMCNC: 29.8 G/DL — LOW (ref 32–37)
MCHC RBC-ENTMCNC: 29.8 G/DL — LOW (ref 32–37)
MCHC RBC-ENTMCNC: 30 G/DL — LOW (ref 32–37)
MCHC RBC-ENTMCNC: 30.1 G/DL — LOW (ref 32–37)
MCHC RBC-ENTMCNC: 30.2 G/DL — LOW (ref 32–37)
MCHC RBC-ENTMCNC: 30.5 G/DL — LOW (ref 32–37)
MCHC RBC-ENTMCNC: 30.6 G/DL — LOW (ref 32–37)
MCHC RBC-ENTMCNC: 30.7 G/DL — LOW (ref 32–37)
MCHC RBC-ENTMCNC: 30.8 G/DL — LOW (ref 32–37)
MCHC RBC-ENTMCNC: 30.8 G/DL — LOW (ref 32–37)
MCHC RBC-ENTMCNC: 30.9 G/DL — LOW (ref 32–37)
MCHC RBC-ENTMCNC: 31 G/DL — LOW (ref 32–37)
MCHC RBC-ENTMCNC: 31 G/DL — LOW (ref 32–37)
MCHC RBC-ENTMCNC: 31.1 G/DL — LOW (ref 32–37)
MCHC RBC-ENTMCNC: 31.2 G/DL — LOW (ref 32–37)
MCHC RBC-ENTMCNC: 31.3 G/DL — LOW (ref 32–37)
MCHC RBC-ENTMCNC: 31.4 G/DL — LOW (ref 32–37)
MCHC RBC-ENTMCNC: 31.5 G/DL — LOW (ref 32–37)
MCHC RBC-ENTMCNC: 31.7 G/DL — LOW (ref 32–37)
MCHC RBC-ENTMCNC: 31.7 G/DL — LOW (ref 32–37)
MCHC RBC-ENTMCNC: 31.8 G/DL — LOW (ref 32–37)
MCHC RBC-ENTMCNC: 31.9 G/DL — LOW (ref 32–37)
MCV RBC AUTO: 85.7 FL — SIGNIFICANT CHANGE UP (ref 81–99)
MCV RBC AUTO: 85.8 FL — SIGNIFICANT CHANGE UP (ref 81–99)
MCV RBC AUTO: 86.3 FL — SIGNIFICANT CHANGE UP (ref 81–99)
MCV RBC AUTO: 86.9 FL — SIGNIFICANT CHANGE UP (ref 81–99)
MCV RBC AUTO: 87.1 FL — SIGNIFICANT CHANGE UP (ref 81–99)
MCV RBC AUTO: 87.2 FL — SIGNIFICANT CHANGE UP (ref 81–99)
MCV RBC AUTO: 87.3 FL — SIGNIFICANT CHANGE UP (ref 81–99)
MCV RBC AUTO: 87.4 FL — SIGNIFICANT CHANGE UP (ref 81–99)
MCV RBC AUTO: 87.5 FL — SIGNIFICANT CHANGE UP (ref 81–99)
MCV RBC AUTO: 87.8 FL — SIGNIFICANT CHANGE UP (ref 81–99)
MCV RBC AUTO: 87.9 FL — SIGNIFICANT CHANGE UP (ref 81–99)
MCV RBC AUTO: 88.2 FL — SIGNIFICANT CHANGE UP (ref 81–99)
MCV RBC AUTO: 88.3 FL — SIGNIFICANT CHANGE UP (ref 81–99)
MCV RBC AUTO: 88.4 FL — SIGNIFICANT CHANGE UP (ref 81–99)
MCV RBC AUTO: 88.5 FL — SIGNIFICANT CHANGE UP (ref 81–99)
MCV RBC AUTO: 88.6 FL — SIGNIFICANT CHANGE UP (ref 81–99)
MCV RBC AUTO: 88.6 FL — SIGNIFICANT CHANGE UP (ref 81–99)
MCV RBC AUTO: 88.8 FL — SIGNIFICANT CHANGE UP (ref 81–99)
MCV RBC AUTO: 88.9 FL — SIGNIFICANT CHANGE UP (ref 81–99)
MCV RBC AUTO: 89 FL — SIGNIFICANT CHANGE UP (ref 81–99)
MCV RBC AUTO: 89.1 FL — SIGNIFICANT CHANGE UP (ref 81–99)
MCV RBC AUTO: 89.2 FL — SIGNIFICANT CHANGE UP (ref 81–99)
MCV RBC AUTO: 89.5 FL — SIGNIFICANT CHANGE UP (ref 81–99)
MCV RBC AUTO: 89.6 FL — SIGNIFICANT CHANGE UP (ref 81–99)
MCV RBC AUTO: 89.9 FL — SIGNIFICANT CHANGE UP (ref 81–99)
MCV RBC AUTO: 90.4 FL — SIGNIFICANT CHANGE UP (ref 81–99)
MCV RBC AUTO: 90.4 FL — SIGNIFICANT CHANGE UP (ref 81–99)
MCV RBC AUTO: 91.1 FL — SIGNIFICANT CHANGE UP (ref 81–99)
MCV RBC AUTO: 91.3 FL — SIGNIFICANT CHANGE UP (ref 81–99)
MCV RBC AUTO: 91.3 FL — SIGNIFICANT CHANGE UP (ref 81–99)
MCV RBC AUTO: 91.5 FL — SIGNIFICANT CHANGE UP (ref 81–99)
MCV RBC AUTO: 92.6 FL — SIGNIFICANT CHANGE UP (ref 81–99)
METHOD TYPE: SIGNIFICANT CHANGE UP
MONOCYTES # BLD AUTO: 0.26 K/UL — SIGNIFICANT CHANGE UP (ref 0.1–0.6)
MONOCYTES # BLD AUTO: 0.35 K/UL — SIGNIFICANT CHANGE UP (ref 0.1–0.6)
MONOCYTES # BLD AUTO: 0.36 K/UL — SIGNIFICANT CHANGE UP (ref 0.1–0.6)
MONOCYTES # BLD AUTO: 0.41 K/UL — SIGNIFICANT CHANGE UP (ref 0.1–0.6)
MONOCYTES # BLD AUTO: 0.44 K/UL — SIGNIFICANT CHANGE UP (ref 0.1–0.6)
MONOCYTES # BLD AUTO: 0.44 K/UL — SIGNIFICANT CHANGE UP (ref 0.1–0.6)
MONOCYTES # BLD AUTO: 0.47 K/UL — SIGNIFICANT CHANGE UP (ref 0.1–0.6)
MONOCYTES # BLD AUTO: 0.58 K/UL — SIGNIFICANT CHANGE UP (ref 0.1–0.6)
MONOCYTES # BLD AUTO: 0.67 K/UL — HIGH (ref 0.1–0.6)
MONOCYTES # BLD AUTO: 0.69 K/UL — HIGH (ref 0.1–0.6)
MONOCYTES # BLD AUTO: 0.71 K/UL — HIGH (ref 0.1–0.6)
MONOCYTES # BLD AUTO: 0.72 K/UL — HIGH (ref 0.1–0.6)
MONOCYTES # BLD AUTO: 0.76 K/UL — HIGH (ref 0.1–0.6)
MONOCYTES # BLD AUTO: 0.79 K/UL — HIGH (ref 0.1–0.6)
MONOCYTES # BLD AUTO: 0.8 K/UL — HIGH (ref 0.1–0.6)
MONOCYTES # BLD AUTO: 0.82 K/UL — HIGH (ref 0.1–0.6)
MONOCYTES # BLD AUTO: 0.83 K/UL — HIGH (ref 0.1–0.6)
MONOCYTES # BLD AUTO: 0.85 K/UL — HIGH (ref 0.1–0.6)
MONOCYTES # BLD AUTO: 0.89 K/UL — HIGH (ref 0.1–0.6)
MONOCYTES # BLD AUTO: 0.95 K/UL — HIGH (ref 0.1–0.6)
MONOCYTES # BLD AUTO: 1.01 K/UL — HIGH (ref 0.1–0.6)
MONOCYTES # BLD AUTO: 1.11 K/UL — HIGH (ref 0.1–0.6)
MONOCYTES # BLD AUTO: 1.14 K/UL — HIGH (ref 0.1–0.6)
MONOCYTES # BLD AUTO: 1.18 K/UL — HIGH (ref 0.1–0.6)
MONOCYTES NFR BLD AUTO: 10.1 % — HIGH (ref 1.7–9.3)
MONOCYTES NFR BLD AUTO: 10.3 % — HIGH (ref 1.7–9.3)
MONOCYTES NFR BLD AUTO: 10.5 % — HIGH (ref 1.7–9.3)
MONOCYTES NFR BLD AUTO: 10.9 % — HIGH (ref 1.7–9.3)
MONOCYTES NFR BLD AUTO: 12.6 % — HIGH (ref 1.7–9.3)
MONOCYTES NFR BLD AUTO: 13.2 % — HIGH (ref 1.7–9.3)
MONOCYTES NFR BLD AUTO: 3.9 % — SIGNIFICANT CHANGE UP (ref 1.7–9.3)
MONOCYTES NFR BLD AUTO: 3.9 % — SIGNIFICANT CHANGE UP (ref 1.7–9.3)
MONOCYTES NFR BLD AUTO: 4.4 % — SIGNIFICANT CHANGE UP (ref 1.7–9.3)
MONOCYTES NFR BLD AUTO: 4.7 % — SIGNIFICANT CHANGE UP (ref 1.7–9.3)
MONOCYTES NFR BLD AUTO: 4.8 % — SIGNIFICANT CHANGE UP (ref 1.7–9.3)
MONOCYTES NFR BLD AUTO: 5.2 % — SIGNIFICANT CHANGE UP (ref 1.7–9.3)
MONOCYTES NFR BLD AUTO: 5.4 % — SIGNIFICANT CHANGE UP (ref 1.7–9.3)
MONOCYTES NFR BLD AUTO: 5.4 % — SIGNIFICANT CHANGE UP (ref 1.7–9.3)
MONOCYTES NFR BLD AUTO: 5.5 % — SIGNIFICANT CHANGE UP (ref 1.7–9.3)
MONOCYTES NFR BLD AUTO: 5.5 % — SIGNIFICANT CHANGE UP (ref 1.7–9.3)
MONOCYTES NFR BLD AUTO: 6.2 % — SIGNIFICANT CHANGE UP (ref 1.7–9.3)
MONOCYTES NFR BLD AUTO: 6.7 % — SIGNIFICANT CHANGE UP (ref 1.7–9.3)
MONOCYTES NFR BLD AUTO: 7.1 % — SIGNIFICANT CHANGE UP (ref 1.7–9.3)
MONOCYTES NFR BLD AUTO: 7.5 % — SIGNIFICANT CHANGE UP (ref 1.7–9.3)
MONOCYTES NFR BLD AUTO: 7.6 % — SIGNIFICANT CHANGE UP (ref 1.7–9.3)
MONOCYTES NFR BLD AUTO: 8 % — SIGNIFICANT CHANGE UP (ref 1.7–9.3)
MONOCYTES NFR BLD AUTO: 9 % — SIGNIFICANT CHANGE UP (ref 1.7–9.3)
MONOCYTES NFR BLD AUTO: 9.7 % — HIGH (ref 1.7–9.3)
MRSA PCR RESULT.: NEGATIVE — SIGNIFICANT CHANGE UP
MTX SERPL-SCNC: <0.04 UMOL/L — LOW (ref 0.5–5)
NEUTROPHILS # BLD AUTO: 13.8 K/UL — HIGH (ref 1.4–6.5)
NEUTROPHILS # BLD AUTO: 14.71 K/UL — HIGH (ref 1.4–6.5)
NEUTROPHILS # BLD AUTO: 16.52 K/UL — HIGH (ref 1.4–6.5)
NEUTROPHILS # BLD AUTO: 16.71 K/UL — HIGH (ref 1.4–6.5)
NEUTROPHILS # BLD AUTO: 22.62 K/UL — HIGH (ref 1.4–6.5)
NEUTROPHILS # BLD AUTO: 3.02 K/UL — SIGNIFICANT CHANGE UP (ref 1.4–6.5)
NEUTROPHILS # BLD AUTO: 3.05 K/UL — SIGNIFICANT CHANGE UP (ref 1.4–6.5)
NEUTROPHILS # BLD AUTO: 3.16 K/UL — SIGNIFICANT CHANGE UP (ref 1.4–6.5)
NEUTROPHILS # BLD AUTO: 3.51 K/UL — SIGNIFICANT CHANGE UP (ref 1.4–6.5)
NEUTROPHILS # BLD AUTO: 3.61 K/UL — SIGNIFICANT CHANGE UP (ref 1.4–6.5)
NEUTROPHILS # BLD AUTO: 4 K/UL — SIGNIFICANT CHANGE UP (ref 1.4–6.5)
NEUTROPHILS # BLD AUTO: 4.52 K/UL — SIGNIFICANT CHANGE UP (ref 1.4–6.5)
NEUTROPHILS # BLD AUTO: 5.18 K/UL — SIGNIFICANT CHANGE UP (ref 1.4–6.5)
NEUTROPHILS # BLD AUTO: 5.64 K/UL — SIGNIFICANT CHANGE UP (ref 1.4–6.5)
NEUTROPHILS # BLD AUTO: 5.7 K/UL — SIGNIFICANT CHANGE UP (ref 1.4–6.5)
NEUTROPHILS # BLD AUTO: 6.34 K/UL — SIGNIFICANT CHANGE UP (ref 1.4–6.5)
NEUTROPHILS # BLD AUTO: 6.69 K/UL — HIGH (ref 1.4–6.5)
NEUTROPHILS # BLD AUTO: 6.93 K/UL — HIGH (ref 1.4–6.5)
NEUTROPHILS # BLD AUTO: 7.6 K/UL — HIGH (ref 1.4–6.5)
NEUTROPHILS # BLD AUTO: 7.91 K/UL — HIGH (ref 1.4–6.5)
NEUTROPHILS # BLD AUTO: 9.01 K/UL — HIGH (ref 1.4–6.5)
NEUTROPHILS # BLD AUTO: 9.2 K/UL — HIGH (ref 1.4–6.5)
NEUTROPHILS # BLD AUTO: 9.79 K/UL — HIGH (ref 1.4–6.5)
NEUTROPHILS # BLD AUTO: 9.8 K/UL — HIGH (ref 1.4–6.5)
NEUTROPHILS NFR BLD AUTO: 53.1 % — SIGNIFICANT CHANGE UP (ref 42.2–75.2)
NEUTROPHILS NFR BLD AUTO: 58.6 % — SIGNIFICANT CHANGE UP (ref 42.2–75.2)
NEUTROPHILS NFR BLD AUTO: 60.4 % — SIGNIFICANT CHANGE UP (ref 42.2–75.2)
NEUTROPHILS NFR BLD AUTO: 62.1 % — SIGNIFICANT CHANGE UP (ref 42.2–75.2)
NEUTROPHILS NFR BLD AUTO: 62.2 % — SIGNIFICANT CHANGE UP (ref 42.2–75.2)
NEUTROPHILS NFR BLD AUTO: 62.6 % — SIGNIFICANT CHANGE UP (ref 42.2–75.2)
NEUTROPHILS NFR BLD AUTO: 64 % — SIGNIFICANT CHANGE UP (ref 42.2–75.2)
NEUTROPHILS NFR BLD AUTO: 64.4 % — SIGNIFICANT CHANGE UP (ref 42.2–75.2)
NEUTROPHILS NFR BLD AUTO: 66.5 % — SIGNIFICANT CHANGE UP (ref 42.2–75.2)
NEUTROPHILS NFR BLD AUTO: 67.3 % — SIGNIFICANT CHANGE UP (ref 42.2–75.2)
NEUTROPHILS NFR BLD AUTO: 72.3 % — SIGNIFICANT CHANGE UP (ref 42.2–75.2)
NEUTROPHILS NFR BLD AUTO: 76.4 % — HIGH (ref 42.2–75.2)
NEUTROPHILS NFR BLD AUTO: 76.6 % — HIGH (ref 42.2–75.2)
NEUTROPHILS NFR BLD AUTO: 76.8 % — HIGH (ref 42.2–75.2)
NEUTROPHILS NFR BLD AUTO: 79 % — HIGH (ref 42.2–75.2)
NEUTROPHILS NFR BLD AUTO: 80.8 % — HIGH (ref 42.2–75.2)
NEUTROPHILS NFR BLD AUTO: 82.9 % — HIGH (ref 42.2–75.2)
NEUTROPHILS NFR BLD AUTO: 83.8 % — HIGH (ref 42.2–75.2)
NEUTROPHILS NFR BLD AUTO: 84.7 % — HIGH (ref 42.2–75.2)
NEUTROPHILS NFR BLD AUTO: 86.1 % — HIGH (ref 42.2–75.2)
NEUTROPHILS NFR BLD AUTO: 87.2 % — HIGH (ref 42.2–75.2)
NEUTROPHILS NFR BLD AUTO: 87.3 % — HIGH (ref 42.2–75.2)
NEUTROPHILS NFR BLD AUTO: 89.3 % — HIGH (ref 42.2–75.2)
NEUTROPHILS NFR BLD AUTO: 89.6 % — HIGH (ref 42.2–75.2)
NITRITE UR-MCNC: NEGATIVE — SIGNIFICANT CHANGE UP
NRBC # BLD: 0 /100 WBCS — SIGNIFICANT CHANGE UP (ref 0–0)
NRBC # BLD: 0 /100 — SIGNIFICANT CHANGE UP (ref 0–0)
NT-PROBNP SERPL-SCNC: 5450 PG/ML — HIGH (ref 0–300)
NT-PROBNP SERPL-SCNC: HIGH PG/ML (ref 0–300)
ORGANISM # SPEC MICROSCOPIC CNT: SIGNIFICANT CHANGE UP
ORGANISM # SPEC MICROSCOPIC CNT: SIGNIFICANT CHANGE UP
PCO2 BLDA: 29 MMHG — LOW (ref 32–35)
PCO2 BLDA: 30 MMHG — LOW (ref 32–35)
PCO2 BLDA: 32 MMHG — SIGNIFICANT CHANGE UP (ref 32–35)
PCO2 BLDA: 36 MMHG — HIGH (ref 32–35)
PCO2 BLDA: 36 MMHG — HIGH (ref 32–35)
PCO2 BLDA: 37 MMHG — HIGH (ref 32–35)
PCO2 BLDA: 38 MMHG — HIGH (ref 32–35)
PCO2 BLDA: 39 MMHG — HIGH (ref 32–35)
PCO2 BLDA: 42 MMHG — HIGH (ref 32–35)
PCO2 BLDA: 42 MMHG — HIGH (ref 32–35)
PCO2 BLDA: 44 MMHG — HIGH (ref 32–35)
PCO2 BLDA: 51 MMHG — HIGH (ref 32–35)
PCO2 BLDV: 54 MMHG — HIGH (ref 39–42)
PCO2 BLDV: 57 MMHG — HIGH (ref 39–42)
PCO2 BLDV: 61 MMHG — HIGH (ref 39–42)
PH BLDA: 7.45 — SIGNIFICANT CHANGE UP (ref 7.35–7.45)
PH BLDA: 7.46 — HIGH (ref 7.35–7.45)
PH BLDA: 7.47 — HIGH (ref 7.35–7.45)
PH BLDA: 7.48 — HIGH (ref 7.35–7.45)
PH BLDA: 7.48 — HIGH (ref 7.35–7.45)
PH BLDA: 7.5 — HIGH (ref 7.35–7.45)
PH BLDA: 7.53 — HIGH (ref 7.35–7.45)
PH BLDV: 7.19 — LOW (ref 7.32–7.43)
PH BLDV: 7.25 — LOW (ref 7.32–7.43)
PH BLDV: 7.38 — SIGNIFICANT CHANGE UP (ref 7.32–7.43)
PH UR: 5.5 — SIGNIFICANT CHANGE UP (ref 5–8)
PHOSPHATE SERPL-MCNC: 3.1 MG/DL — SIGNIFICANT CHANGE UP (ref 2.1–4.9)
PLAT MORPH BLD: NORMAL — SIGNIFICANT CHANGE UP
PLATELET # BLD AUTO: 227 K/UL — SIGNIFICANT CHANGE UP (ref 130–400)
PLATELET # BLD AUTO: 236 K/UL — SIGNIFICANT CHANGE UP (ref 130–400)
PLATELET # BLD AUTO: 237 K/UL — SIGNIFICANT CHANGE UP (ref 130–400)
PLATELET # BLD AUTO: 238 K/UL — SIGNIFICANT CHANGE UP (ref 130–400)
PLATELET # BLD AUTO: 241 K/UL — SIGNIFICANT CHANGE UP (ref 130–400)
PLATELET # BLD AUTO: 243 K/UL — SIGNIFICANT CHANGE UP (ref 130–400)
PLATELET # BLD AUTO: 247 K/UL — SIGNIFICANT CHANGE UP (ref 130–400)
PLATELET # BLD AUTO: 247 K/UL — SIGNIFICANT CHANGE UP (ref 130–400)
PLATELET # BLD AUTO: 248 K/UL — SIGNIFICANT CHANGE UP (ref 130–400)
PLATELET # BLD AUTO: 251 K/UL — SIGNIFICANT CHANGE UP (ref 130–400)
PLATELET # BLD AUTO: 251 K/UL — SIGNIFICANT CHANGE UP (ref 130–400)
PLATELET # BLD AUTO: 252 K/UL — SIGNIFICANT CHANGE UP (ref 130–400)
PLATELET # BLD AUTO: 252 K/UL — SIGNIFICANT CHANGE UP (ref 130–400)
PLATELET # BLD AUTO: 261 K/UL — SIGNIFICANT CHANGE UP (ref 130–400)
PLATELET # BLD AUTO: 268 K/UL — SIGNIFICANT CHANGE UP (ref 130–400)
PLATELET # BLD AUTO: 271 K/UL — SIGNIFICANT CHANGE UP (ref 130–400)
PLATELET # BLD AUTO: 274 K/UL — SIGNIFICANT CHANGE UP (ref 130–400)
PLATELET # BLD AUTO: 295 K/UL — SIGNIFICANT CHANGE UP (ref 130–400)
PLATELET # BLD AUTO: 296 K/UL — SIGNIFICANT CHANGE UP (ref 130–400)
PLATELET # BLD AUTO: 306 K/UL — SIGNIFICANT CHANGE UP (ref 130–400)
PLATELET # BLD AUTO: 307 K/UL — SIGNIFICANT CHANGE UP (ref 130–400)
PLATELET # BLD AUTO: 311 K/UL — SIGNIFICANT CHANGE UP (ref 130–400)
PLATELET # BLD AUTO: 312 K/UL — SIGNIFICANT CHANGE UP (ref 130–400)
PLATELET # BLD AUTO: 318 K/UL — SIGNIFICANT CHANGE UP (ref 130–400)
PLATELET # BLD AUTO: 321 K/UL — SIGNIFICANT CHANGE UP (ref 130–400)
PLATELET # BLD AUTO: 324 K/UL — SIGNIFICANT CHANGE UP (ref 130–400)
PLATELET # BLD AUTO: 330 K/UL — SIGNIFICANT CHANGE UP (ref 130–400)
PLATELET # BLD AUTO: 332 K/UL — SIGNIFICANT CHANGE UP (ref 130–400)
PLATELET # BLD AUTO: 333 K/UL — SIGNIFICANT CHANGE UP (ref 130–400)
PLATELET # BLD AUTO: 344 K/UL — SIGNIFICANT CHANGE UP (ref 130–400)
PLATELET # BLD AUTO: 356 K/UL — SIGNIFICANT CHANGE UP (ref 130–400)
PLATELET # BLD AUTO: 390 K/UL — SIGNIFICANT CHANGE UP (ref 130–400)
PLATELET # BLD AUTO: 392 K/UL — SIGNIFICANT CHANGE UP (ref 130–400)
PLATELET # BLD AUTO: 399 K/UL — SIGNIFICANT CHANGE UP (ref 130–400)
PLATELET # BLD AUTO: 464 K/UL — HIGH (ref 130–400)
PLATELET # BLD AUTO: 470 K/UL — HIGH (ref 130–400)
PLATELET COUNT - ESTIMATE: NORMAL — SIGNIFICANT CHANGE UP
PO2 BLDA: 108 MMHG — SIGNIFICANT CHANGE UP (ref 83–108)
PO2 BLDA: 111 MMHG — HIGH (ref 83–108)
PO2 BLDA: 124 MMHG — HIGH (ref 83–108)
PO2 BLDA: 124 MMHG — HIGH (ref 83–108)
PO2 BLDA: 149 MMHG — HIGH (ref 83–108)
PO2 BLDA: 195 MMHG — HIGH (ref 83–108)
PO2 BLDA: 247 MMHG — HIGH (ref 83–108)
PO2 BLDA: 283 MMHG — HIGH (ref 83–108)
PO2 BLDA: 328 MMHG — HIGH (ref 83–108)
PO2 BLDA: 64 MMHG — LOW (ref 83–108)
PO2 BLDA: 84 MMHG — SIGNIFICANT CHANGE UP (ref 83–108)
PO2 BLDA: 86 MMHG — SIGNIFICANT CHANGE UP (ref 83–108)
PO2 BLDV: 38 MMHG — SIGNIFICANT CHANGE UP
PO2 BLDV: 42 MMHG — SIGNIFICANT CHANGE UP
PO2 BLDV: 47 MMHG — SIGNIFICANT CHANGE UP
POTASSIUM BLDV-SCNC: 3.2 MMOL/L — LOW (ref 3.5–5.1)
POTASSIUM BLDV-SCNC: 4.8 MMOL/L — SIGNIFICANT CHANGE UP (ref 3.5–5.1)
POTASSIUM BLDV-SCNC: 6.3 MMOL/L — CRITICAL HIGH (ref 3.5–5.1)
POTASSIUM SERPL-MCNC: 3.2 MMOL/L — LOW (ref 3.5–5)
POTASSIUM SERPL-MCNC: 3.3 MMOL/L — LOW (ref 3.5–5)
POTASSIUM SERPL-MCNC: 3.4 MMOL/L — LOW (ref 3.5–5)
POTASSIUM SERPL-MCNC: 3.5 MMOL/L — SIGNIFICANT CHANGE UP (ref 3.5–5)
POTASSIUM SERPL-MCNC: 3.5 MMOL/L — SIGNIFICANT CHANGE UP (ref 3.5–5)
POTASSIUM SERPL-MCNC: 3.7 MMOL/L — SIGNIFICANT CHANGE UP (ref 3.5–5)
POTASSIUM SERPL-MCNC: 3.7 MMOL/L — SIGNIFICANT CHANGE UP (ref 3.5–5)
POTASSIUM SERPL-MCNC: 3.8 MMOL/L — SIGNIFICANT CHANGE UP (ref 3.5–5)
POTASSIUM SERPL-MCNC: 3.9 MMOL/L — SIGNIFICANT CHANGE UP (ref 3.5–5)
POTASSIUM SERPL-MCNC: 3.9 MMOL/L — SIGNIFICANT CHANGE UP (ref 3.5–5)
POTASSIUM SERPL-MCNC: 4 MMOL/L — SIGNIFICANT CHANGE UP (ref 3.5–5)
POTASSIUM SERPL-MCNC: 4 MMOL/L — SIGNIFICANT CHANGE UP (ref 3.5–5)
POTASSIUM SERPL-MCNC: 4.1 MMOL/L — SIGNIFICANT CHANGE UP (ref 3.5–5)
POTASSIUM SERPL-MCNC: 4.2 MMOL/L — SIGNIFICANT CHANGE UP (ref 3.5–5)
POTASSIUM SERPL-MCNC: 4.3 MMOL/L — SIGNIFICANT CHANGE UP (ref 3.5–5)
POTASSIUM SERPL-MCNC: 4.4 MMOL/L — SIGNIFICANT CHANGE UP (ref 3.5–5)
POTASSIUM SERPL-MCNC: 4.5 MMOL/L — SIGNIFICANT CHANGE UP (ref 3.5–5)
POTASSIUM SERPL-MCNC: 4.5 MMOL/L — SIGNIFICANT CHANGE UP (ref 3.5–5)
POTASSIUM SERPL-MCNC: 4.7 MMOL/L — SIGNIFICANT CHANGE UP (ref 3.5–5)
POTASSIUM SERPL-MCNC: 4.8 MMOL/L — SIGNIFICANT CHANGE UP (ref 3.5–5)
POTASSIUM SERPL-MCNC: 4.8 MMOL/L — SIGNIFICANT CHANGE UP (ref 3.5–5)
POTASSIUM SERPL-MCNC: 4.9 MMOL/L — SIGNIFICANT CHANGE UP (ref 3.5–5)
POTASSIUM SERPL-SCNC: 3.2 MMOL/L — LOW (ref 3.5–5)
POTASSIUM SERPL-SCNC: 3.3 MMOL/L — LOW (ref 3.5–5)
POTASSIUM SERPL-SCNC: 3.4 MMOL/L — LOW (ref 3.5–5)
POTASSIUM SERPL-SCNC: 3.5 MMOL/L — SIGNIFICANT CHANGE UP (ref 3.5–5)
POTASSIUM SERPL-SCNC: 3.5 MMOL/L — SIGNIFICANT CHANGE UP (ref 3.5–5)
POTASSIUM SERPL-SCNC: 3.7 MMOL/L — SIGNIFICANT CHANGE UP (ref 3.5–5)
POTASSIUM SERPL-SCNC: 3.7 MMOL/L — SIGNIFICANT CHANGE UP (ref 3.5–5)
POTASSIUM SERPL-SCNC: 3.8 MMOL/L — SIGNIFICANT CHANGE UP (ref 3.5–5)
POTASSIUM SERPL-SCNC: 3.9 MMOL/L — SIGNIFICANT CHANGE UP (ref 3.5–5)
POTASSIUM SERPL-SCNC: 3.9 MMOL/L — SIGNIFICANT CHANGE UP (ref 3.5–5)
POTASSIUM SERPL-SCNC: 4 MMOL/L — SIGNIFICANT CHANGE UP (ref 3.5–5)
POTASSIUM SERPL-SCNC: 4 MMOL/L — SIGNIFICANT CHANGE UP (ref 3.5–5)
POTASSIUM SERPL-SCNC: 4.1 MMOL/L — SIGNIFICANT CHANGE UP (ref 3.5–5)
POTASSIUM SERPL-SCNC: 4.2 MMOL/L — SIGNIFICANT CHANGE UP (ref 3.5–5)
POTASSIUM SERPL-SCNC: 4.3 MMOL/L — SIGNIFICANT CHANGE UP (ref 3.5–5)
POTASSIUM SERPL-SCNC: 4.4 MMOL/L — SIGNIFICANT CHANGE UP (ref 3.5–5)
POTASSIUM SERPL-SCNC: 4.5 MMOL/L — SIGNIFICANT CHANGE UP (ref 3.5–5)
POTASSIUM SERPL-SCNC: 4.5 MMOL/L — SIGNIFICANT CHANGE UP (ref 3.5–5)
POTASSIUM SERPL-SCNC: 4.7 MMOL/L — SIGNIFICANT CHANGE UP (ref 3.5–5)
POTASSIUM SERPL-SCNC: 4.8 MMOL/L — SIGNIFICANT CHANGE UP (ref 3.5–5)
POTASSIUM SERPL-SCNC: 4.8 MMOL/L — SIGNIFICANT CHANGE UP (ref 3.5–5)
POTASSIUM SERPL-SCNC: 4.9 MMOL/L — SIGNIFICANT CHANGE UP (ref 3.5–5)
PROCALCITONIN SERPL-MCNC: 0.07 NG/ML — SIGNIFICANT CHANGE UP (ref 0.02–0.1)
PROCALCITONIN SERPL-MCNC: 0.67 NG/ML — HIGH (ref 0.02–0.1)
PROCALCITONIN SERPL-MCNC: 0.98 NG/ML — HIGH (ref 0.02–0.1)
PROCALCITONIN SERPL-MCNC: 1.1 NG/ML — HIGH (ref 0.02–0.1)
PROCALCITONIN SERPL-MCNC: 1.82 NG/ML — HIGH (ref 0.02–0.1)
PROCALCITONIN SERPL-MCNC: 2.09 NG/ML — HIGH (ref 0.02–0.1)
PROT ?TM UR-MCNC: 6 MG/DLG/24H — SIGNIFICANT CHANGE UP
PROT PATTERN SERPL ELPH-IMP: SIGNIFICANT CHANGE UP
PROT SERPL-MCNC: 5 G/DL — LOW (ref 6–8)
PROT SERPL-MCNC: 5 G/DL — LOW (ref 6–8)
PROT SERPL-MCNC: 5.2 G/DL — LOW (ref 6–8)
PROT SERPL-MCNC: 5.3 G/DL — LOW (ref 6–8)
PROT SERPL-MCNC: 5.4 G/DL — LOW (ref 6–8)
PROT SERPL-MCNC: 5.5 G/DL — LOW (ref 6–8)
PROT SERPL-MCNC: 5.5 G/DL — LOW (ref 6–8)
PROT SERPL-MCNC: 5.6 G/DL — LOW (ref 6–8)
PROT SERPL-MCNC: 5.6 G/DL — LOW (ref 6–8.3)
PROT SERPL-MCNC: 5.6 G/DL — LOW (ref 6–8.3)
PROT SERPL-MCNC: 5.7 G/DL — LOW (ref 6–8)
PROT SERPL-MCNC: 5.8 G/DL — LOW (ref 6–8)
PROT SERPL-MCNC: 5.9 G/DL — LOW (ref 6–8)
PROT SERPL-MCNC: 6 G/DL — SIGNIFICANT CHANGE UP (ref 6–8)
PROT SERPL-MCNC: 6.1 G/DL — SIGNIFICANT CHANGE UP (ref 6–8)
PROT SERPL-MCNC: 6.3 G/DL — SIGNIFICANT CHANGE UP (ref 6–8)
PROT SERPL-MCNC: 6.4 G/DL — SIGNIFICANT CHANGE UP (ref 6–8)
PROT SERPL-MCNC: 6.7 G/DL — SIGNIFICANT CHANGE UP (ref 6–8)
PROT SERPL-MCNC: 7.1 G/DL — SIGNIFICANT CHANGE UP (ref 6–8)
PROT UR-MCNC: 100 MG/DL
PROT UR-MCNC: >=300 MG/DL
PROT UR-MCNC: ABNORMAL MG/DL
PROTHROM AB SERPL-ACNC: 12 SEC — SIGNIFICANT CHANGE UP (ref 9.95–12.87)
PROTHROM AB SERPL-ACNC: 16.8 SEC — HIGH (ref 9.95–12.87)
RAPID RVP RESULT: SIGNIFICANT CHANGE UP
RBC # BLD: 2.73 M/UL — LOW (ref 4.2–5.4)
RBC # BLD: 2.76 M/UL — LOW (ref 4.2–5.4)
RBC # BLD: 2.78 M/UL — LOW (ref 4.2–5.4)
RBC # BLD: 2.84 M/UL — LOW (ref 4.2–5.4)
RBC # BLD: 2.86 M/UL — LOW (ref 4.2–5.4)
RBC # BLD: 2.87 M/UL — LOW (ref 4.2–5.4)
RBC # BLD: 2.88 M/UL — LOW (ref 4.2–5.4)
RBC # BLD: 2.89 M/UL — LOW (ref 4.2–5.4)
RBC # BLD: 2.91 M/UL — LOW (ref 4.2–5.4)
RBC # BLD: 2.92 M/UL — LOW (ref 4.2–5.4)
RBC # BLD: 2.92 M/UL — LOW (ref 4.2–5.4)
RBC # BLD: 2.94 M/UL — LOW (ref 4.2–5.4)
RBC # BLD: 2.95 M/UL — LOW (ref 4.2–5.4)
RBC # BLD: 2.96 M/UL — LOW (ref 4.2–5.4)
RBC # BLD: 2.98 M/UL — LOW (ref 4.2–5.4)
RBC # BLD: 3.02 M/UL — LOW (ref 4.2–5.4)
RBC # BLD: 3.07 M/UL — LOW (ref 4.2–5.4)
RBC # BLD: 3.1 M/UL — LOW (ref 4.2–5.4)
RBC # BLD: 3.15 M/UL — LOW (ref 4.2–5.4)
RBC # BLD: 3.2 M/UL — LOW (ref 4.2–5.4)
RBC # BLD: 3.22 M/UL — LOW (ref 4.2–5.4)
RBC # BLD: 3.22 M/UL — LOW (ref 4.2–5.4)
RBC # BLD: 3.23 M/UL — LOW (ref 4.2–5.4)
RBC # BLD: 3.23 M/UL — LOW (ref 4.2–5.4)
RBC # BLD: 3.26 M/UL — LOW (ref 4.2–5.4)
RBC # BLD: 3.28 M/UL — LOW (ref 4.2–5.4)
RBC # BLD: 3.31 M/UL — LOW (ref 4.2–5.4)
RBC # BLD: 3.4 M/UL — LOW (ref 4.2–5.4)
RBC # BLD: 3.41 M/UL — LOW (ref 4.2–5.4)
RBC # BLD: 3.55 M/UL — LOW (ref 4.2–5.4)
RBC # BLD: 3.56 M/UL — LOW (ref 4.2–5.4)
RBC # BLD: 3.8 M/UL — LOW (ref 4.2–5.4)
RBC # FLD: 14.7 % — HIGH (ref 11.5–14.5)
RBC # FLD: 14.8 % — HIGH (ref 11.5–14.5)
RBC # FLD: 14.9 % — HIGH (ref 11.5–14.5)
RBC # FLD: 15 % — HIGH (ref 11.5–14.5)
RBC # FLD: 15.2 % — HIGH (ref 11.5–14.5)
RBC # FLD: 15.4 % — HIGH (ref 11.5–14.5)
RBC # FLD: 15.6 % — HIGH (ref 11.5–14.5)
RBC # FLD: 15.7 % — HIGH (ref 11.5–14.5)
RBC # FLD: 15.9 % — HIGH (ref 11.5–14.5)
RBC # FLD: 15.9 % — HIGH (ref 11.5–14.5)
RBC # FLD: 16 % — HIGH (ref 11.5–14.5)
RBC # FLD: 16.2 % — HIGH (ref 11.5–14.5)
RBC # FLD: 16.3 % — HIGH (ref 11.5–14.5)
RBC # FLD: 16.4 % — HIGH (ref 11.5–14.5)
RBC # FLD: 16.5 % — HIGH (ref 11.5–14.5)
RBC # FLD: 16.7 % — HIGH (ref 11.5–14.5)
RBC # FLD: 16.8 % — HIGH (ref 11.5–14.5)
RBC # FLD: 16.9 % — HIGH (ref 11.5–14.5)
RBC # FLD: 16.9 % — HIGH (ref 11.5–14.5)
RBC # FLD: 17 % — HIGH (ref 11.5–14.5)
RBC # FLD: 17 % — HIGH (ref 11.5–14.5)
RBC # FLD: 17.1 % — HIGH (ref 11.5–14.5)
RBC # FLD: 17.3 % — HIGH (ref 11.5–14.5)
RBC # FLD: 17.6 % — HIGH (ref 11.5–14.5)
RBC # FLD: 18.2 % — HIGH (ref 11.5–14.5)
RBC # FLD: 18.6 % — HIGH (ref 11.5–14.5)
RBC # FLD: 18.7 % — HIGH (ref 11.5–14.5)
RBC # FLD: 19.3 % — HIGH (ref 11.5–14.5)
RBC # FLD: 19.4 % — HIGH (ref 11.5–14.5)
RBC # FLD: 19.5 % — HIGH (ref 11.5–14.5)
RBC # FLD: 19.7 % — HIGH (ref 11.5–14.5)
RBC # FLD: 20 % — HIGH (ref 11.5–14.5)
RBC # FLD: 20.1 % — HIGH (ref 11.5–14.5)
RBC # FLD: 20.5 % — HIGH (ref 11.5–14.5)
RBC BLD AUTO: ABNORMAL
RBC CASTS # UR COMP ASSIST: ABNORMAL /HPF
RBC CASTS # UR COMP ASSIST: ABNORMAL /HPF
RBC CASTS # UR COMP ASSIST: SIGNIFICANT CHANGE UP /HPF
SAO2 % BLDA: 92.4 % — LOW (ref 94–98)
SAO2 % BLDA: 96.1 % — SIGNIFICANT CHANGE UP (ref 94–98)
SAO2 % BLDA: 96.1 % — SIGNIFICANT CHANGE UP (ref 94–98)
SAO2 % BLDA: 96.5 % — SIGNIFICANT CHANGE UP (ref 94–98)
SAO2 % BLDA: 96.7 % — SIGNIFICANT CHANGE UP (ref 94–98)
SAO2 % BLDA: 96.9 % — SIGNIFICANT CHANGE UP (ref 94–98)
SAO2 % BLDA: 97.1 % — SIGNIFICANT CHANGE UP (ref 94–98)
SAO2 % BLDA: 97.3 % — SIGNIFICANT CHANGE UP (ref 94–98)
SAO2 % BLDA: 97.6 % — SIGNIFICANT CHANGE UP (ref 94–98)
SAO2 % BLDA: 97.8 % — SIGNIFICANT CHANGE UP (ref 94–98)
SAO2 % BLDA: 98 % — SIGNIFICANT CHANGE UP (ref 94–98)
SAO2 % BLDA: 98.7 % — HIGH (ref 94–98)
SAO2 % BLDV: 57.3 % — SIGNIFICANT CHANGE UP
SAO2 % BLDV: 65.5 % — SIGNIFICANT CHANGE UP
SAO2 % BLDV: 68.8 % — SIGNIFICANT CHANGE UP
SARS-COV-2 IGG+IGM SERPL QL IA: >250 U/ML — HIGH
SARS-COV-2 IGG+IGM SERPL QL IA: POSITIVE
SARS-COV-2 RNA SPEC QL NAA+PROBE: SIGNIFICANT CHANGE UP
SODIUM SERPL-SCNC: 131 MMOL/L — LOW (ref 135–146)
SODIUM SERPL-SCNC: 132 MMOL/L — LOW (ref 135–146)
SODIUM SERPL-SCNC: 133 MMOL/L — LOW (ref 135–146)
SODIUM SERPL-SCNC: 134 MMOL/L — LOW (ref 135–146)
SODIUM SERPL-SCNC: 135 MMOL/L — SIGNIFICANT CHANGE UP (ref 135–146)
SODIUM SERPL-SCNC: 136 MMOL/L — SIGNIFICANT CHANGE UP (ref 135–146)
SODIUM SERPL-SCNC: 137 MMOL/L — SIGNIFICANT CHANGE UP (ref 135–146)
SODIUM SERPL-SCNC: 138 MMOL/L — SIGNIFICANT CHANGE UP (ref 135–146)
SODIUM SERPL-SCNC: 138 MMOL/L — SIGNIFICANT CHANGE UP (ref 135–146)
SODIUM SERPL-SCNC: 139 MMOL/L — SIGNIFICANT CHANGE UP (ref 135–146)
SODIUM SERPL-SCNC: 140 MMOL/L — SIGNIFICANT CHANGE UP (ref 135–146)
SODIUM SERPL-SCNC: 141 MMOL/L — SIGNIFICANT CHANGE UP (ref 135–146)
SODIUM SERPL-SCNC: 142 MMOL/L — SIGNIFICANT CHANGE UP (ref 135–146)
SODIUM SERPL-SCNC: 143 MMOL/L — SIGNIFICANT CHANGE UP (ref 135–146)
SODIUM SERPL-SCNC: 144 MMOL/L — SIGNIFICANT CHANGE UP (ref 135–146)
SODIUM SERPL-SCNC: 144 MMOL/L — SIGNIFICANT CHANGE UP (ref 135–146)
SODIUM UR-SCNC: 51 MMOL/L — SIGNIFICANT CHANGE UP
SP GR SPEC: 1.02 — SIGNIFICANT CHANGE UP (ref 1.01–1.03)
SP GR SPEC: 1.02 — SIGNIFICANT CHANGE UP (ref 1.01–1.03)
SP GR SPEC: >=1.03 (ref 1.01–1.03)
SPECIMEN SOURCE: SIGNIFICANT CHANGE UP
T3 SERPL-MCNC: 67 NG/DL — LOW (ref 80–200)
T4 FREE SERPL-MCNC: 1.2 NG/DL — SIGNIFICANT CHANGE UP (ref 0.9–1.8)
T4 FREE SERPL-MCNC: 1.3 NG/DL — SIGNIFICANT CHANGE UP (ref 0.9–1.8)
TIBC SERPL-MCNC: 251 UG/DL — SIGNIFICANT CHANGE UP (ref 220–430)
TIBC SERPL-MCNC: 268 UG/DL — SIGNIFICANT CHANGE UP (ref 220–430)
TRANSFERRIN SERPL-MCNC: 229 MG/DL — SIGNIFICANT CHANGE UP (ref 200–360)
TRI-PHOS CRY UR QL COMP ASSIST: NEGATIVE — SIGNIFICANT CHANGE UP
TROPONIN T SERPL-MCNC: 0.01 NG/ML — SIGNIFICANT CHANGE UP
TROPONIN T SERPL-MCNC: 0.02 NG/ML — HIGH
TROPONIN T SERPL-MCNC: 0.02 NG/ML — HIGH
TROPONIN T SERPL-MCNC: 0.09 NG/ML — CRITICAL HIGH
TROPONIN T SERPL-MCNC: 0.1 NG/ML — CRITICAL HIGH
TROPONIN T SERPL-MCNC: 0.11 NG/ML — CRITICAL HIGH
TROPONIN T SERPL-MCNC: 0.16 NG/ML — CRITICAL HIGH
TROPONIN T SERPL-MCNC: 0.16 NG/ML — CRITICAL HIGH
TROPONIN T SERPL-MCNC: 0.19 NG/ML — CRITICAL HIGH
TROPONIN T SERPL-MCNC: 0.35 NG/ML — CRITICAL HIGH
TROPONIN T SERPL-MCNC: 0.49 NG/ML — CRITICAL HIGH
TROPONIN T SERPL-MCNC: 0.61 NG/ML — CRITICAL HIGH
TROPONIN T SERPL-MCNC: 0.69 NG/ML — CRITICAL HIGH
TROPONIN T SERPL-MCNC: 0.7 NG/ML — CRITICAL HIGH
TROPONIN T SERPL-MCNC: 0.75 NG/ML — CRITICAL HIGH
TROPONIN T SERPL-MCNC: 0.82 NG/ML — CRITICAL HIGH
TROPONIN T SERPL-MCNC: 0.85 NG/ML — CRITICAL HIGH
TROPONIN T SERPL-MCNC: 0.96 NG/ML — CRITICAL HIGH
TSH SERPL-MCNC: 12.24 UIU/ML — HIGH (ref 0.27–4.2)
TSH SERPL-MCNC: 16.41 UIU/ML — HIGH (ref 0.27–4.2)
UIBC SERPL-MCNC: 170 UG/DL — SIGNIFICANT CHANGE UP (ref 110–370)
UIBC SERPL-MCNC: 228 UG/DL — SIGNIFICANT CHANGE UP (ref 110–370)
URATE CRY FLD QL MICRO: NEGATIVE — SIGNIFICANT CHANGE UP
UROBILINOGEN FLD QL: 0.2 MG/DL — SIGNIFICANT CHANGE UP
VANCOMYCIN FLD-MCNC: 17.1 UG/ML — HIGH (ref 5–10)
VANCOMYCIN FLD-MCNC: 23.6 UG/ML — HIGH (ref 5–10)
VANCOMYCIN FLD-MCNC: 28.2 UG/ML — HIGH (ref 5–10)
VANCOMYCIN FLD-MCNC: 28.4 UG/ML — HIGH (ref 5–10)
VANCOMYCIN FLD-MCNC: 35 UG/ML — HIGH (ref 5–10)
VANCOMYCIN FLD-MCNC: 9.7 UG/ML — SIGNIFICANT CHANGE UP (ref 5–10)
VANCOMYCIN TROUGH SERPL-MCNC: 37.4 UG/ML — HIGH (ref 5–10)
VANCOMYCIN TROUGH SERPL-MCNC: 39.7 UG/ML — HIGH (ref 5–10)
VANCOMYCIN TROUGH SERPL-MCNC: 40.9 UG/ML — HIGH (ref 5–10)
VANCOMYCIN TROUGH SERPL-MCNC: 9.6 UG/ML — SIGNIFICANT CHANGE UP (ref 5–10)
WBC # BLD: 10.3 K/UL — SIGNIFICANT CHANGE UP (ref 4.8–10.8)
WBC # BLD: 10.54 K/UL — SIGNIFICANT CHANGE UP (ref 4.8–10.8)
WBC # BLD: 10.67 K/UL — SIGNIFICANT CHANGE UP (ref 4.8–10.8)
WBC # BLD: 11.22 K/UL — HIGH (ref 4.8–10.8)
WBC # BLD: 11.65 K/UL — HIGH (ref 4.8–10.8)
WBC # BLD: 11.81 K/UL — HIGH (ref 4.8–10.8)
WBC # BLD: 13.22 K/UL — HIGH (ref 4.8–10.8)
WBC # BLD: 14.63 K/UL — HIGH (ref 4.8–10.8)
WBC # BLD: 14.72 K/UL — HIGH (ref 4.8–10.8)
WBC # BLD: 14.77 K/UL — HIGH (ref 4.8–10.8)
WBC # BLD: 15.32 K/UL — HIGH (ref 4.8–10.8)
WBC # BLD: 16.3 K/UL — HIGH (ref 4.8–10.8)
WBC # BLD: 17.57 K/UL — HIGH (ref 4.8–10.8)
WBC # BLD: 18.02 K/UL — HIGH (ref 4.8–10.8)
WBC # BLD: 19.4 K/UL — HIGH (ref 4.8–10.8)
WBC # BLD: 25.25 K/UL — HIGH (ref 4.8–10.8)
WBC # BLD: 4.7 K/UL — LOW (ref 4.8–10.8)
WBC # BLD: 5.23 K/UL — SIGNIFICANT CHANGE UP (ref 4.8–10.8)
WBC # BLD: 5.65 K/UL — SIGNIFICANT CHANGE UP (ref 4.8–10.8)
WBC # BLD: 5.75 K/UL — SIGNIFICANT CHANGE UP (ref 4.8–10.8)
WBC # BLD: 6.17 K/UL — SIGNIFICANT CHANGE UP (ref 4.8–10.8)
WBC # BLD: 6.22 K/UL — SIGNIFICANT CHANGE UP (ref 4.8–10.8)
WBC # BLD: 6.39 K/UL — SIGNIFICANT CHANGE UP (ref 4.8–10.8)
WBC # BLD: 7.11 K/UL — SIGNIFICANT CHANGE UP (ref 4.8–10.8)
WBC # BLD: 7.26 K/UL — SIGNIFICANT CHANGE UP (ref 4.8–10.8)
WBC # BLD: 7.65 K/UL — SIGNIFICANT CHANGE UP (ref 4.8–10.8)
WBC # BLD: 7.66 K/UL — SIGNIFICANT CHANGE UP (ref 4.8–10.8)
WBC # BLD: 7.77 K/UL — SIGNIFICANT CHANGE UP (ref 4.8–10.8)
WBC # BLD: 7.79 K/UL — SIGNIFICANT CHANGE UP (ref 4.8–10.8)
WBC # BLD: 7.79 K/UL — SIGNIFICANT CHANGE UP (ref 4.8–10.8)
WBC # BLD: 7.88 K/UL — SIGNIFICANT CHANGE UP (ref 4.8–10.8)
WBC # BLD: 8.47 K/UL — SIGNIFICANT CHANGE UP (ref 4.8–10.8)
WBC # BLD: 8.74 K/UL — SIGNIFICANT CHANGE UP (ref 4.8–10.8)
WBC # BLD: 9.05 K/UL — SIGNIFICANT CHANGE UP (ref 4.8–10.8)
WBC # BLD: 9.4 K/UL — SIGNIFICANT CHANGE UP (ref 4.8–10.8)
WBC # BLD: 9.7 K/UL — SIGNIFICANT CHANGE UP (ref 4.8–10.8)
WBC # FLD AUTO: 10.3 K/UL — SIGNIFICANT CHANGE UP (ref 4.8–10.8)
WBC # FLD AUTO: 10.54 K/UL — SIGNIFICANT CHANGE UP (ref 4.8–10.8)
WBC # FLD AUTO: 10.67 K/UL — SIGNIFICANT CHANGE UP (ref 4.8–10.8)
WBC # FLD AUTO: 11.22 K/UL — HIGH (ref 4.8–10.8)
WBC # FLD AUTO: 11.65 K/UL — HIGH (ref 4.8–10.8)
WBC # FLD AUTO: 11.81 K/UL — HIGH (ref 4.8–10.8)
WBC # FLD AUTO: 13.22 K/UL — HIGH (ref 4.8–10.8)
WBC # FLD AUTO: 14.63 K/UL — HIGH (ref 4.8–10.8)
WBC # FLD AUTO: 14.72 K/UL — HIGH (ref 4.8–10.8)
WBC # FLD AUTO: 14.77 K/UL — HIGH (ref 4.8–10.8)
WBC # FLD AUTO: 15.32 K/UL — HIGH (ref 4.8–10.8)
WBC # FLD AUTO: 16.3 K/UL — HIGH (ref 4.8–10.8)
WBC # FLD AUTO: 17.57 K/UL — HIGH (ref 4.8–10.8)
WBC # FLD AUTO: 18.02 K/UL — HIGH (ref 4.8–10.8)
WBC # FLD AUTO: 19.4 K/UL — HIGH (ref 4.8–10.8)
WBC # FLD AUTO: 25.25 K/UL — HIGH (ref 4.8–10.8)
WBC # FLD AUTO: 4.7 K/UL — LOW (ref 4.8–10.8)
WBC # FLD AUTO: 5.23 K/UL — SIGNIFICANT CHANGE UP (ref 4.8–10.8)
WBC # FLD AUTO: 5.65 K/UL — SIGNIFICANT CHANGE UP (ref 4.8–10.8)
WBC # FLD AUTO: 5.75 K/UL — SIGNIFICANT CHANGE UP (ref 4.8–10.8)
WBC # FLD AUTO: 6.17 K/UL — SIGNIFICANT CHANGE UP (ref 4.8–10.8)
WBC # FLD AUTO: 6.22 K/UL — SIGNIFICANT CHANGE UP (ref 4.8–10.8)
WBC # FLD AUTO: 6.39 K/UL — SIGNIFICANT CHANGE UP (ref 4.8–10.8)
WBC # FLD AUTO: 7.11 K/UL — SIGNIFICANT CHANGE UP (ref 4.8–10.8)
WBC # FLD AUTO: 7.26 K/UL — SIGNIFICANT CHANGE UP (ref 4.8–10.8)
WBC # FLD AUTO: 7.65 K/UL — SIGNIFICANT CHANGE UP (ref 4.8–10.8)
WBC # FLD AUTO: 7.66 K/UL — SIGNIFICANT CHANGE UP (ref 4.8–10.8)
WBC # FLD AUTO: 7.77 K/UL — SIGNIFICANT CHANGE UP (ref 4.8–10.8)
WBC # FLD AUTO: 7.79 K/UL — SIGNIFICANT CHANGE UP (ref 4.8–10.8)
WBC # FLD AUTO: 7.79 K/UL — SIGNIFICANT CHANGE UP (ref 4.8–10.8)
WBC # FLD AUTO: 7.88 K/UL — SIGNIFICANT CHANGE UP (ref 4.8–10.8)
WBC # FLD AUTO: 8.47 K/UL — SIGNIFICANT CHANGE UP (ref 4.8–10.8)
WBC # FLD AUTO: 8.74 K/UL — SIGNIFICANT CHANGE UP (ref 4.8–10.8)
WBC # FLD AUTO: 9.05 K/UL — SIGNIFICANT CHANGE UP (ref 4.8–10.8)
WBC # FLD AUTO: 9.4 K/UL — SIGNIFICANT CHANGE UP (ref 4.8–10.8)
WBC # FLD AUTO: 9.7 K/UL — SIGNIFICANT CHANGE UP (ref 4.8–10.8)
WBC UR QL: ABNORMAL /HPF
WBC UR QL: ABNORMAL /HPF
WBC UR QL: NEGATIVE — SIGNIFICANT CHANGE UP

## 2021-01-01 PROCEDURE — 99233 SBSQ HOSP IP/OBS HIGH 50: CPT

## 2021-01-01 PROCEDURE — 93970 EXTREMITY STUDY: CPT | Mod: 26

## 2021-01-01 PROCEDURE — 71045 X-RAY EXAM CHEST 1 VIEW: CPT | Mod: 26

## 2021-01-01 PROCEDURE — 99232 SBSQ HOSP IP/OBS MODERATE 35: CPT

## 2021-01-01 PROCEDURE — 93010 ELECTROCARDIOGRAM REPORT: CPT

## 2021-01-01 PROCEDURE — 93308 TTE F-UP OR LMTD: CPT | Mod: 26

## 2021-01-01 PROCEDURE — 99291 CRITICAL CARE FIRST HOUR: CPT

## 2021-01-01 PROCEDURE — ZZZZZ: CPT

## 2021-01-01 PROCEDURE — 99233 SBSQ HOSP IP/OBS HIGH 50: CPT | Mod: GC

## 2021-01-01 PROCEDURE — 99223 1ST HOSP IP/OBS HIGH 75: CPT

## 2021-01-01 PROCEDURE — 71275 CT ANGIOGRAPHY CHEST: CPT | Mod: 26,MA

## 2021-01-01 PROCEDURE — 71045 X-RAY EXAM CHEST 1 VIEW: CPT | Mod: 26,77

## 2021-01-01 PROCEDURE — 70450 CT HEAD/BRAIN W/O DYE: CPT | Mod: 26,MA

## 2021-01-01 PROCEDURE — 99222 1ST HOSP IP/OBS MODERATE 55: CPT

## 2021-01-01 PROCEDURE — 99232 SBSQ HOSP IP/OBS MODERATE 35: CPT | Mod: GC

## 2021-01-01 PROCEDURE — 74018 RADEX ABDOMEN 1 VIEW: CPT | Mod: 26

## 2021-01-01 PROCEDURE — 99285 EMERGENCY DEPT VISIT HI MDM: CPT

## 2021-01-01 PROCEDURE — 93306 TTE W/DOPPLER COMPLETE: CPT | Mod: 26

## 2021-01-01 PROCEDURE — 31624 DX BRONCHOSCOPE/LAVAGE: CPT

## 2021-01-01 PROCEDURE — 93010 ELECTROCARDIOGRAM REPORT: CPT | Mod: 76

## 2021-01-01 PROCEDURE — 71045 X-RAY EXAM CHEST 1 VIEW: CPT | Mod: 26,76

## 2021-01-01 PROCEDURE — 99233 SBSQ HOSP IP/OBS HIGH 50: CPT | Mod: 25

## 2021-01-01 PROCEDURE — 74176 CT ABD & PELVIS W/O CONTRAST: CPT | Mod: 26

## 2021-01-01 PROCEDURE — 92950 HEART/LUNG RESUSCITATION CPR: CPT

## 2021-01-01 PROCEDURE — 99239 HOSP IP/OBS DSCHRG MGMT >30: CPT

## 2021-01-01 PROCEDURE — 71250 CT THORAX DX C-: CPT | Mod: 26

## 2021-01-01 PROCEDURE — 76775 US EXAM ABDO BACK WALL LIM: CPT | Mod: 26

## 2021-01-01 PROCEDURE — 76705 ECHO EXAM OF ABDOMEN: CPT | Mod: 26

## 2021-01-01 PROCEDURE — 99232 SBSQ HOSP IP/OBS MODERATE 35: CPT | Mod: 25,GC

## 2021-01-01 PROCEDURE — 99497 ADVNCD CARE PLAN 30 MIN: CPT | Mod: GC,25

## 2021-01-01 PROCEDURE — 99291 CRITICAL CARE FIRST HOUR: CPT | Mod: 25

## 2021-01-01 RX ORDER — POTASSIUM CHLORIDE 20 MEQ
20 PACKET (EA) ORAL
Refills: 0 | Status: DISCONTINUED | OUTPATIENT
Start: 2021-01-01 | End: 2021-01-01

## 2021-01-01 RX ORDER — INSULIN LISPRO 100/ML
6 VIAL (ML) SUBCUTANEOUS
Refills: 0 | Status: DISCONTINUED | OUTPATIENT
Start: 2021-01-01 | End: 2021-01-01

## 2021-01-01 RX ORDER — DEXTROSE 50 % IN WATER 50 %
50 SYRINGE (ML) INTRAVENOUS ONCE
Refills: 0 | Status: COMPLETED | OUTPATIENT
Start: 2021-01-01 | End: 2021-01-01

## 2021-01-01 RX ORDER — DEXTROSE 50 % IN WATER 50 %
25 SYRINGE (ML) INTRAVENOUS ONCE
Refills: 0 | Status: DISCONTINUED | OUTPATIENT
Start: 2021-01-01 | End: 2021-01-01

## 2021-01-01 RX ORDER — SODIUM CHLORIDE 9 MG/ML
1000 INJECTION, SOLUTION INTRAVENOUS
Refills: 0 | Status: DISCONTINUED | OUTPATIENT
Start: 2021-01-01 | End: 2021-01-01

## 2021-01-01 RX ORDER — MAGNESIUM SULFATE 500 MG/ML
2 VIAL (ML) INJECTION ONCE
Refills: 0 | Status: COMPLETED | OUTPATIENT
Start: 2021-01-01 | End: 2021-01-01

## 2021-01-01 RX ORDER — GABAPENTIN 400 MG/1
400 CAPSULE ORAL THREE TIMES A DAY
Refills: 0 | Status: DISCONTINUED | OUTPATIENT
Start: 2021-01-01 | End: 2021-01-01

## 2021-01-01 RX ORDER — METOPROLOL TARTRATE 50 MG
25 TABLET ORAL
Refills: 0 | Status: DISCONTINUED | OUTPATIENT
Start: 2021-01-01 | End: 2021-01-01

## 2021-01-01 RX ORDER — INSULIN DEGLUDEC 100 U/ML
20 INJECTION, SOLUTION SUBCUTANEOUS
Qty: 0 | Refills: 0 | DISCHARGE

## 2021-01-01 RX ORDER — ACETAMINOPHEN 500 MG
650 TABLET ORAL EVERY 4 HOURS
Refills: 0 | Status: DISCONTINUED | OUTPATIENT
Start: 2021-01-01 | End: 2021-01-01

## 2021-01-01 RX ORDER — ACETAMINOPHEN 500 MG
650 TABLET ORAL EVERY 6 HOURS
Refills: 0 | Status: DISCONTINUED | OUTPATIENT
Start: 2021-01-01 | End: 2021-01-01

## 2021-01-01 RX ORDER — VANCOMYCIN HCL 1 G
750 VIAL (EA) INTRAVENOUS EVERY 12 HOURS
Refills: 0 | Status: DISCONTINUED | OUTPATIENT
Start: 2021-01-01 | End: 2021-01-01

## 2021-01-01 RX ORDER — INSULIN GLARGINE 100 [IU]/ML
15 INJECTION, SOLUTION SUBCUTANEOUS EVERY MORNING
Refills: 0 | Status: DISCONTINUED | OUTPATIENT
Start: 2021-01-01 | End: 2021-01-01

## 2021-01-01 RX ORDER — DULOXETINE HYDROCHLORIDE 30 MG/1
1 CAPSULE, DELAYED RELEASE ORAL
Qty: 0 | Refills: 0 | DISCHARGE

## 2021-01-01 RX ORDER — FUROSEMIDE 40 MG
40 TABLET ORAL ONCE
Refills: 0 | Status: COMPLETED | OUTPATIENT
Start: 2021-01-01 | End: 2021-01-01

## 2021-01-01 RX ORDER — HYDROCORTISONE 20 MG
100 TABLET ORAL EVERY 8 HOURS
Refills: 0 | Status: DISCONTINUED | OUTPATIENT
Start: 2021-01-01 | End: 2021-01-01

## 2021-01-01 RX ORDER — ATORVASTATIN CALCIUM 80 MG/1
40 TABLET, FILM COATED ORAL AT BEDTIME
Refills: 0 | Status: DISCONTINUED | OUTPATIENT
Start: 2021-01-01 | End: 2021-01-01

## 2021-01-01 RX ORDER — FUROSEMIDE 40 MG
40 TABLET ORAL EVERY 12 HOURS
Refills: 0 | Status: DISCONTINUED | OUTPATIENT
Start: 2021-01-01 | End: 2021-01-01

## 2021-01-01 RX ORDER — INSULIN LISPRO 100/ML
8 VIAL (ML) SUBCUTANEOUS
Refills: 0 | Status: DISCONTINUED | OUTPATIENT
Start: 2021-01-01 | End: 2021-01-01

## 2021-01-01 RX ORDER — SODIUM BICARBONATE 1 MEQ/ML
50 SYRINGE (ML) INTRAVENOUS ONCE
Refills: 0 | Status: COMPLETED | OUTPATIENT
Start: 2021-01-01 | End: 2021-01-01

## 2021-01-01 RX ORDER — IOHEXOL 300 MG/ML
30 INJECTION, SOLUTION INTRAVENOUS ONCE
Refills: 0 | Status: COMPLETED | OUTPATIENT
Start: 2021-01-01 | End: 2021-01-01

## 2021-01-01 RX ORDER — FUROSEMIDE 40 MG
60 TABLET ORAL DAILY
Refills: 0 | Status: DISCONTINUED | OUTPATIENT
Start: 2021-01-01 | End: 2021-01-01

## 2021-01-01 RX ORDER — ASPIRIN/CALCIUM CARB/MAGNESIUM 324 MG
324 TABLET ORAL ONCE
Refills: 0 | Status: COMPLETED | OUTPATIENT
Start: 2021-01-01 | End: 2021-01-01

## 2021-01-01 RX ORDER — METHOTREXATE 2.5 MG/1
17.5 TABLET ORAL
Refills: 0 | Status: DISCONTINUED | OUTPATIENT
Start: 2021-01-01 | End: 2021-01-01

## 2021-01-01 RX ORDER — MIDAZOLAM HYDROCHLORIDE 1 MG/ML
2 INJECTION, SOLUTION INTRAMUSCULAR; INTRAVENOUS ONCE
Refills: 0 | Status: DISCONTINUED | OUTPATIENT
Start: 2021-01-01 | End: 2021-01-01

## 2021-01-01 RX ORDER — INSULIN LISPRO 100/ML
VIAL (ML) SUBCUTANEOUS
Refills: 0 | Status: DISCONTINUED | OUTPATIENT
Start: 2021-01-01 | End: 2021-01-01

## 2021-01-01 RX ORDER — PANTOPRAZOLE SODIUM 20 MG/1
40 TABLET, DELAYED RELEASE ORAL DAILY
Refills: 0 | Status: DISCONTINUED | OUTPATIENT
Start: 2021-01-01 | End: 2021-01-01

## 2021-01-01 RX ORDER — INSULIN GLARGINE 100 [IU]/ML
20 INJECTION, SOLUTION SUBCUTANEOUS EVERY MORNING
Refills: 0 | Status: DISCONTINUED | OUTPATIENT
Start: 2021-01-01 | End: 2021-01-01

## 2021-01-01 RX ORDER — FUROSEMIDE 40 MG
40 TABLET ORAL DAILY
Refills: 0 | Status: DISCONTINUED | OUTPATIENT
Start: 2021-01-01 | End: 2021-01-01

## 2021-01-01 RX ORDER — SODIUM CHLORIDE 9 MG/ML
250 INJECTION INTRAMUSCULAR; INTRAVENOUS; SUBCUTANEOUS ONCE
Refills: 0 | Status: COMPLETED | OUTPATIENT
Start: 2021-01-01 | End: 2021-01-01

## 2021-01-01 RX ORDER — VANCOMYCIN HCL 1 G
VIAL (EA) INTRAVENOUS
Refills: 0 | Status: DISCONTINUED | OUTPATIENT
Start: 2021-01-01 | End: 2021-01-01

## 2021-01-01 RX ORDER — FOLIC ACID 0.8 MG
1 TABLET ORAL DAILY
Refills: 0 | Status: DISCONTINUED | OUTPATIENT
Start: 2021-01-01 | End: 2021-01-01

## 2021-01-01 RX ORDER — INSULIN LISPRO 100/ML
5 VIAL (ML) SUBCUTANEOUS ONCE
Refills: 0 | Status: COMPLETED | OUTPATIENT
Start: 2021-01-01 | End: 2021-01-01

## 2021-01-01 RX ORDER — INFLUENZA VIRUS VACCINE 15; 15; 15; 15 UG/.5ML; UG/.5ML; UG/.5ML; UG/.5ML
0.5 SUSPENSION INTRAMUSCULAR ONCE
Refills: 0 | Status: COMPLETED | OUTPATIENT
Start: 2021-01-01 | End: 2021-01-01

## 2021-01-01 RX ORDER — FENTANYL CITRATE 50 UG/ML
50 INJECTION INTRAVENOUS ONCE
Refills: 0 | Status: DISCONTINUED | OUTPATIENT
Start: 2021-01-01 | End: 2021-01-01

## 2021-01-01 RX ORDER — INSULIN HUMAN 100 [IU]/ML
5 INJECTION, SOLUTION SUBCUTANEOUS
Qty: 50 | Refills: 0 | Status: DISCONTINUED | OUTPATIENT
Start: 2021-01-01 | End: 2021-01-01

## 2021-01-01 RX ORDER — CHLORHEXIDINE GLUCONATE 213 G/1000ML
1 SOLUTION TOPICAL
Refills: 0 | Status: DISCONTINUED | OUTPATIENT
Start: 2021-01-01 | End: 2021-01-01

## 2021-01-01 RX ORDER — ASPIRIN/CALCIUM CARB/MAGNESIUM 324 MG
1 TABLET ORAL
Qty: 0 | Refills: 0 | DISCHARGE

## 2021-01-01 RX ORDER — VANCOMYCIN HCL 1 G
750 VIAL (EA) INTRAVENOUS ONCE
Refills: 0 | Status: COMPLETED | OUTPATIENT
Start: 2021-01-01 | End: 2021-01-01

## 2021-01-01 RX ORDER — GABAPENTIN 400 MG/1
1 CAPSULE ORAL
Qty: 0 | Refills: 0 | DISCHARGE

## 2021-01-01 RX ORDER — LEVOTHYROXINE SODIUM 125 MCG
175 TABLET ORAL DAILY
Refills: 0 | Status: DISCONTINUED | OUTPATIENT
Start: 2021-01-01 | End: 2021-01-01

## 2021-01-01 RX ORDER — GABAPENTIN 400 MG/1
300 CAPSULE ORAL DAILY
Refills: 0 | Status: DISCONTINUED | OUTPATIENT
Start: 2021-01-01 | End: 2021-01-01

## 2021-01-01 RX ORDER — ONDANSETRON 8 MG/1
4 TABLET, FILM COATED ORAL ONCE
Refills: 0 | Status: COMPLETED | OUTPATIENT
Start: 2021-01-01 | End: 2021-01-01

## 2021-01-01 RX ORDER — SIMVASTATIN 20 MG/1
1 TABLET, FILM COATED ORAL
Qty: 0 | Refills: 0 | DISCHARGE

## 2021-01-01 RX ORDER — DEXTROSE 50 % IN WATER 50 %
15 SYRINGE (ML) INTRAVENOUS ONCE
Refills: 0 | Status: DISCONTINUED | OUTPATIENT
Start: 2021-01-01 | End: 2021-01-01

## 2021-01-01 RX ORDER — POTASSIUM CHLORIDE 20 MEQ
20 PACKET (EA) ORAL
Refills: 0 | Status: COMPLETED | OUTPATIENT
Start: 2021-01-01 | End: 2021-01-01

## 2021-01-01 RX ORDER — DEXMEDETOMIDINE HYDROCHLORIDE IN 0.9% SODIUM CHLORIDE 4 UG/ML
2 INJECTION INTRAVENOUS ONCE
Refills: 0 | Status: COMPLETED | OUTPATIENT
Start: 2021-01-01 | End: 2021-01-01

## 2021-01-01 RX ORDER — CEFEPIME 1 G/1
2000 INJECTION, POWDER, FOR SOLUTION INTRAMUSCULAR; INTRAVENOUS ONCE
Refills: 0 | Status: COMPLETED | OUTPATIENT
Start: 2021-01-01 | End: 2021-01-01

## 2021-01-01 RX ORDER — DULOXETINE HYDROCHLORIDE 30 MG/1
60 CAPSULE, DELAYED RELEASE ORAL DAILY
Refills: 0 | Status: DISCONTINUED | OUTPATIENT
Start: 2021-01-01 | End: 2021-01-01

## 2021-01-01 RX ORDER — PHENYLEPHRINE HYDROCHLORIDE 10 MG/ML
0.1 INJECTION INTRAVENOUS
Qty: 160 | Refills: 0 | Status: DISCONTINUED | OUTPATIENT
Start: 2021-01-01 | End: 2021-01-01

## 2021-01-01 RX ORDER — FUROSEMIDE 40 MG
40 TABLET ORAL EVERY 12 HOURS
Refills: 0 | Status: COMPLETED | OUTPATIENT
Start: 2021-01-01 | End: 2021-01-01

## 2021-01-01 RX ORDER — CEFEPIME 1 G/1
INJECTION, POWDER, FOR SOLUTION INTRAMUSCULAR; INTRAVENOUS
Refills: 0 | Status: DISCONTINUED | OUTPATIENT
Start: 2021-01-01 | End: 2021-01-01

## 2021-01-01 RX ORDER — HYDROXYCHLOROQUINE SULFATE 200 MG
200 TABLET ORAL
Refills: 0 | Status: DISCONTINUED | OUTPATIENT
Start: 2021-01-01 | End: 2021-01-01

## 2021-01-01 RX ORDER — ASPIRIN/CALCIUM CARB/MAGNESIUM 324 MG
81 TABLET ORAL DAILY
Refills: 0 | Status: DISCONTINUED | OUTPATIENT
Start: 2021-01-01 | End: 2021-01-01

## 2021-01-01 RX ORDER — FUROSEMIDE 40 MG
40 TABLET ORAL EVERY 24 HOURS
Refills: 0 | Status: DISCONTINUED | OUTPATIENT
Start: 2021-01-01 | End: 2021-01-01

## 2021-01-01 RX ORDER — DEXMEDETOMIDINE HYDROCHLORIDE IN 0.9% SODIUM CHLORIDE 4 UG/ML
0.2 INJECTION INTRAVENOUS
Qty: 400 | Refills: 0 | Status: DISCONTINUED | OUTPATIENT
Start: 2021-01-01 | End: 2021-01-01

## 2021-01-01 RX ORDER — SODIUM CHLORIDE 9 MG/ML
500 INJECTION INTRAMUSCULAR; INTRAVENOUS; SUBCUTANEOUS ONCE
Refills: 0 | Status: COMPLETED | OUTPATIENT
Start: 2021-01-01 | End: 2021-01-01

## 2021-01-01 RX ORDER — IRON SUCROSE 20 MG/ML
200 INJECTION, SOLUTION INTRAVENOUS ONCE
Refills: 0 | Status: COMPLETED | OUTPATIENT
Start: 2021-01-01 | End: 2021-01-01

## 2021-01-01 RX ORDER — MIDAZOLAM HYDROCHLORIDE 1 MG/ML
0.02 INJECTION, SOLUTION INTRAMUSCULAR; INTRAVENOUS
Qty: 100 | Refills: 0 | Status: DISCONTINUED | OUTPATIENT
Start: 2021-01-01 | End: 2021-01-01

## 2021-01-01 RX ORDER — FUROSEMIDE 40 MG
40 TABLET ORAL
Refills: 0 | Status: DISCONTINUED | OUTPATIENT
Start: 2021-01-01 | End: 2021-01-01

## 2021-01-01 RX ORDER — MEROPENEM 1 G/30ML
1000 INJECTION INTRAVENOUS ONCE
Refills: 0 | Status: COMPLETED | OUTPATIENT
Start: 2021-01-01 | End: 2021-01-01

## 2021-01-01 RX ORDER — MIDAZOLAM HYDROCHLORIDE 1 MG/ML
4 INJECTION, SOLUTION INTRAMUSCULAR; INTRAVENOUS ONCE
Refills: 0 | Status: DISCONTINUED | OUTPATIENT
Start: 2021-01-01 | End: 2021-01-01

## 2021-01-01 RX ORDER — POTASSIUM CHLORIDE 20 MEQ
20 PACKET (EA) ORAL ONCE
Refills: 0 | Status: COMPLETED | OUTPATIENT
Start: 2021-01-01 | End: 2021-01-01

## 2021-01-01 RX ORDER — LEVOTHYROXINE SODIUM 125 MCG
100 TABLET ORAL DAILY
Refills: 0 | Status: DISCONTINUED | OUTPATIENT
Start: 2021-01-01 | End: 2021-01-01

## 2021-01-01 RX ORDER — CHLORHEXIDINE GLUCONATE 213 G/1000ML
15 SOLUTION TOPICAL
Refills: 0 | Status: DISCONTINUED | OUTPATIENT
Start: 2021-01-01 | End: 2021-01-01

## 2021-01-01 RX ORDER — SODIUM CHLORIDE 9 MG/ML
10 INJECTION INTRAMUSCULAR; INTRAVENOUS; SUBCUTANEOUS
Refills: 0 | Status: DISCONTINUED | OUTPATIENT
Start: 2021-01-01 | End: 2021-01-01

## 2021-01-01 RX ORDER — FENTANYL CITRATE 50 UG/ML
0.5 INJECTION INTRAVENOUS
Qty: 2500 | Refills: 0 | Status: DISCONTINUED | OUTPATIENT
Start: 2021-01-01 | End: 2021-01-01

## 2021-01-01 RX ORDER — INSULIN GLARGINE 100 [IU]/ML
30 INJECTION, SOLUTION SUBCUTANEOUS EVERY MORNING
Refills: 0 | Status: DISCONTINUED | OUTPATIENT
Start: 2021-01-01 | End: 2021-01-01

## 2021-01-01 RX ORDER — HEPARIN SODIUM 5000 [USP'U]/ML
5000 INJECTION INTRAVENOUS; SUBCUTANEOUS EVERY 12 HOURS
Refills: 0 | Status: DISCONTINUED | OUTPATIENT
Start: 2021-01-01 | End: 2021-01-01

## 2021-01-01 RX ORDER — PROPOFOL 10 MG/ML
5 INJECTION, EMULSION INTRAVENOUS
Qty: 1000 | Refills: 0 | Status: DISCONTINUED | OUTPATIENT
Start: 2021-01-01 | End: 2021-01-01

## 2021-01-01 RX ORDER — VANCOMYCIN HCL 1 G
1000 VIAL (EA) INTRAVENOUS ONCE
Refills: 0 | Status: COMPLETED | OUTPATIENT
Start: 2021-01-01 | End: 2021-01-01

## 2021-01-01 RX ORDER — MEROPENEM 1 G/30ML
INJECTION INTRAVENOUS
Refills: 0 | Status: DISCONTINUED | OUTPATIENT
Start: 2021-01-01 | End: 2021-01-01

## 2021-01-01 RX ORDER — CEFEPIME 1 G/1
1000 INJECTION, POWDER, FOR SOLUTION INTRAMUSCULAR; INTRAVENOUS ONCE
Refills: 0 | Status: COMPLETED | OUTPATIENT
Start: 2021-01-01 | End: 2021-01-01

## 2021-01-01 RX ORDER — DEXTROSE 50 % IN WATER 50 %
25 SYRINGE (ML) INTRAVENOUS ONCE
Refills: 0 | Status: COMPLETED | OUTPATIENT
Start: 2021-01-01 | End: 2021-01-01

## 2021-01-01 RX ORDER — NYSTATIN CREAM 100000 [USP'U]/G
1 CREAM TOPICAL
Refills: 0 | Status: DISCONTINUED | OUTPATIENT
Start: 2021-01-01 | End: 2021-01-01

## 2021-01-01 RX ORDER — INSULIN HUMAN 100 [IU]/ML
6 INJECTION, SOLUTION SUBCUTANEOUS ONCE
Refills: 0 | Status: COMPLETED | OUTPATIENT
Start: 2021-01-01 | End: 2021-01-01

## 2021-01-01 RX ORDER — LEVETIRACETAM 250 MG/1
1000 TABLET, FILM COATED ORAL
Refills: 0 | Status: DISCONTINUED | OUTPATIENT
Start: 2021-01-01 | End: 2021-01-01

## 2021-01-01 RX ORDER — DEXTROSE 50 % IN WATER 50 %
12.5 SYRINGE (ML) INTRAVENOUS ONCE
Refills: 0 | Status: DISCONTINUED | OUTPATIENT
Start: 2021-01-01 | End: 2021-01-01

## 2021-01-01 RX ORDER — CEFEPIME 1 G/1
1000 INJECTION, POWDER, FOR SOLUTION INTRAMUSCULAR; INTRAVENOUS EVERY 24 HOURS
Refills: 0 | Status: DISCONTINUED | OUTPATIENT
Start: 2021-01-01 | End: 2021-01-01

## 2021-01-01 RX ORDER — LEVOTHYROXINE SODIUM 125 MCG
150 TABLET ORAL DAILY
Refills: 0 | Status: DISCONTINUED | OUTPATIENT
Start: 2021-01-01 | End: 2021-01-01

## 2021-01-01 RX ORDER — FUROSEMIDE 40 MG
60 TABLET ORAL
Refills: 0 | Status: DISCONTINUED | OUTPATIENT
Start: 2021-01-01 | End: 2021-01-01

## 2021-01-01 RX ORDER — INSULIN HUMAN 100 [IU]/ML
1 INJECTION, SOLUTION SUBCUTANEOUS
Qty: 100 | Refills: 0 | Status: DISCONTINUED | OUTPATIENT
Start: 2021-01-01 | End: 2021-01-01

## 2021-01-01 RX ORDER — NOREPINEPHRINE BITARTRATE/D5W 8 MG/250ML
0.05 PLASTIC BAG, INJECTION (ML) INTRAVENOUS
Qty: 8 | Refills: 0 | Status: DISCONTINUED | OUTPATIENT
Start: 2021-01-01 | End: 2021-01-01

## 2021-01-01 RX ORDER — HEPARIN SODIUM 5000 [USP'U]/ML
5000 INJECTION INTRAVENOUS; SUBCUTANEOUS EVERY 8 HOURS
Refills: 0 | Status: DISCONTINUED | OUTPATIENT
Start: 2021-01-01 | End: 2021-01-01

## 2021-01-01 RX ORDER — CHLORHEXIDINE GLUCONATE 213 G/1000ML
1 SOLUTION TOPICAL DAILY
Refills: 0 | Status: DISCONTINUED | OUTPATIENT
Start: 2021-01-01 | End: 2021-01-01

## 2021-01-01 RX ORDER — INSULIN HUMAN 100 [IU]/ML
10 INJECTION, SOLUTION SUBCUTANEOUS ONCE
Refills: 0 | Status: COMPLETED | OUTPATIENT
Start: 2021-01-01 | End: 2021-01-01

## 2021-01-01 RX ORDER — INSULIN DEGLUDEC 100 U/ML
15 INJECTION, SOLUTION SUBCUTANEOUS
Qty: 0 | Refills: 0 | DISCHARGE

## 2021-01-01 RX ORDER — LEVOTHYROXINE SODIUM 125 MCG
1 TABLET ORAL
Qty: 0 | Refills: 0 | DISCHARGE

## 2021-01-01 RX ORDER — METHOTREXATE 2.5 MG/1
0 TABLET ORAL
Qty: 0 | Refills: 0 | DISCHARGE

## 2021-01-01 RX ORDER — METHOTREXATE 2.5 MG/1
7 TABLET ORAL
Qty: 0 | Refills: 0 | DISCHARGE

## 2021-01-01 RX ORDER — ONDANSETRON 8 MG/1
4 TABLET, FILM COATED ORAL EVERY 8 HOURS
Refills: 0 | Status: DISCONTINUED | OUTPATIENT
Start: 2021-01-01 | End: 2021-01-01

## 2021-01-01 RX ORDER — FUROSEMIDE 40 MG
20 TABLET ORAL ONCE
Refills: 0 | Status: COMPLETED | OUTPATIENT
Start: 2021-01-01 | End: 2021-01-01

## 2021-01-01 RX ORDER — INSULIN ASPART 100 [IU]/ML
8 INJECTION, SOLUTION SUBCUTANEOUS
Qty: 0 | Refills: 0 | DISCHARGE

## 2021-01-01 RX ORDER — FUROSEMIDE 40 MG
3 TABLET ORAL
Qty: 90 | Refills: 1
Start: 2021-01-01 | End: 2021-11-30

## 2021-01-01 RX ORDER — INSULIN LISPRO 100/ML
VIAL (ML) SUBCUTANEOUS AT BEDTIME
Refills: 0 | Status: DISCONTINUED | OUTPATIENT
Start: 2021-01-01 | End: 2021-01-01

## 2021-01-01 RX ORDER — HYDROXYCHLOROQUINE SULFATE 200 MG
400 TABLET ORAL
Refills: 0 | Status: DISCONTINUED | OUTPATIENT
Start: 2021-01-01 | End: 2021-01-01

## 2021-01-01 RX ORDER — FOLIC ACID 0.8 MG
1 TABLET ORAL
Qty: 0 | Refills: 0 | DISCHARGE

## 2021-01-01 RX ORDER — FUROSEMIDE 40 MG
60 TABLET ORAL ONCE
Refills: 0 | Status: COMPLETED | OUTPATIENT
Start: 2021-01-01 | End: 2021-01-01

## 2021-01-01 RX ORDER — ASPIRIN/CALCIUM CARB/MAGNESIUM 324 MG
325 TABLET ORAL DAILY
Refills: 0 | Status: DISCONTINUED | OUTPATIENT
Start: 2021-01-01 | End: 2021-01-01

## 2021-01-01 RX ORDER — LIDOCAINE 4 G/100G
10 CREAM TOPICAL ONCE
Refills: 0 | Status: COMPLETED | OUTPATIENT
Start: 2021-01-01 | End: 2021-01-01

## 2021-01-01 RX ORDER — INSULIN GLARGINE 100 [IU]/ML
7 INJECTION, SOLUTION SUBCUTANEOUS AT BEDTIME
Refills: 0 | Status: DISCONTINUED | OUTPATIENT
Start: 2021-01-01 | End: 2021-01-01

## 2021-01-01 RX ORDER — INSULIN HUMAN 100 [IU]/ML
8 INJECTION, SOLUTION SUBCUTANEOUS
Qty: 100 | Refills: 0 | Status: DISCONTINUED | OUTPATIENT
Start: 2021-01-01 | End: 2021-01-01

## 2021-01-01 RX ORDER — CEFEPIME 1 G/1
1000 INJECTION, POWDER, FOR SOLUTION INTRAMUSCULAR; INTRAVENOUS EVERY 12 HOURS
Refills: 0 | Status: DISCONTINUED | OUTPATIENT
Start: 2021-01-01 | End: 2021-01-01

## 2021-01-01 RX ORDER — VANCOMYCIN HCL 1 G
750 VIAL (EA) INTRAVENOUS EVERY 24 HOURS
Refills: 0 | Status: DISCONTINUED | OUTPATIENT
Start: 2021-01-01 | End: 2021-01-01

## 2021-01-01 RX ORDER — GLUCAGON INJECTION, SOLUTION 0.5 MG/.1ML
1 INJECTION, SOLUTION SUBCUTANEOUS ONCE
Refills: 0 | Status: DISCONTINUED | OUTPATIENT
Start: 2021-01-01 | End: 2021-01-01

## 2021-01-01 RX ORDER — INSULIN ASPART 100 [IU]/ML
5 INJECTION, SOLUTION SUBCUTANEOUS
Qty: 0 | Refills: 0 | DISCHARGE

## 2021-01-01 RX ORDER — IPRATROPIUM/ALBUTEROL SULFATE 18-103MCG
3 AEROSOL WITH ADAPTER (GRAM) INHALATION EVERY 6 HOURS
Refills: 0 | Status: DISCONTINUED | OUTPATIENT
Start: 2021-01-01 | End: 2021-01-01

## 2021-01-01 RX ORDER — VASOPRESSIN 20 [USP'U]/ML
0.04 INJECTION INTRAVENOUS
Qty: 50 | Refills: 0 | Status: DISCONTINUED | OUTPATIENT
Start: 2021-01-01 | End: 2021-01-01

## 2021-01-01 RX ORDER — INSULIN HUMAN 100 [IU]/ML
2 INJECTION, SOLUTION SUBCUTANEOUS
Qty: 100 | Refills: 0 | Status: DISCONTINUED | OUTPATIENT
Start: 2021-01-01 | End: 2021-01-01

## 2021-01-01 RX ORDER — FUROSEMIDE 40 MG
1 TABLET ORAL
Qty: 0 | Refills: 0 | DISCHARGE

## 2021-01-01 RX ORDER — CALCIUM GLUCONATE 100 MG/ML
2 VIAL (ML) INTRAVENOUS ONCE
Refills: 0 | Status: COMPLETED | OUTPATIENT
Start: 2021-01-01 | End: 2021-01-01

## 2021-01-01 RX ORDER — METOPROLOL TARTRATE 50 MG
1 TABLET ORAL
Qty: 60 | Refills: 1
Start: 2021-01-01 | End: 2021-11-30

## 2021-01-01 RX ORDER — METOPROLOL TARTRATE 50 MG
5 TABLET ORAL ONCE
Refills: 0 | Status: COMPLETED | OUTPATIENT
Start: 2021-01-01 | End: 2021-01-01

## 2021-01-01 RX ORDER — OXYCODONE AND ACETAMINOPHEN 5; 325 MG/1; MG/1
2 TABLET ORAL EVERY 6 HOURS
Refills: 0 | Status: DISCONTINUED | OUTPATIENT
Start: 2021-01-01 | End: 2021-01-01

## 2021-01-01 RX ORDER — EPINEPHRINE 0.3 MG/.3ML
0.03 INJECTION INTRAMUSCULAR; SUBCUTANEOUS
Qty: 4 | Refills: 0 | Status: DISCONTINUED | OUTPATIENT
Start: 2021-01-01 | End: 2021-01-01

## 2021-01-01 RX ORDER — MEROPENEM 1 G/30ML
1000 INJECTION INTRAVENOUS EVERY 12 HOURS
Refills: 0 | Status: DISCONTINUED | OUTPATIENT
Start: 2021-01-01 | End: 2021-01-01

## 2021-01-01 RX ORDER — POLYETHYLENE GLYCOL 3350 17 G/17G
17 POWDER, FOR SOLUTION ORAL DAILY
Refills: 0 | Status: DISCONTINUED | OUTPATIENT
Start: 2021-01-01 | End: 2021-01-01

## 2021-01-01 RX ORDER — LISINOPRIL 2.5 MG/1
20 TABLET ORAL DAILY
Refills: 0 | Status: DISCONTINUED | OUTPATIENT
Start: 2021-01-01 | End: 2021-01-01

## 2021-01-01 RX ORDER — INSULIN HUMAN 100 [IU]/ML
10 INJECTION, SOLUTION SUBCUTANEOUS ONCE
Refills: 0 | Status: DISCONTINUED | OUTPATIENT
Start: 2021-01-01 | End: 2021-01-01

## 2021-01-01 RX ORDER — ENOXAPARIN SODIUM 100 MG/ML
40 INJECTION SUBCUTANEOUS DAILY
Refills: 0 | Status: DISCONTINUED | OUTPATIENT
Start: 2021-01-01 | End: 2021-01-01

## 2021-01-01 RX ORDER — LACOSAMIDE 50 MG/1
100 TABLET ORAL
Refills: 0 | Status: DISCONTINUED | OUTPATIENT
Start: 2021-01-01 | End: 2021-01-01

## 2021-01-01 RX ORDER — INSULIN HUMAN 100 [IU]/ML
12 INJECTION, SOLUTION SUBCUTANEOUS ONCE
Refills: 0 | Status: COMPLETED | OUTPATIENT
Start: 2021-01-01 | End: 2021-01-01

## 2021-01-01 RX ORDER — LEVOTHYROXINE SODIUM 125 MCG
1 TABLET ORAL
Qty: 30 | Refills: 0
Start: 2021-01-01 | End: 2021-11-19

## 2021-01-01 RX ORDER — FUROSEMIDE 40 MG
2 TABLET ORAL
Qty: 60 | Refills: 1
Start: 2021-01-01 | End: 2021-12-19

## 2021-01-01 RX ORDER — EPINEPHRINE 0.3 MG/.3ML
0.3 INJECTION INTRAMUSCULAR; SUBCUTANEOUS
Qty: 8 | Refills: 0 | Status: DISCONTINUED | OUTPATIENT
Start: 2021-01-01 | End: 2021-01-01

## 2021-01-01 RX ORDER — VANCOMYCIN HCL 1 G
1000 VIAL (EA) INTRAVENOUS EVERY 12 HOURS
Refills: 0 | Status: DISCONTINUED | OUTPATIENT
Start: 2021-01-01 | End: 2021-01-01

## 2021-01-01 RX ADMIN — CEFEPIME 100 MILLIGRAM(S): 1 INJECTION, POWDER, FOR SOLUTION INTRAMUSCULAR; INTRAVENOUS at 17:29

## 2021-01-01 RX ADMIN — ATORVASTATIN CALCIUM 40 MILLIGRAM(S): 80 TABLET, FILM COATED ORAL at 21:30

## 2021-01-01 RX ADMIN — Medication 650 MILLIGRAM(S): at 03:26

## 2021-01-01 RX ADMIN — HEPARIN SODIUM 5000 UNIT(S): 5000 INJECTION INTRAVENOUS; SUBCUTANEOUS at 15:25

## 2021-01-01 RX ADMIN — LISINOPRIL 20 MILLIGRAM(S): 2.5 TABLET ORAL at 05:59

## 2021-01-01 RX ADMIN — MIDAZOLAM HYDROCHLORIDE 2 MILLIGRAM(S): 1 INJECTION, SOLUTION INTRAMUSCULAR; INTRAVENOUS at 10:57

## 2021-01-01 RX ADMIN — FENTANYL CITRATE 3.2 MICROGRAM(S)/KG/HR: 50 INJECTION INTRAVENOUS at 06:30

## 2021-01-01 RX ADMIN — Medication 8 UNIT(S): at 07:48

## 2021-01-01 RX ADMIN — HEPARIN SODIUM 5000 UNIT(S): 5000 INJECTION INTRAVENOUS; SUBCUTANEOUS at 14:50

## 2021-01-01 RX ADMIN — CHLORHEXIDINE GLUCONATE 1 APPLICATION(S): 213 SOLUTION TOPICAL at 05:10

## 2021-01-01 RX ADMIN — Medication 8 UNIT(S): at 12:08

## 2021-01-01 RX ADMIN — Medication 40 MILLIGRAM(S): at 12:17

## 2021-01-01 RX ADMIN — Medication 325 MILLIGRAM(S): at 11:11

## 2021-01-01 RX ADMIN — Medication 40 MILLIGRAM(S): at 05:49

## 2021-01-01 RX ADMIN — GABAPENTIN 400 MILLIGRAM(S): 400 CAPSULE ORAL at 21:07

## 2021-01-01 RX ADMIN — PHENYLEPHRINE HYDROCHLORIDE 1.2 MICROGRAM(S)/KG/MIN: 10 INJECTION INTRAVENOUS at 20:33

## 2021-01-01 RX ADMIN — Medication 175 MICROGRAM(S): at 05:23

## 2021-01-01 RX ADMIN — Medication 400 MILLIGRAM(S): at 17:11

## 2021-01-01 RX ADMIN — Medication 8 UNIT(S): at 17:19

## 2021-01-01 RX ADMIN — PROPOFOL 1.92 MICROGRAM(S)/KG/MIN: 10 INJECTION, EMULSION INTRAVENOUS at 19:30

## 2021-01-01 RX ADMIN — HEPARIN SODIUM 5000 UNIT(S): 5000 INJECTION INTRAVENOUS; SUBCUTANEOUS at 13:56

## 2021-01-01 RX ADMIN — Medication 3: at 17:49

## 2021-01-01 RX ADMIN — CHLORHEXIDINE GLUCONATE 15 MILLILITER(S): 213 SOLUTION TOPICAL at 17:09

## 2021-01-01 RX ADMIN — HEPARIN SODIUM 5000 UNIT(S): 5000 INJECTION INTRAVENOUS; SUBCUTANEOUS at 21:20

## 2021-01-01 RX ADMIN — CEFEPIME 100 MILLIGRAM(S): 1 INJECTION, POWDER, FOR SOLUTION INTRAMUSCULAR; INTRAVENOUS at 05:11

## 2021-01-01 RX ADMIN — LACOSAMIDE 100 MILLIGRAM(S): 50 TABLET ORAL at 05:30

## 2021-01-01 RX ADMIN — ATORVASTATIN CALCIUM 40 MILLIGRAM(S): 80 TABLET, FILM COATED ORAL at 21:57

## 2021-01-01 RX ADMIN — CHLORHEXIDINE GLUCONATE 15 MILLILITER(S): 213 SOLUTION TOPICAL at 05:17

## 2021-01-01 RX ADMIN — NYSTATIN CREAM 1 APPLICATION(S): 100000 CREAM TOPICAL at 17:18

## 2021-01-01 RX ADMIN — HEPARIN SODIUM 5000 UNIT(S): 5000 INJECTION INTRAVENOUS; SUBCUTANEOUS at 17:10

## 2021-01-01 RX ADMIN — Medication 250 MILLIGRAM(S): at 17:01

## 2021-01-01 RX ADMIN — Medication 2 MILLIGRAM(S): at 10:57

## 2021-01-01 RX ADMIN — ENOXAPARIN SODIUM 40 MILLIGRAM(S): 100 INJECTION SUBCUTANEOUS at 12:04

## 2021-01-01 RX ADMIN — HEPARIN SODIUM 5000 UNIT(S): 5000 INJECTION INTRAVENOUS; SUBCUTANEOUS at 05:15

## 2021-01-01 RX ADMIN — Medication 200 MILLIGRAM(S): at 17:05

## 2021-01-01 RX ADMIN — Medication 250 MILLIGRAM(S): at 12:17

## 2021-01-01 RX ADMIN — CHLORHEXIDINE GLUCONATE 15 MILLILITER(S): 213 SOLUTION TOPICAL at 05:01

## 2021-01-01 RX ADMIN — INSULIN HUMAN 2 UNIT(S)/HR: 100 INJECTION, SOLUTION SUBCUTANEOUS at 21:10

## 2021-01-01 RX ADMIN — Medication 100 MILLIGRAM(S): at 23:11

## 2021-01-01 RX ADMIN — Medication 50 MILLIEQUIVALENT(S): at 15:11

## 2021-01-01 RX ADMIN — INSULIN HUMAN 2 UNIT(S)/HR: 100 INJECTION, SOLUTION SUBCUTANEOUS at 09:02

## 2021-01-01 RX ADMIN — GABAPENTIN 400 MILLIGRAM(S): 400 CAPSULE ORAL at 14:40

## 2021-01-01 RX ADMIN — CEFEPIME 100 MILLIGRAM(S): 1 INJECTION, POWDER, FOR SOLUTION INTRAMUSCULAR; INTRAVENOUS at 11:08

## 2021-01-01 RX ADMIN — HEPARIN SODIUM 5000 UNIT(S): 5000 INJECTION INTRAVENOUS; SUBCUTANEOUS at 13:13

## 2021-01-01 RX ADMIN — NYSTATIN CREAM 1 APPLICATION(S): 100000 CREAM TOPICAL at 17:44

## 2021-01-01 RX ADMIN — Medication 81 MILLIGRAM(S): at 11:21

## 2021-01-01 RX ADMIN — INSULIN HUMAN 10 UNIT(S): 100 INJECTION, SOLUTION SUBCUTANEOUS at 13:00

## 2021-01-01 RX ADMIN — CHLORHEXIDINE GLUCONATE 1 APPLICATION(S): 213 SOLUTION TOPICAL at 06:04

## 2021-01-01 RX ADMIN — Medication 6 MICROGRAM(S)/KG/MIN: at 13:30

## 2021-01-01 RX ADMIN — Medication 175 MICROGRAM(S): at 05:26

## 2021-01-01 RX ADMIN — Medication 25 MILLILITER(S): at 16:33

## 2021-01-01 RX ADMIN — Medication 40 MILLIGRAM(S): at 05:32

## 2021-01-01 RX ADMIN — Medication 1 MILLIGRAM(S): at 11:10

## 2021-01-01 RX ADMIN — Medication 200 MILLIGRAM(S): at 17:10

## 2021-01-01 RX ADMIN — Medication 200 MILLIGRAM(S): at 05:21

## 2021-01-01 RX ADMIN — CHLORHEXIDINE GLUCONATE 1 APPLICATION(S): 213 SOLUTION TOPICAL at 05:47

## 2021-01-01 RX ADMIN — HEPARIN SODIUM 5000 UNIT(S): 5000 INJECTION INTRAVENOUS; SUBCUTANEOUS at 14:04

## 2021-01-01 RX ADMIN — CEFEPIME 100 MILLIGRAM(S): 1 INJECTION, POWDER, FOR SOLUTION INTRAMUSCULAR; INTRAVENOUS at 18:11

## 2021-01-01 RX ADMIN — Medication 400 MILLIGRAM(S): at 05:22

## 2021-01-01 RX ADMIN — CHLORHEXIDINE GLUCONATE 1 APPLICATION(S): 213 SOLUTION TOPICAL at 05:21

## 2021-01-01 RX ADMIN — ATORVASTATIN CALCIUM 40 MILLIGRAM(S): 80 TABLET, FILM COATED ORAL at 21:10

## 2021-01-01 RX ADMIN — INSULIN HUMAN 2 UNIT(S)/HR: 100 INJECTION, SOLUTION SUBCUTANEOUS at 19:30

## 2021-01-01 RX ADMIN — Medication 650 MILLIGRAM(S): at 11:10

## 2021-01-01 RX ADMIN — NYSTATIN CREAM 1 APPLICATION(S): 100000 CREAM TOPICAL at 05:23

## 2021-01-01 RX ADMIN — FENTANYL CITRATE 50 MICROGRAM(S): 50 INJECTION INTRAVENOUS at 21:52

## 2021-01-01 RX ADMIN — DULOXETINE HYDROCHLORIDE 60 MILLIGRAM(S): 30 CAPSULE, DELAYED RELEASE ORAL at 12:59

## 2021-01-01 RX ADMIN — CHLORHEXIDINE GLUCONATE 1 APPLICATION(S): 213 SOLUTION TOPICAL at 05:49

## 2021-01-01 RX ADMIN — Medication 25 MILLILITER(S): at 23:11

## 2021-01-01 RX ADMIN — NYSTATIN CREAM 1 APPLICATION(S): 100000 CREAM TOPICAL at 05:04

## 2021-01-01 RX ADMIN — HEPARIN SODIUM 5000 UNIT(S): 5000 INJECTION INTRAVENOUS; SUBCUTANEOUS at 05:21

## 2021-01-01 RX ADMIN — Medication 3 MILLILITER(S): at 07:55

## 2021-01-01 RX ADMIN — ATORVASTATIN CALCIUM 40 MILLIGRAM(S): 80 TABLET, FILM COATED ORAL at 22:09

## 2021-01-01 RX ADMIN — Medication 10 MILLIGRAM(S): at 21:51

## 2021-01-01 RX ADMIN — HEPARIN SODIUM 5000 UNIT(S): 5000 INJECTION INTRAVENOUS; SUBCUTANEOUS at 14:22

## 2021-01-01 RX ADMIN — Medication 8 UNIT(S): at 16:34

## 2021-01-01 RX ADMIN — INSULIN GLARGINE 20 UNIT(S): 100 INJECTION, SOLUTION SUBCUTANEOUS at 08:40

## 2021-01-01 RX ADMIN — INSULIN HUMAN 6 UNIT(S): 100 INJECTION, SOLUTION SUBCUTANEOUS at 20:11

## 2021-01-01 RX ADMIN — Medication 325 MILLIGRAM(S): at 11:58

## 2021-01-01 RX ADMIN — HEPARIN SODIUM 5000 UNIT(S): 5000 INJECTION INTRAVENOUS; SUBCUTANEOUS at 21:08

## 2021-01-01 RX ADMIN — PHENYLEPHRINE HYDROCHLORIDE 1.2 MICROGRAM(S)/KG/MIN: 10 INJECTION INTRAVENOUS at 07:04

## 2021-01-01 RX ADMIN — HEPARIN SODIUM 5000 UNIT(S): 5000 INJECTION INTRAVENOUS; SUBCUTANEOUS at 05:05

## 2021-01-01 RX ADMIN — CHLORHEXIDINE GLUCONATE 15 MILLILITER(S): 213 SOLUTION TOPICAL at 17:10

## 2021-01-01 RX ADMIN — LEVETIRACETAM 1000 MILLIGRAM(S): 250 TABLET, FILM COATED ORAL at 05:53

## 2021-01-01 RX ADMIN — DEXMEDETOMIDINE HYDROCHLORIDE IN 0.9% SODIUM CHLORIDE 3.12 MICROGRAM(S): 4 INJECTION INTRAVENOUS at 12:00

## 2021-01-01 RX ADMIN — HEPARIN SODIUM 5000 UNIT(S): 5000 INJECTION INTRAVENOUS; SUBCUTANEOUS at 05:20

## 2021-01-01 RX ADMIN — DULOXETINE HYDROCHLORIDE 60 MILLIGRAM(S): 30 CAPSULE, DELAYED RELEASE ORAL at 12:19

## 2021-01-01 RX ADMIN — INSULIN HUMAN 1 UNIT(S)/HR: 100 INJECTION, SOLUTION SUBCUTANEOUS at 12:00

## 2021-01-01 RX ADMIN — Medication 175 MICROGRAM(S): at 05:21

## 2021-01-01 RX ADMIN — Medication 6 MICROGRAM(S)/KG/MIN: at 21:30

## 2021-01-01 RX ADMIN — NYSTATIN CREAM 1 APPLICATION(S): 100000 CREAM TOPICAL at 17:01

## 2021-01-01 RX ADMIN — PROPOFOL 1.92 MICROGRAM(S)/KG/MIN: 10 INJECTION, EMULSION INTRAVENOUS at 13:58

## 2021-01-01 RX ADMIN — DEXMEDETOMIDINE HYDROCHLORIDE IN 0.9% SODIUM CHLORIDE 3.2 MICROGRAM(S)/KG/HR: 4 INJECTION INTRAVENOUS at 12:29

## 2021-01-01 RX ADMIN — HEPARIN SODIUM 5000 UNIT(S): 5000 INJECTION INTRAVENOUS; SUBCUTANEOUS at 04:30

## 2021-01-01 RX ADMIN — HEPARIN SODIUM 5000 UNIT(S): 5000 INJECTION INTRAVENOUS; SUBCUTANEOUS at 14:44

## 2021-01-01 RX ADMIN — Medication 40 MILLIGRAM(S): at 18:40

## 2021-01-01 RX ADMIN — CHLORHEXIDINE GLUCONATE 1 APPLICATION(S): 213 SOLUTION TOPICAL at 05:38

## 2021-01-01 RX ADMIN — Medication 1 MILLIGRAM(S): at 11:47

## 2021-01-01 RX ADMIN — Medication 25 MILLIGRAM(S): at 21:34

## 2021-01-01 RX ADMIN — CHLORHEXIDINE GLUCONATE 1 APPLICATION(S): 213 SOLUTION TOPICAL at 11:59

## 2021-01-01 RX ADMIN — Medication 81 MILLIGRAM(S): at 12:14

## 2021-01-01 RX ADMIN — Medication 3 MILLILITER(S): at 19:47

## 2021-01-01 RX ADMIN — Medication 3: at 11:53

## 2021-01-01 RX ADMIN — HEPARIN SODIUM 5000 UNIT(S): 5000 INJECTION INTRAVENOUS; SUBCUTANEOUS at 14:17

## 2021-01-01 RX ADMIN — PROPOFOL 1.92 MICROGRAM(S)/KG/MIN: 10 INJECTION, EMULSION INTRAVENOUS at 17:45

## 2021-01-01 RX ADMIN — GABAPENTIN 400 MILLIGRAM(S): 400 CAPSULE ORAL at 21:04

## 2021-01-01 RX ADMIN — INSULIN HUMAN 8 UNIT(S)/HR: 100 INJECTION, SOLUTION SUBCUTANEOUS at 17:22

## 2021-01-01 RX ADMIN — NYSTATIN CREAM 1 APPLICATION(S): 100000 CREAM TOPICAL at 17:50

## 2021-01-01 RX ADMIN — DULOXETINE HYDROCHLORIDE 60 MILLIGRAM(S): 30 CAPSULE, DELAYED RELEASE ORAL at 11:54

## 2021-01-01 RX ADMIN — PROPOFOL 1.92 MICROGRAM(S)/KG/MIN: 10 INJECTION, EMULSION INTRAVENOUS at 04:28

## 2021-01-01 RX ADMIN — GABAPENTIN 300 MILLIGRAM(S): 400 CAPSULE ORAL at 11:11

## 2021-01-01 RX ADMIN — HEPARIN SODIUM 5000 UNIT(S): 5000 INJECTION INTRAVENOUS; SUBCUTANEOUS at 14:21

## 2021-01-01 RX ADMIN — Medication 3: at 08:04

## 2021-01-01 RX ADMIN — HEPARIN SODIUM 5000 UNIT(S): 5000 INJECTION INTRAVENOUS; SUBCUTANEOUS at 05:26

## 2021-01-01 RX ADMIN — Medication 40 MILLIGRAM(S): at 22:25

## 2021-01-01 RX ADMIN — Medication 1 MILLIGRAM(S): at 12:14

## 2021-01-01 RX ADMIN — CEFEPIME 100 MILLIGRAM(S): 1 INJECTION, POWDER, FOR SOLUTION INTRAMUSCULAR; INTRAVENOUS at 13:29

## 2021-01-01 RX ADMIN — CHLORHEXIDINE GLUCONATE 15 MILLILITER(S): 213 SOLUTION TOPICAL at 05:12

## 2021-01-01 RX ADMIN — Medication 2: at 12:09

## 2021-01-01 RX ADMIN — Medication 250 MILLIGRAM(S): at 16:42

## 2021-01-01 RX ADMIN — CEFEPIME 100 MILLIGRAM(S): 1 INJECTION, POWDER, FOR SOLUTION INTRAMUSCULAR; INTRAVENOUS at 14:27

## 2021-01-01 RX ADMIN — Medication 100 MILLIEQUIVALENT(S): at 10:28

## 2021-01-01 RX ADMIN — Medication 650 MILLIGRAM(S): at 17:44

## 2021-01-01 RX ADMIN — EPINEPHRINE 36 MICROGRAM(S)/KG/MIN: 0.3 INJECTION INTRAMUSCULAR; SUBCUTANEOUS at 06:58

## 2021-01-01 RX ADMIN — Medication 650 MILLIGRAM(S): at 01:44

## 2021-01-01 RX ADMIN — CHLORHEXIDINE GLUCONATE 1 APPLICATION(S): 213 SOLUTION TOPICAL at 05:17

## 2021-01-01 RX ADMIN — CEFEPIME 100 MILLIGRAM(S): 1 INJECTION, POWDER, FOR SOLUTION INTRAMUSCULAR; INTRAVENOUS at 05:08

## 2021-01-01 RX ADMIN — Medication 650 MILLIGRAM(S): at 16:42

## 2021-01-01 RX ADMIN — Medication 100 MILLIEQUIVALENT(S): at 20:56

## 2021-01-01 RX ADMIN — INSULIN HUMAN 6 UNIT(S): 100 INJECTION, SOLUTION SUBCUTANEOUS at 00:33

## 2021-01-01 RX ADMIN — Medication 100 MICROGRAM(S): at 05:33

## 2021-01-01 RX ADMIN — Medication 400 MILLIGRAM(S): at 05:53

## 2021-01-01 RX ADMIN — PANTOPRAZOLE SODIUM 40 MILLIGRAM(S): 20 TABLET, DELAYED RELEASE ORAL at 11:20

## 2021-01-01 RX ADMIN — Medication 100 MILLIGRAM(S): at 05:06

## 2021-01-01 RX ADMIN — GABAPENTIN 400 MILLIGRAM(S): 400 CAPSULE ORAL at 05:53

## 2021-01-01 RX ADMIN — HEPARIN SODIUM 5000 UNIT(S): 5000 INJECTION INTRAVENOUS; SUBCUTANEOUS at 05:54

## 2021-01-01 RX ADMIN — INSULIN HUMAN 10 UNIT(S): 100 INJECTION, SOLUTION SUBCUTANEOUS at 22:40

## 2021-01-01 RX ADMIN — PANTOPRAZOLE SODIUM 40 MILLIGRAM(S): 20 TABLET, DELAYED RELEASE ORAL at 11:16

## 2021-01-01 RX ADMIN — GABAPENTIN 400 MILLIGRAM(S): 400 CAPSULE ORAL at 05:32

## 2021-01-01 RX ADMIN — Medication 1 MILLIGRAM(S): at 12:19

## 2021-01-01 RX ADMIN — CHLORHEXIDINE GLUCONATE 1 APPLICATION(S): 213 SOLUTION TOPICAL at 05:07

## 2021-01-01 RX ADMIN — Medication 175 MICROGRAM(S): at 05:49

## 2021-01-01 RX ADMIN — Medication 81 MILLIGRAM(S): at 11:35

## 2021-01-01 RX ADMIN — CHLORHEXIDINE GLUCONATE 1 APPLICATION(S): 213 SOLUTION TOPICAL at 05:05

## 2021-01-01 RX ADMIN — IRON SUCROSE 110 MILLIGRAM(S): 20 INJECTION, SOLUTION INTRAVENOUS at 15:31

## 2021-01-01 RX ADMIN — Medication 175 MICROGRAM(S): at 05:05

## 2021-01-01 RX ADMIN — Medication 650 MILLIGRAM(S): at 19:10

## 2021-01-01 RX ADMIN — NYSTATIN CREAM 1 APPLICATION(S): 100000 CREAM TOPICAL at 05:06

## 2021-01-01 RX ADMIN — Medication 200 MILLIGRAM(S): at 05:09

## 2021-01-01 RX ADMIN — ENOXAPARIN SODIUM 40 MILLIGRAM(S): 100 INJECTION SUBCUTANEOUS at 11:20

## 2021-01-01 RX ADMIN — INSULIN HUMAN 10 UNIT(S): 100 INJECTION, SOLUTION SUBCUTANEOUS at 00:54

## 2021-01-01 RX ADMIN — NYSTATIN CREAM 1 APPLICATION(S): 100000 CREAM TOPICAL at 17:34

## 2021-01-01 RX ADMIN — Medication 650 MILLIGRAM(S): at 05:32

## 2021-01-01 RX ADMIN — PANTOPRAZOLE SODIUM 40 MILLIGRAM(S): 20 TABLET, DELAYED RELEASE ORAL at 11:29

## 2021-01-01 RX ADMIN — INSULIN GLARGINE 20 UNIT(S): 100 INJECTION, SOLUTION SUBCUTANEOUS at 08:41

## 2021-01-01 RX ADMIN — Medication 650 MILLIGRAM(S): at 00:30

## 2021-01-01 RX ADMIN — HEPARIN SODIUM 5000 UNIT(S): 5000 INJECTION INTRAVENOUS; SUBCUTANEOUS at 05:09

## 2021-01-01 RX ADMIN — GABAPENTIN 400 MILLIGRAM(S): 400 CAPSULE ORAL at 13:49

## 2021-01-01 RX ADMIN — Medication 250 MILLIGRAM(S): at 11:35

## 2021-01-01 RX ADMIN — Medication 650 MILLIGRAM(S): at 02:25

## 2021-01-01 RX ADMIN — Medication 6 MICROGRAM(S)/KG/MIN: at 05:01

## 2021-01-01 RX ADMIN — Medication 250 MILLIGRAM(S): at 17:34

## 2021-01-01 RX ADMIN — INSULIN HUMAN 8 UNIT(S)/HR: 100 INJECTION, SOLUTION SUBCUTANEOUS at 19:30

## 2021-01-01 RX ADMIN — CEFEPIME 100 MILLIGRAM(S): 1 INJECTION, POWDER, FOR SOLUTION INTRAMUSCULAR; INTRAVENOUS at 14:18

## 2021-01-01 RX ADMIN — HEPARIN SODIUM 5000 UNIT(S): 5000 INJECTION INTRAVENOUS; SUBCUTANEOUS at 05:38

## 2021-01-01 RX ADMIN — Medication 2: at 17:14

## 2021-01-01 RX ADMIN — Medication 25 MILLIGRAM(S): at 12:08

## 2021-01-01 RX ADMIN — LISINOPRIL 20 MILLIGRAM(S): 2.5 TABLET ORAL at 05:17

## 2021-01-01 RX ADMIN — Medication 25 MILLIGRAM(S): at 18:28

## 2021-01-01 RX ADMIN — Medication 650 MILLIGRAM(S): at 02:35

## 2021-01-01 RX ADMIN — PANTOPRAZOLE SODIUM 40 MILLIGRAM(S): 20 TABLET, DELAYED RELEASE ORAL at 11:18

## 2021-01-01 RX ADMIN — ATORVASTATIN CALCIUM 40 MILLIGRAM(S): 80 TABLET, FILM COATED ORAL at 21:07

## 2021-01-01 RX ADMIN — INSULIN HUMAN 2 UNIT(S)/HR: 100 INJECTION, SOLUTION SUBCUTANEOUS at 21:30

## 2021-01-01 RX ADMIN — Medication 8 UNIT(S): at 12:20

## 2021-01-01 RX ADMIN — GABAPENTIN 400 MILLIGRAM(S): 400 CAPSULE ORAL at 15:03

## 2021-01-01 RX ADMIN — CEFEPIME 100 MILLIGRAM(S): 1 INJECTION, POWDER, FOR SOLUTION INTRAMUSCULAR; INTRAVENOUS at 14:20

## 2021-01-01 RX ADMIN — CEFEPIME 100 MILLIGRAM(S): 1 INJECTION, POWDER, FOR SOLUTION INTRAMUSCULAR; INTRAVENOUS at 11:20

## 2021-01-01 RX ADMIN — NYSTATIN CREAM 1 APPLICATION(S): 100000 CREAM TOPICAL at 05:26

## 2021-01-01 RX ADMIN — CHLORHEXIDINE GLUCONATE 1 APPLICATION(S): 213 SOLUTION TOPICAL at 05:29

## 2021-01-01 RX ADMIN — Medication 250 MILLIGRAM(S): at 05:11

## 2021-01-01 RX ADMIN — PROPOFOL 1.92 MICROGRAM(S)/KG/MIN: 10 INJECTION, EMULSION INTRAVENOUS at 07:46

## 2021-01-01 RX ADMIN — HEPARIN SODIUM 5000 UNIT(S): 5000 INJECTION INTRAVENOUS; SUBCUTANEOUS at 21:50

## 2021-01-01 RX ADMIN — Medication 60 MILLIGRAM(S): at 06:06

## 2021-01-01 RX ADMIN — GABAPENTIN 400 MILLIGRAM(S): 400 CAPSULE ORAL at 13:05

## 2021-01-01 RX ADMIN — PROPOFOL 1.92 MICROGRAM(S)/KG/MIN: 10 INJECTION, EMULSION INTRAVENOUS at 13:13

## 2021-01-01 RX ADMIN — FENTANYL CITRATE 3.2 MICROGRAM(S)/KG/HR: 50 INJECTION INTRAVENOUS at 11:00

## 2021-01-01 RX ADMIN — Medication 6 MICROGRAM(S)/KG/MIN: at 05:35

## 2021-01-01 RX ADMIN — DULOXETINE HYDROCHLORIDE 60 MILLIGRAM(S): 30 CAPSULE, DELAYED RELEASE ORAL at 11:16

## 2021-01-01 RX ADMIN — Medication 400 MILLIGRAM(S): at 06:00

## 2021-01-01 RX ADMIN — HEPARIN SODIUM 5000 UNIT(S): 5000 INJECTION INTRAVENOUS; SUBCUTANEOUS at 13:17

## 2021-01-01 RX ADMIN — HEPARIN SODIUM 5000 UNIT(S): 5000 INJECTION INTRAVENOUS; SUBCUTANEOUS at 21:04

## 2021-01-01 RX ADMIN — Medication 1 MILLIGRAM(S): at 11:20

## 2021-01-01 RX ADMIN — Medication 1 MILLIGRAM(S): at 12:03

## 2021-01-01 RX ADMIN — DULOXETINE HYDROCHLORIDE 60 MILLIGRAM(S): 30 CAPSULE, DELAYED RELEASE ORAL at 12:25

## 2021-01-01 RX ADMIN — Medication 60 MILLIGRAM(S): at 07:46

## 2021-01-01 RX ADMIN — HEPARIN SODIUM 5000 UNIT(S): 5000 INJECTION INTRAVENOUS; SUBCUTANEOUS at 21:26

## 2021-01-01 RX ADMIN — Medication 250 MILLIGRAM(S): at 05:05

## 2021-01-01 RX ADMIN — CEFEPIME 100 MILLIGRAM(S): 1 INJECTION, POWDER, FOR SOLUTION INTRAMUSCULAR; INTRAVENOUS at 17:14

## 2021-01-01 RX ADMIN — CHLORHEXIDINE GLUCONATE 15 MILLILITER(S): 213 SOLUTION TOPICAL at 17:39

## 2021-01-01 RX ADMIN — DULOXETINE HYDROCHLORIDE 60 MILLIGRAM(S): 30 CAPSULE, DELAYED RELEASE ORAL at 11:10

## 2021-01-01 RX ADMIN — NYSTATIN CREAM 1 APPLICATION(S): 100000 CREAM TOPICAL at 17:39

## 2021-01-01 RX ADMIN — DEXMEDETOMIDINE HYDROCHLORIDE IN 0.9% SODIUM CHLORIDE 3.2 MICROGRAM(S)/KG/HR: 4 INJECTION INTRAVENOUS at 16:32

## 2021-01-01 RX ADMIN — NYSTATIN CREAM 1 APPLICATION(S): 100000 CREAM TOPICAL at 05:12

## 2021-01-01 RX ADMIN — Medication 40 MILLIGRAM(S): at 11:12

## 2021-01-01 RX ADMIN — Medication 60 MILLIGRAM(S): at 17:25

## 2021-01-01 RX ADMIN — CHLORHEXIDINE GLUCONATE 1 APPLICATION(S): 213 SOLUTION TOPICAL at 05:26

## 2021-01-01 RX ADMIN — Medication 81 MILLIGRAM(S): at 12:19

## 2021-01-01 RX ADMIN — GABAPENTIN 400 MILLIGRAM(S): 400 CAPSULE ORAL at 21:28

## 2021-01-01 RX ADMIN — Medication 324 MILLIGRAM(S): at 00:01

## 2021-01-01 RX ADMIN — Medication 650 MILLIGRAM(S): at 09:08

## 2021-01-01 RX ADMIN — CHLORHEXIDINE GLUCONATE 15 MILLILITER(S): 213 SOLUTION TOPICAL at 05:46

## 2021-01-01 RX ADMIN — Medication 25 MILLIGRAM(S): at 10:53

## 2021-01-01 RX ADMIN — CHLORHEXIDINE GLUCONATE 15 MILLILITER(S): 213 SOLUTION TOPICAL at 05:05

## 2021-01-01 RX ADMIN — Medication 81 MILLIGRAM(S): at 11:20

## 2021-01-01 RX ADMIN — ATORVASTATIN CALCIUM 40 MILLIGRAM(S): 80 TABLET, FILM COATED ORAL at 21:28

## 2021-01-01 RX ADMIN — ATORVASTATIN CALCIUM 40 MILLIGRAM(S): 80 TABLET, FILM COATED ORAL at 21:04

## 2021-01-01 RX ADMIN — PANTOPRAZOLE SODIUM 40 MILLIGRAM(S): 20 TABLET, DELAYED RELEASE ORAL at 12:19

## 2021-01-01 RX ADMIN — Medication 200 MILLIGRAM(S): at 05:03

## 2021-01-01 RX ADMIN — Medication 1 MILLIGRAM(S): at 11:08

## 2021-01-01 RX ADMIN — Medication 8 UNIT(S): at 08:03

## 2021-01-01 RX ADMIN — Medication 400 MILLIGRAM(S): at 06:23

## 2021-01-01 RX ADMIN — Medication 1: at 16:46

## 2021-01-01 RX ADMIN — PROPOFOL 1.92 MICROGRAM(S)/KG/MIN: 10 INJECTION, EMULSION INTRAVENOUS at 21:25

## 2021-01-01 RX ADMIN — Medication 400 MILLIGRAM(S): at 17:31

## 2021-01-01 RX ADMIN — DEXMEDETOMIDINE HYDROCHLORIDE IN 0.9% SODIUM CHLORIDE 3.2 MICROGRAM(S)/KG/HR: 4 INJECTION INTRAVENOUS at 04:26

## 2021-01-01 RX ADMIN — Medication 650 MILLIGRAM(S): at 16:00

## 2021-01-01 RX ADMIN — DULOXETINE HYDROCHLORIDE 60 MILLIGRAM(S): 30 CAPSULE, DELAYED RELEASE ORAL at 11:13

## 2021-01-01 RX ADMIN — HEPARIN SODIUM 5000 UNIT(S): 5000 INJECTION INTRAVENOUS; SUBCUTANEOUS at 22:17

## 2021-01-01 RX ADMIN — Medication 8 UNIT(S): at 12:18

## 2021-01-01 RX ADMIN — Medication 50 MILLIEQUIVALENT(S): at 13:37

## 2021-01-01 RX ADMIN — Medication 650 MILLIGRAM(S): at 02:45

## 2021-01-01 RX ADMIN — CHLORHEXIDINE GLUCONATE 1 APPLICATION(S): 213 SOLUTION TOPICAL at 05:13

## 2021-01-01 RX ADMIN — Medication 25 MILLIGRAM(S): at 17:21

## 2021-01-01 RX ADMIN — Medication 1 MILLIGRAM(S): at 12:52

## 2021-01-01 RX ADMIN — CHLORHEXIDINE GLUCONATE 15 MILLILITER(S): 213 SOLUTION TOPICAL at 05:10

## 2021-01-01 RX ADMIN — Medication 0: at 07:44

## 2021-01-01 RX ADMIN — Medication 200 MILLIGRAM(S): at 17:20

## 2021-01-01 RX ADMIN — NYSTATIN CREAM 1 APPLICATION(S): 100000 CREAM TOPICAL at 17:19

## 2021-01-01 RX ADMIN — Medication 400 MILLIGRAM(S): at 17:25

## 2021-01-01 RX ADMIN — DEXMEDETOMIDINE HYDROCHLORIDE IN 0.9% SODIUM CHLORIDE 3.2 MICROGRAM(S)/KG/HR: 4 INJECTION INTRAVENOUS at 21:12

## 2021-01-01 RX ADMIN — DULOXETINE HYDROCHLORIDE 60 MILLIGRAM(S): 30 CAPSULE, DELAYED RELEASE ORAL at 11:08

## 2021-01-01 RX ADMIN — DEXMEDETOMIDINE HYDROCHLORIDE IN 0.9% SODIUM CHLORIDE 3.2 MICROGRAM(S)/KG/HR: 4 INJECTION INTRAVENOUS at 21:25

## 2021-01-01 RX ADMIN — Medication 200 MILLIGRAM(S): at 05:13

## 2021-01-01 RX ADMIN — Medication 1 MILLIGRAM(S): at 11:18

## 2021-01-01 RX ADMIN — CHLORHEXIDINE GLUCONATE 1 APPLICATION(S): 213 SOLUTION TOPICAL at 05:11

## 2021-01-01 RX ADMIN — DEXMEDETOMIDINE HYDROCHLORIDE IN 0.9% SODIUM CHLORIDE 3.2 MICROGRAM(S)/KG/HR: 4 INJECTION INTRAVENOUS at 21:01

## 2021-01-01 RX ADMIN — Medication 2: at 12:19

## 2021-01-01 RX ADMIN — POLYETHYLENE GLYCOL 3350 17 GRAM(S): 17 POWDER, FOR SOLUTION ORAL at 11:49

## 2021-01-01 RX ADMIN — Medication 6 MICROGRAM(S)/KG/MIN: at 15:50

## 2021-01-01 RX ADMIN — Medication 650 MILLIGRAM(S): at 02:11

## 2021-01-01 RX ADMIN — Medication 400 MILLIGRAM(S): at 05:59

## 2021-01-01 RX ADMIN — PROPOFOL 1.92 MICROGRAM(S)/KG/MIN: 10 INJECTION, EMULSION INTRAVENOUS at 09:02

## 2021-01-01 RX ADMIN — Medication 81 MILLIGRAM(S): at 11:16

## 2021-01-01 RX ADMIN — Medication 5 MILLIGRAM(S): at 08:33

## 2021-01-01 RX ADMIN — Medication 81 MILLIGRAM(S): at 12:22

## 2021-01-01 RX ADMIN — NYSTATIN CREAM 1 APPLICATION(S): 100000 CREAM TOPICAL at 05:15

## 2021-01-01 RX ADMIN — DULOXETINE HYDROCHLORIDE 60 MILLIGRAM(S): 30 CAPSULE, DELAYED RELEASE ORAL at 11:18

## 2021-01-01 RX ADMIN — CHLORHEXIDINE GLUCONATE 1 APPLICATION(S): 213 SOLUTION TOPICAL at 05:32

## 2021-01-01 RX ADMIN — NYSTATIN CREAM 1 APPLICATION(S): 100000 CREAM TOPICAL at 05:27

## 2021-01-01 RX ADMIN — Medication 6 MICROGRAM(S)/KG/MIN: at 21:02

## 2021-01-01 RX ADMIN — Medication 8 UNIT(S): at 12:25

## 2021-01-01 RX ADMIN — PANTOPRAZOLE SODIUM 40 MILLIGRAM(S): 20 TABLET, DELAYED RELEASE ORAL at 11:10

## 2021-01-01 RX ADMIN — Medication 81 MILLIGRAM(S): at 11:17

## 2021-01-01 RX ADMIN — Medication 650 MILLIGRAM(S): at 00:47

## 2021-01-01 RX ADMIN — Medication 200 MILLIGRAM(S): at 17:30

## 2021-01-01 RX ADMIN — INSULIN GLARGINE 20 UNIT(S): 100 INJECTION, SOLUTION SUBCUTANEOUS at 07:40

## 2021-01-01 RX ADMIN — Medication 81 MILLIGRAM(S): at 11:18

## 2021-01-01 RX ADMIN — HEPARIN SODIUM 5000 UNIT(S): 5000 INJECTION INTRAVENOUS; SUBCUTANEOUS at 21:31

## 2021-01-01 RX ADMIN — CEFEPIME 100 MILLIGRAM(S): 1 INJECTION, POWDER, FOR SOLUTION INTRAMUSCULAR; INTRAVENOUS at 05:21

## 2021-01-01 RX ADMIN — ATORVASTATIN CALCIUM 40 MILLIGRAM(S): 80 TABLET, FILM COATED ORAL at 21:08

## 2021-01-01 RX ADMIN — ATORVASTATIN CALCIUM 40 MILLIGRAM(S): 80 TABLET, FILM COATED ORAL at 22:01

## 2021-01-01 RX ADMIN — CHLORHEXIDINE GLUCONATE 15 MILLILITER(S): 213 SOLUTION TOPICAL at 17:43

## 2021-01-01 RX ADMIN — CHLORHEXIDINE GLUCONATE 1 APPLICATION(S): 213 SOLUTION TOPICAL at 05:00

## 2021-01-01 RX ADMIN — DULOXETINE HYDROCHLORIDE 60 MILLIGRAM(S): 30 CAPSULE, DELAYED RELEASE ORAL at 12:14

## 2021-01-01 RX ADMIN — HEPARIN SODIUM 5000 UNIT(S): 5000 INJECTION INTRAVENOUS; SUBCUTANEOUS at 05:01

## 2021-01-01 RX ADMIN — POLYETHYLENE GLYCOL 3350 17 GRAM(S): 17 POWDER, FOR SOLUTION ORAL at 12:04

## 2021-01-01 RX ADMIN — Medication 1 MILLIGRAM(S): at 11:13

## 2021-01-01 RX ADMIN — CHLORHEXIDINE GLUCONATE 15 MILLILITER(S): 213 SOLUTION TOPICAL at 05:26

## 2021-01-01 RX ADMIN — DEXMEDETOMIDINE HYDROCHLORIDE IN 0.9% SODIUM CHLORIDE 3.2 MICROGRAM(S)/KG/HR: 4 INJECTION INTRAVENOUS at 01:03

## 2021-01-01 RX ADMIN — ATORVASTATIN CALCIUM 40 MILLIGRAM(S): 80 TABLET, FILM COATED ORAL at 21:25

## 2021-01-01 RX ADMIN — Medication 40 MILLIGRAM(S): at 17:40

## 2021-01-01 RX ADMIN — Medication 175 MICROGRAM(S): at 05:12

## 2021-01-01 RX ADMIN — CEFEPIME 100 MILLIGRAM(S): 1 INJECTION, POWDER, FOR SOLUTION INTRAMUSCULAR; INTRAVENOUS at 10:42

## 2021-01-01 RX ADMIN — Medication 650 MILLIGRAM(S): at 11:05

## 2021-01-01 RX ADMIN — PANTOPRAZOLE SODIUM 40 MILLIGRAM(S): 20 TABLET, DELAYED RELEASE ORAL at 12:53

## 2021-01-01 RX ADMIN — Medication 250 MILLIGRAM(S): at 17:32

## 2021-01-01 RX ADMIN — HEPARIN SODIUM 5000 UNIT(S): 5000 INJECTION INTRAVENOUS; SUBCUTANEOUS at 21:57

## 2021-01-01 RX ADMIN — FENTANYL CITRATE 3.2 MICROGRAM(S)/KG/HR: 50 INJECTION INTRAVENOUS at 17:49

## 2021-01-01 RX ADMIN — GABAPENTIN 400 MILLIGRAM(S): 400 CAPSULE ORAL at 21:29

## 2021-01-01 RX ADMIN — CHLORHEXIDINE GLUCONATE 15 MILLILITER(S): 213 SOLUTION TOPICAL at 17:01

## 2021-01-01 RX ADMIN — HEPARIN SODIUM 5000 UNIT(S): 5000 INJECTION INTRAVENOUS; SUBCUTANEOUS at 05:13

## 2021-01-01 RX ADMIN — DULOXETINE HYDROCHLORIDE 60 MILLIGRAM(S): 30 CAPSULE, DELAYED RELEASE ORAL at 11:20

## 2021-01-01 RX ADMIN — PROPOFOL 1.92 MICROGRAM(S)/KG/MIN: 10 INJECTION, EMULSION INTRAVENOUS at 13:57

## 2021-01-01 RX ADMIN — INSULIN GLARGINE 20 UNIT(S): 100 INJECTION, SOLUTION SUBCUTANEOUS at 07:44

## 2021-01-01 RX ADMIN — CEFEPIME 100 MILLIGRAM(S): 1 INJECTION, POWDER, FOR SOLUTION INTRAMUSCULAR; INTRAVENOUS at 11:19

## 2021-01-01 RX ADMIN — PROPOFOL 1.92 MICROGRAM(S)/KG/MIN: 10 INJECTION, EMULSION INTRAVENOUS at 06:39

## 2021-01-01 RX ADMIN — HEPARIN SODIUM 5000 UNIT(S): 5000 INJECTION INTRAVENOUS; SUBCUTANEOUS at 13:37

## 2021-01-01 RX ADMIN — SODIUM CHLORIDE 500 MILLILITER(S): 9 INJECTION INTRAMUSCULAR; INTRAVENOUS; SUBCUTANEOUS at 21:10

## 2021-01-01 RX ADMIN — HEPARIN SODIUM 5000 UNIT(S): 5000 INJECTION INTRAVENOUS; SUBCUTANEOUS at 05:03

## 2021-01-01 RX ADMIN — Medication 175 MICROGRAM(S): at 05:36

## 2021-01-01 RX ADMIN — HEPARIN SODIUM 5000 UNIT(S): 5000 INJECTION INTRAVENOUS; SUBCUTANEOUS at 16:52

## 2021-01-01 RX ADMIN — INSULIN HUMAN 1 UNIT(S)/HR: 100 INJECTION, SOLUTION SUBCUTANEOUS at 20:30

## 2021-01-01 RX ADMIN — Medication 175 MICROGRAM(S): at 05:07

## 2021-01-01 RX ADMIN — ATORVASTATIN CALCIUM 40 MILLIGRAM(S): 80 TABLET, FILM COATED ORAL at 21:34

## 2021-01-01 RX ADMIN — Medication 25 MILLIGRAM(S): at 11:11

## 2021-01-01 RX ADMIN — CHLORHEXIDINE GLUCONATE 15 MILLILITER(S): 213 SOLUTION TOPICAL at 05:20

## 2021-01-01 RX ADMIN — Medication 40 MILLIGRAM(S): at 09:56

## 2021-01-01 RX ADMIN — DULOXETINE HYDROCHLORIDE 60 MILLIGRAM(S): 30 CAPSULE, DELAYED RELEASE ORAL at 11:21

## 2021-01-01 RX ADMIN — DEXMEDETOMIDINE HYDROCHLORIDE IN 0.9% SODIUM CHLORIDE 3.2 MICROGRAM(S)/KG/HR: 4 INJECTION INTRAVENOUS at 18:37

## 2021-01-01 RX ADMIN — ATORVASTATIN CALCIUM 40 MILLIGRAM(S): 80 TABLET, FILM COATED ORAL at 21:29

## 2021-01-01 RX ADMIN — Medication 40 MILLIGRAM(S): at 09:53

## 2021-01-01 RX ADMIN — Medication 175 MICROGRAM(S): at 05:46

## 2021-01-01 RX ADMIN — Medication 400 MILLIGRAM(S): at 17:14

## 2021-01-01 RX ADMIN — Medication 40 MILLIGRAM(S): at 18:33

## 2021-01-01 RX ADMIN — Medication 25 MILLIGRAM(S): at 05:17

## 2021-01-01 RX ADMIN — Medication 40 MILLIGRAM(S): at 05:03

## 2021-01-01 RX ADMIN — INSULIN HUMAN 8 UNIT(S)/HR: 100 INJECTION, SOLUTION SUBCUTANEOUS at 20:47

## 2021-01-01 RX ADMIN — Medication 81 MILLIGRAM(S): at 12:52

## 2021-01-01 RX ADMIN — DEXMEDETOMIDINE HYDROCHLORIDE IN 0.9% SODIUM CHLORIDE 3.2 MICROGRAM(S)/KG/HR: 4 INJECTION INTRAVENOUS at 10:51

## 2021-01-01 RX ADMIN — ONDANSETRON 4 MILLIGRAM(S): 8 TABLET, FILM COATED ORAL at 01:22

## 2021-01-01 RX ADMIN — Medication 8 UNIT(S): at 12:09

## 2021-01-01 RX ADMIN — FENTANYL CITRATE 3.2 MICROGRAM(S)/KG/HR: 50 INJECTION INTRAVENOUS at 02:56

## 2021-01-01 RX ADMIN — FENTANYL CITRATE 3.2 MICROGRAM(S)/KG/HR: 50 INJECTION INTRAVENOUS at 11:27

## 2021-01-01 RX ADMIN — DULOXETINE HYDROCHLORIDE 60 MILLIGRAM(S): 30 CAPSULE, DELAYED RELEASE ORAL at 11:53

## 2021-01-01 RX ADMIN — DULOXETINE HYDROCHLORIDE 60 MILLIGRAM(S): 30 CAPSULE, DELAYED RELEASE ORAL at 11:06

## 2021-01-01 RX ADMIN — Medication 8 UNIT(S): at 11:53

## 2021-01-01 RX ADMIN — Medication 3 MILLILITER(S): at 20:17

## 2021-01-01 RX ADMIN — CHLORHEXIDINE GLUCONATE 15 MILLILITER(S): 213 SOLUTION TOPICAL at 17:02

## 2021-01-01 RX ADMIN — DEXMEDETOMIDINE HYDROCHLORIDE IN 0.9% SODIUM CHLORIDE 3.2 MICROGRAM(S)/KG/HR: 4 INJECTION INTRAVENOUS at 17:01

## 2021-01-01 RX ADMIN — Medication 25 MILLILITER(S): at 21:17

## 2021-01-01 RX ADMIN — Medication 81 MILLIGRAM(S): at 12:04

## 2021-01-01 RX ADMIN — Medication 2: at 11:58

## 2021-01-01 RX ADMIN — PROPOFOL 1.92 MICROGRAM(S)/KG/MIN: 10 INJECTION, EMULSION INTRAVENOUS at 03:19

## 2021-01-01 RX ADMIN — ENOXAPARIN SODIUM 40 MILLIGRAM(S): 100 INJECTION SUBCUTANEOUS at 12:25

## 2021-01-01 RX ADMIN — IOHEXOL 30 MILLILITER(S): 300 INJECTION, SOLUTION INTRAVENOUS at 16:10

## 2021-01-01 RX ADMIN — CHLORHEXIDINE GLUCONATE 15 MILLILITER(S): 213 SOLUTION TOPICAL at 17:19

## 2021-01-01 RX ADMIN — ATORVASTATIN CALCIUM 40 MILLIGRAM(S): 80 TABLET, FILM COATED ORAL at 21:20

## 2021-01-01 RX ADMIN — ATORVASTATIN CALCIUM 40 MILLIGRAM(S): 80 TABLET, FILM COATED ORAL at 21:21

## 2021-01-01 RX ADMIN — Medication 650 MILLIGRAM(S): at 06:02

## 2021-01-01 RX ADMIN — CHLORHEXIDINE GLUCONATE 1 APPLICATION(S): 213 SOLUTION TOPICAL at 05:51

## 2021-01-01 RX ADMIN — PANTOPRAZOLE SODIUM 40 MILLIGRAM(S): 20 TABLET, DELAYED RELEASE ORAL at 12:50

## 2021-01-01 RX ADMIN — HEPARIN SODIUM 5000 UNIT(S): 5000 INJECTION INTRAVENOUS; SUBCUTANEOUS at 21:30

## 2021-01-01 RX ADMIN — HEPARIN SODIUM 5000 UNIT(S): 5000 INJECTION INTRAVENOUS; SUBCUTANEOUS at 05:07

## 2021-01-01 RX ADMIN — HEPARIN SODIUM 5000 UNIT(S): 5000 INJECTION INTRAVENOUS; SUBCUTANEOUS at 21:10

## 2021-01-01 RX ADMIN — GABAPENTIN 400 MILLIGRAM(S): 400 CAPSULE ORAL at 21:16

## 2021-01-01 RX ADMIN — ATORVASTATIN CALCIUM 40 MILLIGRAM(S): 80 TABLET, FILM COATED ORAL at 21:16

## 2021-01-01 RX ADMIN — Medication 400 MILLIGRAM(S): at 17:05

## 2021-01-01 RX ADMIN — LISINOPRIL 20 MILLIGRAM(S): 2.5 TABLET ORAL at 07:47

## 2021-01-01 RX ADMIN — INSULIN HUMAN 1 UNIT(S)/HR: 100 INJECTION, SOLUTION SUBCUTANEOUS at 22:35

## 2021-01-01 RX ADMIN — CHLORHEXIDINE GLUCONATE 15 MILLILITER(S): 213 SOLUTION TOPICAL at 05:21

## 2021-01-01 RX ADMIN — Medication 40 MILLIGRAM(S): at 05:09

## 2021-01-01 RX ADMIN — PROPOFOL 1.92 MICROGRAM(S)/KG/MIN: 10 INJECTION, EMULSION INTRAVENOUS at 21:30

## 2021-01-01 RX ADMIN — MIDAZOLAM HYDROCHLORIDE 1.28 MG/KG/HR: 1 INJECTION, SOLUTION INTRAMUSCULAR; INTRAVENOUS at 19:26

## 2021-01-01 RX ADMIN — HEPARIN SODIUM 5000 UNIT(S): 5000 INJECTION INTRAVENOUS; SUBCUTANEOUS at 22:01

## 2021-01-01 RX ADMIN — PROPOFOL 1.92 MICROGRAM(S)/KG/MIN: 10 INJECTION, EMULSION INTRAVENOUS at 17:41

## 2021-01-01 RX ADMIN — NYSTATIN CREAM 1 APPLICATION(S): 100000 CREAM TOPICAL at 17:15

## 2021-01-01 RX ADMIN — NYSTATIN CREAM 1 APPLICATION(S): 100000 CREAM TOPICAL at 18:19

## 2021-01-01 RX ADMIN — ATORVASTATIN CALCIUM 40 MILLIGRAM(S): 80 TABLET, FILM COATED ORAL at 21:00

## 2021-01-01 RX ADMIN — SODIUM CHLORIDE 60 MILLILITER(S): 9 INJECTION, SOLUTION INTRAVENOUS at 22:04

## 2021-01-01 RX ADMIN — Medication 81 MILLIGRAM(S): at 11:50

## 2021-01-01 RX ADMIN — DEXMEDETOMIDINE HYDROCHLORIDE IN 0.9% SODIUM CHLORIDE 3.2 MICROGRAM(S)/KG/HR: 4 INJECTION INTRAVENOUS at 09:51

## 2021-01-01 RX ADMIN — Medication 2: at 11:34

## 2021-01-01 RX ADMIN — GABAPENTIN 400 MILLIGRAM(S): 400 CAPSULE ORAL at 05:21

## 2021-01-01 RX ADMIN — NYSTATIN CREAM 1 APPLICATION(S): 100000 CREAM TOPICAL at 17:45

## 2021-01-01 RX ADMIN — Medication 650 MILLIGRAM(S): at 13:56

## 2021-01-01 RX ADMIN — INSULIN HUMAN 2 UNIT(S)/HR: 100 INJECTION, SOLUTION SUBCUTANEOUS at 07:45

## 2021-01-01 RX ADMIN — Medication 400 MILLIGRAM(S): at 06:07

## 2021-01-01 RX ADMIN — Medication 1 MILLIGRAM(S): at 11:06

## 2021-01-01 RX ADMIN — Medication 100 MICROGRAM(S): at 06:06

## 2021-01-01 RX ADMIN — Medication 250 MILLIGRAM(S): at 23:15

## 2021-01-01 RX ADMIN — INSULIN HUMAN 2 UNIT(S)/HR: 100 INJECTION, SOLUTION SUBCUTANEOUS at 09:50

## 2021-01-01 RX ADMIN — CHLORHEXIDINE GLUCONATE 15 MILLILITER(S): 213 SOLUTION TOPICAL at 17:49

## 2021-01-01 RX ADMIN — Medication 81 MILLIGRAM(S): at 11:13

## 2021-01-01 RX ADMIN — Medication 10 MILLIGRAM(S): at 21:21

## 2021-01-01 RX ADMIN — Medication 81 MILLIGRAM(S): at 12:03

## 2021-01-01 RX ADMIN — CHLORHEXIDINE GLUCONATE 1 APPLICATION(S): 213 SOLUTION TOPICAL at 05:53

## 2021-01-01 RX ADMIN — CHLORHEXIDINE GLUCONATE 15 MILLILITER(S): 213 SOLUTION TOPICAL at 05:27

## 2021-01-01 RX ADMIN — Medication 1 MILLIGRAM(S): at 11:28

## 2021-01-01 RX ADMIN — GABAPENTIN 400 MILLIGRAM(S): 400 CAPSULE ORAL at 06:06

## 2021-01-01 RX ADMIN — CHLORHEXIDINE GLUCONATE 1 APPLICATION(S): 213 SOLUTION TOPICAL at 11:12

## 2021-01-01 RX ADMIN — HEPARIN SODIUM 5000 UNIT(S): 5000 INJECTION INTRAVENOUS; SUBCUTANEOUS at 05:23

## 2021-01-01 RX ADMIN — NYSTATIN CREAM 1 APPLICATION(S): 100000 CREAM TOPICAL at 05:11

## 2021-01-01 RX ADMIN — DEXMEDETOMIDINE HYDROCHLORIDE IN 0.9% SODIUM CHLORIDE 3.2 MICROGRAM(S)/KG/HR: 4 INJECTION INTRAVENOUS at 00:09

## 2021-01-01 RX ADMIN — Medication 8 UNIT(S): at 17:13

## 2021-01-01 RX ADMIN — CHLORHEXIDINE GLUCONATE 1 APPLICATION(S): 213 SOLUTION TOPICAL at 05:27

## 2021-01-01 RX ADMIN — SODIUM CHLORIDE 50 MILLILITER(S): 9 INJECTION, SOLUTION INTRAVENOUS at 07:30

## 2021-01-01 RX ADMIN — CHLORHEXIDINE GLUCONATE 1 APPLICATION(S): 213 SOLUTION TOPICAL at 05:14

## 2021-01-01 RX ADMIN — HEPARIN SODIUM 5000 UNIT(S): 5000 INJECTION INTRAVENOUS; SUBCUTANEOUS at 15:02

## 2021-01-01 RX ADMIN — Medication 650 MILLIGRAM(S): at 23:34

## 2021-01-01 RX ADMIN — Medication 6 MICROGRAM(S)/KG/MIN: at 20:46

## 2021-01-01 RX ADMIN — DEXMEDETOMIDINE HYDROCHLORIDE IN 0.9% SODIUM CHLORIDE 3.2 MICROGRAM(S)/KG/HR: 4 INJECTION INTRAVENOUS at 15:14

## 2021-01-01 RX ADMIN — Medication 100 MICROGRAM(S): at 05:53

## 2021-01-01 RX ADMIN — CEFEPIME 100 MILLIGRAM(S): 1 INJECTION, POWDER, FOR SOLUTION INTRAMUSCULAR; INTRAVENOUS at 05:49

## 2021-01-01 RX ADMIN — Medication 1 MILLIGRAM(S): at 11:35

## 2021-01-01 RX ADMIN — Medication 2: at 12:08

## 2021-01-01 RX ADMIN — CHLORHEXIDINE GLUCONATE 15 MILLILITER(S): 213 SOLUTION TOPICAL at 17:33

## 2021-01-01 RX ADMIN — FENTANYL CITRATE 3.2 MICROGRAM(S)/KG/HR: 50 INJECTION INTRAVENOUS at 19:30

## 2021-01-01 RX ADMIN — GABAPENTIN 400 MILLIGRAM(S): 400 CAPSULE ORAL at 06:02

## 2021-01-01 RX ADMIN — Medication 50 MILLIEQUIVALENT(S): at 12:46

## 2021-01-01 RX ADMIN — HEPARIN SODIUM 5000 UNIT(S): 5000 INJECTION INTRAVENOUS; SUBCUTANEOUS at 05:49

## 2021-01-01 RX ADMIN — Medication 100 MILLIEQUIVALENT(S): at 09:17

## 2021-01-01 RX ADMIN — Medication 50 MILLIEQUIVALENT(S): at 11:36

## 2021-01-01 RX ADMIN — MEROPENEM 100 MILLIGRAM(S): 1 INJECTION INTRAVENOUS at 23:15

## 2021-01-01 RX ADMIN — DULOXETINE HYDROCHLORIDE 60 MILLIGRAM(S): 30 CAPSULE, DELAYED RELEASE ORAL at 12:53

## 2021-01-01 RX ADMIN — NYSTATIN CREAM 1 APPLICATION(S): 100000 CREAM TOPICAL at 05:21

## 2021-01-01 RX ADMIN — HEPARIN SODIUM 5000 UNIT(S): 5000 INJECTION INTRAVENOUS; SUBCUTANEOUS at 16:11

## 2021-01-01 RX ADMIN — CEFEPIME 100 MILLIGRAM(S): 1 INJECTION, POWDER, FOR SOLUTION INTRAMUSCULAR; INTRAVENOUS at 15:24

## 2021-01-01 RX ADMIN — Medication 200 GRAM(S): at 06:48

## 2021-01-01 RX ADMIN — INSULIN HUMAN 10 UNIT(S): 100 INJECTION, SOLUTION SUBCUTANEOUS at 21:40

## 2021-01-01 RX ADMIN — PANTOPRAZOLE SODIUM 40 MILLIGRAM(S): 20 TABLET, DELAYED RELEASE ORAL at 12:59

## 2021-01-01 RX ADMIN — Medication 175 MICROGRAM(S): at 05:02

## 2021-01-01 RX ADMIN — Medication 6 MICROGRAM(S)/KG/MIN: at 05:49

## 2021-01-01 RX ADMIN — CHLORHEXIDINE GLUCONATE 15 MILLILITER(S): 213 SOLUTION TOPICAL at 05:03

## 2021-01-01 RX ADMIN — CHLORHEXIDINE GLUCONATE 15 MILLILITER(S): 213 SOLUTION TOPICAL at 17:50

## 2021-01-01 RX ADMIN — MIDAZOLAM HYDROCHLORIDE 1.28 MG/KG/HR: 1 INJECTION, SOLUTION INTRAMUSCULAR; INTRAVENOUS at 14:07

## 2021-01-01 RX ADMIN — Medication 6 MICROGRAM(S)/KG/MIN: at 19:24

## 2021-01-01 RX ADMIN — PANTOPRAZOLE SODIUM 40 MILLIGRAM(S): 20 TABLET, DELAYED RELEASE ORAL at 12:15

## 2021-01-01 RX ADMIN — Medication 81 MILLIGRAM(S): at 11:10

## 2021-01-01 RX ADMIN — Medication 8 UNIT(S): at 11:47

## 2021-01-01 RX ADMIN — CHLORHEXIDINE GLUCONATE 15 MILLILITER(S): 213 SOLUTION TOPICAL at 17:20

## 2021-01-01 RX ADMIN — GABAPENTIN 400 MILLIGRAM(S): 400 CAPSULE ORAL at 21:03

## 2021-01-01 RX ADMIN — Medication 1 MILLIGRAM(S): at 11:16

## 2021-01-01 RX ADMIN — CHLORHEXIDINE GLUCONATE 15 MILLILITER(S): 213 SOLUTION TOPICAL at 05:14

## 2021-01-01 RX ADMIN — Medication 650 MILLIGRAM(S): at 17:29

## 2021-01-01 RX ADMIN — Medication 3 MILLILITER(S): at 02:56

## 2021-01-01 RX ADMIN — Medication 650 MILLIGRAM(S): at 00:37

## 2021-01-01 RX ADMIN — HEPARIN SODIUM 5000 UNIT(S): 5000 INJECTION INTRAVENOUS; SUBCUTANEOUS at 06:02

## 2021-01-01 RX ADMIN — ONDANSETRON 4 MILLIGRAM(S): 8 TABLET, FILM COATED ORAL at 02:24

## 2021-01-01 RX ADMIN — LISINOPRIL 20 MILLIGRAM(S): 2.5 TABLET ORAL at 06:25

## 2021-01-01 RX ADMIN — Medication 6 MICROGRAM(S)/KG/MIN: at 07:47

## 2021-01-01 RX ADMIN — Medication 5 UNIT(S): at 16:45

## 2021-01-01 RX ADMIN — NYSTATIN CREAM 1 APPLICATION(S): 100000 CREAM TOPICAL at 05:03

## 2021-01-01 RX ADMIN — HEPARIN SODIUM 5000 UNIT(S): 5000 INJECTION INTRAVENOUS; SUBCUTANEOUS at 15:59

## 2021-01-01 RX ADMIN — FENTANYL CITRATE 3.2 MICROGRAM(S)/KG/HR: 50 INJECTION INTRAVENOUS at 00:53

## 2021-01-01 RX ADMIN — GABAPENTIN 400 MILLIGRAM(S): 400 CAPSULE ORAL at 06:22

## 2021-01-01 RX ADMIN — Medication 175 MICROGRAM(S): at 05:29

## 2021-01-01 RX ADMIN — PANTOPRAZOLE SODIUM 40 MILLIGRAM(S): 20 TABLET, DELAYED RELEASE ORAL at 11:54

## 2021-01-01 RX ADMIN — HEPARIN SODIUM 5000 UNIT(S): 5000 INJECTION INTRAVENOUS; SUBCUTANEOUS at 21:07

## 2021-01-01 RX ADMIN — PROPOFOL 1.92 MICROGRAM(S)/KG/MIN: 10 INJECTION, EMULSION INTRAVENOUS at 02:13

## 2021-01-01 RX ADMIN — ATORVASTATIN CALCIUM 40 MILLIGRAM(S): 80 TABLET, FILM COATED ORAL at 21:26

## 2021-01-01 RX ADMIN — SODIUM CHLORIDE 75 MILLILITER(S): 9 INJECTION, SOLUTION INTRAVENOUS at 09:33

## 2021-01-01 RX ADMIN — Medication 650 MILLIGRAM(S): at 19:04

## 2021-01-01 RX ADMIN — Medication 650 MILLIGRAM(S): at 19:29

## 2021-01-01 RX ADMIN — HEPARIN SODIUM 5000 UNIT(S): 5000 INJECTION INTRAVENOUS; SUBCUTANEOUS at 05:29

## 2021-01-01 RX ADMIN — Medication 60 MILLIGRAM(S): at 07:36

## 2021-01-01 RX ADMIN — PANTOPRAZOLE SODIUM 40 MILLIGRAM(S): 20 TABLET, DELAYED RELEASE ORAL at 11:13

## 2021-01-01 RX ADMIN — Medication 40 MILLIGRAM(S): at 05:17

## 2021-01-01 RX ADMIN — Medication 6 MICROGRAM(S)/KG/MIN: at 07:04

## 2021-01-01 RX ADMIN — PANTOPRAZOLE SODIUM 40 MILLIGRAM(S): 20 TABLET, DELAYED RELEASE ORAL at 11:28

## 2021-01-01 RX ADMIN — Medication 250 MILLIGRAM(S): at 17:05

## 2021-01-01 RX ADMIN — Medication 400 MILLIGRAM(S): at 17:32

## 2021-01-01 RX ADMIN — HEPARIN SODIUM 5000 UNIT(S): 5000 INJECTION INTRAVENOUS; SUBCUTANEOUS at 21:00

## 2021-01-01 RX ADMIN — Medication 650 MILLIGRAM(S): at 23:44

## 2021-01-01 RX ADMIN — Medication 50 MILLILITER(S): at 14:22

## 2021-01-01 RX ADMIN — Medication 175 MICROGRAM(S): at 05:25

## 2021-01-01 RX ADMIN — LISINOPRIL 20 MILLIGRAM(S): 2.5 TABLET ORAL at 05:32

## 2021-01-01 RX ADMIN — Medication 650 MILLIGRAM(S): at 16:56

## 2021-01-01 RX ADMIN — CEFEPIME 100 MILLIGRAM(S): 1 INJECTION, POWDER, FOR SOLUTION INTRAMUSCULAR; INTRAVENOUS at 17:10

## 2021-01-01 RX ADMIN — Medication 400 MILLIGRAM(S): at 05:33

## 2021-01-01 RX ADMIN — Medication 50 GRAM(S): at 22:03

## 2021-01-01 RX ADMIN — Medication 150 MICROGRAM(S): at 06:03

## 2021-01-01 RX ADMIN — Medication 8 UNIT(S): at 07:50

## 2021-01-01 RX ADMIN — INFLUENZA VIRUS VACCINE 0.5 MILLILITER(S): 15; 15; 15; 15 SUSPENSION INTRAMUSCULAR at 17:18

## 2021-01-01 RX ADMIN — CEFEPIME 100 MILLIGRAM(S): 1 INJECTION, POWDER, FOR SOLUTION INTRAMUSCULAR; INTRAVENOUS at 05:36

## 2021-01-01 RX ADMIN — MIDAZOLAM HYDROCHLORIDE 2 MILLIGRAM(S): 1 INJECTION, SOLUTION INTRAMUSCULAR; INTRAVENOUS at 16:17

## 2021-01-01 RX ADMIN — Medication 1 MILLIGRAM(S): at 11:50

## 2021-01-01 RX ADMIN — Medication 81 MILLIGRAM(S): at 11:47

## 2021-01-01 RX ADMIN — Medication 650 MILLIGRAM(S): at 18:34

## 2021-01-01 RX ADMIN — HEPARIN SODIUM 5000 UNIT(S): 5000 INJECTION INTRAVENOUS; SUBCUTANEOUS at 05:46

## 2021-01-01 RX ADMIN — Medication 650 MILLIGRAM(S): at 12:51

## 2021-01-01 RX ADMIN — Medication 40 MILLIGRAM(S): at 05:59

## 2021-01-01 RX ADMIN — Medication 400 MILLIGRAM(S): at 06:02

## 2021-01-01 RX ADMIN — CEFEPIME 100 MILLIGRAM(S): 1 INJECTION, POWDER, FOR SOLUTION INTRAMUSCULAR; INTRAVENOUS at 23:04

## 2021-01-01 RX ADMIN — DEXMEDETOMIDINE HYDROCHLORIDE IN 0.9% SODIUM CHLORIDE 3.2 MICROGRAM(S)/KG/HR: 4 INJECTION INTRAVENOUS at 20:29

## 2021-01-01 RX ADMIN — GABAPENTIN 300 MILLIGRAM(S): 400 CAPSULE ORAL at 11:58

## 2021-01-01 RX ADMIN — HEPARIN SODIUM 5000 UNIT(S): 5000 INJECTION INTRAVENOUS; SUBCUTANEOUS at 05:17

## 2021-01-01 RX ADMIN — Medication 650 MILLIGRAM(S): at 08:38

## 2021-01-01 RX ADMIN — DEXMEDETOMIDINE HYDROCHLORIDE IN 0.9% SODIUM CHLORIDE 3.2 MICROGRAM(S)/KG/HR: 4 INJECTION INTRAVENOUS at 05:06

## 2021-01-01 RX ADMIN — Medication 81 MILLIGRAM(S): at 11:54

## 2021-01-01 RX ADMIN — MIDAZOLAM HYDROCHLORIDE 1.28 MG/KG/HR: 1 INJECTION, SOLUTION INTRAMUSCULAR; INTRAVENOUS at 10:57

## 2021-01-01 RX ADMIN — Medication 250 MILLIGRAM(S): at 17:10

## 2021-01-01 RX ADMIN — CEFEPIME 100 MILLIGRAM(S): 1 INJECTION, POWDER, FOR SOLUTION INTRAMUSCULAR; INTRAVENOUS at 22:05

## 2021-01-01 RX ADMIN — Medication 100 MICROGRAM(S): at 05:32

## 2021-01-01 RX ADMIN — Medication 650 MILLIGRAM(S): at 15:02

## 2021-01-01 RX ADMIN — DEXMEDETOMIDINE HYDROCHLORIDE IN 0.9% SODIUM CHLORIDE 3.2 MICROGRAM(S)/KG/HR: 4 INJECTION INTRAVENOUS at 11:19

## 2021-01-01 RX ADMIN — ENOXAPARIN SODIUM 40 MILLIGRAM(S): 100 INJECTION SUBCUTANEOUS at 12:22

## 2021-01-01 RX ADMIN — Medication 81 MILLIGRAM(S): at 11:53

## 2021-01-01 RX ADMIN — Medication 6 UNIT(S): at 15:09

## 2021-01-01 RX ADMIN — SODIUM CHLORIDE 500 MILLILITER(S): 9 INJECTION INTRAMUSCULAR; INTRAVENOUS; SUBCUTANEOUS at 19:30

## 2021-01-01 RX ADMIN — CHLORHEXIDINE GLUCONATE 1 APPLICATION(S): 213 SOLUTION TOPICAL at 05:33

## 2021-01-01 RX ADMIN — Medication 150 MICROGRAM(S): at 05:54

## 2021-01-01 RX ADMIN — PANTOPRAZOLE SODIUM 40 MILLIGRAM(S): 20 TABLET, DELAYED RELEASE ORAL at 11:21

## 2021-01-01 RX ADMIN — POLYETHYLENE GLYCOL 3350 17 GRAM(S): 17 POWDER, FOR SOLUTION ORAL at 12:48

## 2021-01-01 RX ADMIN — Medication 650 MILLIGRAM(S): at 00:07

## 2021-01-01 RX ADMIN — INSULIN HUMAN 2 UNIT(S)/HR: 100 INJECTION, SOLUTION SUBCUTANEOUS at 21:00

## 2021-01-01 RX ADMIN — HEPARIN SODIUM 5000 UNIT(S): 5000 INJECTION INTRAVENOUS; SUBCUTANEOUS at 14:02

## 2021-01-01 RX ADMIN — CHLORHEXIDINE GLUCONATE 1 APPLICATION(S): 213 SOLUTION TOPICAL at 05:35

## 2021-01-01 RX ADMIN — Medication 40 MILLIGRAM(S): at 05:37

## 2021-01-01 RX ADMIN — HEPARIN SODIUM 5000 UNIT(S): 5000 INJECTION INTRAVENOUS; SUBCUTANEOUS at 13:55

## 2021-01-01 RX ADMIN — Medication 200 MILLIGRAM(S): at 05:23

## 2021-01-01 RX ADMIN — Medication 5: at 07:51

## 2021-01-01 RX ADMIN — Medication 650 MILLIGRAM(S): at 17:05

## 2021-01-01 RX ADMIN — INSULIN GLARGINE 20 UNIT(S): 100 INJECTION, SOLUTION SUBCUTANEOUS at 07:52

## 2021-01-01 RX ADMIN — HEPARIN SODIUM 5000 UNIT(S): 5000 INJECTION INTRAVENOUS; SUBCUTANEOUS at 05:27

## 2021-01-01 RX ADMIN — Medication 1: at 12:20

## 2021-01-01 RX ADMIN — Medication 200 MILLIGRAM(S): at 06:06

## 2021-01-01 RX ADMIN — Medication 81 MILLIGRAM(S): at 11:06

## 2021-01-01 RX ADMIN — Medication 400 MILLIGRAM(S): at 17:38

## 2021-01-01 RX ADMIN — Medication 50 MILLIEQUIVALENT(S): at 23:11

## 2021-01-01 RX ADMIN — Medication 250 MILLIGRAM(S): at 05:20

## 2021-01-01 RX ADMIN — PROPOFOL 1.92 MICROGRAM(S)/KG/MIN: 10 INJECTION, EMULSION INTRAVENOUS at 04:17

## 2021-01-01 RX ADMIN — Medication 1 MILLIGRAM(S): at 11:53

## 2021-01-01 RX ADMIN — Medication 1 MILLIGRAM(S): at 12:24

## 2021-01-01 RX ADMIN — Medication 20 MILLIGRAM(S): at 17:31

## 2021-01-01 RX ADMIN — LACOSAMIDE 100 MILLIGRAM(S): 50 TABLET ORAL at 17:25

## 2021-01-01 RX ADMIN — ATORVASTATIN CALCIUM 40 MILLIGRAM(S): 80 TABLET, FILM COATED ORAL at 21:54

## 2021-01-01 RX ADMIN — SODIUM CHLORIDE 50 MILLILITER(S): 9 INJECTION, SOLUTION INTRAVENOUS at 02:23

## 2021-01-01 RX ADMIN — PROPOFOL 1.92 MICROGRAM(S)/KG/MIN: 10 INJECTION, EMULSION INTRAVENOUS at 03:00

## 2021-01-01 RX ADMIN — INSULIN GLARGINE 20 UNIT(S): 100 INJECTION, SOLUTION SUBCUTANEOUS at 08:42

## 2021-01-01 RX ADMIN — Medication 60 MILLIGRAM(S): at 05:16

## 2021-01-01 RX ADMIN — GABAPENTIN 400 MILLIGRAM(S): 400 CAPSULE ORAL at 06:00

## 2021-01-01 RX ADMIN — Medication 40 MILLIGRAM(S): at 15:46

## 2021-01-01 RX ADMIN — Medication 40 MILLIGRAM(S): at 12:32

## 2021-01-01 RX ADMIN — Medication 6 MICROGRAM(S)/KG/MIN: at 22:52

## 2021-01-01 RX ADMIN — GABAPENTIN 400 MILLIGRAM(S): 400 CAPSULE ORAL at 13:53

## 2021-01-01 RX ADMIN — CHLORHEXIDINE GLUCONATE 15 MILLILITER(S): 213 SOLUTION TOPICAL at 05:29

## 2021-01-01 RX ADMIN — NYSTATIN CREAM 1 APPLICATION(S): 100000 CREAM TOPICAL at 05:47

## 2021-01-01 RX ADMIN — Medication 650 MILLIGRAM(S): at 01:10

## 2021-01-01 RX ADMIN — CHLORHEXIDINE GLUCONATE 15 MILLILITER(S): 213 SOLUTION TOPICAL at 05:49

## 2021-01-01 RX ADMIN — Medication 650 MILLIGRAM(S): at 21:32

## 2021-01-01 RX ADMIN — PANTOPRAZOLE SODIUM 40 MILLIGRAM(S): 20 TABLET, DELAYED RELEASE ORAL at 12:04

## 2021-01-01 RX ADMIN — HEPARIN SODIUM 5000 UNIT(S): 5000 INJECTION INTRAVENOUS; SUBCUTANEOUS at 21:12

## 2021-01-01 RX ADMIN — DEXMEDETOMIDINE HYDROCHLORIDE IN 0.9% SODIUM CHLORIDE 3.2 MICROGRAM(S)/KG/HR: 4 INJECTION INTRAVENOUS at 01:00

## 2021-01-01 RX ADMIN — INSULIN HUMAN 12 UNIT(S): 100 INJECTION, SOLUTION SUBCUTANEOUS at 21:55

## 2021-01-01 RX ADMIN — Medication 650 MILLIGRAM(S): at 18:59

## 2021-01-01 RX ADMIN — INSULIN HUMAN 1 UNIT(S)/HR: 100 INJECTION, SOLUTION SUBCUTANEOUS at 21:57

## 2021-01-01 RX ADMIN — DEXMEDETOMIDINE HYDROCHLORIDE IN 0.9% SODIUM CHLORIDE 3.2 MICROGRAM(S)/KG/HR: 4 INJECTION INTRAVENOUS at 06:29

## 2021-01-01 RX ADMIN — PANTOPRAZOLE SODIUM 40 MILLIGRAM(S): 20 TABLET, DELAYED RELEASE ORAL at 14:44

## 2021-01-01 RX ADMIN — Medication 81 MILLIGRAM(S): at 11:08

## 2021-01-01 RX ADMIN — ATORVASTATIN CALCIUM 40 MILLIGRAM(S): 80 TABLET, FILM COATED ORAL at 21:03

## 2021-01-01 RX ADMIN — Medication 650 MILLIGRAM(S): at 01:53

## 2021-01-01 RX ADMIN — CHLORHEXIDINE GLUCONATE 15 MILLILITER(S): 213 SOLUTION TOPICAL at 17:05

## 2021-01-01 RX ADMIN — Medication 5: at 12:25

## 2021-01-01 RX ADMIN — Medication 175 MICROGRAM(S): at 11:08

## 2021-01-01 RX ADMIN — CHLORHEXIDINE GLUCONATE 15 MILLILITER(S): 213 SOLUTION TOPICAL at 17:30

## 2021-01-01 RX ADMIN — METHOTREXATE 17.5 MILLIGRAM(S): 2.5 TABLET ORAL at 12:17

## 2021-01-01 RX ADMIN — HEPARIN SODIUM 5000 UNIT(S): 5000 INJECTION INTRAVENOUS; SUBCUTANEOUS at 14:06

## 2021-01-01 RX ADMIN — CHLORHEXIDINE GLUCONATE 15 MILLILITER(S): 213 SOLUTION TOPICAL at 05:23

## 2021-01-01 RX ADMIN — VASOPRESSIN 2.4 UNIT(S)/MIN: 20 INJECTION INTRAVENOUS at 23:15

## 2021-01-01 RX ADMIN — PANTOPRAZOLE SODIUM 40 MILLIGRAM(S): 20 TABLET, DELAYED RELEASE ORAL at 11:11

## 2021-01-01 RX ADMIN — DULOXETINE HYDROCHLORIDE 60 MILLIGRAM(S): 30 CAPSULE, DELAYED RELEASE ORAL at 11:47

## 2021-01-01 RX ADMIN — Medication 400 MILLIGRAM(S): at 16:53

## 2021-01-01 RX ADMIN — GABAPENTIN 400 MILLIGRAM(S): 400 CAPSULE ORAL at 16:48

## 2021-01-01 RX ADMIN — FENTANYL CITRATE 3.2 MICROGRAM(S)/KG/HR: 50 INJECTION INTRAVENOUS at 05:07

## 2021-01-01 RX ADMIN — LACOSAMIDE 100 MILLIGRAM(S): 50 TABLET ORAL at 05:53

## 2021-01-01 RX ADMIN — Medication 650 MILLIGRAM(S): at 14:42

## 2021-01-01 RX ADMIN — VASOPRESSIN 2.4 UNIT(S)/MIN: 20 INJECTION INTRAVENOUS at 07:04

## 2021-01-01 RX ADMIN — FENTANYL CITRATE 50 MICROGRAM(S): 50 INJECTION INTRAVENOUS at 22:22

## 2021-01-01 RX ADMIN — DEXMEDETOMIDINE HYDROCHLORIDE IN 0.9% SODIUM CHLORIDE 3.2 MICROGRAM(S)/KG/HR: 4 INJECTION INTRAVENOUS at 05:48

## 2021-01-01 RX ADMIN — Medication 25 MILLIGRAM(S): at 21:54

## 2021-01-01 RX ADMIN — Medication 650 MILLIGRAM(S): at 03:30

## 2021-01-01 RX ADMIN — Medication 400 MILLIGRAM(S): at 05:34

## 2021-01-01 RX ADMIN — Medication 650 MILLIGRAM(S): at 12:32

## 2021-01-01 RX ADMIN — Medication 250 MILLIGRAM(S): at 10:39

## 2021-01-01 RX ADMIN — Medication 60 MILLIGRAM(S): at 06:22

## 2021-01-01 RX ADMIN — Medication 1 MILLIGRAM(S): at 12:22

## 2021-01-01 RX ADMIN — FENTANYL CITRATE 3.2 MICROGRAM(S)/KG/HR: 50 INJECTION INTRAVENOUS at 17:21

## 2021-01-01 RX ADMIN — Medication 1 MILLIGRAM(S): at 12:59

## 2021-01-01 RX ADMIN — MIDAZOLAM HYDROCHLORIDE 1.28 MG/KG/HR: 1 INJECTION, SOLUTION INTRAMUSCULAR; INTRAVENOUS at 12:27

## 2021-01-01 RX ADMIN — Medication 50 GRAM(S): at 10:07

## 2021-01-01 RX ADMIN — Medication 400 MILLIGRAM(S): at 17:21

## 2021-01-01 RX ADMIN — Medication 650 MILLIGRAM(S): at 16:12

## 2021-01-01 RX ADMIN — Medication 200 MILLIGRAM(S): at 05:30

## 2021-01-01 RX ADMIN — CEFEPIME 100 MILLIGRAM(S): 1 INJECTION, POWDER, FOR SOLUTION INTRAMUSCULAR; INTRAVENOUS at 17:01

## 2021-01-01 RX ADMIN — DULOXETINE HYDROCHLORIDE 60 MILLIGRAM(S): 30 CAPSULE, DELAYED RELEASE ORAL at 12:03

## 2021-01-01 RX ADMIN — Medication 650 MILLIGRAM(S): at 09:57

## 2021-01-01 RX ADMIN — Medication 650 MILLIGRAM(S): at 17:35

## 2021-01-01 RX ADMIN — HEPARIN SODIUM 5000 UNIT(S): 5000 INJECTION INTRAVENOUS; SUBCUTANEOUS at 13:03

## 2021-01-01 RX ADMIN — DEXMEDETOMIDINE HYDROCHLORIDE IN 0.9% SODIUM CHLORIDE 3.2 MICROGRAM(S)/KG/HR: 4 INJECTION INTRAVENOUS at 20:08

## 2021-01-01 RX ADMIN — HEPARIN SODIUM 5000 UNIT(S): 5000 INJECTION INTRAVENOUS; SUBCUTANEOUS at 22:20

## 2021-01-01 RX ADMIN — Medication 40 MILLIGRAM(S): at 08:18

## 2021-01-01 RX ADMIN — Medication 200 MILLIGRAM(S): at 17:02

## 2021-01-01 RX ADMIN — Medication 1 MILLIGRAM(S): at 12:04

## 2021-01-01 RX ADMIN — Medication 650 MILLIGRAM(S): at 12:04

## 2021-01-01 RX ADMIN — Medication 50 GRAM(S): at 00:22

## 2021-01-01 RX ADMIN — Medication 650 MILLIGRAM(S): at 00:02

## 2021-01-01 RX ADMIN — NYSTATIN CREAM 1 APPLICATION(S): 100000 CREAM TOPICAL at 17:05

## 2021-01-01 RX ADMIN — INSULIN HUMAN 8 UNIT(S)/HR: 100 INJECTION, SOLUTION SUBCUTANEOUS at 19:24

## 2021-01-01 RX ADMIN — Medication 40 MILLIGRAM(S): at 05:07

## 2021-01-01 RX ADMIN — LEVETIRACETAM 1000 MILLIGRAM(S): 250 TABLET, FILM COATED ORAL at 17:25

## 2021-01-01 RX ADMIN — CHLORHEXIDINE GLUCONATE 15 MILLILITER(S): 213 SOLUTION TOPICAL at 18:19

## 2021-01-01 RX ADMIN — DEXMEDETOMIDINE HYDROCHLORIDE IN 0.9% SODIUM CHLORIDE 3.2 MICROGRAM(S)/KG/HR: 4 INJECTION INTRAVENOUS at 11:33

## 2021-01-01 RX ADMIN — Medication 650 MILLIGRAM(S): at 09:24

## 2021-01-01 RX ADMIN — SODIUM CHLORIDE 750 MILLILITER(S): 9 INJECTION INTRAMUSCULAR; INTRAVENOUS; SUBCUTANEOUS at 21:00

## 2021-01-01 RX ADMIN — HEPARIN SODIUM 5000 UNIT(S): 5000 INJECTION INTRAVENOUS; SUBCUTANEOUS at 17:04

## 2021-01-01 RX ADMIN — Medication 2: at 11:47

## 2021-01-01 RX ADMIN — HEPARIN SODIUM 5000 UNIT(S): 5000 INJECTION INTRAVENOUS; SUBCUTANEOUS at 22:09

## 2021-01-01 RX ADMIN — DEXMEDETOMIDINE HYDROCHLORIDE IN 0.9% SODIUM CHLORIDE 3.2 MICROGRAM(S)/KG/HR: 4 INJECTION INTRAVENOUS at 02:01

## 2021-01-01 RX ADMIN — Medication 200 MILLIGRAM(S): at 18:11

## 2021-01-01 RX ADMIN — DEXMEDETOMIDINE HYDROCHLORIDE IN 0.9% SODIUM CHLORIDE 3.2 MICROGRAM(S)/KG/HR: 4 INJECTION INTRAVENOUS at 11:20

## 2021-01-01 RX ADMIN — DULOXETINE HYDROCHLORIDE 60 MILLIGRAM(S): 30 CAPSULE, DELAYED RELEASE ORAL at 11:35

## 2021-01-01 RX ADMIN — CEFEPIME 100 MILLIGRAM(S): 1 INJECTION, POWDER, FOR SOLUTION INTRAMUSCULAR; INTRAVENOUS at 17:19

## 2021-01-01 RX ADMIN — NYSTATIN CREAM 1 APPLICATION(S): 100000 CREAM TOPICAL at 05:18

## 2021-01-01 RX ADMIN — DEXMEDETOMIDINE HYDROCHLORIDE IN 0.9% SODIUM CHLORIDE 3.2 MICROGRAM(S)/KG/HR: 4 INJECTION INTRAVENOUS at 09:56

## 2021-01-01 RX ADMIN — DEXMEDETOMIDINE HYDROCHLORIDE IN 0.9% SODIUM CHLORIDE 3.2 MICROGRAM(S)/KG/HR: 4 INJECTION INTRAVENOUS at 02:46

## 2021-01-01 RX ADMIN — DULOXETINE HYDROCHLORIDE 60 MILLIGRAM(S): 30 CAPSULE, DELAYED RELEASE ORAL at 12:22

## 2021-01-01 RX ADMIN — INSULIN HUMAN 8 UNIT(S)/HR: 100 INJECTION, SOLUTION SUBCUTANEOUS at 05:34

## 2021-01-01 RX ADMIN — Medication 175 MICROGRAM(S): at 05:18

## 2021-01-01 RX ADMIN — Medication 40 MILLIGRAM(S): at 17:43

## 2021-01-01 RX ADMIN — CHLORHEXIDINE GLUCONATE 15 MILLILITER(S): 213 SOLUTION TOPICAL at 05:35

## 2021-01-01 RX ADMIN — INSULIN GLARGINE 20 UNIT(S): 100 INJECTION, SOLUTION SUBCUTANEOUS at 10:10

## 2021-01-01 RX ADMIN — CHLORHEXIDINE GLUCONATE 15 MILLILITER(S): 213 SOLUTION TOPICAL at 17:29

## 2021-01-01 RX ADMIN — FENTANYL CITRATE 3.2 MICROGRAM(S)/KG/HR: 50 INJECTION INTRAVENOUS at 20:47

## 2021-01-01 RX ADMIN — GABAPENTIN 400 MILLIGRAM(S): 400 CAPSULE ORAL at 15:55

## 2021-01-01 RX ADMIN — Medication 40 MILLIGRAM(S): at 10:51

## 2021-01-01 RX ADMIN — Medication 3: at 16:53

## 2021-01-01 RX ADMIN — FENTANYL CITRATE 50 MICROGRAM(S): 50 INJECTION INTRAVENOUS at 17:05

## 2021-01-01 RX ADMIN — ONDANSETRON 4 MILLIGRAM(S): 8 TABLET, FILM COATED ORAL at 07:54

## 2021-01-01 RX ADMIN — HEPARIN SODIUM 5000 UNIT(S): 5000 INJECTION INTRAVENOUS; SUBCUTANEOUS at 21:39

## 2021-01-01 RX ADMIN — HEPARIN SODIUM 5000 UNIT(S): 5000 INJECTION INTRAVENOUS; SUBCUTANEOUS at 05:36

## 2021-01-01 RX ADMIN — Medication 650 MILLIGRAM(S): at 23:37

## 2021-01-01 RX ADMIN — IRON SUCROSE 110 MILLIGRAM(S): 20 INJECTION, SOLUTION INTRAVENOUS at 11:35

## 2021-01-01 RX ADMIN — ENOXAPARIN SODIUM 40 MILLIGRAM(S): 100 INJECTION SUBCUTANEOUS at 11:53

## 2021-01-01 RX ADMIN — Medication 81 MILLIGRAM(S): at 12:25

## 2021-01-01 RX ADMIN — Medication 40 MILLIGRAM(S): at 05:36

## 2021-01-01 RX ADMIN — PANTOPRAZOLE SODIUM 40 MILLIGRAM(S): 20 TABLET, DELAYED RELEASE ORAL at 11:35

## 2021-01-01 RX ADMIN — Medication 175 MICROGRAM(S): at 05:11

## 2021-01-01 RX ADMIN — PANTOPRAZOLE SODIUM 40 MILLIGRAM(S): 20 TABLET, DELAYED RELEASE ORAL at 11:50

## 2021-01-01 RX ADMIN — Medication 3: at 21:13

## 2021-01-01 RX ADMIN — Medication 250 MILLIGRAM(S): at 05:29

## 2021-01-01 RX ADMIN — Medication 200 MILLIGRAM(S): at 11:09

## 2021-01-01 RX ADMIN — ENOXAPARIN SODIUM 40 MILLIGRAM(S): 100 INJECTION SUBCUTANEOUS at 11:48

## 2021-01-01 RX ADMIN — HEPARIN SODIUM 5000 UNIT(S): 5000 INJECTION INTRAVENOUS; SUBCUTANEOUS at 13:19

## 2021-01-01 RX ADMIN — Medication 81 MILLIGRAM(S): at 12:59

## 2021-01-01 RX ADMIN — PROPOFOL 1.92 MICROGRAM(S)/KG/MIN: 10 INJECTION, EMULSION INTRAVENOUS at 09:50

## 2021-01-01 RX ADMIN — Medication 150 MICROGRAM(S): at 06:22

## 2021-01-01 RX ADMIN — DEXMEDETOMIDINE HYDROCHLORIDE IN 0.9% SODIUM CHLORIDE 3.2 MICROGRAM(S)/KG/HR: 4 INJECTION INTRAVENOUS at 17:52

## 2021-01-01 RX ADMIN — DEXMEDETOMIDINE HYDROCHLORIDE IN 0.9% SODIUM CHLORIDE 3.2 MICROGRAM(S)/KG/HR: 4 INJECTION INTRAVENOUS at 01:44

## 2021-01-01 RX ADMIN — ATORVASTATIN CALCIUM 40 MILLIGRAM(S): 80 TABLET, FILM COATED ORAL at 21:12

## 2021-01-01 RX ADMIN — Medication 650 MILLIGRAM(S): at 01:40

## 2021-01-01 RX ADMIN — LISINOPRIL 20 MILLIGRAM(S): 2.5 TABLET ORAL at 06:06

## 2021-01-01 RX ADMIN — DEXMEDETOMIDINE HYDROCHLORIDE IN 0.9% SODIUM CHLORIDE 3.2 MICROGRAM(S)/KG/HR: 4 INJECTION INTRAVENOUS at 19:42

## 2021-01-01 RX ADMIN — Medication 1 MILLIGRAM(S): at 11:21

## 2021-01-01 RX ADMIN — Medication 150 MICROGRAM(S): at 05:17

## 2021-01-01 RX ADMIN — Medication 200 MILLIGRAM(S): at 05:50

## 2021-01-01 RX ADMIN — MIDAZOLAM HYDROCHLORIDE 1.28 MG/KG/HR: 1 INJECTION, SOLUTION INTRAMUSCULAR; INTRAVENOUS at 20:47

## 2021-01-01 RX ADMIN — CHLORHEXIDINE GLUCONATE 15 MILLILITER(S): 213 SOLUTION TOPICAL at 17:15

## 2021-01-01 RX ADMIN — Medication 100 MICROGRAM(S): at 05:59

## 2021-01-01 RX ADMIN — CHLORHEXIDINE GLUCONATE 1 APPLICATION(S): 213 SOLUTION TOPICAL at 05:23

## 2021-01-01 RX ADMIN — CHLORHEXIDINE GLUCONATE 15 MILLILITER(S): 213 SOLUTION TOPICAL at 05:11

## 2021-01-01 RX ADMIN — DEXMEDETOMIDINE HYDROCHLORIDE IN 0.9% SODIUM CHLORIDE 3.2 MICROGRAM(S)/KG/HR: 4 INJECTION INTRAVENOUS at 22:34

## 2021-01-01 RX ADMIN — ATORVASTATIN CALCIUM 40 MILLIGRAM(S): 80 TABLET, FILM COATED ORAL at 21:39

## 2021-01-01 RX ADMIN — Medication 60 MILLIGRAM(S): at 09:17

## 2021-01-01 RX ADMIN — Medication 650 MILLIGRAM(S): at 13:04

## 2021-01-01 RX ADMIN — HEPARIN SODIUM 5000 UNIT(S): 5000 INJECTION INTRAVENOUS; SUBCUTANEOUS at 05:11

## 2021-01-01 RX ADMIN — PROPOFOL 1.92 MICROGRAM(S)/KG/MIN: 10 INJECTION, EMULSION INTRAVENOUS at 12:32

## 2021-01-01 RX ADMIN — Medication 650 MILLIGRAM(S): at 02:23

## 2021-01-01 RX ADMIN — ENOXAPARIN SODIUM 40 MILLIGRAM(S): 100 INJECTION SUBCUTANEOUS at 11:19

## 2021-01-01 RX ADMIN — Medication 40 MILLIGRAM(S): at 05:15

## 2021-01-01 RX ADMIN — Medication 40 MILLIGRAM(S): at 09:57

## 2021-01-01 RX ADMIN — FENTANYL CITRATE 50 MICROGRAM(S): 50 INJECTION INTRAVENOUS at 17:20

## 2021-01-01 RX ADMIN — Medication 100 MILLIEQUIVALENT(S): at 11:43

## 2021-01-01 RX ADMIN — Medication 175 MICROGRAM(S): at 05:09

## 2021-01-01 RX ADMIN — Medication 1 MILLIGRAM(S): at 11:54

## 2021-01-01 RX ADMIN — LIDOCAINE 10 MILLILITER(S): 4 CREAM TOPICAL at 11:28

## 2021-01-01 RX ADMIN — Medication 650 MILLIGRAM(S): at 04:10

## 2021-01-01 RX ADMIN — INSULIN GLARGINE 20 UNIT(S): 100 INJECTION, SOLUTION SUBCUTANEOUS at 07:47

## 2021-01-01 RX ADMIN — HEPARIN SODIUM 5000 UNIT(S): 5000 INJECTION INTRAVENOUS; SUBCUTANEOUS at 00:53

## 2021-01-01 RX ADMIN — Medication 400 MILLIGRAM(S): at 05:26

## 2021-01-01 RX ADMIN — EPINEPHRINE 36 MICROGRAM(S)/KG/MIN: 0.3 INJECTION INTRAMUSCULAR; SUBCUTANEOUS at 07:04

## 2021-01-01 RX ADMIN — ATORVASTATIN CALCIUM 40 MILLIGRAM(S): 80 TABLET, FILM COATED ORAL at 22:17

## 2021-02-22 NOTE — PATIENT PROFILE ADULT - FUNCTIONAL SCREEN CURRENT LEVEL: COMMUNICATION, MLM
Quality 111:Pneumonia Vaccination Status For Older Adults: Pneumococcal Vaccination Previously Received Detail Level: Simple Quality 130: Documentation Of Current Medications In The Medical Record: Current Medications Documented Quality 402: Tobacco Use And Help With Quitting Among Adolescents: Patient screened for tobacco and never smoked Quality 110: Preventive Care And Screening: Influenza Immunization: Influenza Immunization Administered during Influenza season 0 = understands/communicates without difficulty

## 2021-07-04 NOTE — DIETITIAN INITIAL EVALUATION ADULT. - PERTINENT LABORATORY DATA
"Hospital Medicine History & Physical Note    Date of Service  7/3/2021    Primary Care Physician  John Garces M.D.    Consultants  None    Specialist Names: None    Code Status  Comfort Care/DNR    Chief Complaint  Chief Complaint   Patient presents with   • Fall     pt nicole emerysa from home, came in as TBI, had glf x 2 in 24 hrs. has hx of Dementia. arrived aaox2 Baseline. has bump in the back of head. no laceration. -blood thinners -asa. pt has been increasing falls the last 6 months. has bgl of 79       History of Presenting Illness  Lamar Dangelo is a 89 y.o. female with vascular dementia, hypothyroidism, and GERD who presented 7/3/2021 with recurrent falls.    History obtained from ERP as she is severely demented, no recollection of earlier events, and disoriented to her situation.    She was BIBA after falling and hitting her head. After her fall her speech was \"garbled.\" She has fallen multiple times in the past few days and had worsening memory per her neighbor/MDPOA. In the ED she underwent CTH without acute intracranial process and EKG without signs of ischemia. She was being planned for discharge home but in discussion with [facility], they requested admission for evaluation of recurrent falls.    She reports being \"here for a 's license.\" She denies falling, pain, dyspnea. She believes that she has to come here because \"that neighbor's kid keeps hitting me.\" She lives with her sister and requested that I call her and let her know she'll be here tonight. She refers to me as \"handsome young man\" and requests multiple times \"no peeking\" during our examination.    I called her sister, Ellie. Ellie had forgotten that her sister was sent to the hospital today. She reports that Mrs. Dangelo's memory \"comes and goes\" but seems to be doing ok. When asked about falls and ADLs, she reports that Lamar is \"ok.\"    I attempted to call her MDPOA and neighbor Acacia Rider but no answer. Left a VM " indicating that we would be admitting her to the hospital and call tomorrow for more information and GOC discussion.    I discussed the plan of care with patient, family and bedside RN.    Review of Systems  Review of Systems   Unable to perform ROS: Dementia       Past Medical History   has a past medical history of Hyperlipidemia (7/2/2010) and Hypothyroid (7/2/2010).    Surgical History   has a past surgical history that includes tonsillectomy; appendectomy; colonoscopy - endo (N/A, 9/3/2017); gastroscopy-endo (N/A, 9/3/2017); and hip cannulated screw (Left, 3/24/2018).     Family History  family history includes Heart Disease in her father; Hypertension in her sister.   Family history reviewed with patient. There is no family history that is pertinent to the chief complaint.     Social History   reports that she has never smoked. She has never used smokeless tobacco. She reports that she does not drink alcohol and does not use drugs.    Allergies  Allergies   Allergen Reactions   • Nkda [No Known Drug Allergy]        Medications  Prior to Admission Medications   Prescriptions Last Dose Informant Patient Reported? Taking?   Cyanocobalamin (VITAMIN B-12) 1000 MCG Tab unknown at unknown Historical No No   Sig: TAKE 1 TABLET BY MOUTH ONCE DAILY.   FEROSUL 325 (65 Fe) MG tablet unknown at unknown Historical No No   Sig: TAKE 1 TABLET BY MOUTH ONCE DAILY.   carvedilol (COREG) 12.5 MG Tab unknown at unknown Historical No No   Sig: TAKE 1 TABLET BY MOUTH TWICE DAILY.   levothyroxine (SYNTHROID) 50 MCG Tab unknown at unknown Historical No No   Sig: TAKE 1 TABLET BY MOUTH EVERY MORNING 30 MINUTES BEFORE BREAKFAST.   omeprazole (PRILOSEC) 20 MG delayed-release capsule unknown at unknown Historical No No   Sig: TAKE (1) CAPSULE BY MOUTH TWICE DAILY.   potassium chloride SA (KDUR) 20 MEQ Tab CR unknown at unknown Historical No No   Sig: TAKE 1 TABLET BY MOUTH ONCE DAILY.      Facility-Administered Medications: None        Physical Exam  Temp:  [36.8 °C (98.2 °F)] 36.8 °C (98.2 °F)  Pulse:  [66-82] 71  Resp:  [16] 16  BP: (122-196)/(62-91) 156/70  SpO2:  [92 %-96 %] 92 %    Physical Exam  Vitals and nursing note reviewed.   Constitutional:       General: She is not in acute distress.     Appearance: She is ill-appearing (Chronically).   HENT:      Head:      Comments: Bitemporal and bibuccal wasting     Nose: Nose normal.      Mouth/Throat:      Mouth: Mucous membranes are dry.      Pharynx: Oropharynx is clear.   Eyes:      General: No scleral icterus.     Conjunctiva/sclera: Conjunctivae normal.      Pupils: Pupils are equal, round, and reactive to light.   Cardiovascular:      Rate and Rhythm: Normal rate and regular rhythm.      Pulses: Normal pulses.      Heart sounds: Normal heart sounds. No murmur heard.   No friction rub. No gallop.    Pulmonary:      Effort: Pulmonary effort is normal. No respiratory distress.      Breath sounds: Normal breath sounds. No wheezing, rhonchi or rales.   Abdominal:      General: Abdomen is flat. Bowel sounds are normal. There is no distension.      Palpations: Abdomen is soft.      Tenderness: There is no abdominal tenderness. There is no guarding or rebound.   Genitourinary:     Comments: No rahman  Musculoskeletal:      Cervical back: Neck supple.      Right lower leg: No edema.      Left lower leg: No edema.   Skin:     General: Skin is warm and dry.   Neurological:      Mental Status: She is alert. She is disoriented.      Comments: Oriented to self, not date / situation / location   Psychiatric:         Attention and Perception: Attention normal.         Mood and Affect: Mood and affect normal.         Speech: Speech is tangential.         Behavior: Behavior is cooperative.         Thought Content: Thought content is delusional.         Cognition and Memory: Cognition is impaired. Memory is impaired.         Laboratory:  Recent Labs     07/03/21  1430   WBC 5.3   RBC 3.50*   HEMOGLOBIN  11.6*   HEMATOCRIT 35.5*   .4*   MCH 33.1*   MCHC 32.7*   RDW 50.6*   PLATELETCT 162*   MPV 9.0     Recent Labs     07/03/21  1430   SODIUM 140   POTASSIUM 3.7   CHLORIDE 105   CO2 24   GLUCOSE 83   BUN 25*   CREATININE 1.09   CALCIUM 8.7     Recent Labs     07/03/21  1430   ALTSGPT 14   ASTSGOT 22   ALKPHOSPHAT 95   TBILIRUBIN 1.0   GLUCOSE 83         No results for input(s): NTPROBNP in the last 72 hours.      No results for input(s): TROPONINT in the last 72 hours.    Imaging:  CT-HEAD W/O   Final Result         1.  No acute intracranial process.      2. Diffuse atrophy and periventricular white matter changes consistent with chronic small vessel disease.          EKG:  I have personally reviewed the images and compared with prior images. and My impression is: NSR without acute ischemic changes  CTH negative for acute intracranial process but with diffuse atrophy and microvascular disease    Assessment/Plan:  I anticipate this patient is appropriate for observation status at this time.    Goals of care, counseling/discussion- (present on admission)  Assessment & Plan  ACP documents reviewed:  POLST - Comfort-only measures  Advanced Directive - provide treatments only which ease suffering or preserve function  Unable to reach neighbor MDPOA during admission to clarify GOC  Initiated on comfort-only measures per POLST  Contact MDPOA tomorrow to discuss GOC - poor rehab or PT candidate due to dementia, would be better served on hospice if she qualifies    Recurrent falls- (present on admission)  Assessment & Plan  Reported by neighbor  Likely due to dementia causing severe protein-calorie malnutrition  PT evaluation if within GOC    Vascular dementia without behavioral disturbance (HCC)- (present on admission)  Assessment & Plan  CTH demonstrates diffuse atrophy and microvascular disease  POLST indicates comfort-only measures while hospitalized    Severe protein-calorie malnutrition (HCC)- (present on  admission)  Assessment & Plan  Due to dementia  No RD consultation indicated for comfort-only measures.  Encouraged to eat for pleasure    Urine ketones- (present on admission)  Assessment & Plan  Likely due to inadequate oral intake from dementia    Gastroesophageal reflux disease without esophagitis- (present on admission)  Assessment & Plan  Continue omperazole    Vitamin B12 deficiency- (present on admission)  Assessment & Plan  Treatment not indicated for comfort-only measures    Macrocytic anemia- (present on admission)  Assessment & Plan  No further evaluation nor treatment indicated for comfort-only measures    Essential hypertension- (present on admission)  Assessment & Plan  Carvedilol held on admission, antihypertensives not indicated for comfort-only measures    Acquired hypothyroidism- (present on admission)  Assessment & Plan  Continue levothyroxine      VTE prophylaxis: pharmacologic prophylaxis contraindicated due to comfort care   7/6: POCT blood glucose 599; 7/5: POCT blood glucose 313/359

## 2021-08-07 NOTE — ED PROVIDER NOTE - OBJECTIVE STATEMENT
74 y/o female with a PMH of diabetes, lupus, seizures, CVA HTN presents to the Ed for eval of increasing orthopnea and cough over past few days. patient denies any palpitations, cp, back pain. patient with increasing right leg swelling without any falls . patient denies any fevers. +covid vaccination in past. no hemoptysis, prior hx of dvt in past. patient used a nebulizer without any relief of symptoms patient without any prior lung issues. no black or bloody stools

## 2021-08-07 NOTE — ED PROVIDER NOTE - CARE PROVIDER_API CALL
Clem Camacho)  Internal Medicine  [facility] [place] [address]  Phone: [phone]  Fax: [phone]  Follow Up Time:     Galileo Newberry  CARDIOLOGY  [address] [facility] [address]  [place] [address]  Phone: [phone]  Fax: [phone]  Follow Up Time:

## 2021-08-07 NOTE — ED PROVIDER NOTE - PROGRESS NOTE DETAILS
intent to preform bedside echo: due to SOB , effusion. patient with decreased EF, trace pericardial effusion , no right sided heart strain

## 2021-08-07 NOTE — ED PROVIDER NOTE - PATIENT PORTAL LINK FT
You can access the FollowMyHealth Patient Portal offered by [facility] by registering at the following website: [email or web address]. By joining [employer]’s FollowMyHealth portal, you will also be able to view your health information using other applications (apps) compatible with our system.

## 2021-08-07 NOTE — ED PROVIDER NOTE - CARE PROVIDERS DIRECT ADDRESSES
[email or web address]

## 2021-08-07 NOTE — ED PROVIDER NOTE - NS ED ROS FT
Review of Systems    Constitutional: (-) fever/ chills   Eyes (-) visual changes  ENT: (-) epistaxis (-) sore throat (-) ear pain  Cardiovascular: (-) chest pain, (-) syncope (-) palpitations  Respiratory: (+) cough, (+) shortness of breath  Gastrointestinal: (-) vomiting, (-) diarrhea (-) abdominal pain  : (-) dysuria , hematuria   neck: (-) neck pain or stiffness  Musculoskeletal:  (-) back pain, (-) joint pain   Integumentary: (-) rash, (+right leg swelling   Neurological: (-) headache, (-) altered mental status

## 2021-08-07 NOTE — ED PROVIDER NOTE - ATTENDING CONTRIBUTION TO CARE
72 yo F PMhx DM, Lupus, seizures, HTN, high cholesterol presents with SOB x 2 days.  Feels worse at night and needs to get up from the bed to feel better, no chest pain, + cough, no fever or chills. Pt also with swelling to rt thigh noticed couple days ago.  On exam pt in NAD AAO x 3, speaking full clear sentences, Op clear, + mur, Lungs with crackles b/l, + swelling rt thigh, no skin changes, + DP pulses, abd soft nt nd

## 2021-08-07 NOTE — ED PROVIDER NOTE - CLINICAL SUMMARY MEDICAL DECISION MAKING FREE TEXT BOX
Pt with SOB, orthopnea.  Findings concerning for CHF/fluid overload.  Results d/w patient,  Lasix given.  Rec admission for further management and work up.  Pt does not want to stay in hospital.  She is AAo x 3 and understands the risks of leaving including worseing symptoms, MI and death.  She has a dog at home that she needs to make arrangements for.  States she will follow up with her PMD ASAP.  She knows that she could return to ED at anytime. Strict return precautions discussed. Pt will AMA

## 2021-08-07 NOTE — ED PROVIDER NOTE - NSFOLLOWUPINSTRUCTIONS_ED_ALL_ED_FT
Shortness of breath    Shortness of breath (dyspnea) means you have trouble breathing and could indicate a medical problem. Causes include lung disease, heart disease, low amount of red blood cells (anemia), poor physical fitness, being overweight, smoking, etc. Your health care provider today may not be able to find a cause for your shortness of breath after your exam. In this case, it is important to have a follow-up exam with your primary care physician as instructed. If medicines were prescribed, take them as directed for the full length of time directed. Refrain from tobacco products.    SEEK IMMEDIATE MEDICAL CARE IF YOU HAVE ANY OF THE FOLLOWING SYMPTOMS: worsening shortness of breath, chest pain, back pain, abdominal pain, fever, coughing up blood, lightheadedness/dizziness.      Congestive Heart Failure (CHF)    Congestive heart failure is a chronic condition in which the heart has trouble pumping blood. In some cases of heart failure, fluid may back up into your lungs or you may have swelling (edema) in your lower legs. There are many causes of heart failure including high blood pressure, coronary artery disease, abnormal heart valves, heart muscle disease, lung disease, diabetes, etc. Symptoms include shortness of breath with activity or when lying flat, cough, swelling of the legs, fatigue, or increased urination during the night.     Treatment is aimed at managing the symptoms of heart failure and may include lifestyle changes, medications, or surgical procedures. Take medicines only as directed by your health care provider and do not stop unless instructed to do so. Eat heart-healthy foods with low or no trans/saturated fats, cholesterol and salt. Weigh yourself every day for early recognition of fluid accumulation.    SEEK IMMEDIATE MEDICAL CARE IF YOU HAVE ANY OF THE FOLLOWING SYMPTOMS: shortness of breath, change in mental status, chest pain, lightheadedness/dizziness/fainting, or worsening of symptoms including not being able to conduct normal physical activity.

## 2021-09-25 NOTE — ED PROVIDER NOTE - PHYSICAL EXAMINATION
Physical Exam    Vital Signs: I have reviewed the initial vital signs.  Constitutional: well-nourished, appears stated age, no acute distress  Eyes: Conjunctiva pink, Sclera clear, PERRLA, EOMI without pain.  Cardiovascular: S1 and S2, regular rate, regular rhythm, well-perfused extremities, radial pulses equal and 2+ b/l.   Respiratory: unlabored respiratory effort, (+) dyspnea. b/l rales in the bases. pt is speaking full sentences. no accessory muscle use.   Gastrointestinal: soft, non-tender, nondistended abdomen, no pulsatile mass, normal bowl sounds, no rebound, no guarding, no organomegaly.   Musculoskeletal: supple neck, (+) b/l lower extremity edema, no calf tenderness, no midline tenderness, no palpable spinal step offs  Integumentary: warm, dry, no rash  Neurologic: awake, alert, cranial nerves II-XII grossly intact, extremities’ motor and sensory functions grossly intact.   Psychiatric: appropriate mood, appropriate affect

## 2021-09-25 NOTE — ED PROVIDER NOTE - ATTENDING CONTRIBUTION TO CARE
I personally evaluated the patient. I reviewed the Resident’s or Physician Assistant’s note (as assigned above), and agree with the findings and plan except as documented in my note.  Chart reviewed.  H/O CHF, DM, lupus, presents with worsening SOB for several days associated with leg swelling.  No chest pain, fever or cough.  Vaccinated for Covid.  Exam shows alert patient in no distress, HEENT NCAT, +JVD, lungs bilateral rales, RR S1S2, abdomen soft NT +BS, 4+leg edema.

## 2021-09-25 NOTE — ED ADULT NURSE NOTE - NS ED NOTE ABUSE SUSPICION NEGLECT YN
Diagnosis: Breast Cancer    Regimen: Taxol/Carbo/Tecentriq  Cycle/Day: C1D1    Dr. Cervantes is supervising clinician today.    ECOG:   ECOG [20 0900]   ECOG Performance Status 1       Review and verified Advanced Directives: No, patient has no interest in completing Advanced Directives at this time    Verified if patient has state DNR bracelet on: No; Full Code    Nursing Assessment:   A focused nursing assessment addressing the toxicity of chemotherapy was performed by provider and MA. Reviewed and agree with charted ROS.     Patient confirms that she has received a copy of Chemotherapy Consent and verbalizes understanding of treatment plan: Yes.     Pre-Treatment: - Treatment consent signed  - Patient has valid pre-authorization  - VS completed  - Height and weight verified  - BSA independently double checked & verified by two practitioners  - Premed orders, including hydration, are verified prior to administration  - Treatment parameters verified in patient protocol  - Lab results checked - MD aware  - Chemotherapy doses independently doubled checked & verified by two practitioners  - Chemotherapy infusion pump settings are double checked & verified by two practitioners  - Patient is identified by first & last name, Date of birth that has been verified with the patient chairside.  - Patient is identified by first & last name, Date of birth that has been verified by two practitioners with the patient chairside.  - I have reviewed the following with the patient:  Name of chemo drug, duration and route of infusion, and reportable infusion-related symptoms.  - Drugs were administered in proper sequencing.    Treatment: Refer to LDA and MAR for line assessment and medication administration  Chemotherapy has not ; double checked & verified by two practitioners  Appearance and physical integrity of drugs meets standard of drug monograph; double checked & verified by two practitioners  Rate set on infusion pump  is in alignment with ordered rate; double checked & verified by two practitioners  Blood return confirmed before, during and after treatment administered  Infusion pump used for non-vesicant drugs    Post Treatment: Treatment tolerated well; no adverse reaction    Does the Patient have a central line?  yes:   Device: Port  Central Line Site: Right  and Chest  Central Line Site Appearance: WDL  Is this a port? Yes: Implanted port accessed using a 20 gauge non-coring needle primed with 0.9% sodium chloride. Good blood return obtained.  Blood not collected.  Site Care: Aseptic site care per protocol and Sterile gauze and tape dressing applied  Comments: by lab  Central line flushed with: 20 ml 0.9 normal saline and 100 un/ml- 500 units heparin  Central line removed: no  Paulino Needle: Paulino needle de-accessed      Transfusion: Not needed    Integrative Medicine: No    Oral Chemotherapy: No    Education: Instructed on new treatment/medications. All questions answered.    Next appointment scheduled: 11/20/20  Patient instructed to call the office with any questions or concerns.    Patient Discharged: patient discharged to home per self, ambulatory     No

## 2021-09-25 NOTE — ED ADULT NURSE NOTE - NSIMPLEMENTINTERV_GEN_ALL_ED
Implemented All Fall with Harm Risk Interventions:  [place] to call system. Call bell, personal items and telephone within reach. Instruct patient to call for assistance. Room bathroom lighting operational. Non-slip footwear when patient is off stretcher. Physically safe environment: no spills, clutter or unnecessary equipment. Stretcher in lowest position, wheels locked, appropriate side rails in place. Provide visual cue, wrist band, yellow gown, etc. Monitor gait and stability. Monitor for mental status changes and reorient to person, place, and time. Review medications for side effects contributing to fall risk. Reinforce activity limits and safety measures with patient and family. Provide visual clues: red socks.

## 2021-09-25 NOTE — ED PROVIDER NOTE - OBJECTIVE STATEMENT
74 y/o female with a PMH of DM, lupus, seizures, CVA, and HTN presents to the ED for evaluation of worsening sob x 3 weeks. pt reports sob is worse with lying flat. pt reports she follows cardiologist dr. chambers, and recently had a nuclear stress test pt denies fever, chills, cough, chest pain, back pain, abdominal pain, n/v/d/c, leg pain, recent travel, recent trauma, recent surgeries, recent hospitalizations, hx of cancer, use of hormones, cigarette smoking, or dizziness.

## 2021-09-25 NOTE — ED PROVIDER NOTE - NS ED ROS FT
CONST: No fever, chills or bodyaches  EYES: No pain, redness, drainage or visual changes.  ENT: No ear pain or discharge, nasal discharge or congestion. No sore throat  CARD: No chest pain, palpitations  RESP: (+) SOB. No cough, hemoptysis. No hx of asthma or COPD  GI: No abdominal pain, N/V/D  : No urinary symptoms  MS: No joint pain, back pain or extremity pain/injury  SKIN: No rashes  NEURO: No headache, dizziness, paresthesias or LOC

## 2021-09-25 NOTE — ED PROVIDER NOTE - CLINICAL SUMMARY MEDICAL DECISION MAKING FREE TEXT BOX
Labs noted for trop 0.1 and BNP 66960.  EKG NSR no st elevation.  CXR pulmonary edema.  Given IV Lasix and ASA.  Will admit to tele.

## 2021-09-26 NOTE — PHYSICAL THERAPY INITIAL EVALUATION ADULT - GENERAL OBSERVATIONS, REHAB EVAL
09:55-10:20 Chart reviewed. Pt encountered semireclined in bed, may be seen by Physical Therapist as confirmed with Nurse. Patient denied pain at rest and would try to stand and walk but would like to go back to bed; +tele; +O2 via NC; Primaf[employer]

## 2021-09-26 NOTE — PROGRESS NOTE ADULT - ASSESSMENT
Impression   Acute on chronic HFpEF exacerbation   ANTONELLA on CKD -   SLE on immunosuppressants       Plan     CNS: Avoid CNS depressants. .     CARDIOVASCULAR: continue LAsix 40mg IV  cardiology consult, I < O, strict ins and outs, daily weight, DASH diet, EKG in AM, repeat trops.   2d echo    PULMONARY: HOB >45 degrees. Keep SpO2 >95%. repeat  CXR today . Titrate O2 as tolerated.     INFECTIOUS DISEASE:  bld cx get procal   check RVP   hold on abx for now     GI: Prophylaxis with Protonix 40mg PO daily, DASH diet.     RENAL: I < O. Replace electrolytes if needed. No Wise. Replace MAG     ENDOCRINE: FS at bedtime and before meals. Insulin protocol. TSH and HbA1c.     HEMATOLOGY: DVT ppx with Lovenox 40mg SubQ daily.     FULL CODE  Prognosis guarded           Impression   Acute on chronic HFpEF exacerbation   ANTONELLA on CKD -   SLE on immunosuppressants       Plan     CNS: Avoid CNS depressants. .     CARDIOVASCULAR: continue LAsix 40mg IV  cardiology consult, I < O, strict ins and outs, daily weight, DASH diet, EKG in AM, repeat trops.   2d echo    PULMONARY: HOB >45 degrees. Keep SpO2 >95%. repeat  CXR today . Titrate O2 as tolerated.     INFECTIOUS DISEASE:  bld cx get procal   check RVP   hold on abx for now     GI: Prophylaxis with Protonix 40mg PO daily, DASH diet.     RENAL: I < O. Replace electrolytes if needed. No Wise. Replace MAG     ENDOCRINE: FS at bedtime and before meals. Insulin protocol. TSH and HbA1c.     HEMATOLOGY: DVT ppx with Lovenox 40mg SubQ daily.     FULL CODE

## 2021-09-26 NOTE — PROGRESS NOTE ADULT - ASSESSMENT
Ms. La is a 75F with a past medical history of SLE on CellCept and Methotrexate, HFpEF, HTN, HLD, T2DM, Seizure d/o, CVA on 2002 no residual deficits  who presented to the hospital for evaluation of shortness of breath and lower extremity edema that has been progressively worsening over the last 2 weeks.    #Acute on chronic HFpEF exacerbation   #ANTONELLA on CKD - baseline Cr 0.8, now 1.2   #SLE on immunosuppressants  -on IV lasix   strict i&O; daily weight  -cardiology consult  -ECHO  -CXR in AM  -hold on abx for now   -DVT ppx with Lovenox 40mg SubQ daily    FULL CODE  Prognosis guarded   Dispo: remains in CCU    Discussed with ICU resident

## 2021-09-26 NOTE — H&P ADULT - HISTORY OF PRESENT ILLNESS
Chief Complaint: Shortness of breath, fatigue     Past Medical History: SLE on CellCept and Methotrexate, HFpEF, HTN, HLD, T2DM, Seizure d/o, CVA on 2002 no residual deficits     Past Surgical History: None Relevant     Ms. La is a 75F with a past medical history as described above who presented to the hospital for evaluation of shortness of breath and lower extremity edema that has been progressively worsening over the last 2 weeks. She also endorses fatigue and orthopnea. She went to her cardiologist recently and endorses "everything was okay" on her nuclear stress test. She says Dr. Graham increased her Lasix from 40mg once daily to twice daily. She denies chest pain, palpitations, headaches, fevers, or chills.     In the ED, she was vitally stable. Placed on a NRB at 15LPM for comfort, reduced to 6L NC upon my exam with instruction to titrate down as tolerated. Not tachycardic or having active chest pain. She is hypervolemic on exam with a cardiac wheeze and lab work (BNP >11K, Cr 0.8 > 1.2) reflecting volume overload. Admitted for treatment of CHF exacerbation.

## 2021-09-26 NOTE — PHYSICAL THERAPY INITIAL EVALUATION ADULT - PERTINENT HX OF CURRENT PROBLEM, REHAB EVAL
74 y/o female admitted with diagnoses of Pulmonary Edema, Elevated troponin with c/o worsening SOB at home

## 2021-09-26 NOTE — H&P ADULT - NSHPLABSRESULTS_GEN_ALL_CORE
(09-25 @ 21:48)                      9.1  7.66 )-----------( 248                 29.7    Neutrophils = 6.69 (87.2%)  Lymphocytes = 0.55 (7.2%)  Eosinophils = 0.02 (0.3%)  Basophils = 0.02 (0.3%)  Monocytes = 0.36 (4.7%)  Bands = --%    09-25    140  |  102  |  28<H>  ----------------------------<  178<H>  4.8   |  23  |  1.2    Ca    8.6      25 Sep 2021 21:48  Mg     1.6     09-25    TPro  6.3  /  Alb  3.5  /  TBili  0.4  /  DBili  x   /  AST  23  /  ALT  10  /  AlkPhos  80  09-25      CARDIAC MARKERS ( 25 Sep 2021 21:48 )  Trop 0.10 ng/mL<HH> / CK x     / CKMB x           RVP:    Venous Blood Gas:  09-25 @ 22:22  7.38/54/38/32/65.5  VBG Lactate: 1.30        Tox:

## 2021-09-26 NOTE — PROGRESS NOTE ADULT - SUBJECTIVE AND OBJECTIVE BOX
Patient is a 75y old  Female who presents with a chief complaint of CHF exacerbation (26 Sep 2021 00:19)      HPI:  Chief Complaint: Shortness of breath, fatigue     Past Medical History: SLE on CellCept and Methotrexate, HFpEF, HTN, HLD, T2DM, Seizure d/o, CVA on  no residual deficits     Past Surgical History: None Relevant     Ms. La is a 75F with a past medical history as described above who presented to the hospital for evaluation of shortness of breath and lower extremity edema that has been progressively worsening over the last 2 weeks. She also endorses fatigue and orthopnea. She went to her cardiologist recently and endorses "everything was okay" on her nuclear stress test. She says Dr. Graham increased her Lasix from 40mg once daily to twice daily. She denies chest pain, palpitations, headaches, fevers, or chills.     In the ED, she was vitally stable. Placed on a NRB at 15LPM for comfort, reduced to 6L NC upon my exam with instruction to titrate down as tolerated. Not tachycardic or having active chest pain. She is hypervolemic on exam with a cardiac wheeze and lab work (BNP >11K, Cr 0.8 > 1.2) reflecting volume overload. Admitted for treatment of CHF exacerbation.   felt better after lasix       PAST MEDICAL & SURGICAL HISTORY:  DM (diabetes mellitus)  insulin  fs achs    Hypercholesteremia  statin    Hypertension  hctz    Lupus  as per hx    Fall, initial encounter  as  hx    Stroke  TIA , no deficits    Herniated lumbar intervertebral disc  as per hx    Seizure  keppra    No significant past surgical history      Allergies    No Known Allergies    Intolerances      Family history : no cardiovscular family history   Home Medications:  aspirin 81 mg oral tablet: 1 tab(s) orally once a day (26 Sep 2021 00:41)  Cymbalta 60 mg oral delayed release capsule: 1 cap(s) orally once a day (26 Sep 2021 00:41)  folic acid 1 mg oral tablet: 1 tab(s) orally once a day (26 Sep 2021 00:41)  gabapentin 400 mg oral capsule: 1 cap(s) orally 3 times a day (26 Sep 2021 00:41)  hydroxychloroquine 200 mg oral tablet: 2 tab(s) orally 2 times a day (with meals) (26 Sep 2021 00:41)  lacosamide 100 mg oral tablet: 1 tab(s) orally 2 times a day (26 Sep 2021 00:)  Lasix 40 mg oral tablet: 1 tab(s) orally once a day (26 Sep 2021 00:41)  levETIRAcetam 1000 mg oral tablet: 1 tab(s) orally 2 times a day (26 Sep 2021 00:41)  levothyroxine 100 mcg (0.1 mg) oral tablet: 1 tab(s) orally once a day (26 Sep 2021 00:41)  lisinopril 20 mg oral tablet: 1 tab(s) orally once a day (26 Sep 2021 00:41)  methotrexate 2.5 mg oral tablet: orally once a week,  (26 Sep 2021 00:41)  NovoLO unit(s) subcutaneous 3 times a day (before meals)  Please take as previously taken (26 Sep 2021 00:41)  Percocet 10/325 oral tablet: 1 tab(s) orally every 6 hours, As Needed (26 Sep 2021 00:41)  Tresiba 100 units/mL subcutaneous solution: 20 unit(s) subcutaneous once a day in the morning (26 Sep 2021 00:41)  Zocor 80 mg oral tablet: 1 tab(s) orally once a day (at bedtime) (26 Sep 2021 00:41)    Occupation:  Alochol: Denied  Smoking: Denied  Drug Use: Denied  Marital Status:         ROS: as in HPI; All other systems reviewed are negative    ICU Vital Signs Last 24 Hrs  T(C): 36.8 (26 Sep 2021 00:40), Max: 36.8 (25 Sep 2021 22:24)  T(F): 98.2 (26 Sep 2021 00:40), Max: 98.3 (25 Sep 2021 22:24)  HR: 86 (26 Sep 2021 05:30) (86 - 105)  BP: 139/72 (26 Sep 2021 05:30) (133/77 - 156/77)  BP(mean): 99 (26 Sep 2021 05:30) (99 - 111)  ABP: --  ABP(mean): --  RR: 20 (26 Sep 2021 05:30) (18 - 26)  SpO2: 99% (26 Sep 2021 05:30) (94% - 100%)        Physical Examination:    General: No acute distress.  Alert, oriented, interactive, nonfocal    HEENT: Pupils equal, reactive to light.  Symmetric.    PULM: b/l crackles     CVS: Regular rate and rhythm, no murmurs, rubs, or gallops    ABD: Soft, nondistended, nontender, normoactive bowel sounds, no masses    EXT: 1 edema, nontender, no clubbing     SKIN: Warm and well perfused, no rashes noted.    Neurology : no motor or sensory deficit     Musculoskeletal : move all extremity     Lymphatic system: no Palpable node               I&O's Detail    25 Sep 2021 07:01  -  26 Sep 2021 07:00  --------------------------------------------------------  IN:  Total IN: 0 mL    OUT:    Voided (mL): 1000 mL  Total OUT: 1000 mL    Total NET: -1000 mL            LABS:                        9.1    7.66  )-----------( 248      ( 25 Sep 2021 21:48 )             29.7     26 Sep 2021 05:27    142    |  101    |  28     ----------------------------<  167    4.0     |  25     |  1.1      Ca    8.5        26 Sep 2021 05:27  Mg     1.9       26 Sep 2021 05:27    TPro  5.7    /  Alb  3.3    /  TBili  0.3    /  DBili  x      /  AST  19     /  ALT  9      /  AlkPhos  73     26 Sep 2021 05:27  Amylase x     lipase x          CARDIAC MARKERS ( 26 Sep 2021 05:27 )  x     / 0.11 ng/mL / x     / x     / x      CARDIAC MARKERS ( 25 Sep 2021 21:48 )  x     / 0.10 ng/mL / x     / x     / x          CAPILLARY BLOOD GLUCOSE            Culture        MEDICATIONS  (STANDING):  aspirin  chewable 81 milliGRAM(s) Oral daily  atorvastatin 40 milliGRAM(s) Oral at bedtime  chlorhexidine 4% Liquid 1 Application(s) Topical <User Schedule>  dextrose 40% Gel 15 Gram(s) Oral once  dextrose 5%. 1000 milliLiter(s) (50 mL/Hr) IV Continuous <Continuous>  dextrose 5%. 1000 milliLiter(s) (100 mL/Hr) IV Continuous <Continuous>  dextrose 50% Injectable 25 Gram(s) IV Push once  dextrose 50% Injectable 12.5 Gram(s) IV Push once  dextrose 50% Injectable 25 Gram(s) IV Push once  DULoxetine 60 milliGRAM(s) Oral daily  enoxaparin Injectable 40 milliGRAM(s) SubCutaneous daily  folic acid 1 milliGRAM(s) Oral daily  furosemide   Injectable 40 milliGRAM(s) IV Push daily  gabapentin 400 milliGRAM(s) Oral three times a day  glucagon  Injectable 1 milliGRAM(s) IntraMuscular once  hydroxychloroquine 400 milliGRAM(s) Oral two times a day  influenza   Vaccine 0.5 milliLiter(s) IntraMuscular once  insulin glargine Injectable (LANTUS) 20 Unit(s) SubCutaneous every morning  insulin lispro (ADMELOG) corrective regimen sliding scale   SubCutaneous three times a day before meals  insulin lispro Injectable (ADMELOG) 8 Unit(s) SubCutaneous three times a day before meals  lacosamide 100 milliGRAM(s) Oral two times a day  levETIRAcetam 1000 milliGRAM(s) Oral two times a day  levothyroxine 100 MICROGram(s) Oral daily  lisinopril 20 milliGRAM(s) Oral daily    MEDICATIONS  (PRN):  oxycodone    5 mG/acetaminophen 325 mG 2 Tablet(s) Oral every 6 hours PRN Severe Pain (7 - 10)        RADIOLOGY: ***     CXR: b/l lower opacity / effusion   TLC:  OG:  ET tube:        CAM ICU:  ECHO:

## 2021-09-26 NOTE — PHYSICAL THERAPY INITIAL EVALUATION ADULT - GAIT DEVIATIONS NOTED, PT EVAL
stooped posture, dec heel strike/pushoff , dec KISHAN/decreased parminder/decreased step length/decreased weight-shifting ability

## 2021-09-26 NOTE — H&P ADULT - NSHPPHYSICALEXAM_GEN_ALL_CORE
PHYSICAL EXAM:  GENERAL: NAD, lying in bed comfortably  HEAD:  Atraumatic, Normocephalic  EYES: EOMI, PERRLA, conjunctiva and sclera clear  ENT: Moist mucous membranes  NECK: Supple, No JVD  CHEST/LUNG: Good air movement bilaterally, +exp wheeze across upper chest   HEART: Regular rate and rhythm; No murmurs, rubs, or gallops  ABDOMEN: Bowel sounds present; Soft, Nontender, Nondistended. No hepatomegaly  EXTREMITIES:  2+ Peripheral Pulses, +2-3 edema BLE  NERVOUS SYSTEM:  Alert & Oriented X3, speech clear. No deficits   MSK: FROM all 4 extremities, full and equal strength  SKIN: No rashes or lesions

## 2021-09-26 NOTE — PROGRESS NOTE ADULT - SUBJECTIVE AND OBJECTIVE BOX
LINDSAY ALBERT  75y  Female      Patient is a 75y old  Female who presents with a chief complaint of CHF exacerbation (26 Sep 2021 07:19)      INTERVAL HPI/OVERNIGHT EVENTS:  Patient seen and examined earlier this morning. patient reports her breathing has improved; denies any CP. Also reports of worsening LE edema over some time.       REVIEW OF SYSTEMS:  CONSTITUTIONAL: No fever, weight loss, or fatigue  EYES: No eye pain, visual disturbances, or discharge  ENMT:  No difficulty hearing, tinnitus, vertigo; No sinus or throat pain  NECK: No pain or stiffness  RESPIRATORY: No cough, wheezing, chills or hemoptysis; +shortness of breath  CARDIOVASCULAR: No chest pain, palpitations, dizziness, +LE swelling  GASTROINTESTINAL: No abdominal or epigastric pain. No nausea, vomiting, or hematemesis; No diarrhea or constipation. No melena or hematochezia.  GENITOURINARY: No dysuria, frequency, hematuria, or incontinence  NEUROLOGICAL: No headaches, memory loss, loss of strength, numbness, or tremors  MUSCULOSKELETAL: No joint pain or swelling; No muscle, back, or extremity pain  PSYCHIATRIC: No depression, anxiety, mood swings, or difficulty sleeping      T(C): 36.4 (09-26-21 @ 07:10), Max: 36.8 (09-25-21 @ 22:24)  HR: 80 (09-26-21 @ 07:02) (80 - 105)  BP: 109/70 (09-26-21 @ 07:02) (109/70 - 156/77)  RR: 21 (09-26-21 @ 07:10) (16 - 26)  SpO2: 99% (09-26-21 @ 07:02) (94% - 100%)    PHYSICAL EXAM:  GENERAL: NAD, well-groomed, well-developed, thin   HEAD:  Atraumatic, Normocephalic  EYES: EOMI, PERRLA, conjunctiva and sclera clear  ENMT: No tonsillar erythema, exudates, or enlargement; Moist mucous membranes  NECK: Supple, No JVD, Normal thyroid  NERVOUS SYSTEM:  Alert & Oriented X3, Good concentration; Motor Strength 5/5 B/L upper and lower extremities; DTRs 2+ intact and symmetric  CHEST/LUNG: Clear to percussion bilaterally; No rales, rhonchi, wheezing, or rubs, +2L O2 via NC  HEART: Regular rate and rhythm; No murmurs, rubs, or gallops, 2-3+ b/l LE edema  ABDOMEN: Soft, Nontender, Nondistended; Bowel sounds present  EXTREMITIES:  2+ Peripheral Pulses, No clubbing, cyanosis, or edema      Consultant(s) Notes Reviewed:  [x ] YES  [ ] NO  Care Discussed with Consultants/Other Providers [ x] YES  [ ] NO    LAB:                        9.1    7.66  )-----------( 248      ( 25 Sep 2021 21:48 )             29.7     09-26    142  |  101  |  28<H>  ----------------------------<  167<H>  4.0   |  25  |  1.1    Ca    8.5      26 Sep 2021 05:27  Mg     1.9     09-26    TPro  5.7<L>  /  Alb  3.3<L>  /  TBili  0.3  /  DBili  x   /  AST  19  /  ALT  9   /  AlkPhos  73  09-26    LIVER FUNCTIONS - ( 26 Sep 2021 05:27 )  Alb: 3.3 g/dL / Pro: 5.7 g/dL / ALK PHOS: 73 U/L / ALT: 9 U/L / AST: 19 U/L / GGT: x           CARDIAC MARKERS ( 26 Sep 2021 05:27 )  x     / 0.11 ng/mL / x     / x     / x      CARDIAC MARKERS ( 25 Sep 2021 21:48 )  x     / 0.10 ng/mL / x     / x     / x                  Drug Dosing Weight  Height (cm): 152.4 (26 Sep 2021 00:40)  Weight (kg): 72.6 (26 Sep 2021 00:40)  BMI (kg/m2): 31.3 (26 Sep 2021 00:40)  BSA (m2): 1.7 (26 Sep 2021 00:40)  Height (cm): 152.4 (09-26-21 @ 00:40)  Weight (kg): 72.6 (09-26-21 @ 00:40)  BMI (kg/m2): 31.3 (09-26-21 @ 00:40)  BSA (m2): 1.7 (09-26-21 @ 00:40)  CAPILLARY BLOOD GLUCOSE      POCT Blood Glucose.: 181 mg/dL (26 Sep 2021 07:27)    I&O's Summary    25 Sep 2021 07:01  -  26 Sep 2021 07:00  --------------------------------------------------------  IN: 0 mL / OUT: 1000 mL / NET: -1000 mL    26 Sep 2021 07:01  -  26 Sep 2021 10:13  --------------------------------------------------------  IN: 0 mL / OUT: 800 mL / NET: -800 mL          RADIOLOGY & ADDITIONAL TESTS:  Imaging Personally Reviewed:  [x] YES  [ ] NO    HEALTH ISSUES - PROBLEM Dx:          MEDS:  aspirin  chewable 81 milliGRAM(s) Oral daily  atorvastatin 40 milliGRAM(s) Oral at bedtime  chlorhexidine 4% Liquid 1 Application(s) Topical <User Schedule>  dextrose 40% Gel 15 Gram(s) Oral once  dextrose 5%. 1000 milliLiter(s) IV Continuous <Continuous>  dextrose 5%. 1000 milliLiter(s) IV Continuous <Continuous>  dextrose 50% Injectable 25 Gram(s) IV Push once  dextrose 50% Injectable 12.5 Gram(s) IV Push once  dextrose 50% Injectable 25 Gram(s) IV Push once  DULoxetine 60 milliGRAM(s) Oral daily  enoxaparin Injectable 40 milliGRAM(s) SubCutaneous daily  folic acid 1 milliGRAM(s) Oral daily  furosemide   Injectable 40 milliGRAM(s) IV Push daily  gabapentin 400 milliGRAM(s) Oral three times a day  glucagon  Injectable 1 milliGRAM(s) IntraMuscular once  hydroxychloroquine 400 milliGRAM(s) Oral two times a day  influenza   Vaccine 0.5 milliLiter(s) IntraMuscular once  insulin glargine Injectable (LANTUS) 20 Unit(s) SubCutaneous every morning  insulin lispro (ADMELOG) corrective regimen sliding scale   SubCutaneous three times a day before meals  insulin lispro Injectable (ADMELOG) 8 Unit(s) SubCutaneous three times a day before meals  lacosamide 100 milliGRAM(s) Oral two times a day  levETIRAcetam 1000 milliGRAM(s) Oral two times a day  levothyroxine 100 MICROGram(s) Oral daily  lisinopril 20 milliGRAM(s) Oral daily  oxycodone    5 mG/acetaminophen 325 mG 2 Tablet(s) Oral every 6 hours PRN

## 2021-09-26 NOTE — H&P ADULT - ASSESSMENT
Impression   Acute on chronic HFpEF exacerbation   ANTONELLA on CKD - baseline Cr 0.8, now 1.2   SLE on immunosuppressants       Plan     CNS: Avoid CNS depressants. Continue AED's. Pain control percocet PRN.     CARDIOVASCULAR: LAsix 40mg IV twice daily, cardiology consult, I < O, strict ins and outs, daily weight, DASH diet, EKG in AM, repeat trops.     PULMONARY: HOB >45 degrees. Keep SpO2 >95%. CXR in AM. Titrate O2 as tolerated.     INFECTIOUS DISEASE: IV and catheter care PRN.     GI: Prophylaxis with Protonix 40mg PO daily, DASH diet.     RENAL: I < O. Replace electrolytes if needed. No Wise. Replace MAG     ENDOCRINE: FS at bedtime and before meals. Insulin protocol. TSH and HbA1c.     HEMATOLOGY: DVT ppx with Lovenox 40mg SubQ daily.     FULL CODE  Prognosis guarded

## 2021-09-26 NOTE — PHYSICAL THERAPY INITIAL EVALUATION ADULT - ADDITIONAL COMMENTS
Per patient, she lives alone with no steps to enter home, there are 13 steps with (R) rail going up to the main bedroom, but patient stays on first floor where she sleeps on couch

## 2021-09-27 NOTE — PROGRESS NOTE ADULT - SUBJECTIVE AND OBJECTIVE BOX
Patient is a 75y old  Female who presents with a chief complaint of CHF exacerbation (27 Sep 2021 07:09)        Over Night Events: No acute events overnight. Afebrile overnight        ROS:     All ROS are negative except HPI         PHYSICAL EXAM    ICU Vital Signs Last 24 Hrs  T(C): 36.7 (27 Sep 2021 07:01), Max: 36.8 (26 Sep 2021 15:10)  T(F): 98.1 (27 Sep 2021 07:01), Max: 98.2 (26 Sep 2021 15:10)  HR: 75 (27 Sep 2021 07:01) (75 - 84)  BP: 111/63 (27 Sep 2021 07:01) (106/53 - 120/58)  BP(mean): 82 (27 Sep 2021 07:01) (76 - 83)  ABP: --  ABP(mean): --  RR: 22 (27 Sep 2021 07:01) (14 - 43)  SpO2: 97% (27 Sep 2021 07:01) (97% - 97%)      CONSTITUTIONAL:   NAD    ENT:   Airway patent,   Mouth with normal mucosa.   No thrush    EYES:   Pupils equal,   Round and reactive to light.    CARDIAC:   Normal rate,   Regular rhythm.    No edema      Vascular:  Normal systolic impulse  No Carotid bruits    RESPIRATORY:   No wheezing  dec BS b/l bases  Normal chest expansion  Not tachypneic,  No use of accessory muscles    GASTROINTESTINAL:  Abdomen soft,   Non-tender,   No guarding,   + BS    MUSCULOSKELETAL:   Range of motion is not limited,  No clubbing, cyanosis    NEUROLOGICAL:   Alert and awake    SKIN:   Skin normal color for race,   Warm and dry           09-26-21 @ 07:01  -  09-27-21 @ 07:00  --------------------------------------------------------  IN:    Oral Fluid: 1105 mL  Total IN: 1105 mL    OUT:    Voided (mL): 3000 mL  Total OUT: 3000 mL    Total NET: -1895 mL          LABS:                            9.1    7.66  )-----------( 248      ( 25 Sep 2021 21:48 )             29.7                                               09-26    142  |  101  |  28<H>  ----------------------------<  167<H>  4.0   |  25  |  1.1    Ca    8.5      26 Sep 2021 05:27  Mg     1.9     09-26    TPro  5.7<L>  /  Alb  3.3<L>  /  TBili  0.3  /  DBili  x   /  AST  19  /  ALT  9   /  AlkPhos  73  09-26                                                 CARDIAC MARKERS ( 26 Sep 2021 05:27 )  x     / 0.11 ng/mL / x     / x     / x      CARDIAC MARKERS ( 25 Sep 2021 21:48 )  x     / 0.10 ng/mL / x     / x     / x                                                LIVER FUNCTIONS - ( 26 Sep 2021 05:27 )  Alb: 3.3 g/dL / Pro: 5.7 g/dL / ALK PHOS: 73 U/L / ALT: 9 U/L / AST: 19 U/L / GGT: x                                                                                                                                       MEDICATIONS  (STANDING):  aspirin  chewable 81 milliGRAM(s) Oral daily  atorvastatin 40 milliGRAM(s) Oral at bedtime  chlorhexidine 4% Liquid 1 Application(s) Topical <User Schedule>  dextrose 40% Gel 15 Gram(s) Oral once  dextrose 5%. 1000 milliLiter(s) (50 mL/Hr) IV Continuous <Continuous>  dextrose 5%. 1000 milliLiter(s) (100 mL/Hr) IV Continuous <Continuous>  dextrose 50% Injectable 25 Gram(s) IV Push once  dextrose 50% Injectable 12.5 Gram(s) IV Push once  dextrose 50% Injectable 25 Gram(s) IV Push once  DULoxetine 60 milliGRAM(s) Oral daily  enoxaparin Injectable 40 milliGRAM(s) SubCutaneous daily  folic acid 1 milliGRAM(s) Oral daily  furosemide   Injectable 40 milliGRAM(s) IV Push daily  gabapentin 400 milliGRAM(s) Oral three times a day  glucagon  Injectable 1 milliGRAM(s) IntraMuscular once  hydroxychloroquine 400 milliGRAM(s) Oral two times a day  influenza   Vaccine 0.5 milliLiter(s) IntraMuscular once  insulin glargine Injectable (LANTUS) 20 Unit(s) SubCutaneous every morning  insulin lispro (ADMELOG) corrective regimen sliding scale   SubCutaneous three times a day before meals  insulin lispro Injectable (ADMELOG) 8 Unit(s) SubCutaneous three times a day before meals  lacosamide 100 milliGRAM(s) Oral two times a day  levETIRAcetam 1000 milliGRAM(s) Oral two times a day  levothyroxine 100 MICROGram(s) Oral daily  lisinopril 20 milliGRAM(s) Oral daily    MEDICATIONS  (PRN):  oxycodone    5 mG/acetaminophen 325 mG 2 Tablet(s) Oral every 6 hours PRN Severe Pain (7 - 10)    < from: Xray Chest 1 View- PORTABLE-Routine (Xray Chest 1 View- PORTABLE-Routine .) (09.26.21 @ 11:04) >  Bilateral interstitial opacities, with increased bilateral pleural effusions.    --- End of Report ---      < end of copied text >

## 2021-09-27 NOTE — PROGRESS NOTE ADULT - ASSESSMENT
Acute on chronic HFpEF exacerbation   moderate MR  ANTONELLA on CKD - cardiorenal   SLE on immunosuppressants       Plan     CNS: Avoid CNS depressants. Continue AED's. Pain control percocet PRN.     CARDIOVASCULAR: increase lasix to 60 BID, cardiology consult, I < O, strict ins and outs, daily weight, DASH diet, EKG in AM, repeat trops.     PULMONARY: HOB >45 degrees. Keep SpO2 >95%. CXR in AM. Titrate O2 as tolerated.     INFECTIOUS DISEASE: IV and catheter care PRN.     GI: Prophylaxis with Protonix 40mg PO daily, DASH diet.     RENAL: I < O. Replace electrolytes if needed. No Wise. Replace MAG     ENDOCRINE: FS at bedtime and before meals. Insulin protocol. TSH and HbA1c.     HEMATOLOGY: DVT ppx with Lovenox 40mg SubQ daily.     FULL CODE  Prognosis guarded  Acute on chronic HFpEF exacerbation   moderate MR  ANTONELLA on CKD - cardiorenal   SLE on immunosuppressants       Plan     CNS: Avoid CNS depressants. Continue AED's. Pain control percocet PRN.     CARDIOVASCULAR: increase lasix to 60 BID, cardiology consult Dr. Todd, I < O, strict ins and outs, daily weight, DASH diet, trend  trops.     PULMONARY: HOB >45 degrees. Keep SpO2 >95%. daily CXR .     INFECTIOUS DISEASE: monitor off abx    GI: oral feeding DASH diet.     RENAL: I < O. monitor Cr while on IV diuresis No Wise. Replace mag and K     ENDOCRINE: FS at bedtime and before meals. Insulin protocol.     HEMATOLOGY: DVT ppx with Lovenox 40mg SubQ daily.     Rheum: resume MTX    FULL CODE

## 2021-09-27 NOTE — PROGRESS NOTE ADULT - ASSESSMENT
IMPRESSION    Acute on chronic HFpEF exacerbation   ANTONELLA on CKD   SLE on immunosuppressants       Plan     CNS: Pain control    CARDIOVASCULAR: Continue LAsix 40mg IV. f/u  cardiology , I < O, strict ins and outs, daily weight, DASH diet, EKG in AM, repeat trops. f/u 2d echo    PULMONARY: HOB >45 degrees. Keep SpO2 >95%. repeat CXR today . Titrate O2 as tolerated. Bedside CUS today    INFECTIOUS DISEASE:  F/U Cultures. Procalcitonin. check RVP     GI: Prophylaxis with Protonix 40mg PO daily, DASH diet.     RENAL: I < O. Replace electrolytes if needed.     ENDOCRINE: FS at bedtime and before meals. Insulin protocol. f/u TSH and HbA1c.     HEMATOLOGY: DVT ppx with Lovenox 40mg SubQ daily.     MSK: C/w hydroxychloroquine.  will confirm dose of Cellcept and MTX and resume     FULL CODE    Telemonitoring            IMPRESSION    Acute on chronic HFpEF exacerbation   ANTONELLA on CKD   SLE on immunosuppressants       Plan     CNS: Pain control    CARDIOVASCULAR: Continue LAsix 40mg IV. f/u  cardiology (dr burnham) , I < O, strict ins and outs, daily weight, DASH diet, EKG in AM, repeat trops. f/u 2d echo    PULMONARY: HOB >45 degrees. Keep SpO2 >95%. repeat CXR today . Titrate O2 as tolerated.     INFECTIOUS DISEASE:  F/U Cultures. Procalcitonin. check RVP     GI: Prophylaxis with Protonix 40mg PO daily, DASH diet.     RENAL: I < O. Replace electrolytes if needed.     ENDOCRINE: FS at bedtime and before meals. Insulin protocol. f/u TSH and HbA1c.     HEMATOLOGY: DVT ppx with Lovenox 40mg SubQ daily.     MSK: C/w hydroxychloroquine.  will confirm dose of MTX and resume     FULL CODE    Telemonitoring

## 2021-09-27 NOTE — PROGRESS NOTE ADULT - SUBJECTIVE AND OBJECTIVE BOX
Patient is a 75y old  Female who presents with a chief complaint of CHF exacerbation (27 Sep 2021 09:46)      OVERNIGHT EVENTS: remained stable, off pressors and on RA    SUBJECTIVE / INTERVAL HPI: Patient seen and examined at bedside.     VITAL SIGNS:  Vital Signs Last 24 Hrs  T(C): 36.7 (27 Sep 2021 07:01), Max: 36.8 (26 Sep 2021 15:10)  T(F): 98.1 (27 Sep 2021 07:01), Max: 98.2 (26 Sep 2021 15:10)  HR: 75 (27 Sep 2021 07:01) (75 - 84)  BP: 111/63 (27 Sep 2021 07:01) (106/53 - 120/58)  BP(mean): 82 (27 Sep 2021 07:01) (76 - 83)  RR: 22 (27 Sep 2021 07:01) (14 - 43)  SpO2: 90% (27 Sep 2021 09:00) (90% - 97%)    PHYSICAL EXAM:    General: WDWN  HEENT: NC/AT; PERRL, clear conjunctiva  Neck: supple  Cardiovascular: +S1/S2; RRR  Respiratory: CTA b/l crackles   Gastrointestinal: soft, NT/ND; +BSx4  Extremities: WWP; 2+ peripheral pulses; +2 edema   Neurological: AAOx3; no focal deficits    MEDICATIONS:  MEDICATIONS  (STANDING):  aspirin  chewable 81 milliGRAM(s) Oral daily  atorvastatin 40 milliGRAM(s) Oral at bedtime  chlorhexidine 4% Liquid 1 Application(s) Topical <User Schedule>  dextrose 40% Gel 15 Gram(s) Oral once  dextrose 5%. 1000 milliLiter(s) (50 mL/Hr) IV Continuous <Continuous>  dextrose 5%. 1000 milliLiter(s) (100 mL/Hr) IV Continuous <Continuous>  dextrose 50% Injectable 25 Gram(s) IV Push once  dextrose 50% Injectable 12.5 Gram(s) IV Push once  dextrose 50% Injectable 25 Gram(s) IV Push once  DULoxetine 60 milliGRAM(s) Oral daily  enoxaparin Injectable 40 milliGRAM(s) SubCutaneous daily  folic acid 1 milliGRAM(s) Oral daily  furosemide   Injectable 60 milliGRAM(s) IV Push two times a day  gabapentin 400 milliGRAM(s) Oral three times a day  glucagon  Injectable 1 milliGRAM(s) IntraMuscular once  hydroxychloroquine 400 milliGRAM(s) Oral two times a day  influenza   Vaccine 0.5 milliLiter(s) IntraMuscular once  insulin glargine Injectable (LANTUS) 20 Unit(s) SubCutaneous every morning  insulin lispro (ADMELOG) corrective regimen sliding scale   SubCutaneous three times a day before meals  insulin lispro Injectable (ADMELOG) 8 Unit(s) SubCutaneous three times a day before meals  lacosamide 100 milliGRAM(s) Oral two times a day  levETIRAcetam 1000 milliGRAM(s) Oral two times a day  levothyroxine 100 MICROGram(s) Oral daily  lisinopril 20 milliGRAM(s) Oral daily    MEDICATIONS  (PRN):  oxycodone    5 mG/acetaminophen 325 mG 2 Tablet(s) Oral every 6 hours PRN Severe Pain (7 - 10)      ALLERGIES:  Allergies    No Known Allergies    Intolerances        LABS:                        9.1    7.66  )-----------( 248      ( 25 Sep 2021 21:48 )             29.7     09-26    142  |  101  |  28<H>  ----------------------------<  167<H>  4.0   |  25  |  1.1    Ca    8.5      26 Sep 2021 05:27  Mg     1.9     09-26    TPro  5.7<L>  /  Alb  3.3<L>  /  TBili  0.3  /  DBili  x   /  AST  19  /  ALT  9   /  AlkPhos  73  09-26        CAPILLARY BLOOD GLUCOSE      POCT Blood Glucose.: 173 mg/dL (27 Sep 2021 07:37)      < from: Xray Chest 1 View- PORTABLE-Routine (Xray Chest 1 View- PORTABLE-Routine .) (09.26.21 @ 11:04) >    IMPRESSION:    Bilateral interstitial opacities, with increased bilateral pleural effusions.    < end of copied text >  < from: Transthoracic Echocardiogram (06.20.19 @ 09:14) >    PHYSICIAN INTERPRETATION:  Left Ventricle: The left ventricular internal cavity size is normal. Left   ventricular wall thickness is normal. Left ventricular ejection fraction,   by visual estimation, is 55 to 60%. Spectral Doppler shows impaired   relaxation pattern of left ventricular myocardial filling (Grade I   diastolic dysfunction).  Right Ventricle: Normal right ventricular size and function.  Left Atrium: Left atrial enlargement.  Right Atrium: Normal right atrial size.  Pericardium: There is no evidence of pericardial effusion.  Mitral Valve: The mitral valve is normal in structure. Thickening and   calcification of the anterior and posterior mitral valve leaflets. There   is mitral annular calcification. No evidence of mitral stenosis. Moderate   mitral valve regurgitation is seen.  Tricuspid Valve: The tricuspid valve is normal in structure.   Mild-moderate tricuspid regurgitation is visualized.  Aortic Valve: The aortic valve is trileaflet. No evidence of aortic   stenosis. Aortic valve thickening with normal leaflet opening. Mild   aortic valve regurgitation is seen.  Pulmonic Valve: The pulmonic valve is normal. Trace pulmonic valve   regurgitation.  Aorta: The aortic root and ascending aorta are structurally normal, with   no evidence of dilitation.  Pulmonary Artery: The main pulmonary artery is normal in size.    < end of copied text >

## 2021-09-27 NOTE — CONSULT NOTE ADULT - ASSESSMENT
Patient was volume overloaded. Now better. She has reported preserved lv. She is not following low na diet. Troponin elevated. Need check cpk mb ekg. IV lasix for now. Repeat cxr in am . 
IMPRESSION: Rehab of 76 y/o  f rehab  for  debility  hx  of cva  no weakness    PRECAUTIONS: [  ] Cardiac  [  ] Respiratory  [  ] Seizures [  ] Contact Isolation  [  ] Droplet Isolation  [ FALL ] Other    Weight Bearing Status:     RECOMMENDATION:    Out of Bed to Chair     DVT/Decubiti Prophylaxis    REHAB PLAN:     [  x ] Bedside P/T 3-5 times a week   [   ]   Bedside O/T  2-3 times a week             [   ] No Rehab Therapy Indicated                   [   ]  Speech Therapy   Conditioning/ROM                                    ADL  Bed Mobility                                               Conditioning/ROM  Transfers                                                     Bed Mobility  Sitting /Standing Balance                         Transfers                                        Gait Training                                               Sitting/Standing Balance  Stair Training [   ]Applicable                    Home equipment Eval                                                                        Splinting  [   ] Only      GOALS:   ADL   [  x ]   Independent                    Transfers  [ x  ] Independent                          Ambulation  [x  ] Independent     [ x   ] With device                            [ x  ]  CG                                                         [  x ]  CG                                                                  [  x ] CG                            [    ] Min A                                                   [   ] Min A                                                              [   ] Min  A          DISCHARGE PLAN:   [   ]  Good candidate for Intensive Rehabilitation/Hospital based-[facility]A [facility]                                             Will tolerate 3hrs Intensive Rehab Daily                                       [   xx ]  Short Term Rehab in Skilled Nursing Facility                                       [ x   ]  Home with Outpatient or  services                                         [    ]  Possible Candidate for Intensive Hospital based Rehab

## 2021-09-27 NOTE — PROGRESS NOTE ADULT - SUBJECTIVE AND OBJECTIVE BOX
Patient is a 75y old  Female who presents with a chief complaint of CHF exacerbation (26 Sep 2021 10:13)      T(F): 97.8 (09-26-21 @ 23:02), Max: 98.2 (09-26-21 @ 15:10)  HR: 80 (09-27-21 @ 06:00)  BP: 120/58 (09-27-21 @ 06:00)  RR: 14 (09-27-21 @ 06:00)  SpO2: 97% (09-27-21 @ 06:00) (97% - 97%)    PHYSICAL EXAM:  GENERAL: NAD, well-groomed, well-developed  HEAD:  Atraumatic, Normocephalic  EYES: EOMI, PERRLA, conjunctiva and sclera clear  ENMT: No tonsillar erythema, exudates, or enlargement; Moist mucous membranes, Good dentition, No lesions  NECK: Supple, No JVD, Normal thyroid  NERVOUS SYSTEM:  Alert & Oriented X3,  Motor Strength 5/5 B/L upper and lower extremities  CHEST/LUNG: Clear to percussion bilaterally; No rales, rhonchi, wheezing, or rubs  HEART: Regular rate and rhythm; No murmurs, rubs, or gallops  ABDOMEN: Soft, Nontender, Nondistended; Bowel sounds present  EXTREMITIES:   No clubbing, cyanosis, or edema  LYMPH: No lymphadenopathy noted  SKIN: No rashes or lesions    labs  09-26    142  |  101  |  28<H>  ----------------------------<  167<H>  4.0   |  25  |  1.1    Ca    8.5      26 Sep 2021 05:27  Mg     1.9     09-26    TPro  5.7<L>  /  Alb  3.3<L>  /  TBili  0.3  /  DBili  x   /  AST  19  /  ALT  9   /  AlkPhos  73  09-26                          9.1    7.66  )-----------( 248      ( 25 Sep 2021 21:48 )             29.7               aspirin  chewable 81 milliGRAM(s) Oral daily  atorvastatin 40 milliGRAM(s) Oral at bedtime  chlorhexidine 4% Liquid 1 Application(s) Topical <User Schedule>  dextrose 40% Gel 15 Gram(s) Oral once  dextrose 5%. 1000 milliLiter(s) IV Continuous <Continuous>  dextrose 5%. 1000 milliLiter(s) IV Continuous <Continuous>  dextrose 50% Injectable 25 Gram(s) IV Push once  dextrose 50% Injectable 12.5 Gram(s) IV Push once  dextrose 50% Injectable 25 Gram(s) IV Push once  DULoxetine 60 milliGRAM(s) Oral daily  enoxaparin Injectable 40 milliGRAM(s) SubCutaneous daily  folic acid 1 milliGRAM(s) Oral daily  furosemide   Injectable 40 milliGRAM(s) IV Push daily  gabapentin 400 milliGRAM(s) Oral three times a day  glucagon  Injectable 1 milliGRAM(s) IntraMuscular once  hydroxychloroquine 400 milliGRAM(s) Oral two times a day  influenza   Vaccine 0.5 milliLiter(s) IntraMuscular once  insulin glargine Injectable (LANTUS) 20 Unit(s) SubCutaneous every morning  insulin lispro (ADMELOG) corrective regimen sliding scale   SubCutaneous three times a day before meals  insulin lispro Injectable (ADMELOG) 8 Unit(s) SubCutaneous three times a day before meals  lacosamide 100 milliGRAM(s) Oral two times a day  levETIRAcetam 1000 milliGRAM(s) Oral two times a day  levothyroxine 100 MICROGram(s) Oral daily  lisinopril 20 milliGRAM(s) Oral daily  oxycodone    5 mG/acetaminophen 325 mG 2 Tablet(s) Oral every 6 hours PRN

## 2021-09-27 NOTE — CONSULT NOTE ADULT - SUBJECTIVE AND OBJECTIVE BOX
HPI:,  75F with a past medical history as described  see  medical  notes   who presented to the hospital for evaluation of shortness of breath and lower extremity edema that has been progressively worsening over the last 2 weeks. She also endorses fatigue and orthopnea. She went to her cardiologist recently and endorses "everything was okay" on her nuclear stress test. She says Dr. Graham increased her Lasix from 40mg once daily to twice daily. She denies chest pain, palpitations, headaches, fevers, or chills.     In the ED, she was vitally stable. Placed on a NRB at 15LPM for comfort, reduced to 6L NC upon my exam with instruction to titrate down as tolerated. Not tachycardic or having active chest pain. She is hypervolemic on exam with a cardiac wheeze and lab work (BNP >11K, Cr 0.8 > 1.2) reflecting volume overload. Admitted for treatment of CHF exacerbation.     ptn  seen  at  bed side nad fu  command  aox3     PTN  REFERRED TO ACUTE  REHAB  FOR  EVAL AND  TX   PAST MEDICAL & SURGICAL HISTORY:  DM (diabetes mellitus)  insulin  fs achs    Hypercholesteremia  statin    Hypertension  hctz    Lupus  as per hx    Fall, initial encounter  as  hx    Stroke  TIA 2002, no deficits    Herniated lumbar intervertebral disc  as per hx    Seizure  keppra    No significant past surgical history        Hospital Course:    TODAY'S SUBJECTIVE & REVIEW OF SYMPTOMS:     Constitutional WNL   Cardio WNL   Resp WNL   GI WNL  Heme WNL  Endo WNL  Skin WNL  MSK WNL  Neuro WNL  Cognitive WNL  Psych WNL      MEDICATIONS  (STANDING):  aspirin  chewable 81 milliGRAM(s) Oral daily  atorvastatin 40 milliGRAM(s) Oral at bedtime  chlorhexidine 4% Liquid 1 Application(s) Topical <User Schedule>  dextrose 40% Gel 15 Gram(s) Oral once  dextrose 5%. 1000 milliLiter(s) (50 mL/Hr) IV Continuous <Continuous>  dextrose 5%. 1000 milliLiter(s) (100 mL/Hr) IV Continuous <Continuous>  dextrose 50% Injectable 25 Gram(s) IV Push once  dextrose 50% Injectable 12.5 Gram(s) IV Push once  dextrose 50% Injectable 25 Gram(s) IV Push once  DULoxetine 60 milliGRAM(s) Oral daily  enoxaparin Injectable 40 milliGRAM(s) SubCutaneous daily  folic acid 1 milliGRAM(s) Oral daily  furosemide   Injectable 60 milliGRAM(s) IV Push two times a day  gabapentin 400 milliGRAM(s) Oral three times a day  glucagon  Injectable 1 milliGRAM(s) IntraMuscular once  hydroxychloroquine 400 milliGRAM(s) Oral two times a day  influenza   Vaccine 0.5 milliLiter(s) IntraMuscular once  insulin glargine Injectable (LANTUS) 20 Unit(s) SubCutaneous every morning  insulin lispro (ADMELOG) corrective regimen sliding scale   SubCutaneous three times a day before meals  insulin lispro Injectable (ADMELOG) 8 Unit(s) SubCutaneous three times a day before meals  lacosamide 100 milliGRAM(s) Oral two times a day  levETIRAcetam 1000 milliGRAM(s) Oral two times a day  levothyroxine 100 MICROGram(s) Oral daily  lisinopril 20 milliGRAM(s) Oral daily    MEDICATIONS  (PRN):  oxycodone    5 mG/acetaminophen 325 mG 2 Tablet(s) Oral every 6 hours PRN Severe Pain (7 - 10)      FAMILY HISTORY:      Allergies    No Known Allergies    Intolerances        SOCIAL HISTORY:    [  ] Etoh  [  ] Smoking  [  ] Substance abuse     Home Environment:  [ x ] Home Alone  [  ] Lives with Family  [  ] Home Health Aid    Dwelling:  [  ] Apartment  [ x ] Private House  [  ] Adult Home  [  ] Skilled Nursing Facility      [  ] Short Term  [  ] Long Term  [ x ] Stairs       Elevator [  ]    FUNCTIONAL STATUS PTA: (Check all that apply)  Ambulation: [  x ]Independent    [  ] Dependent     [  ] Non-Ambulatory  Assistive Device: [ x ] SA Cane  [  ]  Q Cane  [x  ] Walker  [  ]  Wheelchair  ADL : [ x ] Independent  [  ]  Dependent       Vital Signs Last 24 Hrs  T(C): 36.7 (27 Sep 2021 07:01), Max: 36.8 (26 Sep 2021 15:10)  T(F): 98.1 (27 Sep 2021 07:01), Max: 98.2 (26 Sep 2021 15:10)  HR: 75 (27 Sep 2021 07:01) (75 - 84)  BP: 111/63 (27 Sep 2021 07:01) (106/53 - 120/58)  BP(mean): 82 (27 Sep 2021 07:01) (76 - 83)  RR: 22 (27 Sep 2021 07:01) (14 - 43)  SpO2: 90% (27 Sep 2021 09:00) (90% - 97%)      PHYSICAL EXAM: Alert & Oriented X3  GENERAL: NAD, well-groomed, well-developed  HEAD:  Atraumatic, Normocephalic  EYES: EOMI, PERRLA, conjunctiva and sclera clear  NECK: Supple, No JVD, Normal thyroid  CHEST/LUNG: Clear to percussion bilaterally; No rales, rhonchi, wheezing, or rubs  HEART: Regular rate and rhythm; No murmurs, rubs, or gallops  ABDOMEN: Soft, Nontender, Nondistended; Bowel sounds present  EXTREMITIES:  2+ Peripheral Pulses, No clubbing, cyanosis, or edema    NERVOUS SYSTEM:  Cranial Nerves 2-12 intact [ x ] Abnormal  [  ]  ROM: WFL all extremities [x  ]  Abnormal [  ]  Motor Strength: WFL all extremities  [  x]  Abnormal [  ]  Sensation: intact to light touch [x  ] Abnormal [  ]  Reflexes: Symmetric [ x ]  Abnormal [  ]    FUNCTIONAL STATUS:  Bed Mobility: Independent [  ]  Supervision [x ]  Needs Assistance [  ]  N/A [  ]  Transfers: Independent [  ]  Supervision [  x]  Needs Assistance [  ]  N/A [  ]   Ambulation: Independent [  ]  Supervision [ x ]  Needs Assistance [  ]  N/A [  ]  ADL: Independent [  ] Requires Assistance [  ] N/A [x  ]  SEE PT/OT IE NOTES    LABS:                        9.1    7.66  )-----------( 248      ( 25 Sep 2021 21:48 )             29.7     09-26    142  |  101  |  28<H>  ----------------------------<  167<H>  4.0   |  25  |  1.1    Ca    8.5      26 Sep 2021 05:27  Mg     1.9     09-26    TPro  5.7<L>  /  Alb  3.3<L>  /  TBili  0.3  /  DBili  x   /  AST  19  /  ALT  9   /  AlkPhos  73  09-26          RADIOLOGY & ADDITIONAL STUDIES:    Assesment:
CARDIOLOGY CONSULT NOTE     CHIEF COMPLAINT/REASON FOR CONSULT:    HPI:  Chief Complaint: Shortness of breath, fatigue     Past Medical History: SLE on CellCept and Methotrexate, HFpEF, HTN, HLD, T2DM, Seizure d/o, CVA on 2002 no residual deficits     Past Surgical History: None Relevant     Ms. La is a 75F with a past medical history as described above who presented to the hospital for evaluation of shortness of breath and lower extremity edema that has been progressively worsening over the last 2 weeks. She also endorses fatigue and orthopnea. She went to her cardiologist recently and endorses "everything was okay" on her nuclear stress test. She says Dr. Graham increased her Lasix from 40mg once daily to twice daily. She denies chest pain, palpitations, headaches, fevers, or chills.               PAST MEDICAL & SURGICAL HISTORY:  DM (diabetes mellitus)  insulin  fs achs    Hypercholesteremia  statin    Hypertension  hctz    Lupus  as per hx    Fall, initial encounter  as  hx    Stroke  TIA 2002, no deficits    Herniated lumbar intervertebral disc  as per hx    Seizure  keppra    No significant past surgical history        Cardiac Risks:   [x ]HTN, [ x] DM, [ ] Smoking, [ ] FH,  [x ] Lipids        MEDICATIONS:  MEDICATIONS  (STANDING):  aspirin  chewable 81 milliGRAM(s) Oral daily  atorvastatin 40 milliGRAM(s) Oral at bedtime  chlorhexidine 4% Liquid 1 Application(s) Topical <User Schedule>  dextrose 40% Gel 15 Gram(s) Oral once  dextrose 5%. 1000 milliLiter(s) (50 mL/Hr) IV Continuous <Continuous>  dextrose 5%. 1000 milliLiter(s) (100 mL/Hr) IV Continuous <Continuous>  dextrose 50% Injectable 25 Gram(s) IV Push once  dextrose 50% Injectable 12.5 Gram(s) IV Push once  dextrose 50% Injectable 25 Gram(s) IV Push once  DULoxetine 60 milliGRAM(s) Oral daily  enoxaparin Injectable 40 milliGRAM(s) SubCutaneous daily  folic acid 1 milliGRAM(s) Oral daily  furosemide   Injectable 40 milliGRAM(s) IV Push daily  gabapentin 400 milliGRAM(s) Oral three times a day  glucagon  Injectable 1 milliGRAM(s) IntraMuscular once  hydroxychloroquine 400 milliGRAM(s) Oral two times a day  influenza   Vaccine 0.5 milliLiter(s) IntraMuscular once  insulin glargine Injectable (LANTUS) 20 Unit(s) SubCutaneous every morning  insulin lispro (ADMELOG) corrective regimen sliding scale   SubCutaneous three times a day before meals  insulin lispro Injectable (ADMELOG) 8 Unit(s) SubCutaneous three times a day before meals  lacosamide 100 milliGRAM(s) Oral two times a day  levETIRAcetam 1000 milliGRAM(s) Oral two times a day  levothyroxine 100 MICROGram(s) Oral daily  lisinopril 20 milliGRAM(s) Oral daily      FAMILY HISTORY:      SOCIAL HISTORY:      [ ] Marital status   Allergies    No Known Allergies    Intolerances    	    REVIEW OF SYSTEMS:  CONSTITUTIONAL: No fever, weight loss, or fatigue  EYES: No eye pain, visual disturbances, or discharge  ENMT:  No difficulty hearing, tinnitus, vertigo; No sinus or throat pain  NECK: No pain or stiffness  RESPIRATORY: No cough, wheezing, chills or hemoptysis; No Shortness of Breath  CARDIOVASCULAR: See above  GASTROINTESTINAL: No abdominal or epigastric pain. No nausea, vomiting, or hematemesis; No diarrhea or constipation. No melena or hematochezia.  GENITOURINARY: No dysuria, frequency, hematuria, or incontinence  NEUROLOGICAL: No headaches, memory loss, loss of strength, numbness, or tremors  SKIN: No itching, burning, rashes, or lesions   	      PHYSICAL EXAM:  T(C): 36.8 (09-26-21 @ 00:40), Max: 36.8 (09-25-21 @ 22:24)  HR: 86 (09-26-21 @ 05:30) (86 - 105)  BP: 139/72 (09-26-21 @ 05:30) (133/77 - 156/77)  RR: 20 (09-26-21 @ 05:30) (18 - 26)  SpO2: 99% (09-26-21 @ 05:30) (94% - 100%)  Wt(kg): --  I&O's Summary    25 Sep 2021 07:01  -  26 Sep 2021 07:00  --------------------------------------------------------  IN: 0 mL / OUT: 1000 mL / NET: -1000 mL        Appearance: Normal	  Psychiatry: A & O x 3, Mood & affect appropriate  HEENT:   Normal oral mucosa, PERRL, EOMI	  Lymphatic: No lymphadenopathy  Cardiovascular: Normal S1 S2,RRR, No JVD, I/vi jamison lsb  Respiratory: Lungs clear to auscultation	  Gastrointestinal:  Soft, Non-tender, + BS	  Skin: No rashes, No ecchymoses, No cyanosis	  Neurologic: Non-focal  Extremities: Normal range of motion, No clubbing, cyanosis tr edema  Vascular: Peripheral pulses palpable 2+ bilaterally      ECG:  	not available    	  LABS:	 	    CARDIAC MARKERS:          Serum Pro-Brain Natriuretic Peptide: 98303 pg/mL (09-25 @ 21:48)                            9.1    7.66  )-----------( 248      ( 25 Sep 2021 21:48 )             29.7     09-26    142  |  101  |  28<H>  ----------------------------<  167<H>  4.0   |  25  |  1.1    Ca    8.5      26 Sep 2021 05:27  Mg     1.9     09-26    TPro  5.7<L>  /  Alb  3.3<L>  /  TBili  0.3  /  DBili  x   /  AST  19  /  ALT  9   /  AlkPhos  73  09-26      proBNP: Serum Pro-Brain Natriuretic Peptide: 32708 pg/mL (09-25 @ 21:48)

## 2021-09-28 NOTE — PROGRESS NOTE ADULT - ASSESSMENT
# Acute on Chronic HFrEF Exacerbation - Improving   #MR  - Increased trops today, 9/28: 0.16 (9.27) --> 0.19 (9/28); ECG and ECHO showed no ischemic changes  - Chest X-Ray, 9/28, improved from yesterday and lungs clear to auscultation on PE  - Continue with lasix 60mg q12 for today, possibly decrease to once daily tomorrow if patient continues to improve   - Keep HOB >45 degrees  - Keep SpO2 > 95%  - Titrate O2 as needed  - Trend trops and follow up CK-MB    #Worsening Normocytic Anemia likely secondary to     #ANTONELLA on CKD - Resolved     #SLE   - Continue with home dose of hydroxychloroquine   - Continue with methotrexate    DVT Prophylaxis : Lovenox   GI Prophylaxis:  Protonix   FULL CODE  Dispo: Per Cardiology and Hospitalist # Acute on Chronic HFrEF Exacerbation - Improving   #MR  - Increased trops today, 9/28: 0.16 (9.27) --> 0.19 (9/28); ECG and ECHO showed no ischemic changes  - Chest X-Ray, 9/28, improved from yesterday and lungs clear to auscultation on PE  - Continue with lasix 60mg q12 for today, possibly decrease to once daily tomorrow if patient continues to improve   - Keep HOB >45 degrees  - Keep SpO2 > 95%  - Titrate O2 as needed  - Trend trops and follow up CK-MB    #Worsening Normocytic Anemia likely secondary to anemia of chronic disease vs marrow suppression from methotrexate  - Bilirubin levels are normal, likely not hemolysis   - Patient on folic acid supplementation  - Order iron studies and urinalysis    #ANTONELLA on CKD - Resolved     #SLE   - Continue with home dose of hydroxychloroquine   - Continue with methotrexate    DVT Prophylaxis : Lovenox   GI Prophylaxis:  Protonix   FULL CODE  Dispo: Per Cardiology and Hospitalist # Acute on Chronic HFrEF Exacerbation - Improving   #MR  - Increased trops today, 9/28: 0.16 (9.27) --> 0.19 (9/28); ECG and ECHO showed no ischemic changes  - Chest X-Ray, 9/28, improved from yesterday and lungs clear to auscultation on PE  - Continue with lasix 60mg q12 for today, possibly decrease to once daily tomorrow if patient continues to improve   - Keep HOB >45 degrees  - Keep SpO2 > 95%  - Titrate O2 as needed  - Trend trops and follow up CK-MB    #Worsening Normocytic Anemia likely secondary to anemia of chronic disease   - Bilirubin levels are normal, likely not hemolysis   - Patient on folic acid supplementation due to methotrexate therapy  - F/u iron studies   - Keep active type and screen  - Keep Hb >7    #ANTONELLA on CKD - Resolved     #SLE   - Continue with home dose of hydroxychloroquine   - Continue with methotrexate    DVT Prophylaxis : Lovenox   GI Prophylaxis:  Protonix   FULL CODE  Dispo: Per Cardiology and Hospitalist # Acute on Chronic HFrEF Exacerbation - Improving   #MR  - Increased trops today, 9/28: 0.16 (9.27) --> 0.19 (9/28); ECG and ECHO showed no ischemic changes  - Chest X-Ray, 9/28, improved from yesterday and lungs clear to auscultation on PE  - Continue with lasix 60mg q12 for today, possibly decrease to once daily tomorrow if patient continues to improve   - Keep HOB >45 degrees  - Keep SpO2 > 95%  - Titrate O2 as needed  - Trend trops and follow up CK-MB  - pgy-3: I spoke to Dr. Campos (pt cardiologist0 he recommended cont IV diuresis and once she is close to be euvolemic to call him back to arrange for cath in the [facility]    #Worsening Normocytic Anemia likely secondary to anemia of chronic disease   - Bilirubin levels are normal, likely not hemolysis   - Patient on folic acid supplementation due to methotrexate therapy  - F/u iron studies   - Keep active type and screen  - Keep Hb >7    #ANTONELLA on CKD - Resolved     #SLE   - Continue with home dose of hydroxychloroquine   - Continue with methotrexate    DVT Prophylaxis : Lovenox   GI Prophylaxis:  Protonix   FULL CODE  Dispo: Per Cardiology and Hospitalist

## 2021-09-28 NOTE — PROGRESS NOTE ADULT - ASSESSMENT
Patient with no complaints. CHF appears better. Out greater in. ? transfuse.  Patient with no complaints. CHF appears better. Out greater in. ? transfuse. Would try off o2 . Check sat ambulate with assistance.

## 2021-09-28 NOTE — PROGRESS NOTE ADULT - ASSESSMENT
IMPRESSION    Acute on chronic HFpEF exacerbation   ANTONELLA on CKD   SLE on immunosuppressants     Plan     CNS: Pain control    CARDIOVASCULAR: decrease lasix 60 iv q24 . f/u  cardiology (dr burnham) , I < O, strict ins and outs, daily weight, DASH diet, daily EKG.  f/u 2d echo    PULMONARY: HOB >45 degrees. Keep SpO2 >95%.  Titrate O2 as tolerated.     INFECTIOUS DISEASE:  F/U Cultures. Procalcitonin. check RVP     GI: Prophylaxis with Protonix 40mg PO daily, DASH diet.     RENAL: I < O. Replace electrolytes if needed.     ENDOCRINE: FS at bedtime and before meals. Insulin protocol. f/u TSH     HEMATOLOGY: DVT ppx with Lovenox 40mg SubQ daily.     MSK: C/w hydroxychloroquine.  c/w home dose of weekly MTX     FULL CODE    Telemonitoring    Disposition  as per cardiology and hospitalist

## 2021-09-28 NOTE — PROGRESS NOTE ADULT - SUBJECTIVE AND OBJECTIVE BOX
Patient is a 75y old  Female who presents with a chief complaint of CHF exacerbation (28 Sep 2021 07:08)        Over Night Events: No acute events overnight        ROS:     All ROS are negative except HPI         PHYSICAL EXAM    ICU Vital Signs Last 24 Hrs  T(C): 36.7 (28 Sep 2021 07:10), Max: 36.8 (27 Sep 2021 15:10)  T(F): 98 (28 Sep 2021 07:10), Max: 98.3 (27 Sep 2021 15:10)  HR: 82 (28 Sep 2021 07:20) (75 - 85)  BP: 114/70 (28 Sep 2021 07:20) (101/54 - 115/55)  BP(mean): 86 (28 Sep 2021 07:20) (76 - 86)  ABP: --  ABP(mean): --  RR: 26 (28 Sep 2021 07:20) (18 - 37)  SpO2: 98% (28 Sep 2021 07:20) (90% - 98%)      CONSTITUTIONAL:  Well nourished.  NAD    ENT:   Airway patent,   Mouth with normal mucosa.   No thrush    EYES:   Pupils equal,   Round and reactive to light.    CARDIAC:   Normal rate,   Regular rhythm.    No edema      Vascular:  Normal systolic impulse  No Carotid bruits    RESPIRATORY:   No wheezing  Bilateral BS  Normal chest expansion  Not tachypneic,  No use of accessory muscles    GASTROINTESTINAL:  Abdomen soft,   Non-tender,   No guarding,   + BS    MUSCULOSKELETAL:   Range of motion is not limited,  No clubbing, cyanosis    NEUROLOGICAL:   Alert and awake  Non focal    SKIN:   Skin normal color for race,   Warm and dry    PSYCHIATRIC:   Normal mood and affect.   No apparent risk to self or others.      09-27-21 @ 07:01  -  09-28-21 @ 07:00  --------------------------------------------------------  IN:    Oral Fluid: 120 mL  Total IN: 120 mL    OUT:    Voided (mL): 2400 mL  Total OUT: 2400 mL    Total NET: -2280 mL          LABS:                            8.0    5.75  )-----------( 251      ( 28 Sep 2021 05:35 )             26.9                                               09-28    139  |  96<L>  |  21<H>  ----------------------------<  105<H>  4.0   |  32  |  0.9    Ca    8.4<L>      28 Sep 2021 05:35  Mg     1.7     09-28    TPro  5.2<L>  /  Alb  3.3<L>  /  TBili  0.3  /  DBili  x   /  AST  20  /  ALT  9   /  AlkPhos  71  09-28                                                 CARDIAC MARKERS ( 28 Sep 2021 05:35 )  x     / 0.19 ng/mL / x     / x     / x      CARDIAC MARKERS ( 27 Sep 2021 11:07 )  x     / 0.16 ng/mL / x     / x     / x                                                LIVER FUNCTIONS - ( 28 Sep 2021 05:35 )  Alb: 3.3 g/dL / Pro: 5.2 g/dL / ALK PHOS: 71 U/L / ALT: 9 U/L / AST: 20 U/L / GGT: x                                                                                                                                       MEDICATIONS  (STANDING):  aspirin  chewable 81 milliGRAM(s) Oral daily  atorvastatin 40 milliGRAM(s) Oral at bedtime  chlorhexidine 4% Liquid 1 Application(s) Topical <User Schedule>  dextrose 40% Gel 15 Gram(s) Oral once  dextrose 5%. 1000 milliLiter(s) (50 mL/Hr) IV Continuous <Continuous>  dextrose 5%. 1000 milliLiter(s) (100 mL/Hr) IV Continuous <Continuous>  dextrose 50% Injectable 25 Gram(s) IV Push once  dextrose 50% Injectable 12.5 Gram(s) IV Push once  dextrose 50% Injectable 25 Gram(s) IV Push once  DULoxetine 60 milliGRAM(s) Oral daily  enoxaparin Injectable 40 milliGRAM(s) SubCutaneous daily  folic acid 1 milliGRAM(s) Oral daily  furosemide   Injectable 60 milliGRAM(s) IV Push two times a day  gabapentin 400 milliGRAM(s) Oral three times a day  glucagon  Injectable 1 milliGRAM(s) IntraMuscular once  hydroxychloroquine 400 milliGRAM(s) Oral two times a day  influenza   Vaccine 0.5 milliLiter(s) IntraMuscular once  insulin glargine Injectable (LANTUS) 20 Unit(s) SubCutaneous every morning  insulin lispro (ADMELOG) corrective regimen sliding scale   SubCutaneous three times a day before meals  insulin lispro Injectable (ADMELOG) 8 Unit(s) SubCutaneous three times a day before meals  lacosamide 100 milliGRAM(s) Oral two times a day  levETIRAcetam 1000 milliGRAM(s) Oral two times a day  levothyroxine 100 MICROGram(s) Oral daily  lisinopril 20 milliGRAM(s) Oral daily  magnesium sulfate  IVPB 2 Gram(s) IV Intermittent once  methotrexate 17.5 milliGRAM(s) Oral every week    MEDICATIONS  (PRN):  oxycodone    5 mG/acetaminophen 325 mG 2 Tablet(s) Oral every 6 hours PRN Severe Pain (7 - 10)      New X-rays reviewed:   B/l effusions decreased in size since prior    < from: TTE Echo Complete w/o Contrast w/ Doppler (09.27.21 @ 08:40) >  Summary:   1. Leftventricular ejection fraction, by visual estimation, is 55 to 60%.   2. Mildly enlarged left atrium.   3. Moderate mitral annular calcification.   4. Moderate mitral valve regurgitation.   5. Mild tricuspid regurgitation.   6. Mild aortic regurgitation.   7. There is mild aortic root calcification.    < end of copied text >

## 2021-09-28 NOTE — PROGRESS NOTE ADULT - SUBJECTIVE AND OBJECTIVE BOX
SUBJECTIVE:    Patient is a 76 y/o female who presents with a chief complaint of CHF exacerbation (28 Sep 2021 08:40)    Currently admitted to medicine with the primary diagnosis of Pulmonary edema    Today is hospital day 3d. This morning she is resting comfortably in bed and reports no new issues or overnight events.     PAST MEDICAL & SURGICAL HISTORY  DM (diabetes mellitus)  insulin  fs achs    Hypercholesteremia  statin    Hypertension  hctz    Lupus  as per hx    Fall, initial encounter  as  hx    Stroke  TIA 2002, no deficits    Herniated lumbar intervertebral disc  as per hx    Seizure  keppra    No significant past surgical history      SOCIAL HISTORY:  Negative for smoking/alcohol/drug use.     ALLERGIES:  No Known Allergies    MEDICATIONS:  STANDING MEDICATIONS  aspirin  chewable 81 milliGRAM(s) Oral daily  atorvastatin 40 milliGRAM(s) Oral at bedtime  chlorhexidine 4% Liquid 1 Application(s) Topical <User Schedule>  dextrose 40% Gel 15 Gram(s) Oral once  dextrose 5%. 1000 milliLiter(s) IV Continuous <Continuous>  dextrose 5%. 1000 milliLiter(s) IV Continuous <Continuous>  dextrose 50% Injectable 25 Gram(s) IV Push once  dextrose 50% Injectable 12.5 Gram(s) IV Push once  dextrose 50% Injectable 25 Gram(s) IV Push once  DULoxetine 60 milliGRAM(s) Oral daily  enoxaparin Injectable 40 milliGRAM(s) SubCutaneous daily  folic acid 1 milliGRAM(s) Oral daily  furosemide   Injectable 20 milliGRAM(s) IV Push once  gabapentin 400 milliGRAM(s) Oral three times a day  glucagon  Injectable 1 milliGRAM(s) IntraMuscular once  hydroxychloroquine 400 milliGRAM(s) Oral two times a day  influenza   Vaccine 0.5 milliLiter(s) IntraMuscular once  insulin glargine Injectable (LANTUS) 20 Unit(s) SubCutaneous every morning  insulin lispro (ADMELOG) corrective regimen sliding scale   SubCutaneous three times a day before meals  insulin lispro Injectable (ADMELOG) 8 Unit(s) SubCutaneous three times a day before meals  lacosamide 100 milliGRAM(s) Oral two times a day  levETIRAcetam 1000 milliGRAM(s) Oral two times a day  levothyroxine 100 MICROGram(s) Oral daily  lisinopril 20 milliGRAM(s) Oral daily  magnesium sulfate  IVPB 2 Gram(s) IV Intermittent once  methotrexate 17.5 milliGRAM(s) Oral every week    PRN MEDICATIONS  oxycodone    5 mG/acetaminophen 325 mG 2 Tablet(s) Oral every 6 hours PRN    VITALS:   T(F): 98  HR: 82  BP: 114/70  RR: 26  SpO2: 98%    LABS:                        8.0    5.75  )-----------( 251      ( 28 Sep 2021 05:35 )             26.9     09-28    139  |  96<L>  |  21<H>  ----------------------------<  105<H>  4.0   |  32  |  0.9    Ca    8.4<L>      28 Sep 2021 05:35  Mg     1.7     09-28    TPro  5.2<L>  /  Alb  3.3<L>  /  TBili  0.3  /  DBili  x   /  AST  20  /  ALT  9   /  AlkPhos  71  09-28    Troponin T, Serum: 0.19 ng/mL *HH* (09-28-21 @ 05:35)  Troponin T, Serum: 0.16 ng/mL *HH* (09-27-21 @ 11:07)      CARDIAC MARKERS ( 28 Sep 2021 05:35 )  x     / 0.19 ng/mL / x     / x     / x      CARDIAC MARKERS ( 27 Sep 2021 11:07 )  x     / 0.16 ng/mL / x     / x     / x          RADIOLOGY:  < from: 12 Lead ECG (09.27.21 @ 08:11) >  Diagnosis Line Normal sinus rhythm  Anterior infarct , age undetermined  Abnormal ECG    < end of copied text >  < from: TTE Echo Complete w/o Contrast w/ Doppler (09.27.21 @ 08:40) >  Summary:   1. Leftventricular ejection fraction, by visual estimation, is 55 to 60%.   2. Mildly enlarged left atrium.   3. Moderate mitral annular calcification.   4. Moderate mitral valve regurgitation.   5. Mild tricuspid regurgitation.   6. Mild aortic regurgitation.   7. There is mild aortic root calcification.    < end of copied text >  < from: Xray Chest 1 View- PORTABLE-Routine (Xray Chest 1 View- PORTABLE-Routine in AM.) (09.27.21 @ 05:55) >  Impression:    Stable pulmonary vascular congestion, bilateral pleural effusions and bilateral opacities.      < end of copied text >    PHYSICAL EXAM:  GENERAL: Patient is not in acute distress  HEAD:  Atraumatic, Normocephalic  EYES: EOMI, PERRLA, conjunctiva and sclera clear  ENMT: Moist mucous membranes, No lesions  NERVOUS SYSTEM:  Alert & Oriented X3, Good concentration  CHEST/LUNG: Clear to auscultation bilaterally; No rales, rhonchi, wheezing, or rubs  HEART: Regular rate and rhythm; No murmurs, rubs, or gallops auscultated  ABDOMEN: Soft, Nontender, Nondistended; Bowel sounds present in all 4 quadrants    IV Access: YES  NG tube present: NO  PEG tube present: NO  Wise catheter present: NO         SUBJECTIVE:    Patient is a 76 y/o female who presents with a chief complaint of CHF exacerbation (28 Sep 2021 08:40)    Currently admitted to medicine with the primary diagnosis of Pulmonary edema    Today is hospital day 3d. This morning she is resting comfortably in bed and reports improvement with no new issues or overnight events.     PAST MEDICAL & SURGICAL HISTORY  DM (diabetes mellitus)  insulin  fs achs    Hypercholesteremia  statin    Hypertension  hctz    Lupus  as per hx    Fall, initial encounter  as  hx    Stroke  TIA 2002, no deficits    Herniated lumbar intervertebral disc  as per hx    Seizure  keppra    No significant past surgical history      SOCIAL HISTORY:  Negative for smoking/alcohol/drug use.     ALLERGIES:  No Known Allergies    MEDICATIONS:  STANDING MEDICATIONS  aspirin  chewable 81 milliGRAM(s) Oral daily  atorvastatin 40 milliGRAM(s) Oral at bedtime  chlorhexidine 4% Liquid 1 Application(s) Topical <User Schedule>  dextrose 40% Gel 15 Gram(s) Oral once  dextrose 5%. 1000 milliLiter(s) IV Continuous <Continuous>  dextrose 5%. 1000 milliLiter(s) IV Continuous <Continuous>  dextrose 50% Injectable 25 Gram(s) IV Push once  dextrose 50% Injectable 12.5 Gram(s) IV Push once  dextrose 50% Injectable 25 Gram(s) IV Push once  DULoxetine 60 milliGRAM(s) Oral daily  enoxaparin Injectable 40 milliGRAM(s) SubCutaneous daily  folic acid 1 milliGRAM(s) Oral daily  furosemide   Injectable 20 milliGRAM(s) IV Push once  gabapentin 400 milliGRAM(s) Oral three times a day  glucagon  Injectable 1 milliGRAM(s) IntraMuscular once  hydroxychloroquine 400 milliGRAM(s) Oral two times a day  influenza   Vaccine 0.5 milliLiter(s) IntraMuscular once  insulin glargine Injectable (LANTUS) 20 Unit(s) SubCutaneous every morning  insulin lispro (ADMELOG) corrective regimen sliding scale   SubCutaneous three times a day before meals  insulin lispro Injectable (ADMELOG) 8 Unit(s) SubCutaneous three times a day before meals  lacosamide 100 milliGRAM(s) Oral two times a day  levETIRAcetam 1000 milliGRAM(s) Oral two times a day  levothyroxine 100 MICROGram(s) Oral daily  lisinopril 20 milliGRAM(s) Oral daily  magnesium sulfate  IVPB 2 Gram(s) IV Intermittent once  methotrexate 17.5 milliGRAM(s) Oral every week    PRN MEDICATIONS  oxycodone    5 mG/acetaminophen 325 mG 2 Tablet(s) Oral every 6 hours PRN    VITALS:   T(F): 98  HR: 82  BP: 114/70  RR: 26  SpO2: 98%    LABS:                        8.0    5.75  )-----------( 251      ( 28 Sep 2021 05:35 )             26.9     09-28    139  |  96<L>  |  21<H>  ----------------------------<  105<H>  4.0   |  32  |  0.9    Ca    8.4<L>      28 Sep 2021 05:35  Mg     1.7     09-28    TPro  5.2<L>  /  Alb  3.3<L>  /  TBili  0.3  /  DBili  x   /  AST  20  /  ALT  9   /  AlkPhos  71  09-28    Troponin T, Serum: 0.19 ng/mL *HH* (09-28-21 @ 05:35)  Troponin T, Serum: 0.16 ng/mL *HH* (09-27-21 @ 11:07)      CARDIAC MARKERS ( 28 Sep 2021 05:35 )  x     / 0.19 ng/mL / x     / x     / x      CARDIAC MARKERS ( 27 Sep 2021 11:07 )  x     / 0.16 ng/mL / x     / x     / x          RADIOLOGY:  < from: 12 Lead ECG (09.27.21 @ 08:11) >  Diagnosis Line Normal sinus rhythm  Anterior infarct , age undetermined  Abnormal ECG    < end of copied text >  < from: TTE Echo Complete w/o Contrast w/ Doppler (09.27.21 @ 08:40) >  Summary:   1. Leftventricular ejection fraction, by visual estimation, is 55 to 60%.   2. Mildly enlarged left atrium.   3. Moderate mitral annular calcification.   4. Moderate mitral valve regurgitation.   5. Mild tricuspid regurgitation.   6. Mild aortic regurgitation.   7. There is mild aortic root calcification.    < end of copied text >  < from: Xray Chest 1 View- PORTABLE-Routine (Xray Chest 1 View- PORTABLE-Routine in AM.) (09.27.21 @ 05:55) >  Impression:    Stable pulmonary vascular congestion, bilateral pleural effusions and bilateral opacities.      < end of copied text >    PHYSICAL EXAM:  GENERAL: Patient is not in acute distress  HEAD:  Atraumatic, Normocephalic  EYES: EOMI, PERRLA, conjunctiva and sclera clear  ENMT: Moist mucous membranes, No lesions  NERVOUS SYSTEM:  Alert & Oriented X3, Good concentration  CHEST/LUNG: Clear to auscultation bilaterally; No rales, rhonchi, wheezing, or rubs  HEART: Regular rate and rhythm; No murmurs, rubs, or gallops auscultated  ABDOMEN: Soft, Nontender, Nondistended; Bowel sounds present in all 4 quadrants    IV Access: YES  NG tube present: NO  PEG tube present: NO  Wise catheter present: NO

## 2021-09-28 NOTE — PROGRESS NOTE ADULT - SUBJECTIVE AND OBJECTIVE BOX
Patient is a 75y old  Female who presents with a chief complaint of CHF exacerbation (27 Sep 2021 10:21)      T(F): 98.3 (09-28-21 @ 05:40), Max: 98.3 (09-27-21 @ 15:10)  HR: 78 (09-28-21 @ 05:40)  BP: 110/55 (09-28-21 @ 05:40)  RR: 24 (09-28-21 @ 05:40)  SpO2: 96% (09-28-21 @ 05:40) (90% - 97%)    PHYSICAL EXAM:  GENERAL: NAD, well-groomed, well-developed  HEAD:  Atraumatic, Normocephalic  EYES: EOMI, PERRLA, conjunctiva and sclera clear  ENMT: No tonsillar erythema, exudates, or enlargement; Moist mucous membranes, Good dentition, No lesions  NECK: Supple, No JVD, Normal thyroid  NERVOUS SYSTEM:  Alert & Oriented X3,  Motor Strength 5/5 B/L upper and lower extremities  CHEST/LUNG: Clear to percussion bilaterally; No rales, rhonchi, wheezing, or rubs  HEART: Regular rate and rhythm; No murmurs, rubs, or gallops  ABDOMEN: Soft, Nontender, Nondistended; Bowel sounds present  EXTREMITIES:   No clubbing, cyanosis, or edema  LYMPH: No lymphadenopathy noted  SKIN: No rashes or lesions    labs  09-27    135  |  94<L>  |  25<H>  ----------------------------<  246<H>  3.8   |  33<H>  |  0.9    Ca    8.3<L>      27 Sep 2021 11:07  Mg     1.7     09-27    TPro  5.4<L>  /  Alb  3.1<L>  /  TBili  0.3  /  DBili  x   /  AST  18  /  ALT  7   /  AlkPhos  70  09-27                          8.0    5.75  )-----------( 251      ( 28 Sep 2021 05:35 )             26.9           Troponin T, Serum: 0.16 ng/mL *HH* (09-27-21 @ 11:07)      aspirin  chewable 81 milliGRAM(s) Oral daily  atorvastatin 40 milliGRAM(s) Oral at bedtime  chlorhexidine 4% Liquid 1 Application(s) Topical <User Schedule>  dextrose 40% Gel 15 Gram(s) Oral once  dextrose 5%. 1000 milliLiter(s) IV Continuous <Continuous>  dextrose 5%. 1000 milliLiter(s) IV Continuous <Continuous>  dextrose 50% Injectable 25 Gram(s) IV Push once  dextrose 50% Injectable 12.5 Gram(s) IV Push once  dextrose 50% Injectable 25 Gram(s) IV Push once  DULoxetine 60 milliGRAM(s) Oral daily  enoxaparin Injectable 40 milliGRAM(s) SubCutaneous daily  folic acid 1 milliGRAM(s) Oral daily  furosemide   Injectable 60 milliGRAM(s) IV Push two times a day  gabapentin 400 milliGRAM(s) Oral three times a day  glucagon  Injectable 1 milliGRAM(s) IntraMuscular once  hydroxychloroquine 400 milliGRAM(s) Oral two times a day  influenza   Vaccine 0.5 milliLiter(s) IntraMuscular once  insulin glargine Injectable (LANTUS) 20 Unit(s) SubCutaneous every morning  insulin lispro (ADMELOG) corrective regimen sliding scale   SubCutaneous three times a day before meals  insulin lispro Injectable (ADMELOG) 8 Unit(s) SubCutaneous three times a day before meals  lacosamide 100 milliGRAM(s) Oral two times a day  levETIRAcetam 1000 milliGRAM(s) Oral two times a day  levothyroxine 100 MICROGram(s) Oral daily  lisinopril 20 milliGRAM(s) Oral daily  methotrexate 17.5 milliGRAM(s) Oral every week  oxycodone    5 mG/acetaminophen 325 mG 2 Tablet(s) Oral every 6 hours PRN

## 2021-09-29 NOTE — CHART NOTE - NSCHARTNOTEFT_GEN_A_CORE
Endocrine called for TSH 16. pt has a h/o T1DM and hypothyroidism   pt last seen by Dr. Nolan in 8/2021 and is on levothyroxine 150 mcg. Please continue this dose. Has follow up on oct 18th at 10.45 am, further dose adjustment can be done then. recall prn. d/w resident.

## 2021-09-29 NOTE — PROGRESS NOTE ADULT - ASSESSMENT
Patient with no complaints. CHF appears better. Out greater in. ? transfuse. Check cbc. Check sat today ambulating

## 2021-09-29 NOTE — PROGRESS NOTE ADULT - ASSESSMENT
# Acute on Chronic HFPEF Exacerbation and moderate MR  - improving, stable on RA, check O2 on ambulation   - keep negative balance  - c/w with IV Lasix for now 60 bid, f/u repeat trop (ckmb neg)  - consider starting BB once euvolemic, c/w lisinopril and ASA  - given TWI in leads v3,v4 and raised trop, pt will need ischemic work up once medically optimized  - I spoke with Dr. Graham (pt cardiologist0 he recommended cont IV diuresis and once she is close to be euvolemic to call him back to arrange for cath in the [facility] or outpt cath, pt   might decide to do cath as outpt    #Normocytic Anemia likely secondary to anemia of chronic disease   - Patient on folic acid supplementation due to methotrexate therapy  - F/u iron studies   - Keep active type and screen  - Keep Hb >7    #ANTONELLA on CKD - Resolved     #Hypothyroidism  - TSH 15, check ft3/4, endocrine eval, c/w current levothyroxine dose for now       #SLE   - Continue with home dose of hydroxychloroquine   - Continue with methotrexate    DVT Prophylaxis : Lovenox   GI Prophylaxis:  Protonix   FULL CODE  Dispo: tele # Acute on Chronic HFPEF Exacerbation and moderate MR  - improving, stable on RA, check O2 on ambulation   - keep negative balance  - c/w with IV Lasix for now 40 bid, f/u repeat trop (ckmb neg)  - consider starting BB once euvolemic, c/w lisinopril and ASA  - given TWI in leads v3,v4 and raised trop, pt will need ischemic work up once medically optimized  - I spoke with Dr. Graham (pt cardiologist0 he recommended cont IV diuresis and once she is close to be euvolemic to call him back to arrange for cath in the [facility] or outpt cath, pt   might decide to do cath as outpt    #Normocytic Anemia likely secondary to anemia of chronic disease   - Patient on folic acid supplementation due to methotrexate therapy  - F/u iron studies   - Keep active type and screen  - Keep Hb >7    #ANTONELLA on CKD - Resolved     #Hypothyroidism  - TSH 15, check ft3/4, endocrine eval, c/w current levothyroxine dose for now       #SLE   - Continue with home dose of hydroxychloroquine   - Continue with methotrexate    DVT Prophylaxis : Lovenox   GI Prophylaxis:  Protonix   FULL CODE  Dispo: tele # Acute on Chronic HFPEF Exacerbation and moderate MR  - improving, stable on RA, check O2 on ambulation   - keep negative balance  - c/w with IV Lasix for now 40 bid, f/u repeat trop (ckmb neg)  - consider starting BB once euvolemic, c/w lisinopril and ASA  - given TWI in leads v3,v4 and raised trop, pt will need ischemic work up once medically optimized  - I spoke with Dr. Graham (pt cardiologist0 he recommended cont IV diuresis and once she is close to be euvolemic to call him back to arrange for cath in the [facility] or outpt cath, pt   might decide to do cath as outpt    #Normocytic Anemia likely secondary to anemia of chronic disease   - Patient on folic acid supplementation due to methotrexate therapy  - F/u iron studies   - Keep active type and screen  - Keep Hb >7    #ANTONELLA on CKD - Resolved     #Hypothyroidism  - TSH 15, check ft3/4, endocrine eval, c/w current levothyroxine dose for now     #DC seizure pt has been off them for long time     #SLE   - Continue with home dose of hydroxychloroquine   - Continue with methotrexate    DVT Prophylaxis : Lovenox   GI Prophylaxis:  Protonix   FULL CODE  Dispo: tele

## 2021-09-29 NOTE — PROGRESS NOTE ADULT - ASSESSMENT
IMPRESSION    Acute on chronic HFpEF exacerbation   CKD  SLE on immunosuppressants  Elevated TSH ; h/o hypothyroidism on daily synthroid  NSTEMI  Moderate MR     Plan     CNS: Pain control    CARDIOVASCULAR: C/W 60 IV Q12 . f/u  cardiology (dr burnham) for possible cath as an inpatient vs outpatient, keep I < O, strict ins and outs, daily weight, DASH diet, daily EKG.      PULMONARY: HOB >45 degrees. Keep SpO2 >95%.  Titrate O2 as tolerated.     INFECTIOUS DISEASE:  F/U Cultures. Procalcitonin reviewed    GI: Prophylaxis with Protonix 40mg PO daily, DASH diet.     RENAL: I < O. Replace electrolytes if needed.     ENDOCRINE: FS at bedtime and before meals. Insulin protocol. Endocrinology consult. send free T3 and T4    HEMATOLOGY: DVT ppx with Lovenox 40mg SubQ daily. F/U iron studies and vit b12, MMA level    MSK: C/w hydroxychloroquine.  c/w home dose of Hydroxychloroquine and weekly MTX     FULL CODE    Telemonitoring    Pending left heart catheterization either inpatient vs outpatient based upon cardiology recommendation.    Plan of care d/w patient and house-staff

## 2021-09-29 NOTE — PROGRESS NOTE ADULT - SUBJECTIVE AND OBJECTIVE BOX
Patient is a 75y old  Female who presents with a chief complaint of CHF exacerbation (28 Sep 2021 10:04)      T(F): 98.4 (09-29-21 @ 07:01), Max: 98.8 (09-28-21 @ 23:10)  HR: 74 (09-29-21 @ 07:05)  BP: 114/61 (09-29-21 @ 07:05)  RR: 15 (09-29-21 @ 07:05)  SpO2: 100% (09-29-21 @ 07:05) (90% - 100%)    PHYSICAL EXAM:  GENERAL: NAD, well-groomed, well-developed  HEAD:  Atraumatic, Normocephalic  EYES: EOMI, PERRLA, conjunctiva and sclera clear  ENMT: No tonsillar erythema, exudates, or enlargement; Moist mucous membranes, Good dentition, No lesions  NECK: Supple, No JVD, Normal thyroid  NERVOUS SYSTEM:  Alert & Oriented X3,  Motor Strength 5/5 B/L upper and lower extremities  CHEST/LUNG: Clear to percussion bilaterally; No rales, rhonchi, wheezing, or rubs  HEART: Regular rate and rhythm; No murmurs, rubs, or gallops  ABDOMEN: Soft, Nontender, Nondistended; Bowel sounds present  EXTREMITIES:   No clubbing, cyanosis, or edema  LYMPH: No lymphadenopathy noted  SKIN: No rashes or lesions    labs  09-28    139  |  96<L>  |  21<H>  ----------------------------<  105<H>  4.0   |  32  |  0.9    Ca    8.4<L>      28 Sep 2021 05:35  Mg     1.7     09-28    TPro  5.2<L>  /  Alb  3.3<L>  /  TBili  0.3  /  DBili  x   /  AST  20  /  ALT  9   /  AlkPhos  71  09-28                          8.0    5.75  )-----------( 251      ( 28 Sep 2021 05:35 )             26.9               aspirin  chewable 81 milliGRAM(s) Oral daily  atorvastatin 40 milliGRAM(s) Oral at bedtime  chlorhexidine 4% Liquid 1 Application(s) Topical <User Schedule>  dextrose 40% Gel 15 Gram(s) Oral once  dextrose 5%. 1000 milliLiter(s) IV Continuous <Continuous>  dextrose 5%. 1000 milliLiter(s) IV Continuous <Continuous>  dextrose 50% Injectable 25 Gram(s) IV Push once  dextrose 50% Injectable 12.5 Gram(s) IV Push once  dextrose 50% Injectable 25 Gram(s) IV Push once  DULoxetine 60 milliGRAM(s) Oral daily  enoxaparin Injectable 40 milliGRAM(s) SubCutaneous daily  folic acid 1 milliGRAM(s) Oral daily  furosemide   Injectable 60 milliGRAM(s) IV Push daily  gabapentin 400 milliGRAM(s) Oral three times a day  glucagon  Injectable 1 milliGRAM(s) IntraMuscular once  hydroxychloroquine 400 milliGRAM(s) Oral two times a day  influenza   Vaccine 0.5 milliLiter(s) IntraMuscular once  insulin glargine Injectable (LANTUS) 20 Unit(s) SubCutaneous every morning  insulin lispro (ADMELOG) corrective regimen sliding scale   SubCutaneous three times a day before meals  insulin lispro Injectable (ADMELOG) 8 Unit(s) SubCutaneous three times a day before meals  lacosamide 100 milliGRAM(s) Oral two times a day  levETIRAcetam 1000 milliGRAM(s) Oral two times a day  levothyroxine 100 MICROGram(s) Oral daily  lisinopril 20 milliGRAM(s) Oral daily  methotrexate 17.5 milliGRAM(s) Oral every week  oxycodone    5 mG/acetaminophen 325 mG 2 Tablet(s) Oral every 6 hours PRN

## 2021-09-29 NOTE — PROGRESS NOTE ADULT - SUBJECTIVE AND OBJECTIVE BOX
Patient is a 75y old  Female who presents with a chief complaint of CHF exacerbation (29 Sep 2021 07:27)        Over Night Events: No acute events overnight; feels better        ROS:     All ROS are negative except HPI         PHYSICAL EXAM    ICU Vital Signs Last 24 Hrs  T(C): 36.9 (29 Sep 2021 07:01), Max: 37.1 (28 Sep 2021 23:10)  T(F): 98.4 (29 Sep 2021 07:01), Max: 98.8 (28 Sep 2021 23:10)  HR: 74 (29 Sep 2021 07:05) (74 - 83)  BP: 114/61 (29 Sep 2021 07:05) (104/57 - 130/61)  BP(mean): 83 (29 Sep 2021 07:05) (77 - 88)  ABP: --  ABP(mean): --  RR: 15 (29 Sep 2021 07:05) (15 - 56)  SpO2: 100% on 2 l NC       CONSTITUTIONAL:  Well nourished.  NAD    ENT:   Airway patent,   Mouth with normal mucosa.   No thrush    EYES:   Pupils equal,   Round and reactive to light.    CARDIAC:   Normal rate,   irregular rhythm.    No edema      Vascular:  Normal systolic impulse  No Carotid bruits    RESPIRATORY:   No wheezing  dec BS b/l bases  Bilateral BS  Normal chest expansion  Not tachypneic,  No use of accessory muscles    GASTROINTESTINAL:  Abdomen soft,   Non-tender,   No guarding,   + BS    MUSCULOSKELETAL:   Range of motion is not limited,  No clubbing, cyanosis    NEUROLOGICAL:   Alert and oriented   No motor  deficits.    SKIN:   Skin normal color for race,   Warm and dry and intact.   No evidence of rash.        09-28-21 @ 07:01  -  09-29-21 @ 07:00  --------------------------------------------------------  IN:    IV PiggyBack: 50 mL    Oral Fluid: 360 mL  Total IN: 410 mL    OUT:    Voided (mL): 2050 mL  Total OUT: 2050 mL    Total NET: -1640 mL      09-29-21 @ 07:01  -  09-29-21 @ 10:05  --------------------------------------------------------  IN:    Oral Fluid: 120 mL  Total IN: 120 mL    OUT:    Voided (mL): 1700 mL  Total OUT: 1700 mL    Total NET: -1580 mL          LABS:                            8.3    5.23  )-----------( 252      ( 29 Sep 2021 06:18 )             26.2                                               09-29    137  |  95<L>  |  16  ----------------------------<  81  3.5   |  35<H>  |  0.8    Ca    8.5      29 Sep 2021 06:18  Mg     2.0     09-29    TPro  5.3<L>  /  Alb  3.3<L>  /  TBili  0.4  /  DBili  x   /  AST  19  /  ALT  9   /  AlkPhos  68  09-29                                                 CARDIAC MARKERS ( 28 Sep 2021 11:17 )  x     / x     / x     / x     / 2.0 ng/mL  CARDIAC MARKERS ( 28 Sep 2021 05:35 )  x     / 0.19 ng/mL / x     / x     / x      CARDIAC MARKERS ( 27 Sep 2021 11:07 )  x     / 0.16 ng/mL / x     / x     / x                                                LIVER FUNCTIONS - ( 29 Sep 2021 06:18 )  Alb: 3.3 g/dL / Pro: 5.3 g/dL / ALK PHOS: 68 U/L / ALT: 9 U/L / AST: 19 U/L / GGT: x                                                                                                                                       MEDICATIONS  (STANDING):  aspirin  chewable 81 milliGRAM(s) Oral daily  atorvastatin 40 milliGRAM(s) Oral at bedtime  chlorhexidine 4% Liquid 1 Application(s) Topical <User Schedule>  dextrose 40% Gel 15 Gram(s) Oral once  dextrose 5%. 1000 milliLiter(s) (50 mL/Hr) IV Continuous <Continuous>  dextrose 5%. 1000 milliLiter(s) (100 mL/Hr) IV Continuous <Continuous>  dextrose 50% Injectable 25 Gram(s) IV Push once  dextrose 50% Injectable 12.5 Gram(s) IV Push once  dextrose 50% Injectable 25 Gram(s) IV Push once  DULoxetine 60 milliGRAM(s) Oral daily  enoxaparin Injectable 40 milliGRAM(s) SubCutaneous daily  folic acid 1 milliGRAM(s) Oral daily  furosemide   Injectable 40 milliGRAM(s) IV Push two times a day  gabapentin 400 milliGRAM(s) Oral three times a day  glucagon  Injectable 1 milliGRAM(s) IntraMuscular once  hydroxychloroquine 400 milliGRAM(s) Oral two times a day  influenza   Vaccine 0.5 milliLiter(s) IntraMuscular once  insulin glargine Injectable (LANTUS) 20 Unit(s) SubCutaneous every morning  insulin lispro (ADMELOG) corrective regimen sliding scale   SubCutaneous three times a day before meals  insulin lispro Injectable (ADMELOG) 8 Unit(s) SubCutaneous three times a day before meals  lacosamide 100 milliGRAM(s) Oral two times a day  levETIRAcetam 1000 milliGRAM(s) Oral two times a day  levothyroxine 100 MICROGram(s) Oral daily  lisinopril 20 milliGRAM(s) Oral daily  methotrexate 17.5 milliGRAM(s) Oral every week    MEDICATIONS  (PRN):  oxycodone    5 mG/acetaminophen 325 mG 2 Tablet(s) Oral every 6 hours PRN Severe Pain (7 - 10)      New X-rays reviewed:    < from: Xray Chest 1 View- PORTABLE-Routine (Xray Chest 1 View- PORTABLE-Routine in AM.) (09.29.21 @ 07:14) >  Bilateral opacities/pleural effusions, decreased..    < end of copied text >    < from: TTE Echo Complete w/o Contrast w/ Doppler (09.27.21 @ 08:40) >  Summary:   1. Leftventricular ejection fraction, by visual estimation, is 55 to 60%.   2. Mildly enlarged left atrium.   3. Moderate mitral annular calcification.   4. Moderate mitral valve regurgitation.   5. Mild tricuspid regurgitation.   6. Mild aortic regurgitation.   7. There is mild aortic root calcification.      < end of copied text >

## 2021-09-29 NOTE — PROGRESS NOTE ADULT - SUBJECTIVE AND OBJECTIVE BOX
Patient is a 75y old  Female who presents with a chief complaint of CHF exacerbation (29 Sep 2021 10:05)      OVERNIGHT EVENTS:    SUBJECTIVE / INTERVAL HPI: Patient seen and examined at bedside.     VITAL SIGNS:  Vital Signs Last 24 Hrs  T(C): 36.9 (29 Sep 2021 07:01), Max: 37.1 (28 Sep 2021 23:10)  T(F): 98.4 (29 Sep 2021 07:01), Max: 98.8 (28 Sep 2021 23:10)  HR: 74 (29 Sep 2021 07:05) (74 - 83)  BP: 114/61 (29 Sep 2021 07:05) (104/57 - 130/61)  BP(mean): 83 (29 Sep 2021 07:05) (77 - 88)  RR: 15 (29 Sep 2021 07:05) (15 - 56)  SpO2: 100% (29 Sep 2021 07:05) (90% - 100%)    PHYSICAL EXAM:    General: WDWN  HEENT: NC/AT; PERRL, clear conjunctiva  Neck: supple  Cardiovascular: +S1/S2; RRR  Respiratory: CTA b/l; scattered b/l crackles   Gastrointestinal: soft, NT/ND; +BSx4  Extremities: WWP; 2+ peripheral pulses; still with LE edema but improving   Neurological: AAOx3; no focal deficits    MEDICATIONS:  MEDICATIONS  (STANDING):  aspirin  chewable 81 milliGRAM(s) Oral daily  atorvastatin 40 milliGRAM(s) Oral at bedtime  chlorhexidine 4% Liquid 1 Application(s) Topical <User Schedule>  dextrose 40% Gel 15 Gram(s) Oral once  dextrose 5%. 1000 milliLiter(s) (50 mL/Hr) IV Continuous <Continuous>  dextrose 5%. 1000 milliLiter(s) (100 mL/Hr) IV Continuous <Continuous>  dextrose 50% Injectable 25 Gram(s) IV Push once  dextrose 50% Injectable 12.5 Gram(s) IV Push once  dextrose 50% Injectable 25 Gram(s) IV Push once  DULoxetine 60 milliGRAM(s) Oral daily  enoxaparin Injectable 40 milliGRAM(s) SubCutaneous daily  folic acid 1 milliGRAM(s) Oral daily  furosemide   Injectable 40 milliGRAM(s) IV Push two times a day  gabapentin 400 milliGRAM(s) Oral three times a day  glucagon  Injectable 1 milliGRAM(s) IntraMuscular once  hydroxychloroquine 400 milliGRAM(s) Oral two times a day  influenza   Vaccine 0.5 milliLiter(s) IntraMuscular once  insulin glargine Injectable (LANTUS) 20 Unit(s) SubCutaneous every morning  insulin lispro (ADMELOG) corrective regimen sliding scale   SubCutaneous three times a day before meals  insulin lispro Injectable (ADMELOG) 8 Unit(s) SubCutaneous three times a day before meals  lacosamide 100 milliGRAM(s) Oral two times a day  levETIRAcetam 1000 milliGRAM(s) Oral two times a day  levothyroxine 100 MICROGram(s) Oral daily  lisinopril 20 milliGRAM(s) Oral daily  methotrexate 17.5 milliGRAM(s) Oral every week    MEDICATIONS  (PRN):  oxycodone    5 mG/acetaminophen 325 mG 2 Tablet(s) Oral every 6 hours PRN Severe Pain (7 - 10)      ALLERGIES:  Allergies    No Known Allergies    Intolerances        LABS:                        8.3    5.23  )-----------( 252      ( 29 Sep 2021 06:18 )             26.2     09-29    137  |  95<L>  |  16  ----------------------------<  81  3.5   |  35<H>  |  0.8    Ca    8.5      29 Sep 2021 06:18  Mg     2.0     09-29    TPro  5.3<L>  /  Alb  3.3<L>  /  TBili  0.4  /  DBili  x   /  AST  19  /  ALT  9   /  AlkPhos  68  09-29        CAPILLARY BLOOD GLUCOSE      POCT Blood Glucose.: 118 mg/dL (29 Sep 2021 07:36)    < from: Xray Chest 1 View- PORTABLE-Routine (Xray Chest 1 View- PORTABLE-Routine in AM.) (09.29.21 @ 07:14) >  Impression:    Bilateral opacities/pleural effusions, decreased..        --- End of Report --- cyst cyst    < end of copied text >

## 2021-09-30 NOTE — PROGRESS NOTE ADULT - ASSESSMENT
IMPRESSION    Acute on chronic HFpEF exacerbation   CKD  SLE on immunosuppressants  Elevated TSH s/p endocrinology evaluation  NSTEMI  Moderate MR     Plan     CNS: Pain control    CARDIOVASCULAR: decrease lasix 60 iv q24 and from tomorrow lasix 60 PO q24 . f/u  cardiology (dr burnham) for possible cath as an inpatient vs outpatient, keep I < O, strict ins and outs, daily weight, DASH diet, daily EKG.      PULMONARY: HOB >45 degrees. Keep SpO2 >95%.  Titrate O2 as tolerated.     INFECTIOUS DISEASE:  F/U Cultures. Procalcitonin reviewed    GI: Prophylaxis with Protonix 40mg PO daily, DASH diet.     RENAL: I < O. Replace electrolytes if needed.     ENDOCRINE: FS at bedtime and before meals. increase Lantus and lispro. Endocrinology consult reviewed.    HEMATOLOGY: DVT ppx with Lovenox 40mg SubQ daily. F/U iron studies reviewed .f/u vit b12, MMA level    MSK: C/w hydroxychloroquine.  c/w home dose of Hydroxychloroquine and weekly MTX     FULL CODE    Telemonitoring    Patient is refusing inpatient left heart catheterization, requesting to follow up and schedule the procedure as outpatient.  Requesting to d/w dr bradley      Plan of care d/w patient and house-staff

## 2021-09-30 NOTE — PROGRESS NOTE ADULT - ASSESSMENT
Patient with no complaints. CHF appears better. Out greater in. ? transfuse. Would try off o2 . Patient now wants to do out patient cardiac cath . Try ambuating off o2. Check sat ambulating. Check labs. Po lasix soon

## 2021-09-30 NOTE — PROGRESS NOTE ADULT - ASSESSMENT
# Acute on Chronic HFPEF Exacerbation and moderate MR  - improving but requires 2L NC, might need home O2  - keep negative balance  - switch to PO lasix in AM  - consider starting BB in AM, c/w lisinopril and ASA  - given TWI in leads v3,v4 and raised trop, (last level dropped down) pt will need ischemic work up once medically optimized  - I spoke with Dr. Graham (pt cardiologist) he recommended CATH at [facility] or outpt cath once optimized,  pt decided to do cath as outpt    #Normocytic Anemia likely secondary to anemia of chronic disease   - Patient on folic acid supplementation due to methotrexate therapy  - F/u iron studies   - Keep active type and screen  - Keep Hb >7    #ANTONELLA on CKD - Resolved     #Hypothyroidism  - c/w levothyroxine 150mcg, has appt with endocrine on October     #DC seizure pt has been off them for long time     #SLE   - Continue with home dose of hydroxychloroquine   - Continue with methotrexate    DVT Prophylaxis : Lovenox   GI Prophylaxis:  Protonix   FULL CODE  Dispo: tele # Acute on Chronic HFPEF Exacerbation and moderate MR  - improving but requires 2L NC, might need home O2  - keep negative balance  - switch to PO lasix in AM  - consider starting BB in AM, c/w lisinopril and ASA  - given TWI in leads v3,v4 and raised trop, (last level dropped down) pt will need ischemic work up once medically optimized  - I spoke with Dr. Graham (pt cardiologist) he recommended CATH at [facility] or outpt cath once optimized,  pt decided to do cath as outpt    #Normocytic Anemia likely secondary to anemia of chronic disease   - Patient on folic acid supplementation due to methotrexate therapy  - F/u iron studies   - Keep active type and screen  - Keep Hb >7    #DM: on insulin, adjusted dose as needed     #ANTONELLA on CKD - Resolved     #Hypothyroidism  - c/w levothyroxine 150mcg, has appt with endocrine on October     #DC seizure pt has been off them for long time     #SLE   - Continue with home dose of hydroxychloroquine   - Continue with methotrexate    DVT Prophylaxis : Lovenox   GI Prophylaxis:  Protonix   FULL CODE  Dispo: tele

## 2021-09-30 NOTE — PROGRESS NOTE ADULT - ASSESSMENT
74 YO F with a past medical history of SLE on CellCept and Methotrexate,  HFpEF, HTN, HLD, T2DM, hypothyroid Seizure d/o and  CVA   presented to the hospital with acute HF exacerbation and ruled in for NSTEMI      - symptoms have resolved   - may change Lasix to oral   - aspirin, Lipitor   - consider initiating b-blockers   -  cardiac cath as per cardiology    - continue home meds   - DVT prophylaxis   74 YO F with a past medical history of SLE on CellCept and Methotrexate, chronic pain and continuous opiate dependance,  HFpEF, HTN, HLD, T2DM, hypothyroid Seizure d/o and  CVA   presented to the hospital with acute HF exacerbation and ruled in for NSTEMI       Acute on chronic HFpEF / NSTEMI     -  symptoms have resolved   - may change Lasix to oral   - aspirin, Lipitor   - consider initiating b-blockers   -  cardiac cath as per cardiology    - continue home meds   - DVT prophylaxis

## 2021-09-30 NOTE — PROGRESS NOTE ADULT - SUBJECTIVE AND OBJECTIVE BOX
Patient is a 76y old  Female who presents with a chief complaint of CHF exacerbation (29 Sep 2021 10:33)      T(F): 97.9 (09-30-21 @ 07:01), Max: 99.1 (09-29-21 @ 19:17)  HR: 70 (09-30-21 @ 05:30)  BP: 106/53 (09-30-21 @ 05:30)  RR: 18 (09-30-21 @ 07:01)  SpO2: 100% (09-30-21 @ 05:30) (97% - 100%)    PHYSICAL EXAM:  GENERAL: NAD, well-groomed, well-developed  HEAD:  Atraumatic, Normocephalic  EYES: EOMI, PERRLA, conjunctiva and sclera clear  ENMT: No tonsillar erythema, exudates, or enlargement; Moist mucous membranes, Good dentition, No lesions  NECK: Supple, No JVD, Normal thyroid  NERVOUS SYSTEM:  Alert & Oriented X3,  Motor Strength 5/5 B/L upper and lower extremities  CHEST/LUNG: Clear to percussion bilaterally; No rales, rhonchi, wheezing, or rubs  HEART: Regular rate and rhythm; No murmurs, rubs, or gallops  ABDOMEN: Soft, Nontender, Nondistended; Bowel sounds present  EXTREMITIES:   No clubbing, cyanosis, or edema  LYMPH: No lymphadenopathy noted  SKIN: No rashes or lesions    labs  09-30    135  |  93<L>  |  18  ----------------------------<  401<H>  3.7   |  32  |  0.9    Ca    8.4<L>      30 Sep 2021 05:53  Mg     1.8     09-30    TPro  5.2<L>  /  Alb  3.0<L>  /  TBili  0.3  /  DBili  x   /  AST  23  /  ALT  12  /  AlkPhos  83  09-30                          8.1    4.70  )-----------( 243      ( 30 Sep 2021 05:53 )             26.4           Troponin T, Serum: 0.09 ng/mL *HH* (09-29-21 @ 11:59)      aspirin  chewable 81 milliGRAM(s) Oral daily  atorvastatin 40 milliGRAM(s) Oral at bedtime  chlorhexidine 4% Liquid 1 Application(s) Topical <User Schedule>  dextrose 40% Gel 15 Gram(s) Oral once  dextrose 5%. 1000 milliLiter(s) IV Continuous <Continuous>  dextrose 5%. 1000 milliLiter(s) IV Continuous <Continuous>  dextrose 50% Injectable 25 Gram(s) IV Push once  dextrose 50% Injectable 12.5 Gram(s) IV Push once  dextrose 50% Injectable 25 Gram(s) IV Push once  DULoxetine 60 milliGRAM(s) Oral daily  enoxaparin Injectable 40 milliGRAM(s) SubCutaneous daily  folic acid 1 milliGRAM(s) Oral daily  furosemide   Injectable 40 milliGRAM(s) IV Push two times a day  gabapentin 400 milliGRAM(s) Oral three times a day  glucagon  Injectable 1 milliGRAM(s) IntraMuscular once  hydroxychloroquine 400 milliGRAM(s) Oral two times a day  influenza   Vaccine 0.5 milliLiter(s) IntraMuscular once  insulin glargine Injectable (LANTUS) 20 Unit(s) SubCutaneous every morning  insulin lispro (ADMELOG) corrective regimen sliding scale   SubCutaneous three times a day before meals  insulin lispro Injectable (ADMELOG) 8 Unit(s) SubCutaneous three times a day before meals  levothyroxine 100 MICROGram(s) Oral daily  lisinopril 20 milliGRAM(s) Oral daily  methotrexate 17.5 milliGRAM(s) Oral every week  oxycodone    5 mG/acetaminophen 325 mG 2 Tablet(s) Oral every 6 hours PRN

## 2021-09-30 NOTE — PROGRESS NOTE ADULT - SUBJECTIVE AND OBJECTIVE BOX
Patient is comfortable in bed, no pain, no SOB      T(F): 98.8 (09-30-21 @ 15:00), Max: 99.1 (09-29-21 @ 19:17)  HR: 88 (09-30-21 @ 15:08)  BP: 115/59 (09-30-21 @ 15:08)  RR: 18 (09-30-21 @ 07:01)  SpO2: 95% (09-30-21 @ 15:08) (95% - 100%)    PHYSICAL EXAM:  GENERAL: NAD  HEAD:  Atraumatic, Normocephalic  EYES: EOMI, PERRLA, conjunctiva and sclera clear  NERVOUS SYSTEM:  Alert & Oriented X3, no focal deficits   CHEST/LUNG:  bilateral rhonchi  HEART: Regular rate and rhythm; No murmurs, rubs, or gallops  ABDOMEN: Soft, Nontender, Nondistended; Bowel sounds present  EXTREMITIES:  2+ Peripheral Pulses, No clubbing, cyanosis, or edema    LABS  09-30    135  |  93<L>  |  18  ----------------------------<  401<H>  3.7   |  32  |  0.9    Ca    8.4<L>      30 Sep 2021 05:53  Mg     1.8     09-30    TPro  5.2<L>  /  Alb  3.0<L>  /  TBili  0.3  /  DBili  x   /  AST  23  /  ALT  12  /  AlkPhos  83  09-30                          8.1    4.70  )-----------( 243      ( 30 Sep 2021 05:53 )             26.4     < from: 12 Lead ECG (09.29.21 @ 08:48) >    Diagnosis Line Normal sinus rhythm  Nonspecific ST-T changes    < end of copied text >      CARDIAC ENZYMES    CKMB Units: 2.0 (09-28 @ 11:17)    Troponin T, Serum: 0.09 ng/mL (09-29-21 @ 11:59)  Troponin T, Serum: 0.19 ng/mL (09-28-21 @ 05:35)    < from: TTE Echo Complete w/o Contrast w/ Doppler (09.27.21 @ 08:40) >    Summary:   1. Leftventricular ejection fraction, by visual estimation, is 55 to 60%.   2. Mildly enlarged left atrium.   3. Moderate mitral annular calcification.   4. Moderate mitral valve regurgitation.   5. Mild tricuspid regurgitation.   6. Mild aortic regurgitation.   7. There is mild aortic root calcification.    < end of copied text >      RADIOLOGY  < from: Xray Chest 1 View- PORTABLE-Routine (Xray Chest 1 View- PORTABLE-Routine in AM.) (09.30.21 @ 05:51) >    Impression:    Left lower lobe opacity/pleural effusion. No air leak.    < end of copied text >    MEDICATIONS  (STANDING):  aspirin  chewable 81 milliGRAM(s) Oral daily  atorvastatin 40 milliGRAM(s) Oral at bedtime  chlorhexidine 4% Liquid 1 Application(s) Topical <User Schedule>  DULoxetine 60 milliGRAM(s) Oral daily  enoxaparin Injectable 40 milliGRAM(s) SubCutaneous daily  folic acid 1 milliGRAM(s) Oral daily  gabapentin 400 milliGRAM(s) Oral three times a day  hydroxychloroquine 400 milliGRAM(s) Oral two times a day  influenza   Vaccine 0.5 milliLiter(s) IntraMuscular once  insulin glargine Injectable (LANTUS) 20 Unit(s) SubCutaneous every morning  insulin lispro (ADMELOG) corrective regimen sliding scale   SubCutaneous three times a day before meals  insulin lispro Injectable (ADMELOG) 8 Unit(s) SubCutaneous three times a day before meals  lisinopril 20 milliGRAM(s) Oral daily  methotrexate 17.5 milliGRAM(s) Oral every week    MEDICATIONS  (PRN):  oxycodone    5 mG/acetaminophen 325 mG 2 Tablet(s) Oral every 6 hours PRN Severe Pain (7 - 10)

## 2021-09-30 NOTE — PROGRESS NOTE ADULT - SUBJECTIVE AND OBJECTIVE BOX
Patient is a 76y old  Female who presents with a chief complaint of CHF exacerbation (30 Sep 2021 10:08)      OVERNIGHT EVENTS: remained stable, LE improved, still on NC    SUBJECTIVE / INTERVAL HPI: Patient seen and examined at bedside.     VITAL SIGNS:  Vital Signs Last 24 Hrs  T(C): 36.6 (30 Sep 2021 07:01), Max: 37.3 (29 Sep 2021 19:17)  T(F): 97.9 (30 Sep 2021 07:01), Max: 99.1 (29 Sep 2021 19:17)  HR: 73 (30 Sep 2021 07:02) (70 - 78)  BP: 107/56 (30 Sep 2021 07:02) (104/54 - 107/56)  BP(mean): 77 (30 Sep 2021 07:02) (75 - 77)  RR: 18 (30 Sep 2021 07:01) (16 - 18)  SpO2: 97% (30 Sep 2021 07:02) (97% - 100%)    PHYSICAL EXAM:    General: WDWN  HEENT: NC/AT; PERRL, clear conjunctiva  Neck: supple  Cardiovascular: +S1/S2; RRR  Respiratory: CTA b/l; scattered crackles b/l  Gastrointestinal: soft, NT/ND; +BSx4  Extremities: WWP; 2+ peripheral pulses; +ve 1 edema   Neurological: AAOx3; no focal deficits    MEDICATIONS:  MEDICATIONS  (STANDING):  aspirin  chewable 81 milliGRAM(s) Oral daily  atorvastatin 40 milliGRAM(s) Oral at bedtime  chlorhexidine 4% Liquid 1 Application(s) Topical <User Schedule>  dextrose 40% Gel 15 Gram(s) Oral once  dextrose 5%. 1000 milliLiter(s) (50 mL/Hr) IV Continuous <Continuous>  dextrose 5%. 1000 milliLiter(s) (100 mL/Hr) IV Continuous <Continuous>  dextrose 50% Injectable 12.5 Gram(s) IV Push once  dextrose 50% Injectable 25 Gram(s) IV Push once  dextrose 50% Injectable 25 Gram(s) IV Push once  DULoxetine 60 milliGRAM(s) Oral daily  enoxaparin Injectable 40 milliGRAM(s) SubCutaneous daily  folic acid 1 milliGRAM(s) Oral daily  gabapentin 400 milliGRAM(s) Oral three times a day  glucagon  Injectable 1 milliGRAM(s) IntraMuscular once  hydroxychloroquine 400 milliGRAM(s) Oral two times a day  influenza   Vaccine 0.5 milliLiter(s) IntraMuscular once  insulin glargine Injectable (LANTUS) 20 Unit(s) SubCutaneous every morning  insulin lispro (ADMELOG) corrective regimen sliding scale   SubCutaneous three times a day before meals  insulin lispro Injectable (ADMELOG) 8 Unit(s) SubCutaneous three times a day before meals  lisinopril 20 milliGRAM(s) Oral daily  methotrexate 17.5 milliGRAM(s) Oral every week    MEDICATIONS  (PRN):  oxycodone    5 mG/acetaminophen 325 mG 2 Tablet(s) Oral every 6 hours PRN Severe Pain (7 - 10)      ALLERGIES:  Allergies    No Known Allergies    Intolerances        LABS:                        8.1    4.70  )-----------( 243      ( 30 Sep 2021 05:53 )             26.4     09-30    135  |  93<L>  |  18  ----------------------------<  401<H>  3.7   |  32  |  0.9    Ca    8.4<L>      30 Sep 2021 05:53  Mg     1.8     09-30    TPro  5.2<L>  /  Alb  3.0<L>  /  TBili  0.3  /  DBili  x   /  AST  23  /  ALT  12  /  AlkPhos  83  09-30        CAPILLARY BLOOD GLUCOSE      POCT Blood Glucose.: 400 mg/dL (30 Sep 2021 07:37)    Troponin T, Serum: 0.09: Critical value: ng/mL (09.29.21 @ 11:59)    < from: Xray Chest 1 View- PORTABLE-Routine (Xray Chest 1 View- PORTABLE-Routine in AM.) (09.29.21 @ 07:14) >    Impression:    Bilateral opacities/pleural effusions, decreased..    < end of copied text >

## 2021-09-30 NOTE — PHARMACOTHERAPY INTERVENTION NOTE - COMMENTS
Informed glucose level of 400 this AM. Rec to recheck and adjust insulin dose as needed. Currently taking same home dose

## 2021-09-30 NOTE — PROGRESS NOTE ADULT - SUBJECTIVE AND OBJECTIVE BOX
Patient is a 76y old  Female who presents with a chief complaint of CHF exacerbation (30 Sep 2021 07:42)        Over Night Events: No acute events overnight; feels better.        ROS:     All ROS are negative except HPI         PHYSICAL EXAM    ICU Vital Signs Last 24 Hrs  T(C): 36.6 (30 Sep 2021 07:01), Max: 37.3 (29 Sep 2021 19:17)  T(F): 97.9 (30 Sep 2021 07:01), Max: 99.1 (29 Sep 2021 19:17)  HR: 73 (30 Sep 2021 07:02) (70 - 78)  BP: 107/56 (30 Sep 2021 07:02) (104/54 - 107/56)  BP(mean): 77 (30 Sep 2021 07:02) (75 - 77)  ABP: --  ABP(mean): --  RR: 18 (30 Sep 2021 07:01) (16 - 18)  SpO2: 97% (30 Sep 2021 07:02) (97% - 100%)      CONSTITUTIONAL:  Well nourished.  NAD    ENT:   Airway patent,   Mouth with normal mucosa.   No thrush    EYES:   Pupils equal,   Round and reactive to light.    CARDIAC:   Normal rate,   Regular rhythm.    No edema        RESPIRATORY:   No wheezing  Bilateral BS  Normal chest expansion  Not tachypneic,  No use of accessory muscles    GASTROINTESTINAL:  Abdomen soft,   Non-tender,   No guarding,   + BS    MUSCULOSKELETAL:   Range of motion is not limited,  No clubbing, cyanosis    NEUROLOGICAL:   Alert and oriented   No motor  deficits.    SKIN:   Skin normal color for race,   Warm and dry and intact.   No evidence of rash.    PSYCHIATRIC:   Normal mood and affect.   No apparent risk to self or others.      09-29-21 @ 07:01  -  09-30-21 @ 07:00  --------------------------------------------------------  IN:    Oral Fluid: 300 mL  Total IN: 300 mL    OUT:    Voided (mL): 3500 mL  Total OUT: 3500 mL    Total NET: -3200 mL          LABS:                            8.1    4.70  )-----------( 243      ( 30 Sep 2021 05:53 )             26.4                                               09-30    135  |  93<L>  |  18  ----------------------------<  401<H>  3.7   |  32  |  0.9    Ca    8.4<L>      30 Sep 2021 05:53  Mg     1.8     09-30    TPro  5.2<L>  /  Alb  3.0<L>  /  TBili  0.3  /  DBili  x   /  AST  23  /  ALT  12  /  AlkPhos  83  09-30                                                 CARDIAC MARKERS ( 29 Sep 2021 11:59 )  x     / 0.09 ng/mL / x     / x     / x      CARDIAC MARKERS ( 28 Sep 2021 11:17 )  x     / x     / x     / x     / 2.0 ng/mL                                            LIVER FUNCTIONS - ( 30 Sep 2021 05:53 )  Alb: 3.0 g/dL / Pro: 5.2 g/dL / ALK PHOS: 83 U/L / ALT: 12 U/L / AST: 23 U/L / GGT: x                                                                                                                                       MEDICATIONS  (STANDING):  aspirin  chewable 81 milliGRAM(s) Oral daily  atorvastatin 40 milliGRAM(s) Oral at bedtime  chlorhexidine 4% Liquid 1 Application(s) Topical <User Schedule>  dextrose 40% Gel 15 Gram(s) Oral once  dextrose 5%. 1000 milliLiter(s) (50 mL/Hr) IV Continuous <Continuous>  dextrose 5%. 1000 milliLiter(s) (100 mL/Hr) IV Continuous <Continuous>  dextrose 50% Injectable 12.5 Gram(s) IV Push once  dextrose 50% Injectable 25 Gram(s) IV Push once  dextrose 50% Injectable 25 Gram(s) IV Push once  DULoxetine 60 milliGRAM(s) Oral daily  enoxaparin Injectable 40 milliGRAM(s) SubCutaneous daily  folic acid 1 milliGRAM(s) Oral daily  gabapentin 400 milliGRAM(s) Oral three times a day  glucagon  Injectable 1 milliGRAM(s) IntraMuscular once  hydroxychloroquine 400 milliGRAM(s) Oral two times a day  influenza   Vaccine 0.5 milliLiter(s) IntraMuscular once  insulin glargine Injectable (LANTUS) 20 Unit(s) SubCutaneous every morning  insulin lispro (ADMELOG) corrective regimen sliding scale   SubCutaneous three times a day before meals  insulin lispro Injectable (ADMELOG) 8 Unit(s) SubCutaneous three times a day before meals  lisinopril 20 milliGRAM(s) Oral daily  methotrexate 17.5 milliGRAM(s) Oral every week    MEDICATIONS  (PRN):  oxycodone    5 mG/acetaminophen 325 mG 2 Tablet(s) Oral every 6 hours PRN Severe Pain (7 - 10)      New X-rays reviewed:  b/l pleural effusions significantly improved

## 2021-10-01 NOTE — PROGRESS NOTE ADULT - SUBJECTIVE AND OBJECTIVE BOX
Patient is a 76y old  Female who presents with a chief complaint of CHF exacerbation (01 Oct 2021 07:57)        Over Night Events: No acute events overnight         ROS:     All ROS are negative except HPI         PHYSICAL EXAM    ICU Vital Signs Last 24 Hrs  T(C): 36.9 (01 Oct 2021 07:10), Max: 37.6 (30 Sep 2021 23:42)  T(F): 98.5 (01 Oct 2021 07:10), Max: 99.6 (30 Sep 2021 23:42)  HR: 75 (01 Oct 2021 05:39) (75 - 88)  BP: 116/57 (01 Oct 2021 05:39) (115/59 - 117/55)  BP(mean): 81 (01 Oct 2021 05:39) (79 - 83)  ABP: --  ABP(mean): --  RR: 20 (01 Oct 2021 07:10) (18 - 20)  SpO2: 97% on 2 L NC      CONSTITUTIONAL:  Well nourished.  NAD    ENT:   Airway patent,   Mouth with normal mucosa.   No thrush    EYES:   Pupils equal,   Round and reactive to light.    CARDIAC:   Normal rate,   irregular rhythm.    No edema      Vascular:  Normal systolic impulse  No Carotid bruits    RESPIRATORY:   No wheezing  Bilateral BS  Normal chest expansion  Not tachypneic,  No use of accessory muscles    GASTROINTESTINAL:  Abdomen soft,   Non-tender,   No guarding,   + BS    MUSCULOSKELETAL:   Range of motion is not limited,  No clubbing, cyanosis    NEUROLOGICAL:   Alert and oriented   No motor  deficits.    SKIN:   Skin normal color for race,   Warm and dry and intact.   No evidence of rash.          09-30-21 @ 07:01  -  10-01-21 @ 07:00  --------------------------------------------------------  IN:  Total IN: 0 mL    OUT:    Voided (mL): 700 mL  Total OUT: 700 mL    Total NET: -700 mL          LABS:                            8.2    5.65  )-----------( 247      ( 01 Oct 2021 06:02 )             26.2                                               10-01    132<L>  |  91<L>  |  20  ----------------------------<  436<H>  4.1   |  33<H>  |  0.9    Ca    8.6      01 Oct 2021 06:02  Mg     1.9     10-01    TPro  5.5<L>  /  Alb  3.0<L>  /  TBili  0.4  /  DBili  x   /  AST  20  /  ALT  12  /  AlkPhos  81  10-01                                                 CARDIAC MARKERS ( 29 Sep 2021 11:59 )  x     / 0.09 ng/mL / x     / x     / x                                                LIVER FUNCTIONS - ( 01 Oct 2021 06:02 )  Alb: 3.0 g/dL / Pro: 5.5 g/dL / ALK PHOS: 81 U/L / ALT: 12 U/L / AST: 20 U/L / GGT: x                                                                                                                                       MEDICATIONS  (STANDING):  aspirin  chewable 81 milliGRAM(s) Oral daily  atorvastatin 40 milliGRAM(s) Oral at bedtime  chlorhexidine 4% Liquid 1 Application(s) Topical <User Schedule>  dextrose 40% Gel 15 Gram(s) Oral once  dextrose 5%. 1000 milliLiter(s) (50 mL/Hr) IV Continuous <Continuous>  dextrose 5%. 1000 milliLiter(s) (100 mL/Hr) IV Continuous <Continuous>  dextrose 50% Injectable 25 Gram(s) IV Push once  dextrose 50% Injectable 12.5 Gram(s) IV Push once  dextrose 50% Injectable 25 Gram(s) IV Push once  DULoxetine 60 milliGRAM(s) Oral daily  enoxaparin Injectable 40 milliGRAM(s) SubCutaneous daily  folic acid 1 milliGRAM(s) Oral daily  furosemide    Tablet 60 milliGRAM(s) Oral daily  gabapentin 400 milliGRAM(s) Oral three times a day  glucagon  Injectable 1 milliGRAM(s) IntraMuscular once  hydroxychloroquine 400 milliGRAM(s) Oral two times a day  influenza   Vaccine 0.5 milliLiter(s) IntraMuscular once  insulin glargine Injectable (LANTUS) 20 Unit(s) SubCutaneous every morning  insulin lispro (ADMELOG) corrective regimen sliding scale   SubCutaneous three times a day before meals  insulin lispro Injectable (ADMELOG) 8 Unit(s) SubCutaneous three times a day before meals  levothyroxine 150 MICROGram(s) Oral daily  lisinopril 20 milliGRAM(s) Oral daily  methotrexate 17.5 milliGRAM(s) Oral every week  metoprolol tartrate 25 milliGRAM(s) Oral two times a day    MEDICATIONS  (PRN):  oxycodone    5 mG/acetaminophen 325 mG 2 Tablet(s) Oral every 6 hours PRN Severe Pain (7 - 10)      New X-rays reviewed:    < from: Xray Chest 1 View- PORTABLE-Routine (Xray Chest 1 View- PORTABLE-Routine in AM.) (09.30.21 @ 05:51) >  Left lower lobe opacity/pleural effusion. No air leak.      < end of copied text >

## 2021-10-01 NOTE — PROGRESS NOTE ADULT - SUBJECTIVE AND OBJECTIVE BOX
Patient is a 76y old  Female who presents with a chief complaint of CHF exacerbation (01 Oct 2021 10:06)      OVERNIGHT EVENTS: off O2, had one time loose BM and 1x NBNB vomiting    SUBJECTIVE / INTERVAL HPI: Patient seen and examined at bedside.     VITAL SIGNS:  Vital Signs Last 24 Hrs  T(C): 36.9 (01 Oct 2021 07:10), Max: 37.6 (30 Sep 2021 23:42)  T(F): 98.5 (01 Oct 2021 07:10), Max: 99.6 (30 Sep 2021 23:42)  HR: 75 (01 Oct 2021 05:39) (75 - 88)  BP: 116/57 (01 Oct 2021 05:39) (115/59 - 117/55)  BP(mean): 81 (01 Oct 2021 05:39) (79 - 83)  RR: 20 (01 Oct 2021 07:10) (18 - 20)  SpO2: 93% (01 Oct 2021 11:13) (88% - 97%)    PHYSICAL EXAM:    General: WDWN  HEENT: NC/AT; PERRL, clear conjunctiva  Neck: supple  Cardiovascular: +S1/S2; RRR  Respiratory: CTA b/l; no W/R/R  Gastrointestinal: soft, NT/ND; +BSx4  Extremities: WWP; 2+ peripheral pulses; trace b/l ankle edema   Neurological: AAOx3; moves all ext, no weakness in all ext and has intact sensation in all ext    MEDICATIONS:  MEDICATIONS  (STANDING):  aspirin  chewable 81 milliGRAM(s) Oral daily  atorvastatin 40 milliGRAM(s) Oral at bedtime  chlorhexidine 4% Liquid 1 Application(s) Topical <User Schedule>  dextrose 40% Gel 15 Gram(s) Oral once  dextrose 5%. 1000 milliLiter(s) (50 mL/Hr) IV Continuous <Continuous>  dextrose 5%. 1000 milliLiter(s) (100 mL/Hr) IV Continuous <Continuous>  dextrose 50% Injectable 25 Gram(s) IV Push once  dextrose 50% Injectable 12.5 Gram(s) IV Push once  dextrose 50% Injectable 25 Gram(s) IV Push once  DULoxetine 60 milliGRAM(s) Oral daily  enoxaparin Injectable 40 milliGRAM(s) SubCutaneous daily  folic acid 1 milliGRAM(s) Oral daily  furosemide    Tablet 60 milliGRAM(s) Oral daily  gabapentin 400 milliGRAM(s) Oral three times a day  glucagon  Injectable 1 milliGRAM(s) IntraMuscular once  hydroxychloroquine 400 milliGRAM(s) Oral two times a day  influenza   Vaccine 0.5 milliLiter(s) IntraMuscular once  insulin glargine Injectable (LANTUS) 20 Unit(s) SubCutaneous every morning  insulin lispro (ADMELOG) corrective regimen sliding scale   SubCutaneous three times a day before meals  insulin lispro Injectable (ADMELOG) 8 Unit(s) SubCutaneous three times a day before meals  levothyroxine 150 MICROGram(s) Oral daily  lisinopril 20 milliGRAM(s) Oral daily  methotrexate 17.5 milliGRAM(s) Oral every week  metoprolol tartrate 25 milliGRAM(s) Oral two times a day    MEDICATIONS  (PRN):  oxycodone    5 mG/acetaminophen 325 mG 2 Tablet(s) Oral every 6 hours PRN Severe Pain (7 - 10)      ALLERGIES:  Allergies    No Known Allergies    Intolerances        LABS:                        8.2    5.65  )-----------( 247      ( 01 Oct 2021 06:02 )             26.2     10-01    132<L>  |  91<L>  |  20  ----------------------------<  436<H>  4.1   |  33<H>  |  0.9    Ca    8.6      01 Oct 2021 06:02  Mg     1.9     10-01    TPro  5.5<L>  /  Alb  3.0<L>  /  TBili  0.4  /  DBili  x   /  AST  20  /  ALT  12  /  AlkPhos  81  10-01        CAPILLARY BLOOD GLUCOSE      POCT Blood Glucose.: 374 mg/dL (01 Oct 2021 11:38)      < from: Xray Chest 1 View- PORTABLE-Routine (Xray Chest 1 View- PORTABLE-Routine in AM.) (10.01.21 @ 06:16) >  Impression:    Stable bilateral lung opacities    < end of copied text >

## 2021-10-01 NOTE — DISCHARGE NOTE PROVIDER - PROVIDER TOKENS
PROVIDER:[TOKEN:[[id number]],FOLLOWUP:[1 week]] PROVIDER:[TOKEN:[[id number]],FOLLOWUP:[1 week]],PROVIDER:[TOKEN:[[id number]]]

## 2021-10-01 NOTE — PROGRESS NOTE ADULT - ATTENDING COMMENTS
Attending Statement: I have personally performed a face to face diagnostic evaluation on this patient. The patient is suffering from Acute on chronic HFpEF exacerbation  and ANTONELLA on CKD.  I have reviewed the above note and agree with the history, exam and suggestions for care, except as I have noted in the text. I have amended the treatment plans as necessary.
Attending Statement: I have personally performed a face to face diagnostic evaluation on this patient. The patient is suffering from Acute on chronic HFpEF exacerbation, CKD andSLE.  I have reviewed the above note and agree with the history, exam and suggestions for care, except as I have noted in the text. I have amended the treatment plans as necessary.
Attending Statement: I have personally performed a face to face diagnostic evaluation on this patient. The patient is suffering from Acute on chronic HFpEF exacerbation  and ANTONELLA on CKD.  I have reviewed the above note and agree with the history, exam and suggestions for care, except as I have noted in the text. I have amended the treatment plans as necessary.
Attending Statement: I have personally performed a face to face diagnostic evaluation on this patient. The patient is suffering from Acute on chronic HFpEF exacerbation  and ANTONELLA on CKD.  I have reviewed the above note and agree with the history, exam and suggestions for care, except as I have noted in the text. I have amended the treatment plans as necessary.
Attending Statement: I have personally performed a face to face diagnostic evaluation on this patient. The patient is suffering from Acute on chronic HFpEF exacerbation  and CKD.  I have reviewed the above note and agree with the history, exam and suggestions for care, except as I have noted in the text. I have amended the treatment plans as necessary.
ICU Vital Signs Last 24 Hrs  T(C): 36.7 (27 Sep 2021 07:01), Max: 36.7 (27 Sep 2021 07:01)  T(F): 98.1 (27 Sep 2021 07:01), Max: 98.1 (27 Sep 2021 07:01)  HR: 85 (27 Sep 2021 15:19) (75 - 85)  BP: 115/55 (27 Sep 2021 15:19) (111/63 - 120/58)  BP(mean): 79 (27 Sep 2021 15:19) (79 - 83)  ABP: --  ABP(mean): --  RR: 37 (27 Sep 2021 15:19) (14 - 37)  SpO2: 97% (27 Sep 2021 15:19) (90% - 97%)    #acute on chronic diastolic CHF - cont current dose IV lasix , I<0,   #NSTEMI - med mngmt  - tried to call dr Arevalo - no answer - follow up - need for possible cath.  # SLE - on mtx  #ANTONELLA on CKD III- improved     discussed with team.
Vital Signs Last 24 Hrs  T(C): 36.9 (29 Sep 2021 07:01), Max: 37.1 (28 Sep 2021 23:10)  T(F): 98.4 (29 Sep 2021 07:01), Max: 98.8 (28 Sep 2021 23:10)  HR: 74 (29 Sep 2021 07:05) (74 - 83)  BP: 114/61 (29 Sep 2021 07:05) (104/57 - 130/61)  BP(mean): 83 (29 Sep 2021 07:05) (77 - 88)  RR: 15 (29 Sep 2021 07:05) (15 - 56)  SpO2: 100% (29 Sep 2021 07:05) (90% - 100%)    nad  aaox3  [id number]  ctabl  softntn+bs  +edema bilat    #acute on chronic diastolic CHF  #NSTEMI  #chronic normocytic anemia   #SLE    - cont IV diuresis lasix 40mg q12 IV  - med tx - plan for cath when euvolemic per dr valadez  - discussed possible cath with patient - she is unsure about proceeding with cath but was advised about risk/benefit and encouraged  - monitor cbc. iron studies - if def then give venofer   -  discussed with ccu team
clinically improved   still some LE edema, not short of breath but sitting in chair   acute on chronic diastolic CHF - cont iv lasix diuresis , i<o  when euvolemic will need cath at [facility] per Dr moreno   NSTEMI - medical mngt   anemia - follow up iron studies - if iron def then give venofer  discussed with ccu  team

## 2021-10-01 NOTE — DISCHARGE NOTE PROVIDER - CARE PROVIDER_API CALL
Jacoby Graham  CARDIOVASCULAR DISEASE  [address] [facility] [address]  [place] [address]  Phone: [phone]  Fax: [phone]  Follow Up Time: 1 week   Jacoby Graham  CARDIOVASCULAR DISEASE  [address] [facility] [address]  [place] [address]  Phone: [phone]  Fax: [phone]  Follow Up Time: 1 week    Kwan Mcmahon)  Critical Care Medicine; Internal Medicine; Pulmonary Disease  [address] [place] [address]  Phone: [phone]  Fax: [phone]  Follow Up Time:

## 2021-10-01 NOTE — DISCHARGE NOTE PROVIDER - NSDCCPCAREPLAN_GEN_ALL_CORE_FT
PRINCIPAL DISCHARGE DIAGNOSIS  Diagnosis: Acute decompensated heart failure  Assessment and Plan of Treatment: you had acute heart failure resulted in fluid overload, you were treated with intravenous diuretics and you responded well, continue the lasix as prescribed and have close follow up with Dr. Graham to arrange for the catheterization.  diet should be low salt and low cholestrol diet  continue the medications as instructed      SECONDARY DISCHARGE DIAGNOSES  Diagnosis: NSTEMI (non-ST elevation myocardial infarction)  Assessment and Plan of Treatment: your shortness of breath and fluid overloaded from the heart failure likley triggered by small blockage of the heart vessels, as you had some EKG changes and elevation of heart enzymes, you need to get heart catheterization, this should happen soon, you were offered to do the catheratization as inshouse or outpatient as per your cardiology recommendations but you decided to do it as outpatient, follow up with Dr. Graham office as sson as you can to arrange for it

## 2021-10-01 NOTE — PROGRESS NOTE ADULT - ASSESSMENT
Patient with no complaints. CHF appears better. Sat low at times at night. ? CHRIS. Patient now wants to do out patient cardiac cath . Try ambuating off o2. Check sat ambulating. Check labs. On Po Lasix. When stable outpatient F/U Dr Dale

## 2021-10-01 NOTE — DIETITIAN INITIAL EVALUATION ADULT. - OTHER INFO
pt is 76 year old female with hx of SLE, HFpEF, HTN, DM, seizure disorder, CVA presents with worsening SOB with B/L LE edema x 2 weeks. admitted with CHF exacerbation/NSTEMI. admission weight 72.1 kgs vs 69.4 kgs today 2/2 resolution of fluid overload. 9/29-9/30 loose BM and emesis noted, however presently resolved. pt consumed > 75% of lunch

## 2021-10-01 NOTE — PROGRESS NOTE ADULT - ASSESSMENT
# Acute on Chronic HFPEF Exacerbation and moderate MR  - O2 sat 96 resting and ambulating in RA  - keep negative balance  - on PO lasix  - consider starting BB in AM, c/w lisinopril and ASA  - given TWI in leads v3,v4 and raised trop, (last level dropped down, TWI resolved) pt will need ischemic work up once medically optimized  - I spoke with Dr. Graham (pt cardiologist) he recommended CATH at [facility] or outpt cath once optimized,  pt decided to do cath as outpt  - feels weak today due diarrhea and vomiting, anticipate for DC tomorrow    #Normocytic Anemia likely secondary to anemia of chronic disease   - Patient on folic acid supplementation due to methotrexate therapy  - Keep active type and screen  - Keep Hb >7    #DM: on insulin, adjusted dose as needed     #NATONELLA on CKD - Resolved     #suspected CHRIS: needs pulm and sleep study as outpt     #Hypothyroidism  - c/w levothyroxine 150mcg, has appt with endocrine on October     #DC seizure pt has been off them for long time     #SLE   - Continue with home dose of hydroxychloroquine   - Continue with methotrexate    DVT Prophylaxis : Lovenox   FULL CODE  Dispo: tele, DC in AM if remain stable

## 2021-10-01 NOTE — PROGRESS NOTE ADULT - ASSESSMENT
74 YO F with a past medical history of SLE on CellCept and Methotrexate, chronic pain and continuous opiate dependance,  HFpEF, HTN, HLD, T2DM, hypothyroid Seizure d/o and  CVA   presented to the hospital with acute HF exacerbation and ruled in for NSTEMI       Acute on chronic HFpEF / NSTEMI / N/V     - check repeat ekg today   - now on oral Lasix   - aspirin, Lipitor   - start lopressor today    -  cardiac cath as per cardiology    - continue home meds   - DVT prophylaxis

## 2021-10-01 NOTE — DISCHARGE NOTE PROVIDER - NSDCMRMEDTOKEN_GEN_ALL_CORE_FT
aspirin 81 mg oral tablet: 1 tab(s) orally once a day  Cymbalta 60 mg oral delayed release capsule: 1 cap(s) orally once a day  folic acid 1 mg oral tablet: 1 tab(s) orally once a day  gabapentin 400 mg oral capsule: 1 cap(s) orally 3 times a day  hydroxychloroquine 200 mg oral tablet: 2 tab(s) orally 2 times a day (with meals)  Lasix 40 mg oral tablet: 1 tab(s) orally once a day  levothyroxine 150 mcg (0.15 mg) oral tablet: 1 tab(s) orally once a day  lisinopril 20 mg oral tablet: 1 tab(s) orally once a day  methotrexate 2.5 mg oral tablet: 7 tab(s) orally once a week  NovoLO unit(s) subcutaneous 3 times a day (before meals)  Please take as previously taken  Percocet 10/325 oral tablet: 1 tab(s) orally every 6 hours, As Needed  Tresiba 100 units/mL subcutaneous solution: 20 unit(s) subcutaneous once a day in the morning  Zocor 80 mg oral tablet: 1 tab(s) orally once a day (at bedtime)   aspirin 81 mg oral tablet: 1 tab(s) orally once a day  Cymbalta 60 mg oral delayed release capsule: 1 cap(s) orally once a day  folic acid 1 mg oral tablet: 1 tab(s) orally once a day  furosemide 20 mg oral tablet: 3 tab(s) orally once a day  gabapentin 400 mg oral capsule: 1 cap(s) orally 3 times a day  hydroxychloroquine 200 mg oral tablet: 2 tab(s) orally 2 times a day (with meals)  levothyroxine 150 mcg (0.15 mg) oral tablet: 1 tab(s) orally once a day  lisinopril 20 mg oral tablet: 1 tab(s) orally once a day  methotrexate 2.5 mg oral tablet: 7 tab(s) orally once a week  metoprolol tartrate 25 mg oral tablet: 1 tab(s) orally 2 times a day  NovoLO unit(s) subcutaneous 3 times a day (before meals)  Please take as previously taken  Tresiba 100 units/mL subcutaneous solution: 20 unit(s) subcutaneous once a day in the morning  Zocor 80 mg oral tablet: 1 tab(s) orally once a day (at bedtime)

## 2021-10-01 NOTE — PROGRESS NOTE ADULT - ASSESSMENT
IMPRESSION    Acute on chronic HFpEF exacerbation - resolved  CKD  SLE on immunosuppressants  Elevated TSH s/p endocrinology evaluation  NSTEMI  Moderate MR     Plan     CNS: Pain control    CARDIOVASCULAR:  lasix 60 PO q24 . f/u  cardiology (dr burnham) for possible cath as an inpatient vs outpatient, keep I < O, strict ins and outs, daily weight, DASH diet, daily EKG.      PULMONARY: HOB >45 degrees. Keep SpO2 >95%.  Titrate O2 as tolerated. check pulse ox on ambulation     INFECTIOUS DISEASE:  F/U Cultures. Procalcitonin reviewed    GI: Prophylaxis with Protonix 40mg PO daily, DASH diet.     RENAL: I < O. Replace electrolytes if needed.     ENDOCRINE: FS at bedtime and before meals. increase Lantus and lispro. Endocrinology consult reviewed.    HEMATOLOGY: DVT ppx with Lovenox 40mg SubQ daily.    MSK: C/w hydroxychloroquine.  c/w home dose of Hydroxychloroquine and weekly MTX     FULL CODE    Will need home oxygen set up    Patient is refusing inpatient left heart catheterization, requesting to follow up and schedule the procedure as outpatient.  Requesting to d/w dr bradley      Plan of care d/w patient and house-staff

## 2021-10-01 NOTE — PROGRESS NOTE ADULT - SUBJECTIVE AND OBJECTIVE BOX
Patient is a 76y old  Female who presents with a chief complaint of CHF exacerbation (30 Sep 2021 17:35)      T(F): 99.6 (09-30-21 @ 23:42), Max: 99.6 (09-30-21 @ 23:42)  HR: 75 (10-01-21 @ 05:39)  BP: 116/57 (10-01-21 @ 05:39)  RR: 18 (09-30-21 @ 23:42)  SpO2: 97% (10-01-21 @ 05:39) (88% - 97%)    PHYSICAL EXAM:  GENERAL: NAD, well-groomed, well-developed  HEAD:  Atraumatic, Normocephalic  EYES: EOMI, PERRLA, conjunctiva and sclera clear  ENMT: No tonsillar erythema, exudates, or enlargement; Moist mucous membranes, Good dentition, No lesions  NECK: Supple, No JVD, Normal thyroid  NERVOUS SYSTEM:  Alert & Oriented X3,  Motor Strength 5/5 B/L upper and lower extremities  CHEST/LUNG: Clear to percussion bilaterally; No rales, rhonchi, wheezing, or rubs  HEART: Regular rate and rhythm; No murmurs, rubs, or gallops  ABDOMEN: Soft, Nontender, Nondistended; Bowel sounds present  EXTREMITIES:   No clubbing, cyanosis, or edema  LYMPH: No lymphadenopathy noted  SKIN: No rashes or lesions    labs  09-30    135  |  93<L>  |  18  ----------------------------<  401<H>  3.7   |  32  |  0.9    Ca    8.4<L>      30 Sep 2021 05:53  Mg     1.8     09-30    TPro  5.2<L>  /  Alb  3.0<L>  /  TBili  0.3  /  DBili  x   /  AST  23  /  ALT  12  /  AlkPhos  83  09-30                          8.1    4.70  )-----------( 243      ( 30 Sep 2021 05:53 )             26.4               aspirin  chewable 81 milliGRAM(s) Oral daily  atorvastatin 40 milliGRAM(s) Oral at bedtime  chlorhexidine 4% Liquid 1 Application(s) Topical <User Schedule>  dextrose 40% Gel 15 Gram(s) Oral once  dextrose 5%. 1000 milliLiter(s) IV Continuous <Continuous>  dextrose 5%. 1000 milliLiter(s) IV Continuous <Continuous>  dextrose 50% Injectable 25 Gram(s) IV Push once  dextrose 50% Injectable 12.5 Gram(s) IV Push once  dextrose 50% Injectable 25 Gram(s) IV Push once  DULoxetine 60 milliGRAM(s) Oral daily  enoxaparin Injectable 40 milliGRAM(s) SubCutaneous daily  folic acid 1 milliGRAM(s) Oral daily  furosemide    Tablet 60 milliGRAM(s) Oral daily  gabapentin 400 milliGRAM(s) Oral three times a day  glucagon  Injectable 1 milliGRAM(s) IntraMuscular once  hydroxychloroquine 400 milliGRAM(s) Oral two times a day  influenza   Vaccine 0.5 milliLiter(s) IntraMuscular once  insulin glargine Injectable (LANTUS) 20 Unit(s) SubCutaneous every morning  insulin lispro (ADMELOG) corrective regimen sliding scale   SubCutaneous three times a day before meals  insulin lispro Injectable (ADMELOG) 8 Unit(s) SubCutaneous three times a day before meals  levothyroxine 150 MICROGram(s) Oral daily  lisinopril 20 milliGRAM(s) Oral daily  methotrexate 17.5 milliGRAM(s) Oral every week  oxycodone    5 mG/acetaminophen 325 mG 2 Tablet(s) Oral every 6 hours PRN

## 2021-10-01 NOTE — PROGRESS NOTE ADULT - SUBJECTIVE AND OBJECTIVE BOX
Patient has nausea, vomiting and diarrhea this am      T(F): 98.5 (10-01-21 @ 07:10), Max: 99.6 (09-30-21 @ 23:42)  HR: 75 (10-01-21 @ 05:39)  BP: 116/57 (10-01-21 @ 05:39)  RR: 20 (10-01-21 @ 07:10)  SpO2: 97% (10-01-21 @ 05:39) (88% - 97%)    PHYSICAL EXAM:  GENERAL: NAD  HEAD:  Atraumatic, Normocephalic  EYES: EOMI, PERRLA, conjunctiva and sclera clear  NERVOUS SYSTEM:  Alert & Oriented X3, no focal deficits   CHEST/LUNG:  bilateral rhonchi  HEART: Regular rate and rhythm; No murmurs, rubs, or gallops  ABDOMEN: Soft, Nontender, Nondistended; Bowel sounds present  EXTREMITIES:  2+ Peripheral Pulses, No clubbing, cyanosis, or edema    LABS  09-30    135  |  93<L>  |  18  ----------------------------<  401<H>  3.7   |  32  |  0.9    Ca    8.4<L>      30 Sep 2021 05:53  Mg     1.8     09-30    TPro  5.2<L>  /  Alb  3.0<L>  /  TBili  0.3  /  DBili  x   /  AST  23  /  ALT  12  /  AlkPhos  83  09-30                          8.2    5.65  )-----------( 247      ( 01 Oct 2021 06:02 )             26.2         CARDIAC ENZYMES    CKMB Units: 2.0 (09-28 @ 11:17)    Troponin T, Serum: 0.09 ng/mL (09-29-21 @ 11:59)    < from: 12 Lead ECG (09.30.21 @ 08:07) >  Diagnosis Line Sinus rhythm with occasional Premature ventricular complexes  Nonspecific ST-T changes    < end of copied text >      RADIOLOGY  < from: Xray Chest 1 View- PORTABLE-Routine (Xray Chest 1 View- PORTABLE-Routine in AM.) (09.30.21 @ 05:51) >  Impression:    Left lower lobe opacity/pleural effusion. No air leak.    < end of copied text >    MEDICATIONS  (STANDING):  aspirin  chewable 81 milliGRAM(s) Oral daily  atorvastatin 40 milliGRAM(s) Oral at bedtime  chlorhexidine 4% Liquid 1 Application(s) Topical <User Schedule>  DULoxetine 60 milliGRAM(s) Oral daily  enoxaparin Injectable 40 milliGRAM(s) SubCutaneous daily  folic acid 1 milliGRAM(s) Oral daily  furosemide    Tablet 60 milliGRAM(s) Oral daily  gabapentin 400 milliGRAM(s) Oral three times a day  glucagon  Injectable 1 milliGRAM(s) IntraMuscular once  hydroxychloroquine 400 milliGRAM(s) Oral two times a day  influenza   Vaccine 0.5 milliLiter(s) IntraMuscular once  insulin glargine Injectable (LANTUS) 20 Unit(s) SubCutaneous every morning  insulin lispro (ADMELOG) corrective regimen sliding scale   SubCutaneous three times a day before meals  insulin lispro Injectable (ADMELOG) 8 Unit(s) SubCutaneous three times a day before meals  levothyroxine 150 MICROGram(s) Oral daily  lisinopril 20 milliGRAM(s) Oral daily  methotrexate 17.5 milliGRAM(s) Oral every week    MEDICATIONS  (PRN):  oxycodone    5 mG/acetaminophen 325 mG 2 Tablet(s) Oral every 6 hours PRN Severe Pain (7 - 10)

## 2021-10-01 NOTE — DIETITIAN INITIAL EVALUATION ADULT. - PERTINENT MEDS FT
MEDICATIONS  (STANDING):  aspirin  chewable 81 milliGRAM(s) Oral daily  atorvastatin 40 milliGRAM(s) Oral at bedtime  chlorhexidine 4% Liquid 1 Application(s) Topical <User Schedule>  dextrose 40% Gel 15 Gram(s) Oral once  dextrose 5%. 1000 milliLiter(s) (50 mL/Hr) IV Continuous <Continuous>  dextrose 5%. 1000 milliLiter(s) (100 mL/Hr) IV Continuous <Continuous>  dextrose 50% Injectable 12.5 Gram(s) IV Push once  dextrose 50% Injectable 25 Gram(s) IV Push once  dextrose 50% Injectable 25 Gram(s) IV Push once  DULoxetine 60 milliGRAM(s) Oral daily  enoxaparin Injectable 40 milliGRAM(s) SubCutaneous daily  folic acid 1 milliGRAM(s) Oral daily  furosemide    Tablet 60 milliGRAM(s) Oral daily  gabapentin 400 milliGRAM(s) Oral three times a day  glucagon  Injectable 1 milliGRAM(s) IntraMuscular once  hydroxychloroquine 400 milliGRAM(s) Oral two times a day  influenza   Vaccine 0.5 milliLiter(s) IntraMuscular once  insulin glargine Injectable (LANTUS) 20 Unit(s) SubCutaneous every morning  insulin lispro (ADMELOG) corrective regimen sliding scale   SubCutaneous three times a day before meals  insulin lispro Injectable (ADMELOG) 8 Unit(s) SubCutaneous three times a day before meals  levothyroxine 150 MICROGram(s) Oral daily  lisinopril 20 milliGRAM(s) Oral daily  methotrexate 17.5 milliGRAM(s) Oral every week  metoprolol tartrate 25 milliGRAM(s) Oral two times a day    MEDICATIONS  (PRN):  oxycodone    5 mG/acetaminophen 325 mG 2 Tablet(s) Oral every 6 hours PRN Severe Pain (7 - 10)

## 2021-10-01 NOTE — DISCHARGE NOTE PROVIDER - HOSPITAL COURSE
76 yr F with SLE on Methotrexate, moderate MR, HFpEF, HTN, HLD, T2DM, hx of CVA with no residual deficits p/w SOB admitted for acute decompensated HF christy in setting of NSTEMI.    pt was treated with IV diuresis, Echo didn't show any new changes, has chronic MR, she made +ve trop with TWI leads v3,v4 (NSTEMI), her cardiology Dr. Graham was contacted and recommended cath as inpt or outpt, pt she refused to stay for inpatient cath and will follow up with her cardiology Dr. Graham,

## 2021-10-02 NOTE — PROGRESS NOTE ADULT - SUBJECTIVE AND OBJECTIVE BOX
Patient seen and examined this AM; denies any compalints; reports she wants to go home today. No acute events overnight.    ROS: All systems were reviewed and are negative otherwise    Vital Signs Last 24 Hrs  T(C): 37.1 (02 Oct 2021 07:02), Max: 37.1 (01 Oct 2021 23:12)  T(F): 98.7 (02 Oct 2021 07:02), Max: 98.8 (01 Oct 2021 23:12)  HR: 70 (02 Oct 2021 08:33) (68 - 80)  BP: 90/53 (02 Oct 2021 08:33) (90/53 - 137/106)  BP(mean): 68 (02 Oct 2021 08:33) (68 - 115)  RR: 18 (02 Oct 2021 07:02) (16 - 18)  SpO2: 93% (02 Oct 2021 08:33) (91% - 98%)  PHYSICAL EXAM:  GENERAL: NAD  HEAD:  Atraumatic, Normocephalic  EYES: EOMI, PERRLA, conjunctiva and sclera clear  NERVOUS SYSTEM:  Alert & Oriented X3, no focal deficits   CHEST/LUNG:  bilateral rhonchi  HEART: Regular rate and rhythm; No murmurs, rubs, or gallops  ABDOMEN: Soft, Nontender, Nondistended; Bowel sounds present  EXTREMITIES:  2+ Peripheral Pulses, No clubbing, cyanosis, or edema    LABS                        8.0    6.17  )-----------( 236      ( 02 Oct 2021 06:41 )             25.6   10-02    133<L>  |  92<L>  |  27<H>  ----------------------------<  228<H>  3.8   |  32  |  1.0    Ca    8.7      02 Oct 2021 06:41  Mg     1.8     10-02    TPro  5.7<L>  /  Alb  3.2<L>  /  TBili  0.3  /  DBili  x   /  AST  20  /  ALT  12  /  AlkPhos  72  10-02        CARDIAC ENZYMES    CKMB Units: 2.0 (09-28 @ 11:17)    Troponin T, Serum: 0.09 ng/mL (09-29-21 @ 11:59)    < from: 12 Lead ECG (09.30.21 @ 08:07) >  Diagnosis Line Sinus rhythm with occasional Premature ventricular complexes  Nonspecific ST-T changes    < end of copied text >      RADIOLOGY  < from: Xray Chest 1 View- PORTABLE-Routine (Xray Chest 1 View- PORTABLE-Routine in AM.) (09.30.21 @ 05:51) >  Impression:    Left lower lobe opacity/pleural effusion. No air leak.    < end of copied text >    MEDICATIONS  (STANDING):  aspirin  chewable 81 milliGRAM(s) Oral daily  atorvastatin 40 milliGRAM(s) Oral at bedtime  chlorhexidine 4% Liquid 1 Application(s) Topical <User Schedule>  dextrose 40% Gel 15 Gram(s) Oral once  dextrose 5%. 1000 milliLiter(s) (50 mL/Hr) IV Continuous <Continuous>  dextrose 5%. 1000 milliLiter(s) (100 mL/Hr) IV Continuous <Continuous>  dextrose 50% Injectable 25 Gram(s) IV Push once  dextrose 50% Injectable 12.5 Gram(s) IV Push once  dextrose 50% Injectable 25 Gram(s) IV Push once  DULoxetine 60 milliGRAM(s) Oral daily  enoxaparin Injectable 40 milliGRAM(s) SubCutaneous daily  folic acid 1 milliGRAM(s) Oral daily  furosemide    Tablet 60 milliGRAM(s) Oral daily  gabapentin 400 milliGRAM(s) Oral three times a day  glucagon  Injectable 1 milliGRAM(s) IntraMuscular once  hydroxychloroquine 400 milliGRAM(s) Oral two times a day  influenza   Vaccine 0.5 milliLiter(s) IntraMuscular once  insulin glargine Injectable (LANTUS) 20 Unit(s) SubCutaneous every morning  insulin lispro (ADMELOG) corrective regimen sliding scale   SubCutaneous three times a day before meals  insulin lispro Injectable (ADMELOG) 8 Unit(s) SubCutaneous three times a day before meals  levothyroxine 150 MICROGram(s) Oral daily  lisinopril 20 milliGRAM(s) Oral daily  methotrexate 17.5 milliGRAM(s) Oral every week  metoprolol tartrate 25 milliGRAM(s) Oral two times a day    MEDICATIONS  (PRN):  oxycodone    5 mG/acetaminophen 325 mG 2 Tablet(s) Oral every 6 hours PRN Severe Pain (7 - 10)

## 2021-10-02 NOTE — PROGRESS NOTE ADULT - PROVIDER SPECIALTY LIST ADULT
Cardiology
Hospitalist
Hospitalist
MICU
Pulmonology
Cardiology
Critical Care
Pulmonology
Cardiology
Cardiology
Critical Care
Pulmonology
Pulmonology
Critical Care
Critical Care
Hospitalist
Hospitalist
Pulmonology
Pulmonology

## 2021-10-02 NOTE — DISCHARGE NOTE NURSING/CASE MANAGEMENT/SOCIAL WORK - PATIENT PORTAL LINK FT
You can access the FollowMyHealth Patient Portal offered by [facility] by registering at the following website: [email or web address]. By joining [facility]’s FollowMyHealth portal, you will also be able to view your health information using other applications (apps) compatible with our system.

## 2021-10-02 NOTE — DISCHARGE NOTE NURSING/CASE MANAGEMENT/SOCIAL WORK - NSDCVIVACCINE_GEN_ALL_CORE_FT
Influenza, seasonal, injectable, preservative free; 07-Oct-2017 22:06; Esme Ochoa (RN);   Tdap; 19-Jun-2019 10:42; Jose Antonio Del Rio (RN); [employer]; [id number] (Exp. Date: 21-Mar-2021); IntraMuscular; Deltoid Left.; 0.5 milliLiter(s); VIS (VIS Published: 09-May-2013, VIS Presented: 19-Jun-2019);

## 2021-10-02 NOTE — PROGRESS NOTE ADULT - ASSESSMENT
Patient feels better.  CHF appears better. Sat low at times at night. ? CHRIS. Patient now wants to do out patient cardiac cath .  On Po Lasix. When stable outpatient F/U Dr Dale. Ambulate

## 2021-10-02 NOTE — PROGRESS NOTE ADULT - REASON FOR ADMISSION
CHF exacerbation

## 2021-10-02 NOTE — PROGRESS NOTE ADULT - SUBJECTIVE AND OBJECTIVE BOX
Patient is a 76y old  Female who presents with a chief complaint of CHF exacerbation (01 Oct 2021 13:30)      T(F): 98.8 (10-01-21 @ 23:12), Max: 98.8 (10-01-21 @ 23:12)  HR: 68 (10-02-21 @ 05:15)  BP: 107/54 (10-02-21 @ 05:15)  RR: 16 (10-02-21 @ 05:15)  SpO2: 98% (10-02-21 @ 05:15) (91% - 98%)    PHYSICAL EXAM:  GENERAL: NAD, well-groomed, well-developed  HEAD:  Atraumatic, Normocephalic  EYES: EOMI, PERRLA, conjunctiva and sclera clear  ENMT: No tonsillar erythema, exudates, or enlargement; Moist mucous membranes, Good dentition, No lesions  NECK: Supple, No JVD, Normal thyroid  NERVOUS SYSTEM:  Alert & Oriented X3,  Motor Strength 5/5 B/L upper and lower extremities  CHEST/LUNG: Clear to percussion bilaterally; No rales, rhonchi, wheezing, or rubs  HEART: Regular rate and rhythm; No murmurs, rubs, or gallops  ABDOMEN: Soft, Nontender, Nondistended; Bowel sounds present  EXTREMITIES:   No clubbing, cyanosis, or edema  LYMPH: No lymphadenopathy noted  SKIN: No rashes or lesions    labs  10-01    132<L>  |  91<L>  |  20  ----------------------------<  436<H>  4.1   |  33<H>  |  0.9    Ca    8.6      01 Oct 2021 06:02  Mg     1.9     10-01    TPro  5.5<L>  /  Alb  3.0<L>  /  TBili  0.4  /  DBili  x   /  AST  20  /  ALT  12  /  AlkPhos  81  10-01                          8.2    5.65  )-----------( 247      ( 01 Oct 2021 06:02 )             26.2           Troponin T, Serum: 0.02 ng/mL *H* (10-01-21 @ 11:22)      aspirin  chewable 81 milliGRAM(s) Oral daily  atorvastatin 40 milliGRAM(s) Oral at bedtime  chlorhexidine 4% Liquid 1 Application(s) Topical <User Schedule>  dextrose 40% Gel 15 Gram(s) Oral once  dextrose 5%. 1000 milliLiter(s) IV Continuous <Continuous>  dextrose 5%. 1000 milliLiter(s) IV Continuous <Continuous>  dextrose 50% Injectable 25 Gram(s) IV Push once  dextrose 50% Injectable 25 Gram(s) IV Push once  dextrose 50% Injectable 12.5 Gram(s) IV Push once  DULoxetine 60 milliGRAM(s) Oral daily  enoxaparin Injectable 40 milliGRAM(s) SubCutaneous daily  folic acid 1 milliGRAM(s) Oral daily  furosemide    Tablet 60 milliGRAM(s) Oral daily  gabapentin 400 milliGRAM(s) Oral three times a day  glucagon  Injectable 1 milliGRAM(s) IntraMuscular once  hydroxychloroquine 400 milliGRAM(s) Oral two times a day  influenza   Vaccine 0.5 milliLiter(s) IntraMuscular once  insulin glargine Injectable (LANTUS) 20 Unit(s) SubCutaneous every morning  insulin lispro (ADMELOG) corrective regimen sliding scale   SubCutaneous three times a day before meals  insulin lispro Injectable (ADMELOG) 8 Unit(s) SubCutaneous three times a day before meals  levothyroxine 150 MICROGram(s) Oral daily  lisinopril 20 milliGRAM(s) Oral daily  methotrexate 17.5 milliGRAM(s) Oral every week  metoprolol tartrate 25 milliGRAM(s) Oral two times a day  oxycodone    5 mG/acetaminophen 325 mG 2 Tablet(s) Oral every 6 hours PRN

## 2021-10-02 NOTE — CHART NOTE - NSCHARTNOTEFT_GEN_A_CORE
Despite diuretics patient desaturates.  Room air P/O at rest:  88%  P/O on 3L liters n/c at rest with improvement in O2 sat to mid 90s    Patient will require home O2 for discharge.  Patient and care giver are aware and agreeable to home O2.  Patient is in a chronic stable medical state CHF (preserved EF) and is medically cleared for discharge with home o2. Despite diuretics patient desaturates.  Room air P/O at rest:  88%  P/O on 3L liters n/c at rest with improvement in O2 sat to 99%    Patient will require home O2 for discharge.  Patient and care giver are aware and agreeable to home O2.  Patient is in a chronic stable medical state CHF (preserved EF) and is medically cleared for discharge with home o2.

## 2021-10-02 NOTE — PROGRESS NOTE ADULT - ASSESSMENT
76 YO F with a past medical history of SLE on CellCept and Methotrexate, chronic pain and continuous opiate dependance,  HFpEF, HTN, HLD, T2DM, hypothyroid Seizure d/o and  CVA presented to the hospital with acute HF exacerbation and ruled in for NSTEMI     #Acute on chronic HFpEF   #NSTEMI  #Moderate MR  - on PO lasix  - on BB, c/w lisinopril and ASA  - pt was recommended inpatient C but refused  - patient will choose to get outpatient cath with Dr. Graham (pt cardiologist) at [facility]     #Normocytic Anemia likely secondary to anemia of chronic disease- Patient on folic acid supplementation due to methotrexate therapy  #DM: on insulin, adjusted dose as needed   #ANTONELLA on CKD - Resolved   #suspected CHRIS: needs pulm and sleep study as outpt   #Hypothyroidism- c/w levothyroxine 150mcg, has appt with endocrine on October   #DC seizure pt has been off them for long time   #SLE - Continue with home dose of hydroxychloroquine; Continue with methotrexate    DVT Prophylaxis : Lovenox   FULL CODE  Dispo: teleBELIA planning for today    Discussed with resident. 74 YO F with a past medical history of SLE on CellCept and Methotrexate, chronic pain and continuous opiate dependance,  HFpEF, HTN, HLD, T2DM, hypothyroid Seizure d/o and  CVA presented to the hospital with acute HF exacerbation and ruled in for NSTEMI     #Acute on chronic HFpEF   #Acute hypoxic respiratory failure 2/2 to CHF  #NSTEMI  #Moderate MR  - on PO lasix  - on BB, c/w lisinopril and ASA  - pt was recommended inpatient C but refused  - patient will choose to get outpatient cath with Dr. Graham (pt cardiologist) at [facility]   - patient desaturated to 88% on room air, O2 improved with 3L O2 via NC to 99%; patient will be d/c'ed home with home O2    #Normocytic Anemia likely secondary to anemia of chronic disease- Patient on folic acid supplementation due to methotrexate therapy  #DM: on insulin, adjusted dose as needed   #ANTONELLA on CKD - Resolved   #suspected CHRIS: needs pulm and sleep study as outpt   #Hypothyroidism- c/w levothyroxine 150mcg, has appt with endocrine on October   #DC seizure pt has been off them for long time   #SLE - Continue with home dose of hydroxychloroquine; Continue with methotrexate    DVT Prophylaxis : Lovenox   FULL CODE  Dispo: tele[facility]ELIA planning for today    Discussed with resident.

## 2021-10-06 PROBLEM — I10 ESSENTIAL HYPERTENSION: Status: ACTIVE | Noted: 2018-10-26

## 2021-10-18 NOTE — ED PROVIDER NOTE - OBJECTIVE STATEMENT
Per grandson Keshav [phone] pt has been more confused for the past 2 days, states she has been slurring her words, confirms both high and low blood sugars. 76yF pmhx SLE on Methotrexate, moderate MR, HFpEF, HTN, HLD, T2DM, hx of CVA with no residual deficits c/o hypogylcemia x2days; waxing and waning; states she has noticed high blood sugars recently, treated by giving herself short-acting insulin, today she treated but did not eat as she was not hungry, re-checked blood sugar and it was in 50s. Pt also states she has been short of breath for approx the last month, has been requiring increasing doses of lasix now takes 80mg BID, associated w/ b/l LE edema and orthopnea. Denies fever/chills, chest pain, SOB, abd pain, n/v/d.   Per grandson Keshav [phone] pt has been more confused for the past 2 days, states she has been slurring her words, confirms both high and low blood sugars.  PCP: Janice  Cardio: Alla

## 2021-10-18 NOTE — ED ADULT TRIAGE NOTE - CHIEF COMPLAINT QUOTE
Patient instructed to follow all orders given by MD, take gabapentin and  Take zofran if needed : use and bring albuterol inhaler on 12/9/19 Teaching reinforced re:antibacterial shower, arrival time, department location/ parking, need for ride home, leave valuables at home , wear clean, comfortable clothes and to bring overnight bag, insurance and photo cards. Verbalizes understanding.Testing needed DOS.   biba from home, slurred speech as per ems, indicated by santy

## 2021-10-18 NOTE — ED PROVIDER NOTE - PHYSICAL EXAMINATION
Vital Signs: Reviewed  GEN: alert, NAD, speaks full sentences  HEAD:  normocephalic, atraumatic  EYES:  PERRLA; conjunctivae without injection, drainage or discharge  ENMT:  nasal mucosa moist; mouth moist without ulcerations or lesions; throat moist without erythema, exudate, ulcerations or lesions  NECK:  supple  CARDIAC:  regular rate, normal S1 and S2, no murmurs  RESP:  respiratory rate and effort appear normal for age; lungs are clear to auscultation bilaterally; no rales or wheezes  ABDOMEN:  soft, nontender, nondistended  MUSCULOSKELETAL/NEURO: CNII-XII intact, no dysmetria, no dysdiadokochinesis, strength 5/5 b/l UE LE; 1+ b/l LE edema  SKIN:  normal skin color for age and race, well-perfused; warm and dry

## 2021-10-18 NOTE — ED PROVIDER NOTE - CLINICAL SUMMARY MEDICAL DECISION MAKING FREE TEXT BOX
despite the complaint of slurred speech the patients complaint is generalized weakness with hypoglycemia after confirmation with the patients grandson symptoms onset is 48 hours.  She denies any headache, visual changes chest pain, sob on exam her speech pattern is normal, pale appearing but no focal deficits   we obtained labs ct, ekg and troponin and will admit for further workup at this time

## 2021-10-18 NOTE — ED PROVIDER NOTE - NS ED ROS FT
Review of Systems:  	•	CONSTITUTIONAL - no fever, no diaphoresis  	•	SKIN - no rash, no lesions  	•	HEMATOLOGIC - no bleeding, no bruising  	•	EYES - no discharge, no injection  	•	ENT - no sore throat, no runny nose  	•	RESPIRATORY - +shortness of breath, no cough  	•	CARDIAC - no chest pain, no palpitations  	•	GI - no abd pain, no nausea, no vomiting, no diarrhea  	•	GENITO-URINARY - no dysuria, no hematuria  	•	ENDO - no polydypsia, no polyurea, +hyper/hypoglycemia  	•	MUSCULOSKELETAL - no joint pain, no muscle aches  	•	NEUROLOGIC - no dizziness, no headache, +slurred speech

## 2021-10-18 NOTE — ED PROVIDER NOTE - ATTENDING CONTRIBUTION TO CARE
I was present for and supervised the key and critical aspects of the procedures performed during the care of the patient. 76yF pmhx SLE on Methotrexate, moderate MR, HFpEF, HTN, HLD, T2DM, hx of CVA with no residual deficits c/o hypogylcemia x2day which is intermittent at this time she  states she has noticed high blood sugars recently, treated by giving herself short-acting insulin, today she treated but did not eat as she was not hungry, re-checked blood sugar and it was in 50s. Pt also states she has been short of breath for approx the last month, has been requiring increasing doses of lasix now takes 80mg BID, associated w/ b/l LE edema and orthopnea. Denies fever/chills, chest pain, SOB, abd pain, n/v/d.   Per santy Parr [phone] pt has been more confused for the past 2 days, states she has been slurring her words  on exam she is pale appearing but answering questions appropriately, she is nc/at perrla eomi oropharynx clear patient has decreased bs b/l, rrr s1s2 ntoed radial pulses 2 += abd-soft ext b/l lower extremities with edema pedal pulses 2+=   a/p- we obtained labs, ekg ct head, symptom onset more than 48 hours

## 2021-10-18 NOTE — ED PROVIDER NOTE - CARE PLAN
1 Principal Discharge DX:	Metabolic encephalopathy  Secondary Diagnosis:	Acute renal failure  Secondary Diagnosis:	Hypoglycemia  Secondary Diagnosis:	Elevated troponin

## 2021-10-19 NOTE — CONSULT NOTE ADULT - SUBJECTIVE AND OBJECTIVE BOX
Patient is a 76y old  Female who presents with a chief complaint of slurred speech (19 Oct 2021 07:26)      HPI:  pt is a 75 yo female PMHx sig for HFpEF, HTN,  HLD, DM,  SLE on Cellsept and methotrexate, CVA in . Pt presented to the ed for slurred speech x 2 days, in the ED she was found to be hypoglycemic, pt states she has had difficulty controlling her glucose.  Speech has reportedly resolved after oral glucose administration.   (19 Oct 2021 00:57)  on D51/2 ns     PAST MEDICAL & SURGICAL HISTORY:  DM (diabetes mellitus)  insulin  fs achs    Hypercholesteremia  statin    Hypertension  hctz    Lupus  as per hx    Fall, initial encounter  as  hx    Stroke  TIA , no deficits    Herniated lumbar intervertebral disc  as per hx    Seizure  keppra    No significant past surgical history      Allergies    No Known Allergies    Intolerances      Family history : no cardiovscular family history   Home Medications:  aspirin 81 mg oral tablet: 1 tab(s) orally once a day (26 Sep 2021 00:41)  Cymbalta 60 mg oral delayed release capsule: 1 cap(s) orally once a day (26 Sep 2021 00:41)  folic acid 1 mg oral tablet: 1 tab(s) orally once a day (26 Sep 2021 00:41)  gabapentin 400 mg oral capsule: 1 cap(s) orally 3 times a day (26 Sep 2021 00:41)  hydroxychloroquine 200 mg oral tablet: 2 tab(s) orally 2 times a day (with meals) (26 Sep 2021 00:41)  levothyroxine 150 mcg (0.15 mg) oral tablet: 1 tab(s) orally once a day (30 Sep 2021 08:11)  lisinopril 20 mg oral tablet: 1 tab(s) orally once a day (26 Sep 2021 00:41)  methotrexate 2.5 mg oral tablet: 7 tab(s) orally once a week (27 Sep 2021 10:47)  NovoLO unit(s) subcutaneous 3 times a day (before meals)  Please take as previously taken (26 Sep 2021 00:41)  Tresiba 100 units/mL subcutaneous solution: 20 unit(s) subcutaneous once a day in the morning (26 Sep 2021 00:41)  Zocor 80 mg oral tablet: 1 tab(s) orally once a day (at bedtime) (26 Sep 2021 00:41)    Occupation:  Al[employer]: Denied  Smoking: Denied  Drug Use: Denied  Marital Status:         ROS: as in HPI; All other systems reviewed are negative    ICU Vital Signs Last 24 Hrs  T(C): 35.8 (19 Oct 2021 07:10), Max: 36 (19 Oct 2021 01:50)  T(F): 96.5 (19 Oct 2021 07:10), Max: 96.8 (19 Oct 2021 01:50)  HR: 65 (19 Oct 2021 07:12) (58 - 68)  BP: 123/51 (19 Oct 2021 07:12) (85/50 - 123/51)  BP(mean): 74 (19 Oct 2021 07:12) (70 - 77)  ABP: --  ABP(mean): --  RR: 20 (19 Oct 2021 07:12) (10 - 24)  SpO2: 97% (19 Oct 2021 07:12) (94% - 99%)        Physical Examination:    General: No acute distress.  Alert, oriented, interactive, nonfocal    HEENT: Pupils equal, reactive to light.  Symmetric.    PULM: b/l crackles     CVS: Regular rate and rhythm, no murmurs, rubs, or gallops    ABD: Soft, nondistended, nontender, normoactive bowel sounds, no masses    EXT: No edema, nontender, no clubbing     SKIN: Warm and well perfused, no rashes noted.    Neurology : no motor or sensory deficit     Musculoskeletal : move all extremity     Lymphatic system: no Palpable node               I&O's Detail    18 Oct 2021 07:01  -  19 Oct 2021 07:00  --------------------------------------------------------  IN:    dextrose 5% + sodium chloride 0.9%: 250 mL  Total IN: 250 mL    OUT:    Voided (mL): 300 mL  Total OUT: 300 mL    Total NET: -50 mL      19 Oct 2021 07:01  -  19 Oct 2021 08:54  --------------------------------------------------------  IN:    dextrose 5% + sodium chloride 0.9%: 50 mL  Total IN: 50 mL    OUT:  Total OUT: 0 mL    Total NET: 50 mL            LABS:                        7.9    7.11  )-----------( 295      ( 19 Oct 2021 06:03 )             25.8     19 Oct 2021 06:03    135    |  98     |  82     ----------------------------<  85     4.1     |  23     |  2.9      Ca    8.5        19 Oct 2021 06:03  Mg     2.1       18 Oct 2021 20:53    TPro  6.7    /  Alb  3.5    /  TBili  0.4    /  DBili  x      /  AST  57     /  ALT  89     /  AlkPhos  122    18 Oct 2021 20:53  Amylase x     lipase x          CARDIAC MARKERS ( 19 Oct 2021 06:03 )  x     / 0.49 ng/mL / x     / x     / x      CARDIAC MARKERS ( 18 Oct 2021 20:53 )  x     / 0.61 ng/mL / x     / x     / x          CAPILLARY BLOOD GLUCOSE      POCT Blood Glucose.: 111 mg/dL (19 Oct 2021 07:58)        Culture        MEDICATIONS  (STANDING):  aspirin 325 milliGRAM(s) Oral daily  atorvastatin 40 milliGRAM(s) Oral at bedtime  dextrose 40% Gel 15 Gram(s) Oral once  dextrose 5% + sodium chloride 0.9%. 1000 milliLiter(s) (50 mL/Hr) IV Continuous <Continuous>  dextrose 5%. 1000 milliLiter(s) (100 mL/Hr) IV Continuous <Continuous>  dextrose 5%. 1000 milliLiter(s) (50 mL/Hr) IV Continuous <Continuous>  dextrose 50% Injectable 25 Gram(s) IV Push once  dextrose 50% Injectable 12.5 Gram(s) IV Push once  dextrose 50% Injectable 25 Gram(s) IV Push once  gabapentin 400 milliGRAM(s) Oral three times a day  glucagon  Injectable 1 milliGRAM(s) IntraMuscular once  heparin   Injectable 5000 Unit(s) SubCutaneous every 12 hours  hydroxychloroquine 400 milliGRAM(s) Oral two times a day  levothyroxine 150 MICROGram(s) Oral daily  metoprolol tartrate 25 milliGRAM(s) Oral two times a day    MEDICATIONS  (PRN):  ondansetron Injectable 4 milliGRAM(s) IV Push every 8 hours PRN Nausea and/or Vomiting        RADIOLOGY: ***     CXR: increase marking b/l and small b/kl effusion   TLC:  OG:  ET tube:        CAM ICU:  ECHO:

## 2021-10-19 NOTE — ED ADULT NURSE NOTE - NSIMPLEMENTINTERV_GEN_ALL_ED
Implemented All Fall with Harm Risk Interventions:  [place] to call system. Call bell, personal items and telephone within reach. Instruct patient to call for assistance. Room bathroom lighting operational. Non-slip footwear when patient is off stretcher. Physically safe environment: no spills, clutter or unnecessary equipment. Stretcher in lowest position, wheels locked, appropriate side rails in place. Provide visual cue, wrist band, yellow gown, etc. Monitor gait and stability. Monitor for mental status changes and reorient to person, place, and time. Review medications for side effects contributing to fall risk. Reinforce activity limits and safety measures with patient and family. Provide visual clues: red socks.

## 2021-10-19 NOTE — H&P ADULT - NSHPLABSRESULTS_GEN_ALL_CORE
9.6    8.47  )-----------( 344      ( 18 Oct 2021 20:53 )             30.5       10-18    132<L>  |  94<L>  |  77<HH>  ----------------------------<  56<L>  3.9   |  22  |  2.9<H>    Ca    9.5      18 Oct 2021 20:53  Mg     2.1     10-18    TPro  6.7  /  Alb  3.5  /  TBili  0.4  /  DBili  x   /  AST  57<H>  /  ALT  89<H>  /  AlkPhos  122<H>  10-18                      Lactate Trend      CARDIAC MARKERS ( 18 Oct 2021 20:53 )  x     / 0.61 ng/mL / x     / x     / x            CAPILLARY BLOOD GLUCOSE      POCT Blood Glucose.: 120 mg/dL (18 Oct 2021 23:31)        ct head    IMPRESSION:    No CT evidence of acute intracranial pathology.

## 2021-10-19 NOTE — CONSULT NOTE ADULT - ASSESSMENT
Patient with hypoglycemia. Now no complaints. Troponin may be up secondary ANTONELLA hypoglycemia. Repeat labs. OOB to chair .

## 2021-10-19 NOTE — CONSULT NOTE ADULT - SUBJECTIVE AND OBJECTIVE BOX
CARDIOLOGY CONSULT NOTE     CHIEF COMPLAINT/REASON FOR CONSULT:    HPI:  pt is a 75 yo female PMHx sig for HFpEF, HTN,  HLD, DM,  SLE on Cellsept and methotrexate, CVA in 2002. Pt presented to the ed for slurred speech x 2 days, in the ED she was found to be hypoglycemic, pt states she has had difficulty controlling her glucose.  Speech has reportedly resolved after oral glucose administration.   (19 Oct 2021 00:57)      PAST MEDICAL & SURGICAL HISTORY:  DM (diabetes mellitus)  insulin  fs achs    Hypercholesteremia  statin    Hypertension  hctz    Lupus  as per hx    Fall, initial encounter  as  hx    Stroke  TIA 2002, no deficits    Herniated lumbar intervertebral disc  as per hx    Seizure  keppra    No significant past surgical history        Cardiac Risks:   [x ]HTN, [x ] DM, [ ] Smoking, [ ] FH,  [x ] Lipids        MEDICATIONS:  MEDICATIONS  (STANDING):  aspirin 325 milliGRAM(s) Oral daily  atorvastatin 40 milliGRAM(s) Oral at bedtime  dextrose 40% Gel 15 Gram(s) Oral once  dextrose 5% + sodium chloride 0.9%. 1000 milliLiter(s) (50 mL/Hr) IV Continuous <Continuous>  dextrose 5%. 1000 milliLiter(s) (100 mL/Hr) IV Continuous <Continuous>  dextrose 5%. 1000 milliLiter(s) (50 mL/Hr) IV Continuous <Continuous>  dextrose 50% Injectable 25 Gram(s) IV Push once  dextrose 50% Injectable 12.5 Gram(s) IV Push once  dextrose 50% Injectable 25 Gram(s) IV Push once  gabapentin 400 milliGRAM(s) Oral three times a day  glucagon  Injectable 1 milliGRAM(s) IntraMuscular once  heparin   Injectable 5000 Unit(s) SubCutaneous every 12 hours  hydroxychloroquine 400 milliGRAM(s) Oral two times a day  levothyroxine 150 MICROGram(s) Oral daily  metoprolol tartrate 25 milliGRAM(s) Oral two times a day      FAMILY HISTORY:      SOCIAL HISTORY:      [ ] Marital status    Allergies    No Known Allergies      	    REVIEW OF SYSTEMS:  CONSTITUTIONAL: No fever, weight loss, or fatigue  EYES: No eye pain, visual disturbances, or discharge  ENMT:  No difficulty hearing, tinnitus, vertigo; No sinus or throat pain  NECK: No pain or stiffness  RESPIRATORY: No cough, wheezing, chills or hemoptysis; No Shortness of Breath  CARDIOVASCULAR: No chest pain, palpitations, passing out, dizziness, or leg swelling  GASTROINTESTINAL: No abdominal or epigastric pain. No nausea, vomiting, or hematemesis; No diarrhea or constipation. No melena or hematochezia.  GENITOURINARY: No dysuria, frequency, hematuria, or incontinence  NEUROLOGICAL: See  SKIN: No itching, burning, rashes, or lesions   	        PHYSICAL EXAM:  T(C): 35.7 (10-19-21 @ 03:00), Max: 36 (10-19-21 @ 01:50)  HR: 65 (10-19-21 @ 07:12) (58 - 68)  BP: 123/51 (10-19-21 @ 07:12) (85/50 - 123/51)  RR: 20 (10-19-21 @ 07:12) (10 - 24)  SpO2: 97% (10-19-21 @ 07:12) (94% - 99%)  Wt(kg): --  I&O's Summary    18 Oct 2021 07:01  -  19 Oct 2021 07:00  --------------------------------------------------------  IN: 250 mL / OUT: 300 mL / NET: -50 mL        Appearance: Normal	  Psychiatry: A & O x 3, Mood & affect appropriate  HEENT:   Normal oral mucosa, PERRL, EOMI	  Lymphatic: No lymphadenopathy  Cardiovascular: Normal S1 S2,RRR, No JVD, I/VI MICHAEL lsb  Respiratory: Lungs clear to auscultation	  Gastrointestinal:  Soft, Non-tender, + BS	  Skin: No rashes, No ecchymoses, No cyanosis	  Neurologic: Non-focal  Extremities: Normal range of motion, No clubbing, cyanosis or tr edema  Vascular: Peripheral pulses palpable 2+ bilaterally      ECG:  	< from: 12 Lead ECG (10.01.21 @ 08:10) >  Diagnosis Line Normal sinus rhythm    Confirmed by LUAN YORK MD ([id number]) on 10/1/2021 3:43:41 PM    < end of copied text >      	  LABS:	 	    CARDIAC MARKERS:          Serum Pro-Brain Natriuretic Peptide: 61430 pg/mL (10-18 @ 20:53)                            9.6    8.47  )-----------( 344      ( 18 Oct 2021 20:53 )             30.5     10-18    132<L>  |  94<L>  |  77<HH>  ----------------------------<  56<L>  3.9   |  22  |  2.9<H>    Ca    9.5      18 Oct 2021 20:53  Mg     2.1     10-18    TPro  6.7  /  Alb  3.5  /  TBili  0.4  /  DBili  x   /  AST  57<H>  /  ALT  89<H>  /  AlkPhos  122<H>  10-18      proBNP: Serum Pro-Brain Natriuretic Peptide: 76090 pg/mL (10-18 @ 20:53)

## 2021-10-19 NOTE — PROGRESS NOTE ADULT - SUBJECTIVE AND OBJECTIVE BOX
SUBJECTIVE:    Patient is a 76y old Female who presents with a chief complaint of slurred speech. (19 Oct 2021 08:53)    Currently admitted to medicine with the primary diagnosis of Metabolic encephalopathy secondary to hypoglycemia.       Today is hospital day . This morning she is resting comfortably in bed and reports no new issues or overnight events.     PAST MEDICAL & SURGICAL HISTORY  DM (diabetes mellitus)  insulin  fs achs    Hypercholesteremia  statin    Hypertension  hctz    Lupus  as per hx    Fall, initial encounter  as  hx    Stroke  TIA 2002, no deficits    Herniated lumbar intervertebral disc  as per hx    Seizure  keppra    No significant past surgical history      SOCIAL HISTORY:  Negative for smoking/alcohol/drug use.     ALLERGIES:  No Known Allergies    MEDICATIONS:  STANDING MEDICATIONS  aspirin 325 milliGRAM(s) Oral daily  atorvastatin 40 milliGRAM(s) Oral at bedtime  dextrose 40% Gel 15 Gram(s) Oral once  dextrose 5% + sodium chloride 0.9%. 1000 milliLiter(s) IV Continuous <Continuous>  dextrose 5%. 1000 milliLiter(s) IV Continuous <Continuous>  dextrose 5%. 1000 milliLiter(s) IV Continuous <Continuous>  dextrose 50% Injectable 25 Gram(s) IV Push once  dextrose 50% Injectable 12.5 Gram(s) IV Push once  dextrose 50% Injectable 25 Gram(s) IV Push once  gabapentin 300 milliGRAM(s) Oral daily  glucagon  Injectable 1 milliGRAM(s) IntraMuscular once  heparin   Injectable 5000 Unit(s) SubCutaneous every 12 hours  hydroxychloroquine 400 milliGRAM(s) Oral two times a day  levothyroxine 150 MICROGram(s) Oral daily  metoprolol tartrate 25 milliGRAM(s) Oral two times a day    PRN MEDICATIONS  ondansetron Injectable 4 milliGRAM(s) IV Push every 8 hours PRN    VITALS:   T(F): 96.5  HR: 68  BP: 112/56  RR: 16  SpO2: 100%    LABS:                        7.9    7.11  )-----------( 295      ( 19 Oct 2021 06:03 )             25.8     10-19    135  |  98  |  82<HH>  ----------------------------<  85  4.1   |  23  |  2.9<H>    Ca    8.5      19 Oct 2021 06:03  Mg     2.1     10-18    TPro  6.7  /  Alb  3.5  /  TBili  0.4  /  DBili  x   /  AST  57<H>  /  ALT  89<H>  /  AlkPhos  122<H>  10-18    Troponin T, Serum: 0.49 ng/mL *HH* (10-19-21 @ 06:03)  Troponin T, Serum: 0.61 ng/mL *HH* (10-18-21 @ 20:53)      CARDIAC MARKERS ( 19 Oct 2021 06:03 )  x     / 0.49 ng/mL / x     / x     / x      CARDIAC MARKERS ( 18 Oct 2021 20:53 )  x     / 0.61 ng/mL / x     / x     / x          RADIOLOGY:  < from: CT Head No Cont (10.18.21 @ 22:21) >  IMPRESSION:    No CT evidence of acute intracranial pathology.    --- End of Report ---    < end of copied text >  < from: Xray Chest 1 View- PORTABLE-Urgent (10.18.21 @ 20:28) >  Impression:    Increasing bilateral lung opacities        --- End of Report ---    < end of copied text >      PHYSICAL EXAM:  GENERAL: Patient is not in acute distress  HEAD:  Atraumatic, Normocephalic  EYES: EOMI, PERRLA, conjunctiva and sclera clear  ENMT: No tonsillar erythema, exudates, or enlargement; Moist mucous membranes, No lesions  NECK: Supple, No JVD, Normal thyroid  NERVOUS SYSTEM:  Alert & Oriented X3, Good concentration; Motor Strength 5/5 B/L upper and lower extremities; DTRs 2+ intact and symmetric  CHEST/LUNG: Clear to auscultation bilaterally; No rales, rhonchi, wheezing, or rubs  HEART: Regular rate and rhythm; No murmurs, rubs, or gallops  ABDOMEN: Soft, Nontender, Nondistended; Bowel sounds present in all 4 quadrants  EXTREMITIES:  2+ Peripheral Pulses, No clubbing, cyanosis, or edema  LYMPH: No lymphadenopathy noted  SKIN: No rashes or lesions    IV Access: YES  NG tube present: NO  PEG tube present: NO  Wise catheter present: NO         SUBJECTIVE:    Patient is a 76y old Female who presents with a chief complaint of slurred speech. (19 Oct 2021 08:53)    Currently admitted to medicine with the primary diagnosis of Metabolic encephalopathy secondary to hypoglycemia.       Today is hospital day . This morning she is resting comfortably in bed and reports no new issues or overnight events.     PAST MEDICAL & SURGICAL HISTORY  DM (diabetes mellitus)  insulin  fs achs    Hypercholesteremia  statin    Hypertension  hctz    Lupus  as per hx    Fall, initial encounter  as  hx    Stroke  TIA 2002, no deficits    Herniated lumbar intervertebral disc  as per hx    Seizure  keppra    No significant past surgical history      SOCIAL HISTORY:  Negative for smoking/alcohol/drug use.     ALLERGIES:  No Known Allergies    MEDICATIONS:  STANDING MEDICATIONS  aspirin 325 milliGRAM(s) Oral daily  atorvastatin 40 milliGRAM(s) Oral at bedtime  dextrose 40% Gel 15 Gram(s) Oral once  dextrose 5% + sodium chloride 0.9%. 1000 milliLiter(s) IV Continuous <Continuous>  dextrose 5%. 1000 milliLiter(s) IV Continuous <Continuous>  dextrose 5%. 1000 milliLiter(s) IV Continuous <Continuous>  dextrose 50% Injectable 25 Gram(s) IV Push once  dextrose 50% Injectable 12.5 Gram(s) IV Push once  dextrose 50% Injectable 25 Gram(s) IV Push once  gabapentin 300 milliGRAM(s) Oral daily  glucagon  Injectable 1 milliGRAM(s) IntraMuscular once  heparin   Injectable 5000 Unit(s) SubCutaneous every 12 hours  hydroxychloroquine 400 milliGRAM(s) Oral two times a day  levothyroxine 150 MICROGram(s) Oral daily  metoprolol tartrate 25 milliGRAM(s) Oral two times a day    PRN MEDICATIONS  ondansetron Injectable 4 milliGRAM(s) IV Push every 8 hours PRN    VITALS:   T(F): 96.5  HR: 68  BP: 112/56  RR: 16  SpO2: 100%    LABS:                        7.9    7.11  )-----------( 295      ( 19 Oct 2021 06:03 )             25.8     10-19    135  |  98  |  82<HH>  ----------------------------<  85  4.1   |  23  |  2.9<H>    Ca    8.5      19 Oct 2021 06:03  Mg     2.1     10-18    TPro  6.7  /  Alb  3.5  /  TBili  0.4  /  DBili  x   /  AST  57<H>  /  ALT  89<H>  /  AlkPhos  122<H>  10-18    Troponin T, Serum: 0.49 ng/mL *HH* (10-19-21 @ 06:03)  Troponin T, Serum: 0.61 ng/mL *HH* (10-18-21 @ 20:53)      CARDIAC MARKERS ( 19 Oct 2021 06:03 )  x     / 0.49 ng/mL / x     / x     / x      CARDIAC MARKERS ( 18 Oct 2021 20:53 )  x     / 0.61 ng/mL / x     / x     / x          RADIOLOGY:  < from: CT Head No Cont (10.18.21 @ 22:21) >  IMPRESSION:    No CT evidence of acute intracranial pathology.    --- End of Report ---    < end of copied text >  < from: Xray Chest 1 View- PORTABLE-Urgent (10.18.21 @ 20:28) >  Impression:    Increasing bilateral lung opacities        --- End of Report ---    < end of copied text >      PHYSICAL EXAM:  GENERAL: Patient is not in acute distress  HEAD:  Atraumatic, Normocephalic  EYES: EOMI, PERRLA, conjunctiva and sclera clear  ENMT: No tonsillar erythema, exudates, or enlargement; Moist mucous membranes, No lesions  NERVOUS SYSTEM:  Alert & Oriented X3, Good concentration; Motor Strength 5/5 B/L upper and lower extremities  CHEST/LUNG: Rhonchi auscultated in the left lower lobe; otherwise clear to auscultation   HEART: Regular rate and rhythm; No murmurs, rubs, or gallops  ABDOMEN: Soft, Nontender, Nondistended; Bowel sounds present in all 4 quadrants  EXTREMITIES:  3+ pitting edema in the lower extremities     IV Access: YES  NG tube present: NO  PEG tube present: NO  Wise catheter present: NO

## 2021-10-19 NOTE — PROGRESS NOTE ADULT - SUBJECTIVE AND OBJECTIVE BOX
Patient is awake and alert. no complaints, no CP      T(F): 96.5 (10-19-21 @ 07:10), Max: 96.8 (10-19-21 @ 01:50)  HR: 73 (10-19-21 @ 11:05)  BP: 109/55 (10-19-21 @ 11:05)  RR: 20 (10-19-21 @ 11:05)  SpO2: 99% (10-19-21 @ 11:05) (94% - 100%)    PHYSICAL EXAM:  GENERAL: NAD  HEAD:  Atraumatic, Normocephalic  EYES: EOMI, PERRLA, conjunctiva and sclera clear  NERVOUS SYSTEM:  Alert & Oriented X3, no focal deficits   CHEST/LUNG: Clear to percussion bilaterally; No rales, rhonchi, wheezing, or rubs  HEART: Regular rate and rhythm; No murmurs, rubs, or gallops  ABDOMEN: Soft, Nontender, Nondistended; Bowel sounds present  EXTREMITIES:  2+ Peripheral Pulses, No clubbing, cyanosis, or edema    LABS  10-19    135  |  98  |  82<HH>  ----------------------------<  85  4.1   |  23  |  2.9<H>    Ca    8.5      19 Oct 2021 06:03  Mg     2.1     10-18    TPro  6.7  /  Alb  3.5  /  TBili  0.4  /  DBili  x   /  AST  57<H>  /  ALT  89<H>  /  AlkPhos  122<H>  10-18                          7.9    7.11  )-----------( 295      ( 19 Oct 2021 06:03 )             25.8       CARDIAC ENZYMES      Troponin T, Serum: 0.49 ng/mL (10-19-21 @ 06:03)  Troponin T, Serum: 0.61 ng/mL (10-18-21 @ 20:53)      RADIOLOGY  < from: CT Head No Cont (10.18.21 @ 22:21) >    IMPRESSION:    No CT evidence of acute intracranial pathology.    < end of copied text >    MEDICATIONS  (STANDING):  aspirin 325 milliGRAM(s) Oral daily  atorvastatin 40 milliGRAM(s) Oral at bedtime  gabapentin 300 milliGRAM(s) Oral daily  glucagon  Injectable 1 milliGRAM(s) IntraMuscular once  heparin   Injectable 5000 Unit(s) SubCutaneous every 12 hours  hydroxychloroquine 400 milliGRAM(s) Oral two times a day  levothyroxine 150 MICROGram(s) Oral daily  metoprolol tartrate 25 milliGRAM(s) Oral two times a day    MEDICATIONS  (PRN):  ondansetron Injectable 4 milliGRAM(s) IV Push every 8 hours PRN Nausea and/or Vomiting

## 2021-10-19 NOTE — H&P ADULT - NSHPPHYSICALEXAM_GEN_ALL_CORE
awake and alert, no distress  neck: -jvd  lungs: clear: heart: s1s2 rrr  abd: soft, nt, nd  ext: 1-2+ edema b/l  neuro: no focal deficits

## 2021-10-19 NOTE — CONSULT NOTE ADULT - ASSESSMENT
# Encephalopathy d/t hypoglycemia  # ANTONELLA / likely pre-renal iso hypotension / r/o ATN  # HFpEF # Encephalopathy d/t hypoglycemia  # ANTONELLA / ?pre-renal iso hypotension / CRS / r/o ATN  # Acute on chronic anemia / on MMF/MTX  # ADHF / Hx of HFpEF   # Hyponatremia    Recommendations:  - obtain UA, urine lytes, urine pr/cr ratio  - d/c iv fluids  - strict i/o / daily weights  - hx of ?SLE, check MIR, anti-dsDNA, c3, C4  - check serum free light chain ratio, spep/ifx    - check methotrexate level   - f/u TTE  - check renal/bladder ultrasound  - restrict fluid intake to 1 liter daily  - hold ACE inhibitor   # Encephalopathy d/t hypoglycemia  # ANTONELLA / ?pre-renal iso hypotension / CRS / r/o ATN  # Acute on chronic anemia / on MMF/MTX  # ADHF / Hx of HFpEF   # Hyponatremia    Recommendations:  - obtain UA, urine lytes, urine pr/cr ratio  - d/c iv fluids  - strict i/o / daily weights  - hx of ?SLE, check MIR, anti-dsDNA, c3, C4  - check serum free light chain ratio, spep/ifx    - check methotrexate level   - f/u TTE  - check renal/bladder ultrasound  - restrict fluid intake to 1 liter daily  - hold ACE inhibitor  - check phos level  - check iron stores

## 2021-10-19 NOTE — CONSULT NOTE ADULT - SUBJECTIVE AND OBJECTIVE BOX
NEPHROLOGY CONSULTATION NOTE    LINDSAY ALBERT  76y  Female  MRN-[id number]    CC:   Patient is a 76y old  Female who presents with a chief complaint of slurred speech (19 Oct 2021 09:53)      HPI:  pt is a 77 yo female PMHx sig for HFpEF, HTN,  HLD, DM,  SLE on Cellsept and methotrexate, CVA in . Pt presented to the ed for slurred speech x 2 days, in the ED she was found to be hypoglycemic, pt states she has had difficulty controlling her glucose.  Speech has reportedly resolved after oral glucose administration.   (19 Oct 2021 00:57)      PAST MEDICAL & SURGICAL HISTORY:  DM (diabetes mellitus)  insulin  fs achs    Hypercholesteremia  statin    Hypertension  hctz    Lupus  as per hx    Fall, initial encounter  as  hx    Stroke  TIA , no deficits    Herniated lumbar intervertebral disc  as per hx    Seizure  keppra    No significant past surgical history      Allergies:  No Known Allergies    Home Medications Reviewed  Hospital Medications:   MEDICATIONS  (STANDING):  aspirin 325 milliGRAM(s) Oral daily  atorvastatin 40 milliGRAM(s) Oral at bedtime  dextrose 40% Gel 15 Gram(s) Oral once  dextrose 5% + sodium chloride 0.9%. 1000 milliLiter(s) (50 mL/Hr) IV Continuous <Continuous>  gabapentin 300 milliGRAM(s) Oral daily  glucagon  Injectable 1 milliGRAM(s) IntraMuscular once  heparin   Injectable 5000 Unit(s) SubCutaneous every 12 hours  hydroxychloroquine 400 milliGRAM(s) Oral two times a day  levothyroxine 150 MICROGram(s) Oral daily  metoprolol tartrate 25 milliGRAM(s) Oral two times a day    MEDICATIONS  (PRN):  ondansetron Injectable 4 milliGRAM(s) IV Push every 8 hours PRN Nausea and/or Vomiting    Home medications:  aspirin 81 mg oral tablet: 1 tab(s) orally once a day (26 Sep 2021 00:41)  Cymbalta 60 mg oral delayed release capsule: 1 cap(s) orally once a day (26 Sep 2021 00:41)  folic acid 1 mg oral tablet: 1 tab(s) orally once a day (26 Sep 2021 00:41)  gabapentin 400 mg oral capsule: 1 cap(s) orally 3 times a day (26 Sep 2021 00:41)  hydroxychloroquine 200 mg oral tablet: 2 tab(s) orally 2 times a day (with meals) (26 Sep 2021 00:41)  levothyroxine 150 mcg (0.15 mg) oral tablet: 1 tab(s) orally once a day (30 Sep 2021 08:11)  lisinopril 20 mg oral tablet: 1 tab(s) orally once a day (26 Sep 2021 00:41)  methotrexate 2.5 mg oral tablet: 7 tab(s) orally once a week (27 Sep 2021 10:47)  NovoLO unit(s) subcutaneous 3 times a day (before meals)  Please take as previously taken (26 Sep 2021 00:41)  Tresiba 100 units/mL subcutaneous solution: 20 unit(s) subcutaneous once a day in the morning (26 Sep 2021 00:41)  Zocor 80 mg oral tablet: 1 tab(s) orally once a day (at bedtime) (26 Sep 2021 00:41)      SOCIAL HISTORY:  Social History:      FAMILY HISTORY:      REVIEW OF SYSTEMS:   All other review of systems is negative unless indicated above.    VITALS:  T(F): 96.5 (10-19-21 @ 07:10), Max: 96.8 (10-19-21 @ 01:50)  HR: 68 (10-19-21 @ 09:00)  BP: 112/56 (10-19-21 @ 09:00)  RR: 16 (10-19-21 @ 09:00)  SpO2: 100% (10-19-21 @ 09:00)    Height (cm): 154.9 (10-19 @ 01:50)  Weight (kg): 72.4 (10-19 @ 01:50)  BMI (kg/m2): 30.2 (10-19 @ 01:50)  BSA (m2): 1.72 (10-19 @ 01:50)  I&O's Detail    18 Oct 2021 07:  -  19 Oct 2021 07:00  --------------------------------------------------------  IN:    dextrose 5% + sodium chloride 0.9%: 250 mL  Total IN: 250 mL    OUT:    Voided (mL): 300 mL  Total OUT: 300 mL    Total NET: -50 mL      19 Oct 2021 07:01  -  19 Oct 2021 10:47  --------------------------------------------------------  IN:    dextrose 5% + sodium chloride 0.9%: 150 mL  Total IN: 150 mL    OUT:  Total OUT: 0 mL    Total NET: 150 mL            I&O's Summary    18 Oct 2021 07:  -  19 Oct 2021 07:00  --------------------------------------------------------  IN: 250 mL / OUT: 300 mL / NET: -50 mL    19 Oct 2021 07:01  -  19 Oct 2021 10:47  --------------------------------------------------------  IN: 150 mL / OUT: 0 mL / NET: 150 mL        PHYSICAL EXAM:  Gen: NAD  resp: CTAB  card: S1/S2  abd: soft  ext: no edema  vascular access:     LABS:  Daily Height in cm: 154.94 (19 Oct 2021 01:50)    Daily Weight in k.3 (19 Oct 2021 07:10)    10-19    135  |  98  |  82<HH>  ----------------------------<  85  4.1   |  23  |  2.9<H>    Ca    8.5      19 Oct 2021 06:03  Mg     2.1     10-18    TPro  6.7  /  Alb  3.5  /  TBili  0.4  /  DBili      /  AST  57<H>  /  ALT  89<H>  /  AlkPhos  122<H>  10-18    Creatinine Trend:   Creatinine, Serum: 2.9 mg/dL (10-19-21 @ 06:03)  Creatinine, Serum: 2.9 mg/dL (10-18-21 @ 20:53)  Creatinine, Serum: 1.0 mg/dL (10-02-21 @ 06:41)  Creatinine, Serum: 0.9 mg/dL (10-01-21 @ 06:02)  Creatinine, Serum: 0.9 mg/dL (21 @ 05:53)  Creatinine, Serum: 0.8 mg/dL (21 @ 06:18)  Creatinine, Serum: 0.9 mg/dL (21 @ 05:35)                            7.9    7.11  )-----------( 295      ( 19 Oct 2021 06:03 )             25.8     Mean Cell Volume: 87.5 fL (10-19-21 @ 06:03)    Urine Studies:              RADIOLOGY & ADDITIONAL STUDIES:        Xray Chest 1 View- PORTABLE-Urgent:   EXAM:  XR CHEST PORTABLE URGENT 1V            PROCEDURE DATE:  10/18/2021            INTERPRETATION:  Clinical History / Reason for exam: Chest pain    Comparison : Chest radiograph 10/1/2021.    Technique/Positioning: Frontal, portable.    Findings:    Support devices: None.    Cardiac/mediastinum/hilum: Stable    Lung parenchyma/Pleura: Increasing bilateral lung opacities    Skeleton/soft tissues: Stable    Impression:    Increasing bilateral lung opacities    < from: CT Head No Cont (10.18.21 @ 22:21) >  IMPRESSION:    No CT evidence of acute intracranial pathology.     NEPHROLOGY CONSULTATION NOTE    LINDSAY ALBERT  76y  Female  MRN-[id number]    CC:   Patient is a 76y old  Female who presents with a chief complaint of slurred speech (19 Oct 2021 09:53)      HPI:  pt is a 75 yo female PMHx sig for HFpEF, HTN,  HLD, DM,  SLE on Cellsept and methotrexate, CVA in . Pt presented to the ed for slurred speech x 2 days, in the ED she was found to be hypoglycemic, pt states she has had difficulty controlling her glucose.  Speech has reportedly resolved after oral glucose administration.   (19 Oct 2021 00:57)      PAST MEDICAL & SURGICAL HISTORY:  DM (diabetes mellitus)  insulin  fs achs    Hypercholesteremia  statin    Hypertension  hctz    Lupus  as per hx    Fall, initial encounter  as  hx    Stroke  TIA , no deficits    Herniated lumbar intervertebral disc  as per hx    Seizure  keppra    No significant past surgical history      Allergies:  No Known Allergies    Home Medications Reviewed  Hospital Medications:   MEDICATIONS  (STANDING):  aspirin 325 milliGRAM(s) Oral daily  atorvastatin 40 milliGRAM(s) Oral at bedtime  dextrose 5% + sodium chloride 0.9%. 1000 milliLiter(s) (50 mL/Hr) IV Continuous <Continuous>  gabapentin 300 milliGRAM(s) Oral daily  glucagon  Injectable 1 milliGRAM(s) IntraMuscular once  heparin   Injectable 5000 Unit(s) SubCutaneous every 12 hours  hydroxychloroquine 400 milliGRAM(s) Oral two times a day  levothyroxine 150 MICROGram(s) Oral daily  metoprolol tartrate 25 milliGRAM(s) Oral two times a day    MEDICATIONS  (PRN):  ondansetron Injectable 4 milliGRAM(s) IV Push every 8 hours PRN Nausea and/or Vomiting    Home medications:  aspirin 81 mg oral tablet: 1 tab(s) orally once a day (26 Sep 2021 00:41)  Cymbalta 60 mg oral delayed release capsule: 1 cap(s) orally once a day (26 Sep 2021 00:41)  folic acid 1 mg oral tablet: 1 tab(s) orally once a day (26 Sep 2021 00:41)  gabapentin 400 mg oral capsule: 1 cap(s) orally 3 times a day (26 Sep 2021 00:41)  hydroxychloroquine 200 mg oral tablet: 2 tab(s) orally 2 times a day (with meals) (26 Sep 2021 00:41)  levothyroxine 150 mcg (0.15 mg) oral tablet: 1 tab(s) orally once a day (30 Sep 2021 08:11)  lisinopril 20 mg oral tablet: 1 tab(s) orally once a day (26 Sep 2021 00:41)  methotrexate 2.5 mg oral tablet: 7 tab(s) orally once a week (27 Sep 2021 10:47)  NovoLO unit(s) subcutaneous 3 times a day (before meals)  Please take as previously taken (26 Sep 2021 00:41)  Tresiba 100 units/mL subcutaneous solution: 20 unit(s) subcutaneous once a day in the morning (26 Sep 2021 00:41)  Zocor 80 mg oral tablet: 1 tab(s) orally once a day (at bedtime) (26 Sep 2021 00:41)      SOCIAL HISTORY:  Social History:      FAMILY HISTORY:      REVIEW OF SYSTEMS:   All other review of systems is negative unless indicated above.    VITALS:  T(F): 96.5 (10-19-21 @ 07:10), Max: 96.8 (10-19-21 @ 01:50)  HR: 68 (10-19-21 @ 09:00)  BP: 112/56 (10-19-21 @ 09:00)  RR: 16 (10-19-21 @ 09:00)  SpO2: 100% (10-19-21 @ 09:00)    Height (cm): 154.9 (10-19 @ 01:50)  Weight (kg): 72.4 (10-19 @ 01:50)  BMI (kg/m2): 30.2 (10-19 @ 01:50)  BSA (m2): 1.72 (10-19 @ 01:50)  I&O's Detail    18 Oct 2021 07:  -  19 Oct 2021 07:00  --------------------------------------------------------  IN:    dextrose 5% + sodium chloride 0.9%: 250 mL  Total IN: 250 mL    OUT:    Voided (mL): 300 mL  Total OUT: 300 mL    Total NET: -50 mL      19 Oct 2021 07:  -  19 Oct 2021 10:47  --------------------------------------------------------  IN:    dextrose 5% + sodium chloride 0.9%: 150 mL  Total IN: 150 mL    OUT:  Total OUT: 0 mL    Total NET: 150 mL            I&O's Summary    18 Oct 2021 07:  -  19 Oct 2021 07:00  --------------------------------------------------------  IN: 250 mL / OUT: 300 mL / NET: -50 mL    19 Oct 2021 07:  -  19 Oct 2021 10:47  --------------------------------------------------------  IN: 150 mL / OUT: 0 mL / NET: 150 mL        PHYSICAL EXAM:  Gen: NAD  resp: decreased bs at the bases   card: S1/S2  abd: soft  ext: +edema    LABS:  Daily Height in cm: 154.94 (19 Oct 2021 01:50)    Daily Weight in k.3 (19 Oct 2021 07:10)    10-19    135  |  98  |  82<HH>  ----------------------------<  85  4.1   |  23  |  2.9<H>    Ca    8.5      19 Oct 2021 06:03  Mg     2.1     10-18    TPro  6.7  /  Alb  3.5  /  TBili  0.4  /  DBili      /  AST  57<H>  /  ALT  89<H>  /  AlkPhos  122<H>  10-18    Creatinine Trend:   Creatinine, Serum: 2.9 mg/dL (10-19-21 @ 06:03)  Creatinine, Serum: 2.9 mg/dL (10-18-21 @ 20:53)  Creatinine, Serum: 1.0 mg/dL (10-02-21 @ 06:41)  Creatinine, Serum: 0.9 mg/dL (10-01-21 @ 06:02)  Creatinine, Serum: 0.9 mg/dL (21 @ 05:53)  Creatinine, Serum: 0.8 mg/dL (21 @ 06:18)  Creatinine, Serum: 0.9 mg/dL (21 @ 05:35)                            7.9    7.11  )-----------( 295      ( 19 Oct 2021 06:03 )             25.8     Mean Cell Volume: 87.5 fL (10-19-21 @ 06:03)    Urine Studies:              RADIOLOGY & ADDITIONAL STUDIES:        Xray Chest 1 View- PORTABLE-Urgent:   EXAM:  XR CHEST PORTABLE URGENT 1V            PROCEDURE DATE:  10/18/2021            INTERPRETATION:  Clinical History / Reason for exam: Chest pain    Comparison : Chest radiograph 10/1/2021.    Technique/Positioning: Frontal, portable.    Findings:    Support devices: None.    Cardiac/mediastinum/hilum: Stable    Lung parenchyma/Pleura: Increasing bilateral lung opacities    Skeleton/soft tissues: Stable    Impression:    Increasing bilateral lung opacities    < from: CT Head No Cont (10.18.21 @ 22:21) >  IMPRESSION:    No CT evidence of acute intracranial pathology.    < from: TTE Echo Complete w/o Contrast w/ Doppler (21 @ 08:40) >  Summary:   1. Leftventricular ejection fraction, by visual estimation, is 55 to 60%.   2. Mildly enlarged left atrium.   3. Moderate mitral annular calcification.   4. Moderate mitral valve regurgitation.   5. Mild tricuspid regurgitation.   6. Mild aortic regurgitation.   7. There is mild aortic root calcification.    < end of copied text >

## 2021-10-19 NOTE — PROGRESS NOTE ADULT - ASSESSMENT
76 yr F with SLE on Methotrexate, moderate MR, HFpEF, HTN, HLD, T2DM, hx of CVA with no residual deficits discharged from hospital earlier this month on 10/2 s/p HF exacerbation and NSTEMI, DC'd on new home oxygen, refused in pt cardiac cath and is supposed to have outpt cardiac cath with Dr Kowalski.     Presented to the hospital yesterday c/o slurred speech with low sugars and SOB. As Per santy Parr [phone] pt has been more confused for the past 2 days, states she has been slurring her words, confirms both high and low blood sugars.    Acute Kidney injury - suspected pre renal / possible NSTEMI / acute metabolic encephalopathy probably secondary to hypoglycemia     - hold Lasix   - check repeat CE and repeat 2DECHO   - tele   - aspirin, Lipitor, metoprolol   - follow repeat kidney function   - cardiology following   - continue home meds   - DVT prophylaxis

## 2021-10-19 NOTE — H&P ADULT - HISTORY OF PRESENT ILLNESS
pt is a 75 yo female PMHx sig for HFpEF, HTN,  HLD, DM,  SLE on Cellsept and methotrexate, CVA in 2002. Pt presented to the ed for slurred speech x 2 days, in the ED she was found to be hypoglycemic, pt states she has had difficulty controlling her glucose.  Speech has reportedly resolved after oral glucose administration.

## 2021-10-19 NOTE — CONSULT NOTE ADULT - ASSESSMENT
IMPRESSION:  metabolic encephalopathy secondary to hypoglycemia   CHF   Marianne prerenal   anemia     PLAN:    CNS: neuro follow up   hold gabapentin for now she received morning dose   HEENT: oral care     PULMONARY: keep pox > 92 %     CARDIOVASCULAR: echo cardiology eval   follow CE   GI: GI prophylaxis.  Feeding     RENAL: follow is and os   renal and bladder sono   renal consult     INFECTIOUS DISEASE: procal     HEMATOLOGICAL:  DVT prophylaxis. follow h/h for now     ENDOCRINE:  Follow up FS.  Insulin protocol if needed.TSH cortisol level   on D1/2 nd   MUSCULOSKELETAL:        CRITICAL CARE TIME SPENT: ***

## 2021-10-19 NOTE — H&P ADULT - ASSESSMENT
NSTEMI  CHF  Acute renal failure  Hypoglycemia  SLE    Pt presented w slurred speech and found to be hypoglycemic, which resolved w oral glucose admin.  Pt has metabolic abnormalities, elevated TN-T elevated BNP acute renal failure. Pt reportedly increased her lasix as directed by her physician, may be due to pre renal from excessive diuresis, will obtain Nephrology consult.  Check serial CE and ekg, cardio eval. Monitor FSBS q2h. will hold insulin at this time.

## 2021-10-19 NOTE — PATIENT PROFILE ADULT - STATED REASON FOR ADMISSION
Grandson stated to EMS that she has been slurring her words x2 days Pt found to be hypoglycemic FS 50 PT covered am + with fast acting insulin and never ate Also recently increased lasix to 80mg 2 times a day and not drinking

## 2021-10-19 NOTE — PROGRESS NOTE ADULT - ASSESSMENT
Patient is a 75 y/o female with PMHx of SLE (on cellcept and methotrexate), hypertension, hyperlipidemia, T2DM, moderate MR, HFpEF, CVA with no deficits, who presents to the ED with confusion and slurred speech due to hypoglycemia, which resolved with glucose administration.  Patient also endorses worsening SOB, orthopnea, and b/l LE edema for the past month during which she increased her lasix to 80mg BID.     # Metabolic Encephalopathy secondary to Hypoglycemia  - Hold insulin   - Continue D5 NS @ 50  - Monitor FS q2h  - F/u TSH and cortisol levels     #ANTONELLA likely Pre-renal   - Cr. 2.9  - Patient was increasing dose of lasix to 80mg BID for the past month due to worsening orthopnea and LE edema  - Hold lasix   - Hold gabapentin for now  - Ordered renal and bladder US  - Ordered renal consult     # CHF   - BNP 65,033, Trops 0.02 --> 0.61  - Chest X-Ray: increasing b/l lung capacities from last visit  - Worsening orthopnea and b/l lower extremity edema for the past month   - Trend trops  - Repeat ECHO  - Cardio eval     # Normocytic Anemia   - Baseline Hb 8   - Hb 7.9 (10/19)  - Keep Hb >7  - Trend H&H    Status: FULL CODE  GI Prophylaxis: None   DVT Prophylaxis: Hep SubQ   Dispo: MICU

## 2021-10-20 NOTE — PROGRESS NOTE ADULT - ASSESSMENT
76 yr F with SLE on Methotrexate, moderate MR, HFpEF, HTN, HLD, T2DM, hx of CVA with no residual deficits discharged from hospital earlier this month on 10/2 s/p HF exacerbation and NSTEMI, DC'd on new home oxygen, refused in pt cardiac cath and is supposed to have outpt cardiac cath with Dr Kowalski.     Presented to the hospital yesterday c/o slurred speech with low sugars and SOB. As Per santy Parr [phone] pt has been more confused for the past 2 days, states she has been slurring her words, confirms both high and low blood sugars.    Acute Kidney injury - suspected pre renal / possible NSTEMI / acute metabolic encephalopathy probably secondary to hypoglycemia     -  Lasix held    - tele   - aspirin, Lipitor, metoprolol   -  repeat kidney function improved   - cardiology following   - continue home meds   - DVT prophylaxis

## 2021-10-20 NOTE — PROGRESS NOTE ADULT - SUBJECTIVE AND OBJECTIVE BOX
Patient is a 76y old  Female who presents with a chief complaint of slurred speech (20 Oct 2021 10:02)      T(F): 98.6 (10-20-21 @ 07:13), Max: 98.6 (10-20-21 @ 07:13)  HR: 74 (10-20-21 @ 10:48)  BP: 110/55 (10-20-21 @ 10:48)  RR: 15 (10-20-21 @ 10:48)  SpO2: 94% (10-20-21 @ 10:48) (93% - 100%)    PHYSICAL EXAM:  GENERAL: NAD  HEAD:  Atraumatic, Normocephalic  EYES: EOMI, PERRLA, conjunctiva and sclera clear  NERVOUS SYSTEM:   no focal deficits   CHEST/LUNG: bilateral rhonchi  HEART: Regular rate and rhythm; No murmurs, rubs, or gallops  ABDOMEN: Soft, Nontender, Nondistended; Bowel sounds present  EXTREMITIES:  b/ledema    LABS  10-20    135  |  99  |  49<H>  ----------------------------<  209<H>  4.5   |  26  |  1.4    Ca    8.5      20 Oct 2021 12:18  Phos  3.1     10-20  Mg     2.0     10-20    TPro  5.9<L>  /  Alb  3.0<L>  /  TBili  0.2  /  DBili  x   /  AST  26  /  ALT  49<H>  /  AlkPhos  107  10-20                          8.6    7.26  )-----------( 311      ( 20 Oct 2021 12:18 )             27.8       CARDIAC ENZYMES      Troponin T, Serum: 0.35 ng/mL (10-19-21 @ 21:41)  Troponin T, Serum: 0.49 ng/mL (10-19-21 @ 06:03)  Troponin T, Serum: 0.61 ng/mL (10-18-21 @ 20:53)    < from: TTE Echo Complete w/o Contrast w/ Doppler (10.19.21 @ 14:47) >  Summary:   1. Left ventricular ejection fraction, by visual estimation, is 50 to 55%.   2. Mildly enlarged left atrium.   3. Mild mitral annular calcification.   4. Moderate mitral valve regurgitation.   5. Moderate tricuspid regurgitation.   6. Mild aortic regurgitation.   7. Sclerotic aortic valve with normal opening.  8. Estimated pulmonary artery systolic pressure is 37.3 mmHg assuming a right atrial pressure of 3 mmHg, which is consistent with borderline pulmonary hypertension.    < end of copied text >      RADIOLOGY  < from: Xray Chest 1 View- PORTABLE-Urgent (Xray Chest 1 View- PORTABLE-Urgent .) (10.20.21 @ 07:58) >    Impression:    Pulmonary vascular congestion and prominent interstitial opacities.      < end of copied text >  < from: US Renal (10.19.21 @ 13:04) >  IMPRESSION:    Bilateral small pelvicalyceal subcentimeter calculi as above. No hydronephrosis bilaterally.    < end of copied text >    MEDICATIONS  (STANDING):  aspirin 325 milliGRAM(s) Oral daily  atorvastatin 40 milliGRAM(s) Oral at bedtime  chlorhexidine 4% Liquid 1 Application(s) Topical daily  gabapentin 300 milliGRAM(s) Oral daily  glucagon  Injectable 1 milliGRAM(s) IntraMuscular once  heparin   Injectable 5000 Unit(s) SubCutaneous every 12 hours  hydroxychloroquine 400 milliGRAM(s) Oral two times a day  insulin glargine Injectable (LANTUS) 20 Unit(s) SubCutaneous every morning  insulin lispro (ADMELOG) corrective regimen sliding scale   SubCutaneous three times a day before meals  iron sucrose IVPB 200 milliGRAM(s) IV Intermittent once  levothyroxine 175 MICROGram(s) Oral daily  metoprolol tartrate 25 milliGRAM(s) Oral two times a day    MEDICATIONS  (PRN):  ondansetron Injectable 4 milliGRAM(s) IV Push every 8 hours PRN Nausea and/or Vomiting

## 2021-10-20 NOTE — PROGRESS NOTE ADULT - SUBJECTIVE AND OBJECTIVE BOX
Patient is a 76y old  Female who presents with a chief complaint of AMS (19 Oct 2021 11:53)      Over Night Events:  Patient seen and examined.   awake not on distress   no fever   no hypoglycemia   ROS:  See HPI    PHYSICAL EXAM    ICU Vital Signs Last 24 Hrs  T(C): 37 (20 Oct 2021 07:13), Max: 37 (20 Oct 2021 07:13)  T(F): 98.6 (20 Oct 2021 07:13), Max: 98.6 (20 Oct 2021 07:13)  HR: 68 (20 Oct 2021 07:13) (68 - 74)  BP: 100/57 (20 Oct 2021 07:13) (100/57 - 121/56)  BP(mean): 73 (20 Oct 2021 07:13) (73 - 81)  ABP: --  ABP(mean): --  RR: 22 (20 Oct 2021 07:13) (12 - 24)  SpO2: 93% (20 Oct 2021 07:13) (93% - 100%)      General: awake comfortable   HEENT:     patricia           Lymph Nodes: NO cervical LN   Lungs: Bilateral BS  Cardiovascular: Regular   Abdomen: Soft, Positive BS  Extremities: No clubbing   Skin: warm   Neurological:  no focal   Musculoskeletal: move all ext     I&O's Detail    19 Oct 2021 07:01  -  20 Oct 2021 07:00  --------------------------------------------------------  IN:    dextrose 5% + sodium chloride 0.9%: 350 mL    Oral Fluid: 240 mL  Total IN: 590 mL    OUT:    Intermittent Catheterization - Urethral (mL): 180 mL    Voided (mL): 2250 mL  Total OUT: 2430 mL    Total NET: -1840 mL          LABS:                          7.9    7.11  )-----------( 295      ( 19 Oct 2021 06:03 )             25.8         19 Oct 2021 11:17    136    |  97     |  75     ----------------------------<  205    4.4     |  27     |  2.4      Ca    8.6        19 Oct 2021 11:17  Phos  3.1       20 Oct 2021 06:24  Mg     2.1       18 Oct 2021 20:53                                                                                      Urinalysis Basic - ( 19 Oct 2021 16:15 )    Color: Yellow / Appearance: Clear / S.025 / pH: x  Gluc: x / Ketone: Negative  / Bili: Negative / Urobili: 0.2 mg/dL   Blood: x / Protein: Trace mg/dL / Nitrite: Negative   Leuk Esterase: Trace / RBC: 3-5 /HPF / WBC 6-10 /HPF   Sq Epi: x / Non Sq Epi: Few /HPF / Bacteria: Few          CARDIAC MARKERS ( 19 Oct 2021 21:41 )  x     / 0.35 ng/mL / x     / x     / x      CARDIAC MARKERS ( 19 Oct 2021 06:03 )  x     / 0.49 ng/mL / x     / x     / x      CARDIAC MARKERS ( 18 Oct 2021 20:53 )  x     / 0.61 ng/mL / x     / x     / x                                                                                                                                                 MEDICATIONS  (STANDING):  aspirin 325 milliGRAM(s) Oral daily  atorvastatin 40 milliGRAM(s) Oral at bedtime  chlorhexidine 4% Liquid 1 Application(s) Topical daily  dextrose 40% Gel 15 Gram(s) Oral once  dextrose 5%. 1000 milliLiter(s) (50 mL/Hr) IV Continuous <Continuous>  dextrose 5%. 1000 milliLiter(s) (100 mL/Hr) IV Continuous <Continuous>  dextrose 50% Injectable 25 Gram(s) IV Push once  dextrose 50% Injectable 12.5 Gram(s) IV Push once  dextrose 50% Injectable 25 Gram(s) IV Push once  gabapentin 300 milliGRAM(s) Oral daily  glucagon  Injectable 1 milliGRAM(s) IntraMuscular once  heparin   Injectable 5000 Unit(s) SubCutaneous every 12 hours  hydroxychloroquine 400 milliGRAM(s) Oral two times a day  insulin glargine Injectable (LANTUS) 20 Unit(s) SubCutaneous every morning  insulin lispro (ADMELOG) corrective regimen sliding scale   SubCutaneous three times a day before meals  levothyroxine 150 MICROGram(s) Oral daily  metoprolol tartrate 25 milliGRAM(s) Oral two times a day    MEDICATIONS  (PRN):  ondansetron Injectable 4 milliGRAM(s) IV Push every 8 hours PRN Nausea and/or Vomiting          Xrays:  TLC:  OG:  ET tube:                                                                                    stable cxr increase marking bibasilar    ECHO:  [facility] ICU:

## 2021-10-20 NOTE — PROGRESS NOTE ADULT - ASSESSMENT
Patient is a 77 y/o female with PMHx of SLE (on cellcept and methotrexate), hypertension, hyperlipidemia, T2DM, moderate MR, HFpEF, CVA with no deficits, who presents to the ED with confusion and slurred speech due to hypoglycemia, which resolved with glucose administration.  Patient also endorses worsening SOB, orthopnea, and b/l LE edema for the past month during which she increased her lasix to 80mg BID.     # Metabolic Encephalopathy secondary to Hypoglycemia - Resolved   - Hold insulin   - Monitor FS     #ANTONELLA likely Pre-renal - Improving   - Patient was increasing dose of lasix to 80mg BID for the past month due to worsening orthopnea and LE edema  - Cr. improved to 2.4 from 2.9  - Renal ultrasound showed Bilateral small pelvicalyceal subcentimeter calculi as above. No hydronephrosis bilaterally.  - Nephro consult appreciated     --> Check MIR, anti-dsDNA, c3, c4, serum free light chain ratio, spep/ifx, phosphate, iron levels, and methotrexate level  - Hold lasix   - Hold gabapentin for now  - D/c fluids     # CHF   - BNP 65,033, Trops 0.61 --> 0.35  - Chest X-Ray: increasing b/l lung capacities from last visit  - Worsening orthopnea and b/l lower extremity edema for the past month   - ECHO unchanged except for new, mild pulmonary hypertension   - D/c fluids   - F/u with cardio    # Normocytic Anemia   - Baseline Hb 8   - Hb 7.9 (10/19)  - Keep Hb >7  - F/u H&H     # Hypothyriodism   - Elevated TSH 12.24  - Increased synthroid to 175mg from 150mg  - Trend TSH    Status: FULL CODE  GI Prophylaxis: None   DVT Prophylaxis: Hep SubQ   Dispo: TELE Patient is a 75 y/o female with PMHx of SLE (on cellcept and methotrexate), hypertension, hyperlipidemia, T2DM, moderate MR, HFpEF, CVA with no deficits, who presents to the ED with confusion and slurred speech due to hypoglycemia, which resolved with glucose administration.  Patient also endorses worsening SOB, orthopnea, and b/l LE edema for the past month during which she increased her lasix to 80mg BID.     # Metabolic Encephalopathy secondary to Hypoglycemia - Resolved   - Insulin regimen restarted for hyperglycemia    - Monitor FS     #ANTONELLA likely Pre-renal - Improving   - Patient was increasing dose of lasix to 80mg BID for the past month due to worsening orthopnea and LE edema  - Cr. improved to 2.4 from 2.9  - Renal ultrasound showed Bilateral small pelvicalyceal subcentimeter calculi as above. No hydronephrosis bilaterally.  - Nephro consult appreciated     --> Check MIR, anti-dsDNA, c3, c4, serum free light chain ratio, spep/ifx, phosphate, iron levels, and methotrexate level  - Hold lasix   - Hold gabapentin for now  - D/c fluids     # CHF   - BNP 65,033, Trops 0.61 --> 0.35  - Chest X-Ray: increasing b/l lung capacities from last visit  - Worsening orthopnea and b/l lower extremity edema for the past month   - ECHO unchanged except for new, mild pulmonary hypertension   - D/c fluids   - F/u with cardio    # Normocytic Anemia   - Baseline Hb 8   - Hb 7.9 (10/19)  - Keep Hb >7  - F/u H&H     # Hypothyriodism   - Elevated TSH 12.24  - Increased synthroid to 175mg from 150mg  - Trend TSH    Status: FULL CODE  GI Prophylaxis: None   DVT Prophylaxis: Hep SubQ   Dispo: TELE Patient is a 75 y/o female with PMHx of SLE (on cellcept and methotrexate), hypertension, hyperlipidemia, T2DM, moderate MR, HFpEF, CVA with no deficits, who presents to the ED with confusion and slurred speech due to hypoglycemia, which resolved with glucose administration.  Patient also endorses worsening SOB, orthopnea, and b/l LE edema for the past month during which she increased her lasix to 80mg twice daily.    Patient continues to improve off the Lasix and will repeat the BMP and follow the renal function. Will simplify insulin regimen given the risk of hypoglycemia and will accept mild hyperglycemia in return. Will plan to discharge in 24-48h    # Metabolic Encephalopathy secondary to Hypoglycemia - Resolved   - Insulin regimen restarted for hyperglycemia  - Will start basal 20 units in AM with sliding scale to simplify regimen    - Monitor FS     #ANTONELLA likely Pre-renal - Improving   - Patient was increasing dose of lasix to 80mg BID for the past month due to worsening orthopnea and LE edema  - Cr. improved to 2.4 from 2.9  - Renal ultrasound showed Bilateral small pelvicalyceal subcentimeter calculi as above. No hydronephrosis bilaterally.  - Nephro consult appreciated   - Ordered MIR, anti-dsDNA, c3, c4, serum free light chain ratio, spep/ifx, phosphate, iron levels, and methotrexate level  - Holding lasix   - Holding gabapentin for now  - Off fluids     # HFpEF   - BNP 65,033, Trops 0.61 --> 0.35  - Chest X-Ray: increasing b/l lung capacities from last visit  - Worsening orthopnea and b/l lower extremity edema for the past month   - Was taking increasing amounts of Lasix at home  - Appears dry on the echo and exam despite the lower extremity edema   - ECHO unchanged except for new, mild pulmonary hypertension   - F/u with cardio    # Normocytic Anemia   - Baseline Hb 8   - Hb 7.9 (10/19)  - Keep Hb >7  - F/u H&H     # Hypothyroidism   - Elevated TSH 12.24  - Increased synthroid to 175mg from 150mg  - TSH repeat outpatient in 2 months    Status: FULL CODE  GI Prophylaxis: None   DVT Prophylaxis: Hep SubQ   Dispo: TELE for now. Consider DC in 24-48h Patient is a 75 y/o female with PMHx of SLE (on cellcept and methotrexate), hypertension, hyperlipidemia, T2DM, moderate MR, HFpEF, CVA with no deficits, who presents to the ED with confusion and slurred speech due to hypoglycemia, which resolved with glucose administration.  Patient also endorses worsening SOB, orthopnea, and b/l LE edema for the past month during which she increased her lasix to 80mg twice daily.    Patient continues to improve off the Lasix and will repeat the BMP and follow the renal function. Will simplify insulin regimen given the risk of hypoglycemia and will accept mild hyperglycemia in return. Will plan to discharge in 24-48h    # Metabolic Encephalopathy secondary to Hypoglycemia - Resolved   - Insulin regimen restarted for hyperglycemia  - Will start basal 20 units in AM with sliding scale to simplify regimen    - Monitor FS     #ANTONELLA likely Pre-renal - Improving   - Patient was increasing dose of lasix to 80mg BID for the past month due to worsening orthopnea and LE edema  - Cr. improved to 2.4 from 2.9  - Renal ultrasound showed Bilateral small pelvicalyceal subcentimeter calculi as above. No hydronephrosis bilaterally.  - Nephro consult appreciated   - Ordered MIR, anti-dsDNA, c3, c4, serum free light chain ratio, spep/ifx, phosphate, iron levels, and methotrexate level  - Holding lasix   - Holding gabapentin for now  - Off fluids     # HFpEF   - BNP 65,033, Trops 0.61 --> 0.35  - Chest X-Ray: increasing b/l lung capacities from last visit  - Worsening orthopnea and b/l lower extremity edema for the past month   - Was taking increasing amounts of Lasix at home  - Appears dry on the echo and exam despite the lower extremity edema   - ECHO unchanged except for new, mild pulmonary hypertension   - F/u with cardio    # Normocytic Anemia   - Baseline Hb 8   - Hb 7.9 (10/19)  - Keep Hb >7  - F/u H&H     # Hypothyroidism   - Elevated TSH 12.24  - Increased synthroid to 175mg from 150mg  - TSH repeat outpatient in 2 months    # Iron Deficiency Anemia  # Anemia of chronic disease  - Will give Iron sucrose 200 daily  - Sat 9% and total 23 with normal TIBC    Status: FULL CODE  GI Prophylaxis: None   DVT Prophylaxis: Hep SubQ   Dispo: TELE for now. Consider DC in 24-48h Patient is a 75 y/o female with PMHx of SLE (on cellcept and methotrexate), hypertension, hyperlipidemia, T2DM, moderate MR, HFpEF, CVA with no deficits, who presents to the ED with confusion and slurred speech due to hypoglycemia, which resolved with glucose administration.  Patient also endorses worsening SOB, orthopnea, and b/l LE edema for the past month during which she increased her lasix to 80mg twice daily.    Patient continues to improve off the Lasix and will repeat the BMP and follow the renal function. Will simplify insulin regimen given the risk of hypoglycemia and will accept mild hyperglycemia in return. Will plan to discharge in 24-48h    # Metabolic Encephalopathy secondary to Hypoglycemia - Resolved   - Insulin regimen restarted for hyperglycemia  - Will start basal 20 units in AM with sliding scale to simplify regimen    - Monitor FS     #ANTONELLA likely Pre-renal - Improving   - Patient was increasing dose of lasix to 80mg BID for the past month due to worsening orthopnea and LE edema  - Cr. improved to 1.4 from 2.9  - Renal ultrasound showed Bilateral small pelvicalyceal subcentimeter calculi as above. No hydronephrosis bilaterally.  - Nephro consult appreciated   - Ordered MIR, anti-dsDNA, c3, c4, serum free light chain ratio, spep/ifx, phosphate, iron levels, and methotrexate level  - Holding Lasix for now  - Will resume Lasix at a lower dose tomorrow   - Holding gabapentin for now  - Off fluids for now    # HFpEF   - BNP 65,033, Trops 0.61 --> 0.35  - Chest X-Ray: increasing b/l lung capacities from last visit  - Worsening orthopnea and b/l lower extremity edema for the past month   - Was taking increasing amounts of Lasix at home  - Appears dry on the echo and exam despite the lower extremity edema   - ECHO unchanged except for new, mild pulmonary hypertension   - F/u with cardio    # Normocytic Anemia   - Baseline Hb 8   - Hb 7.9 (10/19)  - Keep Hb >7  - F/u H&H     # Hypothyroidism   - Elevated TSH 12.24  - Increased synthroid to 175mg from 150mg  - TSH repeat outpatient in 2 months    # Iron Deficiency Anemia  # Anemia of chronic disease  - Will give Iron sucrose 200 daily  - Sat 9% and total 23 with normal TIBC    Status: FULL CODE  GI Prophylaxis: None   DVT Prophylaxis: Hep SubQ   Dispo: TELE for now. Consider DC in 24-48h

## 2021-10-20 NOTE — PROGRESS NOTE ADULT - ASSESSMENT
IMPRESSION:  metabolic encephalopathy secondary to hypoglycemia   CHF   Marianne prerenal   anemia     PLAN:    CNS: neuro follow up   hold gabapentin for now she received morning dose   HEENT: oral care     PULMONARY: keep pox > 92 %     CARDIOVASCULAR: echo cardiology follow up   follow CE   d/c iv fluid   GI: GI prophylaxis.  Feeding     RENAL: follow is and os   follow renal recommendation   follow morning BMP     INFECTIOUS DISEASE: procal for now     HEMATOLOGICAL:  DVT prophylaxis. follow h/h for now repeat cbcb     ENDOCRINE:  Follow up FS.  on lantus     MUSCULOSKELETAL:    downgrade to tele for now follow cardiology     CRITICAL CARE TIME SPENT: ***

## 2021-10-20 NOTE — PROGRESS NOTE ADULT - SUBJECTIVE AND OBJECTIVE BOX
SUBJECTIVE:    Patient is a 76y old Female who presents with a chief complaint of slurred speech (20 Oct 2021 08:34)    Currently admitted to medicine with the primary diagnosis of Metabolic encephalopathy secondary to hypoglycemia        Today is hospital day 1d. This morning she is resting comfortably in bed and reports no new issues or overnight events.     PAST MEDICAL & SURGICAL HISTORY  DM (diabetes mellitus)  insulin  fs achs    Hypercholesteremia  statin    Hypertension  hctz    Lupus  as per hx    Fall, initial encounter  as  hx    Stroke  TIA , no deficits    Herniated lumbar intervertebral disc  as per hx    Seizure  keppra    No significant past surgical history      SOCIAL HISTORY:  Negative for smoking/alcohol/drug use.     ALLERGIES:  No Known Allergies    MEDICATIONS:  STANDING MEDICATIONS  aspirin 325 milliGRAM(s) Oral daily  atorvastatin 40 milliGRAM(s) Oral at bedtime  chlorhexidine 4% Liquid 1 Application(s) Topical daily  dextrose 40% Gel 15 Gram(s) Oral once  dextrose 5%. 1000 milliLiter(s) IV Continuous <Continuous>  dextrose 5%. 1000 milliLiter(s) IV Continuous <Continuous>  dextrose 50% Injectable 25 Gram(s) IV Push once  dextrose 50% Injectable 12.5 Gram(s) IV Push once  dextrose 50% Injectable 25 Gram(s) IV Push once  gabapentin 300 milliGRAM(s) Oral daily  glucagon  Injectable 1 milliGRAM(s) IntraMuscular once  heparin   Injectable 5000 Unit(s) SubCutaneous every 12 hours  hydroxychloroquine 400 milliGRAM(s) Oral two times a day  insulin glargine Injectable (LANTUS) 20 Unit(s) SubCutaneous every morning  insulin lispro (ADMELOG) corrective regimen sliding scale   SubCutaneous three times a day before meals  levothyroxine 150 MICROGram(s) Oral daily  metoprolol tartrate 25 milliGRAM(s) Oral two times a day    PRN MEDICATIONS  ondansetron Injectable 4 milliGRAM(s) IV Push every 8 hours PRN    VITALS:   T(F): 98.6  HR: 68  BP: 100/57  RR: 22  SpO2: 93%    LABS:                        7.9    7.11  )-----------( 295      ( 19 Oct 2021 06:03 )             25.8     10    136  |  97<L>  |  75<HH>  ----------------------------<  205<H>  4.4   |  27  |  2.4<H>    Ca    8.6      19 Oct 2021 11:17  Phos  3.1     10-20  Mg     2.1     10-18    TPro  6.7  /  Alb  3.5  /  TBili  0.4  /  DBili  x   /  AST  57<H>  /  ALT  89<H>  /  AlkPhos  122<H>  10-18      Urinalysis Basic - ( 19 Oct 2021 16:15 )    Color: Yellow / Appearance: Clear / S.025 / pH: x  Gluc: x / Ketone: Negative  / Bili: Negative / Urobili: 0.2 mg/dL   Blood: x / Protein: Trace mg/dL / Nitrite: Negative   Leuk Esterase: Trace / RBC: 3-5 /HPF / WBC 6-10 /HPF   Sq Epi: x / Non Sq Epi: Few /HPF / Bacteria: Few        Troponin T, Serum: 0.35 ng/mL *HH* (10-19-21 @ 21:41)      CARDIAC MARKERS ( 19 Oct 2021 21:41 )  x     / 0.35 ng/mL / x     / x     / x      CARDIAC MARKERS ( 19 Oct 2021 06:03 )  x     / 0.49 ng/mL / x     / x     / x      CARDIAC MARKERS ( 18 Oct 2021 20:53 )  x     / 0.61 ng/mL / x     / x     / x          RADIOLOGY:  < from: US Renal (10.19.21 @ 13:04) >  IMPRESSION:    Bilateral small pelvicalyceal subcentimeter calculi as above. No hydronephrosis bilaterally.      < end of copied text >  < from: TTE Echo Complete w/o Contrast w/ Doppler (10.19.21 @ 14:47) >  Summary:   1. Left ventricular ejection fraction, by visual estimation, is 50 to 55%.   2. Mildly enlarged left atrium.   3. Mild mitral annular calcification.   4. Moderate mitral valve regurgitation.   5. Moderate tricuspid regurgitation.   6. Mild aortic regurgitation.   7. Sclerotic aortic valve with normal opening.  8. Estimated pulmonary artery systolic pressure is 37.3 mmHg assuming a right atrial pressure of 3 mmHg, which is consistent with borderline pulmonary hypertension.    < end of copied text >      PHYSICAL EXAM:  GENERAL: Patient is not in acute distress  HEAD:  Atraumatic, Normocephalic  EYES: EOMI, PERRLA, conjunctiva and sclera clear  ENMT: No tonsillar erythema, exudates, or enlargement; Moist mucous membranes, No lesions  NERVOUS SYSTEM:  Alert & Oriented X3, Good concentration; Motor Strength 5/5 B/L upper and lower extremities  CHEST/LUNG: Clear to auscultation bilaterally; No rales, rhonchi, wheezing, or rubs  HEART: Regular rate and rhythm; No murmurs, rubs, or gallops  ABDOMEN: Soft, Nontender, Nondistended; Bowel sounds present in all 4 quadrants  EXTREMITIES:  3+ Pitting edema b/l in the LE    IV Access: YES  NG tube present: NO  PEG tube present: NO  Wise catheter present: NO

## 2021-10-21 NOTE — DISCHARGE NOTE PROVIDER - CARE PROVIDERS DIRECT ADDRESSES
[email or web address]

## 2021-10-21 NOTE — DISCHARGE NOTE PROVIDER - NSDCCAREPROVSEEN_GEN_ALL_CORE_FT
Team Medicine [facility]  Team Critical Care [facility]  Team Cardiology [facility]

## 2021-10-21 NOTE — PROGRESS NOTE ADULT - SUBJECTIVE AND OBJECTIVE BOX
Nephrology Progress Note    LINDSAY ALBERT  MRN-[id number]  76y  Female    S:  Patient is seen and examined, events over the last 24h noted.    O:  Allergies:  No Known Allergies    Hospital Medications:   MEDICATIONS  (STANDING):  aspirin 325 milliGRAM(s) Oral daily  atorvastatin 40 milliGRAM(s) Oral at bedtime  gabapentin 300 milliGRAM(s) Oral daily  heparin   Injectable 5000 Unit(s) SubCutaneous every 12 hours  hydroxychloroquine 400 milliGRAM(s) Oral two times a day  insulin glargine Injectable (LANTUS) 20 Unit(s) SubCutaneous every morning  insulin lispro (ADMELOG) corrective regimen sliding scale   SubCutaneous three times a day before meals  levothyroxine 175 MICROGram(s) Oral daily  metoprolol tartrate 25 milliGRAM(s) Oral two times a day    MEDICATIONS  (PRN):  ondansetron Injectable 4 milliGRAM(s) IV Push every 8 hours PRN Nausea and/or Vomiting    Home Medications:  aspirin 81 mg oral tablet: 1 tab(s) orally once a day (26 Sep 2021 00:41)  Cymbalta 60 mg oral delayed release capsule: 1 cap(s) orally once a day (26 Sep 2021 00:41)  folic acid 1 mg oral tablet: 1 tab(s) orally once a day (26 Sep 2021 00:41)  gabapentin 400 mg oral capsule: 1 cap(s) orally 3 times a day (26 Sep 2021 00:41)  hydroxychloroquine 200 mg oral tablet: 2 tab(s) orally 2 times a day (with meals) (26 Sep 2021 00:41)  methotrexate 2.5 mg oral tablet: 7 tab(s) orally once a week (27 Sep 2021 10:47)  NovoLO unit(s) subcutaneous 3 times a day (before meals) (21 Oct 2021 13:14)  Tresiba 100 units/mL subcutaneous solution: 15 unit(s) subcutaneous once a day (21 Oct 2021 13:14)  Zocor 80 mg oral tablet: 1 tab(s) orally once a day (at bedtime) (26 Sep 2021 00:41)      VITALS:  T(F): 99.9 (10-21-21 @ 07:01), Max: 99.9 (10-21-21 @ 07:01)  HR: 77 (10-21-21 @ 11:10)  BP: 112/57 (10-21-21 @ 11:10)  RR: 18 (10-21-21 @ 11:10)  SpO2: 99% (10-21-21 @ 11:10)  Wt(kg): --  I&O's Detail    20 Oct 2021 07:  -  21 Oct 2021 07:00  --------------------------------------------------------  IN:  Total IN: 0 mL    OUT:    Intermittent Catheterization - Urethral (mL): 200 mL    Voided (mL): 775 mL  Total OUT: 975 mL    Total NET: -975 mL      21 Oct 2021 07:  -  21 Oct 2021 14:30  --------------------------------------------------------  IN:    IV PiggyBack: 100 mL    Oral Fluid: 240 mL  Total IN: 340 mL    OUT:    Voided (mL): 600 mL  Total OUT: 600 mL    Total NET: -260 mL        I&O's Summary    20 Oct 2021 07:  -  21 Oct 2021 07:00  --------------------------------------------------------  IN: 0 mL / OUT: 975 mL / NET: -975 mL    21 Oct 2021 07:  -  21 Oct 2021 14:30  --------------------------------------------------------  IN: 340 mL / OUT: 600 mL / NET: -260 mL          PHYSICAL EXAM:  Gen: NAD  Resp: CTAB  Card: S1/S2  Abd: soft  Extremities: mild dep edema      LABS:      10-21    140  |  102  |  42<H>  ----------------------------<  99  3.8   |  28  |  1.0    Ca    8.4<L>      21 Oct 2021 05:44  Phos  3.1     10-20  Mg     1.9     10-21    TPro  5.6<L>  /  Alb  3.3<L>  /  TBili  0.3  /  DBili      /  AST  17  /  ALT  38  /  AlkPhos  94  10-21    eGFR if Non African American: 55 mL/min/1.73M2 (10-21-21 @ 05:44)  eGFR if : 64 mL/min/1.73M2 (10-21-21 @ 05:44)    Phosphorus Level, Serum: 3.1 mg/dL (10-20-21 @ 06:24)  Phosphorus Level, Serum: 3.2 mg/dL (19 @ 21:59)                            8.4    7.65  )-----------( 318      ( 21 Oct 2021 05:44 )             27.1     Mean Cell Volume: 87.4 fL (10-21-21 @ 05:44)    % Saturation, Iron: 9 % (10-20-21 @ 10:53)      Urine Studies:  Urinalysis Basic - ( 19 Oct 2021 16:15 )    Color: Yellow / Appearance: Clear / S.025 / pH:   Gluc:  / Ketone: Negative  / Bili: Negative / Urobili: 0.2 mg/dL   Blood:  / Protein: Trace mg/dL / Nitrite: Negative   Leuk Esterase: Trace / RBC: 3-5 /HPF / WBC 6-10 /HPF   Sq Epi:  / Non Sq Epi: Few /HPF / Bacteria: Few    Sodium, Random Urine: 51.0 mmoL/L (10-19 @ 16:15)  Chloride, Random Urine: 69 (10-19 @ 16:15)    Creatinine trend:  Creatinine, Serum: 1.0 mg/dL (10-21-21 @ 05:44)  Creatinine, Serum: 1.4 mg/dL (10-20-21 @ 12:18)  Creatinine, Serum: 2.4 mg/dL (10-19-21 @ 11:17)  Creatinine, Serum: 2.9 mg/dL (10-19-21 @ 06:03)  Creatinine, Serum: 2.9 mg/dL (10-18-21 @ 20:53)  Creatinine, Serum: 1.0 mg/dL (10-02-21 @ 06:41)  Creatinine, Serum: 0.9 mg/dL (10-01-21 @ 06:02)  Creatinine, Serum: 0.9 mg/dL (21 @ 05:53)        < from: US Renal (10.19.21 @ 13:04) >    EXAM:  US KIDNEY(S)            PROCEDURE DATE:  10/19/2021            INTERPRETATION:  CLINICAL INFORMATION: Acute kidney injury.    COMPARISON: None available.    TECHNIQUE: Sonography of the kidneys and bladder.    FINDINGS:    Right kidney: 8.42cm. No hydronephrosis. Interpolar region pelvicalyceal 5 to 6 mm calculus.    Left kidney:  8.83. No hydronephrosis. Interpolar region pelvicalyceal 4 to 5 mm calculus.    IMPRESSION:    Bilateral small pelvicalyceal subcentimeter calculi as above. No hydronephrosis bilaterally.

## 2021-10-21 NOTE — PROGRESS NOTE ADULT - REASON FOR ADMISSION
slurred speech
confusion / hypoglycemia
slurred speech
AMS
slurred speech

## 2021-10-21 NOTE — DISCHARGE NOTE NURSING/CASE MANAGEMENT/SOCIAL WORK - PATIENT PORTAL LINK FT
You can access the FollowMyHealth Patient Portal offered by [facility] by registering at the following website: [email or web address]. By joining [employer]’s FollowMyHealth portal, you will also be able to view your health information using other applications (apps) compatible with our system.

## 2021-10-21 NOTE — PROGRESS NOTE ADULT - SUBJECTIVE AND OBJECTIVE BOX
SUBJECTIVE:    Patient is a 76y old Female who presents with a chief complaint of slurred speech (21 Oct 2021 08:46)    Currently admitted to medicine with the primary diagnosis of Metabolic encephalopathy secondary to hypoglycemia     Today is hospital day 2d. This morning she is resting comfortably in bed and reports no new issues or overnight events.     PAST MEDICAL & SURGICAL HISTORY  DM (diabetes mellitus)  insulin  fs achs    Hypercholesteremia  statin    Hypertension  hctz    Lupus  as per hx    Fall, initial encounter  as  hx    Stroke  TIA , no deficits    Herniated lumbar intervertebral disc  as per hx    Seizure  keppra    No significant past surgical history      SOCIAL HISTORY:  Negative for smoking/alcohol/drug use.     ALLERGIES:  No Known Allergies    MEDICATIONS:  STANDING MEDICATIONS  aspirin 325 milliGRAM(s) Oral daily  atorvastatin 40 milliGRAM(s) Oral at bedtime  chlorhexidine 4% Liquid 1 Application(s) Topical daily  dextrose 40% Gel 15 Gram(s) Oral once  dextrose 5%. 1000 milliLiter(s) IV Continuous <Continuous>  dextrose 5%. 1000 milliLiter(s) IV Continuous <Continuous>  dextrose 50% Injectable 25 Gram(s) IV Push once  dextrose 50% Injectable 12.5 Gram(s) IV Push once  dextrose 50% Injectable 25 Gram(s) IV Push once  gabapentin 300 milliGRAM(s) Oral daily  glucagon  Injectable 1 milliGRAM(s) IntraMuscular once  heparin   Injectable 5000 Unit(s) SubCutaneous every 12 hours  hydroxychloroquine 400 milliGRAM(s) Oral two times a day  insulin glargine Injectable (LANTUS) 20 Unit(s) SubCutaneous every morning  insulin lispro (ADMELOG) corrective regimen sliding scale   SubCutaneous three times a day before meals  iron sucrose IVPB 200 milliGRAM(s) IV Intermittent once  levothyroxine 175 MICROGram(s) Oral daily  metoprolol tartrate 25 milliGRAM(s) Oral two times a day    PRN MEDICATIONS  ondansetron Injectable 4 milliGRAM(s) IV Push every 8 hours PRN    VITALS:   T(F): 99.9  HR: 78  BP: 102/53  RR: 18  SpO2: 94%    LABS:                        8.4    7.65  )-----------( 318      ( 21 Oct 2021 05:44 )             27.1     10-21    140  |  102  |  42<H>  ----------------------------<  99  3.8   |  28  |  1.0    Ca    8.4<L>      21 Oct 2021 05:44  Phos  3.1     10-20  Mg     1.9     10-21    TPro  5.6<L>  /  Alb  3.3<L>  /  TBili  0.3  /  DBili  x   /  AST  17  /  ALT  38  /  AlkPhos  94  10-21      Urinalysis Basic - ( 19 Oct 2021 16:15 )    Color: Yellow / Appearance: Clear / S.025 / pH: x  Gluc: x / Ketone: Negative  / Bili: Negative / Urobili: 0.2 mg/dL   Blood: x / Protein: Trace mg/dL / Nitrite: Negative   Leuk Esterase: Trace / RBC: 3-5 /HPF / WBC 6-10 /HPF   Sq Epi: x / Non Sq Epi: Few /HPF / Bacteria: Few    CARDIAC MARKERS ( 19 Oct 2021 21:41 )  x     / 0.35 ng/mL / x     / x     / x          RADIOLOGY:  < from: US Renal (10.19.21 @ 13:04) >  IMPRESSION:    Bilateral small pelvicalyceal subcentimeter calculi as above. No hydronephrosis bilaterally.    < end of copied text >  < from: Xray Chest 1 View- PORTABLE-Urgent (Xray Chest 1 View- PORTABLE-Urgent .) (10.20.21 @ 07:58) >  Impression:    Pulmonary vascular congestion and prominent interstitial opacities.    < end of copied text >      PHYSICAL EXAM:  GENERAL: Patient is not in acute distress  HEAD:  Atraumatic, Normocephalic  EYES: EOMI, PERRLA, conjunctiva and sclera clear  ENMT: No tonsillar erythema, exudates, or enlargement; Moist mucous membranes, No lesions  NERVOUS SYSTEM:  Alert & Oriented X4, Good concentration; Motor Strength 5/5 B/L upper and lower extremities  CHEST/LUNG: Clear to auscultation bilaterally; No rales, rhonchi, wheezing, or rubs  HEART: Regular rate and rhythm; No murmurs, rubs, or gallops  ABDOMEN: Soft, Nontender, Nondistended; Bowel sounds present in all 4 quadrants  EXTREMITIES:  3+ pitting edema in the LE b/l    IV Access: YES  NG tube present: NO  PEG tube present: NO  Wise catheter present: NO

## 2021-10-21 NOTE — DISCHARGE NOTE PROVIDER - CARE PROVIDER_API CALL
Clem Camacho)  Internal Medicine  [address] [place] [address]  Phone: [phone]  Fax: [phone]  Follow Up Time: 2 weeks

## 2021-10-21 NOTE — PROGRESS NOTE ADULT - SUBJECTIVE AND OBJECTIVE BOX
Patient is a 76y old  Female who presents with a chief complaint of slurred speech (21 Oct 2021 08:46)      T(F): 99.9 (10-21-21 @ 07:01), Max: 99.9 (10-21-21 @ 07:01)  HR: 78 (10-21-21 @ 08:45)  BP: 102/53 (10-21-21 @ 07:11)  RR: 18 (10-21-21 @ 08:45)  SpO2: 94% (10-21-21 @ 08:45) (84% - 99%)    PHYSICAL EXAM:  GENERAL: NAD  HEAD:  Atraumatic, Normocephalic  EYES: EOMI, PERRLA, conjunctiva and sclera clear  NERVOUS SYSTEM:  no focal deficits   CHEST/LUNG: Clear to percussion bilaterally; No rales, rhonchi, wheezing, or rubs  HEART: Regular rate and rhythm; No murmurs, rubs, or gallops  ABDOMEN: Soft, Nontender, Nondistended; Bowel sounds present  EXTREMITIES:  b/l edema    LABS  10-21    140  |  102  |  42<H>  ----------------------------<  99  3.8   |  28  |  1.0    Ca    8.4<L>      21 Oct 2021 05:44  Phos  3.1     10-20  Mg     1.9     10-21    TPro  5.6<L>  /  Alb  3.3<L>  /  TBili  0.3  /  DBili  x   /  AST  17  /  ALT  38  /  AlkPhos  94  10-21                          8.4    7.65  )-----------( 318      ( 21 Oct 2021 05:44 )             27.1         CARDIAC ENZYMES      Troponin T, Serum: 0.35 ng/mL (10-19-21 @ 21:41)  Troponin T, Serum: 0.49 ng/mL (10-19-21 @ 06:03)  Troponin T, Serum: 0.61 ng/mL (10-18-21 @ 20:53)    < from: 12 Lead ECG (10.19.21 @ 08:55) >    Diagnosis Line Normal sinus rhythm  Nonspecific ST-T changes  Confirmed by LUAN YORK MD ([id number]) on 10/19/2021 12:20:09 PM    < end of copied text >  < from: TTE Echo Complete w/o Contrast w/ Doppler (10.19.21 @ 14:47) >      Summary:   1. Left ventricular ejection fraction, by visual estimation, is 50 to 55%.   2. Mildly enlarged left atrium.   3. Mild mitral annular calcification.   4. Moderate mitral valve regurgitation.   5. Moderate tricuspid regurgitation.   6. Mild aortic regurgitation.   7. Sclerotic aortic valve with normal opening.  8. Estimated pulmonary artery systolic pressure is 37.3 mmHg assuming a right atrial pressure of 3 mmHg, which is consistent with borderline pulmonary hypertension.      < end of copied text >    RADIOLOGY  < from: Xray Chest 1 View- PORTABLE-Urgent (Xray Chest 1 View- PORTABLE-Urgent .) (10.20.21 @ 07:58) >  Impression:    Pulmonary vascular congestion and prominent interstitial opacities.      < end of copied text >    < from: US Renal (10.19.21 @ 13:04) >  No hydronephrosis bilaterally.    < end of copied text >    MEDICATIONS  (STANDING):  aspirin 325 milliGRAM(s) Oral daily  atorvastatin 40 milliGRAM(s) Oral at bedtime  chlorhexidine 4% Liquid 1 Application(s) Topical daily  gabapentin 300 milliGRAM(s) Oral daily  heparin   Injectable 5000 Unit(s) SubCutaneous every 12 hours  hydroxychloroquine 400 milliGRAM(s) Oral two times a day  insulin glargine Injectable (LANTUS) 20 Unit(s) SubCutaneous every morning  insulin lispro (ADMELOG) corrective regimen sliding scale   SubCutaneous three times a day before meals  iron sucrose IVPB 200 milliGRAM(s) IV Intermittent once  levothyroxine 175 MICROGram(s) Oral daily  metoprolol tartrate 25 milliGRAM(s) Oral two times a day    MEDICATIONS  (PRN):  ondansetron Injectable 4 milliGRAM(s) IV Push every 8 hours PRN Nausea and/or Vomiting

## 2021-10-21 NOTE — DISCHARGE NOTE PROVIDER - HOSPITAL COURSE
HPI:  pt is a 77 yo female PMHx sig for HFpEF, HTN,  HLD, DM,  SLE on Cellsept and methotrexate, CVA in 2002. Pt presented to the ed for slurred speech x 2 days, in the ED she was found to be hypoglycemic, pt states she has had difficulty controlling her glucose.  Speech has reportedly resolved after oral glucose administration.   (19 Oct 2021 00:57)    Hospital Course  Patients Lasix was held and her renal function improved. She was also hydrated with IV Fluids. Her sugars were monitored and adjustment to Insulin regimen was made. Patient will be discharged to home today.

## 2021-10-21 NOTE — DISCHARGE NOTE NURSING/CASE MANAGEMENT/SOCIAL WORK - NSDCVIVACCINE_GEN_ALL_CORE_FT
Influenza, seasonal, injectable, preservative free; 07-Oct-2017 22:06; Esme Ochoa (RN);   influenza, injectable, quadrivalent, preservative free; 02-Oct-2021 17:18; Esha Hernandez (RN); [employer]; [id number] (Exp. Date: 30-Jun-2022); IntraMuscular; Deltoid Right.; 0.5 milliLiter(s); VIS (VIS Published: 15-Aug-2019, VIS Presented: 02-Oct-2021);   Tdap; 19-Jun-2019 10:42; Jose Antonio Del Rio (RN); [employer]; [id number] (Exp. Date: 21-Mar-2021); IntraMuscular; Deltoid Left.; 0.5 milliLiter(s); VIS (VIS Published: 09-May-2013, VIS Presented: 19-Jun-2019);

## 2021-10-21 NOTE — DISCHARGE NOTE PROVIDER - NSDCMRMEDTOKEN_GEN_ALL_CORE_FT
aspirin 81 mg oral tablet: 1 tab(s) orally once a day  Cymbalta 60 mg oral delayed release capsule: 1 cap(s) orally once a day  folic acid 1 mg oral tablet: 1 tab(s) orally once a day  furosemide 20 mg oral tablet: 2 tab(s) orally once a day   gabapentin 400 mg oral capsule: 1 cap(s) orally 3 times a day  hydroxychloroquine 200 mg oral tablet: 2 tab(s) orally 2 times a day (with meals)  levothyroxine 175 mcg (0.175 mg) oral tablet: 1 tab(s) orally once a day  methotrexate 2.5 mg oral tablet: 7 tab(s) orally once a week  metoprolol tartrate 25 mg oral tablet: 1 tab(s) orally 2 times a day  NovoLO unit(s) subcutaneous 3 times a day (before meals)  Tresiba 100 units/mL subcutaneous solution: 15 unit(s) subcutaneous once a day  Zocor 80 mg oral tablet: 1 tab(s) orally once a day (at bedtime)

## 2021-10-21 NOTE — PROGRESS NOTE ADULT - ASSESSMENT
IMPRESSION:  Metabolic encephalopathy secondary to hypoglycemia, resolved  CHF, moderate MR  ANTONELLA prerenal, resolved   Anemia      PLAN:    CNS: Neuro follow up   Adjust Gabapentin to GFR     HEENT: oral care     PULMONARY: keep pox > 92 %     CARDIOVASCULAR:   Repeat Dave.  Cardiology FU.    GI: GI prophylaxis.  Feeding.  Bowel regimen.    RENAL:  FU lytes.  Replete as necessary.  Nephro FU.    INFECTIOUS DISEASE:  Monitor off ABX.  FU Procalcitonin.    HEMATOLOGICAL:  DVT prophylaxis.    ENDOCRINE:  Follow up FS. Insulin protocol if needed.    MUSCULOSKELETAL:  OOB to chair.  PT/OT.    Telemetry  Recall prn IMPRESSION:  Metabolic encephalopathy secondary to hypoglycemia, resolved  CHF, moderate MR  ANTONELLA prerenal, resolved   Anemia      PLAN:    CNS: Neuro follow up   Adjust Gabapentin to GFR     HEENT: oral care     PULMONARY: keep pox > 92 %     CARDIOVASCULAR:   Repeat Dave.  Cardiology FU.    GI: GI prophylaxis.  Feeding.  Bowel regimen.    RENAL:  FU lytes.  Replete as necessary.  Nephro FU.    INFECTIOUS DISEASE:  Monitor off ABX.  FU Procalcitonin.    HEMATOLOGICAL:  DVT prophylaxis.    ENDOCRINE:  Follow up FS. Insulin protocol if needed.    MUSCULOSKELETAL:  OOB to chair.  PT/OT.    Downgrade to floor  Recall prn

## 2021-10-21 NOTE — PHARMACOTHERAPY INTERVENTION NOTE - COMMENTS
Discharge Counseling    Counseling was attempted with the patient. Pt came in with hypoglycemia diagnosed with DM 35 years ago. Pt has continuous monitoring blood glucose machine so can check blood sugar easily throughout the day. Pt has good understanding of how to manage low blood sugar and the administration of insulin..    Blood glucose goal (70-180mg/dL) was reviewed with her.     Following points were discussed with the patient:    Insulin glargine  -Indication:  to help lower blood sugar and once daily long acting regimen.     Insulin lispro  -Indication: to correct hyperglycemia for meal time  -Administration: to inject SQ TID before meals depending on her blood sugar  -Emphasized that to not administer insulin if she would be skipping meals  -Discussed signs of hypoglycemia such as fatigue, dizziness, and h/a    Per patient, she last met with endocrinologist ~6 months ago through a virtual visit. Recommended to follow up with endocrinologist closely to adjust her insulin regimen and to eat healthy.

## 2021-10-21 NOTE — PHYSICAL THERAPY INITIAL EVALUATION ADULT - SPECIFY REASON(S)
Attempted to see patient for bedside PT, Pt being hooked up for FE infusion, as per RN Patrizia, hold PT until complete.

## 2021-10-21 NOTE — PROGRESS NOTE ADULT - ASSESSMENT
# Encephalopathy d/t hypoglycemia  # ANTONELLA / ?pre-renal iso hypotension / CRS / r/o ATN  # Acute on chronic anemia / on MMF/MTX  # ADHF / Hx of HFpEF   # Hyponatremia    Recommendations:  - non oliguric, creat improved to baseline range  - UA noted, urine prot/creat ratio negative  - strict i/o / daily weights  - hx of ?SLE, check MIR, anti-dsDNA, c3, C4  - check serum free light chain ratio, spep/ifx    - above tests can be completed outpatient if pt is being discharged today  - renal ultrasound noted w/o hydronephrosis, has b/l non obstructing renal calculi  - TTE noted   - cont holding anti-hypertensives  - phos level noted, does not need phos binder  - iron deficient, start po iron supplements w/ stool softener   - pt should have rheumatology f/u outpatient

## 2021-10-21 NOTE — DISCHARGE NOTE PROVIDER - NSDCCPCAREPLAN_GEN_ALL_CORE_FT
PRINCIPAL DISCHARGE DIAGNOSIS  Diagnosis: Metabolic encephalopathy  Assessment and Plan of Treatment: You presented with low blood sugar and were admitted for decrease in glucose and renal function. Your Lasix was held and were given fluids and glucose monitored. Your kidney function improved and will be discharged today.  Please take only 15 units of Insulin long acting and 5 with meals plus sliding scale  Please take only 40mg of Lasix once daily  Please follow with your PCP to have a BMP done in one weeks time.      SECONDARY DISCHARGE DIAGNOSES  Diagnosis: Acute renal failure  Assessment and Plan of Treatment:     Diagnosis: Hypoglycemia  Assessment and Plan of Treatment:     Diagnosis: Elevated troponin  Assessment and Plan of Treatment:

## 2021-10-21 NOTE — PROGRESS NOTE ADULT - ASSESSMENT
76 yr F with SLE on Methotrexate, moderate MR, HFpEF, HTN, HLD, T2DM, hx of CVA with no residual deficits discharged from hospital earlier this month on 10/2 s/p HF exacerbation and NSTEMI, DC'd on new home oxygen, refused in pt cardiac cath and is supposed to have outpt cardiac cath with Dr Kowalski.     Presented to the hospital yesterday c/o slurred speech with low sugars and SOB. As Per santy Parr [phone] pt has been more confused for the past 2 days, states she has been slurring her words, confirms both high and low blood sugars.    Acute Kidney injury - suspected pre renal / possible NSTEMI / acute metabolic encephalopathy probably secondary to hypoglycemia     -  Lasix held    - consider resuming at a lower dose    - aspirin, Lipitor, metoprolol   -  repeat kidney function improved   - no further in-pt mgmt. as per cardiology    - continue home meds   - DVT prophylaxis   - DC planning

## 2021-10-21 NOTE — PROGRESS NOTE ADULT - SUBJECTIVE AND OBJECTIVE BOX
Patient is a 76y old  Female who presents with a chief complaint of confusion / hypoglycemia (20 Oct 2021 14:47)    Over Night Events:  No overnight events.  No hypoglycemia  Afebrile    ROS:   All ROS are negative except HPI     PHYSICAL EXAM  ICU Vital Signs Last 24 Hrs  T(C): 37.7 (21 Oct 2021 07:01), Max: 37.7 (21 Oct 2021 07:01)  T(F): 99.9 (21 Oct 2021 07:01), Max: 99.9 (21 Oct 2021 07:01)  HR: 73 (20 Oct 2021 21:50) (73 - 74)  BP: 100/54 (20 Oct 2021 21:50) (100/54 - 110/55)  BP(mean): 74 (20 Oct 2021 21:50) (74 - 78)  RR: 26 (21 Oct 2021 07:01) (15 - 26)  SpO2: 99% (20 Oct 2021 21:31) (84% - 99%)      CONSTITUTIONAL:  Well nourished.  NAD    ENT:   Airway patent,   Mouth with normal mucosa.   No thrush    EYES:   Pupils equal,   Round and reactive to light.    CARDIAC:   Normal rate,   Regular rhythm.    No edema    RESPIRATORY:   No wheezing  Bilateral BS  Normal chest expansion  Not tachypneic,  No use of accessory muscles    GASTROINTESTINAL:  Abdomen soft,   Non-tender,   No guarding,   + BS    MUSCULOSKELETAL:   Range of motion is not limited,  No clubbing, cyanosis    NEUROLOGICAL:   Alert and oriented   No motor  deficits.    SKIN:   Skin normal color for race,   Warm and dry and intact.   No evidence of rash.    PSYCHIATRIC:   Normal mood and affect.   No apparent risk to self or others.    HEMATOLOGICAL:  No cervical  lymphadenopathy.  no inguinal lymphadenopathy      10-20-21 @ 07:01  -  10-21-21 @ 07:00  --------------------------------------------------------  IN:  Total IN: 0 mL    OUT:    Intermittent Catheterization - Urethral (mL): 200 mL    Voided (mL): 775 mL  Total OUT: 975 mL    Total NET: -975 mL      LABS:                            8.4    7.65  )-----------( 318      ( 21 Oct 2021 05:44 )             27.1     140  |  102  |  42<H>  ----------------------------<  99  3.8   |  28  |  1.0    Ca    8.4<L>      21 Oct 2021 05:44  Phos  3.1     10-20  Mg     1.9     10-21    TPro  5.6<L>  /  Alb  3.3<L>  /  TBili  0.3  /  DBili  x   /  AST  17  /  ALT  38  /  AlkPhos  94  10-21    Urinalysis Basic - ( 19 Oct 2021 16:15 )  Color: Yellow / Appearance: Clear / S.025 / pH: x  Gluc: x / Ketone: Negative  / Bili: Negative / Urobili: 0.2 mg/dL   Blood: x / Protein: Trace mg/dL / Nitrite: Negative   Leuk Esterase: Trace / RBC: 3-5 /HPF / WBC 6-10 /HPF   Sq Epi: x / Non Sq Epi: Few /HPF / Bacteria: Few    CARDIAC MARKERS ( 19 Oct 2021 21:41 )  x     / 0.35 ng/mL / x     / x     / x        LIVER FUNCTIONS - ( 21 Oct 2021 05:44 )  Alb: 3.3 g/dL / Pro: 5.6 g/dL / ALK PHOS: 94 U/L / ALT: 38 U/L / AST: 17 U/L / GGT: x                                              MEDICATIONS  (STANDING):  aspirin 325 milliGRAM(s) Oral daily  atorvastatin 40 milliGRAM(s) Oral at bedtime  chlorhexidine 4% Liquid 1 Application(s) Topical daily  dextrose 40% Gel 15 Gram(s) Oral once  dextrose 5%. 1000 milliLiter(s) (50 mL/Hr) IV Continuous <Continuous>  dextrose 5%. 1000 milliLiter(s) (100 mL/Hr) IV Continuous <Continuous>  dextrose 50% Injectable 25 Gram(s) IV Push once  dextrose 50% Injectable 12.5 Gram(s) IV Push once  dextrose 50% Injectable 25 Gram(s) IV Push once  gabapentin 300 milliGRAM(s) Oral daily  glucagon  Injectable 1 milliGRAM(s) IntraMuscular once  heparin   Injectable 5000 Unit(s) SubCutaneous every 12 hours  hydroxychloroquine 400 milliGRAM(s) Oral two times a day  insulin glargine Injectable (LANTUS) 20 Unit(s) SubCutaneous every morning  insulin lispro (ADMELOG) corrective regimen sliding scale   SubCutaneous three times a day before meals  iron sucrose IVPB 200 milliGRAM(s) IV Intermittent once  levothyroxine 175 MICROGram(s) Oral daily  metoprolol tartrate 25 milliGRAM(s) Oral two times a day    MEDICATIONS  (PRN):  ondansetron Injectable 4 milliGRAM(s) IV Push every 8 hours PRN Nausea and/or Vomiting

## 2021-10-21 NOTE — PROGRESS NOTE ADULT - ASSESSMENT
Patient is a 75 y/o female with PMHx of SLE (on cellcept and methotrexate), hypertension, hyperlipidemia, T2DM, moderate MR, HFpEF, CVA with no deficits, who presents to the ED with confusion and slurred speech due to hypoglycemia, which resolved with glucose administration.  Patient also endorses worsening SOB, orthopnea, and b/l LE edema for the past month during which she increased her lasix to 80mg twice daily.    Patient's kidney function continues to improve off the Lasix; will repeat the BMP in a week after discharge. Will simplify insulin regimen given the risk of hypoglycemia and will accept mild hyperglycemia in return. Will plan to discharge today if patient is able to ambulate safely.     # Metabolic Encephalopathy secondary to Hypoglycemia - Resolved   - Insulin regimen restarted for hyperglycemia  - Patient on basal 20 units in AM with sliding scale to simplify regimen    - Monitor FS     #ANTONELLA likely Pre-renal - Improving   - Patient was increasing dose of Lasix to 80mg BID for the past month due to worsening orthopnea and LE edema  - Renal ultrasound showed Bilateral small pelvicalyceal subcentimeter calculi as above. No hydronephrosis bilaterally.  - Cr. improved to 1.0 from 1.4 today, 10/21  - Nephro consult appreciated   - Ordered MIR, anti-dsDNA, c3, c4, serum free light chain ratio, spep/ifx, phosphate, iron levels, and methotrexate level  - Will resume Lasix at 40 mg daily  - Holding gabapentin for now  - Off fluids for now    # HFpEF   - Was taking increasing amounts of Lasix at home for worsening orthopnea and b/l lower extremity edema for the past month   - BNP 65,033, Trops 0.61 --> 0.35  - Chest X-Ray: unchanged   - Appears dry on the echo and exam despite the lower extremity edema   - ECHO unchanged except for new, mild pulmonary hypertension   - F/u cardio outpatient     # Hypothyroidism   - Elevated TSH 12.24  - Increased synthroid to 175mg from 150mg  - TSH repeat outpatient in 2 months    # Iron Deficiency Anemia  # Anemia of chronic disease  - On Iron sucrose 200 daily  - Sat 9% and total 23 with normal TIBC  - Baseline Hb 8   - Hb 8.4 (10/21)  - Keep Hb >7  - F/u H&H     Status: FULL CODE  GI Prophylaxis: None   DVT Prophylaxis: Hep SubQ   Dispo: DC today if patient tolerates ambulation  Patient is a 77 y/o female with PMHx of SLE (on cellcept and methotrexate), hypertension, hyperlipidemia, T2DM, moderate MR, HFpEF, CVA with no deficits, who presents to the ED with confusion and slurred speech due to hypoglycemia, which resolved with glucose administration.  Patient also endorses worsening SOB, orthopnea, and b/l LE edema for the past month during which she increased her lasix to 80mg twice daily.    Patient's kidney function continues to improve off the Lasix; will repeat the BMP in a week after discharge. Will simplify insulin regimen given the risk of hypoglycemia and will accept mild hyperglycemia in return. Will plan to discharge today if patient is able to ambulate safely.     # Metabolic Encephalopathy secondary to Hypoglycemia - Resolved   - Insulin regimen restarted for hyperglycemia  - Patient on basal 20 units in AM with sliding scale to simplify regimen    - Monitor FS     #ANTONELLA likely Pre-renal - Improving   - Patient was increasing dose of Lasix to 80mg BID for the past month due to worsening orthopnea and LE edema  - Renal ultrasound showed Bilateral small pelvicalyceal subcentimeter calculi as above. No hydronephrosis bilaterally.  - Cr. improved to 1.0 from 1.4 today, 10/21  - Nephro consult appreciated   - Ordered MIR, anti-dsDNA, c3, c4, serum free light chain ratio, spep/ifx, phosphate, iron levels, and methotrexate level  - Will resume Lasix at 40 mg daily  - Adjust gabapentin to GFR  - Off fluids for now    # HFpEF   - Was taking increasing amounts of Lasix at home for worsening orthopnea and b/l lower extremity edema for the past month   - BNP 65,033, Trops 0.61 --> 0.35  - Chest X-Ray: unchanged   - Appears dry on the echo and exam despite the lower extremity edema   - ECHO unchanged except for new, mild pulmonary hypertension   - F/u cardio outpatient     # Hypothyroidism   - Elevated TSH 12.24  - Increased synthroid to 175mg from 150mg  - TSH repeat outpatient in 2 months    # Iron Deficiency Anemia  # Anemia of chronic disease  - On Iron sucrose 200 daily  - Sat 9% and total 23 with normal TIBC  - Baseline Hb 8   - Hb 8.4 (10/21)  - Keep Hb >7  - F/u H&H     Status: FULL CODE  GI Prophylaxis: None   DVT Prophylaxis: Hep SubQ   Dispo: DC today if patient tolerates ambulation  Patient is a 75 y/o female with PMHx of SLE (on cellcept and methotrexate), hypertension, hyperlipidemia, T2DM, moderate MR, HFpEF, CVA with no deficits, who presents to the ED with confusion and slurred speech due to hypoglycemia, which resolved with glucose administration.  Patient also endorses worsening SOB, orthopnea, and b/l LE edema for the past month during which she increased her lasix to 80mg twice daily.    Patient's kidney function continues to improve off the Lasix; will repeat the BMP in a week after discharge. Will simplify insulin regimen given the risk of hypoglycemia and will accept mild hyperglycemia in return. Will plan to discharge today if patient is able to ambulate safely.     # Metabolic Encephalopathy secondary to Hypoglycemia - Resolved   - Insulin regimen restarted for hyperglycemia  - Patient on basal 20 units in AM with sliding scale to simplify regimen    - Monitor FS     #ANTONELLA likely Pre-renal - Improving   - Patient was increasing dose of Lasix to 80mg BID for the past month due to worsening orthopnea and LE edema  - Renal ultrasound showed Bilateral small pelvicalyceal subcentimeter calculi as above. No hydronephrosis bilaterally.  - Cr. improved to 1.0 from 1.4 today, 10/21  - Nephro consult appreciated   - Ordered MIR, anti-dsDNA, c3, c4, serum free light chain ratio, spep/ifx, phosphate, iron levels, and methotrexate level  - Will resume Lasix at 40 mg daily  - Adjust gabapentin to GFR  - Off fluids for now    # HFpEF   - Was taking increasing amounts of Lasix at home for worsening orthopnea and b/l lower extremity edema for the past month   - BNP 65,033, Trops 0.61 --> 0.35  - Chest X-Ray: unchanged   - Appears dry on the echo and exam despite the lower extremity edema   - ECHO unchanged except for new, mild pulmonary hypertension   - F/u cardio outpatient     # Hypothyroidism   - Elevated TSH 12.24  - Increased synthroid to 175mg from 150mg  - TSH repeat outpatient in 2 months    # Iron Deficiency Anemia  # Anemia of chronic disease  - On Iron sucrose 200 daily  - Sat 9% and total 23 with normal TIBC  - Baseline Hb 8   - Hb 8.4 (10/21)  - Keep Hb >7  - F/u H&H     # Nocturnal Hypoxemia  - Patient hypoxic to 81% on room air  - Will need home oxygen    Status: FULL CODE  GI Prophylaxis: None   DVT Prophylaxis: Hep SubQ   Dispo: DC today if patient tolerates ambulation     Despite treatment with lasix, patient is still hypoxic. The saturation on room air at rest is 81%. Patient will require home oxygen.  Patient tested in chronic stable state of CHF. Patient aware going home on oxygen.

## 2021-10-22 NOTE — H&P ADULT - HISTORY OF PRESENT ILLNESS
75 yo female PMHx of HFpEF, HTN, HLD, DM,  SLE on Cellsept and methotrexate, CVA in 2002, recently admitted for Hypoglycemia and ANTONELLA, discharged yesterday, presenting for shortness of breath. Patient was found hypoxic in ER and was put on bipap. Her blood sugars were also high presumably from non compliance since discharge. Patient has no other acute complaints.       T(C): 35.6 (22 Oct 2021 14:13), Max: 37.3 (22 Oct 2021 10:12)  T(F): 96 (22 Oct 2021 14:13), Max: 99.1 (22 Oct 2021 10:12)  HR: 91 (22 Oct 2021 14:24) (86 - 116)  BP: 127/60 (22 Oct 2021 14:13) (105/51 - 168/87)  BP(mean): --  RR: 23 (22 Oct 2021 14:13) (17 - 50)  SpO2: 98% (22 Oct 2021 14:24) (91% - 100%)    Patient found hypoxic in ER. Blood work reviewed, lactate , Beta hydroxy butyrate and anion gap is elevated, UA Positive. CXR showing bilateral infiltrates. Patient is being admitted for hypoxic resp failure and hyperglycemia  75 yo female PMHx of HFpEF, HTN, HLD, DM,  SLE on Cellsept and methotrexate, CVA in 2002, recently admitted for Hypoglycemia and ANTONELLA, discharged yesterday, presenting for shortness of breath. Patient was found hypoxic in ER and was put on bipap. Her blood sugars were also high presumably from non compliance since discharge. Patient has no other acute complaints.     Of note patient was discharged yesterday on reduced dose of Lasix which she did not take at home.      T(C): 35.6 (22 Oct 2021 14:13), Max: 37.3 (22 Oct 2021 10:12)  T(F): 96 (22 Oct 2021 14:13), Max: 99.1 (22 Oct 2021 10:12)  HR: 91 (22 Oct 2021 14:24) (86 - 116)  BP: 127/60 (22 Oct 2021 14:13) (105/51 - 168/87)  BP(mean): --  RR: 23 (22 Oct 2021 14:13) (17 - 50)  SpO2: 98% (22 Oct 2021 14:24) (91% - 100%)    Patient found hypoxic in ER. Blood work reviewed, lactate , Beta hydroxy butyrate and anion gap is elevated, UA Positive. CXR showing bilateral infiltrates. Patient is being admitted for hypoxic resp failure and hyperglycemia

## 2021-10-22 NOTE — ED PROVIDER NOTE - PHYSICAL EXAMINATION
CONSTITUTIONAL: Well-developed; well-nourished; in no acute distress.   SKIN: warm, dry  HEAD: Normocephalic; atraumatic.  EYES: normal sclera and conjunctiva   ENT: No nasal discharge; airway clear.  NECK: Supple; non tender.  CARD: S1, S2 normal; no murmurs, gallops, or rubs. +JVD  RESP: +rales b.l.  ABD: soft ntnd  EXT: Normal ROM.  +edema of the LE  LYMPH: No acute cervical adenopathy.  NEURO: Alert, oriented, grossly unremarkable  PSYCH: Cooperative, appropriate.

## 2021-10-22 NOTE — ED ADULT NURSE NOTE - INTERVENTIONS DEFINITIONS
[place] to call system/Instruct patient to call for assistance/Room bathroom lighting operational

## 2021-10-22 NOTE — ED PROVIDER NOTE - OBJECTIVE STATEMENT
[facility] Emergency Department  [id number]  Phone: [phone]  Fax: [phone]             January 18, 2021    Patient: Jorge Melara   YOB: 2001   Date of Visit: 1/18/2021       To Whom It May Concern:    Jorge Melara was seen and treated in our emergency department on 1/18/2021. She may return to school on 1-. Off practice this week.       Sincerely,             Signature:__________________________________ The patient is a 76 year old female with a history of CHF, mitral valve regurg presenting for shortness of breath. Symptoms of shortness of breath worsened today and worse when laying down. No fevers/chills, chest pain, nausea, vomiting, abdominal pain.

## 2021-10-22 NOTE — CONSULT NOTE ADULT - ASSESSMENT
IMPRESSION:    Acute Hypoxemic & Hypercapnic Respiratory Failure, currently on NIV  Pulmonary edema/Volume Overload SP Lasix 40 & 80  Sepsis present on admission  Combined HAGMA & Respiratory Acidosis  Elevated Lactic Acid likely Hypoperfusion due to CHF  HFpEF, moderate MR  Anemia      PLAN:    CNS: Hold home Gabapentin     HEENT: oral care     PULMONARY:  HOB at 45 degrees.  Aspiration precautions.  ABG in 30 min.  Might need intubation if still tachypneic.  Target POx > 92 %.    CARDIOVASCULAR:   Aggressive diuresis.  Lasix 80mg bid.  Can add Metolazone if low UO.  CE x 3.  ECHO.  EKG in AM.  Target MAP 65.  Keep I < O. Cardiology eval.  Trend Lactic Acid.    GI:  GI prophylaxis.  NPO while on BiPAP, OGT and feeds if intubated.  Bowel regimen.    RENAL:  Check Mg.  FU lytes.  Replete as necessary.  Monitor UO.  Wise care.    INFECTIOUS DISEASE:  Monitor off ABX.  Procalcitonin.  RVP panel.  MRSA nares.    HEMATOLOGICAL:  DVT prophylaxis.    ENDOCRINE:  Check BHB.  Follow up FS. Insulin protocol if needed.      MUSCULOSKELETAL:  bed rest    Monitor in MICU IMPRESSION:    Acute Hypoxemic & Hypercapnic Respiratory Failure, currently on NIV  Pulmonary edema/Volume Overload SP Lasix 40 & 80  Sepsis present on admission  Combined HAGMA & Respiratory Acidosis  Elevated Lactic Acid likely Hypoperfusion due to CHF  HFpEF, moderate MR  Anemia      PLAN:    CNS: Hold home Gabapentin     HEENT: oral care     PULMONARY:  HOB at 45 degrees.  Aspiration precautions.  ABG in 30 min.  Might need intubation if still tachypneic.  Target POx > 92 %.    CARDIOVASCULAR:   Aggressive diuresis.  Lasix bid.  Can add Metolazone if low UO.  CE x 3.  ECHO.  EKG in AM.  Target MAP 65.  Keep I < O. Cardiology eval.  Trend Lactic Acid.    GI:  GI prophylaxis.  NPO while on BiPAP, OGT and feeds if intubated.  Bowel regimen.    RENAL:  Check Mg.  FU lytes.  Replete as necessary.  Monitor UO.  Wise care.    INFECTIOUS DISEASE:  Monitor off ABX.  Procalcitonin.  RVP panel.  MRSA nares.    HEMATOLOGICAL:  DVT prophylaxis.    ENDOCRINE:  Check BHB.  Follow up FS. Insulin protocol if needed.      MUSCULOSKELETAL:  bed rest    Monitor in MICU

## 2021-10-22 NOTE — H&P ADULT - NSHPREVIEWOFSYSTEMS_GEN_ALL_CORE
CONSTITUTIONAL: No weakness, fevers or chills, no weight loss   EYES/ENT: No visual changes;  No vertigo or throat pain   NECK: No pain or stiffness  RESPIRATORY: as above  CARDIOVASCULAR: No chest pain or palpitations  GASTROINTESTINAL: No abdominal or epigastric pain. No nausea, vomiting, or hematemesis; No diarrhea or constipation. No melena or hematochezia.  GENITOURINARY: No discharge, hematuria  NEUROLOGICAL: No numbness or weakness  All other review of systems is negative unless indicated above.

## 2021-10-22 NOTE — H&P ADULT - ATTENDING COMMENTS
ICU Vital Signs Last 24 Hrs  T(C): 35.6 (22 Oct 2021 14:13), Max: 37.3 (22 Oct 2021 10:12)  T(F): 96 (22 Oct 2021 14:13), Max: 99.1 (22 Oct 2021 10:12)  HR: 91 (22 Oct 2021 14:24) (86 - 116)  BP: 127/60 (22 Oct 2021 14:13) (105/51 - 168/87)  BP(mean): --  ABP: --  ABP(mean): --  RR: 25 (22 Oct 2021 15:10) (23 - 50)  SpO2: 98% (22 Oct 2021 14:24) (91% - 100%)    pt was dc from icu yesterday and came back in with chf - pts lasix was held during admission due to ANTONELLA and was to be restarted at home but came back in.    #acute hypoxic resp failure due to CHF - cont BIPAP and wean down to NC  full code if deteriorates and would need intubation - but improving after lasix     #acute on chronic diastolic CHF - multiple valvular regurgiations , medications being held  improved in ER after a total of 120mg lasix IV  monitor UO, I<0, fluid restriction , lwo na diet   monitor lytes, mg,   no need to repeat echo  cardio cs    #lactic acidosis due to hypoperfusion - cont to monitor     #uncontrolled DM - icu protocol  - watch for hypoglycemia that was present last admission     discussed with team, cardio, and son at beside

## 2021-10-22 NOTE — ED PROVIDER NOTE - CLINICAL SUMMARY MEDICAL DECISION MAKING FREE TEXT BOX
pt is critically ill and in impending resp failure.  she is responding to BIPAP.  she may require intubation.  she will be admitted to the CCU.

## 2021-10-22 NOTE — H&P ADULT - NSHPLABSRESULTS_GEN_ALL_CORE
.  LABS:                         10.1   15.32 )-----------( 470      ( 22 Oct 2021 09:30 )             34.7     10-22    139  |  99  |  31<H>  ----------------------------<  297<H>  4.8   |  18  |  1.2    Ca    9.3      22 Oct 2021 09:30  Mg     2.3     10-22    TPro  7.1  /  Alb  3.7  /  TBili  0.6  /  DBili  x   /  AST  41  /  ALT  36  /  AlkPhos  110  10-22      Urinalysis Basic - ( 22 Oct 2021 11:20 )    Color: Yellow / Appearance: Clear / SG: >=1.030 / pH: x  Gluc: x / Ketone: 15  / Bili: Small / Urobili: 0.2 mg/dL   Blood: x / Protein: >=300 mg/dL / Nitrite: Negative   Leuk Esterase: Negative / RBC: 1-2 /HPF / WBC Negative   Sq Epi: x / Non Sq Epi: Few /HPF / Bacteria: Moderate      Serum Pro-Brain Natriuretic Peptide: 62831 pg/mL (10-22 @ 09:30)        RADIOLOGY, EKG & ADDITIONAL TESTS: Reviewed.

## 2021-10-22 NOTE — ED PROVIDER NOTE - COVID-19  TEST TYPE
Brittany Mosquera DO, MD MOLECULAR PCR

## 2021-10-22 NOTE — H&P ADULT - NSHPPHYSICALEXAM_GEN_ALL_CORE
GENERAL: Patient is on bipap  HEAD:  Atraumatic, Normocephalic  EYES: EOMI, PERRLA, conjunctiva and sclera clear  ENMT: No tonsillar erythema, exudates, or enlargement; Moist mucous membranes, No lesions  NERVOUS SYSTEM:  Alert & Oriented X4, Good concentration; Motor Strength 5/5 B/L upper and lower extremities  CHEST/LUNG: Clear to auscultation bilaterally; No rales, rhonchi, wheezing, or rubs  HEART: Regular rate and rhythm; No murmurs, rubs, or gallops  ABDOMEN: Soft, Nontender, Nondistended; Bowel sounds present in all 4 quadrants  EXTREMITIES:  3+ pitting edema in the LE b/l

## 2021-10-22 NOTE — H&P ADULT - ASSESSMENT
77 y/o F PMH heart failure, moderate severity mitral valve regurgitation, admitted in early October for heart failure and end stemi presents c/o SOB. Pt had a recent discharge from the hospital and XR during that admission showed increased markings in both lung fields consistent with CHF.      # Hypoglycemia due to non compliance, mild elevated in betahtdroxyB,. high lactate  - Insulin regimen restarted for hyperglycemia  - Patient on basal 15 units in AM with sliding scale to simplify regimen, the one she was discharged on  - Monitor FS   -f/u repeat lactate and anion gap  -f/u RVP, procal    # HFpEF     - Chest X-Ray: worsened  -received IV lasix in ER, continue IV lasix  -connor, I & O, daily weight  - ECHO on last discharge unchanged except for new, mild pulmonary hypertension   - F/u cardio outpatient     # Hypothyroidism   - Elevated TSH 12.24  - Increased synthroid to 175mg from 150mg  - TSH repeat outpatient in 2 months    # Iron Deficiency Anemia  # Anemia of chronic disease  - On Iron sucrose 200 daily  - Sat 9% and total 23 with normal TIBC  - Baseline Hb 8   - Keep Hb >7  - F/u H&H     # Nocturnal Hypoxemia  - was discharged on home O2    Status: FULL CODE  GI Prophylaxis: None   DVT Prophylaxis: Hep SubQ  75 y/o F PMH heart failure, moderate severity mitral valve regurgitation, admitted in early October for heart failure and end stemi presents c/o SOB. Pt had a recent discharge from the hospital and XR during that admission showed increased markings in both lung fields consistent with CHF.      # Hypoglycemia, mild elevated in betahydroxyButyrate, high lactate  - Insulin regimen restarted for hyperglycemia  - Patient on basal 15 units in AM with sliding scale to simplify regimen, the one she was discharged on  - Monitor FS   -f/u repeat lactate and anion gap  -f/u RVP, procal    # Acute Exacerbation of HFpEF   - Chest X-Ray: worsened  - Was in [place] last admission due to overdiuresis and Lasix was reduced on discharge  - received IV lasix in ER, continue IV lasix  - geeta, I & O, daily weight  - ECHO on last discharge unchanged except for new, mild pulmonary hypertension   - F/u cardio outpatient     # Hypothyroidism   - Elevated TSH 12.24  - Increased synthroid to 175mg from 150mg  - TSH repeat outpatient in 2 months    # Iron Deficiency Anemia  # Anemia of chronic disease  - On Iron sucrose 200 daily  - Sat 9% and total 23 with normal TIBC  - Baseline Hb 8   - Keep Hb >7  - F/u H&H     # Nocturnal Hypoxemia  - was discharged on home O2    Status: FULL CODE  GI Prophylaxis: None   DVT Prophylaxis: Hep SubQ  75 y/o F PMH heart failure, moderate severity mitral valve regurgitation, admitted in early October for heart failure and end stemi presents c/o SOB. Pt had a recent discharge from the hospital and XR during that admission showed increased markings in both lung fields consistent with CHF.    # Hypoglycemia, mild elevated in betahydroxyButyrate, high lactate  - Insulin regimen restarted for hyperglycemia  - Patient on basal 15 units in AM with sliding scale to simplify regimen, the one she was discharged on  - Monitor FS   -f/u repeat lactate and anion gap  -f/u RVP, procal    # Acute Exacerbation of HFpEF   - Chest X-Ray: worsened  - Was in [place] last admission due to overdiuresis and Lasix was reduced on discharge  - received IV lasix in ER, continue IV lasix  - geeta, I & O, daily weight  - ECHO on last discharge unchanged except for new, mild pulmonary hypertension   - F/u cardio outpatient     # Hypothyroidism   - Elevated TSH 12.24  - Continue synthroid 175mg  - TSH repeat outpatient in 2 months    # Iron Deficiency Anemia  # Anemia of chronic disease  - On Iron sucrose 200 daily  - Sat 9% and total 23 with normal TIBC  - Baseline Hb 8   - Keep Hb >7  - F/u H&H     # Nocturnal Hypoxemia  - uses home O2    Status: FULL CODE  GI Prophylaxis: None   DVT Prophylaxis: Hep SubQ

## 2021-10-22 NOTE — ED PROVIDER NOTE - PROGRESS NOTE DETAILS
TA: B-lines on bedside sono; patient started on bipap; pendings labs/cxr/ekg Dr. Alcocer: Pt's breathing is still labored, but somewhat improved from presentation on arrival. Dr. Alcocer: Critical value, abnormal troponin level noted. ATTENDING NOTE: 77 y/o F PMH heart failure, moderate severity mitral valve regurgitation, admitted in early October for heart failure and end stemi presents c/o SOB. Pt had a recent discharge from the hospital and XR during that admission showed increased markings in both lung fields consistent with CHF. She had acute SOB today. No complaints of CP. Vital signs noted. Pt is in respiratory distress. Bi-pap was applied. (+) JVD. (+) Rales to apices. Heart RR. (+) Systolic Murmur. (+) Edema of the LE. Diagnostic testing reviewed, consistent with APE. Pt has + urine output after IV Lasix and although she is still in some respiratory distress, her symptoms have improved compared to arrival. Discussed case with Dr. Lassiter and Dr. Pantoja who will admit to critical care area for close observation of respiratory status. PO 96 % on bipap

## 2021-10-22 NOTE — CONSULT NOTE ADULT - SUBJECTIVE AND OBJECTIVE BOX
Patient is a 76y old  Female who presents with a chief complaint of shortness of breath.    HPI: 75 yo female PMHx of HFpEF, HTN, HLD, DM,  SLE on Cellsept and methotrexate, CVA in 2002, recently admitted for Hypoglycemia and ANTONELLA, discharged yesterday, presenting for shortness of breath.    ED: Lasix 40, Lasix 80, Vanc & Cefepime.  Placed on BiPAP    PAST MEDICAL & SURGICAL HISTORY:  DM (diabetes mellitus)  Hypercholesteremia  Hypertension  Lupus  Fall  Stroke, TIA 2002, no deficits  Herniated lumbar intervertebral disc  Seizure    No significant past surgical history    SOCIAL HX:  negative    FAMILY HISTORY:  No known cardiovascular family history     Review Of Systems:   All ROS are negative except per HPI     Allergies  No Known Allergies    Intolerances    PHYSICAL EXAM    ICU Vital Signs Last 24 Hrs  T(C): 37.3 (22 Oct 2021 10:12), Max: 37.3 (22 Oct 2021 10:12)  T(F): 99.1 (22 Oct 2021 10:12), Max: 99.1 (22 Oct 2021 10:12)  HR: 107 (22 Oct 2021 12:43) (106 - 116)  BP: 137/64 (22 Oct 2021 12:43) (114/58 - 168/87)  RR: 50 (22 Oct 2021 12:43) (17 - 50)  SpO2: 97% (22 Oct 2021 12:43) (91% - 100%)      CONSTITUTIONAL:  Well nourished.  Moderate distress    ENT:   Airway patent,   Mouth with normal mucosa.   No thrush    EYES:   pupils equal,   round and reactive to light.    CARDIAC:   Tachycardic  Regular rhythm.    Heart sounds S1, S2.   edema    RESPIRATORY:   Tachypnea  Diffusely decreased air entry bilaterally  Bilateral crackles  Bilateral wheezing   Normal chest expansion  + use of accessory muscles    GASTROINTESTINAL:  Abdomen soft   Non-tender,   No guarding,   + BS    MUSCULOSKELETAL:   Range of motion is not limited,  No clubbing, cyanosis    NEUROLOGICAL:   Alert and oriented   No motor or sensory deficits.    SKIN:   Skin normal color for race,   Warm and dry  No evidence of rash.      LABS:                          10.1   15.32 )-----------( 470      ( 22 Oct 2021 09:30 )             34.7     139  |  99  |  31<H>  ----------------------------<  297<H>  4.8   |  18  |  1.2    Ca    9.3      22 Oct 2021 09:30  Mg     1.9     10-21    TPro  7.1  /  Alb  3.7  /  TBili  0.6  /  DBili  x   /  AST  41  /  ALT  36  /  AlkPhos  110  10-22    Urinalysis Basic - ( 22 Oct 2021 11:20 )  Color: Yellow / Appearance: Clear / SG: >=1.030 / pH: x  Gluc: x / Ketone: 15  / Bili: Small / Urobili: 0.2 mg/dL   Blood: x / Protein: >=300 mg/dL / Nitrite: Negative   Leuk Esterase: Negative / RBC: 1-2 /HPF / WBC Negative   Sq Epi: x / Non Sq Epi: Few /HPF / Bacteria: Moderate    CARDIAC MARKERS ( 22 Oct 2021 09:30 )  x     / 0.16 ng/mL / x     / x     / x         LIVER FUNCTIONS - ( 22 Oct 2021 09:30 )  Alb: 3.7 g/dL / Pro: 7.1 g/dL / ALK PHOS: 110 U/L / ALT: 36 U/L / AST: 41 U/L / GGT: x                                                                      X-Rays reviewed:                                                                                    ECHO    CXR interpreted by me:  Bilateral opacities

## 2021-10-22 NOTE — ED PROVIDER NOTE - NS ED ROS FT
Eyes:  No visual changes, eye pain or discharge.  ENMT:  No hearing changes, pain, discharge or infections. No neck pain or stiffness.  Cardiac:  No chest pain, + SOB +edema. No chest pain with exertion.  Respiratory:  No cough. No hemoptysis. No history of asthma or RAD.  GI:  No nausea, vomiting, diarrhea or abdominal pain.  :  No dysuria, frequency or burning.  MS:  No myalgia, muscle weakness, joint pain or back pain.  Neuro:  No headache or weakness.  No LOC.  Skin:  No skin rash.   Endocrine: No history of thyroid disease or diabetes.

## 2021-10-23 NOTE — CONSULT NOTE ADULT - ASSESSMENT
Patient sob. She needs intubation. Then repeat cxr. Need repeat cardiac enzymes. Troponin possbly up secondary demand ischemia. Now anemic. Need repeat cbc . May need blood. WBC increased . Procalcitonin increased. Culture antibiotics. Lasix . Prognosis guarded

## 2021-10-23 NOTE — PROGRESS NOTE ADULT - ASSESSMENT
LINDSAY ALBERT 76y Female  MRN#: [id number]   CODE STATUS:________      SUBJECTIVE  Patient is a 76y old Female who presents with a chief complaint of SOB (23 Oct 2021 09:12)  Currently admitted to medicine with the primary diagnosis of Acute pulmonary edema    Hospital course has been complicated by _______.   Today is hospital day 1d, and this morning she is _________ and reports ________ overnight events.     OBJECTIVE  PAST MEDICAL & SURGICAL HISTORY  DM (diabetes mellitus)  insulin  fs achs    Hypercholesteremia  statin    Hypertension  hctz    Lupus  as per hx    Fall, initial encounter  as  hx    Stroke  TIA 2002, no deficits    Herniated lumbar intervertebral disc  as per hx    Seizure  keppra    No significant past surgical history      ALLERGIES:  No Known Allergies    MEDICATIONS:  STANDING MEDICATIONS  aspirin  chewable 81 milliGRAM(s) Oral daily  atorvastatin 40 milliGRAM(s) Oral at bedtime  chlorhexidine 4% Liquid 1 Application(s) Topical <User Schedule>  dextrose 40% Gel 15 Gram(s) Oral once  dextrose 5%. 1000 milliLiter(s) IV Continuous <Continuous>  dextrose 5%. 1000 milliLiter(s) IV Continuous <Continuous>  dextrose 50% Injectable 25 Gram(s) IV Push once  dextrose 50% Injectable 12.5 Gram(s) IV Push once  dextrose 50% Injectable 25 Gram(s) IV Push once  DULoxetine 60 milliGRAM(s) Oral daily  folic acid 1 milliGRAM(s) Oral daily  furosemide   Injectable 40 milliGRAM(s) IV Push every 12 hours  gabapentin 400 milliGRAM(s) Oral three times a day  glucagon  Injectable 1 milliGRAM(s) IntraMuscular once  heparin   Injectable 5000 Unit(s) SubCutaneous every 8 hours  hydroxychloroquine 200 milliGRAM(s) Oral two times a day  insulin glargine Injectable (LANTUS) 15 Unit(s) SubCutaneous every morning  insulin lispro (ADMELOG) corrective regimen sliding scale   SubCutaneous at bedtime  insulin lispro Injectable (ADMELOG) 6 Unit(s) SubCutaneous three times a day before meals  levothyroxine 175 MICROGram(s) Oral daily  metoprolol tartrate 25 milliGRAM(s) Oral two times a day    PRN MEDICATIONS      VITAL SIGNS: Last 24 Hours  T(C): 36.7 (23 Oct 2021 07:01), Max: 37.3 (22 Oct 2021 10:12)  T(F): 98 (23 Oct 2021 07:01), Max: 99.1 (22 Oct 2021 10:12)  HR: 72 (23 Oct 2021 09:00) (58 - 110)  BP: 113/56 (23 Oct 2021 05:00) (95/56 - 168/87)  BP(mean): 80 (23 Oct 2021 05:00) (70 - 83)  RR: 25 (23 Oct 2021 07:01) (21 - 50)  SpO2: 100% (23 Oct 2021 09:00) (91% - 100%)    LABS:                        8.3    18.02 )-----------( 307      ( 23 Oct 2021 05:52 )             27.0     10-23    140  |  99  |  42<H>  ----------------------------<  392<H>  4.3   |  25  |  1.2    Ca    8.9      23 Oct 2021 05:52  Mg     2.3     10-22    TPro  5.6<L>  /  Alb  3.0<L>  /  TBili  0.4  /  DBili  x   /  AST  55<H>  /  ALT  31  /  AlkPhos  95  10-23      Urinalysis Basic - ( 22 Oct 2021 11:20 )    Color: Yellow / Appearance: Clear / SG: >=1.030 / pH: x  Gluc: x / Ketone: 15  / Bili: Small / Urobili: 0.2 mg/dL   Blood: x / Protein: >=300 mg/dL / Nitrite: Negative   Leuk Esterase: Negative / RBC: 1-2 /HPF / WBC Negative   Sq Epi: x / Non Sq Epi: Few /HPF / Bacteria: Moderate      ABG - ( 23 Oct 2021 08:24 )  pH, Arterial: 7.48  pH, Blood: x     /  pCO2: 29    /  pO2: 64    / HCO3: 22    / Base Excess: -0.8  /  SaO2: 92.4              Lactate, Blood: 1.6 mmol/L (10-23-21 @ 05:52)  Troponin T, Serum: 0.82 ng/mL *HH* (10-23-21 @ 05:52)  Lactate, Blood: 3.4 mmol/L *H* (10-22-21 @ 19:36)  Troponin T, Serum: 0.69 ng/mL *HH* (10-22-21 @ 19:36)  Troponin T, Serum: 0.16 ng/mL *HH* (10-22-21 @ 09:30)      CARDIAC MARKERS ( 23 Oct 2021 05:52 )  x     / 0.82 ng/mL / x     / x     / x      CARDIAC MARKERS ( 22 Oct 2021 19:36 )  x     / 0.69 ng/mL / x     / x     / x      CARDIAC MARKERS ( 22 Oct 2021 09:30 )  x     / 0.16 ng/mL / x     / x     / x          RADIOLOGY:      PHYSICAL EXAM:    GENERAL: NAD, well-developed, AAOx3  HEENT:  Atraumatic, Normocephalic. EOMI, PERRLA, conjunctiva and sclera clear, No JVD  PULMONARY: Clear to auscultation bilaterally; No wheeze  CARDIOVASCULAR: Regular rate and rhythm; No murmurs, rubs, or gallops  GASTROINTESTINAL: Soft, Nontender, Nondistended; Bowel sounds present  MUSCULOSKELETAL:  2+ Peripheral Pulses, No clubbing, cyanosis, or edema  NEUROLOGY: non-focal  SKIN: No rashes or lesions      ADMISSION SUMMARY  77 y/o F PMH heart failure, moderate severity mitral valve regurgitation, admitted in early October for heart failure and end stemi presents c/o SOB. Pt had a recent discharge from the hospital and XR during that admission showed increased markings in both lung fields consistent with CHF. remained in respiratory distress despite IV diuresis and BIPAP, required intubation      # AHRF due to pulmonary edema in pt with Heart failure w preserved EF (Ef50%), intubated in AM  # cant rule out infectious component: elevated procal, possible GNR PNA  # Shock on levophed ( cardiogenic vs septic)  # multiple valvular regurgitations , medications being held  # Troponemia likely demand ischemia  # lactic acidosis due to hypoperfusion  # uncontrolled DM  # acute on Chronic anemia, no signs of active loss      - strict Is and Os  - monitor lytes, mg, while on diuretics   - no need to repeat echo  - cardiology f/u  - ICU BS protocol, keep -180  - trend cardiac enzymes  - pan cultures  - active type and screen  - daily CXR/ ABG  - HOB 45  - transfuse to keep Hemoglobin > 8   - will start vanc and cefepime.   - MRSA nares   LINDSAY ALBERT 76y Female  MRN#: [id number]   CODE STATUS:full code      SUBJECTIVE  Patient is a 76y old Female who presents with a chief complaint of SOB (23 Oct 2021 09:12)  Currently admitted to medicine with the primary diagnosis of Acute pulmonary edema  Today is hospital day 1d, and this morning she is was in severe respiratory intubated and started on sedation    OBJECTIVE  PAST MEDICAL & SURGICAL HISTORY  DM (diabetes mellitus)  insulin  fs achs    Hypercholesteremia  statin    Hypertension  hctz    Lupus  as per hx    Fall, initial encounter  as  hx    Stroke  TIA 2002, no deficits    Herniated lumbar intervertebral disc  as per hx    Seizure  keppra    No significant past surgical history      ALLERGIES:  No Known Allergies    MEDICATIONS:  STANDING MEDICATIONS  aspirin  chewable 81 milliGRAM(s) Oral daily  atorvastatin 40 milliGRAM(s) Oral at bedtime  chlorhexidine 4% Liquid 1 Application(s) Topical <User Schedule>  dextrose 40% Gel 15 Gram(s) Oral once  dextrose 5%. 1000 milliLiter(s) IV Continuous <Continuous>  dextrose 5%. 1000 milliLiter(s) IV Continuous <Continuous>  dextrose 50% Injectable 25 Gram(s) IV Push once  dextrose 50% Injectable 12.5 Gram(s) IV Push once  dextrose 50% Injectable 25 Gram(s) IV Push once  DULoxetine 60 milliGRAM(s) Oral daily  folic acid 1 milliGRAM(s) Oral daily  furosemide   Injectable 40 milliGRAM(s) IV Push every 12 hours  gabapentin 400 milliGRAM(s) Oral three times a day  glucagon  Injectable 1 milliGRAM(s) IntraMuscular once  heparin   Injectable 5000 Unit(s) SubCutaneous every 8 hours  hydroxychloroquine 200 milliGRAM(s) Oral two times a day  insulin glargine Injectable (LANTUS) 15 Unit(s) SubCutaneous every morning  insulin lispro (ADMELOG) corrective regimen sliding scale   SubCutaneous at bedtime  insulin lispro Injectable (ADMELOG) 6 Unit(s) SubCutaneous three times a day before meals  levothyroxine 175 MICROGram(s) Oral daily  metoprolol tartrate 25 milliGRAM(s) Oral two times a day    PRN MEDICATIONS      VITAL SIGNS: Last 24 Hours  T(C): 36.7 (23 Oct 2021 07:01), Max: 37.3 (22 Oct 2021 10:12)  T(F): 98 (23 Oct 2021 07:01), Max: 99.1 (22 Oct 2021 10:12)  HR: 72 (23 Oct 2021 09:00) (58 - 110)  BP: 113/56 (23 Oct 2021 05:00) (95/56 - 168/87)  BP(mean): 80 (23 Oct 2021 05:00) (70 - 83)  RR: 25 (23 Oct 2021 07:01) (21 - 50)  SpO2: 100% (23 Oct 2021 09:00) (91% - 100%)    LABS:                        8.3    18.02 )-----------( 307      ( 23 Oct 2021 05:52 )             27.0     10-23    140  |  99  |  42<H>  ----------------------------<  392<H>  4.3   |  25  |  1.2    Ca    8.9      23 Oct 2021 05:52  Mg     2.3     10-22    TPro  5.6<L>  /  Alb  3.0<L>  /  TBili  0.4  /  DBili  x   /  AST  55<H>  /  ALT  31  /  AlkPhos  95  10-23      Urinalysis Basic - ( 22 Oct 2021 11:20 )    Color: Yellow / Appearance: Clear / SG: >=1.030 / pH: x  Gluc: x / Ketone: 15  / Bili: Small / Urobili: 0.2 mg/dL   Blood: x / Protein: >=300 mg/dL / Nitrite: Negative   Leuk Esterase: Negative / RBC: 1-2 /HPF / WBC Negative   Sq Epi: x / Non Sq Epi: Few /HPF / Bacteria: Moderate      ABG - ( 23 Oct 2021 08:24 )  pH, Arterial: 7.48  pH, Blood: x     /  pCO2: 29    /  pO2: 64    / HCO3: 22    / Base Excess: -0.8  /  SaO2: 92.4              Lactate, Blood: 1.6 mmol/L (10-23-21 @ 05:52)  Troponin T, Serum: 0.82 ng/mL *HH* (10-23-21 @ 05:52)  Lactate, Blood: 3.4 mmol/L *H* (10-22-21 @ 19:36)  Troponin T, Serum: 0.69 ng/mL *HH* (10-22-21 @ 19:36)  Troponin T, Serum: 0.16 ng/mL *HH* (10-22-21 @ 09:30)      CARDIAC MARKERS ( 23 Oct 2021 05:52 )  x     / 0.82 ng/mL / x     / x     / x      CARDIAC MARKERS ( 22 Oct 2021 19:36 )  x     / 0.69 ng/mL / x     / x     / x      CARDIAC MARKERS ( 22 Oct 2021 09:30 )  x     / 0.16 ng/mL / x     / x     / x          RADIOLOGY:      PHYSICAL EXAM:    GENERAL: intubated  HEENT:  Atraumatic, Normocephalic.  PULMONARY: Clear to auscultation bilaterally  CARDIOVASCULAR: Regular rate and rhythm;   GASTROINTESTINAL: Soft, Nontender, Nondistended  MUSCULOSKELETAL: No clubbing, cyanosis, or edema  NEUROLOGY: non-focal  SKIN: No rashes or lesions      ADMISSION SUMMARY  75 y/o F PMH heart failure, moderate severity mitral valve regurgitation, admitted in early October for heart failure and end stemi presents c/o SOB. Pt had a recent discharge from the hospital and XR during that admission showed increased markings in both lung fields consistent with CHF. remained in respiratory distress despite IV diuresis and BIPAP, required intubation      # AHRF due to pulmonary edema in pt with Heart failure w preserved EF (Ef50%), intubated in AM  # cant rule out infectious component: elevated procal, possible GNR PNA  # septic Shock on levophed   # multiple valvular regurgitations , medications being held  # Troponemia likely demand ischemia  # lactic acidosis due to hypoperfusion  # uncontrolled DM  # acute on Chronic anemia, no signs of active loss      - strict Is and Os  - monitor lytes, mg, while on diuretics   - cardiology f/u  - ICU BS protocol, keep -180  - trend cardiac enzymes  - pan cultures  - active type and screen  - daily CXR/ ABG  - supportive care per cc team   - transfuse to keep Hemoglobin > 8   - start vanc and cefepime.   - MRSA nares      poor prognosis

## 2021-10-23 NOTE — CHART NOTE - NSCHARTNOTEFT_GEN_A_CORE
Called by Nurse as patient became tachypnic and agitated on Bipap. O2 sat remained above 90%. BP sbp >95.  Pt was intubated for p respiratory failure. Anesthesia intubated the patient.  Central line being placed by Resident. Pt received lasix prior to intubation without relief and lopressor 5mg IVP to control her HR.  Troponin noted to be elevated, ?LBBB on EKG.  Cardio consulted.  Will obtain serial Cardiac enzymes and ekg's. Pt's son, Robin was notified of the change in her status.  Pt also noted to have dropped her hgb, will transfuse PRBC's and check serial cbc.  She may require imaging to eval for bleed, when stable.

## 2021-10-23 NOTE — PROGRESS NOTE ADULT - SUBJECTIVE AND OBJECTIVE BOX
Patient is a 76y old  Female who presents with a chief complaint of SOB (22 Oct 2021 15:03)        HPI:  75 yo female PMHx of HFpEF, HTN, HLD, DM,  SLE on Cellsept and methotrexate, CVA in 2002, recently admitted for Hypoglycemia and ANTONELLA, discharged yesterday, presenting for shortness of breath. Patient was found hypoxic in ER and was put on bipap. Her blood sugars were also high presumably from non compliance since discharge. Patient has no other acute complaints.     Of note patient was discharged yesterday on reduced dose of Lasix which she did not take at home.        Patient found hypoxic in ER. Blood work reviewed, lactate , Beta hydroxy butyrate and anion gap is elevated, UA Positive. CXR showing bilateral infiltrates. Patient is being admitted for hypoxic resp failure and hyperglycemia  (22 Oct 2021 15:03)      Pt evaluated on rounds.  I reviewed the radiology tests and hospital record.    I reviewed previous notes on this patient.    Interval Events: No overnight events.    REVIEW OF SYSTEMS:   see HPI      OBJECTIVE:  ICU Vital Signs Last 24 Hrs  T(C): 36.6 (23 Oct 2021 03:00), Max: 37.3 (22 Oct 2021 10:12)  T(F): 97.8 (23 Oct 2021 03:00), Max: 99.1 (22 Oct 2021 10:12)  HR: 82 (23 Oct 2021 05:00) (58 - 116)  BP: 113/56 (23 Oct 2021 05:00) (95/56 - 168/87)  BP(mean): 80 (23 Oct 2021 05:00) (70 - 83)    RR: 31 (23 Oct 2021 05:00) (21 - 50)  SpO2: 100% (23 Oct 2021 05:00) (91% - 100%)        10-22 @ 07:01  -  10-23 @ 05:56  --------------------------------------------------------  IN: 100 mL / OUT: 910 mL / NET: -810 mL      CAPILLARY BLOOD GLUCOSE      POCT Blood Glucose.: 370 mg/dL (22 Oct 2021 21:10)        PHYSICAL EXAM:     · CONSTITUTIONAL:   not septic appearing,   well nourished,   NAD    · ENMT:   Airway patent,   Nasal mucosa clear.  Mouth with normal mucosa.   No thrush    · EYES:   Clear bilaterally,   pupils equal,   round and reactive to light.    · CARDIAC:   Normal rate,   regular rhythm.    Heart sounds S1, S2.   No murmurs, no rubs or gallops on auscultation  no edema        CAROTID:   normal systolic impulse  no bruits    · RESPIRATORY:   rales  normal chest expansion  no retractions or use of accessory muscles  palpation of chest is normal with no fremitus  percussion of chest demonstrates no hyperresonance or dullness    · GASTROINTESTINAL:  Abdomen soft,   non-tender,   + BS  liver/spleen not palpable    · MUSCULOSKELETAL:   no clubbing, cyanosis      · SKIN:   Skin normal color for race,   warm, dry   No evidence of rash.        · HEME LYMPH:   no splenomegaly.  No cervical  lymphadenopathy.  no inguinal lymphadenopathy    HOSPITAL MEDICATIONS:  MEDICATIONS  (STANDING):  aspirin  chewable 81 milliGRAM(s) Oral daily  atorvastatin 40 milliGRAM(s) Oral at bedtime  chlorhexidine 4% Liquid 1 Application(s) Topical <User Schedule>  dextrose 40% Gel 15 Gram(s) Oral once  dextrose 5%. 1000 milliLiter(s) (50 mL/Hr) IV Continuous <Continuous>  dextrose 5%. 1000 milliLiter(s) (100 mL/Hr) IV Continuous <Continuous>  dextrose 50% Injectable 25 Gram(s) IV Push once  dextrose 50% Injectable 12.5 Gram(s) IV Push once  dextrose 50% Injectable 25 Gram(s) IV Push once  DULoxetine 60 milliGRAM(s) Oral daily  folic acid 1 milliGRAM(s) Oral daily  furosemide   Injectable 40 milliGRAM(s) IV Push every 12 hours  gabapentin 400 milliGRAM(s) Oral three times a day  glucagon  Injectable 1 milliGRAM(s) IntraMuscular once  heparin   Injectable 5000 Unit(s) SubCutaneous every 8 hours  hydroxychloroquine 200 milliGRAM(s) Oral two times a day  insulin glargine Injectable (LANTUS) 15 Unit(s) SubCutaneous every morning  insulin lispro (ADMELOG) corrective regimen sliding scale   SubCutaneous at bedtime  insulin lispro Injectable (ADMELOG) 6 Unit(s) SubCutaneous three times a day before meals  levothyroxine 175 MICROGram(s) Oral daily  metoprolol tartrate 25 milliGRAM(s) Oral two times a day    MEDICATIONS  (PRN):        LABS:                        10.1   15.32 )-----------( 470      ( 22 Oct 2021 09:30 )             34.7     10-22    139  |  100  |  39<H>  ----------------------------<  398<H>  4.3   |  27  |  1.2    Ca    8.8      22 Oct 2021 21:36  Mg     2.3     10-22    TPro  5.5<L>  /  Alb  3.0<L>  /  TBili  0.4  /  DBili  x   /  AST  54<H>  /  ALT  34  /  AlkPhos  97  10-22      Urinalysis Basic - ( 22 Oct 2021 11:20 )    Color: Yellow / Appearance: Clear / SG: >=1.030 / pH: x  Gluc: x / Ketone: 15  / Bili: Small / Urobili: 0.2 mg/dL   Blood: x / Protein: >=300 mg/dL / Nitrite: Negative   Leuk Esterase: Negative / RBC: 1-2 /HPF / WBC Negative   Sq Epi: x / Non Sq Epi: Few /HPF / Bacteria: Moderate      Arterial Blood Gas:  10-22 @ 13:47  7.33/44/93/23/95.4/-2.7  ABG lactate: --    Venous Blood Gas:  10-22 @ 11:10  7.25/57/47/25/68.8  VBG Lactate: 7.90  Venous Blood Gas:  10-22 @ 09:31  7.19/61/42/23/57.3  VBG Lactate: 9.30    CARDIAC MARKERS ( 22 Oct 2021 19:36 )  x     / 0.69 ng/mL / x     / x     / x      CARDIAC MARKERS ( 22 Oct 2021 09:30 )  x     / 0.16 ng/mL / x     / x     / x          COVID-19 PCR: NotDetec (22 Oct 2021 09:30)  COVID-19 PCR: NotDetec (18 Oct 2021 22:25)  COVID-19 PCR: NotDetec (02 Oct 2021 15:35)  COVID-19 PCR: NotDetec (25 Sep 2021 21:43)  COVID-19 PCR: NotDetec (07 Aug 2021 15:30)        ABG - ( 22 Oct 2021 13:47 )  pH, Arterial: 7.33  pH, Blood: x     /  pCO2: 44    /  pO2: 93    / HCO3: 23    / Base Excess: -2.7  /  SaO2: 95.4                  RADIOLOGY: Today I personally interpreted the latest CXR and other pertinent films.

## 2021-10-23 NOTE — CONSULT NOTE ADULT - SUBJECTIVE AND OBJECTIVE BOX
CARDIOLOGY CONSULT NOTE     CHIEF COMPLAINT/REASON FOR CONSULT:    HPI:  75 yo female PMHx of HFpEF, HTN, HLD, DM,  SLE on Cellsept and methotrexate, CVA in 2002, recently admitted for Hypoglycemia and ANTONELLA, discharged yesterday, presenting for shortness of breath. Patient was found hypoxic in ER and was put on bipap. Her blood sugars were also high presumably from non compliance since discharge. Patient has no other acute complaints.     Of note patient was discharged yesterday on reduced dose of Lasix which she did not take at home.      T(C): 35.6 (22 Oct 2021 14:13), Max: 37.3 (22 Oct 2021 10:12)  T(F): 96 (22 Oct 2021 14:13), Max: 99.1 (22 Oct 2021 10:12)  HR: 91 (22 Oct 2021 14:24) (86 - 116)  BP: 127/60 (22 Oct 2021 14:13) (105/51 - 168/87)  BP(mean): --  RR: 23 (22 Oct 2021 14:13) (17 - 50)  SpO2: 98% (22 Oct 2021 14:24) (91% - 100%)    Patient found hypoxic in ER. Blood work reviewed, lactate , Beta hydroxy butyrate and anion gap is elevated, UA Positive. CXR showing bilateral infiltrates. Patient is being admitted for hypoxic resp failure and hyperglycemia  (22 Oct 2021 15:03)      PAST MEDICAL & SURGICAL HISTORY:  DM (diabetes mellitus)  insulin  fs achs    Hypercholesteremia  statin    Hypertension  hctz    Lupus  as per hx    Fall, initial encounter  as  hx    Stroke  TIA 2002, no deficits    Herniated lumbar intervertebral disc  as per hx    Seizure  keppra    No significant past surgical history        Cardiac Risks:   [x ]HTN, [x ] DM, [ ] Smoking, [ ] FH,  [x ] Lipids        MEDICATIONS:  MEDICATIONS  (STANDING):  aspirin  chewable 81 milliGRAM(s) Oral daily  atorvastatin 40 milliGRAM(s) Oral at bedtime  chlorhexidine 4% Liquid 1 Application(s) Topical <User Schedule>  dextrose 40% Gel 15 Gram(s) Oral once  dextrose 5%. 1000 milliLiter(s) (50 mL/Hr) IV Continuous <Continuous>  dextrose 5%. 1000 milliLiter(s) (100 mL/Hr) IV Continuous <Continuous>  dextrose 50% Injectable 25 Gram(s) IV Push once  dextrose 50% Injectable 12.5 Gram(s) IV Push once  dextrose 50% Injectable 25 Gram(s) IV Push once  DULoxetine 60 milliGRAM(s) Oral daily  folic acid 1 milliGRAM(s) Oral daily  furosemide   Injectable 40 milliGRAM(s) IV Push every 12 hours  gabapentin 400 milliGRAM(s) Oral three times a day  glucagon  Injectable 1 milliGRAM(s) IntraMuscular once  heparin   Injectable 5000 Unit(s) SubCutaneous every 8 hours  hydroxychloroquine 200 milliGRAM(s) Oral two times a day  insulin glargine Injectable (LANTUS) 15 Unit(s) SubCutaneous every morning  insulin lispro (ADMELOG) corrective regimen sliding scale   SubCutaneous at bedtime  insulin lispro Injectable (ADMELOG) 6 Unit(s) SubCutaneous three times a day before meals  levothyroxine 175 MICROGram(s) Oral daily  metoprolol tartrate 25 milliGRAM(s) Oral two times a day      FAMILY HISTORY:      SOCIAL HISTORY:      [ ] Marital status    Allergies    No Known Allergies      	    REVIEW OF SYSTEMS:  CONSTITUTIONAL: No fever, weight loss, or fatigue  EYES: No eye pain, visual disturbances, or discharge  ENMT:  No difficulty hearing, tinnitus, vertigo; No sinus or throat pain  NECK: No pain or stiffness  RESPIRATORY: No cough, wheezing, chills or hemoptysis; No Shortness of Breath  CARDIOVASCULAR: See above  GASTROINTESTINAL: No abdominal or epigastric pain. No nausea, vomiting, or hematemesis; No diarrhea or constipation. No melena or hematochezia.  GENITOURINARY: No dysuria, frequency, hematuria, or incontinence  NEUROLOGICAL: No headaches, memory loss, loss of strength, numbness, or tremors  SKIN: No itching, burning, rashes, or lesions   	      PHYSICAL EXAM:  T(C): 36.7 (10-23-21 @ 07:01), Max: 37.3 (10-22-21 @ 10:12)  HR: 72 (10-23-21 @ 09:00) (58 - 116)  BP: 113/56 (10-23-21 @ 05:00) (95/56 - 168/87)  RR: 25 (10-23-21 @ 07:01) (21 - 50)  SpO2: 100% (10-23-21 @ 09:00) (91% - 100%)  Wt(kg): --  I&O's Summary    22 Oct 2021 07:01  -  23 Oct 2021 07:00  --------------------------------------------------------  IN: 100 mL / OUT: 950 mL / NET: -850 mL    23 Oct 2021 07:01  -  23 Oct 2021 09:14  --------------------------------------------------------  IN: 0 mL / OUT: 120 mL / NET: -120 mL        Appearance: Normal	  Psychiatry: A & O x 3, Mood & affect appropriate  HEENT:   Normal oral mucosa, PERRL, EOMI	  Lymphatic: No lymphadenopathy  Cardiovascular: Normal S1 S2,RRR, No JVD, i/vi jamison lsb + HJR  Respiratory: Lungs clear to auscultation	  Gastrointestinal:  Soft, Non-tender, + BS	  Skin: No rashes, No ecchymoses, No cyanosis	  Neurologic: Non-focal  Extremities: Normal range of motion, No clubbing, cyanosis or edema  Vascular: Peripheral pulses palpable 2+ bilaterally      ECG:  	< from: 12 Lead ECG (10.22.21 @ 09:24) >    Diagnosis Line   Sinus tachycardia  Nonspecific ST-T changes  Confirmed by LUAN YORK MD ([id number]) on 10/22/2021 5:08:12 PM    < end of copied text >      	  LABS:	 	    CARDIAC MARKERS:          Serum Pro-Brain Natriuretic Peptide: 45176 pg/mL (10-22 @ 09:30)                            8.3    18.02 )-----------( 307      ( 23 Oct 2021 05:52 )             27.0     10-23    140  |  99  |  42<H>  ----------------------------<  392<H>  4.3   |  25  |  1.2    Ca    8.9      23 Oct 2021 05:52  Mg     2.3     10-22    TPro  5.6<L>  /  Alb  3.0<L>  /  TBili  0.4  /  DBili  x   /  AST  55<H>  /  ALT  31  /  AlkPhos  95  10-23      proBNP: Serum Pro-Brain Natriuretic Peptide: 70131 pg/mL (10-22 @ 09:30)

## 2021-10-23 NOTE — PROGRESS NOTE ADULT - ASSESSMENT
IMPRESSION:    Hypoglycemia, mild elevated in betahydroxyButyrate, high lactate  Acute Exacerbation of HFpEF    Hypothyroidism    Iron Deficiency Anemia   Anemia of chronic disease      SUGGEST:     Insulin regimen for hyperglycemia  Monitor FS   f/u repeat lactate and anion gap  f/u RVP, procal  NIV  continue O2 as necessary to maintain sats > 90%<94  Chest X-Ray: daily  continue IV lasix   connor, I & O, daily weight   F/u cardio    Continue synthroid 175mg   Sat 9% and total 23 with normal TIBC   Baseline Hb 8    Keep Hb >8   F/u H&H   NPO until stable  GI Prophylaxis: None   DVT Prophylaxis: Hep SubQ     The patient is critically ill with a probability of deterioration.

## 2021-10-24 NOTE — PROGRESS NOTE ADULT - SUBJECTIVE AND OBJECTIVE BOX
Patient is a 76y old  Female who presents with a chief complaint of SOB (23 Oct 2021 09:24)        HPI:  75 yo female PMHx of HFpEF, HTN, HLD, DM,  SLE on Cellsept and methotrexate, CVA in 2002, recently admitted for Hypoglycemia and ANTONELLA, discharged yesterday, presenting for shortness of breath. Patient was found hypoxic in ER and was put on bipap. Her blood sugars were also high presumably from non compliance since discharge. Patient has no other acute complaints.     Of note patient was discharged yesterday on reduced dose of Lasix which she did not take at home.      Patient found hypoxic in ER. Blood work reviewed, lactate , Beta hydroxy butyrate and anion gap is elevated, UA Positive. CXR showing bilateral infiltrates. Patient is being admitted for hypoxic resp failure and hyperglycemia  (22 Oct 2021 15:03)      Pt evaluated on rounds.  I reviewed the radiology tests and hospital record.    I reviewed previous notes on this patient.    Interval Events: Intubated overnight .    REVIEW OF SYSTEMS:   see HPI      OBJECTIVE:  ICU Vital Signs Last 24 Hrs  T(C): 37.8 (24 Oct 2021 06:11), Max: 38.2 (24 Oct 2021 02:00)  T(F): 100 (24 Oct 2021 06:11), Max: 100.8 (24 Oct 2021 02:00)  HR: 82 (24 Oct 2021 05:30) (67 - 123)  BP: 102/51 (24 Oct 2021 05:30) (81/42 - 115/60)  BP(mean): 73 (24 Oct 2021 05:30) (57 - 82)  ABP: --  ABP(mean): --  RR: 15 (24 Oct 2021 06:11) (15 - 25)  SpO2: 99% (24 Oct 2021 06:11) (97% - 100%)    Mode: Auto Mode: PRVC/ Volume Support, RR (machine): 15, TV (machine): 400, FiO2: 40, PEEP: 5, MAP: 10, PIP: 21    10-22 @ 07:01  -  10-23 @ 07:00  --------------------------------------------------------  IN: 100 mL / OUT: 950 mL / NET: -850 mL    10-23 @ 07:01  -  10-24 @ 06:30  --------------------------------------------------------  IN: 1640.2 mL / OUT: 1510 mL / NET: 130.2 mL      CAPILLARY BLOOD GLUCOSE      POCT Blood Glucose.: 245 mg/dL (24 Oct 2021 05:52)        PHYSICAL EXAM:     · CONSTITUTIONAL:   not septic appearing,   well nourished,   NAD    · ENMT:   Airway patent,   Nasal mucosa clear.  Mouth with normal mucosa.   No thrush    · EYES:   Clear bilaterally,   pupils equal,   round and reactive to light.    · CARDIAC:   Normal rate,   regular rhythm.    Heart sounds S1, S2.   No murmurs, no rubs or gallops on auscultation  no edema        CAROTID:   normal systolic impulse  no bruits    · RESPIRATORY:   rales  normal chest expansion  no retractions or use of accessory muscles  palpation of chest is normal with no fremitus  percussion of chest demonstrates no hyperresonance or dullness    · GASTROINTESTINAL:  Abdomen soft,   non-tender,   + BS  liver/spleen not palpable    · MUSCULOSKELETAL:   no clubbing, cyanosis      · SKIN:   Skin normal color for race,   warm, dry   No evidence of rash.        · HEME LYMPH:   no splenomegaly.  No cervical  lymphadenopathy.  no inguinal lymphadenopathy    HOSPITAL MEDICATIONS:  MEDICATIONS  (STANDING):  aspirin  chewable 81 milliGRAM(s) Oral daily  atorvastatin 40 milliGRAM(s) Oral at bedtime  cefepime   IVPB 1000 milliGRAM(s) IV Intermittent every 12 hours  cefepime   IVPB      chlorhexidine 4% Liquid 1 Application(s) Topical <User Schedule>  dextrose 40% Gel 15 Gram(s) Oral once  dextrose 5%. 1000 milliLiter(s) (50 mL/Hr) IV Continuous <Continuous>  dextrose 5%. 1000 milliLiter(s) (100 mL/Hr) IV Continuous <Continuous>  dextrose 50% Injectable 25 Gram(s) IV Push once  dextrose 50% Injectable 12.5 Gram(s) IV Push once  dextrose 50% Injectable 25 Gram(s) IV Push once  DULoxetine 60 milliGRAM(s) Oral daily  fentaNYL   Infusion 0.5 MICROgram(s)/kG/Hr (3.2 mL/Hr) IV Continuous <Continuous>  folic acid 1 milliGRAM(s) Oral daily  furosemide   Injectable 40 milliGRAM(s) IV Push every 12 hours  glucagon  Injectable 1 milliGRAM(s) IntraMuscular once  heparin   Injectable 5000 Unit(s) SubCutaneous every 8 hours  hydroxychloroquine 200 milliGRAM(s) Oral two times a day  insulin regular Infusion 8 Unit(s)/Hr (8 mL/Hr) IV Continuous <Continuous>  levothyroxine 175 MICROGram(s) Oral daily  midazolam Infusion 0.02 mG/kG/Hr (1.28 mL/Hr) IV Continuous <Continuous>  norepinephrine Infusion 0.05 MICROgram(s)/kG/Min (6 mL/Hr) IV Continuous <Continuous>  pantoprazole  Injectable 40 milliGRAM(s) IV Push daily  vancomycin  IVPB 750 milliGRAM(s) IV Intermittent every 24 hours    MEDICATIONS  (PRN):  acetaminophen     Tablet .. 650 milliGRAM(s) Oral every 6 hours PRN Temp greater or equal to 38C (100.4F)  dextrose 50% Injectable 25 milliLiter(s) IV Push once PRN glucose < 60        LABS:                        9.3    19.40 )-----------( 330      ( 23 Oct 2021 15:16 )             29.6     10-23    144  |  103  |  46<H>  ----------------------------<  342<H>  3.4<L>   |  29  |  1.4    Ca    8.9      23 Oct 2021 15:16  Mg     1.6     10-23    TPro  5.4<L>  /  Alb  3.0<L>  /  TBili  0.4  /  DBili  x   /  AST  57<H>  /  ALT  38  /  AlkPhos  100  10-23    PT/INR - ( 23 Oct 2021 21:22 )   PT: 16.80 sec;   INR: 1.47 ratio         PTT - ( 23 Oct 2021 21:22 )  PTT:29.2 sec  Urinalysis Basic - ( 22 Oct 2021 11:20 )    Color: Yellow / Appearance: Clear / SG: >=1.030 / pH: x  Gluc: x / Ketone: 15  / Bili: Small / Urobili: 0.2 mg/dL   Blood: x / Protein: >=300 mg/dL / Nitrite: Negative   Leuk Esterase: Negative / RBC: 1-2 /HPF / WBC Negative   Sq Epi: x / Non Sq Epi: Few /HPF / Bacteria: Moderate      Arterial Blood Gas:  10-24 @ 04:00  7.50/42/151/33/97.2/8.8  ABG lactate: --  Arterial Blood Gas:  10-23 @ 14:57  7.42/47/104/30/96.9/5.1  ABG lactate: --  Arterial Blood Gas:  10-23 @ 09:40  7.50/32/328/25/96.1/2.4  ABG lactate: --  Arterial Blood Gas:  10-23 @ 08:24  7.48/29/64/22/92.4/-0.8  ABG lactate: --  Arterial Blood Gas:  10-23 @ 06:06  7.45/42/283/29/96.9/4.7  ABG lactate: --  Arterial Blood Gas:  10-22 @ 13:47  7.33/44/93/23/95.4/-2.7  ABG lactate: --    Venous Blood Gas:  10-22 @ 11:10  7.25/57/47/25/68.8  VBG Lactate: 7.90  Venous Blood Gas:  10-22 @ 09:31  7.19/61/42/23/57.3  VBG Lactate: 9.30    CARDIAC MARKERS ( 23 Oct 2021 21:22 )  x     / 0.96 ng/mL / x     / x     / 17.5 ng/mL  CARDIAC MARKERS ( 23 Oct 2021 10:06 )  x     / 0.75 ng/mL / x     / x     / x      CARDIAC MARKERS ( 23 Oct 2021 05:52 )  x     / 0.82 ng/mL / x     / x     / x      CARDIAC MARKERS ( 22 Oct 2021 19:36 )  x     / 0.69 ng/mL / x     / x     / x      CARDIAC MARKERS ( 22 Oct 2021 09:30 )  x     / 0.16 ng/mL / x     / x     / x            procal 1.82 (10/22)      COVID-19 PCR: NotDetec (22 Oct 2021 09:30)  COVID-19 PCR: NotDetec (18 Oct 2021 22:25)  COVID-19 PCR: NotDetec (02 Oct 2021 15:35)  COVID-19 PCR: NotDetec (25 Sep 2021 21:43)  COVID-19 PCR: NotDetec (07 Aug 2021 15:30)    Mode: Auto Mode: PRVC/ Volume Support  RR (machine): 15  TV (machine): 400  FiO2: 40  PEEP: 5  MAP: 10  PIP: 21      ABG - ( 24 Oct 2021 04:00 )  pH, Arterial: 7.50  pH, Blood: x     /  pCO2: 42    /  pO2: 151   / HCO3: 33    / Base Excess: 8.8   /  SaO2: 97.2                  RADIOLOGY: Today I personally interpreted the latest CXR and other pertinent films.

## 2021-10-24 NOTE — PROGRESS NOTE ADULT - SUBJECTIVE AND OBJECTIVE BOX
Patient is a 76y old  Female who presents with a chief complaint of SOB (24 Oct 2021 06:29)      T(F): 99.9 (10-24-21 @ 07:01), Max: 100.8 (10-24-21 @ 02:00)  HR: 80 (10-24-21 @ 07:39)  BP: 99/53 (10-24-21 @ 06:30)  RR: 15 (10-24-21 @ 07:01)  SpO2: 100% (10-24-21 @ 07:39) (98% - 100%)    PHYSICAL EXAM:  GENERAL: NAD, well-groomed, well-developed  HEAD:  Atraumatic, Normocephalic  EYES: EOMI, PERRLA, conjunctiva and sclera clear  ENMT: No tonsillar erythema, exudates, or enlargement; Moist mucous membranes, Good dentition, No lesions  NECK: Supple, No JVD, Normal thyroid  NERVOUS SYSTEM:  Alert & Oriented X3,  Motor Strength 5/5 B/L upper and lower extremities  CHEST/LUNG: Clear to percussion bilaterally; No rales, rhonchi, wheezing, or rubs. Decreased bs  HEART: Regular rate and rhythm; No murmurs, rubs, or gallops  ABDOMEN: Soft, Nontender, Nondistended; Bowel sounds present  EXTREMITIES:   No clubbing, cyanosis, or edema  LYMPH: No lymphadenopathy noted  SKIN: No rashes or lesions    labs  10-24    142  |  101  |  42<H>  ----------------------------<  246<H>  3.5   |  27  |  1.1    Ca    8.8      24 Oct 2021 06:29  Mg     2.2     10-24    TPro  5.2<L>  /  Alb  2.9<L>  /  TBili  0.5  /  DBili  x   /  AST  40  /  ALT  31  /  AlkPhos  99  10-24                          9.1    25.25 )-----------( 321      ( 24 Oct 2021 06:29 )             29.2       Culture - Urine (collected 22 Oct 2021 11:20)  Source: Catheterized Catheterized  Final Report (23 Oct 2021 22:43):    No growth      PT/INR - ( 23 Oct 2021 21:22 )   PT: 16.80 sec;   INR: 1.47 ratio         PTT - ( 23 Oct 2021 21:22 )  PTT:29.2 sec  Mode: AC/ CMV (Assist Control/ Continuous Mandatory Ventilation)  RR (machine): 15  TV (machine): 400  FiO2: 40  PEEP: 5  ITime: 1  MAP: 11  PIP: 22    Creatine Kinase, Serum: 178 U/L (10-24-21 @ 06:29)  Troponin T, Serum: 0.85 ng/mL *HH* (10-24-21 @ 06:29)  Troponin T, Serum: 0.96 ng/mL *HH* (10-23-21 @ 21:22)  Troponin T, Serum: 0.75 ng/mL *HH* (10-23-21 @ 10:06)      acetaminophen     Tablet .. 650 milliGRAM(s) Oral every 6 hours PRN  aspirin  chewable 81 milliGRAM(s) Oral daily  atorvastatin 40 milliGRAM(s) Oral at bedtime  cefepime   IVPB 1000 milliGRAM(s) IV Intermittent every 12 hours  cefepime   IVPB      chlorhexidine 0.12% Liquid 15 milliLiter(s) Oral Mucosa two times a day  chlorhexidine 4% Liquid 1 Application(s) Topical <User Schedule>  dextrose 40% Gel 15 Gram(s) Oral once  dextrose 5%. 1000 milliLiter(s) IV Continuous <Continuous>  dextrose 5%. 1000 milliLiter(s) IV Continuous <Continuous>  dextrose 50% Injectable 25 Gram(s) IV Push once  dextrose 50% Injectable 12.5 Gram(s) IV Push once  dextrose 50% Injectable 25 Gram(s) IV Push once  dextrose 50% Injectable 25 milliLiter(s) IV Push once PRN  DULoxetine 60 milliGRAM(s) Oral daily  fentaNYL   Infusion 0.5 MICROgram(s)/kG/Hr IV Continuous <Continuous>  folic acid 1 milliGRAM(s) Oral daily  furosemide   Injectable 40 milliGRAM(s) IV Push every 24 hours  glucagon  Injectable 1 milliGRAM(s) IntraMuscular once  heparin   Injectable 5000 Unit(s) SubCutaneous every 8 hours  hydroxychloroquine 200 milliGRAM(s) Oral two times a day  insulin regular Infusion 8 Unit(s)/Hr IV Continuous <Continuous>  levothyroxine 175 MICROGram(s) Oral daily  midazolam Infusion 0.02 mG/kG/Hr IV Continuous <Continuous>  norepinephrine Infusion 0.05 MICROgram(s)/kG/Min IV Continuous <Continuous>  pantoprazole  Injectable 40 milliGRAM(s) IV Push daily  potassium chloride    Tablet ER 20 milliEquivalent(s) Oral once  vancomycin  IVPB 750 milliGRAM(s) IV Intermittent every 24 hours

## 2021-10-24 NOTE — PROGRESS NOTE ADULT - ASSESSMENT
IMPRESSION:    Hypoglycemia,  Acute Exacerbation of HFpEF   concomitant pneumonia   Hypothyroidism    Iron Deficiency Anemia   Anemia of chronic disease      SUGGEST:   Insulin regimen for hyperglycemia  serial FS Q1H  Monitor FS   f/u repeat lactate and anion gap  f/u RVP,   vent changes: taper Fio2 as tolerated, watch PP and  DP  decrease Mve slightly  continue O2 as necessary to maintain sats > 90%<94  Chest X-Ray: daily  continue IV lasix decrease Q24  supplement lytes  connor, I & O, daily weight  F/u cardio   Continue synthroid 175mg Keep Hb >8  OG diet  GI Prophylaxis: None   DVT Prophylaxis: Hep SubQ     The patient is critically ill with a probability of deterioration.   IMPRESSION:    Hypoglycemia,  Acute Exacerbation of HFpEF   concomitant pneumonia   Hypothyroidism    Iron Deficiency Anemia   Anemia of chronic disease      SUGGEST:  empiric abx   Insulin regimen for hyperglycemia  serial FS Q1H  Monitor FS   f/u repeat lactate and anion gap  f/u RVP,   vent changes: taper Fio2 as tolerated, watch PP and  DP  decrease Mve slightly  continue O2 as necessary to maintain sats > 90%<94  Chest X-Ray: daily  continue IV lasix decrease Q24  supplement lytes  geeta, I & O, daily weight  F/u cardio   Continue synthroid 175mg Keep Hb >8  OG diet  GI Prophylaxis: None   DVT Prophylaxis: Hep SubQ     The patient is critically ill with a probability of deterioration.

## 2021-10-24 NOTE — PROGRESS NOTE ADULT - ASSESSMENT
76 yr F with SLE on Methotrexate, moderate MR, HFpEF, HTN, hypothyroid, HLD, T2DM, hx of CVA with no residual deficits discharged from hospital earlier this month on 10/2 s/p HF exacerbation and NSTEMI, DC'd on new home oxygen, refused in pt cardiac cath and is supposed to have outpt cardiac cath with Dr Kowalski.   DC'd on 10/21 from [facility] s/p admission for resolved ANTONELLA, hypoglycemia and NSTEMI  returns to hospital with:    Acute respiratory failure secondary to sepsis from gram neg pneumonia and NSTEMI      - continue IV antibiotics and follow cultures   - pt is febrile with elevated wbc and procal levels   - titrate pressors   - maintain even fluid balance   - home meds   - DVT and GI prophylaxis

## 2021-10-24 NOTE — PROGRESS NOTE ADULT - ASSESSMENT
Patient sedated on pressors. Febrile secretions  from ET tube. Troponin mb decreasing. Check cultures.  Correct K if needed. Antibiotics. Repeat cxr in am. Lasix. Prognosis guarded

## 2021-10-24 NOTE — PROGRESS NOTE ADULT - SUBJECTIVE AND OBJECTIVE BOX
Patient is sedated on versed and Fentanyl on ventilator       T(F): 99.9 (10-24-21 @ 07:01), Max: 100.8 (10-24-21 @ 02:00)  HR: 80 (10-24-21 @ 07:39)  BP: 99/53 (10-24-21 @ 06:30)  RR: 15 (10-24-21 @ 07:01)  SpO2: 100% (10-24-21 @ 07:39) (98% - 100%)    PHYSICAL EXAM:  GENERAL: NAD  HEAD:  Atraumatic, Normocephalic  NERVOUS SYSTEM:   no focal deficits   CHEST/LUNG:  bilateral rales  HEART: Regular rate and rhythm; No murmurs, rubs, or gallops  ABDOMEN: Soft, Nontender, Nondistended; Bowel sounds present  EXTREMITIES:  2+ Peripheral Pulses, No clubbing, cyanosis, or edema    LABS  10-24    142  |  101  |  42<H>  ----------------------------<  246<H>  3.5   |  27  |  1.1    Ca    8.8      24 Oct 2021 06:29  Mg     2.2     10-24    TPro  5.2<L>  /  Alb  2.9<L>  /  TBili  0.5  /  DBili  x   /  AST  40  /  ALT  31  /  AlkPhos  99  10-24  prProcalcitonin, Serum (10.22.21 @ 19:36)   Procalcitonin, Serum: 1.82                        9.1    25.25 )-----------( 321      ( 24 Oct 2021 06:29 )             29.2     PT/INR - ( 23 Oct 2021 21:22 )   PT: 16.80 sec;   INR: 1.47 ratio         PTT - ( 23 Oct 2021 21:22 )  PTT:29.2 sec  Mode: AC/ CMV (Assist Control/ Continuous Mandatory Ventilation)  RR (machine): 15  TV (machine): 400  FiO2: 40  PEEP: 5    CARDIAC ENZYMES  Creatine Kinase, Serum: 178 (10-24 @ 06:29)    CKMB Units: 7.9 (10-24 @ 06:29)  CKMB Units: 17.5 (10-23 @ 21:22)    Troponin T, Serum: 0.85 ng/mL (10-24-21 @ 06:29)  Troponin T, Serum: 0.96 ng/mL (10-23-21 @ 21:22)  Troponin T, Serum: 0.75 ng/mL (10-23-21 @ 10:06)  Troponin T, Serum: 0.82 ng/mL (10-23-21 @ 05:52)  Troponin T, Serum: 0.69 ng/mL (10-22-21 @ 19:36)  Troponin T, Serum: 0.16 ng/mL (10-22-21 @ 09:30)      Culture Results:   No growth (10-22-21)    RADIOLOGY  < from: Xray Chest 1 View- PORTABLE-Routine (Xray Chest 1 View- PORTABLE-Routine in AM.) (10.24.21 @ 07:05) >  Impression:    Cardiomegaly and bilateral opacities, worse.    < end of copied text >    MEDICATIONS  (STANDING):  aspirin  chewable 81 milliGRAM(s) Oral daily  atorvastatin 40 milliGRAM(s) Oral at bedtime  cefepime   IVPB 1000 milliGRAM(s) IV Intermittent every 12 hours  chlorhexidine 0.12% Liquid 15 milliLiter(s) Oral Mucosa two times a day  chlorhexidine 4% Liquid 1 Application(s) Topical <User Schedule>  DULoxetine 60 milliGRAM(s) Oral daily  fentaNYL   Infusion 0.5 MICROgram(s)/kG/Hr (3.2 mL/Hr) IV Continuous <Continuous>  folic acid 1 milliGRAM(s) Oral daily  furosemide   Injectable 40 milliGRAM(s) IV Push every 24 hours  glucagon  Injectable 1 milliGRAM(s) IntraMuscular once  heparin   Injectable 5000 Unit(s) SubCutaneous every 8 hours  hydroxychloroquine 200 milliGRAM(s) Oral two times a day  insulin regular Infusion 8 Unit(s)/Hr (8 mL/Hr) IV Continuous <Continuous>  levothyroxine 175 MICROGram(s) Oral daily  midazolam Infusion 0.02 mG/kG/Hr (1.28 mL/Hr) IV Continuous <Continuous>  norepinephrine Infusion 0.05 MICROgram(s)/kG/Min (6 mL/Hr) IV Continuous <Continuous>  pantoprazole  Injectable 40 milliGRAM(s) IV Push daily  vancomycin  IVPB 750 milliGRAM(s) IV Intermittent every 24 hours    MEDICATIONS  (PRN):  acetaminophen     Tablet .. 650 milliGRAM(s) Oral every 6 hours PRN Temp greater or equal to 38C (100.4F)  dextrose 50% Injectable 25 milliLiter(s) IV Push once PRN glucose < 60

## 2021-10-25 NOTE — PROGRESS NOTE ADULT - SUBJECTIVE AND OBJECTIVE BOX
Patient is sedated on Versed and Fentanyl       T(F): 101.1 (10-25-21 @ 11:00), Max: 102.2 (10-24-21 @ 17:23)  HR: 81 (10-25-21 @ 09:04)  BP: 114/57 (10-25-21 @ 06:30)  RR: 15 (10-25-21 @ 11:00)  SpO2: 99% (10-25-21 @ 09:04) (98% - 100%)    PHYSICAL EXAM:  GENERAL: NAD  HEAD:  Atraumatic, Normocephalic  NERVOUS SYSTEM:   no focal deficits   CHEST/LUNG:  bilateral rhonchi  HEART: Regular rate and rhythm; No murmurs, rubs, or gallops  ABDOMEN: Soft, Nontender, Nondistended; Bowel sounds present  EXTREMITIES:  2+ Peripheral Pulses, No clubbing, cyanosis, or edema    LABS  10-25    143  |  102  |  37<H>  ----------------------------<  139<H>  3.2<L>   |  31  |  0.9    Ca    8.2<L>      25 Oct 2021 05:29  Mg     2.1     10-25    TPro  5.0<L>  /  Alb  2.6<L>  /  TBili  0.5  /  DBili  x   /  AST  36  /  ALT  28  /  AlkPhos  100  10-25                          8.9    17.57 )-----------( 268      ( 25 Oct 2021 05:29 )             28.8     PT/INR - ( 23 Oct 2021 21:22 )   PT: 16.80 sec;   INR: 1.47 ratio         PTT - ( 23 Oct 2021 21:22 )  PTT:29.2 sec  Mode: Auto Mode: PRVC/ Volume Support  RR (machine): 15  TV (machine): 400  FiO2: 35  PEEP: 5    CARDIAC ENZYMES  Creatine Kinase, Serum: 178 (10-24 @ 06:29)    CKMB Units: 7.9 (10-24 @ 06:29)  CKMB Units: 17.5 (10-23 @ 21:22)    Troponin T, Serum: 0.85 ng/mL (10-24-21 @ 06:29)  Troponin T, Serum: 0.96 ng/mL (10-23-21 @ 21:22)  Troponin T, Serum: 0.75 ng/mL (10-23-21 @ 10:06)  Troponin T, Serum: 0.82 ng/mL (10-23-21 @ 05:52)  Troponin T, Serum: 0.69 ng/mL (10-22-21 @ 19:36)      Culture Results:   Normal Respiratory Asia present (10-23-21)  Culture Results:   No growth to date. (10-23-21)  Culture Results:   No growth (10-22-21)    RADIOLOGY  < from: Xray Chest 1 View- PORTABLE-Urgent (Xray Chest 1 View- PORTABLE-Urgent .) (10.25.21 @ 09:33) >  Impression:  Decreased bilateral opacities. No definite pneumothorax.    < end of copied text >    MEDICATIONS  (STANDING):  aspirin  chewable 81 milliGRAM(s) Oral daily  atorvastatin 40 milliGRAM(s) Oral at bedtime  cefepime   IVPB 1000 milliGRAM(s) IV Intermittent every 12 hours  chlorhexidine 0.12% Liquid 15 milliLiter(s) Oral Mucosa two times a day  chlorhexidine 4% Liquid 1 Application(s) Topical <User Schedule>  DULoxetine 60 milliGRAM(s) Oral daily  fentaNYL   Infusion 0.5 MICROgram(s)/kG/Hr (3.2 mL/Hr) IV Continuous <Continuous>  folic acid 1 milliGRAM(s) Oral daily  furosemide   Injectable 40 milliGRAM(s) IV Push every 24 hours  glucagon  Injectable 1 milliGRAM(s) IntraMuscular once  heparin   Injectable 5000 Unit(s) SubCutaneous every 8 hours  hydroxychloroquine 200 milliGRAM(s) Oral two times a day  insulin regular Infusion 8 Unit(s)/Hr (8 mL/Hr) IV Continuous <Continuous>  levothyroxine 175 MICROGram(s) Oral daily  midazolam Infusion 0.02 mG/kG/Hr (1.28 mL/Hr) IV Continuous <Continuous>  norepinephrine Infusion 0.05 MICROgram(s)/kG/Min (6 mL/Hr) IV Continuous <Continuous>  pantoprazole  Injectable 40 milliGRAM(s) IV Push daily  potassium chloride  20 mEq/100 mL IVPB 20 milliEquivalent(s) IV Intermittent every 2 hours  vancomycin  IVPB 750 milliGRAM(s) IV Intermittent every 24 hours    MEDICATIONS  (PRN):  acetaminophen     Tablet .. 650 milliGRAM(s) Oral every 6 hours PRN Temp greater or equal to 38C (100.4F)  dextrose 50% Injectable 25 milliLiter(s) IV Push once PRN glucose < 60

## 2021-10-25 NOTE — PROGRESS NOTE ADULT - SUBJECTIVE AND OBJECTIVE BOX
Patient is a 76y old  Female who presents with a chief complaint of SOB (24 Oct 2021 10:54)      T(F): 100.4 (10-25-21 @ 07:01), Max: 102.2 (10-24-21 @ 17:23)  HR: 77 (10-25-21 @ 06:30)  BP: 114/57 (10-25-21 @ 06:30)  RR: 15 (10-25-21 @ 07:01)  SpO2: 100% (10-25-21 @ 06:30) (98% - 100%)    PHYSICAL EXAM:  GENERAL: NAD, well-groomed, well-developed  HEAD:  Atraumatic, Normocephalic  EYES: EOMI, PERRLA, conjunctiva and sclera clear  ENMT: No tonsillar erythema, exudates, or enlargement; Moist mucous membranes, Good dentition, No lesions  NECK: Supple, No JVD, Normal thyroid  NERVOUS SYSTEM:  Alert & Oriented X3,  Motor Strength 5/5 B/L upper and lower extremities  CHEST/LUNG: Clear to percussion bilaterally; No rales, rhonchi, wheezing, or rubs  HEART: Regular rate and rhythm; No murmurs, rubs, or gallops  ABDOMEN: Soft, Nontender, Nondistended; Bowel sounds present  EXTREMITIES:   No clubbing, cyanosis, or edema  LYMPH: No lymphadenopathy noted  SKIN: No rashes or lesions    labs  10-25    143  |  102  |  37<H>  ----------------------------<  139<H>  3.2<L>   |  31  |  0.9    Ca    8.2<L>      25 Oct 2021 05:29  Mg     2.1     10-25    TPro  5.0<L>  /  Alb  2.6<L>  /  TBili  0.5  /  DBili  x   /  AST  36  /  ALT  28  /  AlkPhos  100  10-25                          8.9    17.57 )-----------( 268      ( 25 Oct 2021 05:29 )             28.8       Culture - Sputum (collected 23 Oct 2021 15:05)  Source: .Sputum Sputum  Gram Stain (24 Oct 2021 14:45):    Numerous polymorphonuclear leukocytes per low power field    Few Squamous epithelial cells per low power field    Numerous Gram Positive Cocci in Clusters per oil power field  Preliminary Report (24 Oct 2021 19:16):    Normal Respiratory Asia present    Culture - Blood (collected 23 Oct 2021 11:45)  Source: .Blood Blood-Peripheral  Preliminary Report (24 Oct 2021 20:01):    No growth to date.    Culture - Urine (collected 22 Oct 2021 11:20)  Source: Catheterized Catheterized  Final Report (23 Oct 2021 22:43):    No growth      PT/INR - ( 23 Oct 2021 21:22 )   PT: 16.80 sec;   INR: 1.47 ratio         PTT - ( 23 Oct 2021 21:22 )  PTT:29.2 sec  Mode: Auto Mode: PRVC/ Volume Support  RR (machine): 15  TV (machine): 400  FiO2: 35  PEEP: 5  MAP: 10  PIP: 21        acetaminophen     Tablet .. 650 milliGRAM(s) Oral every 6 hours PRN  aspirin  chewable 81 milliGRAM(s) Oral daily  atorvastatin 40 milliGRAM(s) Oral at bedtime  cefepime   IVPB 1000 milliGRAM(s) IV Intermittent every 12 hours  cefepime   IVPB      chlorhexidine 0.12% Liquid 15 milliLiter(s) Oral Mucosa two times a day  chlorhexidine 4% Liquid 1 Application(s) Topical <User Schedule>  dextrose 40% Gel 15 Gram(s) Oral once  dextrose 5%. 1000 milliLiter(s) IV Continuous <Continuous>  dextrose 5%. 1000 milliLiter(s) IV Continuous <Continuous>  dextrose 50% Injectable 25 Gram(s) IV Push once  dextrose 50% Injectable 12.5 Gram(s) IV Push once  dextrose 50% Injectable 25 Gram(s) IV Push once  dextrose 50% Injectable 25 milliLiter(s) IV Push once PRN  DULoxetine 60 milliGRAM(s) Oral daily  fentaNYL   Infusion 0.5 MICROgram(s)/kG/Hr IV Continuous <Continuous>  folic acid 1 milliGRAM(s) Oral daily  furosemide   Injectable 40 milliGRAM(s) IV Push every 24 hours  glucagon  Injectable 1 milliGRAM(s) IntraMuscular once  heparin   Injectable 5000 Unit(s) SubCutaneous every 8 hours  hydroxychloroquine 200 milliGRAM(s) Oral two times a day  insulin regular Infusion 8 Unit(s)/Hr IV Continuous <Continuous>  levothyroxine 175 MICROGram(s) Oral daily  midazolam Infusion 0.02 mG/kG/Hr IV Continuous <Continuous>  norepinephrine Infusion 0.05 MICROgram(s)/kG/Min IV Continuous <Continuous>  pantoprazole  Injectable 40 milliGRAM(s) IV Push daily  vancomycin  IVPB 750 milliGRAM(s) IV Intermittent every 24 hours

## 2021-10-25 NOTE — PROGRESS NOTE ADULT - SUBJECTIVE AND OBJECTIVE BOX
Patient is a 76y old  Female who presents with a chief complaint of SOB (25 Oct 2021 08:02)    Over Night Events:  Remains on MV. Sedated on Fentanyl and Versed. Norepinephrine 0.03    ROS:   All ROS are negative except HPI     PHYSICAL EXAM    ICU Vital Signs Last 24 Hrs  T(C): 38 (25 Oct 2021 07:01), Max: 39 (24 Oct 2021 17:23)  T(F): 100.4 (25 Oct 2021 07:01), Max: 102.2 (24 Oct 2021 17:23)  HR: 81 (25 Oct 2021 09:04) (74 - 88)  BP: 114/57 (25 Oct 2021 06:30) (84/46 - 131/62)  BP(mean): 79 (25 Oct 2021 06:30) (61 - 89)  RR: 15 (25 Oct 2021 07:01) (15 - 25)  SpO2: 99% (25 Oct 2021 09:04) (98% - 100%)      CONSTITUTIONAL:  Well nourished.  NAD    ENT:   Airway patent,   Mouth with normal mucosa.   No thrush  ETT    EYES:   Pupils equal,   Round and reactive to light.    CARDIAC:   Normal rate,   Regular rhythm.    mild lower extremity edema    RESPIRATORY:   Bilateral crackles  No wheezing  Normal chest expansion  Not tachypneic,  No use of accessory muscles    GASTROINTESTINAL:  Abdomen soft,   Non-tender,   No guarding,   + BS    MUSCULOSKELETAL:   Range of motion is not limited,  No clubbing, cyanosis    NEUROLOGICAL:   Sedated  No motor deficits.    SKIN:   Skin normal color for race,   Warm and dry  No evidence of rash.    PSYCHIATRIC:   Sedated  No apparent risk to self or others.    HEMATOLOGICAL:  No cervical  lymphadenopathy.  no inguinal lymphadenopathy      10-24-21 @ 07:01  -  10-25-21 @ 07:00  --------------------------------------------------------  IN:    Enteral Tube Flush: 210 mL    FentaNYL: 480.6 mL    Glucerna: 720 mL    Insulin: 55 mL    IV PiggyBack: 350 mL    Midazolam: 72 mL    Norepinephrine: 112 mL  Total IN: 1999.6 mL    OUT:    Indwelling Catheter - Urethral (mL): 1275 mL  Total OUT: 1275 mL    Total NET: 724.6 mL      10-25-21 @ 07:01  -  10-25-21 @ 09:09  --------------------------------------------------------  IN:  Total IN: 0 mL    OUT:    Indwelling Catheter - Urethral (mL): 1000 mL  Total OUT: 1000 mL    Total NET: -1000 mL    LABS:                            8.9    17.57 )-----------( 268      ( 25 Oct 2021 05:29 )             28.8     143  |  102  |  37<H>  ----------------------------<  139<H>  3.2<L>   |  31  |  0.9    Ca    8.2<L>      25 Oct 2021 05:29  Mg     2.1     10-25    TPro  5.0<L>  /  Alb  2.6<L>  /  TBili  0.5  /  DBili  x   /  AST  36  /  ALT  28  /  AlkPhos  100  10-25    PT/INR - ( 23 Oct 2021 21:22 )   PT: 16.80 sec;   INR: 1.47 ratio    PTT - ( 23 Oct 2021 21:22 )  PTT:29.2 sec                                           CARDIAC MARKERS ( 24 Oct 2021 06:29 )  x     / 0.85 ng/mL / 178 U/L / x     / 7.9 ng/mL  CARDIAC MARKERS ( 23 Oct 2021 21:22 )  x     / 0.96 ng/mL / x     / x     / 17.5 ng/mL  CARDIAC MARKERS ( 23 Oct 2021 10:06 )  x     / 0.75 ng/mL / x     / x     / x        LIVER FUNCTIONS - ( 25 Oct 2021 05:29 )  Alb: 2.6 g/dL / Pro: 5.0 g/dL / ALK PHOS: 100 U/L / ALT: 28 U/L / AST: 36 U/L / GGT: x                                              Culture - Sputum (collected 23 Oct 2021 15:05)  Source: .Sputum Sputum  Gram Stain (24 Oct 2021 14:45):    Numerous polymorphonuclear leukocytes per low power field    Few Squamous epithelial cells per low power field    Numerous Gram Positive Cocci in Clusters per oil power field  Preliminary Report (24 Oct 2021 19:16):    Normal Respiratory Asia present    Culture - Blood (collected 23 Oct 2021 11:45)  Source: .Blood Blood-Peripheral  Preliminary Report (24 Oct 2021 20:01):    No growth to date.    Culture - Urine (collected 22 Oct 2021 11:20)  Source: Catheterized Catheterized  Final Report (23 Oct 2021 22:43):    No growth    Mode: Auto Mode: PRVC/ Volume Support  RR (machine): 15  TV (machine): 400  FiO2: 35  PEEP: 5  MAP: 10  PIP: 21    ABG - ( 25 Oct 2021 03:37 ) on 40%  pH, Arterial: 7.45  pH, Blood: x     /  pCO2: 47    /  pO2: 111   / HCO3: 33    / Base Excess: 7.6   /  SaO2: 96.8        MEDICATIONS  (STANDING):  aspirin  chewable 81 milliGRAM(s) Oral daily  atorvastatin 40 milliGRAM(s) Oral at bedtime  cefepime   IVPB 1000 milliGRAM(s) IV Intermittent every 12 hours  cefepime   IVPB      chlorhexidine 0.12% Liquid 15 milliLiter(s) Oral Mucosa two times a day  chlorhexidine 4% Liquid 1 Application(s) Topical <User Schedule>  dextrose 40% Gel 15 Gram(s) Oral once  dextrose 5%. 1000 milliLiter(s) (50 mL/Hr) IV Continuous <Continuous>  dextrose 5%. 1000 milliLiter(s) (100 mL/Hr) IV Continuous <Continuous>  dextrose 50% Injectable 25 Gram(s) IV Push once  dextrose 50% Injectable 12.5 Gram(s) IV Push once  dextrose 50% Injectable 25 Gram(s) IV Push once  DULoxetine 60 milliGRAM(s) Oral daily  fentaNYL   Infusion 0.5 MICROgram(s)/kG/Hr (3.2 mL/Hr) IV Continuous <Continuous>  folic acid 1 milliGRAM(s) Oral daily  furosemide   Injectable 40 milliGRAM(s) IV Push every 24 hours  glucagon  Injectable 1 milliGRAM(s) IntraMuscular once  heparin   Injectable 5000 Unit(s) SubCutaneous every 8 hours  hydroxychloroquine 200 milliGRAM(s) Oral two times a day  insulin regular Infusion 8 Unit(s)/Hr (8 mL/Hr) IV Continuous <Continuous>  levothyroxine 175 MICROGram(s) Oral daily  midazolam Infusion 0.02 mG/kG/Hr (1.28 mL/Hr) IV Continuous <Continuous>  norepinephrine Infusion 0.05 MICROgram(s)/kG/Min (6 mL/Hr) IV Continuous <Continuous>  pantoprazole  Injectable 40 milliGRAM(s) IV Push daily  potassium chloride  20 mEq/100 mL IVPB 20 milliEquivalent(s) IV Intermittent every 2 hours  vancomycin  IVPB 750 milliGRAM(s) IV Intermittent every 24 hours    MEDICATIONS  (PRN):  acetaminophen     Tablet .. 650 milliGRAM(s) Oral every 6 hours PRN Temp greater or equal to 38C (100.4F)  dextrose 50% Injectable 25 milliLiter(s) IV Push once PRN glucose < 60      New X-rays reviewed:                                                                                  ECHO    CXR interpreted by me:  improved bilateral opacities from prior, ETT OK, R IJ TLC

## 2021-10-25 NOTE — PROGRESS NOTE ADULT - ASSESSMENT
IMPRESSION:    Acute Hypoxemic and Hypercapnic Respiratory Failure SP intubation  Pulmonary Edema, Bilateral Effusions  HFpEF exacerbation, moderate MR  Probable PNA  NSTEMI?  Hypothyroidism   Iron Deficiency Anemia, Anemia of chronic disease      PLAN:    CNS:  Daily SAT.  DC Precedex.     HEENT: oral care     PULMONARY:  HOB at 45 degrees.  Aspiration precautions.  Target POx > 92 %.  SBT today    CARDIOVASCULAR:   Lasix 40 qd.  Wean Norepinephrine.  Target MAP 65.  Keep I < O. Cardiology FU.  Daily weight.  Strict I & O    GI:  GI prophylaxis.  Hold OG feeds for possible SBT.  Bowel regimen.    RENAL:  Replete K, check Mg.  FU lytes.  Replete as necessary.  Monitor UO.  Wise care.    INFECTIOUS DISEASE:  FU DTA.  c/w Vanc & Cefepime.  ID eval.  RVP panel.    HEMATOLOGICAL:  DVT prophylaxis.  Keep Hb > 8    ENDOCRINE:  c/w Synthroid 175mcg.  Follow up FS. Insulin protocol     MUSCULOSKELETAL:  bed rest    Guarded prognosis  Monitor in MICU

## 2021-10-25 NOTE — PROGRESS NOTE ADULT - SUBJECTIVE AND OBJECTIVE BOX
SUBJECTIVE:    Patient is a 76y old Female who presents with a chief complaint of SOB (25 Oct 2021 12:58)    Currently admitted to medicine with the primary diagnosis of Acute pulmonary edema       Today is hospital day 3d. This morning she is resting comfortably in bed and reports no new issues or overnight events.     INTERVAL EVENTS: Intubated and sedated    PAST MEDICAL & SURGICAL HISTORY  DM (diabetes mellitus)  insulin  fs achs    Hypercholesteremia  statin    Hypertension  hctz    Lupus  as per hx    Fall, initial encounter  as  hx    Stroke  TIA 2002, no deficits    Herniated lumbar intervertebral disc  as per hx    Seizure  keppra    No significant past surgical history        ALLERGIES:  No Known Allergies    MEDICATIONS:  STANDING MEDICATIONS  aspirin  chewable 81 milliGRAM(s) Oral daily  atorvastatin 40 milliGRAM(s) Oral at bedtime  cefepime   IVPB 1000 milliGRAM(s) IV Intermittent every 12 hours  cefepime   IVPB      chlorhexidine 0.12% Liquid 15 milliLiter(s) Oral Mucosa two times a day  chlorhexidine 4% Liquid 1 Application(s) Topical <User Schedule>  dextrose 40% Gel 15 Gram(s) Oral once  dextrose 5%. 1000 milliLiter(s) IV Continuous <Continuous>  dextrose 5%. 1000 milliLiter(s) IV Continuous <Continuous>  dextrose 50% Injectable 25 Gram(s) IV Push once  dextrose 50% Injectable 12.5 Gram(s) IV Push once  dextrose 50% Injectable 25 Gram(s) IV Push once  DULoxetine 60 milliGRAM(s) Oral daily  fentaNYL   Infusion 0.5 MICROgram(s)/kG/Hr IV Continuous <Continuous>  folic acid 1 milliGRAM(s) Oral daily  furosemide   Injectable 40 milliGRAM(s) IV Push every 24 hours  glucagon  Injectable 1 milliGRAM(s) IntraMuscular once  heparin   Injectable 5000 Unit(s) SubCutaneous every 8 hours  hydroxychloroquine 200 milliGRAM(s) Oral two times a day  insulin regular Infusion 8 Unit(s)/Hr IV Continuous <Continuous>  levothyroxine 175 MICROGram(s) Oral daily  midazolam Infusion 0.02 mG/kG/Hr IV Continuous <Continuous>  norepinephrine Infusion 0.05 MICROgram(s)/kG/Min IV Continuous <Continuous>  pantoprazole  Injectable 40 milliGRAM(s) IV Push daily  vancomycin  IVPB 750 milliGRAM(s) IV Intermittent every 24 hours    PRN MEDICATIONS  acetaminophen     Tablet .. 650 milliGRAM(s) Oral every 6 hours PRN  dextrose 50% Injectable 25 milliLiter(s) IV Push once PRN    VITALS:   T(F): 101.7  HR: 95  BP: 108/57  RR: 15  SpO2: 100%    LABS:                        8.9    17.57 )-----------( 268      ( 25 Oct 2021 05:29 )             28.8     10-25    143  |  102  |  37<H>  ----------------------------<  139<H>  3.2<L>   |  31  |  0.9    Ca    8.2<L>      25 Oct 2021 05:29  Mg     2.1     10-25    TPro  5.0<L>  /  Alb  2.6<L>  /  TBili  0.5  /  DBili  x   /  AST  36  /  ALT  28  /  AlkPhos  100  10-25    PT/INR - ( 23 Oct 2021 21:22 )   PT: 16.80 sec;   INR: 1.47 ratio         PTT - ( 23 Oct 2021 21:22 )  PTT:29.2 sec    ABG - ( 25 Oct 2021 03:37 )  pH, Arterial: 7.45  pH, Blood: x     /  pCO2: 47    /  pO2: 111   / HCO3: 33    / Base Excess: 7.6   /  SaO2: 96.8                  Culture - Sputum (collected 23 Oct 2021 15:05)  Source: .Sputum Sputum  Gram Stain (24 Oct 2021 14:45):    Numerous polymorphonuclear leukocytes per low power field    Few Squamous epithelial cells per low power field    Numerous Gram Positive Cocci in Clusters per oil power field  Preliminary Report (24 Oct 2021 19:16):    Normal Respiratory Asia present    Culture - Blood (collected 23 Oct 2021 11:45)  Source: .Blood Blood-Peripheral  Preliminary Report (24 Oct 2021 20:01):    No growth to date.      CARDIAC MARKERS ( 24 Oct 2021 06:29 )  x     / 0.85 ng/mL / 178 U/L / x     / 7.9 ng/mL  CARDIAC MARKERS ( 23 Oct 2021 21:22 )  x     / 0.96 ng/mL / x     / x     / 17.5 ng/mL      RADIOLOGY:    PHYSICAL EXAM:  GEN: Sedated  PULM/CHEST: Bilateral crackles  CVS: Regular rate and rhythm  ABD: Soft, non-tender, non-distended  EXT: 2+ peripheral edema  NEURO: AAOx3    Wise Catheter:   Indwelling Urethral Catheter:     Connect To:  Straight Drainage/Gravity    Indication:  Urine Output Monitoring in Critically Ill (10-23-21 @ 10:07) (not performed) [Active]  Indwelling Urethral Catheter:     Connect To:  Straight Drainage/Gravity    Indication:  Urine Output Monitoring in Critically Ill (10-22-21 @ 15:25) (not performed) [Active]  Indwelling Urethral Catheter:     Connect To:  Straight Drainage/Gravity    Indication:  Urine Output Monitoring in Critically Ill (10-22-21 @ 10:25) (not performed) [Active]

## 2021-10-25 NOTE — PROGRESS NOTE ADULT - ATTENDING COMMENTS
Attending Statement: I have personally performed a face to face diagnostic evaluation on this patient. The patient is suffering from:  Acute Hypoxemic and Hypercapnic Respiratory Failure SP intubation  Pulmonary Edema,   Bilateral Effusions  I have reviewed the above note and agree with the history, exam and suggestions for care, except as I have noted in the text. I have amended the treatment plans as necessary.

## 2021-10-25 NOTE — PROGRESS NOTE ADULT - ASSESSMENT
76 yr F with SLE on Methotrexate, moderate MR, HFpEF, HTN, hypothyroid, HLD, T2DM, hx of CVA with no residual deficits discharged from hospital earlier this month on 10/2 s/p HF exacerbation and NSTEMI, DC'd on new home oxygen, refused in pt cardiac cath and is supposed to have outpt cardiac cath with Dr Kowalski.   DC'd on 10/21 from [facility] s/p admission for resolved ANTONELLA, hypoglycemia and NSTEMI  returns to hospital with:    Acute respiratory failure secondary to sepsis from gram neg pneumonia and NSTEMI      - continue IV antibiotics and follow cultures   - pt is febrile with elevated wbc and procal levels   - titrate pressors   - maintain even fluid balance   - supplement hypokalemia    - home meds   - DVT and GI prophylaxis

## 2021-10-25 NOTE — PROGRESS NOTE ADULT - ASSESSMENT
Patient sedated on pressors. Febrile secretions  from ET tube. Troponin mb decreasing. Check cultures.  Correct K if needed. Antibiotics. Check cxr.  Lasix. Continuee pressors. May need while on sedation Prognosis guarded

## 2021-10-25 NOTE — PROGRESS NOTE ADULT - ASSESSMENT
77 y/o F PMH heart failure, moderate severity mitral valve regurgitation, admitted in early October for heart failure and end stemi presents c/o SOB. Pt had a recent discharge from the hospital and XR during that admission showed increased markings in both lung fields consistent with CHF exacerbation    Patient found to have pneumonia likely GNR and will continue with antibiotics, will attempt SBT tomorrow.    # Acute hypoxemic respiratory failure 2/2 HAP  - Febrile to 102, hypoxic in ED s/p Intubation  - CXR showing bilateral opacities  - Sputum growing GNR, Procal>1  - Continue Vanc and Cefepime  - Initial blood culture negative  - Follow Procal, cultures  - SBT in AM  - Repeat Xray in AM    # Acute Exacerbation of HFpEF   - Chest X-Ray: worsened  - Was in [place] last admission due to overdiuresis and Lasix was reduced on discharge  - received IV lasix in ER, continue IV lasix  - geeta, I & O, daily weight  - ECHO on last discharge unchanged except for new, mild pulmonary hypertension   - F/u cardio outpatient     # Hypothyroidism   - Elevated TSH 12.24  - Continue synthroid 175mg  - TSH repeat outpatient in 2 months    # Iron Deficiency Anemia  # Anemia of chronic disease  - On lAst admission given Iron sucrose 200x2  - Baseline Hb 8   - Keep Hb >7  - F/u H&H     # Nocturnal Hypoxemia  - uses home O2    # DM II  - Will continue with Insulin regimen      Status: FULL CODE  GI Prophylaxis: None   DVT Prophylaxis: Hep SubQ

## 2021-10-26 NOTE — PROGRESS NOTE ADULT - SUBJECTIVE AND OBJECTIVE BOX
Patient is on low dose pressors, febrile       T(F): 99.9 (10-26-21 @ 07:10), Max: 101.7 (10-25-21 @ 15:32)  HR: 82 (10-26-21 @ 07:15)  BP: 119/61 (10-26-21 @ 07:15)  RR: 15 (10-26-21 @ 07:15)  SpO2: 100% (10-26-21 @ 07:15) (99% - 100%)    PHYSICAL EXAM:  GENERAL: NAD  HEAD:  Atraumatic, Normocephalic  EYES: EOMI, PERRLA, conjunctiva and sclera clear  NERVOUS SYSTEM:  no focal deficits   CHEST/LUNG:  bilateral rhonchi  HEART: Regular rate and rhythm; No murmurs, rubs, or gallops  ABDOMEN: Soft, Nontender, Nondistended; Bowel sounds present  EXTREMITIES:  2+ Peripheral Pulses, No clubbing, cyanosis, or edema    LABS  10-26    138  |  100  |  33<H>  ----------------------------<  148<H>  4.3   |  31  |  0.8    Ca    8.2<L>      26 Oct 2021 05:37  Mg     2.1     10-26    TPro  5.2<L>  /  Alb  2.6<L>  /  TBili  0.5  /  DBili  x   /  AST  38  /  ALT  29  /  AlkPhos  114  10-26     Procalcitonin, Serum (10.22.21 @ 19:36)   Procalcitonin, Serum: 1.82                       9.4    16.30 )-----------( 252      ( 26 Oct 2021 05:37 )             30.4       Mode: Auto Mode: PRVC/ Volume Support  RR (machine): 15  TV (machine): 400  FiO2: 30  PEEP: 5      CARDIAC ENZYMES  Creatine Kinase, Serum: 178 (10-24 @ 06:29)    CKMB Units: 7.9 (10-24 @ 06:29)  CKMB Units: 17.5 (10-23 @ 21:22)    Troponin T, Serum: 0.85 ng/mL (10-24-21 @ 06:29)  Troponin T, Serum: 0.96 ng/mL (10-23-21 @ 21:22)  Troponin T, Serum: 0.75 ng/mL (10-23-21 @ 10:06)      Culture Results:   Normal Respiratory Asia present (10-23-21)  Culture Results:   No growth to date. (10-23-21)  Culture Results:   No growth (10-22-21)    RADIOLOGY  < from: Xray Chest 1 View- PORTABLE-Urgent (Xray Chest 1 View- PORTABLE-Urgent .) (10.25.21 @ 09:33) >  Impression:  Decreased bilateral opacities. No definite pneumothorax.    < end of copied text >    MEDICATIONS  (STANDING):  aspirin  chewable 81 milliGRAM(s) Oral daily  atorvastatin 40 milliGRAM(s) Oral at bedtime  cefepime   IVPB 1000 milliGRAM(s) IV Intermittent every 12 hours  chlorhexidine 0.12% Liquid 15 milliLiter(s) Oral Mucosa two times a day  chlorhexidine 4% Liquid 1 Application(s) Topical <User Schedule>  DULoxetine 60 milliGRAM(s) Oral daily  folic acid 1 milliGRAM(s) Oral daily  furosemide   Injectable 40 milliGRAM(s) IV Push every 24 hours  glucagon  Injectable 1 milliGRAM(s) IntraMuscular once  heparin   Injectable 5000 Unit(s) SubCutaneous every 8 hours  hydroxychloroquine 200 milliGRAM(s) Oral two times a day  insulin regular Infusion 8 Unit(s)/Hr (8 mL/Hr) IV Continuous <Continuous>  levothyroxine 175 MICROGram(s) Oral daily  midazolam Infusion 0.02 mG/kG/Hr (1.28 mL/Hr) IV Continuous <Continuous>  norepinephrine Infusion 0.05 MICROgram(s)/kG/Min (6 mL/Hr) IV Continuous <Continuous>  pantoprazole  Injectable 40 milliGRAM(s) IV Push daily  vancomycin  IVPB 750 milliGRAM(s) IV Intermittent every 24 hours    MEDICATIONS  (PRN):  acetaminophen     Tablet .. 650 milliGRAM(s) Oral every 6 hours PRN Temp greater or equal to 38C (100.4F)  dextrose 50% Injectable 25 milliLiter(s) IV Push once PRN glucose < 60

## 2021-10-26 NOTE — PROGRESS NOTE ADULT - SUBJECTIVE AND OBJECTIVE BOX
Patient is a 76y old  Female who presents with a chief complaint of SOB (26 Oct 2021 08:26)    Over Night Events:  Remains on MV.   Off sedation.  On low dose Norepinephrine.    ROS:   All ROS are negative except HPI     PHYSICAL EXAM  ICU Vital Signs Last 24 Hrs  T(C): 37.7 (26 Oct 2021 07:10), Max: 38.7 (25 Oct 2021 15:32)  T(F): 99.9 (26 Oct 2021 07:10), Max: 101.7 (25 Oct 2021 15:32)  HR: 82 (26 Oct 2021 07:15) (81 - 101)  BP: 119/61 (26 Oct 2021 07:15) (81/43 - 134/63)  BP(mean): 84 (26 Oct 2021 07:15) (59 - 94)  RR: 15 (26 Oct 2021 07:15) (14 - 19)  SpO2: 100% (26 Oct 2021 07:15) (99% - 100%)    CONSTITUTIONAL:  Well nourished.  NAD    ENT:   Airway patent,   Mouth with normal mucosa.   No thrush  ETT    EYES:   Pupils equal,   Round and reactive to light.    CARDIAC:   Normal rate,   Regular rhythm.    mild lower extremity edema    RESPIRATORY:   Bilateral crackles  No wheezing  Normal chest expansion  Not tachypneic,  No use of accessory muscles    GASTROINTESTINAL:  Abdomen soft,   Non-tender,   No guarding,   + BS    MUSCULOSKELETAL:   Range of motion is not limited,  No clubbing, cyanosis    NEUROLOGICAL:   Sedated  No motor deficits.    SKIN:   Skin normal color for race,   Warm and dry  No evidence of rash.    PSYCHIATRIC:   Sedated  No apparent risk to self or others.    HEMATOLOGICAL:  No cervical  lymphadenopathy.  no inguinal lymphadenopathy        10-25-21 @ 07:01  -  10-26-21 @ 07:00  --------------------------------------------------------  IN:    Enteral Tube Flush: 200 mL    FentaNYL: 76.8 mL    Glucerna: 450 mL    Insulin: 53 mL    IV PiggyBack: 600 mL    Midazolam: 12 mL    Norepinephrine: 119 mL  Total IN: 1510.8 mL    OUT:    Indwelling Catheter - Urethral (mL): 2330 mL  Total OUT: 2330 mL    Total NET: -819.2 mL      10-26-21 @ 07:01  -  10-26-21 @ 09:00  --------------------------------------------------------  IN:  Total IN: 0 mL    OUT:    Indwelling Catheter - Urethral (mL): 250 mL  Total OUT: 250 mL    Total NET: -250 mL      LABS:                            9.4    16.30 )-----------( 252      ( 26 Oct 2021 05:37 )             30.4     138  |  100  |  33<H>  ----------------------------<  148<H>  4.3   |  31  |  0.8    Ca    8.2<L>      26 Oct 2021 05:37  Mg     2.1     10-26    TPro  5.2<L>  /  Alb  2.6<L>  /  TBili  0.5  /  DBili  x   /  AST  38  /  ALT  29  /  AlkPhos  114  10-26    LIVER FUNCTIONS - ( 26 Oct 2021 05:37 )  Alb: 2.6 g/dL / Pro: 5.2 g/dL / ALK PHOS: 114 U/L / ALT: 29 U/L / AST: 38 U/L / GGT: x                                             Culture - Sputum (collected 23 Oct 2021 15:05)  Source: .Sputum Sputum  Gram Stain (24 Oct 2021 14:45):    Numerous polymorphonuclear leukocytes per low power field    Few Squamous epithelial cells per low power field    Numerous Gram Positive Cocci in Clusters per oil power field  Final Report (25 Oct 2021 17:03):    Normal Respiratory Asia present    Culture - Blood (collected 23 Oct 2021 11:45)  Source: .Blood Blood-Peripheral  Preliminary Report (24 Oct 2021 20:01):    No growth to date.                                                Mode: Auto Mode: PRVC/ Volume Support  RR (machine): 15  TV (machine): 400  FiO2: 30  PEEP: 5  MAP: 9  PIP: 19    ABG - ( 26 Oct 2021 04:27 )  pH, Arterial: 7.53  pH, Blood: x     /  pCO2: 44    /  pO2: 124   / HCO3: 37    / Base Excess: 12.6  /  SaO2: 97.3        MEDICATIONS  (STANDING):  aspirin  chewable 81 milliGRAM(s) Oral daily  atorvastatin 40 milliGRAM(s) Oral at bedtime  cefepime   IVPB 1000 milliGRAM(s) IV Intermittent every 12 hours  cefepime   IVPB      chlorhexidine 0.12% Liquid 15 milliLiter(s) Oral Mucosa two times a day  chlorhexidine 4% Liquid 1 Application(s) Topical <User Schedule>  dextrose 40% Gel 15 Gram(s) Oral once  dextrose 5%. 1000 milliLiter(s) (50 mL/Hr) IV Continuous <Continuous>  dextrose 5%. 1000 milliLiter(s) (100 mL/Hr) IV Continuous <Continuous>  dextrose 50% Injectable 25 Gram(s) IV Push once  dextrose 50% Injectable 12.5 Gram(s) IV Push once  dextrose 50% Injectable 25 Gram(s) IV Push once  DULoxetine 60 milliGRAM(s) Oral daily  folic acid 1 milliGRAM(s) Oral daily  furosemide   Injectable 40 milliGRAM(s) IV Push every 24 hours  glucagon  Injectable 1 milliGRAM(s) IntraMuscular once  heparin   Injectable 5000 Unit(s) SubCutaneous every 8 hours  hydroxychloroquine 200 milliGRAM(s) Oral two times a day  insulin regular Infusion 8 Unit(s)/Hr (8 mL/Hr) IV Continuous <Continuous>  levothyroxine 175 MICROGram(s) Oral daily  midazolam Infusion 0.02 mG/kG/Hr (1.28 mL/Hr) IV Continuous <Continuous>  norepinephrine Infusion 0.05 MICROgram(s)/kG/Min (6 mL/Hr) IV Continuous <Continuous>  pantoprazole  Injectable 40 milliGRAM(s) IV Push daily  vancomycin  IVPB 750 milliGRAM(s) IV Intermittent every 24 hours    MEDICATIONS  (PRN):  acetaminophen     Tablet .. 650 milliGRAM(s) Oral every 6 hours PRN Temp greater or equal to 38C (100.4F)  dextrose 50% Injectable 25 milliLiter(s) IV Push once PRN glucose < 60      New X-rays reviewed:                                                                                  ECHO    CXR interpreted by me:  ETT OGT, bilateral lower lobe opacifications     Patient is a 76y old  Female who presents with a chief complaint of SOB (26 Oct 2021 08:26)    Over Night Events:  Remains on MV.   Off sedation.  On low dose Norepinephrine.    ROS:   All ROS are negative except HPI     PHYSICAL EXAM  ICU Vital Signs Last 24 Hrs  T(C): 37.7 (26 Oct 2021 07:10), Max: 38.7 (25 Oct 2021 15:32)  T(F): 99.9 (26 Oct 2021 07:10), Max: 101.7 (25 Oct 2021 15:32)  HR: 82 (26 Oct 2021 07:15) (81 - 101)  BP: 119/61 (26 Oct 2021 07:15) (81/43 - 134/63)  BP(mean): 84 (26 Oct 2021 07:15) (59 - 94)  RR: 15 (26 Oct 2021 07:15) (14 - 19)  SpO2: 100% (26 Oct 2021 07:15) (99% - 100%)    CONSTITUTIONAL:  Well nourished.  NAD    ENT:   Airway patent,   Mouth with normal mucosa.   No thrush  ETT    EYES:   Pupils equal,   Round and reactive to light.    CARDIAC:   Normal rate,   Regular rhythm.    mild lower extremity edema    RESPIRATORY:   Bilateral crackles  No wheezing  Normal chest expansion  Not tachypneic,  No use of accessory muscles    GASTROINTESTINAL:  Abdomen soft,   Non-tender,   No guarding,   + BS    MUSCULOSKELETAL:   Range of motion is not limited,  No clubbing, cyanosis    NEUROLOGICAL:   Spontaneous eye opening  Tracks to voice  Not following commands  No motor deficits.    SKIN:   Skin normal color for race,   Warm and dry  No evidence of rash.    PSYCHIATRIC:   Off sedation  No apparent risk to self or others.    HEMATOLOGICAL:  No cervical  lymphadenopathy.  no inguinal lymphadenopathy        10-25-21 @ 07:01  -  10-26-21 @ 07:00  --------------------------------------------------------  IN:    Enteral Tube Flush: 200 mL    FentaNYL: 76.8 mL    Glucerna: 450 mL    Insulin: 53 mL    IV PiggyBack: 600 mL    Midazolam: 12 mL    Norepinephrine: 119 mL  Total IN: 1510.8 mL    OUT:    Indwelling Catheter - Urethral (mL): 2330 mL  Total OUT: 2330 mL    Total NET: -819.2 mL      10-26-21 @ 07:01  -  10-26-21 @ 09:00  --------------------------------------------------------  IN:  Total IN: 0 mL    OUT:    Indwelling Catheter - Urethral (mL): 250 mL  Total OUT: 250 mL    Total NET: -250 mL      LABS:                            9.4    16.30 )-----------( 252      ( 26 Oct 2021 05:37 )             30.4     138  |  100  |  33<H>  ----------------------------<  148<H>  4.3   |  31  |  0.8    Ca    8.2<L>      26 Oct 2021 05:37  Mg     2.1     10-26    TPro  5.2<L>  /  Alb  2.6<L>  /  TBili  0.5  /  DBili  x   /  AST  38  /  ALT  29  /  AlkPhos  114  10-26    LIVER FUNCTIONS - ( 26 Oct 2021 05:37 )  Alb: 2.6 g/dL / Pro: 5.2 g/dL / ALK PHOS: 114 U/L / ALT: 29 U/L / AST: 38 U/L / GGT: x                                             Culture - Sputum (collected 23 Oct 2021 15:05)  Source: .Sputum Sputum  Gram Stain (24 Oct 2021 14:45):    Numerous polymorphonuclear leukocytes per low power field    Few Squamous epithelial cells per low power field    Numerous Gram Positive Cocci in Clusters per oil power field  Final Report (25 Oct 2021 17:03):    Normal Respiratory Asia present    Culture - Blood (collected 23 Oct 2021 11:45)  Source: .Blood Blood-Peripheral  Preliminary Report (24 Oct 2021 20:01):    No growth to date.                                                Mode: Auto Mode: PRVC/ Volume Support  RR (machine): 15  TV (machine): 400  FiO2: 30  PEEP: 5  MAP: 9  PIP: 19    ABG - ( 26 Oct 2021 04:27 )  pH, Arterial: 7.53  pH, Blood: x     /  pCO2: 44    /  pO2: 124   / HCO3: 37    / Base Excess: 12.6  /  SaO2: 97.3        MEDICATIONS  (STANDING):  aspirin  chewable 81 milliGRAM(s) Oral daily  atorvastatin 40 milliGRAM(s) Oral at bedtime  cefepime   IVPB 1000 milliGRAM(s) IV Intermittent every 12 hours  cefepime   IVPB      chlorhexidine 0.12% Liquid 15 milliLiter(s) Oral Mucosa two times a day  chlorhexidine 4% Liquid 1 Application(s) Topical <User Schedule>  dextrose 40% Gel 15 Gram(s) Oral once  dextrose 5%. 1000 milliLiter(s) (50 mL/Hr) IV Continuous <Continuous>  dextrose 5%. 1000 milliLiter(s) (100 mL/Hr) IV Continuous <Continuous>  dextrose 50% Injectable 25 Gram(s) IV Push once  dextrose 50% Injectable 12.5 Gram(s) IV Push once  dextrose 50% Injectable 25 Gram(s) IV Push once  DULoxetine 60 milliGRAM(s) Oral daily  folic acid 1 milliGRAM(s) Oral daily  furosemide   Injectable 40 milliGRAM(s) IV Push every 24 hours  glucagon  Injectable 1 milliGRAM(s) IntraMuscular once  heparin   Injectable 5000 Unit(s) SubCutaneous every 8 hours  hydroxychloroquine 200 milliGRAM(s) Oral two times a day  insulin regular Infusion 8 Unit(s)/Hr (8 mL/Hr) IV Continuous <Continuous>  levothyroxine 175 MICROGram(s) Oral daily  midazolam Infusion 0.02 mG/kG/Hr (1.28 mL/Hr) IV Continuous <Continuous>  norepinephrine Infusion 0.05 MICROgram(s)/kG/Min (6 mL/Hr) IV Continuous <Continuous>  pantoprazole  Injectable 40 milliGRAM(s) IV Push daily  vancomycin  IVPB 750 milliGRAM(s) IV Intermittent every 24 hours    MEDICATIONS  (PRN):  acetaminophen     Tablet .. 650 milliGRAM(s) Oral every 6 hours PRN Temp greater or equal to 38C (100.4F)  dextrose 50% Injectable 25 milliLiter(s) IV Push once PRN glucose < 60      New X-rays reviewed:                                                                                  ECHO    CXR interpreted by me:  ETT OGT, bilateral lower lobe opacifications

## 2021-10-26 NOTE — PROGRESS NOTE ADULT - ASSESSMENT
75 y/o F PMH heart failure, moderate severity mitral valve regurgitation, admitted in early October for heart failure and end stemi presents c/o SOB. Pt had a recent discharge from the hospital and XR during that admission showed increased markings in both lung fields consistent with CHF exacerbation    Patient found to have pneumonia likely GNR and will continue with antibiotics. Patient off sedation and will monitor for improvement in mental status. family updated.    # Acute hypoxemic respiratory failure 2/2 HAP  - Febrile to 102, hypoxic in ED s/p Intubation  - CXR showing bilateral opacities  - Sputum growing GNR, Procal>1  - Continue Vanc and Cefepime  - Initial blood culture negative  - Procal >1, Sputum showing gram positive, culture negative  - SAT today and possible SBT  - Fever curve improving, repeat culture sent  - Vancomycin level sent  - Repeat Xray in AM    # Acute Exacerbation of HFpEF   - Was in [place] last admission due to overdiuresis and Lasix was reduced on discharge  - received IV lasix in ER, continue IV lasix  - connor, I & O, daily weight  - ECHO on last discharge unchanged except for new, mild pulmonary hypertension   - F/u cardio outpatient     # Hypothyroidism   - Elevated TSH 12.24  - Continue synthroid 175mg  - TSH repeat outpatient in 2 months    # Iron Deficiency Anemia  # Anemia of chronic disease  - On lAst admission given Iron sucrose 200x2  - Baseline Hb 8   - Keep Hb >7  - F/u H&H     # Nocturnal Hypoxemia  - uses home O2    # DM II  - Will continue with Insulin regimen    Status: FULL CODE  GI Prophylaxis: None   DVT Prophylaxis: Hep SubQ

## 2021-10-26 NOTE — CONSULT NOTE ADULT - ASSESSMENT
IMP:  - acute resp failure  - NSTEMI  - HFpEF  - DM  - fever    est REE by PSE [id number], est protein needs ~ 90-95 gm/d  suggest increasing Glucerna 1.2 to 50 nl/h and add 2 Prosource TF daily  f/u phos  glycemic control - agree with insulin drip with pt on feeds by drip

## 2021-10-26 NOTE — CONSULT NOTE ADULT - ASSESSMENT
ASSESSMENT  77 yo female PMHx of HFpEF, HTN, HLD, DM,  SLE on Cellsept and methotrexate, CVA in 2002, recently admitted for Hypoglycemia and ANTONELLA who presents with shortness of breath  presenting for shortness of breath    IMPRESSION  #Acute Hypoxic respiratory failure  #Fevers - presumed HAP  - Blood Cx 10/23 NG  - Sputum cx 10/23 NG   - MRSA Nares 10/23 negative   - RVP negative    #CHF Exacerbation  #DM II  #Obesity BMI (kg/m2): 26.7, 30.2  #Abx allergy: NKDA      RECOMMENDATIONS  - fever curve possibly improving- repeat blood cultures and tracheal cultures as she spikes another fever  - continue cefepime 1g q 12 hours  - if blood Cx 10/26 is negative for MRSA, can stop vancomycin   - repeat procalcitonin - check ESR and CRP     Please call or message on [employer] Teams if with any questions.  Spectra [phone]

## 2021-10-26 NOTE — PROGRESS NOTE ADULT - ASSESSMENT
76 yr F with SLE on Methotrexate, moderate MR, HFpEF, HTN, hypothyroid, HLD, T2DM, hx of CVA with no residual deficits discharged from hospital earlier this month on 10/2 s/p HF exacerbation and NSTEMI, DC'd on new home oxygen, refused in pt cardiac cath and is supposed to have outpt cardiac cath with Dr Kowalski.   DC'd on 10/21 from [facility] s/p admission for resolved ANTONELLA, hypoglycemia and NSTEMI  returns to hospital with:    Acute respiratory failure secondary to sepsis from gram neg pneumonia and NSTEMI      - continue IV antibiotics and follow cultures   - pt is febrile with elevated wbc and procal levels   - titrate pressors   - maintain even fluid balance   - home meds   - DVT and GI prophylaxis

## 2021-10-26 NOTE — PROGRESS NOTE ADULT - SUBJECTIVE AND OBJECTIVE BOX
Patient is a 76y old  Female who presents with a chief complaint of SOB (25 Oct 2021 15:37)      T(F): 99.9 (10-26-21 @ 07:10), Max: 101.7 (10-25-21 @ 15:32)  HR: 82 (10-26-21 @ 07:15)  BP: 119/61 (10-26-21 @ 07:15)  RR: 15 (10-26-21 @ 07:15)  SpO2: 100% (10-26-21 @ 07:15) (99% - 100%)    PHYSICAL EXAM:  GENERAL: NAD, well-groomed, well-developed  HEAD:  Atraumatic, Normocephalic  EYES: EOMI, PERRLA, conjunctiva and sclera clear  ENMT: No tonsillar erythema, exudates, or enlargement; Moist mucous membranes, Good dentition, No lesions  NECK: Supple, No JVD, Normal thyroid  NERVOUS SYSTEM:  Alert & Oriented X3,  Motor Strength 5/5 B/L upper and lower extremities  CHEST/LUNG: Clear to percussion bilaterally; No rales, rhonchi, wheezing, or rubs  HEART: Regular rate and rhythm; No murmurs, rubs, or gallops  ABDOMEN: Soft, Nontender, Nondistended; Bowel sounds present  EXTREMITIES:   No clubbing, cyanosis, or edema  LYMPH: No lymphadenopathy noted  SKIN: No rashes or lesions    labs  10-26    138  |  100  |  33<H>  ----------------------------<  148<H>  4.3   |  31  |  0.8    Ca    8.2<L>      26 Oct 2021 05:37  Mg     2.1     10-26    TPro  5.2<L>  /  Alb  2.6<L>  /  TBili  0.5  /  DBili  x   /  AST  38  /  ALT  29  /  AlkPhos  114  10-26                          9.4    16.30 )-----------( 252      ( 26 Oct 2021 05:37 )             30.4       Culture - Sputum (collected 23 Oct 2021 15:05)  Source: .Sputum Sputum  Gram Stain (24 Oct 2021 14:45):    Numerous polymorphonuclear leukocytes per low power field    Few Squamous epithelial cells per low power field    Numerous Gram Positive Cocci in Clusters per oil power field  Final Report (25 Oct 2021 17:03):    Normal Respiratory Asia present    Culture - Blood (collected 23 Oct 2021 11:45)  Source: .Blood Blood-Peripheral  Preliminary Report (24 Oct 2021 20:01):    No growth to date.        Mode: Auto Mode: PRVC/ Volume Support  RR (machine): 15  TV (machine): 400  FiO2: 30  PEEP: 5  MAP: 9  PIP: 19        acetaminophen     Tablet .. 650 milliGRAM(s) Oral every 6 hours PRN  aspirin  chewable 81 milliGRAM(s) Oral daily  atorvastatin 40 milliGRAM(s) Oral at bedtime  cefepime   IVPB 1000 milliGRAM(s) IV Intermittent every 12 hours  cefepime   IVPB      chlorhexidine 0.12% Liquid 15 milliLiter(s) Oral Mucosa two times a day  chlorhexidine 4% Liquid 1 Application(s) Topical <User Schedule>  dextrose 40% Gel 15 Gram(s) Oral once  dextrose 5%. 1000 milliLiter(s) IV Continuous <Continuous>  dextrose 5%. 1000 milliLiter(s) IV Continuous <Continuous>  dextrose 50% Injectable 25 milliLiter(s) IV Push once PRN  dextrose 50% Injectable 25 Gram(s) IV Push once  dextrose 50% Injectable 12.5 Gram(s) IV Push once  dextrose 50% Injectable 25 Gram(s) IV Push once  DULoxetine 60 milliGRAM(s) Oral daily  folic acid 1 milliGRAM(s) Oral daily  furosemide   Injectable 40 milliGRAM(s) IV Push every 24 hours  glucagon  Injectable 1 milliGRAM(s) IntraMuscular once  heparin   Injectable 5000 Unit(s) SubCutaneous every 8 hours  hydroxychloroquine 200 milliGRAM(s) Oral two times a day  insulin regular Infusion 8 Unit(s)/Hr IV Continuous <Continuous>  levothyroxine 175 MICROGram(s) Oral daily  midazolam Infusion 0.02 mG/kG/Hr IV Continuous <Continuous>  norepinephrine Infusion 0.05 MICROgram(s)/kG/Min IV Continuous <Continuous>  pantoprazole  Injectable 40 milliGRAM(s) IV Push daily  vancomycin  IVPB 750 milliGRAM(s) IV Intermittent every 24 hours

## 2021-10-26 NOTE — PROGRESS NOTE ADULT - ASSESSMENT
Patient off sedation. Awake . But not following commands. Low dose pressors. Hold sedation. Check cxr labs. Antibiotics. Prognosis poor

## 2021-10-26 NOTE — PROGRESS NOTE ADULT - SUBJECTIVE AND OBJECTIVE BOX
SUBJECTIVE:    Patient is a 76y old Female who presents with a chief complaint of SOB (26 Oct 2021 09:48)    Currently admitted to medicine with the primary diagnosis of Acute pulmonary edema       Today is hospital day 4d. This morning she is resting comfortably in bed and reports no new issues or overnight events.     INTERVAL EVENTS: Intubated and sedated    PAST MEDICAL & SURGICAL HISTORY  DM (diabetes mellitus)  insulin  fs achs    Hypercholesteremia  statin    Hypertension  hctz    Lupus  as per hx    Fall, initial encounter  as  hx    Stroke  TIA 2002, no deficits    Herniated lumbar intervertebral disc  as per hx    Seizure  keppra    No significant past surgical history        ALLERGIES:  No Known Allergies    MEDICATIONS:  STANDING MEDICATIONS  aspirin  chewable 81 milliGRAM(s) Oral daily  atorvastatin 40 milliGRAM(s) Oral at bedtime  cefepime   IVPB 1000 milliGRAM(s) IV Intermittent every 12 hours  cefepime   IVPB      chlorhexidine 0.12% Liquid 15 milliLiter(s) Oral Mucosa two times a day  chlorhexidine 4% Liquid 1 Application(s) Topical <User Schedule>  dextrose 40% Gel 15 Gram(s) Oral once  dextrose 5%. 1000 milliLiter(s) IV Continuous <Continuous>  dextrose 5%. 1000 milliLiter(s) IV Continuous <Continuous>  dextrose 50% Injectable 25 Gram(s) IV Push once  dextrose 50% Injectable 12.5 Gram(s) IV Push once  dextrose 50% Injectable 25 Gram(s) IV Push once  DULoxetine 60 milliGRAM(s) Oral daily  folic acid 1 milliGRAM(s) Oral daily  furosemide   Injectable 40 milliGRAM(s) IV Push every 24 hours  glucagon  Injectable 1 milliGRAM(s) IntraMuscular once  heparin   Injectable 5000 Unit(s) SubCutaneous every 8 hours  hydroxychloroquine 200 milliGRAM(s) Oral two times a day  insulin regular Infusion 8 Unit(s)/Hr IV Continuous <Continuous>  levothyroxine 175 MICROGram(s) Oral daily  midazolam Infusion 0.02 mG/kG/Hr IV Continuous <Continuous>  norepinephrine Infusion 0.05 MICROgram(s)/kG/Min IV Continuous <Continuous>  pantoprazole  Injectable 40 milliGRAM(s) IV Push daily  vancomycin  IVPB 750 milliGRAM(s) IV Intermittent every 24 hours    PRN MEDICATIONS  acetaminophen     Tablet .. 650 milliGRAM(s) Oral every 6 hours PRN  dextrose 50% Injectable 25 milliLiter(s) IV Push once PRN    VITALS:   T(F): 99.9  HR: 91  BP: 126/58  RR: 15  SpO2: 98%    LABS:                        9.4    16.30 )-----------( 252      ( 26 Oct 2021 05:37 )             30.4     10-26    138  |  100  |  33<H>  ----------------------------<  148<H>  4.3   |  31  |  0.8    Ca    8.2<L>      26 Oct 2021 05:37  Mg     2.1     10-26    TPro  5.2<L>  /  Alb  2.6<L>  /  TBili  0.5  /  DBili  x   /  AST  38  /  ALT  29  /  AlkPhos  114  10-26        ABG - ( 26 Oct 2021 04:27 )  pH, Arterial: 7.53  pH, Blood: x     /  pCO2: 44    /  pO2: 124   / HCO3: 37    / Base Excess: 12.6  /  SaO2: 97.3                  Culture - Sputum (collected 23 Oct 2021 15:05)  Source: .Sputum Sputum  Gram Stain (24 Oct 2021 14:45):    Numerous polymorphonuclear leukocytes per low power field    Few Squamous epithelial cells per low power field    Numerous Gram Positive Cocci in Clusters per oil power field  Final Report (25 Oct 2021 17:03):    Normal Respiratory [place] present    Culture - Blood (collected 23 Oct 2021 11:45)  Source: .Blood Blood-Peripheral  Preliminary Report (24 Oct 2021 20:01):    No growth to date.          RADIOLOGY:    PHYSICAL EXAM:  GEN: No acute distress  PULM/CHEST: Clear to auscultation bilaterally  CVS: Regular rate and rhythm  ABD: Soft, non-tender, non-distended, +BS  EXT: No edema  NEURO: Sedated, awake to voice    Wise Catheter:   Indwelling Urethral Catheter:     Connect To:  Straight Drainage/[place]    Indication:  Urinary Retention / Obstruction (10-26-21 @ 09:26) (not performed) [Active]

## 2021-10-26 NOTE — CONSULT NOTE ADULT - SUBJECTIVE AND OBJECTIVE BOX
76 yr F with SLE on Methotrexate, moderate MR, HFpEF, HTN, hypothyroid, HLD, T2DM, hx of CVA with no residual deficits discharged from hospital earlier this month on 10/2 s/p HF exacerbation and NSTEMI, DC'd on new home oxygen, refused in pt cardiac cath and is supposed to have outpt cardiac cath with Dr Kowalski.   DC'd on 10/21 from [facility] s/p admission for resolved ANTONELLA, hypoglycemia and NSTEMI  returns to hospital 10/22 with: Acute respiratory failure secondary to sepsis from gram neg pneumonia and NSTEMI   pt intubated 10/23.  Vital Signs Last 24 Hrs  T(C): 37.7 (26 Oct 2021 07:10), Max: 38.7 (25 Oct 2021 15:32)  T(F): 99.9 (26 Oct 2021 07:10), Max: 101.7 (25 Oct 2021 15:32)  HR: 92 (26 Oct 2021 11:00) (82 - 101)  BP: 107/53 (26 Oct 2021 11:00) (81/43 - 134/63)  BP(mean): 77 (26 Oct 2021 11:00) (59 - 94)  RR: 15 (26 Oct 2021 11:00) (14 - 19)  SpO2: 99% (26 Oct 2021 11:00) (98% - 100%)  Drug Dosing Weight  Height (cm): 154.9 (22 Oct 2021 14:13)  Weight (kg): 64 (22 Oct 2021 14:13)  BMI (kg/m2): 26.7 (22 Oct 2021 14:13)  BSA (m2): 1.63 (22 Oct 2021 14:13)  intubated, on no sedation when seen, moves when examined but not awake to follow commands  + levo, on insulin at 2 u/h, + KCl rider  sclerae anicteric  abd soft, ND, NT  OG feeding tube in place  ext warm, no c/c/e  right IJ TLC c/d/i    Mode: Auto Mode: PRVC/ Volume Support  RR (machine): 12  TV (machine): 400  FiO2: 30  PEEP: 5  MAP: 9  PIP: 19  ABG - ( 26 Oct 2021 04:27 )  pH, Arterial: 7.53  pH, Blood: x     /  pCO2: 44    /  pO2: 124   / HCO3: 37    / Base Excess: 12.6  /  SaO2: 97.3      MEDICATIONS  (STANDING):  aspirin  chewable 81 milliGRAM(s) Oral daily  atorvastatin 40 milliGRAM(s) Oral at bedtime  cefepime   IVPB 1000 milliGRAM(s) IV Intermittent every 12 hours  cefepime   IVPB      chlorhexidine 0.12% Liquid 15 milliLiter(s) Oral Mucosa two times a day  chlorhexidine 4% Liquid 1 Application(s) Topical <User Schedule>  DULoxetine 60 milliGRAM(s) Oral daily  folic acid 1 milliGRAM(s) Oral daily  furosemide   Injectable 40 milliGRAM(s) IV Push every 24 hours  heparin   Injectable 5000 Unit(s) SubCutaneous every 8 hours  hydroxychloroquine 200 milliGRAM(s) Oral two times a day  insulin regular Infusion 8 Unit(s)/Hr (8 mL/Hr) IV Continuous <Continuous>  levothyroxine 175 MICROGram(s) Oral daily  midazolam Infusion 0.02 mG/kG/Hr (1.28 mL/Hr) IV Continuous <Continuous>  norepinephrine Infusion 0.05 MICROgram(s)/kG/Min (6 mL/Hr) IV Continuous <Continuous>  pantoprazole  Injectable 40 milliGRAM(s) IV Push daily  vancomycin  IVPB 750 milliGRAM(s) IV Intermittent every 24 hours                        9.4    16.30 )-----------( 252      ( 26 Oct 2021 05:37 )             30.4   10-26    138  |  100  |  33<H>  ----------------------------<  148<H>  4.3   |  31  |  0.8    Ca    8.2<L>      26 Oct 2021 05:37  Mg     2.1     10-26    TPro  5.2<L>  /  Alb  2.6<L>  /  TBili  0.5  /  DBili  x   /  AST  38  /  ALT  29  /  AlkPhos  114  10-26    Phosphorus Level, Serum: 3.1 mg/dL (10.20.21 @ 06:24)  POCT Blood Glucose.: 119 mg/dL (26 Oct 2021 11:02)  POCT Blood Glucose.: 168 mg/dL (26 Oct 2021 06:27)  POCT Blood Glucose.: 169 mg/dL (26 Oct 2021 05:11)  POCT Blood Glucose.: 163 mg/dL (26 Oct 2021 04:09)  POCT Blood Glucose.: 162 mg/dL (26 Oct 2021 03:18)  POCT Blood Glucose.: 196 mg/dL (26 Oct 2021 02:07)  POCT Blood Glucose.: 183 mg/dL (26 Oct 2021 00:55)  POCT Blood Glucose.: 113 mg/dL (25 Oct 2021 23:44)  POCT Blood Glucose.: 109 mg/dL (25 Oct 2021 22:51)  POCT Blood Glucose.: 166 mg/dL (25 Oct 2021 21:46)  POCT Blood Glucose.: 213 mg/dL (25 Oct 2021 19:57)  POCT Blood Glucose.: 264 mg/dL (25 Oct 2021 18:57)  POCT Blood Glucose.: 237 mg/dL (25 Oct 2021 18:05)  POCT Blood Glucose.: 153 mg/dL (25 Oct 2021 16:46)  POCT Blood Glucose.: 87 mg/dL (25 Oct 2021 15:06)  POCT Blood Glucose.: 88 mg/dL (25 Oct 2021 14:18)  POCT Blood Glucose.: 147 mg/dL (25 Oct 2021 12:53)  POCT Blood Glucose.: 158 mg/dL (25 Oct 2021 12:09)    < from: Xray Chest 1 View- PORTABLE-Urgent (Xray Chest 1 View- PORTABLE-Urgent .) (10.25.21 @ 09:33) >  Support devices: Stable positioning of enteric tube endotracheal tube and right IJ central venous line    Cardiac/mediastinum/hilum: No significant change    Lung parenchyma/Pleura: Decreased bilateral opacities. No definite pneumothorax.    < end of copied text >

## 2021-10-26 NOTE — CONSULT NOTE ADULT - SUBJECTIVE AND OBJECTIVE BOX
LINDSAY ALBERT  76y, Female  Allergy: No Known Allergies      CHIEF COMPLAINT: SOB (26 Oct 2021 07:57)      LOS  4d    HPI:  77 yo female PMHx of HFpEF, HTN, HLD, DM,  SLE on Cellsept and methotrexate, CVA in 2002, recently admitted for Hypoglycemia and ANTONELLA, discharged yesterday, presenting for shortness of breath. Patient was found hypoxic in ER and was put on bipap. Her blood sugars were also high presumably from non compliance since discharge. Patient has no other acute complaints.     Of note patient was discharged yesterday on reduced dose of Lasix which she did not take at home.      T(C): 35.6 (22 Oct 2021 14:13), Max: 37.3 (22 Oct 2021 10:12)  T(F): 96 (22 Oct 2021 14:13), Max: 99.1 (22 Oct 2021 10:12)  HR: 91 (22 Oct 2021 14:24) (86 - 116)  BP: 127/60 (22 Oct 2021 14:13) (105/51 - 168/87)  BP(mean): --  RR: 23 (22 Oct 2021 14:13) (17 - 50)  SpO2: 98% (22 Oct 2021 14:24) (91% - 100%)    Patient found hypoxic in ER. Blood work reviewed, lactate , Beta hydroxy butyrate and anion gap is elevated, UA Positive. CXR showing bilateral infiltrates. Patient is being admitted for hypoxic resp failure and hyperglycemia  (22 Oct 2021 15:03)    INFECTIOUS DISEASE HISTORY:  History as above.   ID consulted for fevers/evalution of pneumonia.   Admitted on 10/23, intubated  Sputum Cx 10/23 with normal respirator steven.     Febrile on 10/24  Started on cefepime 10/22    PAST MEDICAL & SURGICAL HISTORY:  DM (diabetes mellitus)  insulin  fs achs    Hypercholesteremia  statin    Hypertension  hctz    Lupus  as per hx    Fall, initial encounter  as  hx    Stroke  TIA 2002, no deficits    Herniated lumbar intervertebral disc  as per hx    Seizure  keppra    No significant past surgical history        FAMILY HISTORY  No pertinent family history in first degree relatives        SOCIAL HISTORY  Social History:  neg (22 Oct 2021 15:03)        ROS  General: Denies rigors, nightsweats  HEENT: Denies headache, rhinorrhea, sore throat, eye pain  CV: Denies CP, palpitations  PULM: Denies wheezing, hemoptysis  GI: Denies hematemesis, hematochezia, melena  : Denies discharge, hematuria  MSK: Denies arthralgias, myalgias  SKIN: Denies rash, lesions  NEURO: Denies paresthesias, weakness  PSYCH: Denies depression, anxiety    VITALS:  T(F): 99.9, Max: 101.7 (10-25-21 @ 15:32)  HR: 82  BP: 119/61  RR: 15Vital Signs Last 24 Hrs  T(C): 37.7 (26 Oct 2021 07:10), Max: 38.7 (25 Oct 2021 15:32)  T(F): 99.9 (26 Oct 2021 07:10), Max: 101.7 (25 Oct 2021 15:32)  HR: 82 (26 Oct 2021 07:15) (81 - 101)  BP: 119/61 (26 Oct 2021 07:15) (81/43 - 134/63)  BP(mean): 84 (26 Oct 2021 07:15) (59 - 94)  RR: 15 (26 Oct 2021 07:15) (14 - 19)  SpO2: 100% (26 Oct 2021 07:15) (99% - 100%)    PHYSICAL EXAM:  Gen: NAD, resting in bed  HEENT: Normocephalic, atraumatic  Neck: supple, no lymphadenopathy  CV: Regular rate & regular rhythm  Lungs: decreased BS at bases, no fremitus  Abdomen: Soft, BS present  Ext: Warm, well perfused  Neuro: non focal, awake  Skin: no rash, no erythema  Lines: no phlebitis    TESTS & MEASUREMENTS:                        9.4    16.30 )-----------( 252      ( 26 Oct 2021 05:37 )             30.4     10-26    138  |  100  |  33<H>  ----------------------------<  148<H>  4.3   |  31  |  0.8    Ca    8.2<L>      26 Oct 2021 05:37  Mg     2.1     10-26    TPro  5.2<L>  /  Alb  2.6<L>  /  TBili  0.5  /  DBili  x   /  AST  38  /  ALT  29  /  AlkPhos  114  10-26    eGFR if Non African American: 72 mL/min/1.73M2 (10-26-21 @ 05:37)  eGFR if African American: 84 mL/min/1.73M2 (10-26-21 @ 05:37)    LIVER FUNCTIONS - ( 26 Oct 2021 05:37 )  Alb: 2.6 g/dL / Pro: 5.2 g/dL / ALK PHOS: 114 U/L / ALT: 29 U/L / AST: 38 U/L / GGT: x               Culture - Sputum (collected 10-23-21 @ 15:05)  Source: .Sputum Sputum  Gram Stain (10-24-21 @ 14:45):    Numerous polymorphonuclear leukocytes per low power field    Few Squamous epithelial cells per low power field    Numerous Gram Positive Cocci in Clusters per oil power field  Final Report (10-25-21 @ 17:03):    Normal Respiratory Steven present    Culture - Blood (collected 10-23-21 @ 11:45)  Source: .Blood Blood-Peripheral  Preliminary Report (10-24-21 @ 20:01):    No growth to date.    Culture - Urine (collected 10-22-21 @ 11:20)  Source: Catheterized Catheterized  Final Report (10-23-21 @ 22:43):    No growth        Lactate, Blood: 2.6 mmol/L (10-23-21 @ 15:16)  Lactate, Blood: 1.6 mmol/L (10-23-21 @ 05:52)  Lactate, Blood: 3.4 mmol/L (10-22-21 @ 19:36)  Blood Gas Venous - Lactate: 7.90 mmol/L (10-22-21 @ 11:10)  Blood Gas Venous - Lactate: 9.30 mmol/L (10-22-21 @ 09:31)      INFECTIOUS DISEASES TESTING  MRSA PCR Result.: Negative (10-23-21 @ 15:16)  Procalcitonin, Serum: 1.82 ng/mL (10-22-21 @ 19:36)  COVID-19 PCR: NotDetec (10-22-21 @ 09:30)  COVID-19 PCR: NotDetec (10-18-21 @ 22:25)  COVID-19 PCR: NotDetec (10-02-21 @ 15:35)  Procalcitonin, Serum: 0.07 ng/mL (09-28-21 @ 11:17)  COVID-19 PCR: NotDetec (09-25-21 @ 21:43)  COVID-19 PCR: NotDetec (08-07-21 @ 15:30)      RADIOLOGY & ADDITIONAL TESTS:  I have personally reviewed the last Chest xray  CXR  Xray Chest 1 View- PORTABLE-Urgent:   EXAM:  XR CHEST PORTABLE URGENT 1V            PROCEDURE DATE:  10/25/2021            INTERPRETATION:  Clinical History / Reason for exam: PNA    Comparison : Chest radiograph:  10/24/2021    Technique/Positioning: Frontal view of the chest    Findings:    Support devices: Stable positioning of enteric tube endotracheal tube and right IJ central venous line    Cardiac/mediastinum/hilum: No significant change    Lung parenchyma/Pleura: Decreased bilateral opacities. No definite pneumothorax.    Skeleton/soft tissues: No significant change.    Impression:  Decreased bilateral opacities. No definite pneumothorax.    Lines and support devices as described above.          --- End of Report ---              BERTHA VICENTE MD; Attending Radiologist  This document has been electronically signed. Oct 25 2021  9:37AM (10-25-21 @ 09:33)      CT      CARDIOLOGY TESTING  12 Lead ECG:   Ventricular Rate 80 BPM    Atrial Rate 80 BPM    P-R Interval 130 ms    QRS Duration 92 ms    Q-T Interval 440 ms    QTC Calculation(Bazett) 507 ms    P Axis 22 degrees    R Axis -18 degrees    T Axis 214 degrees    Diagnosis Line Normal sinus rhythm  Nonspecific ST-T changes  Confirmed by LUAN YORK MD ([id number]) on 10/25/2021 8:36:05 PM (10-25-21 @ 07:23)  12 Lead ECG:   Ventricular Rate 81 BPM    Atrial Rate 81 BPM    P-R Interval 136 ms    QRS Duration 88 ms    Q-T Interval 450 ms    QTC Calculation(Bazett) 522 ms    P Axis -18 degrees    R Axis -16 degrees    T Axis 107 degrees    Diagnosis Line Normal sinus rhythm  ST & T wave abnormality, consider anterolateral ischemia  Prolonged QT  Abnormal ECG    Confirmed by LUAN YORK MD ([id number]) on 10/24/2021 10:46:14 AM (10-24-21 @ 07:47)      MEDICATIONS  aspirin  chewable 81 Oral daily  atorvastatin 40 Oral at bedtime  cefepime   IVPB 1000 IV Intermittent every 12 hours  cefepime   IVPB     chlorhexidine 0.12% Liquid 15 Oral Mucosa two times a day  chlorhexidine 4% Liquid 1 Topical <User Schedule>  dextrose 40% Gel 15 Oral once  dextrose 5%. 1000 IV Continuous <Continuous>  dextrose 5%. 1000 IV Continuous <Continuous>  dextrose 50% Injectable 25 IV Push once  dextrose 50% Injectable 12.5 IV Push once  dextrose 50% Injectable 25 IV Push once  DULoxetine 60 Oral daily  folic acid 1 Oral daily  furosemide   Injectable 40 IV Push every 24 hours  glucagon  Injectable 1 IntraMuscular once  heparin   Injectable 5000 SubCutaneous every 8 hours  hydroxychloroquine 200 Oral two times a day  insulin regular Infusion 8 IV Continuous <Continuous>  levothyroxine 175 Oral daily  midazolam Infusion 0.02 IV Continuous <Continuous>  norepinephrine Infusion 0.05 IV Continuous <Continuous>  pantoprazole  Injectable 40 IV Push daily  vancomycin  IVPB 750 IV Intermittent every 24 hours      Weight  Weight (kg): 64 (10-22-21 @ 14:13)    ANTIBIOTICS:  cefepime   IVPB 1000 milliGRAM(s) IV Intermittent every 12 hours  cefepime   IVPB      hydroxychloroquine 200 milliGRAM(s) Oral two times a day  vancomycin  IVPB 750 milliGRAM(s) IV Intermittent every 24 hours      ALLERGIES:  No Known Allergies      ASSESSMENT  77 yo female PMHx of HFpEF, HTN, HLD, DM,  SLE on Cellsept and methotrexate, CVA in 2002, recently admitted for Hypoglycemia and ANTONELLA who presents with shortness of breath  presenting for shortness of breath    IMPRESSION  #Acute Hypoxic respiratory failure  #Fevers - presumed HAP  - Blood Cx 10/23 NG  - Sputum cx 10/23 NG   - RVP negative    #CHF Exacerbation  #DM II  #Obesity BMI (kg/m2): 26.7, 30.2  #Abx allergy: NKDA      RECOMMENDATIONS  This is a preliminary incomplete pended note, all final recommendations to follow after interview and examination of the patient.    Please call or message on [employer] Teams if with any questions.  Spectra [phone]     LINDSAY ALBERT  76y, Female  Allergy: No Known Allergies      CHIEF COMPLAINT: SOB (26 Oct 2021 07:57)      LOS  4d    HPI:  77 yo female PMHx of HFpEF, HTN, HLD, DM,  SLE on Cellsept and methotrexate, CVA in 2002, recently admitted for Hypoglycemia and ANTONELLA, discharged yesterday, presenting for shortness of breath. Patient was found hypoxic in ER and was put on bipap. Her blood sugars were also high presumably from non compliance since discharge. Patient has no other acute complaints.     Of note patient was discharged yesterday on reduced dose of Lasix which she did not take at home.      T(C): 35.6 (22 Oct 2021 14:13), Max: 37.3 (22 Oct 2021 10:12)  T(F): 96 (22 Oct 2021 14:13), Max: 99.1 (22 Oct 2021 10:12)  HR: 91 (22 Oct 2021 14:24) (86 - 116)  BP: 127/60 (22 Oct 2021 14:13) (105/51 - 168/87)  BP(mean): --  RR: 23 (22 Oct 2021 14:13) (17 - 50)  SpO2: 98% (22 Oct 2021 14:24) (91% - 100%)    Patient found hypoxic in ER. Blood work reviewed, lactate , Beta hydroxy butyrate and anion gap is elevated, UA Positive. CXR showing bilateral infiltrates. Patient is being admitted for hypoxic resp failure and hyperglycemia  (22 Oct 2021 15:03)    INFECTIOUS DISEASE HISTORY:  History as above.   ID consulted for fevers/evalution of pneumonia.   Admitted on 10/23, intubated  Sputum Cx 10/23 with normal respirator steven.     Febrile on 10/24  Started on cefepime 10/22    PAST MEDICAL & SURGICAL HISTORY:  DM (diabetes mellitus)  insulin  fs achs    Hypercholesteremia  statin    Hypertension  hctz    Lupus  as per hx    Fall, initial encounter  as  hx    Stroke  TIA 2002, no deficits    Herniated lumbar intervertebral disc  as per hx    Seizure  keppra    No significant past surgical history        FAMILY HISTORY  No pertinent family history in first degree relatives        SOCIAL HISTORY  Social History:  neg (22 Oct 2021 15:03)        ROS  unable to obtain history secondary to patient's mental status     VITALS:  T(F): 99.9, Max: 101.7 (10-25-21 @ 15:32)  HR: 82  BP: 119/61  RR: 15Vital Signs Last 24 Hrs  T(C): 37.7 (26 Oct 2021 07:10), Max: 38.7 (25 Oct 2021 15:32)  T(F): 99.9 (26 Oct 2021 07:10), Max: 101.7 (25 Oct 2021 15:32)  HR: 82 (26 Oct 2021 07:15) (81 - 101)  BP: 119/61 (26 Oct 2021 07:15) (81/43 - 134/63)  BP(mean): 84 (26 Oct 2021 07:15) (59 - 94)  RR: 15 (26 Oct 2021 07:15) (14 - 19)  SpO2: 100% (26 Oct 2021 07:15) (99% - 100%)    PHYSICAL EXAM:  Gen: intubated  HEENT: Normocephalic, atraumatic  Neck: supple, no lymphadenopathy  CV: Regular rate & regular rhythm  Lungs: decreased BS at bases, no fremitus  Abdomen: Soft, BS present  Ext: Warm, well perfused  Neuro: non focal, awake  Skin: no rash, no erythema,sedated  Lines: no phlebitis    TESTS & MEASUREMENTS:                        9.4    16.30 )-----------( 252      ( 26 Oct 2021 05:37 )             30.4     10-26    138  |  100  |  33<H>  ----------------------------<  148<H>  4.3   |  31  |  0.8    Ca    8.2<L>      26 Oct 2021 05:37  Mg     2.1     10-26    TPro  5.2<L>  /  Alb  2.6<L>  /  TBili  0.5  /  DBili  x   /  AST  38  /  ALT  29  /  AlkPhos  114  10-26    eGFR if Non African American: 72 mL/min/1.73M2 (10-26-21 @ 05:37)  eGFR if African American: 84 mL/min/1.73M2 (10-26-21 @ 05:37)    LIVER FUNCTIONS - ( 26 Oct 2021 05:37 )  Alb: 2.6 g/dL / Pro: 5.2 g/dL / ALK PHOS: 114 U/L / ALT: 29 U/L / AST: 38 U/L / GGT: x               Culture - Sputum (collected 10-23-21 @ 15:05)  Source: .Sputum Sputum  Gram Stain (10-24-21 @ 14:45):    Numerous polymorphonuclear leukocytes per low power field    Few Squamous epithelial cells per low power field    Numerous Gram Positive Cocci in Clusters per oil power field  Final Report (10-25-21 @ 17:03):    Normal Respiratory Steven present    Culture - Blood (collected 10-23-21 @ 11:45)  Source: .Blood Blood-Peripheral  Preliminary Report (10-24-21 @ 20:01):    No growth to date.    Culture - Urine (collected 10-22-21 @ 11:20)  Source: Catheterized Catheterized  Final Report (10-23-21 @ 22:43):    No growth        Lactate, Blood: 2.6 mmol/L (10-23-21 @ 15:16)  Lactate, Blood: 1.6 mmol/L (10-23-21 @ 05:52)  Lactate, Blood: 3.4 mmol/L (10-22-21 @ 19:36)  Blood Gas Venous - Lactate: 7.90 mmol/L (10-22-21 @ 11:10)  Blood Gas Venous - Lactate: 9.30 mmol/L (10-22-21 @ 09:31)      INFECTIOUS DISEASES TESTING  MRSA PCR Result.: Negative (10-23-21 @ 15:16)  Procalcitonin, Serum: 1.82 ng/mL (10-22-21 @ 19:36)  COVID-19 PCR: NotDetec (10-22-21 @ 09:30)  COVID-19 PCR: NotDetec (10-18-21 @ 22:25)  COVID-19 PCR: NotDetec (10-02-21 @ 15:35)  Procalcitonin, Serum: 0.07 ng/mL (09-28-21 @ 11:17)  COVID-19 PCR: NotDetec (09-25-21 @ 21:43)  COVID-19 PCR: NotDetec (08-07-21 @ 15:30)      RADIOLOGY & ADDITIONAL TESTS:  I have personally reviewed the last Chest xray  CXR  Xray Chest 1 View- PORTABLE-Urgent:   EXAM:  XR CHEST PORTABLE URGENT 1V            PROCEDURE DATE:  10/25/2021            INTERPRETATION:  Clinical History / Reason for exam: PNA    Comparison : Chest radiograph:  10/24/2021    Technique/Positioning: Frontal view of the chest    Findings:    Support devices: Stable positioning of enteric tube endotracheal tube and right IJ central venous line    Cardiac/mediastinum/hilum: No significant change    Lung parenchyma/Pleura: Decreased bilateral opacities. No definite pneumothorax.    Skeleton/soft tissues: No significant change.    Impression:  Decreased bilateral opacities. No definite pneumothorax.    Lines and support devices as described above.          --- End of Report ---              BERTHA VICENTE MD; Attending Radiologist  This document has been electronically signed. Oct 25 2021  9:37AM (10-25-21 @ 09:33)      CT      CARDIOLOGY TESTING  12 Lead ECG:   Ventricular Rate 80 BPM    Atrial Rate 80 BPM    P-R Interval 130 ms    QRS Duration 92 ms    Q-T Interval 440 ms    QTC Calculation(Bazett) 507 ms    P Axis 22 degrees    R Axis -18 degrees    T Axis 214 degrees    Diagnosis Line Normal sinus rhythm  Nonspecific ST-T changes  Confirmed by LUAN YORK MD ([id number]) on 10/25/2021 8:36:05 PM (10-25-21 @ 07:23)  12 Lead ECG:   Ventricular Rate 81 BPM    Atrial Rate 81 BPM    P-R Interval 136 ms    QRS Duration 88 ms    Q-T Interval 450 ms    QTC Calculation(Bazett) 522 ms    P Axis -18 degrees    R Axis -16 degrees    T Axis 107 degrees    Diagnosis Line Normal sinus rhythm  ST & T wave abnormality, consider anterolateral ischemia  Prolonged QT  Abnormal ECG    Confirmed by LUAN YORK MD ([id number]) on 10/24/2021 10:46:14 AM (10-24-21 @ 07:47)      MEDICATIONS  aspirin  chewable 81 Oral daily  atorvastatin 40 Oral at bedtime  cefepime   IVPB 1000 IV Intermittent every 12 hours  cefepime   IVPB     chlorhexidine 0.12% Liquid 15 Oral Mucosa two times a day  chlorhexidine 4% Liquid 1 Topical <User Schedule>  dextrose 40% Gel 15 Oral once  dextrose 5%. 1000 IV Continuous <Continuous>  dextrose 5%. 1000 IV Continuous <Continuous>  dextrose 50% Injectable 25 IV Push once  dextrose 50% Injectable 12.5 IV Push once  dextrose 50% Injectable 25 IV Push once  DULoxetine 60 Oral daily  folic acid 1 Oral daily  furosemide   Injectable 40 IV Push every 24 hours  glucagon  Injectable 1 IntraMuscular once  heparin   Injectable 5000 SubCutaneous every 8 hours  hydroxychloroquine 200 Oral two times a day  insulin regular Infusion 8 IV Continuous <Continuous>  levothyroxine 175 Oral daily  midazolam Infusion 0.02 IV Continuous <Continuous>  norepinephrine Infusion 0.05 IV Continuous <Continuous>  pantoprazole  Injectable 40 IV Push daily  vancomycin  IVPB 750 IV Intermittent every 24 hours      Weight  Weight (kg): 64 (10-22-21 @ 14:13)    ANTIBIOTICS:  cefepime   IVPB 1000 milliGRAM(s) IV Intermittent every 12 hours  cefepime   IVPB      hydroxychloroquine 200 milliGRAM(s) Oral two times a day  vancomycin  IVPB 750 milliGRAM(s) IV Intermittent every 24 hours      ALLERGIES:  No Known Allergies

## 2021-10-26 NOTE — PROGRESS NOTE ADULT - ASSESSMENT
IMPRESSION:    Acute Hypoxemic and Hypercapnic Respiratory Failure SP intubation  Pulmonary Edema, Bilateral Effusions  HFpEF exacerbation, moderate MR  Probable PNA  NSTEMI  Hypothyroidism   Iron Deficiency Anemia, Anemia of chronic disease      PLAN:    CNS:  Off sedation.     HEENT: oral care     PULMONARY:  HOB at 45 degrees.  Aspiration precautions.  Target POx > 92 %.  Vent changes: decrease RR to 12.  Possible SBT today if more alert.    CARDIOVASCULAR:   Lasix 40 qd.  DC Norepinephrine.  Target MAP 60.  Keep I < O. Cardiology FU.  Daily weight.  Strict I & O    GI:  GI prophylaxis.  Hold OG feeds for possible SBT.  Bowel regimen.    RENAL:  FU lytes.  Replete as necessary.  Monitor UO.  Wise care.    INFECTIOUS DISEASE:  FU DTA.  MRSA nares negative.  DC Vanc. c/w Cefepime.  ID eval.  RVP panel.    HEMATOLOGICAL:  DVT prophylaxis.  Keep Hb > 8    ENDOCRINE:  c/w Synthroid 175mcg.  Follow up FS. Insulin protocol     MUSCULOSKELETAL:  bed rest    Guarded prognosis  Monitor in MICU IMPRESSION:    Acute Hypoxemic and Hypercapnic Respiratory Failure SP intubation  Pulmonary Edema, Bilateral Effusions  HFpEF exacerbation, moderate MR  Probable PNA  NSTEMI  Hypothyroidism   Iron Deficiency Anemia, Anemia of chronic disease      PLAN:    CNS:  Off sedation.  Monitor mental status.     HEENT: oral care     PULMONARY:  HOB at 45 degrees.  Aspiration precautions.  Target POx > 92 %.  Vent changes: decrease RR to 12.  Possible SBT today if more alert.    CARDIOVASCULAR:   Lasix 40 qd.  DC Norepinephrine.  Target MAP 60.  Keep I < O. Cardiology FU.  Daily weight.  Strict I & O    GI:  GI prophylaxis.  Hold OG feeds for possible SBT.  Bowel regimen.    RENAL:  FU lytes.  Replete as necessary.  Monitor UO.  Wise care.    INFECTIOUS DISEASE:  FU DTA.  MRSA nares & RVP negative.  c/w Vanc & Cefepime.  Vanc trough ID eval.    HEMATOLOGICAL:  DVT prophylaxis.  Keep Hb > 8    ENDOCRINE:  c/w Synthroid 175mcg.  Follow up FS. Insulin protocol     MUSCULOSKELETAL:  bed rest    Guarded prognosis  Monitor in MICU

## 2021-10-27 NOTE — ED ADULT NURSE NOTE - NSSUSCREENINGQ2_ED_ALL_ED
Caller would like to discuss an/a Covid Exposure for NA. Writer advised caller of callback within 24-72 hours.    Patient Name: Ira Barnes  Caller Name: Self  Name of Facility: [facility]  Callback Number: [phone]  Best Availability: Anytime  Can A Detailed Message Be left? yes  Fax Number: NA  Additional Info: Patient is calling regarding exposure to COVID. They had lunch with someone yesterday who is now positive for COVID. Patient claims they are experiencing running nose, sneezing, and congestion. Patient is worried due to being pregnant and now having an exposure. Please call back.   Did you confirm the message with the caller?: yes    Thank you,  Pretty James     No

## 2021-10-27 NOTE — PROGRESS NOTE ADULT - ASSESSMENT
75 y/o F PMH heart failure, moderate severity mitral valve regurgitation, admitted in early October for heart failure and end stemi presents c/o SOB. Pt had a recent discharge from the hospital and XR during that admission showed increased markings in both lung fields consistent with CHF exacerbation    Patient found to have pneumonia likely GNR and will continue with antibiotics. Patient off sedation and not fully awake yet for trial of SBT. family updated at the bedside.    # Acute hypoxemic respiratory failure 2/2 HAP  - Febrile to 102, hypoxic in ED s/p Intubation  - CXR showing bilateral opacities  - Sputum growing GNR, Procal>1  - Continue Vanc and Cefepime  - Initial blood culture negative  - Procal >1, Sputum showing gram positive, culture negative  - SAT today, SBT when stable  - Fever curve improving, repeat culture sent  - Vancomycin level sent  - Repeat Xray in AM    # Acute Exacerbation of HFpEF   - Was in [place] last admission due to overdiuresis and Lasix was reduced on discharge  - received IV lasix in ER, Holding Lasix  - geeta, I & O, daily weight  - ECHO on last discharge unchanged except for new, mild pulmonary hypertension   - F/u cardio outpatient     # Hypothyroidism   - Elevated TSH 12.24  - Continue synthroid 175mg  - TSH repeat outpatient in 2 months    # Iron Deficiency Anemia  # Anemia of chronic disease  - On lAst admission given Iron sucrose 200x2  - Baseline Hb 8   - Keep Hb >7  - F/u H&H     # Nocturnal Hypoxemia  - uses home O2    # DM II  - Will continue with Insulin regimen    Status: FULL CODE  GI Prophylaxis: None   DVT Prophylaxis: Hep SubQ

## 2021-10-27 NOTE — PROGRESS NOTE ADULT - SUBJECTIVE AND OBJECTIVE BOX
Patient appears comfortable on ventilator       T(F): 100.4 (10-27-21 @ 11:00), Max: 100.8 (10-26-21 @ 15:09)  HR: 94 (10-27-21 @ 10:24)  BP: 102/55 (10-27-21 @ 09:00)  RR: 17 (10-27-21 @ 11:00)  SpO2: 100% (10-27-21 @ 10:24) (94% - 100%)    PHYSICAL EXAM:  GENERAL: NAD  HEAD:  Atraumatic, Normocephalic  EYES: EOMI, PERRLA, conjunctiva and sclera clear  NERVOUS SYSTEM:  no focal deficits   CHEST/LUNG:  bilateral rhonchi  HEART: Regular rate and rhythm; No murmurs, rubs, or gallops  ABDOMEN: Soft, Nontender, Nondistended; Bowel sounds present  EXTREMITIES:  2+ Peripheral Pulses, No clubbing, cyanosis, or edema    LABS  10-27    139  |  98  |  38<H>  ----------------------------<  168<H>  3.8   |  35<H>  |  0.7    Ca    8.3<L>      27 Oct 2021 05:54  Mg     2.1     10-27    TPro  5.2<L>  /  Alb  2.7<L>  /  TBili  0.3  /  DBili  x   /  AST  74<H>  /  ALT  41  /  AlkPhos  132<H>  10-27                          8.8    11.81 )-----------( 227      ( 27 Oct 2021 05:54 )             29.1       Mode: Auto Mode: PRVC/ Volume Support  RR (machine): 12  TV (machine): 400  FiO2: 30  PEEP: 5    Culture Results:   Normal Respiratory [place] present (10-23-21)  Culture Results:   No growth to date. (10-23-21)  Culture Results:   No growth (10-22-21)    RADIOLOGY  < from: Xray Chest 1 View- PORTABLE-Routine (Xray Chest 1 View- PORTABLE-Routine in AM.) (10.26.21 @ 06:04) >  Lung parenchyma/Pleura: Hazy bilateral opacities/effusions left greater than right are unchanged. No pneumothorax.    < end of copied text >    MEDICATIONS  (STANDING):  aspirin  chewable 81 milliGRAM(s) Oral daily  atorvastatin 40 milliGRAM(s) Oral at bedtime  cefepime   IVPB 1000 milliGRAM(s) IV Intermittent every 12 hours  chlorhexidine 0.12% Liquid 15 milliLiter(s) Oral Mucosa two times a day  chlorhexidine 4% Liquid 1 Application(s) Topical <User Schedule>  DULoxetine 60 milliGRAM(s) Oral daily  folic acid 1 milliGRAM(s) Oral daily  glucagon  Injectable 1 milliGRAM(s) IntraMuscular once  heparin   Injectable 5000 Unit(s) SubCutaneous every 8 hours  hydroxychloroquine 200 milliGRAM(s) Oral two times a day  insulin regular Infusion 8 Unit(s)/Hr (8 mL/Hr) IV Continuous <Continuous>  levothyroxine 175 MICROGram(s) Oral daily  midazolam Infusion 0.02 mG/kG/Hr (1.28 mL/Hr) IV Continuous <Continuous>  norepinephrine Infusion 0.05 MICROgram(s)/kG/Min (6 mL/Hr) IV Continuous <Continuous>  pantoprazole  Injectable 40 milliGRAM(s) IV Push daily  vancomycin  IVPB 750 milliGRAM(s) IV Intermittent every 12 hours        MEDICATIONS  (PRN):  acetaminophen     Tablet .. 650 milliGRAM(s) Oral every 6 hours PRN Temp greater or equal to 38C (100.4F)  dextrose 50% Injectable 25 milliLiter(s) IV Push once PRN glucose < 60

## 2021-10-27 NOTE — PROGRESS NOTE ADULT - SUBJECTIVE AND OBJECTIVE BOX
LINDSAY ALBERT  76y, Female  Allergy: No Known Allergies      LOS  5d    CHIEF COMPLAINT: SOB (27 Oct 2021 12:58)      INTERVAL EVENTS/HPI  - No acute events overnight  - T(F): , Max: 100.6 (10-27-21 @ 00:00)  - remains intubated - WBC improving, still febrile   - WBC Count: 11.81 (10-27-21 @ 05:54)  WBC Count: 16.30 (10-26-21 @ 05:37)     - Creatinine, Serum: 0.7 (10-27-21 @ 05:54)  Creatinine, Serum: 0.8 (10-26-21 @ 05:37)       ROS  unable to obtain history secondary to patient's mental status and/or sedation    VITALS:  T(F): 100.4, Max: 100.6 (10-27-21 @ 00:00)  HR: 98  BP: 113/59  RR: 16Vital Signs Last 24 Hrs  T(C): 38 (27 Oct 2021 11:00), Max: 38.1 (27 Oct 2021 00:00)  T(F): 100.4 (27 Oct 2021 11:00), Max: 100.6 (27 Oct 2021 00:00)  HR: 98 (27 Oct 2021 13:00) (83 - 98)  BP: 113/59 (27 Oct 2021 13:00) (88/54 - 130/62)  BP(mean): 82 (27 Oct 2021 13:00) (67 - 89)  RR: 16 (27 Oct 2021 13:00) (12 - 22)  SpO2: 99% (27 Oct 2021 13:00) (94% - 100%)    PHYSICAL EXAM:  Gen: intubated  HEENT: Normocephalic, atraumatic  Neck: supple, no lymphadenopathy  CV: Regular rate & regular rhythm  Lungs: decreased BS at bases, no fremitus  Abdomen: Soft, BS present  Ext: Warm, well perfused  Neuro: non focal, sedated  Skin: no rash, no erythema  Lines: no phlebitis    FH: Non-contributory  Social Hx: Non-contributory    TESTS & MEASUREMENTS:                        8.8    11.81 )-----------( 227      ( 27 Oct 2021 05:54 )             29.1     10-27    139  |  98  |  38<H>  ----------------------------<  168<H>  3.8   |  35<H>  |  0.7    Ca    8.3<L>      27 Oct 2021 05:54  Mg     2.1     10-27    TPro  5.2<L>  /  Alb  2.7<L>  /  TBili  0.3  /  DBili  x   /  AST  74<H>  /  ALT  41  /  AlkPhos  132<H>  10-27    eGFR if Non African American: 85 mL/min/1.73M2 (10-27-21 @ 05:54)  eGFR if African American: 98 mL/min/1.73M2 (10-27-21 @ 05:54)    LIVER FUNCTIONS - ( 27 Oct 2021 05:54 )  Alb: 2.7 g/dL / Pro: 5.2 g/dL / ALK PHOS: 132 U/L / ALT: 41 U/L / AST: 74 U/L / GGT: x               Culture - Sputum (collected 10-26-21 @ 20:55)  Source: .Sputum Sputum  Gram Stain (10-27-21 @ 07:06):    Numerous polymorphonuclear leukocytes per low power field    No Squamous epithelial cells per low power field    Moderate Gram positive cocci in pairs seen per oil power field    Culture - Sputum (collected 10-23-21 @ 15:05)  Source: .Sputum Sputum  Gram Stain (10-24-21 @ 14:45):    Numerous polymorphonuclear leukocytes per low power field    Few Squamous epithelial cells per low power field    Numerous Gram Positive Cocci in Clusters per oil power field  Final Report (10-25-21 @ 17:03):    Normal Respiratory [place] present    Culture - Blood (collected 10-23-21 @ 11:45)  Source: .Blood Blood-Peripheral  Preliminary Report (10-24-21 @ 20:01):    No growth to date.    Culture - Urine (collected 10-22-21 @ 11:20)  Source: Catheterized Catheterized  Final Report (10-23-21 @ 22:43):    No growth        Lactate, Blood: 2.6 mmol/L (10-23-21 @ 15:16)  Lactate, Blood: 1.6 mmol/L (10-23-21 @ 05:52)  Lactate, Blood: 3.4 mmol/L (10-22-21 @ 19:36)      INFECTIOUS DISEASES TESTING  Procalcitonin, Serum: 0.98 (10-26-21 @ 05:37)  Rapid RVP Result: NotDetec (10-25-21 @ 09:22)  MRSA PCR Result.: Negative (10-23-21 @ 15:16)  Procalcitonin, Serum: 1.82 (10-22-21 @ 19:36)  COVID-19 PCR: NotDetec (10-22-21 @ 09:30)  COVID-19 PCR: NotDetec (10-18-21 @ 22:25)  COVID-19 PCR: NotDetec (10-02-21 @ 15:35)  Procalcitonin, Serum: 0.07 (09-28-21 @ 11:17)  COVID-19 PCR: NotDetec (09-25-21 @ 21:43)  COVID-19 PCR: NotDetec (08-07-21 @ 15:30)      INFLAMMATORY MARKERS      RADIOLOGY & ADDITIONAL TESTS:  I have personally reviewed the last available Chest xray  CXR  Xray Chest 1 View- PORTABLE-Urgent:   EXAM:  XR CHEST PORTABLE URGENT 1V            PROCEDURE DATE:  10/25/2021            INTERPRETATION:  Clinical History / Reason for exam: PNA    Comparison : Chest radiograph:  10/24/2021    Technique/Positioning: Frontal view of the chest    Findings:    Support devices: Stable positioning of enteric tube endotracheal tube and right IJ central venous line    Cardiac/mediastinum/hilum: No significant change    Lung parenchyma/Pleura: Decreased bilateral opacities. No definite pneumothorax.    Skeleton/soft tissues: No significant change.    Impression:  Decreased bilateral opacities. No definite pneumothorax.    Lines and support devices as described above.          --- End of Report ---              BERTHA VICENTE MD; Attending Radiologist  This document has been electronically signed. Oct 25 2021  9:37AM (10-25-21 @ 09:33)      CT      CARDIOLOGY TESTING  12 Lead ECG:   Ventricular Rate 80 BPM    Atrial Rate 80 BPM    P-R Interval 130 ms    QRS Duration 92 ms    Q-T Interval 440 ms    QTC Calculation(Bazett) 507 ms    P Axis 22 degrees    R Axis -18 degrees    T Axis 214 degrees    Diagnosis Line Normal sinus rhythm  Nonspecific ST-T changes  Confirmed by LUAN YORK MD ([id number]) on 10/25/2021 8:36:05 PM (10-25-21 @ 07:23)  12 Lead ECG:   Ventricular Rate 81 BPM    Atrial Rate 81 BPM    P-R Interval 136 ms    QRS Duration 88 ms    Q-T Interval 450 ms    QTC Calculation(Bazett) 522 ms    P Axis -18 degrees    R Axis -16 degrees    T Axis 107 degrees    Diagnosis Line Normal sinus rhythm  ST & T wave abnormality, consider anterolateral ischemia  Prolonged QT  Abnormal ECG    Confirmed by LUAN YORK MD ([id number]) on 10/24/2021 10:46:14 AM (10-24-21 @ 07:47)      MEDICATIONS  aspirin  chewable 81 Oral daily  atorvastatin 40 Oral at bedtime  cefepime   IVPB 1000 IV Intermittent every 12 hours  cefepime   IVPB     chlorhexidine 0.12% Liquid 15 Oral Mucosa two times a day  chlorhexidine 4% Liquid 1 Topical <User Schedule>  dextrose 40% Gel 15 Oral once  dextrose 5%. 1000 IV Continuous <Continuous>  dextrose 5%. 1000 IV Continuous <Continuous>  dextrose 50% Injectable 25 IV Push once  dextrose 50% Injectable 12.5 IV Push once  dextrose 50% Injectable 25 IV Push once  DULoxetine 60 Oral daily  folic acid 1 Oral daily  glucagon  Injectable 1 IntraMuscular once  heparin   Injectable 5000 SubCutaneous every 8 hours  hydroxychloroquine 200 Oral two times a day  insulin regular Infusion 8 IV Continuous <Continuous>  levothyroxine 175 Oral daily  midazolam Infusion 0.02 IV Continuous <Continuous>  norepinephrine Infusion 0.05 IV Continuous <Continuous>  pantoprazole  Injectable 40 IV Push daily  vancomycin  IVPB 750 IV Intermittent every 12 hours  vancomycin  IVPB         WEIGHT  Weight (kg): 64 (10-22-21 @ 14:13)  Creatinine, Serum: 0.7 mg/dL (10-27-21 @ 05:54)      ANTIBIOTICS:  cefepime   IVPB 1000 milliGRAM(s) IV Intermittent every 12 hours  cefepime   IVPB      hydroxychloroquine 200 milliGRAM(s) Oral two times a day  vancomycin  IVPB 750 milliGRAM(s) IV Intermittent every 12 hours  vancomycin  IVPB          All available historical records have been reviewed

## 2021-10-27 NOTE — PROGRESS NOTE ADULT - ASSESSMENT
76 yr F with SLE on Methotrexate, moderate MR, HFpEF, HTN, hypothyroid, HLD, T2DM, hx of CVA with no residual deficits discharged from hospital earlier this month on 10/2 s/p HF exacerbation and NSTEMI, DC'd on new home oxygen, refused in pt cardiac cath and is supposed to have outpt cardiac cath with Dr Kowalski.   DC'd on 10/21 from [facility] s/p admission for resolved ANTONELLA, hypoglycemia and NSTEMI  returns to hospital with:    Acute respiratory failure secondary to sepsis from gram neg pneumonia and NSTEMI      - continue IV antibiotics and follow cultures   - titrate pressors   - maintain even fluid balance   - home meds   - DVT and GI prophylaxis

## 2021-10-27 NOTE — PROGRESS NOTE ADULT - ASSESSMENT
ASSESSMENT  77 yo female PMHx of HFpEF, HTN, HLD, DM,  SLE on Cellsept and methotrexate, CVA in 2002, recently admitted for Hypoglycemia and ANTONELLA who presents with shortness of breath  presenting for shortness of breath    IMPRESSION  #Acute Hypoxic respiratory failure  #Fevers - presumed HAP  - Blood Cx 10/23 NG  - Sputum cx 10/23 NG   - MRSA Nares 10/23 negative   - RVP negative  - Procalcitonin, Serum: 1.82 (10.22.21 @ 19:36) -> Procalcitonin, Serum: 0.98 (10.26.21 @ 05:37)    #CHF Exacerbation  #DM II  #Obesity BMI (kg/m2): 26.7, 30.2  #Abx allergy: NKDA      RECOMMENDATIONS  - follow-up blood cx 10/26 and 10/27  - follow-up DTA from 10/26  - continue cefepime 1g q 12 hours  - trend LFTs   - if blood Cx 10/26 is negative for MRSA, can stop vancomycin   - repeat procalcitonin - check ESR and CRP     Please call or message on Microsoft Teams if with any questions.  Spectra [phone]

## 2021-10-27 NOTE — PROGRESS NOTE ADULT - ASSESSMENT
IMPRESSION:    Acute Hypoxemic and Hypercapnic Respiratory Failure SP intubation  Pulmonary Edema, Bilateral Effusions, improved  HFpEF exacerbation, moderate MR  Probable PNA  NSTEMI  Hypothyroidism   Iron Deficiency Anemia, Anemia of chronic disease      PLAN:    CNS:  DAILY SAT.  If needs sedation do not start Versed, can use low dose Precedex.  Target RAAS 0 to -1.  Monitor mental status.     HEENT: oral care     PULMONARY:  HOB at 45 degrees.  Aspiration precautions.  Target POx > 92 %.  No vent changes.  SBT today if more alert.    CARDIOVASCULAR:   Hold Lasix.   Target MAP 60.  Keep I < O.  Daily weight.  Strict I & O    GI:  GI prophylaxis.  Hold OG feeds for possible SBT.  Bowel regimen.    RENAL:  FU lytes.  Replete as necessary.  Monitor UO.  Wise care.    INFECTIOUS DISEASE:  FU DTA & blood culture.  MRSA nares & RVP negative.  c/w Vanc & Cefepime.  Vanc trough.  ID eval.    HEMATOLOGICAL:  DVT prophylaxis.  Keep Hb > 8    ENDOCRINE:  c/w Synthroid 175mcg.  Follow up FS. Insulin protocol     MUSCULOSKELETAL:  Bed in chair if more awake.  PT/OT if more awake.    Guarded prognosis  Monitor in MICU

## 2021-10-27 NOTE — PROGRESS NOTE ADULT - SUBJECTIVE AND OBJECTIVE BOX
Patient is a 76y old  Female who presents with a chief complaint of SOB (26 Oct 2021 11:36)    Over Night Events:  No overnight events.  Remains on MV.  Currently sedation stopped.  Restarted sedation yesterday at 5pm due to desynchrony with ventilation.    ROS:   All ROS are negative except HPI     PHYSICAL EXAM  ICU Vital Signs Last 24 Hrs  T(C): 37.5 (27 Oct 2021 07:01), Max: 38.2 (26 Oct 2021 15:09)  T(F): 99.5 (27 Oct 2021 07:01), Max: 100.8 (26 Oct 2021 15:09)  HR: 85 (27 Oct 2021 07:00) (83 - 99)  BP: 101/54 (27 Oct 2021 07:00) (88/54 - 130/62)  BP(mean): 74 (27 Oct 2021 07:00) (67 - 89)  RR: 15 (27 Oct 2021 07:01) (12 - 22)  SpO2: 99% (27 Oct 2021 07:00) (94% - 100%)      CONSTITUTIONAL:  Well nourished.  NAD    ENT:   Airway patent,   Mouth with normal mucosa.   No thrush  ETT    EYES:   Pupils equal,   Round and reactive to light.    CARDIAC:   Normal rate,   Regular rhythm.    mild lower extremity edema    RESPIRATORY:   Bilateral crackles  No wheezing  Normal chest expansion  Not tachypneic,  No use of accessory muscles    GASTROINTESTINAL:  Abdomen soft,   Non-tender,   No guarding,   + BS    MUSCULOSKELETAL:   Range of motion is not limited,  No clubbing, cyanosis    NEUROLOGICAL:   Spontaneous eye opening when off sedation  Tracks to voice  Not following commands  No motor deficits.    SKIN:   Skin normal color for race,   Warm and dry  No evidence of rash.    PSYCHIATRIC:   Off sedation  No apparent risk to self or others.    HEMATOLOGICAL:  No cervical  lymphadenopathy.  no inguinal lymphadenopathy      10-26-21 @ 07:01  -  10-27-21 @ 07:00  --------------------------------------------------------  IN:    Enteral Tube Flush: 270 mL    Glucerna: 1100 mL    Insulin: 61 mL    IV PiggyBack: 600 mL    Midazolam: 69 mL    Norepinephrine: 3 mL  Total IN: 2103 mL    OUT:    Indwelling Catheter - Urethral (mL): 1655 mL  Total OUT: 1655 mL    Total NET: 448 mL      10-27-21 @ 07:01  -  10-27-21 @ 08:51  --------------------------------------------------------  IN:    Glucerna: 50 mL    Insulin: 7 mL    Midazolam: 4 mL  Total IN: 61 mL    OUT:    Indwelling Catheter - Urethral (mL): 350 mL  Total OUT: 350 mL    Total NET: -289 mL      LABS:                          8.8    11.81 )-----------( 227      ( 27 Oct 2021 05:54 )             29.1     139  |  98  |  38<H>  ----------------------------<  168<H>  3.8   |  35<H>  |  0.7    Ca    8.3<L>      27 Oct 2021 05:54  Mg     2.1     10-27    TPro  5.2<L>  /  Alb  2.7<L>  /  TBili  0.3  /  DBili  x   /  AST  74<H>  /  ALT  41  /  AlkPhos  132<H>  10-27    LIVER FUNCTIONS - ( 27 Oct 2021 05:54 )  Alb: 2.7 g/dL / Pro: 5.2 g/dL / ALK PHOS: 132 U/L / ALT: 41 U/L / AST: 74 U/L / GGT: x                                              Culture - Sputum (collected 26 Oct 2021 20:55)  Source: .Sputum Sputum  Gram Stain (27 Oct 2021 07:06):    Numerous polymorphonuclear leukocytes per low power field    No Squamous epithelial cells per low power field    Moderate Gram positive cocci in pairs seen per oil power field                                                Mode: Auto Mode: PRVC/ Volume Support  RR (machine): 12  TV (machine): 400  FiO2: 30  PEEP: 5  MAP: 10  PIP: 18    ABG - ( 27 Oct 2021 03:49 )  pH, Arterial: 7.47  pH, Blood: x     /  pCO2: 51    /  pO2: 108   / HCO3: 37    / Base Excess: 11.5  /  SaO2: 96.7      MEDICATIONS  (STANDING):  aspirin  chewable 81 milliGRAM(s) Oral daily  atorvastatin 40 milliGRAM(s) Oral at bedtime  cefepime   IVPB 1000 milliGRAM(s) IV Intermittent every 12 hours  cefepime   IVPB      chlorhexidine 0.12% Liquid 15 milliLiter(s) Oral Mucosa two times a day  chlorhexidine 4% Liquid 1 Application(s) Topical <User Schedule>  dextrose 40% Gel 15 Gram(s) Oral once  dextrose 5%. 1000 milliLiter(s) (50 mL/Hr) IV Continuous <Continuous>  dextrose 5%. 1000 milliLiter(s) (100 mL/Hr) IV Continuous <Continuous>  dextrose 50% Injectable 25 Gram(s) IV Push once  dextrose 50% Injectable 12.5 Gram(s) IV Push once  dextrose 50% Injectable 25 Gram(s) IV Push once  DULoxetine 60 milliGRAM(s) Oral daily  folic acid 1 milliGRAM(s) Oral daily  furosemide   Injectable 40 milliGRAM(s) IV Push every 24 hours  glucagon  Injectable 1 milliGRAM(s) IntraMuscular once  heparin   Injectable 5000 Unit(s) SubCutaneous every 8 hours  hydroxychloroquine 200 milliGRAM(s) Oral two times a day  insulin regular Infusion 8 Unit(s)/Hr (8 mL/Hr) IV Continuous <Continuous>  levothyroxine 175 MICROGram(s) Oral daily  midazolam Infusion 0.02 mG/kG/Hr (1.28 mL/Hr) IV Continuous <Continuous>  norepinephrine Infusion 0.05 MICROgram(s)/kG/Min (6 mL/Hr) IV Continuous <Continuous>  pantoprazole  Injectable 40 milliGRAM(s) IV Push daily  vancomycin  IVPB 750 milliGRAM(s) IV Intermittent every 12 hours  vancomycin  IVPB        MEDICATIONS  (PRN):  acetaminophen     Tablet .. 650 milliGRAM(s) Oral every 6 hours PRN Temp greater or equal to 38C (100.4F)  dextrose 50% Injectable 25 milliLiter(s) IV Push once PRN glucose < 60      New X-rays reviewed:                                                                                  ECHO    CXR interpreted by me:  ETT OGT OK, Right IJ TLC, improved bilateral opacities compared to prior

## 2021-10-27 NOTE — PROGRESS NOTE ADULT - SUBJECTIVE AND OBJECTIVE BOX
SUBJECTIVE:    Patient is a 76y old Female who presents with a chief complaint of SOB (27 Oct 2021 15:15)    Currently admitted to medicine with the primary diagnosis of Acute pulmonary edema       Today is hospital day 5d. This morning she is resting comfortably in bed and reports no new issues or overnight events.     INTERVAL EVENTS: SAT today, possible SBT    PAST MEDICAL & SURGICAL HISTORY  DM (diabetes mellitus)  insulin  fs achs    Hypercholesteremia  statin    Hypertension  hctz    Lupus  as per hx    Fall, initial encounter  as  hx    Stroke  TIA 2002, no deficits    Herniated lumbar intervertebral disc  as per hx    Seizure  keppra    No significant past surgical history        ALLERGIES:  No Known Allergies    MEDICATIONS:  STANDING MEDICATIONS  aspirin  chewable 81 milliGRAM(s) Oral daily  atorvastatin 40 milliGRAM(s) Oral at bedtime  cefepime   IVPB 1000 milliGRAM(s) IV Intermittent every 12 hours  cefepime   IVPB      chlorhexidine 0.12% Liquid 15 milliLiter(s) Oral Mucosa two times a day  chlorhexidine 4% Liquid 1 Application(s) Topical <User Schedule>  dextrose 40% Gel 15 Gram(s) Oral once  dextrose 5%. 1000 milliLiter(s) IV Continuous <Continuous>  dextrose 5%. 1000 milliLiter(s) IV Continuous <Continuous>  dextrose 50% Injectable 25 Gram(s) IV Push once  dextrose 50% Injectable 12.5 Gram(s) IV Push once  dextrose 50% Injectable 25 Gram(s) IV Push once  DULoxetine 60 milliGRAM(s) Oral daily  folic acid 1 milliGRAM(s) Oral daily  glucagon  Injectable 1 milliGRAM(s) IntraMuscular once  heparin   Injectable 5000 Unit(s) SubCutaneous every 8 hours  hydroxychloroquine 200 milliGRAM(s) Oral two times a day  insulin regular Infusion 8 Unit(s)/Hr IV Continuous <Continuous>  levothyroxine 175 MICROGram(s) Oral daily  midazolam Infusion 0.02 mG/kG/Hr IV Continuous <Continuous>  norepinephrine Infusion 0.05 MICROgram(s)/kG/Min IV Continuous <Continuous>  pantoprazole  Injectable 40 milliGRAM(s) IV Push daily  vancomycin  IVPB 750 milliGRAM(s) IV Intermittent every 12 hours  vancomycin  IVPB        PRN MEDICATIONS  acetaminophen     Tablet .. 650 milliGRAM(s) Oral every 6 hours PRN  dextrose 50% Injectable 25 milliLiter(s) IV Push once PRN    VITALS:   T(F): 100.4  HR: 98  BP: 113/59  RR: 16  SpO2: 99%    LABS:                        8.8    11.81 )-----------( 227      ( 27 Oct 2021 05:54 )             29.1     10-27    139  |  98  |  38<H>  ----------------------------<  168<H>  3.8   |  35<H>  |  0.7    Ca    8.3<L>      27 Oct 2021 05:54  Mg     2.1     10-27    TPro  5.2<L>  /  Alb  2.7<L>  /  TBili  0.3  /  DBili  x   /  AST  74<H>  /  ALT  41  /  AlkPhos  132<H>  10-27        ABG - ( 27 Oct 2021 03:49 )  pH, Arterial: 7.47  pH, Blood: x     /  pCO2: 51    /  pO2: 108   / HCO3: 37    / Base Excess: 11.5  /  SaO2: 96.7                  Culture - Sputum (collected 26 Oct 2021 20:55)  Source: .Sputum Sputum  Gram Stain (27 Oct 2021 07:06):    Numerous polymorphonuclear leukocytes per low power field    No Squamous epithelial cells per low power field    Moderate Gram positive cocci in pairs seen per oil power field          RADIOLOGY:    PHYSICAL EXAM:  GEN: No acute distress  PULM/CHEST: Clear to auscultation bilaterally  CVS: Regular rate and rhythm, S1-S2  ABD: Soft, non-tender, non-distended, +BS  EXT: +2 edema  NEURO: awakes to stimuli    Wise Catheter:   Indwelling Urethral Catheter:     Connect To:  Straight Drainage/[place]    Indication:  Urinary Retention / Obstruction (10-26-21 @ 09:26) (not performed) [Active]  Indwelling Urethral Catheter:     Connect To:  Straight Drainage/Gravity    Indication:  Urine Output Monitoring in Critically Ill (10-23-21 @ 10:07) (not performed) [Active]  Indwelling Urethral Catheter:     Connect To:  Straight Drainage/Gravity    Indication:  Urine Output Monitoring in Critically Ill (10-22-21 @ 15:25) (not performed) [Active]  Indwelling Urethral Catheter:     Connect To:  Straight Drainage/Gravity    Indication:  Urine Output Monitoring in Critically Ill (10-22-21 @ 10:25) (not performed) [Active]

## 2021-10-27 NOTE — PROGRESS NOTE ADULT - ATTENDING COMMENTS
Attending Statement: I have personally performed a face to face diagnostic evaluation on this patient. The patient is suffering from:  Acute Hypoxemic and Hypercapnic Respiratory Failure SP intubation  Pulmonary Edema, Bilateral Effusions, improved  HFpEF exacerbation  I have reviewed the above note and agree with the history, exam and suggestions for care, except as I have noted in the text. I have amended the treatment plans as necessary.

## 2021-10-28 NOTE — PROGRESS NOTE ADULT - ASSESSMENT
IMPRESSION:    Acute Hypoxemic and Hypercapnic Respiratory Failure SP intubation  Pulmonary Edema, Bilateral Effusions, improving  HFpEF exacerbation, moderate MR  Sepsis present on admission  PNA, Staph aureus  NSTEMI  Hypothyroidism   Iron Deficiency Anemia, Anemia of chronic disease      PLAN:    CNS:  SAT today.  Off Precedex.  Target RAAS 0 to -1.  Monitor mental status.     HEENT: oral care     PULMONARY:  HOB at 45 degrees.  Aspiration precautions.  Target POx > 92 %.  No vent changes.  SBT today if more alert    CARDIOVASCULAR:   c/w Lasix.   Target MAP 60.  Keep I < O.  Daily weight.  Strict I & O    GI:  GI prophylaxis.  Hold OG feeds for possible SBT.  Bowel regimen.    RENAL:   Replete K.  FU lytes.  Replete as necessary.  Monitor UO.  Wise care.    INFECTIOUS DISEASE:  FU DTA & blood culture.  c/w Vanc & Cefepime.  Vanc trough.  ID FU appreciated.  If blood culture 10/26 final result negative can d/c Vanc.    HEMATOLOGICAL:  DVT prophylaxis.  Keep Hb > 8    ENDOCRINE:  c/w Synthroid 175mcg.  Follow up FS. Insulin protocol     MUSCULOSKELETAL:  Bed in chair if more awake.  PT/OT if more awake.    Guarded prognosis  Monitor in MICU IMPRESSION:    Acute Hypoxemic and Hypercapnic Respiratory Failure SP intubation  Pulmonary Edema, Bilateral Effusions, improving  HFpEF exacerbation, moderate MR  Sepsis present on admission  PNA, Staph aureus  NSTEMI  Hypothyroidism   Iron Deficiency Anemia, Anemia of chronic disease      PLAN:    CNS:  SAT today.  Off Precedex.  Target RAAS 0 to -1.  Monitor mental status.     HEENT: oral care     PULMONARY:  HOB at 45 degrees.  Aspiration precautions.  Target POx > 92 %.  No vent changes.  SBT today if more alert    CARDIOVASCULAR:   c/w Lasix.   Target MAP 60.  Keep I < O.  Daily weight.  Strict I & O    GI:  GI prophylaxis.  Hold OG feeds for possible SBT.  Bowel regimen.    RENAL:   Replete K.  FU lytes.  Replete as necessary.  Monitor UO.  Wise care.    INFECTIOUS DISEASE:  FU DTA & blood culture.  c/w Vanc & Cefepime.  Vanc trough.  ID FU appreciated.  If blood culture 10/26 final result negative can d/c Vanc.    HEMATOLOGICAL:  DVT prophylaxis.  Keep Hb > 8    ENDOCRINE:  c/w Synthroid 175mcg.  Follow up FS. Insulin protocol     MUSCULOSKELETAL:  Bed in chair if more awake.  PT/OT if more awake.    DC R IJ TLC  Guarded prognosis  Monitor in MICU IMPRESSION:    Acute Hypoxemic and Hypercapnic Respiratory Failure SP intubation  Pulmonary Edema, Bilateral Effusions, improving  HFpEF exacerbation, moderate MR  Sepsis present on admission  PNA, Staph aureus  NSTEMI  Hypothyroidism   Iron Deficiency Anemia, Anemia of chronic disease      PLAN:    CNS:  SAT today.  Off Precedex.  Target RAAS 0 to -1.  CAM ICU positive; sepsis, low K.  Monitor mental status. Reassess CAM-ICU.     HEENT: oral care     PULMONARY:  HOB at 45 degrees.  Aspiration precautions.  Target POx > 92 %.  No vent changes.  SBT today if more alert    CARDIOVASCULAR:   c/w Lasix.   Target MAP 60.  Keep I < O.  Daily weight.  Strict I & O    GI:  GI prophylaxis.  Hold OG feeds for possible SBT.  Bowel regimen.    RENAL:   Replete K.  FU lytes.  Replete as necessary.  Monitor UO.  Wise care.    INFECTIOUS DISEASE:  FU DTA & blood culture.  c/w Vanc & Cefepime.  Vanc trough.  ID FU appreciated.  If blood culture 10/26 final result negative can d/c Vanc.    HEMATOLOGICAL:  DVT prophylaxis.  Keep Hb > 8    ENDOCRINE:  c/w Synthroid 175mcg.  Follow up FS. Insulin protocol     MUSCULOSKELETAL:  Bed in chair if more awake.  PT/OT if more awake.    DC R IJ TLC  Guarded prognosis  Monitor in MICU

## 2021-10-28 NOTE — PROGRESS NOTE ADULT - ASSESSMENT
76 yr F with SLE on Methotrexate, moderate MR, HFpEF, HTN, hypothyroid, HLD, T2DM, hx of CVA with no residual deficits discharged from hospital earlier this month on 10/2 s/p HF exacerbation and NSTEMI, DC'd on new home oxygen, refused in pt cardiac cath and is supposed to have outpt cardiac cath with Dr Kowalski.   DC'd on 10/21 from [facility] s/p admission for resolved ANTONELLA, hypoglycemia and NSTEMI  returns to hospital with:    Acute respiratory failure secondary to sepsis from gram neg pneumonia and NSTEMI      - continue IV antibiotics    - procal improved but still elevated   - off pressors   - maintain even fluid balance   - supplement hypokalemia    - weaning as tolerated    - home meds   - DVT and GI prophylaxis

## 2021-10-28 NOTE — PROGRESS NOTE ADULT - SUBJECTIVE AND OBJECTIVE BOX
Patient is a 76y old  Female who presents with a chief complaint of SOB (28 Oct 2021 07:51)    Over Night Events:  No overnight events.  Remains on MV.  On low dose Precedex overnight.  Currently off sedation.    ROS:   All ROS are negative except HPI     PHYSICAL EXAM  ICU Vital Signs Last 24 Hrs  T(C): 37.2 (28 Oct 2021 07:01), Max: 38.5 (28 Oct 2021 02:15)  T(F): 99 (28 Oct 2021 07:01), Max: 101.3 (28 Oct 2021 02:15)  HR: 62 (28 Oct 2021 07:04) (62 - 108)  BP: 101/52 (28 Oct 2021 07:04) (96/52 - 124/63)  BP(mean): 73 (28 Oct 2021 07:04) (70 - 88)  RR: 14 (28 Oct 2021 07:04) (12 - 35)  SpO2: 100% (28 Oct 2021 07:04) (97% - 100%)      CONSTITUTIONAL:  Well nourished.  NAD    ENT:   Airway patent,   Mouth with normal mucosa.   No thrush  ETT    EYES:   Pupils equal,   Round and reactive to light.    CARDIAC:   Normal rate,   Regular rhythm.    mild lower extremity edema    RESPIRATORY:   Bilateral crackles  No wheezing  Normal chest expansion  Not tachypneic,  No use of accessory muscles    GASTROINTESTINAL:  Abdomen soft,   Non-tender,   No guarding,   + BS    MUSCULOSKELETAL:   Range of motion is not limited,  No clubbing, cyanosis    NEUROLOGICAL:   Spontaneous eye opening when off sedation  Tracks to voice  Not following commands  No motor deficits.    SKIN:   Skin normal color for race,   Warm and dry  No evidence of rash.    PSYCHIATRIC:   Off sedation  No apparent risk to self or others.    HEMATOLOGICAL:  No cervical  lymphadenopathy.  no inguinal lymphadenopathy      10-27-21 @ 07:01  -  10-28-21 @ 07:00  --------------------------------------------------------  IN:    Dexmedetomidine: 122.2 mL    Enteral Tube Flush: 110 mL    Glucerna: 545 mL    Insulin: 40 mL    Insulin: 47 mL    IV PiggyBack: 660 mL    Midazolam: 4 mL  Total IN: 1528.2 mL    OUT:    Indwelling Catheter - Urethral (mL): 1390 mL  Total OUT: 1390 mL    Total NET: 138.2 mL      10-28-21 @ 07:01  -  10-28-21 @ 08:17  --------------------------------------------------------  IN:  Total IN: 0 mL    OUT:    Indwelling Catheter - Urethral (mL): 70 mL  Total OUT: 70 mL    Total NET: -70 mL        LABS:                            8.6    11.22 )-----------( 237      ( 28 Oct 2021 05:35 )             28.2     137  |  95<L>  |  37<H>  ----------------------------<  212<H>  3.3<L>   |  31  |  0.7    Ca    8.5      28 Oct 2021 05:35  Mg     2.1     10-28    TPro  5.4<L>  /  Alb  2.8<L>  /  TBili  0.3  /  DBili  x   /  AST  61<H>  /  ALT  43<H>  /  AlkPhos  119<H>  10-28    LIVER FUNCTIONS - ( 28 Oct 2021 05:35 )  Alb: 2.8 g/dL / Pro: 5.4 g/dL / ALK PHOS: 119 U/L / ALT: 43 U/L / AST: 61 U/L / GGT: x           Culture - Sputum (collected 26 Oct 2021 20:55)  Source: .Sputum Sputum  Gram Stain (27 Oct 2021 07:06):    Numerous polymorphonuclear leukocytes per low power field    No Squamous epithelial cells per low power field    Moderate Gram positive cocci in pairs seen per oil power field  Preliminary Report (27 Oct 2021 20:32):    Numerous Staphylococcus aureus    Culture - Blood (collected 26 Oct 2021 05:37)  Source: .Blood None  Preliminary Report (27 Oct 2021 18:01):    No growth to date.    Mode: Auto Mode: PRVC/ Volume Support  RR (machine): 12  TV (machine): 400  FiO2: 30  PEEP: 5  MAP: 9  PIP: 18    ABG - ( 28 Oct 2021 04:26 )  pH, Arterial: 7.49  pH, Blood: x     /  pCO2: 42    /  pO2: 73    / HCO3: 32    / Base Excess: 7.9   /  SaO2: 95.4        MEDICATIONS  (STANDING):  aspirin  chewable 81 milliGRAM(s) Oral daily  atorvastatin 40 milliGRAM(s) Oral at bedtime  cefepime   IVPB      cefepime   IVPB 1000 milliGRAM(s) IV Intermittent every 12 hours  chlorhexidine 0.12% Liquid 15 milliLiter(s) Oral Mucosa two times a day  chlorhexidine 4% Liquid 1 Application(s) Topical <User Schedule>  dexMEDEtomidine Infusion 0.2 MICROgram(s)/kG/Hr (3.2 mL/Hr) IV Continuous <Continuous>  dextrose 40% Gel 15 Gram(s) Oral once  dextrose 5%. 1000 milliLiter(s) (50 mL/Hr) IV Continuous <Continuous>  dextrose 5%. 1000 milliLiter(s) (100 mL/Hr) IV Continuous <Continuous>  dextrose 50% Injectable 25 Gram(s) IV Push once  dextrose 50% Injectable 12.5 Gram(s) IV Push once  dextrose 50% Injectable 25 Gram(s) IV Push once  DULoxetine 60 milliGRAM(s) Oral daily  folic acid 1 milliGRAM(s) Oral daily  glucagon  Injectable 1 milliGRAM(s) IntraMuscular once  heparin   Injectable 5000 Unit(s) SubCutaneous every 8 hours  hydroxychloroquine 200 milliGRAM(s) Oral two times a day  insulin regular Infusion 1 Unit(s)/Hr (1 mL/Hr) IV Continuous <Continuous>  levothyroxine 175 MICROGram(s) Oral daily  midazolam Infusion 0.02 mG/kG/Hr (1.28 mL/Hr) IV Continuous <Continuous>  norepinephrine Infusion 0.05 MICROgram(s)/kG/Min (6 mL/Hr) IV Continuous <Continuous>  ondansetron Injectable 4 milliGRAM(s) IV Push once  pantoprazole  Injectable 40 milliGRAM(s) IV Push daily  vancomycin  IVPB 750 milliGRAM(s) IV Intermittent every 12 hours  vancomycin  IVPB        MEDICATIONS  (PRN):  acetaminophen     Tablet .. 650 milliGRAM(s) Oral every 6 hours PRN Temp greater or equal to 38C (100.4F)  dextrose 50% Injectable 25 milliLiter(s) IV Push once PRN glucose < 60      New X-rays reviewed:                                                                                  ECHO    CXR interpreted by me:   ETT deep pull by 1.5cm, OGT OK, bilateral lower lobe opacities, R IJ TLC     Patient is a 76y old  Female who presents with a chief complaint of SOB (28 Oct 2021 07:51)    Over Night Events:  No overnight events.  Remains on MV.  On low dose Precedex overnight.  Currently off sedation.    ROS:   All ROS are negative except HPI     PHYSICAL EXAM  ICU Vital Signs Last 24 Hrs  T(C): 37.2 (28 Oct 2021 07:01), Max: 38.5 (28 Oct 2021 02:15)  T(F): 99 (28 Oct 2021 07:01), Max: 101.3 (28 Oct 2021 02:15)  HR: 62 (28 Oct 2021 07:04) (62 - 108)  BP: 101/52 (28 Oct 2021 07:04) (96/52 - 124/63)  BP(mean): 73 (28 Oct 2021 07:04) (70 - 88)  RR: 14 (28 Oct 2021 07:04) (12 - 35)  SpO2: 100% (28 Oct 2021 07:04) (97% - 100%)      CONSTITUTIONAL:  Well nourished.  NAD    ENT:   Airway patent,   Mouth with normal mucosa.   No thrush  ETT    EYES:   Pupils equal,   Round and reactive to light.    CARDIAC:   Normal rate,   Regular rhythm.    mild lower extremity edema    RESPIRATORY:   Bilateral crackles  No wheezing  Normal chest expansion  Not tachypneic,  No use of accessory muscles    GASTROINTESTINAL:  Abdomen soft,   Non-tender,   No guarding,   + BS    MUSCULOSKELETAL:   Range of motion is not limited,  No clubbing, cyanosis    NEUROLOGICAL:   Spontaneous eye opening when off sedation  Tracks to voice  Not following commands  No motor deficits.    SKIN:   Skin normal color for race,   Warm and dry  No evidence of rash.    PSYCHIATRIC:   Off sedation  No apparent risk to self or others.    HEMATOLOGICAL:  No cervical  lymphadenopathy.  no inguinal lymphadenopathy      10-27-21 @ 07:01  -  10-28-21 @ 07:00  --------------------------------------------------------  IN:    Dexmedetomidine: 122.2 mL    Enteral Tube Flush: 110 mL    Glucerna: 545 mL    Insulin: 40 mL    Insulin: 47 mL    IV PiggyBack: 660 mL    Midazolam: 4 mL  Total IN: 1528.2 mL    OUT:    Indwelling Catheter - Urethral (mL): 1390 mL  Total OUT: 1390 mL    Total NET: 138.2 mL      10-28-21 @ 07:01  -  10-28-21 @ 08:17  --------------------------------------------------------  IN:  Total IN: 0 mL    OUT:    Indwelling Catheter - Urethral (mL): 70 mL  Total OUT: 70 mL    Total NET: -70 mL        LABS:                            8.6    11.22 )-----------( 237      ( 28 Oct 2021 05:35 )             28.2     137  |  95<L>  |  37<H>  ----------------------------<  212<H>  3.3<L>   |  31  |  0.7    Ca    8.5      28 Oct 2021 05:35  Mg     2.1     10-28    TPro  5.4<L>  /  Alb  2.8<L>  /  TBili  0.3  /  DBili  x   /  AST  61<H>  /  ALT  43<H>  /  AlkPhos  119<H>  10-28    LIVER FUNCTIONS - ( 28 Oct 2021 05:35 )  Alb: 2.8 g/dL / Pro: 5.4 g/dL / ALK PHOS: 119 U/L / ALT: 43 U/L / AST: 61 U/L / GGT: x           Culture - Sputum (collected 26 Oct 2021 20:55)  Source: .Sputum Sputum  Gram Stain (27 Oct 2021 07:06):    Numerous polymorphonuclear leukocytes per low power field    No Squamous epithelial cells per low power field    Moderate Gram positive cocci in pairs seen per oil power field  Preliminary Report (27 Oct 2021 20:32):    Numerous Staphylococcus aureus    Culture - Blood (collected 26 Oct 2021 05:37)  Source: .Blood None  Preliminary Report (27 Oct 2021 18:01):    No growth to date.    Mode: Auto Mode: PRVC/ Volume Support  RR (machine): 12  TV (machine): 400  FiO2: 30  PEEP: 5  MAP: 9  PIP: 18    ABG - ( 28 Oct 2021 04:26 )  pH, Arterial: 7.49  pH, Blood: x     /  pCO2: 42    /  pO2: 73    / HCO3: 32    / Base Excess: 7.9   /  SaO2: 95.4        MEDICATIONS  (STANDING):  aspirin  chewable 81 milliGRAM(s) Oral daily  atorvastatin 40 milliGRAM(s) Oral at bedtime  cefepime   IVPB      cefepime   IVPB 1000 milliGRAM(s) IV Intermittent every 12 hours  chlorhexidine 0.12% Liquid 15 milliLiter(s) Oral Mucosa two times a day  chlorhexidine 4% Liquid 1 Application(s) Topical <User Schedule>  dexMEDEtomidine Infusion 0.2 MICROgram(s)/kG/Hr (3.2 mL/Hr) IV Continuous <Continuous>  dextrose 40% Gel 15 Gram(s) Oral once  dextrose 5%. 1000 milliLiter(s) (50 mL/Hr) IV Continuous <Continuous>  dextrose 5%. 1000 milliLiter(s) (100 mL/Hr) IV Continuous <Continuous>  dextrose 50% Injectable 25 Gram(s) IV Push once  dextrose 50% Injectable 12.5 Gram(s) IV Push once  dextrose 50% Injectable 25 Gram(s) IV Push once  DULoxetine 60 milliGRAM(s) Oral daily  folic acid 1 milliGRAM(s) Oral daily  glucagon  Injectable 1 milliGRAM(s) IntraMuscular once  heparin   Injectable 5000 Unit(s) SubCutaneous every 8 hours  hydroxychloroquine 200 milliGRAM(s) Oral two times a day  insulin regular Infusion 1 Unit(s)/Hr (1 mL/Hr) IV Continuous <Continuous>  levothyroxine 175 MICROGram(s) Oral daily  midazolam Infusion 0.02 mG/kG/Hr (1.28 mL/Hr) IV Continuous <Continuous>  norepinephrine Infusion 0.05 MICROgram(s)/kG/Min (6 mL/Hr) IV Continuous <Continuous>  ondansetron Injectable 4 milliGRAM(s) IV Push once  pantoprazole  Injectable 40 milliGRAM(s) IV Push daily  vancomycin  IVPB 750 milliGRAM(s) IV Intermittent every 12 hours  vancomycin  IVPB        MEDICATIONS  (PRN):  acetaminophen     Tablet .. 650 milliGRAM(s) Oral every 6 hours PRN Temp greater or equal to 38C (100.4F)  dextrose 50% Injectable 25 milliLiter(s) IV Push once PRN glucose < 60      New X-rays reviewed:                                                                                  ECHO    CXR interpreted by me:   ETT deep pull by 1.5cm, OGT OK, bilateral lower lobe opacities, R IJ TLC Patient is a 76y old  Female who presents with a chief complaint of SOB (28 Oct 2021 07:51)    Over Night Events:  No overnight events.  Remains on MV.  On low dose Precedex overnight.  Currently off sedation.    ROS:   All ROS are negative except HPI     PHYSICAL EXAM  ICU Vital Signs Last 24 Hrs  T(C): 37.2 (28 Oct 2021 07:01), Max: 38.5 (28 Oct 2021 02:15)  T(F): 99 (28 Oct 2021 07:01), Max: 101.3 (28 Oct 2021 02:15)  HR: 62 (28 Oct 2021 07:04) (62 - 108)  BP: 101/52 (28 Oct 2021 07:04) (96/52 - 124/63)  BP(mean): 73 (28 Oct 2021 07:04) (70 - 88)  RR: 14 (28 Oct 2021 07:04) (12 - 35)  SpO2: 100% (28 Oct 2021 07:04) (97% - 100%)      CONSTITUTIONAL:  Well nourished.  NAD    ENT:   Airway patent,   Mouth with normal mucosa.   No thrush  ETT    EYES:   Pupils equal,   Round and reactive to light.    CARDIAC:   Normal rate,   Regular rhythm.    mild lower extremity edema    RESPIRATORY:   Bilateral crackles  No wheezing  Normal chest expansion  Not tachypneic,  No use of accessory muscles    GASTROINTESTINAL:  Abdomen soft,   Non-tender,   No guarding,   + BS    MUSCULOSKELETAL:   Range of motion is not limited,  No clubbing, cyanosis    NEUROLOGICAL:   Spontaneous eye opening when off sedation  Tracks to voice  Not following commands  No motor deficits.    SKIN:   Skin normal color for race,   Warm and dry  No evidence of rash.    PSYCHIATRIC:   Off sedation  No apparent risk to self or others.    HEMATOLOGICAL:  No cervical  lymphadenopathy.  no inguinal lymphadenopathy      10-27-21 @ 07:01  -  10-28-21 @ 07:00  --------------------------------------------------------  IN:    Dexmedetomidine: 122.2 mL    Enteral Tube Flush: 110 mL    Glucerna: 545 mL    Insulin: 40 mL    Insulin: 47 mL    IV PiggyBack: 660 mL    Midazolam: 4 mL  Total IN: 1528.2 mL    OUT:    Indwelling Catheter - Urethral (mL): 1390 mL  Total OUT: 1390 mL    Total NET: 138.2 mL      10-28-21 @ 07:01  -  10-28-21 @ 08:17  --------------------------------------------------------  IN:  Total IN: 0 mL    OUT:    Indwelling Catheter - Urethral (mL): 70 mL  Total OUT: 70 mL    Total NET: -70 mL        LABS:                            8.6    11.22 )-----------( 237      ( 28 Oct 2021 05:35 )             28.2     137  |  95<L>  |  37<H>  ----------------------------<  212<H>  3.3<L>   |  31  |  0.7    Ca    8.5      28 Oct 2021 05:35  Mg     2.1     10-28    TPro  5.4<L>  /  Alb  2.8<L>  /  TBili  0.3  /  DBili  x   /  AST  61<H>  /  ALT  43<H>  /  AlkPhos  119<H>  10-28    LIVER FUNCTIONS - ( 28 Oct 2021 05:35 )  Alb: 2.8 g/dL / Pro: 5.4 g/dL / ALK PHOS: 119 U/L / ALT: 43 U/L / AST: 61 U/L / GGT: x           Culture - Sputum (collected 26 Oct 2021 20:55)  Source: .Sputum Sputum  Gram Stain (27 Oct 2021 07:06):    Numerous polymorphonuclear leukocytes per low power field    No Squamous epithelial cells per low power field    Moderate Gram positive cocci in pairs seen per oil power field  Preliminary Report (27 Oct 2021 20:32):    Numerous Staphylococcus aureus    Culture - Blood (collected 26 Oct 2021 05:37)  Source: .Blood None  Preliminary Report (27 Oct 2021 18:01):    No growth to date.    Mode: Auto Mode: PRVC/ Volume Support  RR (machine): 12  TV (machine): 400  FiO2: 30  PEEP: 5  MAP: 9  PIP: 18    ABG - ( 28 Oct 2021 04:26 )  pH, Arterial: 7.49  pH, Blood: x     /  pCO2: 42    /  pO2: 73    / HCO3: 32    / Base Excess: 7.9   /  SaO2: 95.4        MEDICATIONS  (STANDING):  aspirin  chewable 81 milliGRAM(s) Oral daily  atorvastatin 40 milliGRAM(s) Oral at bedtime  cefepime   IVPB      cefepime   IVPB 1000 milliGRAM(s) IV Intermittent every 12 hours  chlorhexidine 0.12% Liquid 15 milliLiter(s) Oral Mucosa two times a day  chlorhexidine 4% Liquid 1 Application(s) Topical <User Schedule>  dexMEDEtomidine Infusion 0.2 MICROgram(s)/kG/Hr (3.2 mL/Hr) IV Continuous <Continuous>  dextrose 40% Gel 15 Gram(s) Oral once  dextrose 5%. 1000 milliLiter(s) (50 mL/Hr) IV Continuous <Continuous>  dextrose 5%. 1000 milliLiter(s) (100 mL/Hr) IV Continuous <Continuous>  dextrose 50% Injectable 25 Gram(s) IV Push once  dextrose 50% Injectable 12.5 Gram(s) IV Push once  dextrose 50% Injectable 25 Gram(s) IV Push once  DULoxetine 60 milliGRAM(s) Oral daily  folic acid 1 milliGRAM(s) Oral daily  glucagon  Injectable 1 milliGRAM(s) IntraMuscular once  heparin   Injectable 5000 Unit(s) SubCutaneous every 8 hours  hydroxychloroquine 200 milliGRAM(s) Oral two times a day  insulin regular Infusion 1 Unit(s)/Hr (1 mL/Hr) IV Continuous <Continuous>  levothyroxine 175 MICROGram(s) Oral daily  ondansetron Injectable 4 milliGRAM(s) IV Push once  pantoprazole  Injectable 40 milliGRAM(s) IV Push daily  vancomycin  IVPB 750 milliGRAM(s) IV Intermittent every 12 hours  vancomycin  IVPB        MEDICATIONS  (PRN):  acetaminophen     Tablet .. 650 milliGRAM(s) Oral every 6 hours PRN Temp greater or equal to 38C (100.4F)  dextrose 50% Injectable 25 milliLiter(s) IV Push once PRN glucose < 60      New X-rays reviewed:                                                                                  ECHO    CXR interpreted by me:   ETT deep pull by 1.5cm, OGT OK, bilateral lower lobe opacities, R IJ TLC Patient is a 76y old  Female who presents with a chief complaint of SOB (28 Oct 2021 07:51)    Over Night Events:  No overnight events.  Remains on MV.  On low dose Precedex overnight.  Currently off sedation.    ROS:   All ROS are negative except HPI     PHYSICAL EXAM  ICU Vital Signs Last 24 Hrs  T(C): 37.2 (28 Oct 2021 07:01), Max: 38.5 (28 Oct 2021 02:15)  T(F): 99 (28 Oct 2021 07:01), Max: 101.3 (28 Oct 2021 02:15)  HR: 62 (28 Oct 2021 07:04) (62 - 108)  BP: 101/52 (28 Oct 2021 07:04) (96/52 - 124/63)  BP(mean): 73 (28 Oct 2021 07:04) (70 - 88)  RR: 14 (28 Oct 2021 07:04) (12 - 35)  SpO2: 100% (28 Oct 2021 07:04) (97% - 100%)      CONSTITUTIONAL:  Well nourished.  NAD  CAM ICU positive    ENT:   Airway patent,   Mouth with normal mucosa.   No thrush  ETT    EYES:   Pupils equal,   Round and reactive to light.    CARDIAC:   Normal rate,   Regular rhythm.    mild lower extremity edema    RESPIRATORY:   Bilateral crackles  No wheezing  Normal chest expansion  Not tachypneic,  No use of accessory muscles    GASTROINTESTINAL:  Abdomen soft,   Non-tender,   No guarding,   + BS    MUSCULOSKELETAL:   Range of motion is not limited,  No clubbing, cyanosis    NEUROLOGICAL:   Spontaneous eye opening when off sedation  Tracks to voice  Not following commands  No motor deficits.    SKIN:   Skin normal color for race,   Warm and dry  No evidence of rash.    PSYCHIATRIC:   Off sedation  No apparent risk to self or others.    HEMATOLOGICAL:  No cervical  lymphadenopathy.  no inguinal lymphadenopathy      10-27-21 @ 07:01  -  10-28-21 @ 07:00  --------------------------------------------------------  IN:    Dexmedetomidine: 122.2 mL    Enteral Tube Flush: 110 mL    Glucerna: 545 mL    Insulin: 40 mL    Insulin: 47 mL    IV PiggyBack: 660 mL    Midazolam: 4 mL  Total IN: 1528.2 mL    OUT:    Indwelling Catheter - Urethral (mL): 1390 mL  Total OUT: 1390 mL    Total NET: 138.2 mL      10-28-21 @ 07:01  -  10-28-21 @ 08:17  --------------------------------------------------------  IN:  Total IN: 0 mL    OUT:    Indwelling Catheter - Urethral (mL): 70 mL  Total OUT: 70 mL    Total NET: -70 mL        LABS:                            8.6    11.22 )-----------( 237      ( 28 Oct 2021 05:35 )             28.2     137  |  95<L>  |  37<H>  ----------------------------<  212<H>  3.3<L>   |  31  |  0.7    Ca    8.5      28 Oct 2021 05:35  Mg     2.1     10-28    TPro  5.4<L>  /  Alb  2.8<L>  /  TBili  0.3  /  DBili  x   /  AST  61<H>  /  ALT  43<H>  /  AlkPhos  119<H>  10-28    LIVER FUNCTIONS - ( 28 Oct 2021 05:35 )  Alb: 2.8 g/dL / Pro: 5.4 g/dL / ALK PHOS: 119 U/L / ALT: 43 U/L / AST: 61 U/L / GGT: x           Culture - Sputum (collected 26 Oct 2021 20:55)  Source: .Sputum Sputum  Gram Stain (27 Oct 2021 07:06):    Numerous polymorphonuclear leukocytes per low power field    No Squamous epithelial cells per low power field    Moderate Gram positive cocci in pairs seen per oil power field  Preliminary Report (27 Oct 2021 20:32):    Numerous Staphylococcus aureus    Culture - Blood (collected 26 Oct 2021 05:37)  Source: .Blood None  Preliminary Report (27 Oct 2021 18:01):    No growth to date.    Mode: Auto Mode: PRVC/ Volume Support  RR (machine): 12  TV (machine): 400  FiO2: 30  PEEP: 5  MAP: 9  PIP: 18    ABG - ( 28 Oct 2021 04:26 )  pH, Arterial: 7.49  pH, Blood: x     /  pCO2: 42    /  pO2: 73    / HCO3: 32    / Base Excess: 7.9   /  SaO2: 95.4        MEDICATIONS  (STANDING):  aspirin  chewable 81 milliGRAM(s) Oral daily  atorvastatin 40 milliGRAM(s) Oral at bedtime  cefepime   IVPB      cefepime   IVPB 1000 milliGRAM(s) IV Intermittent every 12 hours  chlorhexidine 0.12% Liquid 15 milliLiter(s) Oral Mucosa two times a day  chlorhexidine 4% Liquid 1 Application(s) Topical <User Schedule>  dexMEDEtomidine Infusion 0.2 MICROgram(s)/kG/Hr (3.2 mL/Hr) IV Continuous <Continuous>  dextrose 40% Gel 15 Gram(s) Oral once  dextrose 5%. 1000 milliLiter(s) (50 mL/Hr) IV Continuous <Continuous>  dextrose 5%. 1000 milliLiter(s) (100 mL/Hr) IV Continuous <Continuous>  dextrose 50% Injectable 25 Gram(s) IV Push once  dextrose 50% Injectable 12.5 Gram(s) IV Push once  dextrose 50% Injectable 25 Gram(s) IV Push once  DULoxetine 60 milliGRAM(s) Oral daily  folic acid 1 milliGRAM(s) Oral daily  glucagon  Injectable 1 milliGRAM(s) IntraMuscular once  heparin   Injectable 5000 Unit(s) SubCutaneous every 8 hours  hydroxychloroquine 200 milliGRAM(s) Oral two times a day  insulin regular Infusion 1 Unit(s)/Hr (1 mL/Hr) IV Continuous <Continuous>  levothyroxine 175 MICROGram(s) Oral daily  ondansetron Injectable 4 milliGRAM(s) IV Push once  pantoprazole  Injectable 40 milliGRAM(s) IV Push daily  vancomycin  IVPB 750 milliGRAM(s) IV Intermittent every 12 hours  vancomycin  IVPB        MEDICATIONS  (PRN):  acetaminophen     Tablet .. 650 milliGRAM(s) Oral every 6 hours PRN Temp greater or equal to 38C (100.4F)  dextrose 50% Injectable 25 milliLiter(s) IV Push once PRN glucose < 60      New X-rays reviewed:                                                                                  ECHO    CXR interpreted by me:   ETT deep pull by 1.5cm, OGT OK, bilateral lower lobe opacities, R IJ TLC

## 2021-10-28 NOTE — PROGRESS NOTE ADULT - SUBJECTIVE AND OBJECTIVE BOX
Patient is a 76y old  Female who presents with a chief complaint of SOB (28 Oct 2021 08:17)      T(F): 99.3 (10-28-21 @ 11:00), Max: 101.3 (10-28-21 @ 02:15)  HR: 80 (10-28-21 @ 11:00)  BP: 90/45 (10-28-21 @ 11:00)  RR: 20  SpO2: 100% (10-28-21 @ 11:00) (97% - 100%)    PHYSICAL EXAM:  GENERAL: NAD  HEAD:  Atraumatic, Normocephalic  EYES: EOMI, PERRLA, conjunctiva and sclera clear  NERVOUS SYSTEM:   no focal deficits   CHEST/LUNG:  bilateral rhonchi  HEART: Regular rate and rhythm; No murmurs, rubs, or gallops  ABDOMEN: Soft, Nontender, Nondistended; Bowel sounds present  EXTREMITIES:  2+ Peripheral Pulses, No clubbing, cyanosis, or edema    LABS  10-28    137  |  95<L>  |  37<H>  ----------------------------<  212<H>  3.3<L>   |  31  |  0.7    Ca    8.5      28 Oct 2021 05:35  Mg     2.1     10-28    TPro  5.4<L>  /  Alb  2.8<L>  /  TBili  0.3  /  DBili  x   /  AST  61<H>  /  ALT  43<H>  /  AlkPhos  119<H>  10-28                          8.6    11.22 )-----------( 237      ( 28 Oct 2021 05:35 )             28.2     Procalcitonin, Serum (10.26.21 @ 05:37)   Procalcitonin, Serum: 0.98  Mode: CPAP with PS  FiO2: 30  PEEP: 5  PS: 7      Culture Results:   Numerous Staphylococcus aureus (10-26-21)  Culture Results:   No growth to date. (10-26-21)  Culture Results:   Normal Respiratory Asia present (10-23-21)  Culture Results:   No growth to date. (10-23-21)  Culture Results:   No growth (10-22-21)    RADIOLOGY  < from: Xray Chest 1 View- PORTABLE-Routine (Xray Chest 1 View- PORTABLE-Routine in AM.) (10.28.21 @ 07:09) >  Impression:    Tip of endotracheal tube is at the clara and retraction is recommended.    Stable bilateral lung opacities    < end of copied text >    MEDICATIONS  (STANDING):  aspirin  chewable 81 milliGRAM(s) Oral daily  atorvastatin 40 milliGRAM(s) Oral at bedtime      cefepime   IVPB 1000 milliGRAM(s) IV Intermittent every 12 hours  chlorhexidine 0.12% Liquid 15 milliLiter(s) Oral Mucosa two times a day  chlorhexidine 4% Liquid 1 Application(s) Topical <User Schedule>  dexMEDEtomidine Infusion 0.2 MICROgram(s)/kG/Hr (3.2 mL/Hr) IV Continuous <Continuous>  DULoxetine 60 milliGRAM(s) Oral daily  folic acid 1 milliGRAM(s) Oral daily  glucagon  Injectable 1 milliGRAM(s) IntraMuscular once  heparin   Injectable 5000 Unit(s) SubCutaneous every 8 hours  hydroxychloroquine 200 milliGRAM(s) Oral two times a day  insulin regular Infusion 1 Unit(s)/Hr (1 mL/Hr) IV Continuous <Continuous>  levothyroxine 175 MICROGram(s) Oral daily  ondansetron Injectable 4 milliGRAM(s) IV Push once  pantoprazole  Injectable 40 milliGRAM(s) IV Push daily  potassium chloride  20 mEq/100 mL IVPB 20 milliEquivalent(s) IV Intermittent every 2 hours  vancomycin  IVPB 750 milliGRAM(s) IV Intermittent every 12 hours  vancomycin  IVPB        MEDICATIONS  (PRN):  acetaminophen     Tablet .. 650 milliGRAM(s) Oral every 6 hours PRN Temp greater or equal to 38C (100.4F)  dextrose 50% Injectable 25 milliLiter(s) IV Push once PRN glucose < 60

## 2021-10-28 NOTE — PROGRESS NOTE ADULT - SUBJECTIVE AND OBJECTIVE BOX
SUBJECTIVE:    Patient is a 76y old Female who presents with a chief complaint of SOB (27 Oct 2021 15:45)    Currently admitted to medicine with the primary diagnosis of Acute pulmonary edema       Today is hospital day 6d. This morning she is resting comfortably in bed and reports no new issues or overnight events.     INTERVAL EVENTS: Patient barely arousable on Precedex Does not follow commands. Continues to be febrile to 101 overnight    PAST MEDICAL & SURGICAL HISTORY  DM (diabetes mellitus)  insulin  fs achs    Hypercholesteremia  statin    Hypertension  hctz    Lupus  as per hx    Fall, initial encounter  as  hx    Stroke  TIA 2002, no deficits    Herniated lumbar intervertebral disc  as per hx    Seizure  keppra    No significant past surgical history        ALLERGIES:  No Known Allergies    MEDICATIONS:  STANDING MEDICATIONS  aspirin  chewable 81 milliGRAM(s) Oral daily  atorvastatin 40 milliGRAM(s) Oral at bedtime  cefepime   IVPB      cefepime   IVPB 1000 milliGRAM(s) IV Intermittent every 12 hours  chlorhexidine 0.12% Liquid 15 milliLiter(s) Oral Mucosa two times a day  chlorhexidine 4% Liquid 1 Application(s) Topical <User Schedule>  dexMEDEtomidine Infusion 0.2 MICROgram(s)/kG/Hr IV Continuous <Continuous>  dextrose 40% Gel 15 Gram(s) Oral once  dextrose 5%. 1000 milliLiter(s) IV Continuous <Continuous>  dextrose 5%. 1000 milliLiter(s) IV Continuous <Continuous>  dextrose 50% Injectable 25 Gram(s) IV Push once  dextrose 50% Injectable 12.5 Gram(s) IV Push once  dextrose 50% Injectable 25 Gram(s) IV Push once  DULoxetine 60 milliGRAM(s) Oral daily  folic acid 1 milliGRAM(s) Oral daily  glucagon  Injectable 1 milliGRAM(s) IntraMuscular once  heparin   Injectable 5000 Unit(s) SubCutaneous every 8 hours  hydroxychloroquine 200 milliGRAM(s) Oral two times a day  insulin regular Infusion 1 Unit(s)/Hr IV Continuous <Continuous>  levothyroxine 175 MICROGram(s) Oral daily  midazolam Infusion 0.02 mG/kG/Hr IV Continuous <Continuous>  norepinephrine Infusion 0.05 MICROgram(s)/kG/Min IV Continuous <Continuous>  ondansetron Injectable 4 milliGRAM(s) IV Push once  pantoprazole  Injectable 40 milliGRAM(s) IV Push daily  vancomycin  IVPB 750 milliGRAM(s) IV Intermittent every 12 hours  vancomycin  IVPB        PRN MEDICATIONS  acetaminophen     Tablet .. 650 milliGRAM(s) Oral every 6 hours PRN  dextrose 50% Injectable 25 milliLiter(s) IV Push once PRN    VITALS:   T(F): 99  HR: 62  BP: 101/52  RR: 14  SpO2: 100%    LABS:                        8.6    11.22 )-----------( 237      ( 28 Oct 2021 05:35 )             28.2     10-27    139  |  98  |  38<H>  ----------------------------<  168<H>  3.8   |  35<H>  |  0.7    Ca    8.3<L>      27 Oct 2021 05:54  Mg     2.1     10-27    TPro  5.2<L>  /  Alb  2.7<L>  /  TBili  0.3  /  DBili  x   /  AST  74<H>  /  ALT  41  /  AlkPhos  132<H>  10-27        ABG - ( 28 Oct 2021 04:26 )  pH, Arterial: 7.49  pH, Blood: x     /  pCO2: 42    /  pO2: 73    / HCO3: 32    / Base Excess: 7.9   /  SaO2: 95.4                  Culture - Sputum (collected 26 Oct 2021 20:55)  Source: .Sputum Sputum  Gram Stain (27 Oct 2021 07:06):    Numerous polymorphonuclear leukocytes per low power field    No Squamous epithelial cells per low power field    Moderate Gram positive cocci in pairs seen per oil power field  Preliminary Report (27 Oct 2021 20:32):    Numerous Staphylococcus aureus    Culture - Blood (collected 26 Oct 2021 05:37)  Source: .Blood None  Preliminary Report (27 Oct 2021 18:01):    No growth to date.          RADIOLOGY:    PHYSICAL EXAM:  GEN: Sedated and intubated  PULM/CHEST: Clear to auscultation bilaterally  CVS: Regular rate and rhythm, S1-S2  ABD: Soft, non-tender, non-distended, +BS  EXT: No edema  NEURO: Sedated    Wise Catheter:   Indwelling Urethral Catheter:     Connect To:  Straight Drainage/[place]    Indication:  Urinary Retention / Obstruction (10-26-21 @ 09:26) (not performed) [Active]  Indwelling Urethral Catheter:     Connect To:  Straight Drainage/Gravity    Indication:  Urine Output Monitoring in Critically Ill (10-23-21 @ 10:07) (not performed) [Active]  Indwelling Urethral Catheter:     Connect To:  Straight Drainage/Gravity    Indication:  Urine Output Monitoring in Critically Ill (10-22-21 @ 15:25) (not performed) [Active]  Indwelling Urethral Catheter:     Connect To:  Straight Drainage/Gravity    Indication:  Urine Output Monitoring in Critically Ill (10-22-21 @ 10:25) (not performed) [Active]

## 2021-10-28 NOTE — PROGRESS NOTE ADULT - ASSESSMENT
75 yo female PMHx of HFpEF, HTN, HLD, DM,  SLE on Cellsept and methotrexate, CVA in 2002, recently admitted for Hypoglycemia and ANTONELLA who presents with shortness of breath  presenting for shortness of breath    IMPRESSION  #Acute Hypoxic respiratory failure  #Fevers - presumed HAP  - Blood Cx 10/23 NG  - Sputum cx 10/23 NG   - MRSA Nares 10/23 negative   - RVP negative  - Procalcitonin, Serum: 1.82 (10.22.21 @ 19:36) -> Procalcitonin, Serum: 0.98 (10.26.21 @ 05:37)  #Obesity BMI (kg/m2): 26.7, 30.2  #Abx allergy: NKDA    would recommend:    1. Continue current doses Vancomycin since level is therapeutic  2. Please discontinue  Cefepime   3. Trend LFTs   4. Management of Vent. as per ICU protocol    Attending Attestation:    Spent more than 45 minutes on total encounter, more than 50 % of the visit was spent counseling and/or coordinating care by the Attending physician.   75 yo female PMHx of HFpEF, HTN, HLD, DM,  SLE on Cellsept and methotrexate, CVA in 2002, recently admitted for Hypoglycemia and ANTONELLA who presents with shortness of breath  presenting for shortness of breath    IMPRESSION  #Acute Hypoxic respiratory failure  #Fevers - presumed HAP- sputum cx grew MRSA, 10/26/21  - Blood Cx 10/23 NG  - Sputum cx 10/23 NG   - MRSA Nares 10/23 negative   - RVP negative  - Procalcitonin, Serum: 1.82 (10.22.21 @ 19:36) -> Procalcitonin, Serum: 0.98 (10.26.21 @ 05:37)  #Obesity BMI (kg/m2): 26.7, 30.2  #Abx allergy: NKDA    would recommend:    1. Continue current doses Vancomycin since level is therapeutic  2. Please discontinue  Cefepime   3. Trend LFTs   4. Management of Vent. as per ICU protocol  5. Contact Isolation     d/w ICU team     Attending Attestation:    Spent more than 45 minutes on total encounter, more than 50 % of the visit was spent counseling and/or coordinating care by the Attending physician.

## 2021-10-28 NOTE — PROGRESS NOTE ADULT - SUBJECTIVE AND OBJECTIVE BOX
Patient is seen and examined at the bed side, is febrile. The sputum culture  grew  MRSA.       REVIEW OF SYSTEMS: Unable to obtain due to mental status      ALLERGIES: No Known Allergies      ICU Vital Signs Last 24 Hrs  T(C): 38.1 (28 Oct 2021 15:05), Max: 38.5 (28 Oct 2021 02:15)  T(F): 100.6 (28 Oct 2021 15:05), Max: 101.3 (28 Oct 2021 02:15)  HR: 74 (28 Oct 2021 15:24) (62 - 108)  BP: 111/58 (28 Oct 2021 15:00) (87/50 - 124/63)  BP(mean): 81 (28 Oct 2021 15:00) (65 - 88)  ABP: --  ABP(mean): --  RR: 20 (28 Oct 2021 15:05) (14 - 35)  SpO2: 100% (28 Oct 2021 15:24) (97% - 100%)        PHYSICAL EXAM:  GENERAL: intubated/vented   CHEST/LUNG: Not using accessory muscles   HEART: s1 and s2 present  ABDOMEN:  Nontender and  Nondistended  EXTREMITIES: No pedal  edema  CNS: intubated/vented       LABS:                        8.6    11.22 )-----------( 237      ( 28 Oct 2021 05:35 )             28.2       10-28    137  |  95<L>  |  37<H>  ----------------------------<  212<H>  3.3<L>   |  31  |  0.7    Ca    8.5      28 Oct 2021 05:35  Mg     2.1     10-28    TPro  5.4<L>  /  Alb  2.8<L>  /  TBili  0.3  /  DBili  x   /  AST  61<H>  /  ALT  43<H>  /  AlkPhos  119<H>  10-28        CAPILLARY BLOOD GLUCOSE  233 (28 Oct 2021 07:00)  231 (28 Oct 2021 06:15)  199 (28 Oct 2021 05:15)  272 (28 Oct 2021 04:15)    POCT Blood Glucose.: 156 mg/dL (28 Oct 2021 16:18)  POCT Blood Glucose.: 252 mg/dL (28 Oct 2021 14:13)  POCT Blood Glucose.: 259 mg/dL (28 Oct 2021 12:05)  POCT Blood Glucose.: 92 mg/dL (28 Oct 2021 10:25)  POCT Blood Glucose.: 77 mg/dL (28 Oct 2021 09:13)  POCT Blood Glucose.: 233 mg/dL (28 Oct 2021 06:51    ABG - ( 28 Oct 2021 16:19 )  pH, Arterial: 7.51  pH, Blood: x     /  pCO2: 44    /  pO2: 108   / HCO3: 35    / Base Excess: 11.0  /  SaO2: 97.5        Vancomycin Level, Random (10.28.21 @ 05:35)   Vancomycin Level, Random: 17.1      MEDICATIONS  (STANDING):  aspirin  chewable 81 milliGRAM(s) Oral daily  atorvastatin 40 milliGRAM(s) Oral at bedtime  cefepime   IVPB 1000 milliGRAM(s) IV Intermittent every 12 hours  cefepime   IVPB      chlorhexidine 0.12% Liquid 15 milliLiter(s) Oral Mucosa two times a day  chlorhexidine 4% Liquid 1 Application(s) Topical <User Schedule>  dexMEDEtomidine Infusion 0.2 MICROgram(s)/kG/Hr (3.2 mL/Hr) IV Continuous <Continuous>  dextrose 40% Gel 15 Gram(s) Oral once  dextrose 5%. 1000 milliLiter(s) (50 mL/Hr) IV Continuous <Continuous>  dextrose 5%. 1000 milliLiter(s) (100 mL/Hr) IV Continuous <Continuous>  dextrose 50% Injectable 25 Gram(s) IV Push once  dextrose 50% Injectable 12.5 Gram(s) IV Push once  dextrose 50% Injectable 25 Gram(s) IV Push once  DULoxetine 60 milliGRAM(s) Oral daily  folic acid 1 milliGRAM(s) Oral daily  glucagon  Injectable 1 milliGRAM(s) IntraMuscular once  heparin   Injectable 5000 Unit(s) SubCutaneous every 8 hours  hydroxychloroquine 200 milliGRAM(s) Oral two times a day  insulin regular Infusion 1 Unit(s)/Hr (1 mL/Hr) IV Continuous <Continuous>  levothyroxine 175 MICROGram(s) Oral daily  ondansetron Injectable 4 milliGRAM(s) IV Push once  pantoprazole  Injectable 40 milliGRAM(s) IV Push daily  vancomycin  IVPB 750 milliGRAM(s) IV Intermittent every 12 hours  vancomycin  IVPB            RADIOLOGY & ADDITIONAL TESTS:    < from: Xray Chest 1 View- PORTABLE-Routine (Xray Chest 1 View- PORTABLE-Routine in AM.) (10.28.21 @ 07:09) >  Tip of endotracheal tube is at the clara and retraction is recommended.    Stable bilateral lung opacities      < from: Xray Chest 1 View- PORTABLE-Routine (Xray Chest 1 View- PORTABLE-Routine in AM.) (10.26.21 @ 06:04) >  Supportdevices: Right IJ line endotracheal tube and enteric tube are in satisfactory position.    Cardiac/mediastinum/hilum: Unremarkable.    Lung parenchyma/Pleura: Hazy bilateral opacities/effusions left greater than right are unchanged. No pneumothorax.        MICROBIOLOGY DATA:    Culture - Sputum . (10.26.21 @ 20:55)   - Amoxicillin/Clavulanic Acid: R >4/2   - Ampicillin: R >8   - Ampicillin/Sulbactam: R 16/8   - Cefazolin: R >16   - Ceftriaxone: R >32   - Ciprofloxacin: R >2   - Clindamycin: R >4   - Daptomycin: S <=0.25   - Erythromycin: R >4   - Gentamicin: S <=1 Should not be used as monotherapy   - Levofloxacin: R >4   - Linezolid: S 2   - Meropenem: R >8   - Moxifloxacin(Aerobic): R >4   - Oxacillin: R >2   - Penicillin: R >8   - RIF- Rifampin: S <=1 Should not be used as monotherapy   - Tetra/Doxy: S <=1   - Trimethoprim/Sulfamethoxazole: S <=0.5/9.5   - Vancomycin: S 1   Gram Stain:   Numerous polymorphonuclear leukocytes per low power field   No Squamous epithelial cells per low power field   Moderate Gram positive cocci in pairs seen per oil power field   Specimen Source: .Sputum Sputum   Culture Results:   Numerous Methicillin Resistant Staphylococcus aureus   Normal Respiratory Asia present

## 2021-10-28 NOTE — PROGRESS NOTE ADULT - ASSESSMENT
75 y/o F PMH heart failure, moderate severity mitral valve regurgitation, admitted in early October for heart failure and end stemi presents c/o SOB. Pt had a recent discharge from the hospital and XR during that admission showed increased markings in both lung fields consistent with CHF exacerbation    Patient found to have pneumonia likely GNR and will continue with antibiotics. Continues to be febrile despite being on broad spectrum antibiotics. Patient only on Precedex for sedation and not fully awake yet for a trial of SBT. Family updated at the bedside.    # Acute hypoxemic respiratory failure 2/2 HAP  - Febrile to 102, hypoxic in ED s/p Intubation  - CXR showing bilateral opacities  - Sputum growing GNR, Procal>1  - Continues to be febrile to 101  - Continue Vanc and Cefepime  - Initial blood culture and repeat negative  - Procal >1, Sputum showing gram positive  - Repeat cultures and Procal received in the lab  - SAT again today, SBT when stable  - Vancomycin level sent  - Repeat Xray in AM    # Acute Exacerbation of HFpEF   - Was in [place] last admission due to overdiuresis and Lasix was reduced on discharge  - received IV lasix in ER, Holding Lasix  - geeta, I & O, daily weight  - ECHO on last discharge unchanged except for new, mild pulmonary hypertension   - F/u cardio outpatient     # Hypothyroidism   - Elevated TSH 12.24  - Continue synthroid 175mg  - TSH repeat outpatient in 2 months    # Iron Deficiency Anemia  # Anemia of chronic disease  - On lAst admission given Iron sucrose 200x2  - Baseline Hb 8   - Keep Hb >7  - F/u H&H     # Nocturnal Hypoxemia  - uses home O2    # DM II  - Will continue with Insulin regimen  - Continue Insulin drip    Status: FULL CODE  GI Prophylaxis: None   DVT Prophylaxis: Hep SubQ

## 2021-10-29 NOTE — PROGRESS NOTE ADULT - ATTENDING COMMENTS
Attending Statement: I have personally performed a face to face diagnostic evaluation on this patient. The patient is suffering from:  Acute Hypoxemic and Hypercapnic Respiratory Failure   Pulmonary Edema,   Bilateral Effusions,   HFpEF exacerbation, moderate MR  I have reviewed the above note and agree with the history, exam and suggestions for care, except as I have noted in the text. I have amended the treatment plans as necessary.

## 2021-10-29 NOTE — PROGRESS NOTE ADULT - ASSESSMENT
ASSESSMENT  77 yo female PMHx of HFpEF, HTN, HLD, DM,  SLE on Cellsept and methotrexate, CVA in 2002, recently admitted for Hypoglycemia and ANTONELLA who presents with shortness of breath  presenting for shortness of breath    IMPRESSION  #Acute Hypoxic respiratory failure  #Fevers - presumed HAP  - Blood Cx 10/23 NG  - Sputum cx 10/23 NG   - MRSA Nares 10/23 negative   - RVP negative  - sputum Cx 10/26 MRSA   - Procalcitonin, Serum: 1.82 (10.22.21 @ 19:36) -> Procalcitonin, Serum: 0.98 (10.26.21 @ 05:37)    #CHF Exacerbation  #DM II  #Obesity BMI (kg/m2): 26.7, 30.2  #Abx allergy: NKDA      RECOMMENDATIONS  - vancomycin level 10/28 reviewed - continue vancomycin 750 mg q 12 hours     This is a preliminary incomplete pended note, all final recommendations to follow after interview and examination of the patient.    Please call or message on [employer] Teams if with any questions.  Spectra [phone] ASSESSMENT  75 yo female PMHx of HFpEF, HTN, HLD, DM,  SLE on Cellsept and methotrexate, CVA in 2002, recently admitted for Hypoglycemia and ANTONELLA who presents with shortness of breath  presenting for shortness of breath    IMPRESSION  #Acute Hypoxic respiratory failure  #Fevers - presumed HAP  - Blood Cx 10/23 NG  - Sputum cx 10/23 NG   - MRSA Nares 10/23 negative   - RVP negative  - sputum Cx 10/26 MRSA   - Procalcitonin, Serum: 1.82 (10.22.21 @ 19:36) -> Procalcitonin, Serum: 0.98 (10.26.21 @ 05:37)    #CHF Exacerbation  #DM II  #Obesity BMI (kg/m2): 26.7, 30.2  #Abx allergy: NKDA      RECOMMENDATIONS  - vancomycin level 10/28 reviewed - continue vancomycin 750 mg q 12 hours   - trend fever curve for now   - follow-up repeat cultures  - planned for SBT today        Please call or message on [employer] Teams if with any questions.  Spectra [phone]

## 2021-10-29 NOTE — PROGRESS NOTE ADULT - SUBJECTIVE AND OBJECTIVE BOX
Patient seen and evaluated this am, sedated on Precedex      T(F): 101.3 (10-29-21 @ 11:00), Max: 101.3 (10-29-21 @ 07:01)  HR: 73 (10-29-21 @ 13:00)  BP: 103/52 (10-29-21 @ 13:00)  RR: 19 (10-29-21 @ 13:00)  SpO2: 100% (10-29-21 @ 13:00) (98% - 100%)    PHYSICAL EXAM:  GENERAL: NAD  HEAD:  Atraumatic, Normocephalic  NERVOUS SYSTEM:  no focal deficits   CHEST/LUNG:  bilateral rhonchi  HEART: Regular rate and rhythm; No murmurs, rubs, or gallops  ABDOMEN: Soft, Nontender, Nondistended; Bowel sounds present  EXTREMITIES:  2+ Peripheral Pulses, No clubbing, cyanosis, or edema    LABS  10-29    134<L>  |  97<L>  |  44<H>  ----------------------------<  123<H>  4.1   |  27  |  1.1    Ca    8.7      29 Oct 2021 05:13  Mg     2.3     10-29    TPro  5.9<L>  /  Alb  2.7<L>  /  TBili  0.3  /  DBili  x   /  AST  94<H>  /  ALT  63<H>  /  AlkPhos  173<H>  10-29                          9.0    11.65 )-----------( 247      ( 29 Oct 2021 05:13 )             29.2     Procalcitonin, Serum (10.28.21 @ 05:35)   Procalcitonin, Serum: 0.67 (was 1.82)  Mode: Auto Mode: PRVC/ Volume Support  RR (machine): 15  TV (machine): 400  FiO2: 30  PEEP: 5      Culture Results:   No growth to date. (10-28-21)  Culture Results:   No growth to date. (10-27-21)  Culture Results:   Numerous Methicillin Resistant Staphylococcus aureus  Normal Respiratory Asia present (10-26-21)  Culture Results:   No growth to date. (10-26-21)  Culture Results:   Normal Respiratory Asia present (10-23-21)  Culture Results:   No Growth Final (10-23-21)  Culture Results:   No growth (10-22-21)    RADIOLOGY  < from: Xray Chest 1 View- PORTABLE-Routine (Xray Chest 1 View- PORTABLE-Routine in AM.) (10.29.21 @ 06:19) >  Impression:    Left lower lobe opacity/pleural effusion unchanged. No air leak.      < end of copied text >    MEDICATIONS  (STANDING):  aspirin  chewable 81 milliGRAM(s) Oral daily  atorvastatin 40 milliGRAM(s) Oral at bedtime  cefepime   IVPB 1000 milliGRAM(s) IV Intermittent every 12 hours  chlorhexidine 0.12% Liquid 15 milliLiter(s) Oral Mucosa two times a day  chlorhexidine 4% Liquid 1 Application(s) Topical <User Schedule>  dexMEDEtomidine Infusion 0.2 MICROgram(s)/kG/Hr (3.2 mL/Hr) IV Continuous <Continuous>  DULoxetine 60 milliGRAM(s) Oral daily  folic acid 1 milliGRAM(s) Oral daily  glucagon  Injectable 1 milliGRAM(s) IntraMuscular once  heparin   Injectable 5000 Unit(s) SubCutaneous every 8 hours  hydroxychloroquine 200 milliGRAM(s) Oral two times a day  insulin regular Infusion 1 Unit(s)/Hr (1 mL/Hr) IV Continuous <Continuous>  levothyroxine 175 MICROGram(s) Oral daily  pantoprazole  Injectable 40 milliGRAM(s) IV Push daily  vancomycin  IVPB 750 milliGRAM(s) IV Intermittent every 12 hours        MEDICATIONS  (PRN):  acetaminophen     Tablet .. 650 milliGRAM(s) Oral every 6 hours PRN Temp greater or equal to 38C (100.4F)  dextrose 50% Injectable 25 milliLiter(s) IV Push once PRN glucose < 60

## 2021-10-29 NOTE — PROGRESS NOTE ADULT - ASSESSMENT
75 y/o F PMH heart failure, moderate severity mitral valve regurgitation, admitted in early October for heart failure and end stemi presents c/o SOB. Pt had a recent discharge from the hospital and XR during that admission showed increased markings in both lung fields consistent with CHF exacerbation    Patient found to have pneumonia likely MRSA and will continue with Vancomycin as per ID. Continues to be febrile despite being on antibiotics. Patient only on Precedex for sedation. Will attempt SAT and SBT if passes SAT    # Acute hypoxemic respiratory failure 2/2 HAP  - Febrile to 102, hypoxic in ED s/p Intubation  - CXR showing bilateral opacities  - Sputum growing MRSA, Procal>1>0.9  - Continues to be febrile to 101  - Continue Vanc   - D/C Cefepime as per ID  - Initial blood culture and repeat negative  - Procal >1, Sputum showing gram positive  - Repeat cultures, Fungitell and DTA sent  - SAT again today, SBT when stable  - Vancomycin level therapeutic  - Repeat Xray in AM    # Acute Exacerbation of HFpEF   - Was in [place] last admission due to overdiuresis and Lasix was reduced on discharge  - received IV lasix in ER, Holding Lasix  - geeta, I & O, daily weight  - ECHO on last discharge unchanged except for new, mild pulmonary hypertension   - F/u cardio outpatient     # Hypothyroidism   - Elevated TSH 12.24  - Continue synthroid 175mg  - TSH repeat outpatient in 2 months    # Iron Deficiency Anemia  # Anemia of chronic disease  - On lAst admission given Iron sucrose 200x2  - Baseline Hb 8   - Keep Hb >7  - F/u H&H     # Nocturnal Hypoxemia  - uses home O2    # DM II  - Will continue with Insulin regimen  - Continue Insulin drip    Status: FULL CODE  GI Prophylaxis: None   DVT Prophylaxis: Hep SubQ    75 y/o F PMH heart failure, moderate severity mitral valve regurgitation, admitted in early October for heart failure and end stemi presents c/o SOB. Pt had a recent discharge from the hospital and XR during that admission showed increased markings in both lung fields consistent with CHF exacerbation    Patient found to have pneumonia likely MRSA and will continue with Vancomycin as per ID. Continues to be febrile despite being on antibiotics. Patient only on Precedex for sedation. SAT patient woke up but was tachypneic on the SBT.    # Acute hypoxemic respiratory failure 2/2 HAP  - Febrile to 102, hypoxic in ED s/p Intubation  - CXR showing bilateral opacities  - Sputum growing MRSA, Procal>1>0.9  - Continues to be febrile to 101  - Continue Vanc   - D/C Cefepime as per ID  - Initial blood culture and repeat negative  - Procal >1, Sputum showing gram positive  - Repeat cultures, Fungitell and DTA sent  - SAT again today, SBT when stable  - Vancomycin level therapeutic  - Repeat Xray in AM    # Acute Exacerbation of HFpEF   - Was in [place] last admission due to overdiuresis and Lasix was reduced on discharge  - received IV lasix in ER, Holding Lasix  - geeta, I & O, daily weight  - ECHO on last discharge unchanged except for new, mild pulmonary hypertension   - F/u cardio outpatient     # Hypothyroidism   - Elevated TSH 12.24  - Continue synthroid 175mg  - TSH repeat outpatient in 2 months    # Iron Deficiency Anemia  # Anemia of chronic disease  - On lAst admission given Iron sucrose 200x2  - Baseline Hb 8   - Keep Hb >7  - F/u H&H     # Nocturnal Hypoxemia  - uses home O2    # DM II  - Will continue with Insulin regimen  - Continue Insulin drip    # Elevated LFTs  - Will get RUQ Sono  - Rule out acalculous cholecystitis    Status: FULL CODE  GI Prophylaxis: None   DVT Prophylaxis: Hep SubQ

## 2021-10-29 NOTE — PROGRESS NOTE ADULT - SUBJECTIVE AND OBJECTIVE BOX
SUBJECTIVE:    Patient is a 76y old Female who presents with a chief complaint of SOB (29 Oct 2021 07:32)    Currently admitted to medicine with the primary diagnosis of Acute pulmonary edema       Today is hospital day 7d. This morning she is resting comfortably in bed and reports no new issues or overnight events.     INTERVAL EVENTS: Intubated and sedated    PAST MEDICAL & SURGICAL HISTORY  DM (diabetes mellitus)  insulin  fs achs    Hypercholesteremia  statin    Hypertension  hctz    Lupus  as per hx    Fall, initial encounter  as  hx    Stroke  TIA 2002, no deficits    Herniated lumbar intervertebral disc  as per hx    Seizure  keppra    No significant past surgical history        ALLERGIES:  No Known Allergies    MEDICATIONS:  STANDING MEDICATIONS  aspirin  chewable 81 milliGRAM(s) Oral daily  atorvastatin 40 milliGRAM(s) Oral at bedtime  chlorhexidine 0.12% Liquid 15 milliLiter(s) Oral Mucosa two times a day  chlorhexidine 4% Liquid 1 Application(s) Topical <User Schedule>  dexMEDEtomidine Infusion 0.2 MICROgram(s)/kG/Hr IV Continuous <Continuous>  dextrose 40% Gel 15 Gram(s) Oral once  dextrose 5%. 1000 milliLiter(s) IV Continuous <Continuous>  dextrose 5%. 1000 milliLiter(s) IV Continuous <Continuous>  dextrose 50% Injectable 25 Gram(s) IV Push once  dextrose 50% Injectable 12.5 Gram(s) IV Push once  dextrose 50% Injectable 25 Gram(s) IV Push once  DULoxetine 60 milliGRAM(s) Oral daily  folic acid 1 milliGRAM(s) Oral daily  glucagon  Injectable 1 milliGRAM(s) IntraMuscular once  heparin   Injectable 5000 Unit(s) SubCutaneous every 8 hours  hydroxychloroquine 200 milliGRAM(s) Oral two times a day  insulin regular Infusion 1 Unit(s)/Hr IV Continuous <Continuous>  levothyroxine 175 MICROGram(s) Oral daily  pantoprazole  Injectable 40 milliGRAM(s) IV Push daily  sodium chloride 0.45%. 1000 milliLiter(s) IV Continuous <Continuous>  vancomycin  IVPB 750 milliGRAM(s) IV Intermittent every 12 hours  vancomycin  IVPB        PRN MEDICATIONS  acetaminophen     Tablet .. 650 milliGRAM(s) Oral every 6 hours PRN  dextrose 50% Injectable 25 milliLiter(s) IV Push once PRN    VITALS:   T(F): 101.3  HR: 71  BP: 107/55  RR: 15  SpO2: 100%    LABS:                        9.0    11.65 )-----------( 247      ( 29 Oct 2021 05:13 )             29.2     10-29    134<L>  |  97<L>  |  44<H>  ----------------------------<  123<H>  4.1   |  27  |  1.1    Ca    8.7      29 Oct 2021 05:13  Mg     2.3     10-29    TPro  5.9<L>  /  Alb  2.7<L>  /  TBili  0.3  /  DBili  x   /  AST  94<H>  /  ALT  63<H>  /  AlkPhos  173<H>  10-29        ABG - ( 29 Oct 2021 03:38 )  pH, Arterial: 7.50  pH, Blood: x     /  pCO2: 39    /  pO2: 127   / HCO3: 30    / Base Excess: 6.7   /  SaO2: 97.7                  Culture - Blood (collected 27 Oct 2021 05:54)  Source: .Blood None  Preliminary Report (28 Oct 2021 20:00):    No growth to date.    Culture - Sputum (collected 26 Oct 2021 20:55)  Source: .Sputum Sputum  Gram Stain (27 Oct 2021 07:06):    Numerous polymorphonuclear leukocytes per low power field    No Squamous epithelial cells per low power field    Moderate Gram positive cocci in pairs seen per oil power field  Final Report (28 Oct 2021 16:02):    Numerous Methicillin Resistant Staphylococcus aureus    Normal Respiratory Asia present  Organism: Methicillin resistant Staphylococcus aureus (28 Oct 2021 16:02)  Organism: Methicillin resistant Staphylococcus aureus (28 Oct 2021 16:02)          RADIOLOGY:    PHYSICAL EXAM:  GEN: No acute distress  PULM/CHEST: Clear to auscultation bilaterally  CVS: Regular rate and rhythm, S1-S2  ABD: Soft, non-tender, non-distended, +BS  EXT: +1 edema  NEURO: Sedated    Wise Catheter:   Indwelling Urethral Catheter:     Connect To:  Straight Drainage/Stamps    Indication:  Urinary Retention / Obstruction (10-28-21 @ 13:43) (not performed) [Active]

## 2021-10-29 NOTE — PROGRESS NOTE ADULT - SUBJECTIVE AND OBJECTIVE BOX
Patient is a 76y old  Female who presents with a chief complaint of SOB (29 Oct 2021 07:37)    Over Night Events:  Remains on MV. On low dose Precedex.  Currently Precedex held.  Febrile overnight.    ROS:   All ROS are negative except HPI     PHYSICAL EXAM  ICU Vital Signs Last 24 Hrs  T(C): 38.5 (29 Oct 2021 07:01), Max: 38.5 (29 Oct 2021 07:01)  T(F): 101.3 (29 Oct 2021 07:01), Max: 101.3 (29 Oct 2021 07:01)  HR: 71 (29 Oct 2021 07:00) (62 - 101)  BP: 107/55 (29 Oct 2021 07:00) (84/50 - 123/62)  BP(mean): 75 (29 Oct 2021 07:00) (62 - 85)  RR: 15 (29 Oct 2021 07:01) (13 - 28)  SpO2: 100% (29 Oct 2021 07:00) (97% - 100%)    CONSTITUTIONAL:  Well nourished.  NAD  CAM ICU unable to assess RAAS > -4    ENT:   Airway patent,   Mouth with normal mucosa.   No thrush  ETT    EYES:   Pupils equal,   Round and reactive to light.    CARDIAC:   Normal rate,   Regular rhythm.    mild lower extremity edema    RESPIRATORY:   Bilateral crackles  No wheezing  Normal chest expansion  Not tachypneic,  No use of accessory muscles    GASTROINTESTINAL:  Abdomen soft,   Non-tender,   No guarding,   + BS    MUSCULOSKELETAL:   Range of motion is not limited,  No clubbing, cyanosis    NEUROLOGICAL:   Spontaneous eye opening when off sedation  Tracks to voice  Not following commands  No motor deficits.    SKIN:   Skin normal color for race,   Warm and dry  No evidence of rash.    PSYCHIATRIC:   No apparent risk to self or others.    HEMATOLOGICAL:  No cervical  lymphadenopathy.  no inguinal lymphadenopathy    10-28-21 @ 07:01  -  10-29-21 @ 07:00  --------------------------------------------------------  IN:    Dexmedetomidine: 277.2 mL    Enteral Tube Flush: 260 mL    Glucerna: 1100 mL    Insulin: 64 mL    IV PiggyBack: 750 mL    sodium chloride 0.45%: 600 mL    Sodium Chloride 0.9% Bolus: 500 mL  Total IN: 3551.2 mL    OUT:    Indwelling Catheter - Urethral (mL): 720 mL  Total OUT: 720 mL    Total NET: 2831.2 mL      10-29-21 @ 07:01  -  10-29-21 @ 08:15  --------------------------------------------------------  IN:  Total IN: 0 mL    OUT:    Indwelling Catheter - Urethral (mL): 45 mL  Total OUT: 45 mL    Total NET: -45 mL      LABS:                            9.0    11.65 )-----------( 247      ( 29 Oct 2021 05:13 )             29.2     134<L>  |  97<L>  |  44<H>  ----------------------------<  123<H>  4.1   |  27  |  1.1    Ca    8.7      29 Oct 2021 05:13  Mg     2.3     10-29    TPro  5.9<L>  /  Alb  2.7<L>  /  TBili  0.3  /  DBili  x   /  AST  94<H>  /  ALT  63<H>  /  AlkPhos  173<H>  10-29     LIVER FUNCTIONS - ( 29 Oct 2021 05:13 )  Alb: 2.7 g/dL / Pro: 5.9 g/dL / ALK PHOS: 173 U/L / ALT: 63 U/L / AST: 94 U/L / GGT: x                                              Culture - Blood (collected 27 Oct 2021 05:54)  Source: .Blood None  Preliminary Report (28 Oct 2021 20:00):    No growth to date.    Culture - Sputum (collected 26 Oct 2021 20:55)  Source: .Sputum Sputum  Gram Stain (27 Oct 2021 07:06):    Numerous polymorphonuclear leukocytes per low power field    No Squamous epithelial cells per low power field    Moderate Gram positive cocci in pairs seen per oil power field  Final Report (28 Oct 2021 16:02):    Numerous Methicillin Resistant Staphylococcus aureus    Normal Respiratory Asia present  Organism: Methicillin resistant Staphylococcus aureus (28 Oct 2021 16:02)  Organism: Methicillin resistant Staphylococcus aureus (28 Oct 2021 16:02)                                           Mode: Auto Mode: PRVC/ Volume Support  RR (machine): 12  TV (machine): 400  FiO2: 30  PEEP: 5  MAP: 10  PIP: 21    ABG - ( 29 Oct 2021 03:38 )  pH, Arterial: 7.50  pH, Blood: x     /  pCO2: 39    /  pO2: 127   / HCO3: 30    / Base Excess: 6.7   /  SaO2: 97.7        MEDICATIONS  (STANDING):  aspirin  chewable 81 milliGRAM(s) Oral daily  atorvastatin 40 milliGRAM(s) Oral at bedtime  chlorhexidine 0.12% Liquid 15 milliLiter(s) Oral Mucosa two times a day  chlorhexidine 4% Liquid 1 Application(s) Topical <User Schedule>  dexMEDEtomidine Infusion 0.2 MICROgram(s)/kG/Hr (3.2 mL/Hr) IV Continuous <Continuous>  dextrose 40% Gel 15 Gram(s) Oral once  dextrose 5%. 1000 milliLiter(s) (50 mL/Hr) IV Continuous <Continuous>  dextrose 5%. 1000 milliLiter(s) (100 mL/Hr) IV Continuous <Continuous>  dextrose 50% Injectable 25 Gram(s) IV Push once  dextrose 50% Injectable 12.5 Gram(s) IV Push once  dextrose 50% Injectable 25 Gram(s) IV Push once  DULoxetine 60 milliGRAM(s) Oral daily  folic acid 1 milliGRAM(s) Oral daily  glucagon  Injectable 1 milliGRAM(s) IntraMuscular once  heparin   Injectable 5000 Unit(s) SubCutaneous every 8 hours  hydroxychloroquine 200 milliGRAM(s) Oral two times a day  insulin regular Infusion 1 Unit(s)/Hr (1 mL/Hr) IV Continuous <Continuous>  levothyroxine 175 MICROGram(s) Oral daily  pantoprazole  Injectable 40 milliGRAM(s) IV Push daily  vancomycin  IVPB 750 milliGRAM(s) IV Intermittent every 12 hours  vancomycin  IVPB        MEDICATIONS  (PRN):  acetaminophen     Tablet .. 650 milliGRAM(s) Oral every 6 hours PRN Temp greater or equal to 38C (100.4F)  dextrose 50% Injectable 25 milliLiter(s) IV Push once PRN glucose < 60      New X-rays reviewed:                                                                                  ECHO    CXR interpreted by me:  ETT OGT OK, bilateral vascular congestion and opacities

## 2021-10-29 NOTE — PROGRESS NOTE ADULT - SUBJECTIVE AND OBJECTIVE BOX
LINDSAY ALBERT  76y, Female  Allergy: No Known Allergies      LOS  7d    CHIEF COMPLAINT: SOB (28 Oct 2021 17:36)      INTERVAL EVENTS/HPI  - No acute events overnight  - T(F): , Max: 101.3 (10-29-21 @ 07:01)  - Denies any worsening symptoms  - Tolerating medication  - WBC Count: 11.65 (10-29-21 @ 05:13)  WBC Count: 11.22 (10-28-21 @ 05:35)     - Creatinine, Serum: 0.7 (10-28-21 @ 05:35)       ROS  General: Denies rigors, nightsweats  HEENT: Denies headache, rhinorrhea, sore throat, eye pain  CV: Denies CP, palpitations  PULM: Denies wheezing, hemoptysis  GI: Denies hematemesis, hematochezia, melena  : Denies discharge, hematuria  MSK: Denies arthralgias, myalgias  SKIN: Denies rash, lesions  NEURO: Denies paresthesias, weakness  PSYCH: Denies depression, anxiety    VITALS:  T(F): 101.3, Max: 101.3 (10-29-21 @ 07:01)  HR: 71  BP: 107/55  RR: 15Vital Signs Last 24 Hrs  T(C): 38.5 (29 Oct 2021 07:01), Max: 38.5 (29 Oct 2021 07:01)  T(F): 101.3 (29 Oct 2021 07:01), Max: 101.3 (29 Oct 2021 07:01)  HR: 71 (29 Oct 2021 07:00) (62 - 101)  BP: 107/55 (29 Oct 2021 07:00) (84/50 - 123/62)  BP(mean): 75 (29 Oct 2021 07:00) (62 - 85)  RR: 15 (29 Oct 2021 07:01) (13 - 28)  SpO2: 100% (29 Oct 2021 07:00) (97% - 100%)    PHYSICAL EXAM:  Gen: NAD, resting in bed  HEENT: Normocephalic, atraumatic  Neck: supple, no lymphadenopathy  CV: Regular rate & regular rhythm  Lungs: decreased BS at bases, no fremitus  Abdomen: Soft, BS present  Ext: Warm, well perfused  Neuro: non focal, awake  Skin: no rash, no erythema  Lines: no phlebitis    FH: Non-contributory  Social Hx: Non-contributory    TESTS & MEASUREMENTS:                        9.0    11.65 )-----------( 247      ( 29 Oct 2021 05:13 )             29.2     10-28    137  |  95<L>  |  37<H>  ----------------------------<  212<H>  3.3<L>   |  31  |  0.7    Ca    8.5      28 Oct 2021 05:35  Mg     2.1     10-28    TPro  5.4<L>  /  Alb  2.8<L>  /  TBili  0.3  /  DBili  x   /  AST  61<H>  /  ALT  43<H>  /  AlkPhos  119<H>  10-28      LIVER FUNCTIONS - ( 28 Oct 2021 05:35 )  Alb: 2.8 g/dL / Pro: 5.4 g/dL / ALK PHOS: 119 U/L / ALT: 43 U/L / AST: 61 U/L / GGT: x               Culture - Blood (collected 10-27-21 @ 05:54)  Source: .Blood None  Preliminary Report (10-28-21 @ 20:00):    No growth to date.    Culture - Sputum (collected 10-26-21 @ 20:55)  Source: .Sputum Sputum  Gram Stain (10-27-21 @ 07:06):    Numerous polymorphonuclear leukocytes per low power field    No Squamous epithelial cells per low power field    Moderate Gram positive cocci in pairs seen per oil power field  Final Report (10-28-21 @ 16:02):    Numerous Methicillin Resistant Staphylococcus aureus    Normal Respiratory Asia present  Organism: Methicillin resistant Staphylococcus aureus (10-28-21 @ 16:02)  Organism: Methicillin resistant Staphylococcus aureus (10-28-21 @ 16:02)      -  Amoxicillin/Clavulanic Acid: R >4/2      -  Ampicillin: R >8      -  Ampicillin/Sulbactam: R 16/8      -  Cefazolin: R >16      -  Ceftriaxone: R >32      -  Ciprofloxacin: R >2      -  Clindamycin: R >4      -  Daptomycin: S <=0.25      -  Erythromycin: R >4      -  Gentamicin: S <=1 Should not be used as monotherapy      -  Levofloxacin: R >4      -  Linezolid: S 2      -  Meropenem: R >8      -  Moxifloxacin(Aerobic): R >4      -  Oxacillin: R >2      -  Penicillin: R >8      -  RIF- Rifampin: S <=1 Should not be used as monotherapy      -  Tetra/Doxy: S <=1      -  Trimethoprim/Sulfamethoxazole: S <=0.5/9.5      -  Vancomycin: S 1      Method Type: VINNIE    Culture - Blood (collected 10-26-21 @ 05:37)  Source: .Blood None  Preliminary Report (10-27-21 @ 18:01):    No growth to date.    Culture - Sputum (collected 10-23-21 @ 15:05)  Source: .Sputum Sputum  Gram Stain (10-24-21 @ 14:45):    Numerous polymorphonuclear leukocytes per low power field    Few Squamous epithelial cells per low power field    Numerous Gram Positive Cocci in Clusters per oil power field  Final Report (10-25-21 @ 17:03):    Normal Respiratory Asia present    Culture - Blood (collected 10-23-21 @ 11:45)  Source: .Blood Blood-Peripheral  Final Report (10-28-21 @ 20:00):    No Growth Final    Culture - Urine (collected 10-22-21 @ 11:20)  Source: Catheterized Catheterized  Final Report (10-23-21 @ 22:43):    No growth            INFECTIOUS DISEASES TESTING  Procalcitonin, Serum: 0.67 (10-28-21 @ 05:35)  Procalcitonin, Serum: 0.98 (10-26-21 @ 05:37)  Rapid RVP Result: NotDetec (10-25-21 @ 09:22)  MRSA PCR Result.: Negative (10-23-21 @ 15:16)  Procalcitonin, Serum: 1.82 (10-22-21 @ 19:36)  COVID-19 PCR: NotDetec (10-22-21 @ 09:30)  COVID-19 PCR: NotDetec (10-18-21 @ 22:25)  COVID-19 PCR: NotDetec (10-02-21 @ 15:35)  Procalcitonin, Serum: 0.07 (09-28-21 @ 11:17)  COVID-19 PCR: NotDetec (09-25-21 @ 21:43)  COVID-19 PCR: NotDetec (08-07-21 @ 15:30)      INFLAMMATORY MARKERS  C-Reactive Protein, Serum: 98 mg/L (10-28-21 @ 05:35)  Sedimentation Rate, Erythrocyte: 87 mm/Hr (10-28-21 @ 05:35)      RADIOLOGY & ADDITIONAL TESTS:  I have personally reviewed the last available Chest xray  CXR      CT      CARDIOLOGY TESTING  12 Lead ECG:   Ventricular Rate 80 BPM    Atrial Rate 80 BPM    P-R Interval 130 ms    QRS Duration 92 ms    Q-T Interval 440 ms    QTC Calculation(Bazett) 507 ms    P Axis 22 degrees    R Axis -18 degrees    T Axis 214 degrees    Diagnosis Line Normal sinus rhythm  Nonspecific ST-T changes  Confirmed by LUAN YORK MD ([id number]) on 10/25/2021 8:36:05 PM (10-25-21 @ 07:23)  12 Lead ECG:   Ventricular Rate 81 BPM    Atrial Rate 81 BPM    P-R Interval 136 ms    QRS Duration 88 ms    Q-T Interval 450 ms    QTC Calculation(Bazett) 522 ms    P Axis -18 degrees    R Axis -16 degrees    T Axis 107 degrees    Diagnosis Line Normal sinus rhythm  ST & T wave abnormality, consider anterolateral ischemia  Prolonged QT  Abnormal ECG    Confirmed by LUAN YORK MD ([id number]) on 10/24/2021 10:46:14 AM (10-24-21 @ 07:47)      MEDICATIONS  aspirin  chewable 81 Oral daily  atorvastatin 40 Oral at bedtime  chlorhexidine 0.12% Liquid 15 Oral Mucosa two times a day  chlorhexidine 4% Liquid 1 Topical <User Schedule>  dexMEDEtomidine Infusion 0.2 IV Continuous <Continuous>  dextrose 40% Gel 15 Oral once  dextrose 5%. 1000 IV Continuous <Continuous>  dextrose 5%. 1000 IV Continuous <Continuous>  dextrose 50% Injectable 25 IV Push once  dextrose 50% Injectable 12.5 IV Push once  dextrose 50% Injectable 25 IV Push once  DULoxetine 60 Oral daily  folic acid 1 Oral daily  furosemide   Injectable 60 IV Push once  glucagon  Injectable 1 IntraMuscular once  heparin   Injectable 5000 SubCutaneous every 8 hours  hydroxychloroquine 200 Oral two times a day  insulin regular Infusion 1 IV Continuous <Continuous>  levothyroxine 175 Oral daily  pantoprazole  Injectable 40 IV Push daily  sodium chloride 0.45%. 1000 IV Continuous <Continuous>  vancomycin  IVPB 750 IV Intermittent every 12 hours  vancomycin  IVPB         WEIGHT  Weight (kg): 64 (10-22-21 @ 14:13)      ANTIBIOTICS:  hydroxychloroquine 200 milliGRAM(s) Oral two times a day  vancomycin  IVPB 750 milliGRAM(s) IV Intermittent every 12 hours  vancomycin  IVPB          All available historical records have been reviewed

## 2021-10-29 NOTE — PROGRESS NOTE ADULT - ASSESSMENT
76 yr F with SLE on Methotrexate, moderate MR, HFpEF, HTN, hypothyroid, HLD, T2DM, hx of CVA with no residual deficits discharged from hospital earlier this month on 10/2 s/p HF exacerbation and NSTEMI, DC'd on new home oxygen, refused in pt cardiac cath and is supposed to have outpt cardiac cath with Dr Kowalski.   DC'd on 10/21 from [facility] s/p admission for resolved ANTONELLA, hypoglycemia and NSTEMI  returns to hospital with:    Acute respiratory failure secondary to sepsis from MRSA pneumonia and NSTEMI      - continue IV antibiotics as per ID   - procal improved but still elevated   - off pressors   - maintain even fluid balance   - weaning as tolerated    - home meds   - DVT and GI prophylaxis

## 2021-10-29 NOTE — PROGRESS NOTE ADULT - ASSESSMENT
IMPRESSION:    Acute Hypoxemic and Hypercapnic Respiratory Failure SP intubation  Pulmonary Edema, Bilateral Effusions, improving  HFpEF exacerbation, moderate MR  Sepsis present on admission  MRSA PNA  NSTEMI  Hypothyroidism   Iron Deficiency Anemia, Anemia of chronic disease      PLAN:    CNS:  SAT today.  Off Precedex.  Target RAAS 0 to -1.  Monitor mental status.  Reassess CAM-ICU.     HEENT: oral care     PULMONARY:  HOB at 45 degrees.  Aspiration precautions.  Target POx > 92 %.  No vent changes.  SBT today if more alert    CARDIOVASCULAR:   c/w Lasix.   Target MAP 60.  Keep I < O.  Daily weight.  Strict I & O    GI:  GI prophylaxis.  Hold OG feeds for possible SBT.  Bowel regimen.  RUQ US.    RENAL:   FU lytes.  Replete as necessary.  Monitor UO.  BELIA Wise    INFECTIOUS DISEASE:  FU DTA & blood culture.  Panculture.  c/w Vanc & Cefepime.  Vanc trough.  ID FU appreciated.    HEMATOLOGICAL:  DVT prophylaxis.  Keep Hb > 8    ENDOCRINE:  c/w Synthroid 175mcg.  Follow up FS. Insulin protocol    MUSCULOSKELETAL:  Bed in chair if more awake.  PT/OT if more awake.    Guarded prognosis  Monitor in MICU IMPRESSION:    Acute Hypoxemic and Hypercapnic Respiratory Failure SP intubation  Pulmonary Edema, Bilateral Effusions, improving  HFpEF exacerbation, moderate MR  Sepsis present on admission  MRSA PNA  NSTEMI  Hypothyroidism   Iron Deficiency Anemia, Anemia of chronic disease      PLAN:    CNS:  SAT today.  Off Precedex.  Target RAAS 0 to -1.  Monitor mental status.  Reassess CAM-ICU.     HEENT: oral care     PULMONARY:  HOB at 45 degrees.  Aspiration precautions.  Target POx > 92 %.  No vent changes.  SBT today if more alert    CARDIOVASCULAR:   c/w Lasix.   Target MAP 60.  Keep I < O.  Daily weight.  Strict I & O    GI:  GI prophylaxis.  Hold OG feeds for possible SBT.  Bowel regimen.  RUQ US.    RENAL:   FU lytes.  Replete as necessary.  Monitor UO.  DC Wise, Bladder scan.  Straight cath if retaining.    INFECTIOUS DISEASE:  FU DTA & blood culture.  Panculture.  c/w Vanc & Cefepime.  Vanc trough.  ID FU appreciated.    HEMATOLOGICAL:  DVT prophylaxis.  Keep Hb > 8    ENDOCRINE:  c/w Synthroid 175mcg.  Follow up FS. Insulin protocol    MUSCULOSKELETAL:  Bed in chair if more awake.  PT/OT if more awake.    Guarded prognosis  Monitor in MICU

## 2021-10-30 NOTE — PROGRESS NOTE ADULT - ASSESSMENT
Impression/plan  Acute Hypoxemic and Hypercapnic Respiratory Failure SP intubation  Pulmonary Edema, Bilateral Effusions, improving  HFpEF exacerbation, moderate MR  Sepsis present on admission  MRSA PNA  NSTEMI  Hypothyroidism   Iron Deficiency Anemia, Anemia of chronic disease      Pt remains intubated will continue daily SBT.  Daily SAT. Continue abx. Continue glycemic control w insulin drip.  pt remains critically ill w multi organ involvment. Time spent 35 minutes.

## 2021-10-30 NOTE — PROGRESS NOTE ADULT - SUBJECTIVE AND OBJECTIVE BOX
76y old Female who presents with a chief complaint of SOB  admitted to medicine with the primary diagnosis of Acute pulmonary edema due to CHF exacerbation, suspected MRSA PNA, was intubated and admitted to ICU.  Today pt is fully awake, orally intubated, answering questions with gestures, calm.     PAST MEDICAL & SURGICAL HISTORY  DM (diabetes mellitus)  insulin  fs achs    Hypercholesteremia  statin    Hypertension  hctz    Lupus  as per hx    Fall, initial encounter  as  hx    Stroke  TIA 2002, no deficits    Herniated lumbar intervertebral disc  as per hx    Seizure  keppra    No significant past surgical history        ALLERGIES:  No Known Allergies    VITALS:   T(C): 38.2 (30 Oct 2021 15:00), Max: 38.7 (30 Oct 2021 08:00)  T(F): 100.8 (30 Oct 2021 15:00), Max: 101.6 (30 Oct 2021 08:00)  HR: 73 (30 Oct 2021 16:00) (65 - 75)  BP: 101/55 (30 Oct 2021 16:00) (87/52 - 108/56)  BP(mean): 75 (30 Oct 2021 16:00) (65 - 78)  RR: 18 (30 Oct 2021 16:00) (15 - 33)  SpO2: 100% (30 Oct 2021 16:00) (99% - 100%)    PHYSICAL EXAM:  GEN: No acute distress, awake, orally intubated   PULM/CHEST: decreased BS at bases, no wheezing   CVS: Regular rate and rhythm, S1-S2  ABD: Soft, non-tender, non-distended, +BS  EXT: +1 edema on LE and 3+ on UE   NEURO: awake, answering questions with gestures, orally intubated       LABS:                                    8.7    9.40  )-----------( 238      ( 30 Oct 2021 05:26 )             27.6   10-30    138  |  98  |  55<H>  ----------------------------<  163<H>  4.7   |  25  |  1.3    Ca    8.6      30 Oct 2021 05:26  Mg     2.3     10-30    TPro  6.0  /  Alb  3.0<L>  /  TBili  0.3  /  DBili  x   /  AST  73<H>  /  ALT  63<H>  /  AlkPhos  178<H>  10-30      ABG - ( 29 Oct 2021 03:38 )  pH, Arterial: 7.50  pH, Blood: x     /  pCO2: 39    /  pO2: 127   / HCO3: 30    / Base Excess: 6.7   /  SaO2: 97.7        Culture - Blood (collected 27 Oct 2021 05:54)  Source: .Blood None  Preliminary Report (28 Oct 2021 20:00):    No growth to date.    Culture - Sputum (collected 26 Oct 2021 20:55)  Source: .Sputum Sputum  Gram Stain (27 Oct 2021 07:06):    Numerous polymorphonuclear leukocytes per low power field    No Squamous epithelial cells per low power field    Moderate Gram positive cocci in pairs seen per oil power field  Final Report (28 Oct 2021 16:02):      Numerous Methicillin Resistant Staphylococcus aureus    Normal Respiratory Asia present  Organism: Methicillin resistant Staphylococcus aureus (28 Oct 2021 16:02)  Organism: Methicillin resistant Staphylococcus aureus (28 Oct 2021 16:02)      RADIOLOGY:    < from: Xray Chest 1 View- PORTABLE-Routine (Xray Chest 1 View- PORTABLE-Routine in AM.) (10.30.21 @ 05:28) >  Impression:    Bilateral opacities/left pleural effusion. Stable cardiomegaly.    < end of copied text >  < from: US Abdomen Upper Quadrant Right (10.29.21 @ 12:17) >  IMPRESSION:  Limited evaluation due to shadowing from overlying bowel gas.    1.  Nonobstructing 0.7 cm right lower pole renal calculus.  2.  No hydronephrosis.    < end of copied text >  < from: TTE Echo Complete w/o Contrast w/ Doppler (10.19.21 @ 14:47) >  Summary:   1. Left ventricular ejection fraction, by visual estimation, is 50 to 55%.   2. Mildly enlarged left atrium.   3. Mild mitral annular calcification.   4. Moderate mitral valve regurgitation.   5. Moderate tricuspid regurgitation.   6. Mild aortic regurgitation.   7. Sclerotic aortic valve with normal opening.  8. Estimated pulmonary artery systolic pressure is 37.3 mmHg assuming a right atrial pressure of 3 mmHg, which is consistent with borderline pulmonary hypertension.    < end of copied text >    MEDICATIONS  (STANDING):  aspirin  chewable 81 milliGRAM(s) Oral daily  atorvastatin 40 milliGRAM(s) Oral at bedtime  cefepime   IVPB      cefepime   IVPB 1000 milliGRAM(s) IV Intermittent every 12 hours  chlorhexidine 0.12% Liquid 15 milliLiter(s) Oral Mucosa two times a day  chlorhexidine 4% Liquid 1 Application(s) Topical <User Schedule>  dexMEDEtomidine Infusion 0.2 MICROgram(s)/kG/Hr (3.2 mL/Hr) IV Continuous <Continuous>  dextrose 40% Gel 15 Gram(s) Oral once  dextrose 5%. 1000 milliLiter(s) (50 mL/Hr) IV Continuous <Continuous>  dextrose 5%. 1000 milliLiter(s) (100 mL/Hr) IV Continuous <Continuous>  dextrose 50% Injectable 25 Gram(s) IV Push once  dextrose 50% Injectable 12.5 Gram(s) IV Push once  dextrose 50% Injectable 25 Gram(s) IV Push once  DULoxetine 60 milliGRAM(s) Oral daily  folic acid 1 milliGRAM(s) Oral daily  glucagon  Injectable 1 milliGRAM(s) IntraMuscular once  heparin   Injectable 5000 Unit(s) SubCutaneous every 8 hours  hydroxychloroquine 200 milliGRAM(s) Oral two times a day  insulin regular Infusion 1 Unit(s)/Hr (1 mL/Hr) IV Continuous <Continuous>  levothyroxine 175 MICROGram(s) Oral daily  nystatin Ointment 1 Application(s) Topical two times a day  pantoprazole  Injectable 40 milliGRAM(s) IV Push daily  vancomycin  IVPB 750 milliGRAM(s) IV Intermittent every 12 hours  vancomycin  IVPB        MEDICATIONS  (PRN):  acetaminophen     Tablet .. 650 milliGRAM(s) Oral every 6 hours PRN Temp greater or equal to 38C (100.4F)

## 2021-10-30 NOTE — PROGRESS NOTE ADULT - SUBJECTIVE AND OBJECTIVE BOX
LENGTH OF HOSPITAL STAY: 8d    CHIEF COMPLAINT:   Patient is a 76y old  Female who presents with a chief complaint of SOB (29 Oct 2021 14:03)      HISTORY OF PRESENTING ILLNESS:    HPI:  77 yo female PMHx of HFpEF, HTN, HLD, DM,  SLE on Cellsept and methotrexate, CVA in 2002, recently admitted for Hypoglycemia and ANTONELLA, discharged yesterday, presenting for shortness of breath. Patient was found hypoxic in ER and was put on bipap. Her blood sugars were also high presumably from non compliance since discharge. Patient has no other acute complaints.     Of note patient was discharged yesterday on reduced dose of Lasix which she did not take at home.      T(C): 35.6 (22 Oct 2021 14:13), Max: 37.3 (22 Oct 2021 10:12)  T(F): 96 (22 Oct 2021 14:13), Max: 99.1 (22 Oct 2021 10:12)  HR: 91 (22 Oct 2021 14:24) (86 - 116)  BP: 127/60 (22 Oct 2021 14:13) (105/51 - 168/87)  BP(mean): --  RR: 23 (22 Oct 2021 14:13) (17 - 50)  SpO2: 98% (22 Oct 2021 14:24) (91% - 100%)    Patient found hypoxic in ER. Blood work reviewed, lactate , Beta hydroxy butyrate and anion gap is elevated, UA Positive. CXR showing bilateral infiltrates. Patient is being admitted for hypoxic resp failure and hyperglycemia  (22 Oct 2021 15:03)      Pt remains intubated and vent dependent , hemodynamically stable.    PAST MEDICAL & SURGICAL HISTORY  PAST MEDICAL & SURGICAL HISTORY:  DM (diabetes mellitus)  insulin  fs achs    Hypercholesteremia  statin    Hypertension  hctz    Lupus  as per hx    Fall, initial encounter  as  hx    Stroke  TIA 2002, no deficits    Herniated lumbar intervertebral disc  as per hx    Seizure  keppra    No significant past surgical history          ALLERGIES:  No Known Allergies    MEDICATIONS:  STANDING MEDICATIONS  aspirin  chewable 81 milliGRAM(s) Oral daily  atorvastatin 40 milliGRAM(s) Oral at bedtime  cefepime   IVPB      cefepime   IVPB 1000 milliGRAM(s) IV Intermittent every 12 hours  chlorhexidine 0.12% Liquid 15 milliLiter(s) Oral Mucosa two times a day  chlorhexidine 4% Liquid 1 Application(s) Topical <User Schedule>  dexMEDEtomidine Infusion 0.2 MICROgram(s)/kG/Hr IV Continuous <Continuous>  dextrose 40% Gel 15 Gram(s) Oral once  dextrose 5%. 1000 milliLiter(s) IV Continuous <Continuous>  dextrose 5%. 1000 milliLiter(s) IV Continuous <Continuous>  dextrose 50% Injectable 25 Gram(s) IV Push once  dextrose 50% Injectable 12.5 Gram(s) IV Push once  dextrose 50% Injectable 25 Gram(s) IV Push once  DULoxetine 60 milliGRAM(s) Oral daily  folic acid 1 milliGRAM(s) Oral daily  glucagon  Injectable 1 milliGRAM(s) IntraMuscular once  heparin   Injectable 5000 Unit(s) SubCutaneous every 8 hours  hydroxychloroquine 200 milliGRAM(s) Oral two times a day  insulin regular Infusion 1 Unit(s)/Hr IV Continuous <Continuous>  levothyroxine 175 MICROGram(s) Oral daily  nystatin Ointment 1 Application(s) Topical two times a day  pantoprazole  Injectable 40 milliGRAM(s) IV Push daily  vancomycin  IVPB 750 milliGRAM(s) IV Intermittent every 12 hours  vancomycin  IVPB        PRN MEDICATIONS  acetaminophen     Tablet .. 650 milliGRAM(s) Oral every 6 hours PRN  dextrose 50% Injectable 25 milliLiter(s) IV Push once PRN    VITALS:   T(F): 100.2  HR: 71  BP: 94/53  RR: 18  SpO2: 100%    LABS:                        8.7    9.40  )-----------( 238      ( 30 Oct 2021 05:26 )             27.6     10-30    138  |  98  |  55<H>  ----------------------------<  163<H>  4.7   |  25  |  1.3    Ca    8.6      30 Oct 2021 05:26  Mg     2.3     10-30    TPro  6.0  /  Alb  3.0<L>  /  TBili  0.3  /  DBili  x   /  AST  73<H>  /  ALT  63<H>  /  AlkPhos  178<H>  10-30        ABG - ( 30 Oct 2021 04:35 )  pH, Arterial: 7.53  pH, Blood: x     /  pCO2: 36    /  pO2: 100   / HCO3: 30    / Base Excess: 7.0   /  SaO2: 96.7                  Culture - Blood (collected 28 Oct 2021 05:35)  Source: .Blood None  Preliminary Report (29 Oct 2021 14:02):    No growth to date.          RADIOLOGY:  cxr    Impression:    Bilateral opacities/left pleural effusion. Stable cardiomegaly.    PHYSICAL EXAM:  GEN: sedated  HEENT: ett in place  LUNGS: bilateral air entry  HEART: S1/S2 present. RRR.   ABD: Soft, non-tender, non-distended. Bowel sounds present  EXT:-c/c/e  NEURO: sedated

## 2021-10-30 NOTE — PROGRESS NOTE ADULT - ASSESSMENT
77 y/o F PMH heart failure, moderate severity mitral valve regurgitation, admitted in early October for heart failure and NSTEMI presents c/o SOB. Pt had a recent discharge from the hospital and XR during that admission showed increased markings in both lung fields consistent with CHF exacerbation Patient found to have pneumonia likely MRSA and will continue with Vancomycin as per ID. Continues to be febrile despite being on antibiotics.     A/P     # Acute hypoxemic respiratory failure/ acute on chronic diastolic CHF/ MRSA PNA   - pt is orally intubated, on sedation vacation today  - daily weaning trials   - off diuretics now  - monitor intake and output, c/w connor cath   - c/w Vanco, d/c Cefepime   - contact isolation for MRSA  - pulmonary toilet, daily weaning trials   -Tylenol PRN for fever, repeat blood Cx if pt'll spike high grade fever   - if pt remains febrile might need double Abx coverage for MRSA   - ID follow up   - D/C Cefepime as per ID  - Initial blood culture and repeat negative  -  f/u Repeat cultures, Fungitell and DTA   - hold Hydroxychloroquine      # Hypothyroidism   - Elevated TSH 12.24  - Continue synthroid 175mg  - TSH repeat outpatient in 2 months    # Iron Deficiency Anemia/  Anemia of chronic disease  - On lAst admission given Iron sucrose 200x2  - monitor H/H, keep Hb above 7.5     # DM II  - monitor finger stick   - Continue Insulin drip    # Elevated LFTs  - f/u RUQ US  - monitor LFTs     Status: FULL CODE  GI Prophylaxis: None   DVT Prophylaxis: Hep SubQ     #Progress Note Handoff  Pending (specify):  d/c Cefepime, and hydroxychloroquine, if pt'll spike fever repeat pan Cx, ID follow up to consider double coverage for MRSA, monitor LFTs, f/u RUQ US, daily weaning trials, if pt passes, extubate    Family discussion: n/a   Disposition: Home___/SNF___/Other________/Unknown at this time____x ____   77 y/o F PMH heart failure, moderate severity mitral valve regurgitation, admitted in early October for heart failure and NSTEMI presents c/o SOB. Pt had a recent discharge from the hospital and XR during that admission showed increased markings in both lung fields consistent with CHF exacerbation Patient found to have pneumonia likely MRSA and will continue with Vancomycin as per ID. Continues to be febrile despite being on antibiotics.     A/P     # Acute hypoxemic respiratory failure/ acute on chronic diastolic CHF/ MRSA PNA   - pt is orally intubated, on sedation vacation today  - daily weaning trials   - off diuretics now  - monitor intake and output, c/w connor cath   - c/w Vanco, d/c Cefepime   - contact isolation for MRSA  - pulmonary toilet, daily weaning trials   -Tylenol PRN for fever, repeat blood Cx if pt'll spike high grade fever   - if pt remains febrile might need double Abx coverage for MRSA   - ID follow up   - D/C Cefepime as per ID  - Initial blood culture and repeat negative  -  f/u Repeat cultures, Fungitell and DTA     # h/o SLE  - hold Hydroxychloroquine      # Hypothyroidism   - Elevated TSH 12.24  - Continue synthroid 175mg  - TSH repeat outpatient in 2 months    # Iron Deficiency Anemia/  Anemia of chronic disease  - On lAst admission given Iron sucrose 200x2  - monitor H/H, keep Hb above 7.5     # DM II  - monitor finger stick   - Continue Insulin drip    # Elevated LFTs  - f/u RUQ US  - monitor LFTs     Status: FULL CODE  GI Prophylaxis: None   DVT Prophylaxis: Hep SubQ     #Progress Note Handoff  Pending (specify):  d/c Cefepime, and hydroxychloroquine, if pt'll spike fever repeat pan Cx, ID follow up to consider double coverage for MRSA, monitor LFTs, f/u RUQ US, daily weaning trials, if pt passes, extubate    Family discussion: n/a   Disposition: Home___/SNF___/Other________/Unknown at this time____x ____

## 2021-10-31 NOTE — PROGRESS NOTE ADULT - ASSESSMENT
76 yr F with SLE on Methotrexate, moderate MR, HFpEF, HTN, hypothyroid, HLD, T2DM, hx of CVA with no residual deficits discharged from hospital earlier this month on 10/2 s/p HF exacerbation and NSTEMI, DC'd on new home oxygen, refused in pt cardiac cath and is supposed to have outpt cardiac cath with Dr Kowalski.   DC'd on 10/21 from [facility] s/p admission for resolved ANTONELLA, hypoglycemia and NSTEMI  returns to hospital with:    Acute respiratory failure secondary to sepsis from gram neg pneumonia and NSTEMI      - continue IV antibiotics    - aspirin, Lipitor   - off pressors   - maintain even fluid balance   - failed SBT today    - home meds   - DVT and GI prophylaxis

## 2021-10-31 NOTE — PROGRESS NOTE ADULT - SUBJECTIVE AND OBJECTIVE BOX
Patient awake and following commands when off sedation      T(F): 99.9 (10-31-21 @ 11:01), Max: 100.8 (10-30-21 @ 15:00)  HR: 82 (10-31-21 @ 11:00)  BP: 133/63 (10-31-21 @ 11:00)  RR: 18 (10-31-21 @ 11:01)  SpO2: 100% (10-31-21 @ 11:00) (98% - 100%)    PHYSICAL EXAM:  GENERAL: NAD  HEAD:  Atraumatic, Normocephalic  EYES: EOMI, PERRLA, conjunctiva and sclera clear  NERVOUS SYSTEM:  Alert & Oriented X3, no focal deficits   CHEST/LUNG: Clear to percussion bilaterally; No rales, rhonchi, wheezing, or rubs  HEART: Regular rate and rhythm; No murmurs, rubs, or gallops  ABDOMEN: Soft, Nontender, Nondistended; Bowel sounds present  EXTREMITIES:  2+ Peripheral Pulses, No clubbing, cyanosis, or edema    LABS  10-31    134<L>  |  97<L>  |  60<H>  ----------------------------<  109<H>  4.3   |  25  |  1.3    Ca    8.8      31 Oct 2021 05:51  Mg     2.4     10-31    TPro  5.8<L>  /  Alb  2.8<L>  /  TBili  0.2  /  DBili  x   /  AST  73<H>  /  ALT  72<H>  /  AlkPhos  188<H>  10-31                          8.0    9.05  )-----------( 261      ( 31 Oct 2021 05:51 )             25.1       Mode: CPAP with PS  FiO2: 30  PEEP: 5  PS: 10      Culture Results:   No growth to date. (10-29-21)  Culture Results:   No growth to date. (10-28-21)  Culture Results:   No growth to date. (10-27-21)  Culture Results:   Numerous Methicillin Resistant Staphylococcus aureus  Normal Respiratory Asia present (10-26-21)  Culture Results:   No growth to date. (10-26-21)  Culture Results:   Normal Respiratory Asia present (10-23-21)  Culture Results:   No Growth Final (10-23-21)  Culture Results:   No growth (10-22-21)    RADIOLOGY  < from: Xray Chest 1 View- PORTABLE-Routine (Xray Chest 1 View- PORTABLE-Routine in AM.) (10.30.21 @ 05:28) >  Impression:    Bilateral opacities/left pleural effusion. Stable cardiomegaly.      < end of copied text >    MEDICATIONS  (STANDING):  aspirin  chewable 81 milliGRAM(s) Oral daily  atorvastatin 40 milliGRAM(s) Oral at bedtime  chlorhexidine 0.12% Liquid 15 milliLiter(s) Oral Mucosa two times a day  chlorhexidine 4% Liquid 1 Application(s) Topical <User Schedule>  dexMEDEtomidine Infusion 0.2 MICROgram(s)/kG/Hr (3.2 mL/Hr) IV Continuous <Continuous>  DULoxetine 60 milliGRAM(s) Oral daily  folic acid 1 milliGRAM(s) Oral daily  glucagon  Injectable 1 milliGRAM(s) IntraMuscular once  heparin   Injectable 5000 Unit(s) SubCutaneous every 8 hours  insulin regular Infusion 1 Unit(s)/Hr (1 mL/Hr) IV Continuous <Continuous>  levothyroxine 175 MICROGram(s) Oral daily  midazolam Infusion 0.02 mG/kG/Hr (1.28 mL/Hr) IV Continuous <Continuous>  nystatin Ointment 1 Application(s) Topical two times a day  pantoprazole  Injectable 40 milliGRAM(s) IV Push daily  vancomycin  IVPB 750 milliGRAM(s) IV Intermittent every 12 hours          MEDICATIONS  (PRN):  acetaminophen     Tablet .. 650 milliGRAM(s) Oral every 6 hours PRN Temp greater or equal to 38C (100.4F)

## 2021-10-31 NOTE — PROGRESS NOTE ADULT - SUBJECTIVE AND OBJECTIVE BOX
Patient is seen and examined at the bed side, is febrile. The sputum culture  grew  MRSA.       REVIEW OF SYSTEMS: Unable to obtain due to mental status      ALLERGIES: No Known Allergies      ICU Vital Signs Last 24 Hrs  T(C): 36.8 (31 Oct 2021 15:05), Max: 37.7 (31 Oct 2021 11:01)  T(F): 98.2 (31 Oct 2021 15:05), Max: 99.9 (31 Oct 2021 11:01)  HR: 62 (31 Oct 2021 16:30) (62 - 82)  BP: 94/56 (31 Oct 2021 16:30) (88/54 - 133/63)  BP(mean): 70 (31 Oct 2021 16:30) (67 - 90)  ABP: --  ABP(mean): --  RR: 15 (31 Oct 2021 17:00) (15 - 38)  SpO2: 100% (31 Oct 2021 16:30) (98% - 100%)      PHYSICAL EXAM:  GENERAL: intubated/vented   CHEST/LUNG: Not using accessory muscles   HEART: s1 and s2 present  ABDOMEN:  Nontender and  Nondistended  EXTREMITIES: No pedal  edema  CNS: intubated/vented       LABS:                        8.0    9.05  )-----------( 261      ( 31 Oct 2021 05:51 )             25.1                           8.6    11.22 )-----------( 237      ( 28 Oct 2021 05:35 )             28.2     10-31    134<L>  |  97<L>  |  60<H>  ----------------------------<  109<H>  4.3   |  25  |  1.3    Ca    8.8      31 Oct 2021 05:51  Mg     2.4     10-31    TPro  5.8<L>  /  Alb  2.8<L>  /  TBili  0.2  /  DBili  x   /  AST  73<H>  /  ALT  72<H>  /  AlkPhos  188<H>  10-31    10-28    137  |  95<L>  |  37<H>  ----------------------------<  212<H>  3.3<L>   |  31  |  0.7    Ca    8.5      28 Oct 2021 05:35  Mg     2.1     10-28    TPro  5.4<L>  /  Alb  2.8<L>  /  TBili  0.3  /  DBili  x   /  AST  61<H>  /  ALT  43<H>  /  AlkPhos  119<H>  10-28        CAPILLARY BLOOD GLUCOSE  233 (28 Oct 2021 07:00)  231 (28 Oct 2021 06:15)  199 (28 Oct 2021 05:15)  272 (28 Oct 2021 04:15)    POCT Blood Glucose.: 156 mg/dL (28 Oct 2021 16:18)  POCT Blood Glucose.: 252 mg/dL (28 Oct 2021 14:13)  POCT Blood Glucose.: 259 mg/dL (28 Oct 2021 12:05)  POCT Blood Glucose.: 92 mg/dL (28 Oct 2021 10:25)  POCT Blood Glucose.: 77 mg/dL (28 Oct 2021 09:13)  POCT Blood Glucose.: 233 mg/dL (28 Oct 2021 06:51    ABG - ( 28 Oct 2021 16:19 )  pH, Arterial: 7.51  pH, Blood: x     /  pCO2: 44    /  pO2: 108   / HCO3: 35    / Base Excess: 11.0  /  SaO2: 97.5        vaVancomycin Level, Random (10.30.21 @ 05:55)   Vancomycin Level, Random: 28.2:   Vancomycin Level, Random (10.28.21 @ 05:35)   Vancomycin Level, Random: 17.1      MEDICATIONS  (STANDING):      aspirin  chewable 81 milliGRAM(s) Oral daily  atorvastatin 40 milliGRAM(s) Oral at bedtime  chlorhexidine 0.12% Liquid 15 milliLiter(s) Oral Mucosa two times a day  chlorhexidine 4% Liquid 1 Application(s) Topical <User Schedule>  dexMEDEtomidine Infusion 0.2 MICROgram(s)/kG/Hr (3.2 mL/Hr) IV Continuous <Continuous>  DULoxetine 60 milliGRAM(s) Oral daily  folic acid 1 milliGRAM(s) Oral daily  glucagon  Injectable 1 milliGRAM(s) IntraMuscular once  heparin   Injectable 5000 Unit(s) SubCutaneous every 8 hours  insulin regular Infusion 1 Unit(s)/Hr (1 mL/Hr) IV Continuous <Continuous>  levothyroxine 175 MICROGram(s) Oral daily  nystatin Ointment 1 Application(s) Topical two times a day  pantoprazole  Injectable 40 milliGRAM(s) IV Push daily  propofol Infusion 5 MICROgram(s)/kG/Min (1.92 mL/Hr) IV Continuous <Continuous>  vancomycin  IVPB 750 milliGRAM(s) IV Intermittent every 12 hours  vancomycin  IVPB          RADIOLOGY & ADDITIONAL TESTS:    < from: Xray Chest 1 View- PORTABLE-Routine (Xray Chest 1 View- PORTABLE-Routine in AM.) (10.28.21 @ 07:09) >  Tip of endotracheal tube is at the clara and retraction is recommended.    Stable bilateral lung opacities      < from: Xray Chest 1 View- PORTABLE-Routine (Xray Chest 1 View- PORTABLE-Routine in AM.) (10.26.21 @ 06:04) >  Supportdevices: Right IJ line endotracheal tube and enteric tube are in satisfactory position.    Cardiac/mediastinum/hilum: Unremarkable.    Lung parenchyma/Pleura: Hazy bilateral opacities/effusions left greater than right are unchanged. No pneumothorax.        MICROBIOLOGY DATA:    Culture - Sputum . (10.26.21 @ 20:55)   - Amoxicillin/Clavulanic Acid: R >4/2   - Ampicillin: R >8   - Ampicillin/Sulbactam: R 16/8   - Cefazolin: R >16   - Ceftriaxone: R >32   - Ciprofloxacin: R >2   - Clindamycin: R >4   - Daptomycin: S <=0.25   - Erythromycin: R >4   - Gentamicin: S <=1 Should not be used as monotherapy   - Levofloxacin: R >4   - Linezolid: S 2   - Meropenem: R >8   - Moxifloxacin(Aerobic): R >4   - Oxacillin: R >2   - Penicillin: R >8   - RIF- Rifampin: S <=1 Should not be used as monotherapy   - Tetra/Doxy: S <=1   - Trimethoprim/Sulfamethoxazole: S <=0.5/9.5   - Vancomycin: S 1   Gram Stain:   Numerous polymorphonuclear leukocytes per low power field   No Squamous epithelial cells per low power field   Moderate Gram positive cocci in pairs seen per oil power field   Specimen Source: .Sputum Sputum   Culture Results:   Numerous Methicillin Resistant Staphylococcus aureus   Normal Respiratory Asia present            Patient is seen and examined at the bed side, is febrile. The Vancomycin level is elevated, 28.1 as of 10/30/21.      REVIEW OF SYSTEMS: Unable to obtain due to mental status      ALLERGIES: No Known Allergies      ICU Vital Signs Last 24 Hrs  T(C): 36.8 (31 Oct 2021 15:05), Max: 37.7 (31 Oct 2021 11:01)  T(F): 98.2 (31 Oct 2021 15:05), Max: 99.9 (31 Oct 2021 11:01)  HR: 62 (31 Oct 2021 16:30) (62 - 82)  BP: 94/56 (31 Oct 2021 16:30) (88/54 - 133/63)  BP(mean): 70 (31 Oct 2021 16:30) (67 - 90)  ABP: --  ABP(mean): --  RR: 15 (31 Oct 2021 17:00) (15 - 38)  SpO2: 100% (31 Oct 2021 16:30) (98% - 100%)      PHYSICAL EXAM:  GENERAL: intubated/vented   CHEST/LUNG: Not using accessory muscles   HEART: s1 and s2 present  ABDOMEN:  Nontender and  Nondistended  EXTREMITIES: No pedal  edema  CNS: intubated/vented       LABS:                        8.0    9.05  )-----------( 261      ( 31 Oct 2021 05:51 )             25.1                           8.6    11.22 )-----------( 237      ( 28 Oct 2021 05:35 )             28.2       10-31    134<L>  |  97<L>  |  60<H>  ----------------------------<  109<H>  4.3   |  25  |  1.3    Ca    8.8      31 Oct 2021 05:51  Mg     2.4     10-31    TPro  5.8<L>  /  Alb  2.8<L>  /  TBili  0.2  /  DBili  x   /  AST  73<H>  /  ALT  72<H>  /  AlkPhos  188<H>  10-31    10-28    137  |  95<L>  |  37<H>  ----------------------------<  212<H>  3.3<L>   |  31  |  0.7    Ca    8.5      28 Oct 2021 05:35  Mg     2.1     10-28    TPro  5.4<L>  /  Alb  2.8<L>  /  TBili  0.3  /  DBili  x   /  AST  61<H>  /  ALT  43<H>  /  AlkPhos  119<H>  10-28        CAPILLARY BLOOD GLUCOSE  233 (28 Oct 2021 07:00)  231 (28 Oct 2021 06:15)  199 (28 Oct 2021 05:15)  272 (28 Oct 2021 04:15)    POCT Blood Glucose.: 156 mg/dL (28 Oct 2021 16:18)  POCT Blood Glucose.: 252 mg/dL (28 Oct 2021 14:13)  POCT Blood Glucose.: 259 mg/dL (28 Oct 2021 12:05)  POCT Blood Glucose.: 92 mg/dL (28 Oct 2021 10:25)  POCT Blood Glucose.: 77 mg/dL (28 Oct 2021 09:13)  POCT Blood Glucose.: 233 mg/dL (28 Oct 2021 06:51    ABG - ( 28 Oct 2021 16:19 )  pH, Arterial: 7.51  pH, Blood: x     /  pCO2: 44    /  pO2: 108   / HCO3: 35    / Base Excess: 11.0  /  SaO2: 97.5        Vancomycin Level, Random (10.30.21 @ 05:55)   Vancomycin Level, Random: 28.2:   Vancomycin Level, Random (10.28.21 @ 05:35)   Vancomycin Level, Random: 17.1      MEDICATIONS  (STANDING):      aspirin  chewable 81 milliGRAM(s) Oral daily  atorvastatin 40 milliGRAM(s) Oral at bedtime  chlorhexidine 0.12% Liquid 15 milliLiter(s) Oral Mucosa two times a day  chlorhexidine 4% Liquid 1 Application(s) Topical <User Schedule>  dexMEDEtomidine Infusion 0.2 MICROgram(s)/kG/Hr (3.2 mL/Hr) IV Continuous <Continuous>  DULoxetine 60 milliGRAM(s) Oral daily  folic acid 1 milliGRAM(s) Oral daily  glucagon  Injectable 1 milliGRAM(s) IntraMuscular once  heparin   Injectable 5000 Unit(s) SubCutaneous every 8 hours  insulin regular Infusion 1 Unit(s)/Hr (1 mL/Hr) IV Continuous <Continuous>  levothyroxine 175 MICROGram(s) Oral daily  nystatin Ointment 1 Application(s) Topical two times a day  pantoprazole  Injectable 40 milliGRAM(s) IV Push daily  propofol Infusion 5 MICROgram(s)/kG/Min (1.92 mL/Hr) IV Continuous <Continuous>  vancomycin  IVPB 750 milliGRAM(s) IV Intermittent every 12 hours  vancomycin  IVPB          RADIOLOGY & ADDITIONAL TESTS:    < from: Xray Chest 1 View- PORTABLE-Routine (Xray Chest 1 View- PORTABLE-Routine in AM.) (10.28.21 @ 07:09) >  Tip of endotracheal tube is at the clara and retraction is recommended.    Stable bilateral lung opacities      < from: Xray Chest 1 View- PORTABLE-Routine (Xray Chest 1 View- PORTABLE-Routine in AM.) (10.26.21 @ 06:04) >  Supportdevices: Right IJ line endotracheal tube and enteric tube are in satisfactory position.    Cardiac/mediastinum/hilum: Unremarkable.    Lung parenchyma/Pleura: Hazy bilateral opacities/effusions left greater than right are unchanged. No pneumothorax.        MICROBIOLOGY DATA:    Culture - Sputum . (10.26.21 @ 20:55)   - Amoxicillin/Clavulanic Acid: R >4/2   - Ampicillin: R >8   - Ampicillin/Sulbactam: R 16/8   - Cefazolin: R >16   - Ceftriaxone: R >32   - Ciprofloxacin: R >2   - Clindamycin: R >4   - Daptomycin: S <=0.25   - Erythromycin: R >4   - Gentamicin: S <=1 Should not be used as monotherapy   - Levofloxacin: R >4   - Linezolid: S 2   - Meropenem: R >8   - Moxifloxacin(Aerobic): R >4   - Oxacillin: R >2   - Penicillin: R >8   - RIF- Rifampin: S <=1 Should not be used as monotherapy   - Tetra/Doxy: S <=1   - Trimethoprim/Sulfamethoxazole: S <=0.5/9.5   - Vancomycin: S 1   Gram Stain:   Numerous polymorphonuclear leukocytes per low power field   No Squamous epithelial cells per low power field   Moderate Gram positive cocci in pairs seen per oil power field   Specimen Source: .Sputum Sputum   Culture Results:   Numerous Methicillin Resistant Staphylococcus aureus   Normal Respiratory Asia present

## 2021-11-01 NOTE — PROGRESS NOTE ADULT - SUBJECTIVE AND OBJECTIVE BOX
Patient is seen and examined at the bed side, is febrile. The Vancomycin level is elevated, 40.9 today.      REVIEW OF SYSTEMS: Unable to obtain due to mental status      ALLERGIES: No Known Allergies      ICU Vital Signs Last 24 Hrs  T(C): 38 (01 Nov 2021 19:00), Max: 38.1 (01 Nov 2021 11:00)  T(F): 100.4 (01 Nov 2021 19:00), Max: 100.6 (01 Nov 2021 11:00)  HR: 114 (01 Nov 2021 18:00) (62 - 114)  BP: 102/55 (01 Nov 2021 18:00) (76/56 - 109/56)  BP(mean): 73 (01 Nov 2021 18:00) (61 - 80)  ABP: --  ABP(mean): --  RR: 22 (01 Nov 2021 19:00) (16 - 47)  SpO2: 97% (01 Nov 2021 18:00) (97% - 100%)      PHYSICAL EXAM:  GENERAL: intubated/vented   CHEST/LUNG: Not using accessory muscles   HEART: s1 and s2 present  ABDOMEN:  Nontender and  Nondistended  EXTREMITIES: No pedal  edema  CNS: intubated/vented       LABS:                        8.1    7.79  )-----------( 306      ( 01 Nov 2021 05:40 )             25.5                           8.0    9.05  )-----------( 261      ( 31 Oct 2021 05:51 )             25.1                           8.6    11.22 )-----------( 237      ( 28 Oct 2021 05:35 )             28.2       11-01    134<L>  |  97<L>  |  67<HH>  ----------------------------<  99  4.4   |  27  |  1.3    Ca    8.4<L>      01 Nov 2021 05:40  Mg     2.6     11-01    TPro  5.9<L>  /  Alb  2.9<L>  /  TBili  0.3  /  DBili  x   /  AST  102<H>  /  ALT  90<H>  /  AlkPhos  214<H>  11-01    10-31    134<L>  |  97<L>  |  60<H>  ----------------------------<  109<H>  4.3   |  25  |  1.3    Ca    8.8      31 Oct 2021 05:51  Mg     2.4     10-31    TPro  5.8<L>  /  Alb  2.8<L>  /  TBili  0.2  /  DBili  x   /  AST  73<H>  /  ALT  72<H>  /  AlkPhos  188<H>  10-31        CAPILLARY BLOOD GLUCOSE  233 (28 Oct 2021 07:00)  231 (28 Oct 2021 06:15)  199 (28 Oct 2021 05:15)  272 (28 Oct 2021 04:15)    POCT Blood Glucose.: 156 mg/dL (28 Oct 2021 16:18)  POCT Blood Glucose.: 252 mg/dL (28 Oct 2021 14:13)  POCT Blood Glucose.: 259 mg/dL (28 Oct 2021 12:05)  POCT Blood Glucose.: 92 mg/dL (28 Oct 2021 10:25)  POCT Blood Glucose.: 77 mg/dL (28 Oct 2021 09:13)  POCT Blood Glucose.: 233 mg/dL (28 Oct 2021 06:51    ABG - ( 28 Oct 2021 16:19 )  pH, Arterial: 7.51  pH, Blood: x     /  pCO2: 44    /  pO2: 108   / HCO3: 35    / Base Excess: 11.0  /  SaO2: 97.5          Vancomycin Level, Trough (11.01.21 @ 00:25): 40.9  Vancomycin Level, Random (10.30.21 @ 05:55)   Vancomycin Level, Random: 28.2:   Vancomycin Level, Random (10.28.21 @ 05:35)   Vancomycin Level, Random: 17.1      MEDICATIONS  (STANDING):      aspirin  chewable 81 milliGRAM(s) Oral daily  atorvastatin 40 milliGRAM(s) Oral at bedtime  chlorhexidine 0.12% Liquid 15 milliLiter(s) Oral Mucosa two times a day  chlorhexidine 4% Liquid 1 Application(s) Topical <User Schedule>  dexMEDEtomidine Infusion 0.2 MICROgram(s)/kG/Hr (3.2 mL/Hr) IV Continuous <Continuous>  DULoxetine 60 milliGRAM(s) Oral daily  folic acid 1 milliGRAM(s) Oral daily  glucagon  Injectable 1 milliGRAM(s) IntraMuscular once  heparin   Injectable 5000 Unit(s) SubCutaneous every 8 hours  insulin regular Infusion 1 Unit(s)/Hr (1 mL/Hr) IV Continuous <Continuous>  levothyroxine 175 MICROGram(s) Oral daily  nystatin Ointment 1 Application(s) Topical two times a day  pantoprazole  Injectable 40 milliGRAM(s) IV Push daily  propofol Infusion 5 MICROgram(s)/kG/Min (1.92 mL/Hr) IV Continuous <Continuous>  vancomycin  IVPB 750 milliGRAM(s) IV Intermittent every 12 hours  vancomycin  IVPB          RADIOLOGY & ADDITIONAL TESTS:    < from: Xray Chest 1 View- PORTABLE-Routine (Xray Chest 1 View- PORTABLE-Routine in AM.) (10.28.21 @ 07:09) >  Tip of endotracheal tube is at the clara and retraction is recommended.    Stable bilateral lung opacities      < from: Xray Chest 1 View- PORTABLE-Routine (Xray Chest 1 View- PORTABLE-Routine in AM.) (10.26.21 @ 06:04) >  Supportdevices: Right IJ line endotracheal tube and enteric tube are in satisfactory position.    Cardiac/mediastinum/hilum: Unremarkable.    Lung parenchyma/Pleura: Hazy bilateral opacities/effusions left greater than right are unchanged. No pneumothorax.        MICROBIOLOGY DATA:    Culture - Sputum . (10.26.21 @ 20:55)   - Amoxicillin/Clavulanic Acid: R >4/2   - Ampicillin: R >8   - Ampicillin/Sulbactam: R 16/8   - Cefazolin: R >16   - Ceftriaxone: R >32   - Ciprofloxacin: R >2   - Clindamycin: R >4   - Daptomycin: S <=0.25   - Erythromycin: R >4   - Gentamicin: S <=1 Should not be used as monotherapy   - Levofloxacin: R >4   - Linezolid: S 2   - Meropenem: R >8   - Moxifloxacin(Aerobic): R >4   - Oxacillin: R >2   - Penicillin: R >8   - RIF- Rifampin: S <=1 Should not be used as monotherapy   - Tetra/Doxy: S <=1   - Trimethoprim/Sulfamethoxazole: S <=0.5/9.5   - Vancomycin: S 1   Gram Stain:   Numerous polymorphonuclear leukocytes per low power field   No Squamous epithelial cells per low power field   Moderate Gram positive cocci in pairs seen per oil power field   Specimen Source: .Sputum Sputum   Culture Results:   Numerous Methicillin Resistant Staphylococcus aureus   Normal Respiratory Asia present          Patient is seen and examined at the bed side, is febrile. She is s/p extubated , on BIPAP.  The Vancomycin level is elevated, 40.9 today.      REVIEW OF SYSTEMS: All other review systems are negative      ALLERGIES: No Known Allergies      ICU Vital Signs Last 24 Hrs  T(C): 38 (01 Nov 2021 19:00), Max: 38.1 (01 Nov 2021 11:00)  T(F): 100.4 (01 Nov 2021 19:00), Max: 100.6 (01 Nov 2021 11:00)  HR: 114 (01 Nov 2021 18:00) (62 - 114)  BP: 102/55 (01 Nov 2021 18:00) (76/56 - 109/56)  BP(mean): 73 (01 Nov 2021 18:00) (61 - 80)  ABP: --  ABP(mean): --  RR: 22 (01 Nov 2021 19:00) (16 - 47)  SpO2: 97% (01 Nov 2021 18:00) (97% - 100%)      PHYSICAL EXAM:  GENERAL: s/p extubation, on BIPAP  CHEST/LUNG: Not using accessory muscles   HEART: s1 and s2 present  ABDOMEN:  Nontender and  Nondistended  EXTREMITIES: No pedal  edema  CNS: Awake and Alert      LABS:                        8.1    7.79  )-----------( 306      ( 01 Nov 2021 05:40 )             25.5                           8.0    9.05  )-----------( 261      ( 31 Oct 2021 05:51 )             25.1                           8.6    11.22 )-----------( 237      ( 28 Oct 2021 05:35 )             28.2       11-01    134<L>  |  97<L>  |  67<HH>  ----------------------------<  99  4.4   |  27  |  1.3    Ca    8.4<L>      01 Nov 2021 05:40  Mg     2.6     11-01    TPro  5.9<L>  /  Alb  2.9<L>  /  TBili  0.3  /  DBili  x   /  AST  102<H>  /  ALT  90<H>  /  AlkPhos  214<H>  11-01    10-31    134<L>  |  97<L>  |  60<H>  ----------------------------<  109<H>  4.3   |  25  |  1.3    Ca    8.8      31 Oct 2021 05:51  Mg     2.4     10-31    TPro  5.8<L>  /  Alb  2.8<L>  /  TBili  0.2  /  DBili  x   /  AST  73<H>  /  ALT  72<H>  /  AlkPhos  188<H>  10-31        CAPILLARY BLOOD GLUCOSE  233 (28 Oct 2021 07:00)  231 (28 Oct 2021 06:15)  199 (28 Oct 2021 05:15)  272 (28 Oct 2021 04:15)    POCT Blood Glucose.: 156 mg/dL (28 Oct 2021 16:18)  POCT Blood Glucose.: 252 mg/dL (28 Oct 2021 14:13)  POCT Blood Glucose.: 259 mg/dL (28 Oct 2021 12:05)  POCT Blood Glucose.: 92 mg/dL (28 Oct 2021 10:25)  POCT Blood Glucose.: 77 mg/dL (28 Oct 2021 09:13)  POCT Blood Glucose.: 233 mg/dL (28 Oct 2021 06:51    ABG - ( 28 Oct 2021 16:19 )  pH, Arterial: 7.51  pH, Blood: x     /  pCO2: 44    /  pO2: 108   / HCO3: 35    / Base Excess: 11.0  /  SaO2: 97.5          Vancomycin Level, Trough (11.01.21 @ 00:25): 40.9  Vancomycin Level, Random (10.30.21 @ 05:55)   Vancomycin Level, Random: 28.2:   Vancomycin Level, Random (10.28.21 @ 05:35)   Vancomycin Level, Random: 17.1      MEDICATIONS  (STANDING):      aspirin  chewable 81 milliGRAM(s) Oral daily  atorvastatin 40 milliGRAM(s) Oral at bedtime  chlorhexidine 0.12% Liquid 15 milliLiter(s) Oral Mucosa two times a day  chlorhexidine 4% Liquid 1 Application(s) Topical <User Schedule>  dexMEDEtomidine Infusion 0.2 MICROgram(s)/kG/Hr (3.2 mL/Hr) IV Continuous <Continuous>  DULoxetine 60 milliGRAM(s) Oral daily  folic acid 1 milliGRAM(s) Oral daily  glucagon  Injectable 1 milliGRAM(s) IntraMuscular once  heparin   Injectable 5000 Unit(s) SubCutaneous every 8 hours  insulin regular Infusion 1 Unit(s)/Hr (1 mL/Hr) IV Continuous <Continuous>  levothyroxine 175 MICROGram(s) Oral daily  nystatin Ointment 1 Application(s) Topical two times a day  pantoprazole  Injectable 40 milliGRAM(s) IV Push daily  propofol Infusion 5 MICROgram(s)/kG/Min (1.92 mL/Hr) IV Continuous <Continuous>  vancomycin  IVPB 750 milliGRAM(s) IV Intermittent every 12 hours  vancomycin  IVPB          RADIOLOGY & ADDITIONAL TESTS:    < from: Xray Chest 1 View- PORTABLE-Routine (Xray Chest 1 View- PORTABLE-Routine in AM.) (10.28.21 @ 07:09) >  Tip of endotracheal tube is at the clara and retraction is recommended.    Stable bilateral lung opacities      < from: Xray Chest 1 View- PORTABLE-Routine (Xray Chest 1 View- PORTABLE-Routine in AM.) (10.26.21 @ 06:04) >  Supportdevices: Right IJ line endotracheal tube and enteric tube are in satisfactory position.    Cardiac/mediastinum/hilum: Unremarkable.    Lung parenchyma/Pleura: Hazy bilateral opacities/effusions left greater than right are unchanged. No pneumothorax.        MICROBIOLOGY DATA:    Culture - Sputum . (10.26.21 @ 20:55)   - Amoxicillin/Clavulanic Acid: R >4/2   - Ampicillin: R >8   - Ampicillin/Sulbactam: R 16/8   - Cefazolin: R >16   - Ceftriaxone: R >32   - Ciprofloxacin: R >2   - Clindamycin: R >4   - Daptomycin: S <=0.25   - Erythromycin: R >4   - Gentamicin: S <=1 Should not be used as monotherapy   - Levofloxacin: R >4   - Linezolid: S 2   - Meropenem: R >8   - Moxifloxacin(Aerobic): R >4   - Oxacillin: R >2   - Penicillin: R >8   - RIF- Rifampin: S <=1 Should not be used as monotherapy   - Tetra/Doxy: S <=1   - Trimethoprim/Sulfamethoxazole: S <=0.5/9.5   - Vancomycin: S 1   Gram Stain:   Numerous polymorphonuclear leukocytes per low power field   No Squamous epithelial cells per low power field   Moderate Gram positive cocci in pairs seen per oil power field   Specimen Source: .Sputum Sputum   Culture Results:   Numerous Methicillin Resistant Staphylococcus aureus   Normal Respiratory [place] present

## 2021-11-01 NOTE — PROGRESS NOTE ADULT - ATTENDING COMMENTS
ICU Vital Signs Last 24 Hrs  T(C): 37.9 (01 Nov 2021 15:00), Max: 38.1 (01 Nov 2021 11:00)  T(F): 100.2 (01 Nov 2021 15:00), Max: 100.6 (01 Nov 2021 11:00)  HR: 89 (01 Nov 2021 15:23) (62 - 89)  BP: 108/59 (01 Nov 2021 12:30) (91/53 - 109/56)  BP(mean): 80 (01 Nov 2021 12:30) (67 - 80)  ABP: --  ABP(mean): --  RR: 22 (01 Nov 2021 15:00) (15 - 28)  SpO2: 100% (01 Nov 2021 15:23) (100% - 100%)    #acute hypoxic resp failure on chronic   sp extubation today   s&s eval  home o2 already    #MRSA PNA - cont vanco - ID follow up , check vanco trough     #acute on chronic diastolic CHF - sp lasix - cont to montior     discussed with team ICU Vital Signs Last 24 Hrs  T(C): 37.9 (01 Nov 2021 15:00), Max: 38.1 (01 Nov 2021 11:00)  T(F): 100.2 (01 Nov 2021 15:00), Max: 100.6 (01 Nov 2021 11:00)  HR: 89 (01 Nov 2021 15:23) (62 - 89)  BP: 108/59 (01 Nov 2021 12:30) (91/53 - 109/56)  BP(mean): 80 (01 Nov 2021 12:30) (67 - 80)  ABP: --  ABP(mean): --  RR: 22 (01 Nov 2021 15:00) (15 - 28)  SpO2: 100% (01 Nov 2021 15:23) (100% - 100%)    #acute hypoxic resp failure on chronic   sp extubation today   s&s eval  home o2 already    #MRSA PNA - on vanco - ID follow up , check vanco trough again , level 40 today - hold vanco and repeat    #acute on chronic diastolic CHF - sp lasix - cont to montior     discussed with team

## 2021-11-01 NOTE — PROGRESS NOTE ADULT - ATTENDING COMMENTS
patient seen and examined agree above note   pull et tube 2cm out   sedation vacation   weaning trial

## 2021-11-01 NOTE — PROGRESS NOTE ADULT - SUBJECTIVE AND OBJECTIVE BOX
Patient is a 76y old  Female who presents with a chief complaint of SOB (31 Oct 2021 17:35)        Over Night Events:        ROS:  See HPI    PHYSICAL EXAM    ICU Vital Signs Last 24 Hrs  T(C): 37.3 (01 Nov 2021 07:01), Max: 37.7 (31 Oct 2021 11:01)  T(F): 99.1 (01 Nov 2021 07:01), Max: 99.9 (31 Oct 2021 11:01)  HR: 66 (01 Nov 2021 09:00) (62 - 88)  BP: 96/52 (01 Nov 2021 09:00) (91/53 - 133/63)  BP(mean): 69 (01 Nov 2021 09:00) (67 - 90)  ABP: --  ABP(mean): --  RR: 17 (01 Nov 2021 09:00) (15 - 38)  SpO2: 100% (01 Nov 2021 09:00) (99% - 100%)      General:  HEENT: DAHLIA             Lymphatic system: No cervical LN   Lungs: Bilateral BS  Cardiovascular: Regular   Gastrointestinal: Soft, Positive BS  Extremities: No clubbing.  Moves extremities.  Full Range of motion   Skin: Warm, intact  Neurological: No motor or sensory deficit       10-31-21 @ 07:01  -  11-01-21 @ 07:00  --------------------------------------------------------  IN:    Dexmedetomidine: 432 mL    Enteral Tube Flush: 210 mL    Glucerna: 1050 mL    Insulin: 76 mL    IV PiggyBack: 250 mL    Midazolam: 3.8 mL    Propofol: 58.8 mL  Total IN: 2080.6 mL    OUT:  Total OUT: 0 mL    Total NET: 2080.6 mL      11-01-21 @ 07:01  -  11-01-21 @ 09:05  --------------------------------------------------------  IN:    Dexmedetomidine: 45 mL    Insulin: 2 mL  Total IN: 47 mL    OUT:    Propofol: 0 mL  Total OUT: 0 mL    Total NET: 47 mL          LABS:                            8.1    7.79  )-----------( 306      ( 01 Nov 2021 05:40 )             25.5                                               11-01    134<L>  |  97<L>  |  67<HH>  ----------------------------<  99  4.4   |  27  |  1.3    Ca    8.4<L>      01 Nov 2021 05:40  Mg     2.6     11-01    TPro  5.9<L>  /  Alb  2.9<L>  /  TBili  0.3  /  DBili  x   /  AST  102<H>  /  ALT  90<H>  /  AlkPhos  214<H>  11-01                                                                                           LIVER FUNCTIONS - ( 01 Nov 2021 05:40 )  Alb: 2.9 g/dL / Pro: 5.9 g/dL / ALK PHOS: 214 U/L / ALT: 90 U/L / AST: 102 U/L / GGT: x                                                  Culture - Blood (collected 30 Oct 2021 05:26)  Source: .Blood None  Preliminary Report (31 Oct 2021 19:01):    No growth to date.                                                   Mode: Auto Mode: PRVC/ Volume Support  RR (machine): 15  TV (machine): 400  FiO2: 30  PEEP: 5  ITime: 1  MAP: 12  PIP: 22                                      ABG - ( 01 Nov 2021 04:44 )  pH, Arterial: 7.47  pH, Blood: x     /  pCO2: 37    /  pO2: 124   / HCO3: 27    / Base Excess: 3.2   /  SaO2: 97.1                MEDICATIONS  (STANDING):  aspirin  chewable 81 milliGRAM(s) Oral daily  atorvastatin 40 milliGRAM(s) Oral at bedtime  chlorhexidine 0.12% Liquid 15 milliLiter(s) Oral Mucosa two times a day  chlorhexidine 4% Liquid 1 Application(s) Topical <User Schedule>  dexMEDEtomidine Infusion 0.2 MICROgram(s)/kG/Hr (3.2 mL/Hr) IV Continuous <Continuous>  dextrose 40% Gel 15 Gram(s) Oral once  dextrose 5%. 1000 milliLiter(s) (50 mL/Hr) IV Continuous <Continuous>  dextrose 5%. 1000 milliLiter(s) (100 mL/Hr) IV Continuous <Continuous>  dextrose 50% Injectable 25 Gram(s) IV Push once  dextrose 50% Injectable 12.5 Gram(s) IV Push once  dextrose 50% Injectable 25 Gram(s) IV Push once  DULoxetine 60 milliGRAM(s) Oral daily  folic acid 1 milliGRAM(s) Oral daily  glucagon  Injectable 1 milliGRAM(s) IntraMuscular once  heparin   Injectable 5000 Unit(s) SubCutaneous every 8 hours  insulin regular Infusion 1 Unit(s)/Hr (1 mL/Hr) IV Continuous <Continuous>  levothyroxine 175 MICROGram(s) Oral daily  nystatin Ointment 1 Application(s) Topical two times a day  pantoprazole  Injectable 40 milliGRAM(s) IV Push daily  propofol Infusion 5 MICROgram(s)/kG/Min (1.92 mL/Hr) IV Continuous <Continuous>  vancomycin  IVPB 750 milliGRAM(s) IV Intermittent every 12 hours  vancomycin  IVPB        MEDICATIONS  (PRN):  acetaminophen     Tablet .. 650 milliGRAM(s) Oral every 6 hours PRN Temp greater or equal to 38C (100.4F)      Xrays:                                                                                     ECHO     Patient is a 76y old  Female who presents with a chief complaint of SOB (31 Oct 2021 17:35)        Over Night Events:    No events. remains intubated, vented on precedex and propofol. on insulin drip.    ROS:  See HPI    PHYSICAL EXAM    ICU Vital Signs Last 24 Hrs  T(C): 37.3 (01 Nov 2021 07:01), Max: 37.7 (31 Oct 2021 11:01)  T(F): 99.1 (01 Nov 2021 07:01), Max: 99.9 (31 Oct 2021 11:01)  HR: 66 (01 Nov 2021 09:00) (62 - 88)  BP: 96/52 (01 Nov 2021 09:00) (91/53 - 133/63)  BP(mean): 69 (01 Nov 2021 09:00) (67 - 90)  ABP: --  ABP(mean): --  RR: 17 (01 Nov 2021 09:00) (15 - 38)  SpO2: 100% (01 Nov 2021 09:00) (99% - 100%)      General: NAD  HEENT: DAHLIA             Lymphatic system: No cervical LN   Lungs: Bilateral BS  Cardiovascular: Regular   Gastrointestinal: Soft, Positive BS  Extremities: No clubbing.  Moves extremities.  Full Range of motion   Skin: Warm, intact  Neurological: No motor or sensory deficit       10-31-21 @ 07:01  -  11-01-21 @ 07:00  --------------------------------------------------------  IN:    Dexmedetomidine: 432 mL    Enteral Tube Flush: 210 mL    Glucerna: 1050 mL    Insulin: 76 mL    IV PiggyBack: 250 mL    Midazolam: 3.8 mL    Propofol: 58.8 mL  Total IN: 2080.6 mL    OUT:  Total OUT: 0 mL    Total NET: 2080.6 mL      11-01-21 @ 07:01  -  11-01-21 @ 09:05  --------------------------------------------------------  IN:    Dexmedetomidine: 45 mL    Insulin: 2 mL  Total IN: 47 mL    OUT:    Propofol: 0 mL  Total OUT: 0 mL    Total NET: 47 mL          LABS:                            8.1    7.79  )-----------( 306      ( 01 Nov 2021 05:40 )             25.5                                               11-01    134<L>  |  97<L>  |  67<HH>  ----------------------------<  99  4.4   |  27  |  1.3    Ca    8.4<L>      01 Nov 2021 05:40  Mg     2.6     11-01    TPro  5.9<L>  /  Alb  2.9<L>  /  TBili  0.3  /  DBili  x   /  AST  102<H>  /  ALT  90<H>  /  AlkPhos  214<H>  11-01                                                                                           LIVER FUNCTIONS - ( 01 Nov 2021 05:40 )  Alb: 2.9 g/dL / Pro: 5.9 g/dL / ALK PHOS: 214 U/L / ALT: 90 U/L / AST: 102 U/L / GGT: x                                                  Culture - Blood (collected 30 Oct 2021 05:26)  Source: .Blood None  Preliminary Report (31 Oct 2021 19:01):    No growth to date.                                                   Mode: Auto Mode: PRVC/ Volume Support  RR (machine): 15  TV (machine): 400  FiO2: 30  PEEP: 5  ITime: 1  MAP: 12  PIP: 22                                      ABG - ( 01 Nov 2021 04:44 )  pH, Arterial: 7.47  pH, Blood: x     /  pCO2: 37    /  pO2: 124   / HCO3: 27    / Base Excess: 3.2   /  SaO2: 97.1                MEDICATIONS  (STANDING):  aspirin  chewable 81 milliGRAM(s) Oral daily  atorvastatin 40 milliGRAM(s) Oral at bedtime  chlorhexidine 0.12% Liquid 15 milliLiter(s) Oral Mucosa two times a day  chlorhexidine 4% Liquid 1 Application(s) Topical <User Schedule>  dexMEDEtomidine Infusion 0.2 MICROgram(s)/kG/Hr (3.2 mL/Hr) IV Continuous <Continuous>  dextrose 40% Gel 15 Gram(s) Oral once  dextrose 5%. 1000 milliLiter(s) (50 mL/Hr) IV Continuous <Continuous>  dextrose 5%. 1000 milliLiter(s) (100 mL/Hr) IV Continuous <Continuous>  dextrose 50% Injectable 25 Gram(s) IV Push once  dextrose 50% Injectable 12.5 Gram(s) IV Push once  dextrose 50% Injectable 25 Gram(s) IV Push once  DULoxetine 60 milliGRAM(s) Oral daily  folic acid 1 milliGRAM(s) Oral daily  glucagon  Injectable 1 milliGRAM(s) IntraMuscular once  heparin   Injectable 5000 Unit(s) SubCutaneous every 8 hours  insulin regular Infusion 1 Unit(s)/Hr (1 mL/Hr) IV Continuous <Continuous>  levothyroxine 175 MICROGram(s) Oral daily  nystatin Ointment 1 Application(s) Topical two times a day  pantoprazole  Injectable 40 milliGRAM(s) IV Push daily  propofol Infusion 5 MICROgram(s)/kG/Min (1.92 mL/Hr) IV Continuous <Continuous>  vancomycin  IVPB 750 milliGRAM(s) IV Intermittent every 12 hours  vancomycin  IVPB        MEDICATIONS  (PRN):  acetaminophen     Tablet .. 650 milliGRAM(s) Oral every 6 hours PRN Temp greater or equal to 38C (100.4F)      Xrays:                                                                                     ECHO

## 2021-11-01 NOTE — PROGRESS NOTE ADULT - ASSESSMENT
77 yo female PMHx of HFpEF, HTN, HLD, DM,  SLE on Cellsept and methotrexate, CVA in 2002, recently admitted for Hypoglycemia and ANTONELLA who presents with shortness of breath  presenting for shortness of breath    IMPRESSION  #Acute Hypoxic respiratory failure- intubated/vented   #Fevers - presumed HAP- sputum cx grew MRSA, 10/26/21  - Blood Cx 10/23 NG  - Sputum cx 10/23 NG   - MRSA Nares 10/23 negative   - RVP negative  - Procalcitonin, Serum: 1.82 (10.22.21 @ 19:36) -> Procalcitonin, Serum: 0.98 (10.26.21 @ 05:37)  #Obesity BMI (kg/m2): 26.7, 30.2  #Abx allergy: NKDA    would recommend:    1. Please discontinue Vancomycin since level is elevated   2. Check Vancomycin level daily  3. Management of Vent. as per ICU protocol  4.Contact Isolation     d/w ICU team     Attending Attestation:    Spent more than 35 minutes on total encounter, more than 50 % of the visit was spent counseling and/or coordinating care by the Attending physician. 75 yo female PMHx of HFpEF, HTN, HLD, DM,  SLE on Cellsept and methotrexate, CVA in 2002, recently admitted for Hypoglycemia and ANTONELLA who presents with shortness of breath  presenting for shortness of breath    IMPRESSION  #Acute Hypoxic respiratory failure- intubated/vented   #Fevers - presumed HAP- sputum cx grew MRSA, 10/26/21  - Blood Cx 10/23 NG  - Sputum cx 10/23 NG   - MRSA Nares 10/23 negative   - RVP negative  - Procalcitonin, Serum: 1.82 (10.22.21 @ 19:36) -> Procalcitonin, Serum: 0.98 (10.26.21 @ 05:37)  #Obesity BMI (kg/m2): 26.7, 30.2  #Abx allergy: NKDA    would recommend:    1. Please discontinue Vancomycin since level is elevated   2. Check Vancomycin level daily and Monitor kidney function   3. Management of BIPAP as per ICU protocol  4. Aspiration precaution  4.Contact Isolation     d/w ICU team     Attending Attestation:    Spent more than 35 minutes on total encounter, more than 50 % of the visit was spent counseling and/or coordinating care by the Attending physician.

## 2021-11-01 NOTE — PROGRESS NOTE ADULT - ASSESSMENT
77 y/o F PMH heart failure, moderate severity mitral valve regurgitation, admitted in early October for heart failure and end stemi presents c/o SOB. Pt had a recent discharge from the hospital and XR during that admission showed increased markings in both lung fields consistent with CHF exacerbation    Patient found to have pneumonia likely MRSA and will continue with Vancomycin as per ID. Continues to be febrile despite being on antibiotics. Patient only on Precedex for sedation. SAT patient woke up but was tachypneic on the SBT.    # Acute hypoxemic respiratory failure 2/2 HAP  - Febrile to 102, hypoxic in ED s/p Intubation  - CXR showing bilateral opacities  - Sputum growing MRSA, Procal>1>0.9  - Afebrile for the last almost 48 hours   - Continue Vanc   - D/C Cefepime as per ID  - Initial blood culture and repeat negative  - SAT again today, SBT if she can tolerate it   - Check Vanc Trough prior to next dose   - Repeat Xray in AM    # Acute Exacerbation of HFpEF   - Was in [place] last admission due to overdiuresis and Lasix was reduced on discharge  - received IV lasix in ER, Holding Lasix  - geeta, I & O, daily weight  - ECHO on last discharge unchanged except for new, mild pulmonary hypertension   - F/u cardio outpatient     # Hypothyroidism   - Elevated TSH 12.24  - Continue synthroid 175mg  - TSH repeat outpatient in 2 months    # Iron Deficiency Anemia  # Anemia of chronic disease  - On lAst admission given Iron sucrose 200x2  - Baseline Hb 8   - Keep Hb >7  - F/u H&H     # Nocturnal Hypoxemia  - uses home O2    # DM II  - Will continue with Insulin regimen  - Continue Insulin drip    # Elevated LFTs  - RUQ US reviewed, no evidence of stone/inflammation in the gallbladder   -Can be due to propofol, continue to monitor     Status: FULL CODE  GI Prophylaxis: None   DVT Prophylaxis: Hep SubQ

## 2021-11-01 NOTE — PROGRESS NOTE ADULT - SUBJECTIVE AND OBJECTIVE BOX
24H events:    Patient is a 76y old Female who presents with a chief complaint of SOB (01 Nov 2021 09:05)    Primary diagnosis of Acute pulmonary edema       Today is hospital day 10d.     PAST MEDICAL & SURGICAL HISTORY  DM (diabetes mellitus)  insulin  fs achs    Hypercholesteremia  statin    Hypertension  hctz    Lupus  as per hx    Fall, initial encounter  as  hx    Stroke  TIA 2002, no deficits    Herniated lumbar intervertebral disc  as per hx    Seizure  keppra    No significant past surgical history      SOCIAL HISTORY:  Negative for smoking/alcohol/drug use.     ALLERGIES:  No Known Allergies    MEDICATIONS:  STANDING MEDICATIONS  aspirin  chewable 81 milliGRAM(s) Oral daily  atorvastatin 40 milliGRAM(s) Oral at bedtime  chlorhexidine 0.12% Liquid 15 milliLiter(s) Oral Mucosa two times a day  chlorhexidine 4% Liquid 1 Application(s) Topical <User Schedule>  dexMEDEtomidine Infusion 0.2 MICROgram(s)/kG/Hr IV Continuous <Continuous>  dextrose 40% Gel 15 Gram(s) Oral once  dextrose 5%. 1000 milliLiter(s) IV Continuous <Continuous>  dextrose 5%. 1000 milliLiter(s) IV Continuous <Continuous>  dextrose 50% Injectable 25 Gram(s) IV Push once  dextrose 50% Injectable 12.5 Gram(s) IV Push once  dextrose 50% Injectable 25 Gram(s) IV Push once  DULoxetine 60 milliGRAM(s) Oral daily  folic acid 1 milliGRAM(s) Oral daily  glucagon  Injectable 1 milliGRAM(s) IntraMuscular once  heparin   Injectable 5000 Unit(s) SubCutaneous every 8 hours  insulin regular Infusion 1 Unit(s)/Hr IV Continuous <Continuous>  levothyroxine 175 MICROGram(s) Oral daily  nystatin Ointment 1 Application(s) Topical two times a day  pantoprazole  Injectable 40 milliGRAM(s) IV Push daily  propofol Infusion 5 MICROgram(s)/kG/Min IV Continuous <Continuous>  vancomycin  IVPB 750 milliGRAM(s) IV Intermittent every 12 hours  vancomycin  IVPB        PRN MEDICATIONS  acetaminophen     Tablet .. 650 milliGRAM(s) Oral every 6 hours PRN    VITALS:   T(F): 100.6  HR: 66  BP: 96/52  RR: 22  SpO2: 100%    LABS:                        8.1    7.79  )-----------( 306      ( 01 Nov 2021 05:40 )             25.5     11-01    134<L>  |  97<L>  |  67<HH>  ----------------------------<  99  4.4   |  27  |  1.3    Ca    8.4<L>      01 Nov 2021 05:40  Mg     2.6     11-01    TPro  5.9<L>  /  Alb  2.9<L>  /  TBili  0.3  /  DBili  x   /  AST  102<H>  /  ALT  90<H>  /  AlkPhos  214<H>  11-01        ABG - ( 01 Nov 2021 04:44 )  pH, Arterial: 7.47  pH, Blood: x     /  pCO2: 37    /  pO2: 124   / HCO3: 27    / Base Excess: 3.2   /  SaO2: 97.1                  PHYSICAL EXAM:

## 2021-11-01 NOTE — PROGRESS NOTE ADULT - ASSESSMENT
Impression:    Acute Hypoxemic and Hypercapnic Respiratory Failure SP intubation  Pulmonary Edema, Bilateral Effusions, improving  HFpEF exacerbation, moderate MR  Sepsis present on admission  MRSA PNA  NSTEMI  Hypothyroidism   Iron Deficiency Anemia, Anemia of chronic disease    PLAN:    CNS:    HEENT:    PULMONARY:    CARDIOVASCULAR:    GI: GI prophylaxis.  Feeding     RENAL:    INFECTIOUS DISEASE:    HEMATOLOGICAL:  DVT prophylaxis.    ENDOCRINE:  Follow up FS.  Insulin protocol if needed.    MUSCULOSKELETAL:    TIME SPENT:     Impression:    Acute Hypoxemic and Hypercapnic Respiratory Failure SP intubation  Pulmonary Edema, Bilateral Effusions, improving  HFpEF exacerbation, moderate MR  Sepsis present on admission  MRSA PNA  NSTEMI  Hypothyroidism   Iron Deficiency Anemia, Anemia of chronic disease    PLAN:    CNS: SAT. wean off propofol.     HEENT: oral care. et tube care    PULMONARY: Hob >45. SBT. no vent changes. repeat CXR .     CARDIOVASCULAR: keep i=o. daily weights. lasix 40mg iv x1. keep negative balance. fu cardiology    GI: GI prophylaxis.  Feeding per OGT. hold for SBT    RENAL: fu lytes. keep connor    INFECTIOUS DISEASE: antibx per ID. check vanc trough. fu cultures    HEMATOLOGICAL:  DVT prophylaxis.    ENDOCRINE:  Follow up FS.  Insulin protocol if needed.    MUSCULOSKELETAL: bed to chair position    MICU monitoring.

## 2021-11-02 NOTE — PROGRESS NOTE ADULT - SUBJECTIVE AND OBJECTIVE BOX
LINDSAY ALBERT  76y, Female  Allergy: No Known Allergies      LOS  11d    CHIEF COMPLAINT: SOB (02 Nov 2021 08:11)      INTERVAL EVENTS/HPI  - No acute events overnight  - T(F): , Max: 101.1 (11-01-21 @ 23:40)  - extubated - on bipap   - WBC Count: 10.67 (11-02-21 @ 05:31)  WBC Count: 7.79 (11-01-21 @ 05:40)     - Creatinine, Serum: 1.8 (11-02-21 @ 05:31)  Creatinine, Serum: 1.3 (11-01-21 @ 05:40)       ROS  General: Denies rigors, nightsweats  HEENT: Denies headache, rhinorrhea, sore throat, eye pain  CV: Denies CP, palpitations  PULM: Denies wheezing, hemoptysis  GI: Denies hematemesis, hematochezia, melena  : Denies discharge, hematuria  MSK: Denies arthralgias, myalgias  SKIN: Denies rash, lesions  NEURO: Denies paresthesias, weakness  PSYCH: Denies depression, anxiety    VITALS:  T(F): 98, Max: 101.1 (11-01-21 @ 23:40)  HR: 87  BP: 123/60  RR: 24Vital Signs Last 24 Hrs  T(C): 36.7 (02 Nov 2021 07:10), Max: 38.4 (01 Nov 2021 23:40)  T(F): 98 (02 Nov 2021 07:10), Max: 101.1 (01 Nov 2021 23:40)  HR: 87 (02 Nov 2021 07:57) (66 - 114)  BP: 123/60 (02 Nov 2021 07:01) (76/40 - 123/60)  BP(mean): 84 (02 Nov 2021 07:01) (52 - 84)  RR: 24 (02 Nov 2021 07:10) (17 - 47)  SpO2: 99% (02 Nov 2021 07:57) (97% - 100%)    PHYSICAL EXAM:  Gen: NAD, resting in bed  HEENT: Normocephalic, atraumatic  Neck: supple, no lymphadenopathy  CV: Regular rate & regular rhythm  Lungs: decreased BS at bases, no fremitus  Abdomen: Soft, BS present  Ext: Warm, well perfused  Neuro: non focal, awake  Skin: no rash, no erythema  Lines: no phlebitis    FH: Non-contributory  Social Hx: Non-contributory    TESTS & MEASUREMENTS:                        7.9    10.67 )-----------( 356      ( 02 Nov 2021 05:31 )             25.2     11-02    132<L>  |  94<L>  |  72<HH>  ----------------------------<  278<H>  4.7   |  23  |  1.8<H>    Ca    8.7      02 Nov 2021 05:31  Mg     2.6     11-01    TPro  5.9<L>  /  Alb  2.9<L>  /  TBili  0.6  /  DBili  x   /  AST  93<H>  /  ALT  85<H>  /  AlkPhos  176<H>  11-02    eGFR if Non African American: 27 mL/min/1.73M2 (11-02-21 @ 05:31)  eGFR if African American: 31 mL/min/1.73M2 (11-02-21 @ 05:31)    LIVER FUNCTIONS - ( 02 Nov 2021 05:31 )  Alb: 2.9 g/dL / Pro: 5.9 g/dL / ALK PHOS: 176 U/L / ALT: 85 U/L / AST: 93 U/L / GGT: x               Culture - Blood (collected 10-30-21 @ 05:26)  Source: .Blood None  Preliminary Report (10-31-21 @ 19:01):    No growth to date.    Culture - Blood (collected 10-29-21 @ 05:13)  Source: .Blood None  Preliminary Report (10-30-21 @ 18:01):    No growth to date.    Culture - Blood (collected 10-28-21 @ 05:35)  Source: .Blood None  Preliminary Report (10-29-21 @ 14:02):    No growth to date.    Culture - Blood (collected 10-27-21 @ 05:54)  Source: .Blood None  Final Report (11-01-21 @ 20:00):    No Growth Final    Culture - Sputum (collected 10-26-21 @ 20:55)  Source: .Sputum Sputum  Gram Stain (10-27-21 @ 07:06):    Numerous polymorphonuclear leukocytes per low power field    No Squamous epithelial cells per low power field    Moderate Gram positive cocci in pairs seen per oil power field  Final Report (10-28-21 @ 16:02):    Numerous Methicillin Resistant Staphylococcus aureus    Normal Respiratory Asia present  Organism: Methicillin resistant Staphylococcus aureus (10-28-21 @ 16:02)  Organism: Methicillin resistant Staphylococcus aureus (10-28-21 @ 16:02)      -  Amoxicillin/Clavulanic Acid: R >4/2      -  Ampicillin: R >8      -  Ampicillin/Sulbactam: R 16/8      -  Cefazolin: R >16      -  Ceftriaxone: R >32      -  Ciprofloxacin: R >2      -  Clindamycin: R >4      -  Daptomycin: S <=0.25      -  Erythromycin: R >4      -  Gentamicin: S <=1 Should not be used as monotherapy      -  Levofloxacin: R >4      -  Linezolid: S 2      -  Meropenem: R >8      -  Moxifloxacin(Aerobic): R >4      -  Oxacillin: R >2      -  Penicillin: R >8      -  RIF- Rifampin: S <=1 Should not be used as monotherapy      -  Tetra/Doxy: S <=1      -  Trimethoprim/Sulfamethoxazole: S <=0.5/9.5      -  Vancomycin: S 1      Method Type: VINNIE    Culture - Blood (collected 10-26-21 @ 05:37)  Source: .Blood None  Final Report (10-31-21 @ 18:01):    No Growth Final    Culture - Sputum (collected 10-23-21 @ 15:05)  Source: .Sputum Sputum  Gram Stain (10-24-21 @ 14:45):    Numerous polymorphonuclear leukocytes per low power field    Few Squamous epithelial cells per low power field    Numerous Gram Positive Cocci in Clusters per oil power field  Final Report (10-25-21 @ 17:03):    Normal Respiratory Asia present    Culture - Blood (collected 10-23-21 @ 11:45)  Source: .Blood Blood-Peripheral  Final Report (10-28-21 @ 20:00):    No Growth Final    Culture - Urine (collected 10-22-21 @ 11:20)  Source: Catheterized Catheterized  Final Report (10-23-21 @ 22:43):    No growth            INFECTIOUS DISEASES TESTING  Procalcitonin, Serum: 0.67 (10-28-21 @ 05:35)  Procalcitonin, Serum: 0.98 (10-26-21 @ 05:37)  Rapid RVP Result: NotDetec (10-25-21 @ 09:22)  MRSA PCR Result.: Negative (10-23-21 @ 15:16)  Procalcitonin, Serum: 1.82 (10-22-21 @ 19:36)  COVID-19 PCR: NotDetec (10-22-21 @ 09:30)  COVID-19 PCR: NotDetec (10-18-21 @ 22:25)  COVID-19 PCR: NotDetec (10-02-21 @ 15:35)  Procalcitonin, Serum: 0.07 (09-28-21 @ 11:17)  COVID-19 PCR: NotDetec (09-25-21 @ 21:43)  COVID-19 PCR: NotDetec (08-07-21 @ 15:30)      INFLAMMATORY MARKERS  C-Reactive Protein, Serum: 94 mg/L (10-29-21 @ 05:13)  C-Reactive Protein, Serum: 98 mg/L (10-28-21 @ 05:35)  Sedimentation Rate, Erythrocyte: 87 mm/Hr (10-28-21 @ 05:35)      RADIOLOGY & ADDITIONAL TESTS:  I have personally reviewed the last available Chest xray  CXR      CT      CARDIOLOGY TESTING  12 Lead ECG:   Ventricular Rate 80 BPM    Atrial Rate 80 BPM    P-R Interval 130 ms    QRS Duration 92 ms    Q-T Interval 440 ms    QTC Calculation(Bazett) 507 ms    P Axis 22 degrees    R Axis -18 degrees    T Axis 214 degrees    Diagnosis Line Normal sinus rhythm  Nonspecific ST-T changes  Confirmed by LUAN YORK MD ([id number]) on 10/25/2021 8:36:05 PM (10-25-21 @ 07:23)  12 Lead ECG:   Ventricular Rate 81 BPM    Atrial Rate 81 BPM    P-R Interval 136 ms    QRS Duration 88 ms    Q-T Interval 450 ms    QTC Calculation(Bazett) 522 ms    P Axis -18 degrees    R Axis -16 degrees    T Axis 107 degrees    Diagnosis Line Normal sinus rhythm  ST & T wave abnormality, consider anterolateral ischemia  Prolonged QT  Abnormal ECG    Confirmed by LUAN YORK MD ([id number]) on 10/24/2021 10:46:14 AM (10-24-21 @ 07:47)      MEDICATIONS  albuterol/ipratropium for Nebulization 3 Nebulizer every 6 hours  aspirin  chewable 81 Oral daily  atorvastatin 40 Oral at bedtime  chlorhexidine 0.12% Liquid 15 Oral Mucosa two times a day  chlorhexidine 4% Liquid 1 Topical <User Schedule>  dexMEDEtomidine Infusion 0.2 IV Continuous <Continuous>  dextrose 40% Gel 15 Oral once  dextrose 5%. 1000 IV Continuous <Continuous>  dextrose 5%. 1000 IV Continuous <Continuous>  dextrose 50% Injectable 25 IV Push once  dextrose 50% Injectable 12.5 IV Push once  dextrose 50% Injectable 25 IV Push once  DULoxetine 60 Oral daily  folic acid 1 Oral daily  glucagon  Injectable 1 IntraMuscular once  heparin   Injectable 5000 SubCutaneous every 8 hours  insulin regular Infusion 1 IV Continuous <Continuous>  levothyroxine 175 Oral daily  nystatin Ointment 1 Topical two times a day  pantoprazole  Injectable 40 IV Push daily  propofol Infusion 5 IV Continuous <Continuous>      WEIGHT  Weight (kg): 64 (10-30-21 @ 17:00)  Creatinine, Serum: 1.8 mg/dL (11-02-21 @ 05:31)      ANTIBIOTICS:      All available historical records have been reviewed

## 2021-11-02 NOTE — PROGRESS NOTE ADULT - ASSESSMENT
Impression:    Acute Hypoxemic and Hypercapnic Respiratory Failure SP intubation  Pulmonary Edema, Bilateral Effusions, improving  HFpEF exacerbation, moderate MR  Sepsis present on admission  MRSA PNA  NSTEMI  Hypothyroidism   Iron Deficiency Anemia, Anemia of chronic disease    PLAN:    CNS:  no sedation n    HEENT: oral care.     PULMONARY: Hob >45.  NIv as needed and at night   keep pox > 92 %      CARDIOVASCULAR: keep i=o. daily weights.  start NS 50cc/ hr while NPO for now  hold  lasix     GI: GI prophylaxis.  speech ans swallow eval d/c fluid if tolerate feed     RENAL: fu lytes. keep connor    INFECTIOUS DISEASE: antibx per ID. check vanc trough. fu cultures  if spike temp again start merop   repeat bld cx     HEMATOLOGICAL:  DVT prophylaxis.    ENDOCRINE:  Follow up FS.  Insulin protocol if needed.    MUSCULOSKELETAL: bed to chair position    MICU monitoring.

## 2021-11-02 NOTE — PROGRESS NOTE ADULT - ASSESSMENT
77 y/o F PMH heart failure, moderate severity mitral valve regurgitation, admitted in early October for heart failure and end stemi presents c/o SOB. Pt had a recent discharge from the hospital and XR during that admission showed increased markings in both lung fields consistent with CHF exacerbation    Patient found to have pneumonia likely MRSA and will continue with Vancomycin as per ID. Continues to be febrile despite being on antibiotics. Patient only on Precedex for sedation. SAT patient woke up but was tachypneic on the SBT.    # Acute hypoxemic respiratory failure 2/2 HAP  - Febrile to 102, hypoxic in ED s/p Intubation  - CXR showing bilateral opacities  - Sputum growing MRSA, Procal>1>0.9  - Afebrile for the last 48 hours   - Patient is on Vanc IV which is on hold for now, follow the levels daily   - Initial blood culture and repeat negative  - Patient was extubated on 11/1/2021 and then re-intubated again today 11/2/2021 for severe tachypnea and respiratory fatigue despite being on BiPAP  - Repeat Xray in AM    # Acute Exacerbation of HFpEF   - Was in [place] last admission due to overdiuresis and Lasix was reduced on discharge  - received IV lasix in ER, Holding Lasix  - geeta, I & O, daily weight  - ECHO on last discharge unchanged except for new, mild pulmonary hypertension   - F/u cardio outpatient     # Hypothyroidism   - Elevated TSH 12.24  - Continue synthroid 175mg  - TSH repeat outpatient in 2 months    # Iron Deficiency Anemia  # Anemia of chronic disease  - On lAst admission given Iron sucrose 200x2  - Baseline Hb 8   - Keep Hb >7  - F/u H&H     # Nocturnal Hypoxemia  - uses home O2    # DM II  - Will continue with Insulin regimen  - Continue Insulin drip    # Elevated LFTs  - RUQ US reviewed, no evidence of stone/inflammation in the gallbladder   -Can be due to propofol, continue to monitor     Status: FULL CODE  GI Prophylaxis: None   DVT Prophylaxis: Hep SubQ

## 2021-11-02 NOTE — PHARMACOTHERAPY INTERVENTION NOTE - NSPHARMCOMMASP
ASP - Dose optimization/Non-Renal dose adjustment
ASP - Duration of therapy
ASP - Lab/ test recommended
ASP - Duration of therapy

## 2021-11-02 NOTE — PROGRESS NOTE ADULT - SUBJECTIVE AND OBJECTIVE BOX
24H events:    Patient is a 76y old Female who presents with a chief complaint of SOB (02 Nov 2021 08:41)    Primary diagnosis of Acute pulmonary edema       Today is hospital day 11d.     PAST MEDICAL & SURGICAL HISTORY  DM (diabetes mellitus)  insulin  fs achs    Hypercholesteremia  statin    Hypertension  hctz    Lupus  as per hx    Fall, initial encounter  as  hx    Stroke  TIA 2002, no deficits    Herniated lumbar intervertebral disc  as per hx    Seizure  keppra    No significant past surgical history      SOCIAL HISTORY:  Negative for smoking/alcohol/drug use.     ALLERGIES:  No Known Allergies    MEDICATIONS:  STANDING MEDICATIONS  albuterol/ipratropium for Nebulization 3 milliLiter(s) Nebulizer every 6 hours  aspirin  chewable 81 milliGRAM(s) Oral daily  atorvastatin 40 milliGRAM(s) Oral at bedtime  chlorhexidine 0.12% Liquid 15 milliLiter(s) Oral Mucosa two times a day  chlorhexidine 4% Liquid 1 Application(s) Topical <User Schedule>  dexMEDEtomidine Infusion 0.2 MICROgram(s)/kG/Hr IV Continuous <Continuous>  dextrose 40% Gel 15 Gram(s) Oral once  dextrose 5%. 1000 milliLiter(s) IV Continuous <Continuous>  dextrose 5%. 1000 milliLiter(s) IV Continuous <Continuous>  dextrose 50% Injectable 25 Gram(s) IV Push once  dextrose 50% Injectable 12.5 Gram(s) IV Push once  dextrose 50% Injectable 25 Gram(s) IV Push once  DULoxetine 60 milliGRAM(s) Oral daily  folic acid 1 milliGRAM(s) Oral daily  glucagon  Injectable 1 milliGRAM(s) IntraMuscular once  heparin   Injectable 5000 Unit(s) SubCutaneous every 8 hours  insulin glargine Injectable (LANTUS) 7 Unit(s) SubCutaneous at bedtime  insulin lispro (ADMELOG) corrective regimen sliding scale   SubCutaneous three times a day before meals  lactated ringers. 1000 milliLiter(s) IV Continuous <Continuous>  levothyroxine 175 MICROGram(s) Oral daily  norepinephrine Infusion 0.05 MICROgram(s)/kG/Min IV Continuous <Continuous>  nystatin Ointment 1 Application(s) Topical two times a day  pantoprazole  Injectable 40 milliGRAM(s) IV Push daily  propofol Infusion 5 MICROgram(s)/kG/Min IV Continuous <Continuous>    PRN MEDICATIONS  acetaminophen     Tablet .. 650 milliGRAM(s) Oral every 6 hours PRN  acetaminophen  Suppository .. 650 milliGRAM(s) Rectal every 4 hours PRN    VITALS:   T(F): 97.5  HR: 89  BP: 119/70  RR: 21  SpO2: 100%    LABS:                        7.9    10.67 )-----------( 356      ( 02 Nov 2021 05:31 )             25.2     11-02    132<L>  |  94<L>  |  72<HH>  ----------------------------<  278<H>  4.7   |  23  |  1.8<H>    Ca    8.7      02 Nov 2021 05:31  Mg     2.6     11-01    TPro  5.9<L>  /  Alb  2.9<L>  /  TBili  0.6  /  DBili  x   /  AST  93<H>  /  ALT  85<H>  /  AlkPhos  176<H>  11-02        ABG - ( 02 Nov 2021 12:53 )  pH, Arterial: 7.35  pH, Blood: x     /  pCO2: 31    /  pO2: 71    / HCO3: 17    / Base Excess: -7.6  /  SaO2: 93.0                      RADIOLOGY:    PHYSICAL EXAM:  Gen: NAD, resting in bed  HEENT: Normocephalic, atraumatic  Neck: supple, no lymphadenopathy  CV: Regular rate & regular rhythm  Lungs: decreased BS at bases, no fremitus  Abdomen: Soft, BS present  Ext: Warm, well perfused  Neuro: non focal, awake  Skin: no rash, no erythema  Lines: no phlebitis

## 2021-11-02 NOTE — PROGRESS NOTE ADULT - ATTENDING COMMENTS
ICU Vital Signs Last 24 Hrs  T(C): 36.4 (02 Nov 2021 15:10), Max: 38.4 (01 Nov 2021 23:40)  T(F): 97.5 (02 Nov 2021 15:10), Max: 101.1 (01 Nov 2021 23:40)  HR: 89 (02 Nov 2021 15:45) (79 - 133)  BP: 119/70 (02 Nov 2021 15:45) (75/51 - 149/65)  BP(mean): 89 (02 Nov 2021 15:45) (52 - 94)  ABP: --  ABP(mean): --  RR: 21 (02 Nov 2021 15:45) (21 - 45)  SpO2: 100% (02 Nov 2021 15:45) (97% - 100%)    pt  sob this AM and tiring out on bipap  reintubated today  hypotensive - started on levophed   MRSA pna - holding  vanco - follow trough   poor overall prognsis   family bedside  full code  follow up with pulm/cc for further recs and vent mangmt ICU Vital Signs Last 24 Hrs  T(C): 36.4 (02 Nov 2021 15:10), Max: 38.4 (01 Nov 2021 23:40)  T(F): 97.5 (02 Nov 2021 15:10), Max: 101.1 (01 Nov 2021 23:40)  HR: 89 (02 Nov 2021 15:45) (79 - 133)  BP: 119/70 (02 Nov 2021 15:45) (75/51 - 149/65)  BP(mean): 89 (02 Nov 2021 15:45) (52 - 94)  ABP: --  ABP(mean): --  RR: 21 (02 Nov 2021 15:45) (21 - 45)  SpO2: 100% (02 Nov 2021 15:45) (97% - 100%)    pt  sob this AM and tiring out on bipap  reintubated today  hypotensive - started on levophed   MRSA pna - holding  vanco - follow trough daily - lsowly downtrending 37  poor overall prognsis   family bedside  full code  follow up with pulm/cc for further recs and vent mangmt

## 2021-11-02 NOTE — PROGRESS NOTE ADULT - SUBJECTIVE AND OBJECTIVE BOX
Patient is a 76y old  Female who presents with a chief complaint of SOB (01 Nov 2021 19:26)      Over Night Events:  Patient seen and examined.   spiked temp   s.p extubation did well     ROS:  See HPI    PHYSICAL EXAM    ICU Vital Signs Last 24 Hrs  T(C): 36.7 (02 Nov 2021 07:10), Max: 38.4 (01 Nov 2021 23:40)  T(F): 98 (02 Nov 2021 07:10), Max: 101.1 (01 Nov 2021 23:40)  HR: 87 (02 Nov 2021 07:57) (66 - 114)  BP: 123/60 (02 Nov 2021 07:01) (76/40 - 123/60)  BP(mean): 84 (02 Nov 2021 07:01) (52 - 84)  ABP: --  ABP(mean): --  RR: 24 (02 Nov 2021 07:10) (17 - 47)  SpO2: 99% (02 Nov 2021 07:57) (97% - 100%)      General: Awake   HEENT:  patricia              Lymph Nodes: NO cervical LN   Lungs: Bilateral BS  Cardiovascular: Regular   Abdomen: Soft, Positive BS  Extremities: No clubbing   Skin: warm   Neurological: follow command   Musculoskeletal: move all ext     I&O's Detail    01 Nov 2021 07:01  -  02 Nov 2021 07:00  --------------------------------------------------------  IN:    Dexmedetomidine: 144 mL    Insulin: 53 mL  Total IN: 197 mL    OUT:    Propofol: 0 mL  Total OUT: 0 mL    Total NET: 197 mL          LABS:                          7.9    10.67 )-----------( 356      ( 02 Nov 2021 05:31 )             25.2         02 Nov 2021 05:31    132    |  94     |  72     ----------------------------<  278    4.7     |  23     |  1.8      Ca    8.7        02 Nov 2021 05:31  Mg     2.6       01 Nov 2021 05:40    TPro  5.9    /  Alb  2.9    /  TBili  0.6    /  DBili  x      /  AST  93     /  ALT  85     /  AlkPhos  176    02 Nov 2021 05:31  Amylase x     lipase x                                                                                                                                                                                                 Mode: CPAP with PS  FiO2: 30  PEEP: 5  PS: 5                                      ABG - ( 01 Nov 2021 14:52 )  pH, Arterial: 7.42  pH, Blood: x     /  pCO2: 32    /  pO2: 201   / HCO3: 21    / Base Excess: -2.8  /  SaO2: 97.4                MEDICATIONS  (STANDING):  albuterol/ipratropium for Nebulization 3 milliLiter(s) Nebulizer every 6 hours  aspirin  chewable 81 milliGRAM(s) Oral daily  atorvastatin 40 milliGRAM(s) Oral at bedtime  chlorhexidine 0.12% Liquid 15 milliLiter(s) Oral Mucosa two times a day  chlorhexidine 4% Liquid 1 Application(s) Topical <User Schedule>  dexMEDEtomidine Infusion 0.2 MICROgram(s)/kG/Hr (3.2 mL/Hr) IV Continuous <Continuous>  dextrose 40% Gel 15 Gram(s) Oral once  dextrose 5%. 1000 milliLiter(s) (50 mL/Hr) IV Continuous <Continuous>  dextrose 5%. 1000 milliLiter(s) (100 mL/Hr) IV Continuous <Continuous>  dextrose 50% Injectable 25 Gram(s) IV Push once  dextrose 50% Injectable 12.5 Gram(s) IV Push once  dextrose 50% Injectable 25 Gram(s) IV Push once  DULoxetine 60 milliGRAM(s) Oral daily  folic acid 1 milliGRAM(s) Oral daily  glucagon  Injectable 1 milliGRAM(s) IntraMuscular once  heparin   Injectable 5000 Unit(s) SubCutaneous every 8 hours  insulin regular Infusion 1 Unit(s)/Hr (1 mL/Hr) IV Continuous <Continuous>  levothyroxine 175 MICROGram(s) Oral daily  nystatin Ointment 1 Application(s) Topical two times a day  pantoprazole  Injectable 40 milliGRAM(s) IV Push daily  propofol Infusion 5 MICROgram(s)/kG/Min (1.92 mL/Hr) IV Continuous <Continuous>    MEDICATIONS  (PRN):  acetaminophen     Tablet .. 650 milliGRAM(s) Oral every 6 hours PRN Temp greater or equal to 38C (100.4F)  acetaminophen  Suppository .. 650 milliGRAM(s) Rectal every 4 hours PRN Temp greater or equal to 38C (100.4F)          Xrays:  TLC:  OG:  ET tube:                                                                                    Right lower hilar opacity    ECHO:  [facility] ICU:

## 2021-11-02 NOTE — PROGRESS NOTE ADULT - ASSESSMENT
75 yo female PMHx of HFpEF, HTN, HLD, DM,  SLE on Cellsept and methotrexate, CVA in 2002, recently admitted for Hypoglycemia and ANTONELLA who presents with shortness of breath  presenting for shortness of breath    IMPRESSION  #Acute Hypoxic respiratory failure- intubated/vented   #Fevers - presumed HAP- sputum cx grew MRSA, 10/26/21  - Blood Cx 10/23 NG  - Sputum cx 10/23 NG   - MRSA Nares 10/23 negative   - RVP negative  - Procalcitonin, Serum: 1.82 (10.22.21 @ 19:36) -> Procalcitonin, Serum: 0.98 (10.26.21 @ 05:37)    #ANTONELLA   #Obesity BMI (kg/m2): 26.7, 30.2  #Abx allergy: NKDA    Recommendations  - hold vancomycin levels -- check levels daily  - trend Creatinine  - R>L lower extremity swelling - consider dopplers   - Management of BIPAP as per ICU protocol    Please call or message on [employer] Teams if with any questions.  Spectra [phone]

## 2021-11-02 NOTE — AIRWAY PLACEMENT NOTE ADULT - AIRWAY COMMENTS:
Pt intubated at 1302 preoxygenated to 100% /76 P90 RR 30.  Post intubation /63  RR 10.  Pt in noted VTach turned over to ICU team.

## 2021-11-03 NOTE — PROGRESS NOTE ADULT - ASSESSMENT
75 yo female PMHx of HFpEF, HTN, HLD, DM,  SLE on Cellsept and methotrexate, CVA in 2002, recently admitted for Hypoglycemia and ANTONELLA who presents with shortness of breath  presenting for shortness of breath    IMPRESSION  #Acute Hypoxic respiratory failure- intubated/vented   #Fevers - presumed HAP- sputum cx grew MRSA, 10/26/21  - Blood Cx 10/23 NG  - Sputum cx 10/23 NG   - MRSA Nares 10/23 negative   - RVP negative  - Procalcitonin, Serum: 1.82 (10.22.21 @ 19:36) -> Procalcitonin, Serum: 0.98 (10.26.21 @ 05:37)    #ANTONELLA   #Obesity BMI (kg/m2): 26.7, 30.2  #Abx allergy: NKDA    Recommendations  - hold vancomycin levels -- check levels daily  - trend Creatinine  - repeat sputum cultures and procalcitonin   - unclear if other cause of fevers -- has gone at most 2 days without fevers -- if blood cx, sputum cx, urine Cx are unrevealing would consider CT imaging   - can start cefepime 1g q 24 hours while waiting for above   - Management of BIPAP as per ICU protocol    Please call or message on [employer] Teams if with any questions.  Spectra [phone]

## 2021-11-03 NOTE — PROGRESS NOTE ADULT - SUBJECTIVE AND OBJECTIVE BOX
24H events:    Patient is a 76y old Female who presents with a chief complaint of SOB (03 Nov 2021 10:50)    Primary diagnosis of Acute pulmonary edema       Today is hospital day 12d.     PAST MEDICAL & SURGICAL HISTORY  DM (diabetes mellitus)  insulin  fs achs    Hypercholesteremia  statin    Hypertension  hctz    Lupus  as per hx    Fall, initial encounter  as  hx    Stroke  TIA 2002, no deficits    Herniated lumbar intervertebral disc  as per hx    Seizure  keppra    No significant past surgical history      SOCIAL HISTORY:  Negative for smoking/alcohol/drug use.     ALLERGIES:  No Known Allergies    MEDICATIONS:  STANDING MEDICATIONS  albuterol/ipratropium for Nebulization 3 milliLiter(s) Nebulizer every 6 hours  aspirin  chewable 81 milliGRAM(s) Oral daily  atorvastatin 40 milliGRAM(s) Oral at bedtime  chlorhexidine 0.12% Liquid 15 milliLiter(s) Oral Mucosa two times a day  chlorhexidine 4% Liquid 1 Application(s) Topical <User Schedule>  dexMEDEtomidine Infusion 0.2 MICROgram(s)/kG/Hr IV Continuous <Continuous>  dextrose 40% Gel 15 Gram(s) Oral once  dextrose 5%. 1000 milliLiter(s) IV Continuous <Continuous>  dextrose 5%. 1000 milliLiter(s) IV Continuous <Continuous>  dextrose 50% Injectable 25 Gram(s) IV Push once  dextrose 50% Injectable 12.5 Gram(s) IV Push once  dextrose 50% Injectable 25 Gram(s) IV Push once  DULoxetine 60 milliGRAM(s) Oral daily  folic acid 1 milliGRAM(s) Oral daily  glucagon  Injectable 1 milliGRAM(s) IntraMuscular once  heparin   Injectable 5000 Unit(s) SubCutaneous every 8 hours  insulin regular Infusion 2 Unit(s)/Hr IV Continuous <Continuous>  levothyroxine 175 MICROGram(s) Oral daily  norepinephrine Infusion 0.05 MICROgram(s)/kG/Min IV Continuous <Continuous>  nystatin Ointment 1 Application(s) Topical two times a day  pantoprazole  Injectable 40 milliGRAM(s) IV Push daily  propofol Infusion 5 MICROgram(s)/kG/Min IV Continuous <Continuous>    PRN MEDICATIONS  acetaminophen     Tablet .. 650 milliGRAM(s) Oral every 6 hours PRN  acetaminophen  Suppository .. 650 milliGRAM(s) Rectal every 4 hours PRN  sodium chloride 0.9% lock flush 10 milliLiter(s) IV Push every 1 hour PRN    VITALS:   T(F): 100.9  HR: 88  BP: 108/53  RR: 24  SpO2: 100%    LABS:                        8.2    13.22 )-----------( 464      ( 03 Nov 2021 06:24 )             25.9     11-03    136  |  97<L>  |  71<HH>  ----------------------------<  252<H>  4.9   |  22  |  1.7<H>    Ca    8.8      03 Nov 2021 06:24    TPro  5.8<L>  /  Alb  2.6<L>  /  TBili  0.6  /  DBili  x   /  AST  674<H>  /  ALT  444<H>  /  AlkPhos  182<H>  11-03        ABG - ( 03 Nov 2021 04:48 )  pH, Arterial: 7.50  pH, Blood: x     /  pCO2: 34    /  pO2: 160   / HCO3: 26    / Base Excess: 3.2   /  SaO2: 97.7                PHYSICAL EXAM:  Gen: NAD, intubated and sedated  HEENT: Normocephalic, atraumatic  Neck: supple, no lymphadenopathy  CV: Regular rate & regular rhythm  Lungs: decreased BS throughout the lung field   Abdomen: Soft, BS present  Neuro: Sedated   Skin: no rash, no erythema  Lines: no phlebitis

## 2021-11-03 NOTE — PROGRESS NOTE ADULT - SUBJECTIVE AND OBJECTIVE BOX
LINDSAY ALBERT  76y, Female  Allergy: No Known Allergies      LOS  12d    CHIEF COMPLAINT: SOB (03 Nov 2021 11:30)      INTERVAL EVENTS/HPI  - No acute events overnight  - T(F): , Max: 101.3 (11-03-21 @ 12:00)  - trachypnic and reintubated again  - WBC Count: 13.22 (11-03-21 @ 06:24)  WBC Count: 10.67 (11-02-21 @ 05:31)     - Creatinine, Serum: 1.7 (11-03-21 @ 06:24)  Creatinine, Serum: 1.8 (11-02-21 @ 05:31)       ROS  unable to obtain history secondary to patient's mental status     VITALS:  T(F): 101.3, Max: 101.3 (11-03-21 @ 12:00)  HR: 95  BP: 105/52  RR: 25Vital Signs Last 24 Hrs  T(C): 38.5 (03 Nov 2021 12:00), Max: 38.5 (03 Nov 2021 12:00)  T(F): 101.3 (03 Nov 2021 12:00), Max: 101.3 (03 Nov 2021 12:00)  HR: 95 (03 Nov 2021 14:40) (78 - 95)  BP: 105/52 (03 Nov 2021 12:00) (75/51 - 119/70)  BP(mean): 75 (03 Nov 2021 12:00) (59 - 89)  RR: 25 (03 Nov 2021 12:00) (17 - 34)  SpO2: 100% (03 Nov 2021 14:40) (98% - 100%)    PHYSICAL EXAM:  Gen: NAD, resting in bed  HEENT: Normocephalic, atraumatic  Neck: supple, no lymphadenopathy  CV: Regular rate & regular rhythm  Lungs: decreased BS at bases, no fremitus  Abdomen: Soft, BS present  Ext: Warm, well perfused  Neuro: non focal, awake  Skin: no rash, no erythema  Lines: no phlebitis    FH: Non-contributory  Social Hx: Non-contributory    TESTS & MEASUREMENTS:                        8.2    13.22 )-----------( 464      ( 03 Nov 2021 06:24 )             25.9     11-03    136  |  97<L>  |  71<HH>  ----------------------------<  252<H>  4.9   |  22  |  1.7<H>    Ca    8.8      03 Nov 2021 06:24    TPro  5.8<L>  /  Alb  2.6<L>  /  TBili  0.6  /  DBili  x   /  AST  674<H>  /  ALT  444<H>  /  AlkPhos  182<H>  11-03    eGFR if Non African American: 29 mL/min/1.73M2 (11-03-21 @ 06:24)  eGFR if : 34 mL/min/1.73M2 (11-03-21 @ 06:24)    LIVER FUNCTIONS - ( 03 Nov 2021 06:24 )  Alb: 2.6 g/dL / Pro: 5.8 g/dL / ALK PHOS: 182 U/L / ALT: 444 U/L / AST: 674 U/L / GGT: x               Culture - Blood (collected 10-30-21 @ 05:26)  Source: .Blood None  Preliminary Report (10-31-21 @ 19:01):    No growth to date.    Culture - Blood (collected 10-29-21 @ 05:13)  Source: .Blood None  Preliminary Report (10-30-21 @ 18:01):    No growth to date.    Culture - Blood (collected 10-28-21 @ 05:35)  Source: .Blood None  Final Report (11-02-21 @ 14:00):    No Growth Final    Culture - Blood (collected 10-27-21 @ 05:54)  Source: .Blood None  Final Report (11-01-21 @ 20:00):    No Growth Final    Culture - Sputum (collected 10-26-21 @ 20:55)  Source: .Sputum Sputum  Gram Stain (10-27-21 @ 07:06):    Numerous polymorphonuclear leukocytes per low power field    No Squamous epithelial cells per low power field    Moderate Gram positive cocci in pairs seen per oil power field  Final Report (10-28-21 @ 16:02):    Numerous Methicillin Resistant Staphylococcus aureus    Normal Respiratory Asia present  Organism: Methicillin resistant Staphylococcus aureus (10-28-21 @ 16:02)  Organism: Methicillin resistant Staphylococcus aureus (10-28-21 @ 16:02)      -  Amoxicillin/Clavulanic Acid: R >4/2      -  Ampicillin: R >8      -  Ampicillin/Sulbactam: R 16/8      -  Cefazolin: R >16      -  Ceftriaxone: R >32      -  Ciprofloxacin: R >2      -  Clindamycin: R >4      -  Daptomycin: S <=0.25      -  Erythromycin: R >4      -  Gentamicin: S <=1 Should not be used as monotherapy      -  Levofloxacin: R >4      -  Linezolid: S 2      -  Meropenem: R >8      -  Moxifloxacin(Aerobic): R >4      -  Oxacillin: R >2      -  Penicillin: R >8      -  RIF- Rifampin: S <=1 Should not be used as monotherapy      -  Tetra/Doxy: S <=1      -  Trimethoprim/Sulfamethoxazole: S <=0.5/9.5      -  Vancomycin: S 1      Method Type: VINNIE    Culture - Blood (collected 10-26-21 @ 05:37)  Source: .Blood None  Final Report (10-31-21 @ 18:01):    No Growth Final    Culture - Sputum (collected 10-23-21 @ 15:05)  Source: .Sputum Sputum  Gram Stain (10-24-21 @ 14:45):    Numerous polymorphonuclear leukocytes per low power field    Few Squamous epithelial cells per low power field    Numerous Gram Positive Cocci in Clusters per oil power field  Final Report (10-25-21 @ 17:03):    Normal Respiratory Asia present    Culture - Blood (collected 10-23-21 @ 11:45)  Source: .Blood Blood-Peripheral  Final Report (10-28-21 @ 20:00):    No Growth Final    Culture - Urine (collected 10-22-21 @ 11:20)  Source: Catheterized Catheterized  Final Report (10-23-21 @ 22:43):    No growth            INFECTIOUS DISEASES TESTING  Fungitell: <31 (10-29-21 @ 05:13)  Procalcitonin, Serum: 0.67 (10-28-21 @ 05:35)  Procalcitonin, Serum: 0.98 (10-26-21 @ 05:37)  Rapid RVP Result: NotDetec (10-25-21 @ 09:22)  MRSA PCR Result.: Negative (10-23-21 @ 15:16)  Procalcitonin, Serum: 1.82 (10-22-21 @ 19:36)  COVID-19 PCR: NotDetec (10-22-21 @ 09:30)  COVID-19 PCR: NotDetec (10-18-21 @ 22:25)  COVID-19 PCR: NotDetec (10-02-21 @ 15:35)  Procalcitonin, Serum: 0.07 (09-28-21 @ 11:17)  COVID-19 PCR: NotDetec (09-25-21 @ 21:43)  COVID-19 PCR: NotDetec (08-07-21 @ 15:30)      INFLAMMATORY MARKERS  C-Reactive Protein, Serum: 94 mg/L (10-29-21 @ 05:13)  C-Reactive Protein, Serum: 98 mg/L (10-28-21 @ 05:35)  Sedimentation Rate, Erythrocyte: 87 mm/Hr (10-28-21 @ 05:35)      RADIOLOGY & ADDITIONAL TESTS:  I have personally reviewed the last available Chest xray  CXR  Xray Chest 1 View- PORTABLE-Urgent:   EXAM:  XR CHEST PORTABLE URGENT 1V            PROCEDURE DATE:  11/02/2021            INTERPRETATION:  Clinical History / Reason for exam: Right IJ TLC placement    Comparison : Chest radiograph November 2, 2021.    Technique/Positioning: Frontal chest radiograph.    Findings:    Support devices: Interval placement of a right IJ central venous catheter with tip in the SVC. Stable ET tube. New enteric tube courses below the diaphragm.    Cardiac/mediastinum/hilum: Unchanged.    Lung parenchyma/Pleura: Unchanged bibasilar opacities/effusions. No pneumothorax.    Skeleton/soft tissues: Unchanged.    Impression:    Unchanged bibasilar opacities/effusions.  Support devices as above. No pneumothorax.    --- End of Report ---              CLARE GUNDERSON MD; Attending Radiologist  This document has been electronically signed. Nov  3 2021 10:29AM (11-02-21 @ 22:36)      CT      CARDIOLOGY TESTING  12 Lead ECG:   Ventricular Rate 141 BPM    Atrial Rate 282 BPM    QRS Duration 112 ms    Q-T Interval 342 ms    QTC Calculation(Bazett) 523 ms    R Axis -45 degrees    T Axis 117 degrees    Diagnosis Line *** Poor data quality, interpretation may be adversely affected  Sinus tachycardia  Left axis deviation  Non-specific intra-ventricular conduction delay    Confirmed by LUAN YORK MD ([id number]) on 11/2/2021 3:07:56 PM (11-02-21 @ 12:42)  12 Lead ECG:   Ventricular Rate 80 BPM    Atrial Rate 80 BPM    P-R Interval 130 ms    QRS Duration 92 ms    Q-T Interval 440 ms    QTC Calculation(Bazett) 507 ms    P Axis 22 degrees    R Axis -18 degrees    T Axis 214 degrees    Diagnosis Line Normal sinus rhythm  Nonspecific ST-T changes  Confirmed by LUAN YORK MD ([id number]) on 10/25/2021 8:36:05 PM (10-25-21 @ 07:23)      MEDICATIONS  albuterol/ipratropium for Nebulization 3 Nebulizer every 6 hours  aspirin  chewable 81 Oral daily  atorvastatin 40 Oral at bedtime  chlorhexidine 0.12% Liquid 15 Oral Mucosa two times a day  chlorhexidine 4% Liquid 1 Topical <User Schedule>  dexMEDEtomidine Infusion 0.2 IV Continuous <Continuous>  dextrose 40% Gel 15 Oral once  dextrose 5%. 1000 IV Continuous <Continuous>  dextrose 5%. 1000 IV Continuous <Continuous>  dextrose 50% Injectable 25 IV Push once  dextrose 50% Injectable 12.5 IV Push once  dextrose 50% Injectable 25 IV Push once  DULoxetine 60 Oral daily  folic acid 1 Oral daily  glucagon  Injectable 1 IntraMuscular once  heparin   Injectable 5000 SubCutaneous every 8 hours  insulin regular Infusion 2 IV Continuous <Continuous>  levothyroxine 175 Oral daily  norepinephrine Infusion 0.05 IV Continuous <Continuous>  nystatin Ointment 1 Topical two times a day  pantoprazole  Injectable 40 IV Push daily  propofol Infusion 5 IV Continuous <Continuous>      WEIGHT  Weight (kg): 64 (10-30-21 @ 17:00)  Creatinine, Serum: 1.7 mg/dL (11-03-21 @ 06:24)      ANTIBIOTICS:      All available historical records have been reviewed

## 2021-11-03 NOTE — PROGRESS NOTE ADULT - ASSESSMENT
Impression:    Acute Hypoxemic and Hypercapnic Respiratory Failure SP intubation  Pulmonary Edema, Bilateral Effusions, improving  HFpEF exacerbation, moderate MR  Sepsis present on admission  MRSA PNA  NSTEMI  Hypothyroidism   Iron Deficiency Anemia, Anemia of chronic disease    PLAN:    CNS:  fentanyl for sedation     HEENT: oral care.     PULMONARY: Hob >45.lower fioe to 50%  CARDIOVASCULAR: keep i=o. daily weights.   d/c iv fluid   lasix 40 mg once Iv keep is < os     GI: GI prophylaxis.  NG feed   follow LFT for now     RENAL: fu lytes. keep connor    INFECTIOUS DISEASE: antibx per ID. check vanc trough. fu cultures  if spike temp again start merop   repeat bld cx     HEMATOLOGICAL:  DVT prophylaxis.    ENDOCRINE:  Follow up FS.  Insulin protocol if needed.    MUSCULOSKELETAL: bed to chair position    MICU monitoring.

## 2021-11-03 NOTE — PROGRESS NOTE ADULT - ASSESSMENT
76 yr F with SLE on Methotrexate, moderate MR, HFpEF, HTN, hypothyroid, HLD, T2DM, hx of CVA with no residual deficits discharged from hospital earlier this month on 10/2 s/p HF exacerbation and NSTEMI, DC'd on new home oxygen, refused in pt cardiac cath and is supposed to have outpt cardiac cath with Dr Kowalski.   DC'd on 10/21 from [facility] s/p admission for resolved ANTONELLA, hypoglycemia and NSTEMI  returns to hospital with:    Acute respiratory failure secondary to sepsis from gram neg pneumonia and NSTEMI      - events noted, pt extubated and re intubated   - now with worsening CXR   - DC IV fluid - give Lasix x 1    - check procal   -  antibiotics  as per ID   - aspirin, Lipitor   - maintain even to neg fluid balance    - home meds   - DVT and GI prophylaxis

## 2021-11-03 NOTE — PROGRESS NOTE ADULT - SUBJECTIVE AND OBJECTIVE BOX
Patient is a 76y old  Female who presents with a chief complaint of SOB (03 Nov 2021 08:22)      T(F): 99.6 (11-03-21 @ 07:10), Max: 100.5 (11-02-21 @ 19:00)  HR: 88 (11-03-21 @ 09:00)  BP: 108/53 (11-03-21 @ 09:00)  RR: 25 (11-03-21 @ 09:00)  SpO2: 100% (11-03-21 @ 09:00) (98% - 100%)    PHYSICAL EXAM:  GENERAL: NAD  HEAD:  Atraumatic, Normocephalic  EYES: EOMI, PERRLA, conjunctiva and sclera clear  NERVOUS SYSTEM:  Alert & Oriented X3, no focal deficits   CHEST/LUNG: Clear to percussion bilaterally; No rales, rhonchi, wheezing, or rubs  HEART: Regular rate and rhythm; No murmurs, rubs, or gallops  ABDOMEN: Soft, Nontender, Nondistended; Bowel sounds present  EXTREMITIES:  2+ Peripheral Pulses, No clubbing, cyanosis, or edema    LABS  11-03    136  |  97<L>  |  71<HH>  ----------------------------<  252<H>  4.9   |  22  |  1.7<H>    Ca    8.8      03 Nov 2021 06:24    TPro  5.8<L>  /  Alb  2.6<L>  /  TBili  0.6  /  DBili  x   /  AST  674<H>  /  ALT  444<H>  /  AlkPhos  182<H>  11-03                          8.2    13.22 )-----------( 464      ( 03 Nov 2021 06:24 )             25.9       Mode: AC/ CMV (Assist Control/ Continuous Mandatory Ventilation)  RR (machine): 12  TV (machine): 400  FiO2: 60  PEEP: 5  MAP: 10  PIP: 27    CARDIAC ENZYMES          Culture Results:   No growth to date. (10-30-21)  Culture Results:   No growth to date. (10-29-21)  Culture Results:   No Growth Final (10-28-21)  Culture Results:   No Growth Final (10-27-21)  Culture Results:   Numerous Methicillin Resistant Staphylococcus aureus  Normal Respiratory Asia present (10-26-21)  Culture Results:   No Growth Final (10-26-21)  Culture Results:   Normal Respiratory Asia present (10-23-21)  Culture Results:   No Growth Final (10-23-21)  Culture Results:   No growth (10-22-21)    RADIOLOGY  < from: Xray Chest 1 View- PORTABLE-Routine (Xray Chest 1 View- PORTABLE-Routine in AM.) (11.03.21 @ 05:37) >    Impression:    Worsening pulmonary vascular congestion, bilateral pleural effusions and opacities.      < end of copied text >    MEDICATIONS  (STANDING):  albuterol/ipratropium for Nebulization 3 milliLiter(s) Nebulizer every 6 hours  aspirin  chewable 81 milliGRAM(s) Oral daily  atorvastatin 40 milliGRAM(s) Oral at bedtime  chlorhexidine 0.12% Liquid 15 milliLiter(s) Oral Mucosa two times a day  chlorhexidine 4% Liquid 1 Application(s) Topical <User Schedule>  dexMEDEtomidine Infusion 0.2 MICROgram(s)/kG/Hr (3.2 mL/Hr) IV Continuous <Continuous>  DULoxetine 60 milliGRAM(s) Oral daily  folic acid 1 milliGRAM(s) Oral daily  heparin   Injectable 5000 Unit(s) SubCutaneous every 8 hours  insulin glargine Injectable (LANTUS) 7 Unit(s) SubCutaneous at bedtime  insulin lispro (ADMELOG) corrective regimen sliding scale   SubCutaneous three times a day before meals  insulin regular Infusion 2 Unit(s)/Hr (2 mL/Hr) IV Continuous <Continuous>  lactated ringers. 1000 milliLiter(s) (75 mL/Hr) IV Continuous <Continuous>  levothyroxine 175 MICROGram(s) Oral daily  norepinephrine Infusion 0.05 MICROgram(s)/kG/Min (6 mL/Hr) IV Continuous <Continuous>  nystatin Ointment 1 Application(s) Topical two times a day  pantoprazole  Injectable 40 milliGRAM(s) IV Push daily  propofol Infusion 5 MICROgram(s)/kG/Min (1.92 mL/Hr) IV Continuous <Continuous>    MEDICATIONS  (PRN):  acetaminophen     Tablet .. 650 milliGRAM(s) Oral every 6 hours PRN Temp greater or equal to 38C (100.4F)  acetaminophen  Suppository .. 650 milliGRAM(s) Rectal every 4 hours PRN Temp greater or equal to 38C (100.4F)        Patient is sedated on ventilator on Propofol      T(F): 99.6 (11-03-21 @ 07:10), Max: 100.5 (11-02-21 @ 19:00)  HR: 88 (11-03-21 @ 09:00)  BP: 108/53 (11-03-21 @ 09:00)  RR: 25 (11-03-21 @ 09:00)  SpO2: 100% (11-03-21 @ 09:00) (98% - 100%)    PHYSICAL EXAM:  GENERAL: NAD  HEAD:  Atraumatic, Normocephalic  EYES: EOMI, PERRLA, conjunctiva and sclera clear  NERVOUS SYSTEM:   no focal deficits   CHEST/LUNG:  bilateral rhonchi  HEART: Regular rate and rhythm; No murmurs, rubs, or gallops  ABDOMEN: Soft, Nontender, Nondistended; Bowel sounds present  EXTREMITIES:  2+ Peripheral Pulses, No clubbing, cyanosis, or edema    LABS  11-03    136  |  97<L>  |  71<HH>  ----------------------------<  252<H>  4.9   |  22  |  1.7<H>    Ca    8.8      03 Nov 2021 06:24    TPro  5.8<L>  /  Alb  2.6<L>  /  TBili  0.6  /  DBili  x   /  AST  674<H>  /  ALT  444<H>  /  AlkPhos  182<H>  11-03                          8.2    13.22 )-----------( 464      ( 03 Nov 2021 06:24 )             25.9       Mode: AC/ CMV (Assist Control/ Continuous Mandatory Ventilation)  RR (machine): 12  TV (machine): 400  FiO2: 60  PEEP: 5      CARDIAC ENZYMES    Troponin T, Serum (10.24.21 @ 06:29)   Troponin T, Serum: 0.85: Critical value:previously called ng/mL < from: 12 Lead ECG (11.02.21 @ 12:42) >    Ventricular Rate 141 BPM    Atrial Rate 282 BPM    QRS Duration 112 ms    Q-T Interval 342 ms    QTC Calculation(Bazett) 523 ms    R Axis -45 degrees    T Axis 117 degrees    Diagnosis Line *** Poor data quality, interpretation may be adversely affected  Sinus tachycardia  Left axis deviation  Non-specific intra-ventricular conduction delay    < end of copied text >      Culture Results:   No growth to date. (10-30-21)  Culture Results:   No growth to date. (10-29-21)  Culture Results:   No Growth Final (10-28-21)  Culture Results:   No Growth Final (10-27-21)  Culture Results:   Numerous Methicillin Resistant Staphylococcus aureus  Normal Respiratory Asia present (10-26-21)  Culture Results:   No Growth Final (10-26-21)  Culture Results:   Normal Respiratory Asia present (10-23-21)  Culture Results:   No Growth Final (10-23-21)  Culture Results:   No growth (10-22-21)    RADIOLOGY  < from: Xray Chest 1 View- PORTABLE-Routine (Xray Chest 1 View- PORTABLE-Routine in AM.) (11.03.21 @ 05:37) >    Impression:    Worsening pulmonary vascular congestion, bilateral pleural effusions and opacities.      < end of copied text >    MEDICATIONS  (STANDING):  albuterol/ipratropium for Nebulization 3 milliLiter(s) Nebulizer every 6 hours  aspirin  chewable 81 milliGRAM(s) Oral daily  atorvastatin 40 milliGRAM(s) Oral at bedtime  chlorhexidine 0.12% Liquid 15 milliLiter(s) Oral Mucosa two times a day  chlorhexidine 4% Liquid 1 Application(s) Topical <User Schedule>  dexMEDEtomidine Infusion 0.2 MICROgram(s)/kG/Hr (3.2 mL/Hr) IV Continuous <Continuous>  DULoxetine 60 milliGRAM(s) Oral daily  folic acid 1 milliGRAM(s) Oral daily  heparin   Injectable 5000 Unit(s) SubCutaneous every 8 hours  insulin regular Infusion 2 Unit(s)/Hr (2 mL/Hr) IV Continuous <Continuous>  lactated ringers. 1000 milliLiter(s) (75 mL/Hr) IV Continuous <Continuous>  levothyroxine 175 MICROGram(s) Oral daily  norepinephrine Infusion 0.05 MICROgram(s)/kG/Min (6 mL/Hr) IV Continuous <Continuous>  nystatin Ointment 1 Application(s) Topical two times a day  pantoprazole  Injectable 40 milliGRAM(s) IV Push daily  propofol Infusion 5 MICROgram(s)/kG/Min (1.92 mL/Hr) IV Continuous <Continuous>    MEDICATIONS  (PRN):  acetaminophen     Tablet .. 650 milliGRAM(s) Oral every 6 hours PRN Temp greater or equal to 38C (100.4F)  acetaminophen  Suppository .. 650 milliGRAM(s) Rectal every 4 hours PRN Temp greater or equal to 38C (100.4F)        Patient is sedated on ventilator on Propofol      T(F): 99.6 (11-03-21 @ 07:10), Max: 100.5 (11-02-21 @ 19:00)  HR: 88 (11-03-21 @ 09:00)  BP: 108/53 (11-03-21 @ 09:00)  RR: 25 (11-03-21 @ 09:00)  SpO2: 100% (11-03-21 @ 09:00) (98% - 100%)    PHYSICAL EXAM:  GENERAL: NAD  HEAD:  Atraumatic, Normocephalic  EYES: EOMI, PERRLA, conjunctiva and sclera clear  NERVOUS SYSTEM:   no focal deficits   CHEST/LUNG:  bilateral rhonchi  HEART: Regular rate and rhythm; No murmurs, rubs, or gallops  ABDOMEN: Soft, Nontender, Nondistended; Bowel sounds present  EXTREMITIES:  2+ Peripheral Pulses, No clubbing, cyanosis, or edema    LABS  11-03    136  |  97<L>  |  71<HH>  ----------------------------<  252<H>  4.9   |  22  |  1.7<H>    Ca    8.8      03 Nov 2021 06:24    TPro  5.8<L>  /  Alb  2.6<L>  /  TBili  0.6  /  DBili  x   /  AST  674<H>  /  ALT  444<H>  /  AlkPhos  182<H>  11-03                          8.2    13.22 )-----------( 464      ( 03 Nov 2021 06:24 )             25.9       Mode: AC/ CMV (Assist Control/ Continuous Mandatory Ventilation)  RR (machine): 12  TV (machine): 400  FiO2: 60  PEEP: 5      CARDIAC ENZYMES    Troponin T, Serum (10.24.21 @ 06:29)   Troponin T, Serum: 0.85: Critical value:previously called ng/mL < from: 12 Lead ECG (11.02.21 @ 12:42) >    Ventricular Rate 141 BPM    Atrial Rate 282 BPM    QRS Duration 112 ms    Q-T Interval 342 ms    QTC Calculation(Bazett) 523 ms    R Axis -45 degrees    T Axis 117 degrees    Diagnosis Line *** Poor data quality, interpretation may be adversely affected  Sinus tachycardia  Left axis deviation  Non-specific intra-ventricular conduction delay    < end of copied text >      Culture Results:   No growth to date. (10-30-21)  Culture Results:   No growth to date. (10-29-21)  Culture Results:   No Growth Final (10-28-21)  Culture Results:   No Growth Final (10-27-21)  Culture Results:   Numerous Methicillin Resistant Staphylococcus aureus  Normal Respiratory Asia present (10-26-21)  Culture Results:   No Growth Final (10-26-21)  Culture Results:   Normal Respiratory Asia present (10-23-21)  Culture Results:   No Growth Final (10-23-21)  Culture Results:   No growth (10-22-21)    RADIOLOGY  < from: Xray Chest 1 View- PORTABLE-Routine (Xray Chest 1 View- PORTABLE-Routine in AM.) (11.03.21 @ 05:37) >    Impression:    Worsening pulmonary vascular congestion, bilateral pleural effusions and opacities.      < end of copied text >    MEDICATIONS  (STANDING):  albuterol/ipratropium for Nebulization 3 milliLiter(s) Nebulizer every 6 hours  aspirin  chewable 81 milliGRAM(s) Oral daily  atorvastatin 40 milliGRAM(s) Oral at bedtime  chlorhexidine 0.12% Liquid 15 milliLiter(s) Oral Mucosa two times a day  chlorhexidine 4% Liquid 1 Application(s) Topical <User Schedule>  dexMEDEtomidine Infusion 0.2 MICROgram(s)/kG/Hr (3.2 mL/Hr) IV Continuous <Continuous>  DULoxetine 60 milliGRAM(s) Oral daily  folic acid 1 milliGRAM(s) Oral daily  heparin   Injectable 5000 Unit(s) SubCutaneous every 8 hours  insulin regular Infusion 2 Unit(s)/Hr (2 mL/Hr) IV Continuous <Continuous>  levothyroxine 175 MICROGram(s) Oral daily  norepinephrine Infusion 0.05 MICROgram(s)/kG/Min (6 mL/Hr) IV Continuous <Continuous>  nystatin Ointment 1 Application(s) Topical two times a day  pantoprazole  Injectable 40 milliGRAM(s) IV Push daily  propofol Infusion 5 MICROgram(s)/kG/Min (1.92 mL/Hr) IV Continuous <Continuous>    MEDICATIONS  (PRN):  acetaminophen     Tablet .. 650 milliGRAM(s) Oral every 6 hours PRN Temp greater or equal to 38C (100.4F)  acetaminophen  Suppository .. 650 milliGRAM(s) Rectal every 4 hours PRN Temp greater or equal to 38C (100.4F)

## 2021-11-03 NOTE — PROGRESS NOTE ADULT - SUBJECTIVE AND OBJECTIVE BOX
Patient is a 76y old  Female who presents with a chief complaint of SOB (02 Nov 2021 15:53)      Over Night Events:  Patient seen and examined.   patient was reintubated yesterday for resp distress also episode of Vtach     ROS:  See HPI    PHYSICAL EXAM    ICU Vital Signs Last 24 Hrs  T(C): 37.6 (03 Nov 2021 07:10), Max: 38.1 (02 Nov 2021 19:00)  T(F): 99.6 (03 Nov 2021 07:10), Max: 100.5 (02 Nov 2021 19:00)  HR: 85 (03 Nov 2021 08:08) (78 - 133)  BP: 117/59 (03 Nov 2021 06:30) (75/51 - 149/65)  BP(mean): 84 (03 Nov 2021 06:30) (59 - 94)  ABP: --  ABP(mean): --  RR: 25 (03 Nov 2021 07:10) (17 - 39)  SpO2: 100% (03 Nov 2021 08:08) (98% - 100%)      General: open eyes   HEENT:  et tube               Lymph Nodes: NO cervical LN   Lungs: Bilateral crackles   Cardiovascular: Regular   Abdomen: Soft, Positive BS  Extremities: No clubbing   Skin: warm   Neurological: follow command   Musculoskeletal: move all ext     I&O's Detail    02 Nov 2021 07:01  -  03 Nov 2021 07:00  --------------------------------------------------------  IN:    Dexmedetomidine: 183 mL    Enteral Tube Flush: 60 mL    Glucerna: 450 mL    Insulin: 30 mL    Lactated Ringers: 1650 mL    Norepinephrine: 82 mL    Propofol: 312.4 mL  Total IN: 2767.4 mL    OUT:    Indwelling Catheter - Urethral (mL): 465 mL  Total OUT: 465 mL    Total NET: 2302.4 mL      03 Nov 2021 07:01  -  03 Nov 2021 08:22  --------------------------------------------------------  IN:  Total IN: 0 mL    OUT:    Indwelling Catheter - Urethral (mL): 15 mL  Total OUT: 15 mL    Total NET: -15 mL          LABS:                          8.2    13.22 )-----------( 464      ( 03 Nov 2021 06:24 )             25.9         03 Nov 2021 06:24    136    |  97     |  71     ----------------------------<  252    4.9     |  22     |  1.7      Ca    8.8        03 Nov 2021 06:24    TPro  5.8    /  Alb  2.6    /  TBili  0.6    /  DBili  x      /  AST  674    /  ALT  444    /  AlkPhos  182    03 Nov 2021 06:24  Amylase x     lipase x                                                                                                                                                                                                 Mode: AC/ CMV (Assist Control/ Continuous Mandatory Ventilation)  RR (machine): 12  TV (machine): 400  FiO2: 60  PEEP: 5  MAP: 10  PIP: 27                                      ABG - ( 03 Nov 2021 04:48 )  pH, Arterial: 7.50  pH, Blood: x     /  pCO2: 34    /  pO2: 160   / HCO3: 26    / Base Excess: 3.2   /  SaO2: 97.7                MEDICATIONS  (STANDING):  albuterol/ipratropium for Nebulization 3 milliLiter(s) Nebulizer every 6 hours  aspirin  chewable 81 milliGRAM(s) Oral daily  atorvastatin 40 milliGRAM(s) Oral at bedtime  chlorhexidine 0.12% Liquid 15 milliLiter(s) Oral Mucosa two times a day  chlorhexidine 4% Liquid 1 Application(s) Topical <User Schedule>  dexMEDEtomidine Infusion 0.2 MICROgram(s)/kG/Hr (3.2 mL/Hr) IV Continuous <Continuous>  dextrose 40% Gel 15 Gram(s) Oral once  dextrose 5%. 1000 milliLiter(s) (50 mL/Hr) IV Continuous <Continuous>  dextrose 5%. 1000 milliLiter(s) (100 mL/Hr) IV Continuous <Continuous>  dextrose 50% Injectable 25 Gram(s) IV Push once  dextrose 50% Injectable 12.5 Gram(s) IV Push once  dextrose 50% Injectable 25 Gram(s) IV Push once  DULoxetine 60 milliGRAM(s) Oral daily  folic acid 1 milliGRAM(s) Oral daily  glucagon  Injectable 1 milliGRAM(s) IntraMuscular once  heparin   Injectable 5000 Unit(s) SubCutaneous every 8 hours  insulin glargine Injectable (LANTUS) 7 Unit(s) SubCutaneous at bedtime  insulin lispro (ADMELOG) corrective regimen sliding scale   SubCutaneous three times a day before meals  insulin regular Infusion 2 Unit(s)/Hr (2 mL/Hr) IV Continuous <Continuous>  lactated ringers. 1000 milliLiter(s) (75 mL/Hr) IV Continuous <Continuous>  levothyroxine 175 MICROGram(s) Oral daily  norepinephrine Infusion 0.05 MICROgram(s)/kG/Min (6 mL/Hr) IV Continuous <Continuous>  nystatin Ointment 1 Application(s) Topical two times a day  pantoprazole  Injectable 40 milliGRAM(s) IV Push daily  propofol Infusion 5 MICROgram(s)/kG/Min (1.92 mL/Hr) IV Continuous <Continuous>    MEDICATIONS  (PRN):  acetaminophen     Tablet .. 650 milliGRAM(s) Oral every 6 hours PRN Temp greater or equal to 38C (100.4F)  acetaminophen  Suppository .. 650 milliGRAM(s) Rectal every 4 hours PRN Temp greater or equal to 38C (100.4F)  sodium chloride 0.9% lock flush 10 milliLiter(s) IV Push every 1 hour PRN Pre/post blood products, medications, blood draw, and to maintain line patency          Xrays:  TLC:  OG:  ET tube:                                                                                    b/l effusion    ECHO:  [facility] ICU:

## 2021-11-03 NOTE — PROGRESS NOTE ADULT - ASSESSMENT
75 y/o F PMH heart failure, moderate severity mitral valve regurgitation, admitted in early October for heart failure and end stemi presents c/o SOB. Pt had a recent discharge from the hospital and XR during that admission showed increased markings in both lung fields consistent with CHF exacerbation    Patient found to have pneumonia likely MRSA and will continue with Vancomycin as per ID. Continues to be febrile despite being on antibiotics. Patient only on Precedex for sedation. SAT patient woke up but was tachypneic on the SBT.    # Acute hypoxemic respiratory failure 2/2 HAP  - Febrile to 102, hypoxic in ED s/p Intubation 10/23/2021, extubated 11/1/2021 and then intubation again 11/2/2021  - CXR showing bilateral opacities  - Sputum growing MRSA, Procal>1>0.9  - Afebrile for the last 48 hours   - Patient is on Vanc IV which is on hold for now, follow the levels daily   - Initial blood culture and repeat negative  - Patient was  re-intubated again on 11/2/2021 for severe tachypnea and respiratory fatigue despite being on BiPAP  - Repeat Xray in AM    # Acute Exacerbation of HFpEF   - Was in [place] last admission due to overdiuresis and Lasix was reduced on discharge  - received IV lasix in ER, Holding Lasix  - geeta, I & O, daily weight  - ECHO on last discharge unchanged except for new, mild pulmonary hypertension   - F/u cardio outpatient     # Hypothyroidism   - Elevated TSH 12.24  - Continue synthroid 175mg  - TSH repeat outpatient in 2 months    # Iron Deficiency Anemia  # Anemia of chronic disease  - On lAst admission given Iron sucrose 200x2  - Baseline Hb 8   - Keep Hb >7  - F/u H&H     # Nocturnal Hypoxemia  - uses home O2    # DM II  - Will continue with Insulin regimen  - Continue Insulin drip    # Elevated LFTs  - RUQ US reviewed, no evidence of stone/inflammation in the gallbladder   -LFT was markedly elevated when compared to the prior readings, Patient was briefly hypotensive during the intubation and then pressor support was initiated. Daily LFT and if continued to trend up consult GI     Status: FULL CODE  GI Prophylaxis: PPI  DVT Prophylaxis: Hep SubQ

## 2021-11-04 NOTE — PROGRESS NOTE ADULT - ASSESSMENT
77 yo female PMHx of HFpEF, HTN, HLD, DM,  SLE on Cellsept and methotrexate, CVA in 2002, recently admitted for Hypoglycemia and ANTONELLA who presents with shortness of breath  presenting for shortness of breath    IMPRESSION  #Acute Hypoxic respiratory failure- intubated/vented   #Fevers - presumed HAP- sputum cx grew MRSA, 10/26/21  - Blood Cx 10/23 NG  - Sputum cx 10/23 NG   - MRSA Nares 10/23 negative   - RVP negative  - Procalcitonin, Serum: 1.82 (10.22.21 @ 19:36) -> Procalcitonin, Serum: 0.98 (10.26.21 @ 05:37)  #Obesity BMI (kg/m2): 26.7, 30.2  #Abx allergy: NKDA    would recommend:    1. Monitor Temp. and c/w supportive care  2. Continue Cefepin=me and Vancomycin level still elevated  3. Management of BIPAP as per ICU protocol  4. Aspiration precaution  5.Contact Isolation     d/w ICU team     Attending Attestation:    Spent more than 35 minutes on total encounter, more than 50 % of the visit was spent counseling and/or coordinating care by the Attending physician.   75 yo female PMHx of HFpEF, HTN, HLD, DM,  SLE on Cellsept and methotrexate, CVA in 2002, recently admitted for Hypoglycemia and ANTONELLA who presents with shortness of breath  presenting for shortness of breath    IMPRESSION  #Acute Hypoxic respiratory failure- intubated/vented   #Fevers - presumed HAP- sputum cx grew MRSA, 10/26/21  - Blood Cx 10/23 NG  - Sputum cx 10/23 NG   - MRSA Nares 10/23 negative   - RVP negative  - Procalcitonin, Serum: 1.82 (10.22.21 @ 19:36) -> Procalcitonin, Serum: 0.98 (10.26.21 @ 05:37)  #Obesity BMI (kg/m2): 26.7, 30.2  #Abx allergy: NKDA    would recommend:    1. Monitor Temp. and c/w supportive care  2. Continue Cefepime and Vancomycin level still elevated  3. Management of Vent as per ICU protocol  4. Aspiration precaution  5. Monitor WBC count    d/w ICU team     Attending Attestation:    Spent more than 35 minutes on total encounter, more than 50 % of the visit was spent counseling and/or coordinating care by the Attending physician.

## 2021-11-04 NOTE — CHART NOTE - NSCHARTNOTEFT_GEN_A_CORE
DATE/TIME OF PROCEDURE: 11/4/2021 11:00 am  PREOPERATIVE DIAGNOSIS: RLL pneumonia  POSTOPERATIVE DIAGNOSES: RLL pneumonia  PROCEDURE PERFORMED:  Bronchoscopy with BAL  Attending: Dr. Mcmahon  Fellow: Dr. Ross    CONSENT: After explaining the risks and benefits, verbal consent was obtained from Robin (son) via phone call [phone]    The patient had been previously intubated and was on mechanical ventilatory support. She was on sedation, which was continued and adjusted during the procedure. Her FiO2 was increased to 100% during the procedure. The  fiberoptic bronchoscope was introduced through an endotracheal tube adaptor and the tip of the endotracheal tube was noted to be in good position above the clara.   The Right tracheobronchial tree was inspected closely to the level of the subsegmental bronchi. All bronchi are patent with no endobronchial lesions and no mucosal lesions noted.   The Left tracheobronchial tree was also patent and intact with the mucosa normal.   The bronchoscope was then introduced to the Right Lower Lobe and BAL was done in that area.  After adequate clearing of secretions was accomplished, the bronchoscope was removed from the patient and the procedure was ended.  All samples were submitted for appropriate studies.   The patient tolerated the procedure well and there were no complications. DATE/TIME OF PROCEDURE: 11/4/2021 11:00 am  PREOPERATIVE DIAGNOSIS: RLL pneumonia  POSTOPERATIVE DIAGNOSES: RLL pneumonia  PROCEDURE PERFORMED:  Bronchoscopy with BAL  Attending: Dr. Mcmahon  Fellow: Dr. Ross    CONSENT: After explaining the risks and benefits, verbal consent was obtained from Robin (son) via phone call [phone]    The patient had been previously intubated and was on mechanical ventilatory support. She was on sedation, which was continued and adjusted during the procedure. Her FiO2 was increased to 100% during the procedure. The  fiberoptic bronchoscope was introduced through an endotracheal tube adaptor and the tip of the endotracheal tube was noted to be in good position above the clara.   The Right tracheobronchial tree was inspected closely to the level of the subsegmental bronchi. All bronchi are patent with no endobronchial lesions and no mucosal lesions noted.   The Left tracheobronchial tree was also patent and intact with the mucosa normal.   The bronchoscope was then introduced to the Right Lower Lobe and BAL was done in that area.  After adequate clearing of secretions was accomplished, the bronchoscope was removed from the patient and the procedure was ended.  All samples were submitted for appropriate studies.   The patient tolerated the procedure well and there were no complications.  I was present during whole procedure

## 2021-11-04 NOTE — PROGRESS NOTE ADULT - SUBJECTIVE AND OBJECTIVE BOX
Patient is seen and examined at the bed side, is afebrile  now. She is s/p extubated , on BIPAP.  The Vancomycin level is elevated, 40.9 today.      REVIEW OF SYSTEMS: All other review systems are negative      ALLERGIES: No Known Allergies      ICU Vital Signs Last 24 Hrs  T(C): 37.7 (04 Nov 2021 15:10), Max: 38.2 (03 Nov 2021 19:00)  T(F): 99.9 (04 Nov 2021 15:10), Max: 100.8 (03 Nov 2021 19:00)  HR: 72 (04 Nov 2021 17:00) (72 - 98)  BP: 112/64 (04 Nov 2021 17:00) (95/51 - 114/53)  BP(mean): 83 (04 Nov 2021 17:00) (68 - 83)  ABP: --  ABP(mean): --  RR: 16 (04 Nov 2021 17:00) (13 - 29)  SpO2: 100% (04 Nov 2021 17:00) (98% - 100%)      PHYSICAL EXAM:  GENERAL: s/p extubation, on BIPAP  CHEST/LUNG: Not using accessory muscles   HEART: s1 and s2 present  ABDOMEN:  Nontender and  Nondistended  EXTREMITIES: No pedal  edema  CNS: Awake and Alert      LABS:                        7.5    14.77 )-----------( 392      ( 04 Nov 2021 06:00 )             24.1                           8.1    7.79  )-----------( 306      ( 01 Nov 2021 05:40 )             25.5         11-04    140  |  102  |  68<HH>  ----------------------------<  142<H>  3.8   |  28  |  1.6<H>    Ca    8.0<L>      04 Nov 2021 06:00    TPro  5.2<L>  /  Alb  2.6<L>  /  TBili  0.5  /  DBili  x   /  AST  196<H>  /  ALT  293<H>  /  AlkPhos  193<H>  11-04 11-01    134<L>  |  97<L>  |  67<HH>  ----------------------------<  99  4.4   |  27  |  1.3    Ca    8.4<L>      01 Nov 2021 05:40  Mg     2.6     11-01    TPro  5.9<L>  /  Alb  2.9<L>  /  TBili  0.3  /  DBili  x   /  AST  102<H>  /  ALT  90<H>  /  AlkPhos  214<H>  11-01      CAPILLARY BLOOD GLUCOSE  233 (28 Oct 2021 07:00)  231 (28 Oct 2021 06:15)  199 (28 Oct 2021 05:15)  272 (28 Oct 2021 04:15)    POCT Blood Glucose.: 156 mg/dL (28 Oct 2021 16:18)  POCT Blood Glucose.: 252 mg/dL (28 Oct 2021 14:13)  POCT Blood Glucose.: 259 mg/dL (28 Oct 2021 12:05)  POCT Blood Glucose.: 92 mg/dL (28 Oct 2021 10:25)  POCT Blood Glucose.: 77 mg/dL (28 Oct 2021 09:13)  POCT Blood Glucose.: 233 mg/dL (28 Oct 2021 06:51    ABG - ( 28 Oct 2021 16:19 )  pH, Arterial: 7.51  pH, Blood: x     /  pCO2: 44    /  pO2: 108   / HCO3: 35    / Base Excess: 11.0  /  SaO2: 97.5        Vancomycin Level, Random (11.04.21 @ 10:13)   Vancomycin Level, Random: 23.6:   Vancomycin Level, Trough (11.01.21 @ 00:25): 40.9  Vancomycin Level, Random (10.30.21 @ 05:55) : 28.2:   Vancomycin Level, Random (10.28.21 @ 05:35)   Vancomycin Level, Random: 17.1      MEDICATIONS  (STANDING):    albuterol/ipratropium for Nebulization 3 milliLiter(s) Nebulizer every 6 hours  aspirin  chewable 81 milliGRAM(s) Oral daily  atorvastatin 40 milliGRAM(s) Oral at bedtime  cefepime   IVPB 1000 milliGRAM(s) IV Intermittent every 24 hours  chlorhexidine 0.12% Liquid 15 milliLiter(s) Oral Mucosa two times a day  chlorhexidine 4% Liquid 1 Application(s) Topical <User Schedule>  dexMEDEtomidine Infusion 0.2 MICROgram(s)/kG/Hr (3.2 mL/Hr) IV Continuous <Continuous>  dextrose 40% Gel 15 Gram(s) Oral once  dextrose 5%. 1000 milliLiter(s) (50 mL/Hr) IV Continuous <Continuous>  dextrose 5%. 1000 milliLiter(s) (100 mL/Hr) IV Continuous <Continuous>  dextrose 50% Injectable 25 Gram(s) IV Push once  dextrose 50% Injectable 12.5 Gram(s) IV Push once  dextrose 50% Injectable 25 Gram(s) IV Push once  DULoxetine 60 milliGRAM(s) Oral daily  folic acid 1 milliGRAM(s) Oral daily  glucagon  Injectable 1 milliGRAM(s) IntraMuscular once  heparin   Injectable 5000 Unit(s) SubCutaneous every 8 hours  insulin regular Infusion 2 Unit(s)/Hr (2 mL/Hr) IV Continuous <Continuous>  levothyroxine 175 MICROGram(s) Oral daily  norepinephrine Infusion 0.05 MICROgram(s)/kG/Min (6 mL/Hr) IV Continuous <Continuous>  nystatin Ointment 1 Application(s) Topical two times a day  pantoprazole  Injectable 40 milliGRAM(s) IV Push daily  propofol Infusion 5 MICROgram(s)/kG/Min (1.92 mL/Hr) IV Continuous <Continuous>      RADIOLOGY & ADDITIONAL TESTS:    < from: Xray Chest 1 View- PORTABLE-Routine (Xray Chest 1 View- PORTABLE-Routine in AM.) (10.28.21 @ 07:09) >  Tip of endotracheal tube is at the clara and retraction is recommended.    Stable bilateral lung opacities      < from: Xray Chest 1 View- PORTABLE-Routine (Xray Chest 1 View- PORTABLE-Routine in AM.) (10.26.21 @ 06:04) >  Supportdevices: Right IJ line endotracheal tube and enteric tube are in satisfactory position.    Cardiac/mediastinum/hilum: Unremarkable.    Lung parenchyma/Pleura: Hazy bilateral opacities/effusions left greater than right are unchanged. No pneumothorax.        MICROBIOLOGY DATA:    Culture - Sputum . (10.26.21 @ 20:55)   - Amoxicillin/Clavulanic Acid: R >4/2   - Ampicillin: R >8   - Ampicillin/Sulbactam: R 16/8   - Cefazolin: R >16   - Ceftriaxone: R >32   - Ciprofloxacin: R >2   - Clindamycin: R >4   - Daptomycin: S <=0.25   - Erythromycin: R >4   - Gentamicin: S <=1 Should not be used as monotherapy   - Levofloxacin: R >4   - Linezolid: S 2   - Meropenem: R >8   - Moxifloxacin(Aerobic): R >4   - Oxacillin: R >2   - Penicillin: R >8   - RIF- Rifampin: S <=1 Should not be used as monotherapy   - Tetra/Doxy: S <=1   - Trimethoprim/Sulfamethoxazole: S <=0.5/9.5   - Vancomycin: S 1   Gram Stain:   Numerous polymorphonuclear leukocytes per low power field   No Squamous epithelial cells per low power field   Moderate Gram positive cocci in pairs seen per oil power field   Specimen Source: .Sputum Sputum   Culture Results:   Numerous Methicillin Resistant Staphylococcus aureus   Normal Respiratory Asia present              Patient is seen and examined at the bed side, is afebrile now. She is Re-intubated.  The Vancomycin level is 23.6 today.      REVIEW OF SYSTEMS: Unable to obtain due to mental status       ALLERGIES: No Known Allergies      ICU Vital Signs Last 24 Hrs  T(C): 37.7 (04 Nov 2021 15:10), Max: 38.2 (03 Nov 2021 19:00)  T(F): 99.9 (04 Nov 2021 15:10), Max: 100.8 (03 Nov 2021 19:00)  HR: 72 (04 Nov 2021 17:00) (72 - 98)  BP: 112/64 (04 Nov 2021 17:00) (95/51 - 114/53)  BP(mean): 83 (04 Nov 2021 17:00) (68 - 83)  ABP: --  ABP(mean): --  RR: 16 (04 Nov 2021 17:00) (13 - 29)  SpO2: 100% (04 Nov 2021 17:00) (98% - 100%)      PHYSICAL EXAM:  GENERAL: Intubated/vented   CHEST/LUNG: Not using accessory muscles   HEART: s1 and s2 present  ABDOMEN:  Nontender and  Nondistended  EXTREMITIES: No pedal  edema  CNS: Intubated/vented       LABS:                        7.5    14.77 )-----------( 392      ( 04 Nov 2021 06:00 )             24.1                           8.1    7.79  )-----------( 306      ( 01 Nov 2021 05:40 )             25.5         11-04    140  |  102  |  68<HH>  ----------------------------<  142<H>  3.8   |  28  |  1.6<H>    Ca    8.0<L>      04 Nov 2021 06:00    TPro  5.2<L>  /  Alb  2.6<L>  /  TBili  0.5  /  DBili  x   /  AST  196<H>  /  ALT  293<H>  /  AlkPhos  193<H>  11-04 11-01    134<L>  |  97<L>  |  67<HH>  ----------------------------<  99  4.4   |  27  |  1.3    Ca    8.4<L>      01 Nov 2021 05:40  Mg     2.6     11-01    TPro  5.9<L>  /  Alb  2.9<L>  /  TBili  0.3  /  DBili  x   /  AST  102<H>  /  ALT  90<H>  /  AlkPhos  214<H>  11-01      CAPILLARY BLOOD GLUCOSE  233 (28 Oct 2021 07:00)  231 (28 Oct 2021 06:15)  199 (28 Oct 2021 05:15)  272 (28 Oct 2021 04:15)    POCT Blood Glucose.: 156 mg/dL (28 Oct 2021 16:18)  POCT Blood Glucose.: 252 mg/dL (28 Oct 2021 14:13)  POCT Blood Glucose.: 259 mg/dL (28 Oct 2021 12:05)  POCT Blood Glucose.: 92 mg/dL (28 Oct 2021 10:25)  POCT Blood Glucose.: 77 mg/dL (28 Oct 2021 09:13)  POCT Blood Glucose.: 233 mg/dL (28 Oct 2021 06:51    ABG - ( 28 Oct 2021 16:19 )  pH, Arterial: 7.51  pH, Blood: x     /  pCO2: 44    /  pO2: 108   / HCO3: 35    / Base Excess: 11.0  /  SaO2: 97.5        Vancomycin Level, Random (11.04.21 @ 10:13)   Vancomycin Level, Random: 23.6:   Vancomycin Level, Trough (11.01.21 @ 00:25): 40.9  Vancomycin Level, Random (10.30.21 @ 05:55) : 28.2:   Vancomycin Level, Random (10.28.21 @ 05:35)   Vancomycin Level, Random: 17.1      MEDICATIONS  (STANDING):    albuterol/ipratropium for Nebulization 3 milliLiter(s) Nebulizer every 6 hours  aspirin  chewable 81 milliGRAM(s) Oral daily  atorvastatin 40 milliGRAM(s) Oral at bedtime  cefepime   IVPB 1000 milliGRAM(s) IV Intermittent every 24 hours  chlorhexidine 0.12% Liquid 15 milliLiter(s) Oral Mucosa two times a day  chlorhexidine 4% Liquid 1 Application(s) Topical <User Schedule>  dexMEDEtomidine Infusion 0.2 MICROgram(s)/kG/Hr (3.2 mL/Hr) IV Continuous <Continuous>  dextrose 40% Gel 15 Gram(s) Oral once  dextrose 5%. 1000 milliLiter(s) (50 mL/Hr) IV Continuous <Continuous>  dextrose 5%. 1000 milliLiter(s) (100 mL/Hr) IV Continuous <Continuous>  dextrose 50% Injectable 25 Gram(s) IV Push once  dextrose 50% Injectable 12.5 Gram(s) IV Push once  dextrose 50% Injectable 25 Gram(s) IV Push once  DULoxetine 60 milliGRAM(s) Oral daily  folic acid 1 milliGRAM(s) Oral daily  glucagon  Injectable 1 milliGRAM(s) IntraMuscular once  heparin   Injectable 5000 Unit(s) SubCutaneous every 8 hours  insulin regular Infusion 2 Unit(s)/Hr (2 mL/Hr) IV Continuous <Continuous>  levothyroxine 175 MICROGram(s) Oral daily  norepinephrine Infusion 0.05 MICROgram(s)/kG/Min (6 mL/Hr) IV Continuous <Continuous>  nystatin Ointment 1 Application(s) Topical two times a day  pantoprazole  Injectable 40 milliGRAM(s) IV Push daily  propofol Infusion 5 MICROgram(s)/kG/Min (1.92 mL/Hr) IV Continuous <Continuous>      RADIOLOGY & ADDITIONAL TESTS:    < from: Xray Chest 1 View- PORTABLE-Routine (Xray Chest 1 View- PORTABLE-Routine in AM.) (10.28.21 @ 07:09) >  Tip of endotracheal tube is at the clara and retraction is recommended.    Stable bilateral lung opacities      < from: Xray Chest 1 View- PORTABLE-Routine (Xray Chest 1 View- PORTABLE-Routine in AM.) (10.26.21 @ 06:04) >  Supportdevices: Right IJ line endotracheal tube and enteric tube are in satisfactory position.    Cardiac/mediastinum/hilum: Unremarkable.    Lung parenchyma/Pleura: Hazy bilateral opacities/effusions left greater than right are unchanged. No pneumothorax.        MICROBIOLOGY DATA:    Culture - Sputum . (10.26.21 @ 20:55)   - Amoxicillin/Clavulanic Acid: R >4/2   - Ampicillin: R >8   - Ampicillin/Sulbactam: R 16/8   - Cefazolin: R >16   - Ceftriaxone: R >32   - Ciprofloxacin: R >2   - Clindamycin: R >4   - Daptomycin: S <=0.25   - Erythromycin: R >4   - Gentamicin: S <=1 Should not be used as monotherapy   - Levofloxacin: R >4   - Linezolid: S 2   - Meropenem: R >8   - Moxifloxacin(Aerobic): R >4   - Oxacillin: R >2   - Penicillin: R >8   - RIF- Rifampin: S <=1 Should not be used as monotherapy   - Tetra/Doxy: S <=1   - Trimethoprim/Sulfamethoxazole: S <=0.5/9.5   - Vancomycin: S 1   Gram Stain:   Numerous polymorphonuclear leukocytes per low power field   No Squamous epithelial cells per low power field   Moderate Gram positive cocci in pairs seen per oil power field   Specimen Source: .Sputum Sputum   Culture Results:   Numerous Methicillin Resistant Staphylococcus aureus   Normal Respiratory Asia present

## 2021-11-04 NOTE — PROGRESS NOTE ADULT - SUBJECTIVE AND OBJECTIVE BOX
Patient is lightly sedated on ventilator, diarrhea today       T(F): 99.1 (11-04-21 @ 07:01), Max: 101.3 (11-03-21 @ 12:00)  HR: 85 (11-04-21 @ 07:02)  BP: 107/55 (11-04-21 @ 07:02)  RR: 17 (11-04-21 @ 07:02)  SpO2: 99% (11-04-21 @ 07:02) (99% - 100%)    PHYSICAL EXAM:  GENERAL: NAD  HEAD:  Atraumatic, Normocephalic  NERVOUS SYSTEM:   no focal deficits   CHEST/LUNG:  bilateral rhonchi  HEART: Regular rate and rhythm; No murmurs, rubs, or gallops  ABDOMEN: Soft, Nontender, Nondistended; Bowel sounds present  EXTREMITIES:  2+ Peripheral Pulses, No clubbing, cyanosis, or edema    LABS  11-04    140  |  102  |  x   ----------------------------<  142<H>  3.8   |  28  |  1.6<H>    Ca    8.0<L>      04 Nov 2021 06:00    TPro  5.2<L>  /  Alb  2.6<L>  /  TBili  0.5  /  DBili  x   /  AST  196<H>  /  ALT  293<H>  /  AlkPhos  193<H>  11-04                          7.5    14.77 )-----------( 392      ( 04 Nov 2021 06:00 )             24.1       Mode: AC/ CMV (Assist Control/ Continuous Mandatory Ventilation)  RR (machine): 12  TV (machine): 400  FiO2: 45  PEEP: 5      Culture Results:   No growth to date. (10-30-21)  Culture Results:   No Growth Final (10-29-21)  Culture Results:   No Growth Final (10-28-21)  Culture Results:   No Growth Final (10-27-21)  Culture Results:   Numerous Methicillin Resistant Staphylococcus aureus  Normal Respiratory [place] present (10-26-21)  Culture Results:   No Growth Final (10-26-21)  Culture Results:   Normal Respiratory [place] present (10-23-21)  Culture Results:   No Growth Final (10-23-21)  Culture Results:   No growth (10-22-21)    RADIOLOGY  < from: Xray Chest 1 View- PORTABLE-Routine (Xray Chest 1 View- PORTABLE-Routine in AM.) (11.03.21 @ 05:37) >  Impression:    Worsening pulmonary vascular congestion, bilateral pleural effusions and opacities.    < end of copied text >    MEDICATIONS  (STANDING):  albuterol/ipratropium for Nebulization 3 milliLiter(s) Nebulizer every 6 hours  aspirin  chewable 81 milliGRAM(s) Oral daily  atorvastatin 40 milliGRAM(s) Oral at bedtime  chlorhexidine 0.12% Liquid 15 milliLiter(s) Oral Mucosa two times a day  chlorhexidine 4% Liquid 1 Application(s) Topical <User Schedule>  dexMEDEtomidine Infusion 0.2 MICROgram(s)/kG/Hr (3.2 mL/Hr) IV Continuous <Continuous>  DULoxetine 60 milliGRAM(s) Oral daily  folic acid 1 milliGRAM(s) Oral daily  glucagon  Injectable 1 milliGRAM(s) IntraMuscular once  heparin   Injectable 5000 Unit(s) SubCutaneous every 8 hours  insulin regular Infusion 2 Unit(s)/Hr (2 mL/Hr) IV Continuous <Continuous>  levothyroxine 175 MICROGram(s) Oral daily  norepinephrine Infusion 0.05 MICROgram(s)/kG/Min (6 mL/Hr) IV Continuous <Continuous>  nystatin Ointment 1 Application(s) Topical two times a day  pantoprazole  Injectable 40 milliGRAM(s) IV Push daily  propofol Infusion 5 MICROgram(s)/kG/Min (1.92 mL/Hr) IV Continuous <Continuous>    MEDICATIONS  (PRN):  acetaminophen     Tablet .. 650 milliGRAM(s) Oral every 6 hours PRN Temp greater or equal to 38C (100.4F)  acetaminophen  Suppository .. 650 milliGRAM(s) Rectal every 4 hours PRN Temp greater or equal to 38C (100.4F)  sodium chloride 0.9% lock flush 10 milliLiter(s) IV Push every 1 hour PRN Pre/post blood products, medications, blood draw, and to maintain line patency

## 2021-11-04 NOTE — PROGRESS NOTE ADULT - SUBJECTIVE AND OBJECTIVE BOX
Patient is a 76y old  Female who presents with a chief complaint of SOB (03 Nov 2021 14:59)      Over Night Events:  Patient seen and examined.   spikes temp     ROS:  See HPI    PHYSICAL EXAM    ICU Vital Signs Last 24 Hrs  T(C): 37.3 (04 Nov 2021 07:01), Max: 38.5 (03 Nov 2021 12:00)  T(F): 99.1 (04 Nov 2021 07:01), Max: 101.3 (03 Nov 2021 12:00)  HR: 85 (04 Nov 2021 07:02) (84 - 95)  BP: 107/55 (04 Nov 2021 07:02) (102/53 - 114/53)  BP(mean): 78 (04 Nov 2021 07:02) (74 - 82)  ABP: --  ABP(mean): --  RR: 17 (04 Nov 2021 07:02) (16 - 26)  SpO2: 99% (04 Nov 2021 07:02) (99% - 100%)      General: awake   HEENT:       et tube          Lymph Nodes: NO cervical LN   Lungs: Bilateral crackles   Cardiovascular: Regular   Abdomen: Soft, Positive BS  Extremities: No clubbing   Skin: warm   Neurological: no focal   Musculoskeletal: move all ext     I&O's Detail    03 Nov 2021 07:01  -  04 Nov 2021 07:00  --------------------------------------------------------  IN:    Enteral Tube Flush: 340 mL    Glucerna: 1250 mL    Insulin: 56 mL    Lactated Ringers: 300 mL    Norepinephrine: 46 mL    Propofol: 446 mL  Total IN: 2438 mL    OUT:    Dexmedetomidine: 0 mL    Indwelling Catheter - Urethral (mL): 980 mL  Total OUT: 980 mL    Total NET: 1458 mL      04 Nov 2021 07:01  -  04 Nov 2021 08:17  --------------------------------------------------------  IN:  Total IN: 0 mL    OUT:    Indwelling Catheter - Urethral (mL): 35 mL  Total OUT: 35 mL    Total NET: -35 mL          LABS:                          7.5    14.77 )-----------( 392      ( 04 Nov 2021 06:00 )             24.1         04 Nov 2021 06:00    140    |  102    |  x      ----------------------------<  142    3.8     |  28     |  1.6      Ca    8.0        04 Nov 2021 06:00    TPro  5.2    /  Alb  2.6    /  TBili  0.5    /  DBili  x      /  AST  196    /  ALT  293    /  AlkPhos  193    04 Nov 2021 06:00  Amylase x     lipase x                                                                                                                                                                                                 Mode: AC/ CMV (Assist Control/ Continuous Mandatory Ventilation)  RR (machine): 12  TV (machine): 400  FiO2: 45  PEEP: 5  MAP: 10  PIP: 26                                      ABG - ( 04 Nov 2021 03:45 )  pH, Arterial: 7.50  pH, Blood: x     /  pCO2: 36    /  pO2: 195   / HCO3: 28    / Base Excess: 4.9   /  SaO2: 97.6                MEDICATIONS  (STANDING):  albuterol/ipratropium for Nebulization 3 milliLiter(s) Nebulizer every 6 hours  aspirin  chewable 81 milliGRAM(s) Oral daily  atorvastatin 40 milliGRAM(s) Oral at bedtime  chlorhexidine 0.12% Liquid 15 milliLiter(s) Oral Mucosa two times a day  chlorhexidine 4% Liquid 1 Application(s) Topical <User Schedule>  dexMEDEtomidine Infusion 0.2 MICROgram(s)/kG/Hr (3.2 mL/Hr) IV Continuous <Continuous>  dextrose 40% Gel 15 Gram(s) Oral once  dextrose 5%. 1000 milliLiter(s) (50 mL/Hr) IV Continuous <Continuous>  dextrose 5%. 1000 milliLiter(s) (100 mL/Hr) IV Continuous <Continuous>  dextrose 50% Injectable 25 Gram(s) IV Push once  dextrose 50% Injectable 12.5 Gram(s) IV Push once  dextrose 50% Injectable 25 Gram(s) IV Push once  DULoxetine 60 milliGRAM(s) Oral daily  folic acid 1 milliGRAM(s) Oral daily  glucagon  Injectable 1 milliGRAM(s) IntraMuscular once  heparin   Injectable 5000 Unit(s) SubCutaneous every 8 hours  insulin regular Infusion 2 Unit(s)/Hr (2 mL/Hr) IV Continuous <Continuous>  levothyroxine 175 MICROGram(s) Oral daily  norepinephrine Infusion 0.05 MICROgram(s)/kG/Min (6 mL/Hr) IV Continuous <Continuous>  nystatin Ointment 1 Application(s) Topical two times a day  pantoprazole  Injectable 40 milliGRAM(s) IV Push daily  propofol Infusion 5 MICROgram(s)/kG/Min (1.92 mL/Hr) IV Continuous <Continuous>    MEDICATIONS  (PRN):  acetaminophen     Tablet .. 650 milliGRAM(s) Oral every 6 hours PRN Temp greater or equal to 38C (100.4F)  acetaminophen  Suppository .. 650 milliGRAM(s) Rectal every 4 hours PRN Temp greater or equal to 38C (100.4F)  sodium chloride 0.9% lock flush 10 milliLiter(s) IV Push every 1 hour PRN Pre/post blood products, medications, blood draw, and to maintain line patency          Xrays:  TLC:  OG:  ET tube:                                                                                    b/l effusion with right opacity    ECHO:  [facility] ICU:

## 2021-11-04 NOTE — PROGRESS NOTE ADULT - ASSESSMENT
76 yr F with SLE on Methotrexate, moderate MR, HFpEF, HTN, hypothyroid, HLD, T2DM, hx of CVA with no residual deficits discharged from hospital earlier this month on 10/2 s/p HF exacerbation and NSTEMI, DC'd on new home oxygen, refused in pt cardiac cath and is supposed to have outpt cardiac cath with Dr Kowalski.   DC'd on 10/21 from [facility] s/p admission for resolved ANTONELLA, hypoglycemia and NSTEMI  returns to hospital with:    Acute respiratory failure secondary to sepsis from gram neg pneumonia and NSTEMI      - check c-diff today for diarrhea   - events noted, pt extubated and re intubated   - now with worsening CXR   - maintain even to negative fluid balance    - check repeat procal   -  antibiotics  as per ID   - aspirin, Lipitor   - home meds   - DVT and GI prophylaxis

## 2021-11-04 NOTE — PROGRESS NOTE ADULT - ASSESSMENT
Impression:    Acute Hypoxemic and Hypercapnic Respiratory Failure SP intubation  Pulmonary Edema, Bilateral Effusions, improving  HFpEF exacerbation, moderate MR  Sepsis present on admission  MRSA PNA  NSTEMI  Hypothyroidism   Iron Deficiency Anemia, Anemia of chronic disease    PLAN:    CNS:  fentanyl for sedation   sedation vacation     HEENT: oral care.     PULMONARY: Hob >45.lower fioe to 40   will do bronch and BAL for cx   CARDIOVASCULAR: keep i=o. daily weights.     lasix 40 mg once Iv keep is < os     GI: GI prophylaxis.  NG feed   follow LFT for now     RENAL: fu lytes. keep connor    INFECTIOUS DISEASE: antibx per ID. check vanc trough. f/u cultures  start cefepime after bronch   repeat bld cx   sputum cx     HEMATOLOGICAL:  DVT prophylaxis.    ENDOCRINE:  Follow up FS.  Insulin protocol if needed.    MUSCULOSKELETAL: bed to chair position    MICU monitoring.         Impression:    Acute Hypoxemic and Hypercapnic Respiratory Failure SP intubation  Pulmonary Edema, Bilateral Effusions, improving  HFpEF exacerbation, moderate MR  Sepsis present on admission  MRSA PNA  NSTEMI  Hypothyroidism   Iron Deficiency Anemia, Anemia of chronic disease    PLAN:    CNS:  start precedex and d/c propofol ,fentanyl if needed for sedation   sedation vacation today     HEENT: oral care.     PULMONARY: Hob >45.lower fioe to 40   will do bronch and BAL for cx   CARDIOVASCULAR: keep i=o. daily weights.     lasix 40 mg once Iv keep is < os     GI: GI prophylaxis.  NG feed   follow LFT for now     RENAL: fu lytes. keep connor    INFECTIOUS DISEASE: antibx per ID. check vanc trough. f/u cultures  cefepime after bronch   repeat bld cx   sputum cx   send stool for c-diff , diarrhea   HEMATOLOGICAL:  DVT prophylaxis.    ENDOCRINE:  Follow up FS.  Insulin protocol if needed.    MUSCULOSKELETAL: bed to chair position    MICU monitoring.

## 2021-11-04 NOTE — PROGRESS NOTE ADULT - ASSESSMENT
75 y/o F PMH heart failure, moderate severity mitral valve regurgitation, admitted in early October for heart failure and end stemi presents c/o SOB. Pt had a recent discharge from the hospital and XR during that admission showed increased markings in both lung fields consistent with CHF exacerbation    Patient found to have pneumonia likely MRSA and will continue with Vancomycin as per ID. Continues to be febrile despite being on antibiotics. Patient only on Precedex for sedation. SAT patient woke up but was tachypneic on the SBT.    # Acute hypoxemic respiratory failure 2/2 HAP  - Febrile to 102, hypoxic in ED s/p Intubation 10/23/2021, extubated 11/1/2021 and then intubation again 11/2/2021  - CXR showing bilateral opacities  - Sputum growing MRSA, Procal>1>0.9  - Patient is on Vanc IV which is on hold for now, follow the levels daily   - Initial blood culture and repeat negative  - Patient was  re-intubated again on 11/2/2021 for severe tachypnea and respiratory fatigue despite being on BiPAP  - Repeat Xray in AM    # Acute Exacerbation of HFpEF   - Was in [place] last admission due to overdiuresis and Lasix was reduced on discharge  - received IV lasix in ER, Holding Lasix  - connor, I & O, daily weight  - ECHO on last discharge unchanged except for new, mild pulmonary hypertension   - F/u cardio outpatient     # Hypothyroidism   - Elevated TSH 12.24  - Continue synthroid 175mg  - TSH repeat outpatient in 2 months    # Iron Deficiency Anemia  # Anemia of chronic disease  - On lAst admission given Iron sucrose 200x2  - Baseline Hb 8   - Keep Hb >7  - F/u H&H     # Nocturnal Hypoxemia  - uses home O2    # DM II  - Will continue with Insulin regimen  - Continue Insulin drip    # Elevated LFTs  - RUQ US reviewed, no evidence of stone/inflammation in the gallbladder   - trending down, mostly secondary to the brief episode of hypotension after the second intubation     Status: FULL CODE  GI Prophylaxis: PPI  DVT Prophylaxis: Hep SubQ     75 y/o F PMH heart failure, moderate severity mitral valve regurgitation, admitted in early October for heart failure and end stemi presents c/o SOB. Pt had a recent discharge from the hospital and XR during that admission showed increased markings in both lung fields consistent with CHF exacerbation    Patient found to have pneumonia likely MRSA and will continue with Vancomycin as per ID. Continues to be febrile despite being on antibiotics. Patient only on Precedex for sedation. SAT patient woke up but was tachypneic on the SBT.    # Acute hypoxemic respiratory failure 2/2 HAP  - Febrile to 102, hypoxic in ED s/p Intubation 10/23/2021, extubated 11/1/2021 and then intubation again 11/2/2021  - CXR showing bilateral opacities  - Sputum growing MRSA, Procal>1>0.9  - Patient is on Vanc IV which is on hold for now, follow the levels daily   - Initial blood culture and repeat negative  - Patient was  re-intubated again on 11/2/2021 for severe tachypnea and respiratory fatigue despite being on BiPAP  - Bronch today, follow up the labs  - Repeat Xray in AM    # Acute Exacerbation of HFpEF   - Was in [place] last admission due to overdiuresis and Lasix was reduced on discharge  - received IV lasix in ER, Holding Lasix  - geeta, I & O, daily weight  - ECHO on last discharge unchanged except for new, mild pulmonary hypertension   - F/u cardio outpatient     # Hypothyroidism   - Elevated TSH 12.24  - Continue synthroid 175mg  - TSH repeat outpatient in 2 months    # Iron Deficiency Anemia  # Anemia of chronic disease  - On lAst admission given Iron sucrose 200x2  - Baseline Hb 8   - Keep Hb >7  - F/u H&H     # Nocturnal Hypoxemia  - uses home O2    # DM II  - Will continue with Insulin regimen  - Continue Insulin drip    # Elevated LFTs  - RUQ US reviewed, no evidence of stone/inflammation in the gallbladder   - trending down, mostly secondary to the brief episode of hypotension after the second intubation     Status: FULL CODE  GI Prophylaxis: PPI  DVT Prophylaxis: Hep SubQ

## 2021-11-05 NOTE — PROGRESS NOTE ADULT - ASSESSMENT
76 yr F with SLE on Methotrexate, moderate MR, HFpEF, HTN, hypothyroid, HLD, T2DM, hx of CVA with no residual deficits discharged from hospital earlier this month on 10/2 s/p HF exacerbation and NSTEMI, DC'd on new home oxygen, refused in pt cardiac cath and is supposed to have outpt cardiac cath with Dr Kowalski.   DC'd on 10/21 from [facility] s/p admission for resolved ANTONELLA, hypoglycemia and NSTEMI  returns to hospital with:    Acute respiratory failure secondary to sepsis from gram neg pneumonia and NSTEMI      - cefepime and vanco (level still elevated) as per ID - still febrile   - s/p bronchoscopy - follow BAL results    - maintain even to negative fluid balance    - repeat procal now more elevated    - aspirin, Lipitor   - home meds   - DVT and GI prophylaxis

## 2021-11-05 NOTE — PROGRESS NOTE ADULT - SUBJECTIVE AND OBJECTIVE BOX
24H events:    Patient is a 76y old Female who presents with a chief complaint of SOB (05 Nov 2021 10:44)    Primary diagnosis of Acute pulmonary edema       Today is hospital day 14d.     PAST MEDICAL & SURGICAL HISTORY  DM (diabetes mellitus)  insulin  fs achs    Hypercholesteremia  statin    Hypertension  hctz    Lupus  as per hx    Fall, initial encounter  as  hx    Stroke  TIA 2002, no deficits    Herniated lumbar intervertebral disc  as per hx    Seizure  keppra    No significant past surgical history      SOCIAL HISTORY:  Negative for smoking/alcohol/drug use.     ALLERGIES:  No Known Allergies    MEDICATIONS:  STANDING MEDICATIONS  albuterol/ipratropium for Nebulization 3 milliLiter(s) Nebulizer every 6 hours  aspirin  chewable 81 milliGRAM(s) Oral daily  atorvastatin 40 milliGRAM(s) Oral at bedtime  cefepime   IVPB 1000 milliGRAM(s) IV Intermittent every 24 hours  chlorhexidine 0.12% Liquid 15 milliLiter(s) Oral Mucosa two times a day  chlorhexidine 4% Liquid 1 Application(s) Topical <User Schedule>  dexMEDEtomidine Infusion 0.2 MICROgram(s)/kG/Hr IV Continuous <Continuous>  dextrose 40% Gel 15 Gram(s) Oral once  dextrose 5%. 1000 milliLiter(s) IV Continuous <Continuous>  dextrose 5%. 1000 milliLiter(s) IV Continuous <Continuous>  dextrose 50% Injectable 25 Gram(s) IV Push once  dextrose 50% Injectable 12.5 Gram(s) IV Push once  dextrose 50% Injectable 25 Gram(s) IV Push once  DULoxetine 60 milliGRAM(s) Oral daily  folic acid 1 milliGRAM(s) Oral daily  furosemide   Injectable 40 milliGRAM(s) IV Push every 12 hours  glucagon  Injectable 1 milliGRAM(s) IntraMuscular once  heparin   Injectable 5000 Unit(s) SubCutaneous every 8 hours  insulin regular Infusion 2 Unit(s)/Hr IV Continuous <Continuous>  levothyroxine 175 MICROGram(s) Oral daily  norepinephrine Infusion 0.05 MICROgram(s)/kG/Min IV Continuous <Continuous>  nystatin Ointment 1 Application(s) Topical two times a day  pantoprazole  Injectable 40 milliGRAM(s) IV Push daily  propofol Infusion 5 MICROgram(s)/kG/Min IV Continuous <Continuous>    PRN MEDICATIONS  acetaminophen     Tablet .. 650 milliGRAM(s) Oral every 6 hours PRN  acetaminophen  Suppository .. 650 milliGRAM(s) Rectal every 4 hours PRN  sodium chloride 0.9% lock flush 10 milliLiter(s) IV Push every 1 hour PRN    VITALS:   T(F): 101  HR: 70  BP: 113/65  RR: 24  SpO2: 96%    LABS:                        7.9    10.54 )-----------( 399      ( 05 Nov 2021 06:19 )             25.5     11-05    140  |  102  |  59<H>  ----------------------------<  155<H>  4.1   |  28  |  1.4    Ca    8.0<L>      05 Nov 2021 06:19    TPro  5.6<L>  /  Alb  2.6<L>  /  TBili  0.5  /  DBili  x   /  AST  190<H>  /  ALT  277<H>  /  AlkPhos  171<H>  11-05        ABG - ( 05 Nov 2021 06:52 )  pH, Arterial: 7.50  pH, Blood: x     /  pCO2: 38    /  pO2: 84    / HCO3: 30    / Base Excess: 6.1   /  SaO2: 96.1                  Culture - Blood (collected 03 Nov 2021 15:29)  Source: .Blood None  Preliminary Report (04 Nov 2021 23:02):    No growth to date.          PHYSICAL EXAM:  GENERAL: NAD, intubated, awake, follows command   HEAD:  Atraumatic, Normocephalic  NERVOUS SYSTEM:   no focal deficits   CHEST/LUNG:  bilateral rhonchi  HEART: Regular rate and rhythm; No murmurs, rubs, or gallops  ABDOMEN: Soft, Nontender, Nondistended; Bowel sounds present  EXTREMITIES:  2+ Peripheral Pulses, No clubbing, cyanosis, or edema

## 2021-11-05 NOTE — PROGRESS NOTE ADULT - SUBJECTIVE AND OBJECTIVE BOX
LINDSAY ALBERT  76y, Female  Allergy: No Known Allergies      LOS  14d    CHIEF COMPLAINT: SOB (05 Nov 2021 08:30)      INTERVAL EVENTS/HPI  - No acute events overnight  - T(F): , Max: 101.1 (11-05-21 @ 01:50)  - still remains febrile - no diarrhea- scant secretions   - WBC Count: 10.54 (11-05-21 @ 06:19)  WBC Count: 14.77 (11-04-21 @ 06:00)     - Creatinine, Serum: 1.4 (11-05-21 @ 06:19)  Creatinine, Serum: 1.6 (11-04-21 @ 06:00)       ROS  General: Denies rigors, nightsweats  HEENT: Denies headache, rhinorrhea, sore throat, eye pain  CV: Denies CP, palpitations  PULM: Denies wheezing, hemoptysis  GI: Denies hematemesis, hematochezia, melena  : Denies discharge, hematuria  MSK: Denies arthralgias, myalgias  SKIN: Denies rash, lesions  NEURO: Denies paresthesias, weakness  PSYCH: Denies depression, anxiety    VITALS:  T(F): 100.6, Max: 101.1 (11-05-21 @ 01:50)  HR: 70  BP: 113/65  RR: 20Vital Signs Last 24 Hrs  T(C): 38.1 (05 Nov 2021 07:01), Max: 38.4 (05 Nov 2021 01:50)  T(F): 100.6 (05 Nov 2021 07:01), Max: 101.1 (05 Nov 2021 01:50)  HR: 70 (05 Nov 2021 08:37) (66 - 98)  BP: 113/65 (05 Nov 2021 08:31) (95/51 - 117/66)  BP(mean): 84 (05 Nov 2021 08:31) (68 - 89)  RR: 20 (05 Nov 2021 08:31) (13 - 29)  SpO2: 96% (05 Nov 2021 08:37) (96% - 100%)    PHYSICAL EXAM:  Gen: NAD, resting in bed  HEENT: Normocephalic, atraumatic  Neck: supple, no lymphadenopathy  CV: Regular rate & regular rhythm  Lungs: decreased BS at bases, no fremitus  Abdomen: Soft, BS present  Ext: Warm, well perfused  Neuro: non focal, awake  Skin: no rash, no erythema  Lines: no phlebitis    FH: Non-contributory  Social Hx: Non-contributory    TESTS & MEASUREMENTS:                        7.9    10.54 )-----------( 399      ( 05 Nov 2021 06:19 )             25.5     11-05    140  |  102  |  59<H>  ----------------------------<  155<H>  4.1   |  28  |  1.4    Ca    8.0<L>      05 Nov 2021 06:19    TPro  5.6<L>  /  Alb  2.6<L>  /  TBili  0.5  /  DBili  x   /  AST  190<H>  /  ALT  277<H>  /  AlkPhos  171<H>  11-05    eGFR if Non African American: 37 mL/min/1.73M2 (11-05-21 @ 06:19)  eGFR if African American: 42 mL/min/1.73M2 (11-05-21 @ 06:19)    LIVER FUNCTIONS - ( 05 Nov 2021 06:19 )  Alb: 2.6 g/dL / Pro: 5.6 g/dL / ALK PHOS: 171 U/L / ALT: 277 U/L / AST: 190 U/L / GGT: x               Culture - Blood (collected 11-03-21 @ 15:29)  Source: .Blood None  Preliminary Report (11-04-21 @ 23:02):    No growth to date.    Culture - Blood (collected 10-30-21 @ 05:26)  Source: .Blood None  Final Report (11-04-21 @ 19:01):    No Growth Final    Culture - Blood (collected 10-29-21 @ 05:13)  Source: .Blood None  Final Report (11-03-21 @ 18:00):    No Growth Final    Culture - Blood (collected 10-28-21 @ 05:35)  Source: .Blood None  Final Report (11-02-21 @ 14:00):    No Growth Final    Culture - Blood (collected 10-27-21 @ 05:54)  Source: .Blood None  Final Report (11-01-21 @ 20:00):    No Growth Final    Culture - Sputum (collected 10-26-21 @ 20:55)  Source: .Sputum Sputum  Gram Stain (10-27-21 @ 07:06):    Numerous polymorphonuclear leukocytes per low power field    No Squamous epithelial cells per low power field    Moderate Gram positive cocci in pairs seen per oil power field  Final Report (10-28-21 @ 16:02):    Numerous Methicillin Resistant Staphylococcus aureus    Normal Respiratory Asia present  Organism: Methicillin resistant Staphylococcus aureus (10-28-21 @ 16:02)  Organism: Methicillin resistant Staphylococcus aureus (10-28-21 @ 16:02)      -  Amoxicillin/Clavulanic Acid: R >4/2      -  Ampicillin: R >8      -  Ampicillin/Sulbactam: R 16/8      -  Cefazolin: R >16      -  Ceftriaxone: R >32      -  Ciprofloxacin: R >2      -  Clindamycin: R >4      -  Daptomycin: S <=0.25      -  Erythromycin: R >4      -  Gentamicin: S <=1 Should not be used as monotherapy      -  Levofloxacin: R >4      -  Linezolid: S 2      -  Meropenem: R >8      -  Moxifloxacin(Aerobic): R >4      -  Oxacillin: R >2      -  Penicillin: R >8      -  RIF- Rifampin: S <=1 Should not be used as monotherapy      -  Tetra/Doxy: S <=1      -  Trimethoprim/Sulfamethoxazole: S <=0.5/9.5      -  Vancomycin: S 1      Method Type: VINNIE    Culture - Blood (collected 10-26-21 @ 05:37)  Source: .Blood None  Final Report (10-31-21 @ 18:01):    No Growth Final    Culture - Sputum (collected 10-23-21 @ 15:05)  Source: .Sputum Sputum  Gram Stain (10-24-21 @ 14:45):    Numerous polymorphonuclear leukocytes per low power field    Few Squamous epithelial cells per low power field    Numerous Gram Positive Cocci in Clusters per oil power field  Final Report (10-25-21 @ 17:03):    Normal Respiratory Asia present    Culture - Blood (collected 10-23-21 @ 11:45)  Source: .Blood Blood-Peripheral  Final Report (10-28-21 @ 20:00):    No Growth Final    Culture - Urine (collected 10-22-21 @ 11:20)  Source: Catheterized Catheterized  Final Report (10-23-21 @ 22:43):    No growth            INFECTIOUS DISEASES TESTING  Procalcitonin, Serum: 2.09 (11-04-21 @ 10:13)  COVID-19 PCR: NotDetec (11-04-21 @ 07:00)  Fungitell: <31 (10-29-21 @ 05:13)  Procalcitonin, Serum: 0.67 (10-28-21 @ 05:35)  Procalcitonin, Serum: 0.98 (10-26-21 @ 05:37)  Rapid RVP Result: NotDetec (10-25-21 @ 09:22)  MRSA PCR Result.: Negative (10-23-21 @ 15:16)  Procalcitonin, Serum: 1.82 (10-22-21 @ 19:36)  COVID-19 PCR: NotDetec (10-22-21 @ 09:30)  COVID-19 PCR: NotDetec (10-18-21 @ 22:25)  COVID-19 PCR: NotDetec (10-02-21 @ 15:35)  Procalcitonin, Serum: 0.07 (09-28-21 @ 11:17)  COVID-19 PCR: NotDetec (09-25-21 @ 21:43)  COVID-19 PCR: NotDetec (08-07-21 @ 15:30)      INFLAMMATORY MARKERS  C-Reactive Protein, Serum: 94 mg/L (10-29-21 @ 05:13)  C-Reactive Protein, Serum: 98 mg/L (10-28-21 @ 05:35)  Sedimentation Rate, Erythrocyte: 87 mm/Hr (10-28-21 @ 05:35)      RADIOLOGY & ADDITIONAL TESTS:  I have personally reviewed the last available Chest xray  CXR  Xray Chest 1 View- PORTABLE-Urgent:   EXAM:  XR CHEST PORTABLE URGENT 1V            PROCEDURE DATE:  11/02/2021            INTERPRETATION:  Clinical History / Reason for exam: Right IJ TLC placement    Comparison : Chest radiograph November 2, 2021.    Technique/Positioning: Frontal chest radiograph.    Findings:    Support devices: Interval placement of a right IJ central venous catheter with tip in the SVC. Stable ET tube. New enteric tube courses below the diaphragm.    Cardiac/mediastinum/hilum: Unchanged.    Lung parenchyma/Pleura: Unchanged bibasilar opacities/effusions. No pneumothorax.    Skeleton/soft tissues: Unchanged.    Impression:    Unchanged bibasilar opacities/effusions.  Support devices as above. No pneumothorax.    --- End of Report ---              CLARE GUNDERSON MD; Attending Radiologist  This document has been electronically signed. Nov  3 2021 10:29AM (11-02-21 @ 22:36)      CT      CARDIOLOGY TESTING  12 Lead ECG:   Ventricular Rate 141 BPM    Atrial Rate 282 BPM    QRS Duration 112 ms    Q-T Interval 342 ms    QTC Calculation(Bazett) 523 ms    R Axis -45 degrees    T Axis 117 degrees    Diagnosis Line *** Poor data quality, interpretation may be adversely affected  Sinus tachycardia  Left axis deviation  Non-specific intra-ventricular conduction delay    Confirmed by LUAN YORK MD ([id number]) on 11/2/2021 3:07:56 PM (11-02-21 @ 12:42)  12 Lead ECG:   Ventricular Rate 80 BPM    Atrial Rate 80 BPM    P-R Interval 130 ms    QRS Duration 92 ms    Q-T Interval 440 ms    QTC Calculation(Bazett) 507 ms    P Axis 22 degrees    R Axis -18 degrees    T Axis 214 degrees    Diagnosis Line Normal sinus rhythm  Nonspecific ST-T changes  Confirmed by LUAN YORK MD ([id number]) on 10/25/2021 8:36:05 PM (10-25-21 @ 07:23)      MEDICATIONS  albuterol/ipratropium for Nebulization 3 Nebulizer every 6 hours  aspirin  chewable 81 Oral daily  atorvastatin 40 Oral at bedtime  cefepime   IVPB 1000 IV Intermittent every 24 hours  chlorhexidine 0.12% Liquid 15 Oral Mucosa two times a day  chlorhexidine 4% Liquid 1 Topical <User Schedule>  dexMEDEtomidine Infusion 0.2 IV Continuous <Continuous>  dextrose 40% Gel 15 Oral once  dextrose 5%. 1000 IV Continuous <Continuous>  dextrose 5%. 1000 IV Continuous <Continuous>  dextrose 50% Injectable 25 IV Push once  dextrose 50% Injectable 12.5 IV Push once  dextrose 50% Injectable 25 IV Push once  DULoxetine 60 Oral daily  folic acid 1 Oral daily  furosemide   Injectable 40 IV Push every 12 hours  glucagon  Injectable 1 IntraMuscular once  heparin   Injectable 5000 SubCutaneous every 8 hours  insulin regular Infusion 2 IV Continuous <Continuous>  levothyroxine 175 Oral daily  norepinephrine Infusion 0.05 IV Continuous <Continuous>  nystatin Ointment 1 Topical two times a day  pantoprazole  Injectable 40 IV Push daily  propofol Infusion 5 IV Continuous <Continuous>      WEIGHT  Weight (kg): 64 (10-30-21 @ 17:00)  Creatinine, Serum: 1.4 mg/dL (11-05-21 @ 06:19)      ANTIBIOTICS:  cefepime   IVPB 1000 milliGRAM(s) IV Intermittent every 24 hours      All available historical records have been reviewed

## 2021-11-05 NOTE — PROGRESS NOTE ADULT - ASSESSMENT
75 y/o F PMH heart failure, moderate severity mitral valve regurgitation, admitted in early October for heart failure and end stemi presents c/o SOB. Pt had a recent discharge from the hospital and XR during that admission showed increased markings in both lung fields consistent with CHF exacerbation    Patient found to have pneumonia likely MRSA and will continue with Vancomycin as per ID. Continues to be febrile despite being on antibiotics. Patient only on Precedex for sedation. SAT patient woke up but was tachypneic on the SBT.    # Acute hypoxemic respiratory failure 2/2 HAP  - Febrile to 102, hypoxic in ED s/p Intubation 10/23/2021, extubated 11/1/2021 and then intubation again 11/2/2021  - CXR showing bilateral opacities  - Sputum growing MRSA, Procal>1>0.9  - Patient is on Vanc IV which is on hold for now, follow the levels daily   - Initial blood culture and repeat negative  - Patient was  re-intubated again on 11/2/2021 for severe tachypnea and respiratory fatigue despite being on BiPAP  - Bronched yesterday, very thick secretions. The patient was started on Cefepime for now, follow up Vanc levels   - Repeat Xray in AM    # Acute Exacerbation of HFpEF   - Was in [place] last admission due to overdiuresis and Lasix was reduced on discharge  - received IV lasix in ER, Holding Lasix  - geeta I & O, daily weight  - ECHO on last discharge unchanged except for new, mild pulmonary hypertension   - F/u cardio outpatient     # Hypothyroidism   - Elevated TSH 12.24  - Continue synthroid 175mg  - TSH repeat outpatient in 2 months    # Iron Deficiency Anemia  # Anemia of chronic disease  - On lAst admission given Iron sucrose 200x2  - Baseline Hb 8   - Keep Hb >7  - F/u H&H     # Nocturnal Hypoxemia  - uses home O2    # DM II  - Will continue with Insulin regimen  - Continue Insulin drip    # Elevated LFTs  - RUQ US reviewed, no evidence of stone/inflammation in the gallbladder   - trending down, mostly secondary to the brief episode of hypotension after the second intubation     Status: FULL CODE  GI Prophylaxis: PPI  DVT Prophylaxis: Hep SubQ

## 2021-11-05 NOTE — PROGRESS NOTE ADULT - SUBJECTIVE AND OBJECTIVE BOX
Patient is a 76y old  Female who presents with a chief complaint of SOB (05 Nov 2021 08:01)      Over Night Events:  Patient seen and examined.   spiking temp     ROS:  See HPI    PHYSICAL EXAM    ICU Vital Signs Last 24 Hrs  T(C): 38.1 (05 Nov 2021 07:01), Max: 38.4 (05 Nov 2021 01:50)  T(F): 100.6 (05 Nov 2021 07:01), Max: 101.1 (05 Nov 2021 01:50)  HR: 67 (05 Nov 2021 07:30) (66 - 98)  BP: 98/58 (05 Nov 2021 07:30) (95/51 - 117/66)  BP(mean): 73 (05 Nov 2021 07:30) (68 - 89)  ABP: --  ABP(mean): --  RR: 14 (05 Nov 2021 07:30) (13 - 29)  SpO2: 98% (05 Nov 2021 07:30) (98% - 100%)      General: awake   HEENT:  et tube               Lymph Nodes: NO cervical LN   Lungs: Bilateral BS  Cardiovascular: Regular   Abdomen: Soft, Positive BS  Extremities: No clubbing   Skin: warm   Neurological: no focal   Musculoskeletal: move all ext     I&O's Detail    04 Nov 2021 07:01  -  05 Nov 2021 07:00  --------------------------------------------------------  IN:    Dexmedetomidine: 450 mL    Enteral Tube Flush: 200 mL    Glucerna: 1200 mL    Insulin: 43 mL    IV PiggyBack: 50 mL    Norepinephrine: 49 mL    Propofol: 90 mL  Total IN: 2082 mL    OUT:    Indwelling Catheter - Urethral (mL): 1665 mL  Total OUT: 1665 mL    Total NET: 417 mL      05 Nov 2021 07:01  -  05 Nov 2021 08:30  --------------------------------------------------------  IN:    Dexmedetomidine: 50 mL    Glucerna: 100 mL    Insulin: 4 mL    Norepinephrine: 4 mL  Total IN: 158 mL    OUT:    Indwelling Catheter - Urethral (mL): 35 mL  Total OUT: 35 mL    Total NET: 123 mL          LABS:                          7.9    10.54 )-----------( 399      ( 05 Nov 2021 06:19 )             25.5         05 Nov 2021 06:19    140    |  102    |  59     ----------------------------<  155    4.1     |  28     |  1.4      Ca    8.0        05 Nov 2021 06:19    TPro  5.6    /  Alb  2.6    /  TBili  0.5    /  DBili  x      /  AST  190    /  ALT  277    /  AlkPhos  171    05 Nov 2021 06:19  Amylase x     lipase x                                                                                                                                                    Culture - Blood (collected 03 Nov 2021 15:29)  Source: .Blood None  Preliminary Report (04 Nov 2021 23:02):    No growth to date.                                                   Mode: AC/ CMV (Assist Control/ Continuous Mandatory Ventilation)  RR (machine): 12  TV (machine): 400  FiO2: 35  PEEP: 5  MAP: 10  PIP: 31                                      ABG - ( 05 Nov 2021 06:52 )  pH, Arterial: 7.50  pH, Blood: x     /  pCO2: 38    /  pO2: 84    / HCO3: 30    / Base Excess: 6.1   /  SaO2: 96.1                MEDICATIONS  (STANDING):  albuterol/ipratropium for Nebulization 3 milliLiter(s) Nebulizer every 6 hours  aspirin  chewable 81 milliGRAM(s) Oral daily  atorvastatin 40 milliGRAM(s) Oral at bedtime  cefepime   IVPB 1000 milliGRAM(s) IV Intermittent every 24 hours  chlorhexidine 0.12% Liquid 15 milliLiter(s) Oral Mucosa two times a day  chlorhexidine 4% Liquid 1 Application(s) Topical <User Schedule>  dexMEDEtomidine Infusion 0.2 MICROgram(s)/kG/Hr (3.2 mL/Hr) IV Continuous <Continuous>  dextrose 40% Gel 15 Gram(s) Oral once  dextrose 5%. 1000 milliLiter(s) (50 mL/Hr) IV Continuous <Continuous>  dextrose 5%. 1000 milliLiter(s) (100 mL/Hr) IV Continuous <Continuous>  dextrose 50% Injectable 25 Gram(s) IV Push once  dextrose 50% Injectable 12.5 Gram(s) IV Push once  dextrose 50% Injectable 25 Gram(s) IV Push once  DULoxetine 60 milliGRAM(s) Oral daily  folic acid 1 milliGRAM(s) Oral daily  glucagon  Injectable 1 milliGRAM(s) IntraMuscular once  heparin   Injectable 5000 Unit(s) SubCutaneous every 8 hours  insulin regular Infusion 2 Unit(s)/Hr (2 mL/Hr) IV Continuous <Continuous>  levothyroxine 175 MICROGram(s) Oral daily  norepinephrine Infusion 0.05 MICROgram(s)/kG/Min (6 mL/Hr) IV Continuous <Continuous>  nystatin Ointment 1 Application(s) Topical two times a day  pantoprazole  Injectable 40 milliGRAM(s) IV Push daily  propofol Infusion 5 MICROgram(s)/kG/Min (1.92 mL/Hr) IV Continuous <Continuous>    MEDICATIONS  (PRN):  acetaminophen     Tablet .. 650 milliGRAM(s) Oral every 6 hours PRN Temp greater or equal to 38C (100.4F)  acetaminophen  Suppository .. 650 milliGRAM(s) Rectal every 4 hours PRN Temp greater or equal to 38C (100.4F)  sodium chloride 0.9% lock flush 10 milliLiter(s) IV Push every 1 hour PRN Pre/post blood products, medications, blood draw, and to maintain line patency          Xrays:  TLC:  OG:  ET tube:                                                                                    b/l effusion opacity    ECHO:  [facility] ICU:

## 2021-11-05 NOTE — PROGRESS NOTE ADULT - ASSESSMENT
77 yo female PMHx of HFpEF, HTN, HLD, DM,  SLE on Cellsept and methotrexate, CVA in 2002, recently admitted for Hypoglycemia and ANTONELLA who presents with shortness of breath  presenting for shortness of breath    IMPRESSION  #Acute Hypoxic respiratory failure- intubated/vented   #Fevers - presumed HAP- sputum cx grew MRSA, 10/26/21  - Procalcitonin, Serum: 1.82 (10.22.21 @ 19:36) -> Procalcitonin, Serum: 0.98 (10.26.21 @ 05:37)  - s/p bronch 11/4    #Obesity BMI (kg/m2): 26.7, 30.2  #Abx allergy: NKDA    Recommendations  - check vancomycin levels daily  - continue cefepime -- increase to 1g q 12 hours as CrCl improving   - unclear etiology of fevers -- consider CT C/A/P as continues to spike fevers    Please call or message on [employer] Teams if with any questions.  Spectra [phone]

## 2021-11-05 NOTE — PROGRESS NOTE ADULT - SUBJECTIVE AND OBJECTIVE BOX
Patient is lightly sedated on Precedex      T(F): 100.6 (11-05-21 @ 07:01), Max: 101.1 (11-05-21 @ 01:50)  HR: 67 (11-05-21 @ 07:30)  BP: 98/58 (11-05-21 @ 07:30)  RR: 14 (11-05-21 @ 07:30)  SpO2: 98% (11-05-21 @ 07:30) (98% - 100%)    PHYSICAL EXAM:  GENERAL: NAD  HEAD:  Atraumatic, Normocephalic  NERVOUS SYSTEM:   no focal deficits   CHEST/LUNG:  bilateral rhonchi  HEART: Regular rate and rhythm; No murmurs, rubs, or gallops  ABDOMEN: Soft, Nontender, Nondistended; Bowel sounds present  EXTREMITIES:  2+ Peripheral Pulses, No clubbing, cyanosis, or edema    LABS  11-05    140  |  102  |  59<H>  ----------------------------<  155<H>  4.1   |  28  |  1.4    Ca    8.0<L>      05 Nov 2021 06:19    TPro  5.6<L>  /  Alb  2.6<L>  /  TBili  0.5  /  DBili  x   /  AST  190<H>  /  ALT  277<H>  /  AlkPhos  171<H>  11-05  Vancomycin Level, Random (11.04.21 @ 10:13)   Vancomycin Level, Random: 23.6    Procalcitonin, Serum (11.04.21 @ 10:13)   Procalcitonin, Serum: 2.09                    7.9    10.54 )-----------( 399      ( 05 Nov 2021 06:19 )             25.5       Mode: AC/ CMV (Assist Control/ Continuous Mandatory Ventilation)  RR (machine): 12  TV (machine): 400  FiO2: 35  PEEP: 5      Culture Results:   No growth to date. (11-03-21)  Culture Results:   No Growth Final (10-30-21)  Culture Results:   No Growth Final (10-29-21)  Culture Results:   No Growth Final (10-28-21)  Culture Results:   No Growth Final (10-27-21)  Culture Results:   Numerous Methicillin Resistant Staphylococcus aureus  Normal Respiratory Asia present (10-26-21)  Culture Results:   No Growth Final (10-26-21)  Culture Results:   Normal Respiratory Asia present (10-23-21)  Culture Results:   No Growth Final (10-23-21)  Culture Results:   No growth (10-22-21)    RADIOLOGY  < from: Xray Chest 1 View- PORTABLE-Routine (Xray Chest 1 View- PORTABLE-Routine in AM.) (11.04.21 @ 06:25) >  Impression:    Stable bilateral lung opacities    < end of copied text >    MEDICATIONS  (STANDING):  albuterol/ipratropium for Nebulization 3 milliLiter(s) Nebulizer every 6 hours  aspirin  chewable 81 milliGRAM(s) Oral daily  atorvastatin 40 milliGRAM(s) Oral at bedtime  cefepime   IVPB 1000 milliGRAM(s) IV Intermittent every 24 hours  chlorhexidine 0.12% Liquid 15 milliLiter(s) Oral Mucosa two times a day  chlorhexidine 4% Liquid 1 Application(s) Topical <User Schedule>  dexMEDEtomidine Infusion 0.2 MICROgram(s)/kG/Hr (3.2 mL/Hr) IV Continuous <Continuous>  DULoxetine 60 milliGRAM(s) Oral daily  folic acid 1 milliGRAM(s) Oral daily  glucagon  Injectable 1 milliGRAM(s) IntraMuscular once  heparin   Injectable 5000 Unit(s) SubCutaneous every 8 hours  insulin regular Infusion 2 Unit(s)/Hr (2 mL/Hr) IV Continuous <Continuous>  levothyroxine 175 MICROGram(s) Oral daily  norepinephrine Infusion 0.05 MICROgram(s)/kG/Min (6 mL/Hr) IV Continuous <Continuous>  nystatin Ointment 1 Application(s) Topical two times a day  pantoprazole  Injectable 40 milliGRAM(s) IV Push daily  propofol Infusion 5 MICROgram(s)/kG/Min (1.92 mL/Hr) IV Continuous <Continuous>    MEDICATIONS  (PRN):  acetaminophen     Tablet .. 650 milliGRAM(s) Oral every 6 hours PRN Temp greater or equal to 38C (100.4F)  acetaminophen  Suppository .. 650 milliGRAM(s) Rectal every 4 hours PRN Temp greater or equal to 38C (100.4F)  sodium chloride 0.9% lock flush 10 milliLiter(s) IV Push every 1 hour PRN Pre/post blood products, medications, blood draw, and to maintain line patency

## 2021-11-05 NOTE — PROGRESS NOTE ADULT - ASSESSMENT
Impression:    Acute Hypoxemic and Hypercapnic Respiratory Failure SP intubation  Pulmonary Edema, Bilateral Effusions, improving  HFpEF exacerbation, moderate MR  Sepsis present on admission  MRSA PNA  NSTEMI  Hypothyroidism   Iron Deficiency Anemia, Anemia of chronic disease    PLAN:    CNS:  start precedex and d/c propofol ,fentanyl if needed for sedation   sedation vacation today     HEENT: oral care.     PULMONARY: Hob >45.lower fioe to  35   lower Tv to 350   frequent suction   Gs x for trach by next week if not extubated  s/p reintubation     CARDIOVASCULAR: keep i=o. daily weights.     lasix 40 mg  Q12 hrs     GI: GI prophylaxis.  NG feed   follow LFT for now     RENAL: fu lytes. keep connor    INFECTIOUS DISEASE:  cefepime   resume vacno when level less then 20 foloow ID     HEMATOLOGICAL:  DVT prophylaxis.    ENDOCRINE:  Follow up FS.  Insulin protocol if needed.    MUSCULOSKELETAL: bed to chair position    MICU monitoring.

## 2021-11-06 NOTE — CONSULT NOTE ADULT - CONSULT REASON
post troponin chf
EVAL FOR TRACH
enteral feeding
Acute Hypoxemic and Hypercapnic Respiratory Failure
SOB

## 2021-11-06 NOTE — CONSULT NOTE ADULT - TIME BILLING
I have personally seen and examined this patient.    I have reviewed all pertinent clinical information and reviewed all relevant imaging and diagnostic studies personally.   I counseled the patient about diagnostic testing and treatment plan. All questions were answered.   I discussed recommendations with the primary team.
discussed the care with surgical and ICU teams, dr Puga
self-care/home management/community/leisure

## 2021-11-06 NOTE — PROGRESS NOTE ADULT - SUBJECTIVE AND OBJECTIVE BOX
Patient is seen and examined at the bed side, is febrile now. She is Re-intubated.  The Leukocytosis trended down to normal.      REVIEW OF SYSTEMS: Unable to obtain due to mental status       ALLERGIES: No Known Allergies      ICU Vital Signs Last 24 Hrs  T(C): 38.2 (06 Nov 2021 19:00), Max: 38.4 (06 Nov 2021 10:00)  T(F): 100.8 (06 Nov 2021 19:00), Max: 101.2 (06 Nov 2021 11:00)  HR: 98 (06 Nov 2021 18:00) (66 - 98)  BP: 138/74 (06 Nov 2021 18:00) (94/53 - 138/74)  BP(mean): 100 (06 Nov 2021 18:00) (70 - 100)  ABP: --  ABP(mean): --  RR: 22 (06 Nov 2021 19:00) (22 - 33)  SpO2: 93% (06 Nov 2021 18:00) (93% - 99%)      PHYSICAL EXAM:  GENERAL: Intubated/vented   CHEST/LUNG: Not using accessory muscles   HEART: s1 and s2 present  ABDOMEN:  Nontender and  Nondistended  EXTREMITIES: No pedal  edema  CNS: Intubated/vented       LABS:                        7.7    9.70  )-----------( 332      ( 06 Nov 2021 08:42 )             25.1                           7.5    14.77 )-----------( 392      ( 04 Nov 2021 06:00 )             24.1       11-06    140  |  101  |  68<HH>  ----------------------------<  233<H>  4.4   |  27  |  1.5    Ca    7.9<L>      06 Nov 2021 08:42    TPro  5.9<L>  /  Alb  2.7<L>  /  TBili  0.5  /  DBili  x   /  AST  204<H>  /  ALT  279<H>  /  AlkPhos  218<H>  11-06 11-04    140  |  102  |  68<HH>  ----------------------------<  142<H>  3.8   |  28  |  1.6<H>    Ca    8.0<L>      04 Nov 2021 06:00    TPro  5.2<L>  /  Alb  2.6<L>  /  TBili  0.5  /  DBili  x   /  AST  196<H>  /  ALT  293<H>  /  AlkPhos  193<H>  11-04        CAPILLARY BLOOD GLUCOSE  233 (28 Oct 2021 07:00)  231 (28 Oct 2021 06:15)  199 (28 Oct 2021 05:15)  272 (28 Oct 2021 04:15)    POCT Blood Glucose.: 156 mg/dL (28 Oct 2021 16:18)  POCT Blood Glucose.: 252 mg/dL (28 Oct 2021 14:13)  POCT Blood Glucose.: 259 mg/dL (28 Oct 2021 12:05)  POCT Blood Glucose.: 92 mg/dL (28 Oct 2021 10:25)  POCT Blood Glucose.: 77 mg/dL (28 Oct 2021 09:13)  POCT Blood Glucose.: 233 mg/dL (28 Oct 2021 06:51    ABG - ( 28 Oct 2021 16:19 )  pH, Arterial: 7.51  pH, Blood: x     /  pCO2: 44    /  pO2: 108   / HCO3: 35    / Base Excess: 11.0  /  SaO2: 97.5        Vancomycin Level, Random (11.04.21 @ 10:13)   Vancomycin Level, Random: 23.6:   Vancomycin Level, Trough (11.01.21 @ 00:25): 40.9  Vancomycin Level, Random (10.30.21 @ 05:55) : 28.2:   Vancomycin Level, Random (10.28.21 @ 05:35)   Vancomycin Level, Random: 17.1      MEDICATIONS  (STANDING):    albuterol/ipratropium for Nebulization 3 milliLiter(s) Nebulizer every 6 hours  aspirin  chewable 81 milliGRAM(s) Oral daily  atorvastatin 40 milliGRAM(s) Oral at bedtime  cefepime   IVPB 1000 milliGRAM(s) IV Intermittent every 24 hours  chlorhexidine 0.12% Liquid 15 milliLiter(s) Oral Mucosa two times a day  chlorhexidine 4% Liquid 1 Application(s) Topical <User Schedule>  dexMEDEtomidine Infusion 0.2 MICROgram(s)/kG/Hr (3.2 mL/Hr) IV Continuous <Continuous>  DULoxetine 60 milliGRAM(s) Oral daily  folic acid 1 milliGRAM(s) Oral daily  glucagon  Injectable 1 milliGRAM(s) IntraMuscular once  heparin   Injectable 5000 Unit(s) SubCutaneous every 8 hours  insulin regular Infusion 2 Unit(s)/Hr (2 mL/Hr) IV Continuous <Continuous>  levothyroxine 175 MICROGram(s) Oral daily  norepinephrine Infusion 0.05 MICROgram(s)/kG/Min (6 mL/Hr) IV Continuous <Continuous>  nystatin Ointment 1 Application(s) Topical two times a day  pantoprazole  Injectable 40 milliGRAM(s) IV Push daily  propofol Infusion 5 MICROgram(s)/kG/Min (1.92 mL/Hr) IV Continuous <Continuous>      RADIOLOGY & ADDITIONAL TESTS:    < from: Xray Chest 1 View- PORTABLE-Routine (Xray Chest 1 View- PORTABLE-Routine in AM.) (11.06.21 @ 06:41) >  Stable bilateral pleural effusion/opacities.  Stable support devices.    < from: Xray Chest 1 View- PORTABLE-Routine (Xray Chest 1 View- PORTABLE-Routine in AM.) (10.28.21 @ 07:09) >  Tip of endotracheal tube is at the clara and retraction is recommended.    Stable bilateral lung opacities      < from: Xray Chest 1 View- PORTABLE-Routine (Xray Chest 1 View- PORTABLE-Routine in AM.) (10.26.21 @ 06:04) >  Supportdevices: Right IJ line endotracheal tube and enteric tube are in satisfactory position.    Cardiac/mediastinum/hilum: Unremarkable.    Lung parenchyma/Pleura: Hazy bilateral opacities/effusions left greater than right are unchanged. No pneumothorax.        MICROBIOLOGY DATA:    Culture - Sputum . (10.26.21 @ 20:55)   - Amoxicillin/Clavulanic Acid: R >4/2   - Ampicillin: R >8   - Ampicillin/Sulbactam: R 16/8   - Cefazolin: R >16   - Ceftriaxone: R >32   - Ciprofloxacin: R >2   - Clindamycin: R >4   - Daptomycin: S <=0.25   - Erythromycin: R >4   - Gentamicin: S <=1 Should not be used as monotherapy   - Levofloxacin: R >4   - Linezolid: S 2   - Meropenem: R >8   - Moxifloxacin(Aerobic): R >4   - Oxacillin: R >2   - Penicillin: R >8   - RIF- Rifampin: S <=1 Should not be used as monotherapy   - Tetra/Doxy: S <=1   - Trimethoprim/Sulfamethoxazole: S <=0.5/9.5   - Vancomycin: S 1   Gram Stain:   Numerous polymorphonuclear leukocytes per low power field   No Squamous epithelial cells per low power field   Moderate Gram positive cocci in pairs seen per oil power field   Specimen Source: .Sputum Sputum   Culture Results:   Numerous Methicillin Resistant Staphylococcus aureus   Normal Respiratory Asia present              Patient is seen and examined at the bed side, is febrile . She is Re-intubated.  The Leukocytosis trended down to normal.      REVIEW OF SYSTEMS: Unable to obtain due to mental status       ALLERGIES: No Known Allergies      ICU Vital Signs Last 24 Hrs  T(C): 38.2 (06 Nov 2021 19:00), Max: 38.4 (06 Nov 2021 10:00)  T(F): 100.8 (06 Nov 2021 19:00), Max: 101.2 (06 Nov 2021 11:00)  HR: 98 (06 Nov 2021 18:00) (66 - 98)  BP: 138/74 (06 Nov 2021 18:00) (94/53 - 138/74)  BP(mean): 100 (06 Nov 2021 18:00) (70 - 100)  ABP: --  ABP(mean): --  RR: 22 (06 Nov 2021 19:00) (22 - 33)  SpO2: 93% (06 Nov 2021 18:00) (93% - 99%)      PHYSICAL EXAM:  GENERAL: Intubated/vented   CHEST/LUNG: Not using accessory muscles   HEART: s1 and s2 present  ABDOMEN:  Nontender and  Nondistended  EXTREMITIES: No pedal  edema  CNS: Intubated/vented       LABS:                        7.7    9.70  )-----------( 332      ( 06 Nov 2021 08:42 )             25.1                           7.5    14.77 )-----------( 392      ( 04 Nov 2021 06:00 )             24.1       11-06    140  |  101  |  68<HH>  ----------------------------<  233<H>  4.4   |  27  |  1.5    Ca    7.9<L>      06 Nov 2021 08:42    TPro  5.9<L>  /  Alb  2.7<L>  /  TBili  0.5  /  DBili  x   /  AST  204<H>  /  ALT  279<H>  /  AlkPhos  218<H>  11-06 11-04    140  |  102  |  68<HH>  ----------------------------<  142<H>  3.8   |  28  |  1.6<H>    Ca    8.0<L>      04 Nov 2021 06:00    TPro  5.2<L>  /  Alb  2.6<L>  /  TBili  0.5  /  DBili  x   /  AST  196<H>  /  ALT  293<H>  /  AlkPhos  193<H>  11-04        CAPILLARY BLOOD GLUCOSE  233 (28 Oct 2021 07:00)  231 (28 Oct 2021 06:15)  199 (28 Oct 2021 05:15)  272 (28 Oct 2021 04:15)    POCT Blood Glucose.: 156 mg/dL (28 Oct 2021 16:18)  POCT Blood Glucose.: 252 mg/dL (28 Oct 2021 14:13)  POCT Blood Glucose.: 259 mg/dL (28 Oct 2021 12:05)  POCT Blood Glucose.: 92 mg/dL (28 Oct 2021 10:25)  POCT Blood Glucose.: 77 mg/dL (28 Oct 2021 09:13)  POCT Blood Glucose.: 233 mg/dL (28 Oct 2021 06:51    ABG - ( 28 Oct 2021 16:19 )  pH, Arterial: 7.51  pH, Blood: x     /  pCO2: 44    /  pO2: 108   / HCO3: 35    / Base Excess: 11.0  /  SaO2: 97.5        Vancomycin Level, Random (11.04.21 @ 10:13)   Vancomycin Level, Random: 23.6:   Vancomycin Level, Trough (11.01.21 @ 00:25): 40.9  Vancomycin Level, Random (10.30.21 @ 05:55) : 28.2:   Vancomycin Level, Random (10.28.21 @ 05:35)   Vancomycin Level, Random: 17.1      MEDICATIONS  (STANDING):    albuterol/ipratropium for Nebulization 3 milliLiter(s) Nebulizer every 6 hours  aspirin  chewable 81 milliGRAM(s) Oral daily  atorvastatin 40 milliGRAM(s) Oral at bedtime  cefepime   IVPB 1000 milliGRAM(s) IV Intermittent every 24 hours  chlorhexidine 0.12% Liquid 15 milliLiter(s) Oral Mucosa two times a day  chlorhexidine 4% Liquid 1 Application(s) Topical <User Schedule>  dexMEDEtomidine Infusion 0.2 MICROgram(s)/kG/Hr (3.2 mL/Hr) IV Continuous <Continuous>  DULoxetine 60 milliGRAM(s) Oral daily  folic acid 1 milliGRAM(s) Oral daily  glucagon  Injectable 1 milliGRAM(s) IntraMuscular once  heparin   Injectable 5000 Unit(s) SubCutaneous every 8 hours  insulin regular Infusion 2 Unit(s)/Hr (2 mL/Hr) IV Continuous <Continuous>  levothyroxine 175 MICROGram(s) Oral daily  norepinephrine Infusion 0.05 MICROgram(s)/kG/Min (6 mL/Hr) IV Continuous <Continuous>  nystatin Ointment 1 Application(s) Topical two times a day  pantoprazole  Injectable 40 milliGRAM(s) IV Push daily  propofol Infusion 5 MICROgram(s)/kG/Min (1.92 mL/Hr) IV Continuous <Continuous>      RADIOLOGY & ADDITIONAL TESTS:    < from: Xray Chest 1 View- PORTABLE-Routine (Xray Chest 1 View- PORTABLE-Routine in AM.) (11.06.21 @ 06:41) >  Stable bilateral pleural effusion/opacities.  Stable support devices.    < from: Xray Chest 1 View- PORTABLE-Routine (Xray Chest 1 View- PORTABLE-Routine in AM.) (10.28.21 @ 07:09) >  Tip of endotracheal tube is at the clara and retraction is recommended.    Stable bilateral lung opacities      < from: Xray Chest 1 View- PORTABLE-Routine (Xray Chest 1 View- PORTABLE-Routine in AM.) (10.26.21 @ 06:04) >  Supportdevices: Right IJ line endotracheal tube and enteric tube are in satisfactory position.    Cardiac/mediastinum/hilum: Unremarkable.    Lung parenchyma/Pleura: Hazy bilateral opacities/effusions left greater than right are unchanged. No pneumothorax.        MICROBIOLOGY DATA:    Culture - Sputum . (10.26.21 @ 20:55)   - Amoxicillin/Clavulanic Acid: R >4/2   - Ampicillin: R >8   - Ampicillin/Sulbactam: R 16/8   - Cefazolin: R >16   - Ceftriaxone: R >32   - Ciprofloxacin: R >2   - Clindamycin: R >4   - Daptomycin: S <=0.25   - Erythromycin: R >4   - Gentamicin: S <=1 Should not be used as monotherapy   - Levofloxacin: R >4   - Linezolid: S 2   - Meropenem: R >8   - Moxifloxacin(Aerobic): R >4   - Oxacillin: R >2   - Penicillin: R >8   - RIF- Rifampin: S <=1 Should not be used as monotherapy   - Tetra/Doxy: S <=1   - Trimethoprim/Sulfamethoxazole: S <=0.5/9.5   - Vancomycin: S 1   Gram Stain:   Numerous polymorphonuclear leukocytes per low power field   No Squamous epithelial cells per low power field   Moderate Gram positive cocci in pairs seen per oil power field   Specimen Source: .Sputum Sputum   Culture Results:   Numerous Methicillin Resistant Staphylococcus aureus   Normal Respiratory Asia present

## 2021-11-06 NOTE — CONSULT NOTE ADULT - ASSESSMENT
77 y/o F PMH heart failure, moderate severity mitral valve regurgitation, admitted in early October for heart failure and end stemi presents c/o SOB. Pt had a recent discharge from the hospital and XR during that admission showed increased markings in both lung fields consistent with CHF exacerbation    Febrile to 102, hypoxic in ED s/p Intubation 10/23/2021, extubated 11/1/2021 and then intubation again 11/2/2021      # Acute hypoxemic respiratory failure 2/2 HAP  # Acute Exacerbation of HFpEF     - EVAL FOR TRACHEOSTOMY   - OBTAIN COAGS/ TYPE AND SCREEN   - POSS TRACH THIS WEEK

## 2021-11-06 NOTE — PROGRESS NOTE ADULT - SUBJECTIVE AND OBJECTIVE BOX
Patient seen and examined at bedside; patient awake and comfortable.     Vital Signs Last 24 Hrs  T(C): 38.4 (06 Nov 2021 11:00), Max: 38.5 (05 Nov 2021 19:00)  T(F): 101.2 (06 Nov 2021 11:00), Max: 101.3 (05 Nov 2021 19:00)  HR: 75 (06 Nov 2021 14:24) (66 - 80)  BP: 119/59 (06 Nov 2021 10:00) (94/53 - 127/64)  BP(mean): 83 (06 Nov 2021 10:00) (70 - 86)  RR: 27 (06 Nov 2021 11:00) (23 - 33)  SpO2: 96% (06 Nov 2021 14:24) (93% - 99%)  PHYSICAL EXAM:  GENERAL: NAD  HEAD:  Atraumatic, Normocephalic  NERVOUS SYSTEM:   no focal deficits   CHEST/LUNG:  bilateral rhonchi  HEART: Regular rate and rhythm; No murmurs, rubs, or gallops  ABDOMEN: Soft, Nontender, Nondistended; Bowel sounds present  EXTREMITIES:  2+ Peripheral Pulses, No clubbing, cyanosis, or edema    LABS                        7.7    9.70  )-----------( 332      ( 06 Nov 2021 08:42 )             25.1   11-06    140  |  101  |  68<HH>  ----------------------------<  233<H>  4.4   |  27  |  1.5    Ca    7.9<L>      06 Nov 2021 08:42    TPro  5.9<L>  /  Alb  2.7<L>  /  TBili  0.5  /  DBili  x   /  AST  204<H>  /  ALT  279<H>  /  AlkPhos  218<H>  11-06      Mode: AC/ CMV (Assist Control/ Continuous Mandatory Ventilation)  RR (machine): 12  TV (machine): 400  FiO2: 35  PEEP: 5      Culture Results:   No growth to date. (11-03-21)  Culture Results:   No Growth Final (10-30-21)  Culture Results:   No Growth Final (10-29-21)  Culture Results:   No Growth Final (10-28-21)  Culture Results:   No Growth Final (10-27-21)  Culture Results:   Numerous Methicillin Resistant Staphylococcus aureus  Normal Respiratory Asia present (10-26-21)  Culture Results:   No Growth Final (10-26-21)  Culture Results:   Normal Respiratory Asia present (10-23-21)  Culture Results:   No Growth Final (10-23-21)  Culture Results:   No growth (10-22-21)    RADIOLOGY  < from: Xray Chest 1 View- PORTABLE-Routine (Xray Chest 1 View- PORTABLE-Routine in AM.) (11.04.21 @ 06:25) >  Impression:    Stable bilateral lung opacities    < end of copied text >    MEDICATIONS  (STANDING):  albuterol/ipratropium for Nebulization 3 milliLiter(s) Nebulizer every 6 hours  aspirin  chewable 81 milliGRAM(s) Oral daily  atorvastatin 40 milliGRAM(s) Oral at bedtime  cefepime   IVPB 1000 milliGRAM(s) IV Intermittent every 24 hours  chlorhexidine 0.12% Liquid 15 milliLiter(s) Oral Mucosa two times a day  chlorhexidine 4% Liquid 1 Application(s) Topical <User Schedule>  dexMEDEtomidine Infusion 0.2 MICROgram(s)/kG/Hr (3.2 mL/Hr) IV Continuous <Continuous>  dextrose 40% Gel 15 Gram(s) Oral once  dextrose 5%. 1000 milliLiter(s) (50 mL/Hr) IV Continuous <Continuous>  dextrose 5%. 1000 milliLiter(s) (100 mL/Hr) IV Continuous <Continuous>  dextrose 50% Injectable 25 Gram(s) IV Push once  dextrose 50% Injectable 12.5 Gram(s) IV Push once  dextrose 50% Injectable 25 Gram(s) IV Push once  DULoxetine 60 milliGRAM(s) Oral daily  folic acid 1 milliGRAM(s) Oral daily  glucagon  Injectable 1 milliGRAM(s) IntraMuscular once  heparin   Injectable 5000 Unit(s) SubCutaneous every 8 hours  insulin regular Infusion 2 Unit(s)/Hr (2 mL/Hr) IV Continuous <Continuous>  levothyroxine 175 MICROGram(s) Oral daily  norepinephrine Infusion 0.05 MICROgram(s)/kG/Min (6 mL/Hr) IV Continuous <Continuous>  nystatin Ointment 1 Application(s) Topical two times a day  pantoprazole  Injectable 40 milliGRAM(s) IV Push daily  propofol Infusion 5 MICROgram(s)/kG/Min (1.92 mL/Hr) IV Continuous <Continuous>    MEDICATIONS  (PRN):  acetaminophen     Tablet .. 650 milliGRAM(s) Oral every 6 hours PRN Temp greater or equal to 38C (100.4F)  acetaminophen  Suppository .. 650 milliGRAM(s) Rectal every 4 hours PRN Temp greater or equal to 38C (100.4F)  sodium chloride 0.9% lock flush 10 milliLiter(s) IV Push every 1 hour PRN Pre/post blood products, medications, blood draw, and to maintain line patency

## 2021-11-06 NOTE — PROGRESS NOTE ADULT - ASSESSMENT
Impression:    Acute Hypoxemic and Hypercapnic Respiratory Failure SP intubation  Pulmonary Edema, Bilateral Effusions, improving  HFpEF exacerbation, moderate MR  Sepsis present on admission  MRSA PNA  NSTEMI  Hypothyroidism   Iron Deficiency Anemia, Anemia of chronic disease    PLAN:    CNS: t precedex sedation vacation   HEENT: oral care.     PULMONARY: Hob >45.try weaning trial   Gs x for trach by next week if not extubated  s/p reintubation     CARDIOVASCULAR: keep i=o. daily weights.     lasix 40 mg  Q24 hrs   GI: GI prophylaxis.  NG feed   follow LFT for now     RENAL: fu lytes. keep connor    INFECTIOUS DISEASE:  cefepime   resume vacno when level less then 20 follow ID     HEMATOLOGICAL:  DVT prophylaxis.    ENDOCRINE:  Follow up FS.  Insulin protocol if needed.    MUSCULOSKELETAL: bed to chair position    MICU monitoring.

## 2021-11-06 NOTE — PROGRESS NOTE ADULT - SUBJECTIVE AND OBJECTIVE BOX
Patient is a 76y old  Female who presents with a chief complaint of SOB (05 Nov 2021 11:03)      Over Night Events:  Patient seen and examined.   on vent and sedation   awake follow command     ROS:  See HPI    PHYSICAL EXAM    ICU Vital Signs Last 24 Hrs  T(C): 37.9 (06 Nov 2021 05:30), Max: 38.5 (05 Nov 2021 19:00)  T(F): 100.2 (06 Nov 2021 05:30), Max: 101.3 (05 Nov 2021 19:00)  HR: 69 (06 Nov 2021 05:30) (66 - 79)  BP: 107/56 (06 Nov 2021 05:30) (94/53 - 127/64)  BP(mean): 76 (06 Nov 2021 05:30) (70 - 86)  ABP: --  ABP(mean): --  RR: 31 (06 Nov 2021 05:30) (14 - 33)  SpO2: 97% (06 Nov 2021 05:30) (95% - 99%)      General: awake   HEENT:    et tube             Lymph Nodes: NO cervical LN   Lungs: Bilateral decrease bibasilar   Cardiovascular: Regular   Abdomen: Soft, Positive BS  Extremities: No clubbing   Skin: warm   Neurological: no focal   Musculoskeletal: move all ext     I&O's Detail    05 Nov 2021 07:01  -  06 Nov 2021 07:00  --------------------------------------------------------  IN:    Dexmedetomidine: 575 mL    Glucerna: 1150 mL    Insulin: 60 mL    IV PiggyBack: 50 mL    Norepinephrine: 20 mL  Total IN: 1855 mL    OUT:    Indwelling Catheter - Urethral (mL): 1115 mL  Total OUT: 1115 mL    Total NET: 740 mL          LABS:                          7.9    10.54 )-----------( 399      ( 05 Nov 2021 06:19 )             25.5         05 Nov 2021 06:19    140    |  102    |  59     ----------------------------<  155    4.1     |  28     |  1.4      Ca    8.0        05 Nov 2021 06:19                                                                                                                                                  Culture - Bronchial (collected 04 Nov 2021 11:30)  Source: Lavage None  Gram Stain (06 Nov 2021 07:00):    Few polymorphonuclear leukocytes seen per low power field    No squamous epithelial cells seen per low power field    No organisms seen per oil power field    Culture - Blood (collected 03 Nov 2021 15:29)  Source: .Blood None  Preliminary Report (04 Nov 2021 23:02):    No growth to date.                                                   Mode: AC/ CMV (Assist Control/ Continuous Mandatory Ventilation)  RR (machine): 12  TV (machine): 350  FiO2: 35  PEEP: 5  MAP: 12  PIP: 33                                      ABG - ( 06 Nov 2021 04:17 )  pH, Arterial: 7.52  pH, Blood: x     /  pCO2: 35    /  pO2: 71    / HCO3: 29    / Base Excess: 5.7   /  SaO2: 94.9                MEDICATIONS  (STANDING):  albuterol/ipratropium for Nebulization 3 milliLiter(s) Nebulizer every 6 hours  aspirin  chewable 81 milliGRAM(s) Oral daily  atorvastatin 40 milliGRAM(s) Oral at bedtime  cefepime   IVPB 1000 milliGRAM(s) IV Intermittent every 24 hours  chlorhexidine 0.12% Liquid 15 milliLiter(s) Oral Mucosa two times a day  chlorhexidine 4% Liquid 1 Application(s) Topical <User Schedule>  dexMEDEtomidine Infusion 0.2 MICROgram(s)/kG/Hr (3.2 mL/Hr) IV Continuous <Continuous>  dextrose 40% Gel 15 Gram(s) Oral once  dextrose 5%. 1000 milliLiter(s) (50 mL/Hr) IV Continuous <Continuous>  dextrose 5%. 1000 milliLiter(s) (100 mL/Hr) IV Continuous <Continuous>  dextrose 50% Injectable 25 Gram(s) IV Push once  dextrose 50% Injectable 12.5 Gram(s) IV Push once  dextrose 50% Injectable 25 Gram(s) IV Push once  DULoxetine 60 milliGRAM(s) Oral daily  folic acid 1 milliGRAM(s) Oral daily  glucagon  Injectable 1 milliGRAM(s) IntraMuscular once  heparin   Injectable 5000 Unit(s) SubCutaneous every 8 hours  insulin regular Infusion 2 Unit(s)/Hr (2 mL/Hr) IV Continuous <Continuous>  levothyroxine 175 MICROGram(s) Oral daily  norepinephrine Infusion 0.05 MICROgram(s)/kG/Min (6 mL/Hr) IV Continuous <Continuous>  nystatin Ointment 1 Application(s) Topical two times a day  pantoprazole  Injectable 40 milliGRAM(s) IV Push daily  propofol Infusion 5 MICROgram(s)/kG/Min (1.92 mL/Hr) IV Continuous <Continuous>    MEDICATIONS  (PRN):  acetaminophen     Tablet .. 650 milliGRAM(s) Oral every 6 hours PRN Temp greater or equal to 38C (100.4F)  acetaminophen  Suppository .. 650 milliGRAM(s) Rectal every 4 hours PRN Temp greater or equal to 38C (100.4F)  sodium chloride 0.9% lock flush 10 milliLiter(s) IV Push every 1 hour PRN Pre/post blood products, medications, blood draw, and to maintain line patency          Xrays:  TLC:  OG:  ET tube:                                                                                    decrease left effusion    ECHO:  [facility] ICU:

## 2021-11-06 NOTE — PROGRESS NOTE ADULT - ASSESSMENT
76 yr F with SLE on Methotrexate, moderate MR, HFpEF, HTN, hypothyroid, HLD, T2DM, hx of CVA with no residual deficits discharged from hospital earlier this month on 10/2 s/p HF exacerbation and NSTEMI, DC'd on new home oxygen, refused in pt cardiac cath and is supposed to have outpt cardiac cath with Dr Kowalski.   DC'd on 10/21 from [facility] s/p admission for resolved ANTONELLA, hypoglycemia and NSTEMI  returns to hospital with:    Acute respiratory failure secondary to sepsis from gram neg pneumonia and NSTEMI   Pulmonary edema  CHFpEF exacerbation/ Moderate MR     - cefepime and vanco (level still elevated) as per ID - still febrile   - s/p bronchoscopy - follow BAL results    - Gs x for trach by next week if not extubated   - maintain even to negative fluid balance    - repeat procal now more elevated    - aspirin, Lipitor   - home meds   - DVT and GI prophylaxis     Dispo: remains in MICU

## 2021-11-06 NOTE — CONSULT NOTE ADULT - SUBJECTIVE AND OBJECTIVE BOX
SURGERY CONSULTED FOR TRACHEOSTOMY     75 y/o F PMH heart failure, moderate severity mitral valve regurgitation, admitted in early October for heart failure and end stemi presents c/o SOB. Pt had a recent discharge from the hospital and XR during that admission showed increased markings in both lung fields consistent with CHF exacerbation    Patient found to have pneumonia likely MRSA and will continue with Vancomycin as per ID. Continues to be febrile despite being on antibiotics. Patient only on Precedex for sedation. SAT patient woke up but was tachypneic on the SBT.    Febrile to 102, hypoxic in ED s/p Intubation 10/23/2021, extubated 11/1/2021 and then intubation again 11/2/2021  CXR showing bilateral opacities  Patient was  re-intubated again on 11/2/2021 for severe tachypnea and respiratory fatigue despite being on BiPAP  Bronched yesterday, very thick secretions.         PAST MEDICAL & SURGICAL HISTORY  DM (diabetes mellitus)  insulin  fs achs    Hypercholesteremia  statin    Hypertension  hctz    Lupus  as per hx    Fall, initial encounter  as  hx    Stroke  TIA 2002, no deficits    Herniated lumbar intervertebral disc  as per hx    Seizure  keppra    No significant past surgical history      SOCIAL HISTORY:  Negative for smoking/alcohol/drug use.     ALLERGIES:  No Known Allergies      MEDICATIONS:  STANDING MEDICATIONS  albuterol/ipratropium for Nebulization 3 milliLiter(s) Nebulizer every 6 hours  aspirin  chewable 81 milliGRAM(s) Oral daily  atorvastatin 40 milliGRAM(s) Oral at bedtime  cefepime   IVPB 1000 milliGRAM(s) IV Intermittent every 24 hours  chlorhexidine 0.12% Liquid 15 milliLiter(s) Oral Mucosa two times a day  chlorhexidine 4% Liquid 1 Application(s) Topical <User Schedule>  dexMEDEtomidine Infusion 0.2 MICROgram(s)/kG/Hr IV Continuous <Continuous>  dextrose 40% Gel 15 Gram(s) Oral once  dextrose 5%. 1000 milliLiter(s) IV Continuous <Continuous>  dextrose 5%. 1000 milliLiter(s) IV Continuous <Continuous>  dextrose 50% Injectable 25 Gram(s) IV Push once  dextrose 50% Injectable 12.5 Gram(s) IV Push once  dextrose 50% Injectable 25 Gram(s) IV Push once  DULoxetine 60 milliGRAM(s) Oral daily  folic acid 1 milliGRAM(s) Oral daily  furosemide   Injectable 40 milliGRAM(s) IV Push every 12 hours  glucagon  Injectable 1 milliGRAM(s) IntraMuscular once  heparin   Injectable 5000 Unit(s) SubCutaneous every 8 hours  insulin regular Infusion 2 Unit(s)/Hr IV Continuous <Continuous>  levothyroxine 175 MICROGram(s) Oral daily  norepinephrine Infusion 0.05 MICROgram(s)/kG/Min IV Continuous <Continuous>  nystatin Ointment 1 Application(s) Topical two times a day  pantoprazole  Injectable 40 milliGRAM(s) IV Push daily  propofol Infusion 5 MICROgram(s)/kG/Min IV Continuous <Continuous>    PRN MEDICATIONS  acetaminophen     Tablet .. 650 milliGRAM(s) Oral every 6 hours PRN  acetaminophen  Suppository .. 650 milliGRAM(s) Rectal every 4 hours PRN  sodium chloride 0.9% lock flush 10 milliLiter(s) IV Push every 1 hour PRN      Vital Signs Last 24 Hrs  T(C): 38.1 (06 Nov 2021 07:01), Max: 38.5 (05 Nov 2021 19:00)  T(F): 100.6 (06 Nov 2021 07:01), Max: 101.3 (05 Nov 2021 19:00)  HR: 79 (06 Nov 2021 09:00) (66 - 79)  BP: 121/62 (06 Nov 2021 09:00) (94/53 - 127/64)  BP(mean): 86 (06 Nov 2021 09:00) (70 - 86)  RR: 29 (06 Nov 2021 09:00) (23 - 33)  SpO2: 94% (06 Nov 2021 09:00) (93% - 99%)      PHYSICAL EXAM:  GENERAL: NAD, intubated, awake, follows command   HEAD:  Atraumatic, Normocephalic  NERVOUS SYSTEM:   no focal deficits   CHEST/LUNG:  bilateral rhonchi  HEART: Regular rate and rhythm; No murmurs, rubs, or gallops  ABDOMEN: Soft, Nontender, Nondistended; Bowel sounds present  EXTREMITIES:  2+ Peripheral Pulses, No clubbing, cyanosis, or edema                 SURGERY CONSULTED FOR TRACHEOSTOMY     77 y/o F PMH heart failure, moderate severity mitral valve regurgitation, admitted in early October for heart failure and end stemi presents c/o SOB. Pt had a recent discharge from the hospital and XR during that admission showed increased markings in both lung fields consistent with CHF exacerbation    Patient found to have pneumonia likely MRSA and will continue with Vancomycin as per ID. Continues to be febrile despite being on antibiotics. Patient only on Precedex for sedation. SAT patient woke up but was tachypneic on the SBT.    Febrile to 102, hypoxic in ED s/p Intubation 10/23/2021, extubated 11/1/2021 and then intubation again 11/2/2021  CXR showing bilateral opacities  Patient was  re-intubated again on 11/2/2021 for severe tachypnea and respiratory fatigue despite being on BiPAP  Bronched yesterday, very thick secretions.         PAST MEDICAL & SURGICAL HISTORY  DM (diabetes mellitus)  insulin  fs achs    Hypercholesteremia  statin    Hypertension  hctz    Lupus  as per hx    Fall, initial encounter  as  hx    Stroke  TIA 2002, no deficits    Herniated lumbar intervertebral disc  as per hx    Seizure  keppra    No significant past surgical history      SOCIAL HISTORY:  Negative for smoking/alcohol/drug use.     ALLERGIES:  No Known Allergies      MEDICATIONS:  STANDING MEDICATIONS  albuterol/ipratropium for Nebulization 3 milliLiter(s) Nebulizer every 6 hours  aspirin  chewable 81 milliGRAM(s) Oral daily  atorvastatin 40 milliGRAM(s) Oral at bedtime  cefepime   IVPB 1000 milliGRAM(s) IV Intermittent every 24 hours  chlorhexidine 0.12% Liquid 15 milliLiter(s) Oral Mucosa two times a day  chlorhexidine 4% Liquid 1 Application(s) Topical <User Schedule>  dexMEDEtomidine Infusion 0.2 MICROgram(s)/kG/Hr IV Continuous <Continuous>  dextrose 40% Gel 15 Gram(s) Oral once  dextrose 5%. 1000 milliLiter(s) IV Continuous <Continuous>  dextrose 5%. 1000 milliLiter(s) IV Continuous <Continuous>  dextrose 50% Injectable 25 Gram(s) IV Push once  dextrose 50% Injectable 12.5 Gram(s) IV Push once  dextrose 50% Injectable 25 Gram(s) IV Push once  DULoxetine 60 milliGRAM(s) Oral daily  folic acid 1 milliGRAM(s) Oral daily  furosemide   Injectable 40 milliGRAM(s) IV Push every 12 hours  glucagon  Injectable 1 milliGRAM(s) IntraMuscular once  heparin   Injectable 5000 Unit(s) SubCutaneous every 8 hours  insulin regular Infusion 2 Unit(s)/Hr IV Continuous <Continuous>  levothyroxine 175 MICROGram(s) Oral daily  norepinephrine Infusion 0.05 MICROgram(s)/kG/Min IV Continuous <Continuous>  nystatin Ointment 1 Application(s) Topical two times a day  pantoprazole  Injectable 40 milliGRAM(s) IV Push daily  propofol Infusion 5 MICROgram(s)/kG/Min IV Continuous <Continuous>    PRN MEDICATIONS  acetaminophen     Tablet .. 650 milliGRAM(s) Oral every 6 hours PRN  acetaminophen  Suppository .. 650 milliGRAM(s) Rectal every 4 hours PRN  sodium chloride 0.9% lock flush 10 milliLiter(s) IV Push every 1 hour PRN      Vital Signs Last 24 Hrs  T(C): 38.1 (06 Nov 2021 07:01), Max: 38.5 (05 Nov 2021 19:00)  T(F): 100.6 (06 Nov 2021 07:01), Max: 101.3 (05 Nov 2021 19:00)  HR: 79 (06 Nov 2021 09:00) (66 - 79)  BP: 121/62 (06 Nov 2021 09:00) (94/53 - 127/64)  BP(mean): 86 (06 Nov 2021 09:00) (70 - 86)  RR: 29 (06 Nov 2021 09:00) (23 - 33)  SpO2: 94% (06 Nov 2021 09:00) (93% - 99%)      Mode: AC/ CMV (Assist Control/ Continuous Mandatory Ventilation)  RR (machine): 12  TV (machine): 350  FiO2: 35  PEEP: 5  MAP: 12  PIP: 33        PHYSICAL EXAM:  GENERAL: NAD, intubated, awake, follows command   HEAD:  Atraumatic, Normocephalic  NERVOUS SYSTEM:   no focal deficits   CHEST/LUNG:  bilateral rhonchi  HEART: Regular rate and rhythm; No murmurs, rubs, or gallops  ABDOMEN: Soft, Nontender, Nondistended; Bowel sounds present  EXTREMITIES:  2+ Peripheral Pulses, No clubbing, cyanosis, or edema      11-06    140  |  101  |  x   ----------------------------<  233<H>  4.4   |  27  |  1.5    Ca    7.9<L>      06 Nov 2021 08:42    TPro  5.9<L>  /  Alb  2.7<L>  /  TBili  0.5  /  DBili  x   /  AST  204<H>  /  ALT  279<H>  /  AlkPhos  218<H>  11-06                            7.7    9.70  )-----------( 332      ( 06 Nov 2021 08:42 )             25.1     CAPILLARY BLOOD GLUCOSE      POCT Blood Glucose.: 246 mg/dL (06 Nov 2021 07:04)  POCT Blood Glucose.: 154 mg/dL (06 Nov 2021 05:03)  POCT Blood Glucose.: 109 mg/dL (06 Nov 2021 04:01)  POCT Blood Glucose.: 172 mg/dL (06 Nov 2021 00:01)  POCT Blood Glucose.: 236 mg/dL (05 Nov 2021 20:06)  POCT Blood Glucose.: 199 mg/dL (05 Nov 2021 16:24)  POCT Blood Glucose.: 203 mg/dL (05 Nov 2021 11:19)

## 2021-11-06 NOTE — CONSULT NOTE ADULT - ATTENDING COMMENTS
patient seen and examined agree above note   lasix IV q 12 HRS   PATIENT NOW FEEL BETTER LESS TACHYPNEA   KEEP BIPAP FOR NOW   abg now repeat   follow cardiology   echo repeat   CE follow up   cardiology consult already spoke to cardiology   monitor is and os   cxr janet   monitor bun, cr
patient needs tracheostomy, spoke to medical ICU team to prepare patient for trach, spoke to dr Puga to proceed with trach monday, patient needs bowel regimen

## 2021-11-06 NOTE — PROGRESS NOTE ADULT - ASSESSMENT
77 yo female PMHx of HFpEF, HTN, HLD, DM,  SLE on Cellsept and methotrexate, CVA in 2002, recently admitted for Hypoglycemia and ANTONELLA who presents with shortness of breath  presenting for shortness of breath    IMPRESSION  #Acute Hypoxic respiratory failure- intubated/vented   #Fevers - presumed HAP- sputum cx grew MRSA, 10/26/21  - Blood Cx 10/23 NG  - Sputum cx 10/23 NG   - MRSA Nares 10/23 negative   - RVP negative  - Procalcitonin, Serum: 1.82 (10.22.21 @ 19:36) -> Procalcitonin, Serum: 0.98 (10.26.21 @ 05:37)  #Obesity BMI (kg/m2): 26.7, 30.2  #Abx allergy: NKDA    would recommend:    1. Monitor Temp. and c/w supportive care  2. Continue Cefepime and Vancomycin level still elevated  3. Management of Vent as per ICU protocol  4. Aspiration precaution  5. OBtain CT scan of chest and abb/pelvis  since keep spiking fever despite of broad spectrum antibiotics    d/w ICU team     Attending Attestation:    Spent more than 35 minutes on total encounter, more than 50 % of the visit was spent counseling and/or coordinating care by the Attending physician. 77 yo female PMHx of HFpEF, HTN, HLD, DM,  SLE on Cellsept and methotrexate, CVA in 2002, recently admitted for Hypoglycemia and ANTONELLA who presents with shortness of breath  presenting for shortness of breath    IMPRESSION  #Acute Hypoxic respiratory failure- intubated/vented   #Fevers - presumed HAP- sputum cx grew MRSA, 10/26/21  - Blood Cx 10/23 NG  - Sputum cx 10/23 NG   - MRSA Nares 10/23 negative   - RVP negative  - Procalcitonin, Serum: 1.82 (10.22.21 @ 19:36) -> Procalcitonin, Serum: 0.98 (10.26.21 @ 05:37)  #Obesity BMI (kg/m2): 26.7, 30.2  #Abx allergy: NKDA    would recommend:    1. Please change the DEEP LINE since still spiking fever   2. Monitor Temp. and c/w supportive care  3. Continue Cefepime and Vancomycin level still elevated  4. Management of Vent as per ICU protocol  5. Aspiration precaution  6. Obtain CT scan of chest and abd/pelvis  since keep spiking fever despite of broad spectrum antibiotics    d/w ICU team     Attending Attestation:    Spent more than 35 minutes on total encounter, more than 50 % of the visit was spent counseling and/or coordinating care by the Attending physician.

## 2021-11-07 NOTE — PROGRESS NOTE ADULT - SUBJECTIVE AND OBJECTIVE BOX
Patient is a 76y old  Female who presents with a chief complaint of SOB (06 Nov 2021 20:06)      Over Night Events:  Patient seen and examined.   on vent spiking machelle     ROS:  See HPI    PHYSICAL EXAM    ICU Vital Signs Last 24 Hrs  T(C): 38 (07 Nov 2021 06:02), Max: 38.4 (06 Nov 2021 10:00)  T(F): 100.4 (07 Nov 2021 06:02), Max: 101.2 (06 Nov 2021 11:00)  HR: 75 (07 Nov 2021 06:02) (64 - 98)  BP: 107/60 (07 Nov 2021 06:02) (96/60 - 138/74)  BP(mean): 77 (07 Nov 2021 06:02) (73 - 100)  ABP: --  ABP(mean): --  RR: 36 (07 Nov 2021 06:02) (22 - 36)  SpO2: 97% (07 Nov 2021 06:02) (90% - 98%)      General:awake   HEENT:   et tueb              Lymph Nodes: NO cervical LN   Lungs: Bilateral BS  Cardiovascular: Regular   Abdomen: Soft, Positive BS  Extremities: No clubbing   Skin: warm   Neurological: follow command   Musculoskeletal: move all ext     I&O's Detail    05 Nov 2021 07:01  -  06 Nov 2021 07:00  --------------------------------------------------------  IN:    Dexmedetomidine: 575 mL    Glucerna: 1150 mL    Insulin: 60 mL    IV PiggyBack: 50 mL    Norepinephrine: 20 mL  Total IN: 1855 mL    OUT:    Indwelling Catheter - Urethral (mL): 1115 mL  Total OUT: 1115 mL    Total NET: 740 mL      06 Nov 2021 08:01  -  07 Nov 2021 06:48  --------------------------------------------------------  IN:    Dexmedetomidine: 600 mL    Glucerna: 1200 mL    Insulin: 73 mL    Propofol: 65 mL  Total IN: 1938 mL    OUT:    Indwelling Catheter - Urethral (mL): 1000 mL  Total OUT: 1000 mL    Total NET: 938 mL          LABS:                          7.7    9.70  )-----------( 332      ( 06 Nov 2021 08:42 )             25.1         06 Nov 2021 08:42    140    |  101    |  68     ----------------------------<  233    4.4     |  27     |  1.5      Ca    7.9        06 Nov 2021 08:42    TPro  5.9    /  Alb  2.7    /  TBili  0.5    /  DBili  x      /  AST  204    /  ALT  279    /  AlkPhos  218    06 Nov 2021 08:42  Amylase x     lipase x                                                                                                                                                    Culture - Acid Fast - Bronchial w/Smear (collected 04 Nov 2021 11:30)  Source: .Bronchial None    Culture - Bronchial (collected 04 Nov 2021 11:30)  Source: Lavage None  Gram Stain (06 Nov 2021 07:00):    Few polymorphonuclear leukocytes seen per low power field    No squamous epithelial cells seen per low power field    No organisms seen per oil power field  Preliminary Report (06 Nov 2021 19:57):    Normal Respiratory [place] present                                                   Mode: AC/ CMV (Assist Control/ Continuous Mandatory Ventilation)  RR (machine): 12  TV (machine): 350  FiO2: 35  PEEP: 5  MAP: 11  PIP: 28                                      ABG - ( 07 Nov 2021 04:36 )  pH, Arterial: 7.53  pH, Blood: x     /  pCO2: 33    /  pO2: 89    / HCO3: 28    / Base Excess: 4.7   /  SaO2: 97.5                MEDICATIONS  (STANDING):  albuterol/ipratropium for Nebulization 3 milliLiter(s) Nebulizer every 6 hours  aspirin  chewable 81 milliGRAM(s) Oral daily  atorvastatin 40 milliGRAM(s) Oral at bedtime  cefepime   IVPB 1000 milliGRAM(s) IV Intermittent every 24 hours  chlorhexidine 0.12% Liquid 15 milliLiter(s) Oral Mucosa two times a day  chlorhexidine 4% Liquid 1 Application(s) Topical <User Schedule>  dexMEDEtomidine Infusion 0.2 MICROgram(s)/kG/Hr (3.2 mL/Hr) IV Continuous <Continuous>  dextrose 40% Gel 15 Gram(s) Oral once  dextrose 5%. 1000 milliLiter(s) (50 mL/Hr) IV Continuous <Continuous>  dextrose 5%. 1000 milliLiter(s) (100 mL/Hr) IV Continuous <Continuous>  dextrose 50% Injectable 25 Gram(s) IV Push once  dextrose 50% Injectable 12.5 Gram(s) IV Push once  dextrose 50% Injectable 25 Gram(s) IV Push once  DULoxetine 60 milliGRAM(s) Oral daily  folic acid 1 milliGRAM(s) Oral daily  glucagon  Injectable 1 milliGRAM(s) IntraMuscular once  heparin   Injectable 5000 Unit(s) SubCutaneous every 8 hours  insulin regular Infusion 2 Unit(s)/Hr (2 mL/Hr) IV Continuous <Continuous>  levothyroxine 175 MICROGram(s) Oral daily  norepinephrine Infusion 0.05 MICROgram(s)/kG/Min (6 mL/Hr) IV Continuous <Continuous>  nystatin Ointment 1 Application(s) Topical two times a day  pantoprazole  Injectable 40 milliGRAM(s) IV Push daily  propofol Infusion 5 MICROgram(s)/kG/Min (1.92 mL/Hr) IV Continuous <Continuous>    MEDICATIONS  (PRN):  acetaminophen     Tablet .. 650 milliGRAM(s) Oral every 6 hours PRN Temp greater or equal to 38C (100.4F)  acetaminophen  Suppository .. 650 milliGRAM(s) Rectal every 4 hours PRN Temp greater or equal to 38C (100.4F)  sodium chloride 0.9% lock flush 10 milliLiter(s) IV Push every 1 hour PRN Pre/post blood products, medications, blood draw, and to maintain line patency          Xrays:  TLC:  OG:  ET tube:                                                                                       ECHO:  [facility] ICU:

## 2021-11-07 NOTE — PROGRESS NOTE ADULT - ASSESSMENT
76 yr F with SLE on Methotrexate, moderate MR, HFpEF, HTN, hypothyroid, HLD, T2DM, hx of CVA with no residual deficits discharged from hospital earlier this month on 10/2 s/p HF exacerbation and NSTEMI, DC'd on new home oxygen, refused in pt cardiac cath and is supposed to have outpt cardiac cath with Dr Kowalski.   DC'd on 10/21 from [facility] s/p admission for resolved ANTONELLA, hypoglycemia and NSTEMI  returns to hospital with:    Acute respiratory failure secondary to sepsis from gram neg pneumonia and NSTEMI      - ct chest and abdomen   - cefepime and vanco (level still elevated) as per ID - still febrile   - s/p bronchoscopy - follow BAL results    - maintain even to negative fluid balance    - repeat procal now more elevated    - aspirin, Lipitor   - home meds   - DVT and GI prophylaxis

## 2021-11-07 NOTE — PROGRESS NOTE ADULT - ASSESSMENT
Impression:    Acute Hypoxemic and Hypercapnic Respiratory Failure SP intubation  Pulmonary Edema, Bilateral Effusions, improving  HFpEF exacerbation, moderate MR  Sepsis present on admission  MRSA PNA  NSTEMI  Hypothyroidism   Iron Deficiency Anemia, Anemia of chronic disease    PLAN:    CNS: t precedex sedation vacation   HEENT: oral care.     PULMONARY: Hob >45.try weaning trial   Gs x for trach by next week if not extubated  s/p reintubation   proceed with ct chest no contratst     CARDIOVASCULAR: keep i=o. daily weights.     GI: GI prophylaxis.  NG feed   follow LFT for now   ct abd recommended by ID     RENAL: fu lytes. keep connor    INFECTIOUS DISEASE:  cefepime   resume vanco when level less then 20 follow ID repeat level today     HEMATOLOGICAL:  DVT prophylaxis.    ENDOCRINE:  Follow up FS.  Insulin protocol if needed.    MUSCULOSKELETAL: bed to chair position    MICU monitoring.

## 2021-11-07 NOTE — PROGRESS NOTE ADULT - ASSESSMENT
75 yo female PMHx of HFpEF, HTN, HLD, DM,  SLE on Cellsept and methotrexate, CVA in 2002, recently admitted for Hypoglycemia and ANTONELLA who presents with shortness of breath  presenting for shortness of breath    IMPRESSION  #Acute Hypoxic respiratory failure- intubated/vented   #Fevers - presumed HAP- sputum cx grew MRSA, 10/26/21  - Blood Cx 10/23 NG  - Sputum cx 10/23 NG   - MRSA Nares 10/23 negative   - RVP negative  - Procalcitonin, Serum: 1.82 (10.22.21 @ 19:36) -> Procalcitonin, Serum: 0.98 (10.26.21 @ 05:37)  #Obesity BMI (kg/m2): 26.7, 30.2  #Abx allergy: NKDA    would recommend:    1. Please change the DEEP LINE since still spiking fever   2. Monitor Temp. and c/w supportive care  3. Please discontinue Abx and pancultures in 48 hours off Abx  4. Management of Vent as per ICU protocol  5. Aspiration precaution  6. Obtain CT scan of chest and abd/pelvis  since keep spiking fever despite of broad spectrum antibiotics    d/w ICU team     Attending Attestation:    Spent more than 35 minutes on total encounter, more than 50 % of the visit was spent counseling and/or coordinating care by the Attending physician.

## 2021-11-07 NOTE — PROGRESS NOTE ADULT - SUBJECTIVE AND OBJECTIVE BOX
Patient is seen and examined at the bed side, still febrile . She  remains intubated.  The Leukocytosis trended down to normal.      REVIEW OF SYSTEMS: Unable to obtain due to mental status       ALLERGIES: No Known Allergies      ICU Vital Signs Last 24 Hrs  T(C): 38.1 (07 Nov 2021 18:37), Max: 38.9 (07 Nov 2021 17:00)  T(F): 100.6 (07 Nov 2021 18:37), Max: 102 (07 Nov 2021 17:00)  HR: 71 (07 Nov 2021 18:37) (64 - 91)  BP: 112/65 (07 Nov 2021 17:00) (93/56 - 112/65)  BP(mean): 83 (07 Nov 2021 17:00) (69 - 83)  ABP: --  ABP(mean): --  RR: 30 (07 Nov 2021 18:37) (22 - 41)  SpO2: 94% (07 Nov 2021 18:37) (90% - 98%)        PHYSICAL EXAM:  GENERAL: Intubated/vented   CHEST/LUNG: Not using accessory muscles   HEART: s1 and s2 present  ABDOMEN:  Nontender and  Nondistended  EXTREMITIES: No pedal  edema  CNS: Intubated/vented       LABS:                        7.9    7.79  )-----------( 333      ( 07 Nov 2021 05:49 )             25.1                           7.5    14.77 )-----------( 392      ( 04 Nov 2021 06:00 )             24.1         11-07    139  |  100  |  73<HH>  ----------------------------<  148<H>  4.5   |  27  |  1.6<H>    Ca    8.1<L>      07 Nov 2021 05:49    TPro  5.8<L>  /  Alb  2.7<L>  /  TBili  0.5  /  DBili  x   /  AST  72<H>  /  ALT  201<H>  /  AlkPhos  197<H>  11-07 11-06    140  |  101  |  68<HH>  ----------------------------<  233<H>  4.4   |  27  |  1.5    Ca    7.9<L>      06 Nov 2021 08:42    TPro  5.9<L>  /  Alb  2.7<L>  /  TBili  0.5  /  DBili  x   /  AST  204<H>  /  ALT  279<H>  /  AlkPhos  218<H>  11-06        CAPILLARY BLOOD GLUCOSE  233 (28 Oct 2021 07:00)  231 (28 Oct 2021 06:15)  199 (28 Oct 2021 05:15)  272 (28 Oct 2021 04:15)    POCT Blood Glucose.: 156 mg/dL (28 Oct 2021 16:18)  POCT Blood Glucose.: 252 mg/dL (28 Oct 2021 14:13)  POCT Blood Glucose.: 259 mg/dL (28 Oct 2021 12:05)  POCT Blood Glucose.: 92 mg/dL (28 Oct 2021 10:25)  POCT Blood Glucose.: 77 mg/dL (28 Oct 2021 09:13)  POCT Blood Glucose.: 233 mg/dL (28 Oct 2021 06:51    ABG - ( 28 Oct 2021 16:19 )  pH, Arterial: 7.51  pH, Blood: x     /  pCO2: 44    /  pO2: 108   / HCO3: 35    / Base Excess: 11.0  /  SaO2: 97.5        Vancomycin Level, Random (11.04.21 @ 10:13)   Vancomycin Level, Random: 23.6:   Vancomycin Level, Trough (11.01.21 @ 00:25): 40.9  Vancomycin Level, Random (10.30.21 @ 05:55) : 28.2:   Vancomycin Level, Random (10.28.21 @ 05:35)   Vancomycin Level, Random: 17.1      MEDICATIONS  (STANDING):  albuterol/ipratropium for Nebulization 3 milliLiter(s) Nebulizer every 6 hours  aspirin  chewable 81 milliGRAM(s) Oral daily  atorvastatin 40 milliGRAM(s) Oral at bedtime  bisacodyl Suppository 10 milliGRAM(s) Rectal at bedtime  cefepime   IVPB 1000 milliGRAM(s) IV Intermittent every 24 hours  chlorhexidine 0.12% Liquid 15 milliLiter(s) Oral Mucosa two times a day  chlorhexidine 4% Liquid 1 Application(s) Topical <User Schedule>  dexMEDEtomidine Infusion 0.2 MICROgram(s)/kG/Hr (3.2 mL/Hr) IV Continuous <Continuous>  DULoxetine 60 milliGRAM(s) Oral daily  folic acid 1 milliGRAM(s) Oral daily  glucagon  Injectable 1 milliGRAM(s) IntraMuscular once  heparin   Injectable 5000 Unit(s) SubCutaneous every 8 hours  insulin regular Infusion 2 Unit(s)/Hr (2 mL/Hr) IV Continuous <Continuous>  levothyroxine 175 MICROGram(s) Oral daily  norepinephrine Infusion 0.05 MICROgram(s)/kG/Min (6 mL/Hr) IV Continuous <Continuous>  nystatin Ointment 1 Application(s) Topical two times a day  pantoprazole  Injectable 40 milliGRAM(s) IV Push daily  polyethylene glycol 3350 17 Gram(s) Oral daily  propofol Infusion 5 MICROgram(s)/kG/Min (1.92 mL/Hr) IV Continuous <Continuous>        RADIOLOGY & ADDITIONAL TESTS:    < from: Xray Chest 1 View-PORTABLE IMMEDIATE (Xray Chest 1 View-PORTABLE IMMEDIATE .) (11.07.21 @ 11:54) >  Support devices: Right IJ central venous line, endotracheal and enteric tubes.    Cardiac/mediastinum/hilum: No significant change    Lung parenchyma/Pleura: Stable bilateral opacities. No definite pneumothorax.    Skeleton/soft tissues: No significant change.    Impression:  Stable bilateral opacities. No definite pneumothorax.    < end of copied text >      < from: Xray Chest 1 View- PORTABLE-Routine (Xray Chest 1 View- PORTABLE-Routine in AM.) (11.06.21 @ 06:41) >  Stable bilateral pleural effusion/opacities.  Stable support devices.    < from: Xray Chest 1 View- PORTABLE-Routine (Xray Chest 1 View- PORTABLE-Routine in AM.) (10.28.21 @ 07:09) >  Tip of endotracheal tube is at the clara and retraction is recommended.    Stable bilateral lung opacities      < from: Xray Chest 1 View- PORTABLE-Routine (Xray Chest 1 View- PORTABLE-Routine in AM.) (10.26.21 @ 06:04) >  Supportdevices: Right IJ line endotracheal tube and enteric tube are in satisfactory position.    Cardiac/mediastinum/hilum: Unremarkable.    Lung parenchyma/Pleura: Hazy bilateral opacities/effusions left greater than right are unchanged. No pneumothorax.        MICROBIOLOGY DATA:    Culture - Sputum . (10.26.21 @ 20:55)   - Amoxicillin/Clavulanic Acid: R >4/2   - Ampicillin: R >8   - Ampicillin/Sulbactam: R 16/8   - Cefazolin: R >16   - Ceftriaxone: R >32   - Ciprofloxacin: R >2   - Clindamycin: R >4   - Daptomycin: S <=0.25   - Erythromycin: R >4   - Gentamicin: S <=1 Should not be used as monotherapy   - Levofloxacin: R >4   - Linezolid: S 2   - Meropenem: R >8   - Moxifloxacin(Aerobic): R >4   - Oxacillin: R >2   - Penicillin: R >8   - RIF- Rifampin: S <=1 Should not be used as monotherapy   - Tetra/Doxy: S <=1   - Trimethoprim/Sulfamethoxazole: S <=0.5/9.5   - Vancomycin: S 1   Gram Stain:   Numerous polymorphonuclear leukocytes per low power field   No Squamous epithelial cells per low power field   Moderate Gram positive cocci in pairs seen per oil power field   Specimen Source: .Sputum Sputum   Culture Results:   Numerous Methicillin Resistant Staphylococcus aureus   Normal Respiratory Asia present                  Patient is seen and examined at the bed side, remains febrile and  intubated.  The Leukocytosis trended down to normal.      REVIEW OF SYSTEMS: Unable to obtain due to mental status       ALLERGIES: No Known Allergies      ICU Vital Signs Last 24 Hrs  T(C): 38.1 (07 Nov 2021 18:37), Max: 38.9 (07 Nov 2021 17:00)  T(F): 100.6 (07 Nov 2021 18:37), Max: 102 (07 Nov 2021 17:00)  HR: 71 (07 Nov 2021 18:37) (64 - 91)  BP: 112/65 (07 Nov 2021 17:00) (93/56 - 112/65)  BP(mean): 83 (07 Nov 2021 17:00) (69 - 83)  ABP: --  ABP(mean): --  RR: 30 (07 Nov 2021 18:37) (22 - 41)  SpO2: 94% (07 Nov 2021 18:37) (90% - 98%)        PHYSICAL EXAM:  GENERAL: Intubated/vented   CHEST/LUNG: Not using accessory muscles   HEART: s1 and s2 present  ABDOMEN:  Nontender and  Nondistended  EXTREMITIES: No pedal  edema  CNS: Intubated/vented       LABS:                        7.9    7.79  )-----------( 333      ( 07 Nov 2021 05:49 )             25.1                           7.5    14.77 )-----------( 392      ( 04 Nov 2021 06:00 )             24.1         11-07    139  |  100  |  73<HH>  ----------------------------<  148<H>  4.5   |  27  |  1.6<H>    Ca    8.1<L>      07 Nov 2021 05:49    TPro  5.8<L>  /  Alb  2.7<L>  /  TBili  0.5  /  DBili  x   /  AST  72<H>  /  ALT  201<H>  /  AlkPhos  197<H>  11-07 11-06    140  |  101  |  68<HH>  ----------------------------<  233<H>  4.4   |  27  |  1.5    Ca    7.9<L>      06 Nov 2021 08:42    TPro  5.9<L>  /  Alb  2.7<L>  /  TBili  0.5  /  DBili  x   /  AST  204<H>  /  ALT  279<H>  /  AlkPhos  218<H>  11-06        CAPILLARY BLOOD GLUCOSE  233 (28 Oct 2021 07:00)  231 (28 Oct 2021 06:15)  199 (28 Oct 2021 05:15)  272 (28 Oct 2021 04:15)    POCT Blood Glucose.: 156 mg/dL (28 Oct 2021 16:18)  POCT Blood Glucose.: 252 mg/dL (28 Oct 2021 14:13)  POCT Blood Glucose.: 259 mg/dL (28 Oct 2021 12:05)  POCT Blood Glucose.: 92 mg/dL (28 Oct 2021 10:25)  POCT Blood Glucose.: 77 mg/dL (28 Oct 2021 09:13)  POCT Blood Glucose.: 233 mg/dL (28 Oct 2021 06:51    ABG - ( 28 Oct 2021 16:19 )  pH, Arterial: 7.51  pH, Blood: x     /  pCO2: 44    /  pO2: 108   / HCO3: 35    / Base Excess: 11.0  /  SaO2: 97.5        Vancomycin Level, Random (11.04.21 @ 10:13)   Vancomycin Level, Random: 23.6:   Vancomycin Level, Trough (11.01.21 @ 00:25): 40.9  Vancomycin Level, Random (10.30.21 @ 05:55) : 28.2:   Vancomycin Level, Random (10.28.21 @ 05:35)   Vancomycin Level, Random: 17.1      MEDICATIONS  (STANDING):  albuterol/ipratropium for Nebulization 3 milliLiter(s) Nebulizer every 6 hours  aspirin  chewable 81 milliGRAM(s) Oral daily  atorvastatin 40 milliGRAM(s) Oral at bedtime  bisacodyl Suppository 10 milliGRAM(s) Rectal at bedtime  cefepime   IVPB 1000 milliGRAM(s) IV Intermittent every 24 hours  chlorhexidine 0.12% Liquid 15 milliLiter(s) Oral Mucosa two times a day  chlorhexidine 4% Liquid 1 Application(s) Topical <User Schedule>  dexMEDEtomidine Infusion 0.2 MICROgram(s)/kG/Hr (3.2 mL/Hr) IV Continuous <Continuous>  DULoxetine 60 milliGRAM(s) Oral daily  folic acid 1 milliGRAM(s) Oral daily  glucagon  Injectable 1 milliGRAM(s) IntraMuscular once  heparin   Injectable 5000 Unit(s) SubCutaneous every 8 hours  insulin regular Infusion 2 Unit(s)/Hr (2 mL/Hr) IV Continuous <Continuous>  levothyroxine 175 MICROGram(s) Oral daily  norepinephrine Infusion 0.05 MICROgram(s)/kG/Min (6 mL/Hr) IV Continuous <Continuous>  nystatin Ointment 1 Application(s) Topical two times a day  pantoprazole  Injectable 40 milliGRAM(s) IV Push daily  polyethylene glycol 3350 17 Gram(s) Oral daily  propofol Infusion 5 MICROgram(s)/kG/Min (1.92 mL/Hr) IV Continuous <Continuous>        RADIOLOGY & ADDITIONAL TESTS:    < from: Xray Chest 1 View-PORTABLE IMMEDIATE (Xray Chest 1 View-PORTABLE IMMEDIATE .) (11.07.21 @ 11:54) >  Support devices: Right IJ central venous line, endotracheal and enteric tubes.    Cardiac/mediastinum/hilum: No significant change    Lung parenchyma/Pleura: Stable bilateral opacities. No definite pneumothorax.    Skeleton/soft tissues: No significant change.    Impression:  Stable bilateral opacities. No definite pneumothorax.      < from: Xray Chest 1 View- PORTABLE-Routine (Xray Chest 1 View- PORTABLE-Routine in AM.) (11.06.21 @ 06:41) >  Stable bilateral pleural effusion/opacities.  Stable support devices.    < from: Xray Chest 1 View- PORTABLE-Routine (Xray Chest 1 View- PORTABLE-Routine in AM.) (10.28.21 @ 07:09) >  Tip of endotracheal tube is at the clara and retraction is recommended.    Stable bilateral lung opacities      < from: Xray Chest 1 View- PORTABLE-Routine (Xray Chest 1 View- PORTABLE-Routine in AM.) (10.26.21 @ 06:04) >  Supportdevices: Right IJ line endotracheal tube and enteric tube are in satisfactory position.    Cardiac/mediastinum/hilum: Unremarkable.    Lung parenchyma/Pleura: Hazy bilateral opacities/effusions left greater than right are unchanged. No pneumothorax.        MICROBIOLOGY DATA:    Culture - Sputum . (10.26.21 @ 20:55)   - Amoxicillin/Clavulanic Acid: R >4/2   - Ampicillin: R >8   - Ampicillin/Sulbactam: R 16/8   - Cefazolin: R >16   - Ceftriaxone: R >32   - Ciprofloxacin: R >2   - Clindamycin: R >4   - Daptomycin: S <=0.25   - Erythromycin: R >4   - Gentamicin: S <=1 Should not be used as monotherapy   - Levofloxacin: R >4   - Linezolid: S 2   - Meropenem: R >8   - Moxifloxacin(Aerobic): R >4   - Oxacillin: R >2   - Penicillin: R >8   - RIF- Rifampin: S <=1 Should not be used as monotherapy   - Tetra/Doxy: S <=1   - Trimethoprim/Sulfamethoxazole: S <=0.5/9.5   - Vancomycin: S 1   Gram Stain:   Numerous polymorphonuclear leukocytes per low power field   No Squamous epithelial cells per low power field   Moderate Gram positive cocci in pairs seen per oil power field   Specimen Source: .Sputum Sputum   Culture Results:   Numerous Methicillin Resistant Staphylococcus aureus   Normal Respiratory Asia present

## 2021-11-07 NOTE — PROGRESS NOTE ADULT - SUBJECTIVE AND OBJECTIVE BOX
Patient is comfortably sedated on Propofol and Precedex      T(F): 101.7 (11-07-21 @ 12:00), Max: 101.7 (11-07-21 @ 12:00)  HR: 77 (11-07-21 @ 13:00)  BP: 102/61 (11-07-21 @ 13:00)  RR: 22  SpO2: 96% (11-07-21 @ 13:00) (90% - 98%)    PHYSICAL EXAM:  GENERAL: NAD  HEAD:  Atraumatic, Normocephalic  NERVOUS SYSTEM:  no focal deficits   CHEST/LUNG:  bilateral rhonchi  HEART: Regular rate and rhythm; No murmurs, rubs, or gallops  ABDOMEN: Soft, Nontender, Nondistended; Bowel sounds present  EXTREMITIES:  2+ Peripheral Pulses, No clubbing, cyanosis, or edema    LABS  11-07    139  |  100  |  73<HH>  ----------------------------<  148<H>  4.5   |  27  |  1.6<H>    Ca    8.1<L>      07 Nov 2021 05:49    TPro  5.8<L>  /  Alb  2.7<L>  /  TBili  0.5  /  DBili  x   /  AST  72<H>  /  ALT  201<H>  /  AlkPhos  197<H>  11-07                          7.9    7.79  )-----------( 333      ( 07 Nov 2021 05:49 )             25.1       Mode: AC/ CMV (Assist Control/ Continuous Mandatory Ventilation)  RR (machine): 12  TV (machine): 350  FiO2: 35  PEEP: 5    Culture Results:   Normal Respiratory Asia present (11-04-21)  Culture Results:   No growth to date. (11-03-21)  Culture Results:   No Growth Final (10-30-21)  Culture Results:   No Growth Final (10-29-21)  Culture Results:   No Growth Final (10-28-21)  Culture Results:   No Growth Final (10-27-21)  Culture Results:   Numerous Methicillin Resistant Staphylococcus aureus  Normal Respiratory Asia present (10-26-21)  Culture Results:   No Growth Final (10-26-21)  Culture Results:   Normal Respiratory Asia present (10-23-21)  Culture Results:   No Growth Final (10-23-21)    RADIOLOGY  < from: Xray Chest 1 View-PORTABLE IMMEDIATE (Xray Chest 1 View-PORTABLE IMMEDIATE .) (11.07.21 @ 11:54) >  Impression:  Stable bilateral opacities. No definite pneumothorax.      < end of copied text >    MEDICATIONS  (STANDING):  albuterol/ipratropium for Nebulization 3 milliLiter(s) Nebulizer every 6 hours  aspirin  chewable 81 milliGRAM(s) Oral daily  atorvastatin 40 milliGRAM(s) Oral at bedtime  bisacodyl Suppository 10 milliGRAM(s) Rectal at bedtime  cefepime   IVPB 1000 milliGRAM(s) IV Intermittent every 24 hours  chlorhexidine 0.12% Liquid 15 milliLiter(s) Oral Mucosa two times a day  chlorhexidine 4% Liquid 1 Application(s) Topical <User Schedule>  dexMEDEtomidine Infusion 0.2 MICROgram(s)/kG/Hr (3.2 mL/Hr) IV Continuous <Continuous>  DULoxetine 60 milliGRAM(s) Oral daily  folic acid 1 milliGRAM(s) Oral daily  glucagon  Injectable 1 milliGRAM(s) IntraMuscular once  heparin   Injectable 5000 Unit(s) SubCutaneous every 8 hours  insulin regular Infusion 2 Unit(s)/Hr (2 mL/Hr) IV Continuous <Continuous>  iohexol 300 mG (iodine)/mL Oral Solution 30 milliLiter(s) Oral once  levothyroxine 175 MICROGram(s) Oral daily  norepinephrine Infusion 0.05 MICROgram(s)/kG/Min (6 mL/Hr) IV Continuous <Continuous>  nystatin Ointment 1 Application(s) Topical two times a day  pantoprazole  Injectable 40 milliGRAM(s) IV Push daily  polyethylene glycol 3350 17 Gram(s) Oral daily  propofol Infusion 5 MICROgram(s)/kG/Min (1.92 mL/Hr) IV Continuous <Continuous>    MEDICATIONS  (PRN):  acetaminophen     Tablet .. 650 milliGRAM(s) Oral every 6 hours PRN Temp greater or equal to 38C (100.4F)  acetaminophen  Suppository .. 650 milliGRAM(s) Rectal every 4 hours PRN Temp greater or equal to 38C (100.4F)  sodium chloride 0.9% lock flush 10 milliLiter(s) IV Push every 1 hour PRN Pre/post blood products, medications, blood draw, and to maintain line patency

## 2021-11-08 NOTE — PROCEDURE NOTE - NSINDICATIONS_GEN_A_CORE
critical illness/venous access/volume resuscitation
critical illness/emergency venous access/venous access
emergency venous access

## 2021-11-08 NOTE — PROGRESS NOTE ADULT - ASSESSMENT
75 y/o F PMH heart failure, moderate severity mitral valve regurgitation, admitted in early October for heart failure and end stemi presents c/o SOB. Pt had a recent discharge from the hospital and XR during that admission showed increased markings in both lung fields consistent with CHF exacerbation    Patient found to have pneumonia likely MRSA and will continue with Vancomycin as per ID. Continues to be febrile despite being on antibiotics. Patient only on Precedex for sedation. SAT patient woke up but was tachypneic on the SBT.    # Acute hypoxemic respiratory failure 2/2 HAP  - Febrile to 102, hypoxic in ED s/p Intubation 10/23/2021, extubated 11/1/2021 and then intubation again 11/2/2021  - CXR showing bilateral opacities  - Sputum growing MRSA, Procal>1>0.9>0.67>2.09  - Patient is on Vanc IV which is on hold for now, follow the levels daily   - Initial blood culture and repeat negative  - Bronched, very thick secretions. The patient is on Cefepime for now, follow up Vanc levels   - Repeat Xray in AM    # Acute Exacerbation of HFpEF   - Was in [place] last admission due to overdiuresis and Lasix was reduced on discharge  - received IV lasix in ER, Holding Lasix  - connor, I & O, daily weight  - ECHO on last discharge unchanged except for new, mild pulmonary hypertension   - F/u cardio outpatient     # Hypothyroidism   - Elevated TSH 12.24  - Continue synthroid 175mg  - TSH repeat outpatient in 2 months    # Iron Deficiency Anemia  # Anemia of chronic disease  - On lAst admission given Iron sucrose 200x2  - Baseline Hb 8   - Keep Hb >7  - F/u H&H     # Nocturnal Hypoxemia  - uses home O2    # DM II  - Will continue with Insulin regimen  - Continue Insulin drip    # Elevated LFTs  - RUQ US reviewed, no evidence of stone/inflammation in the gallbladder   - trending down    Status: FULL CODE  GI Prophylaxis: PPI  DVT Prophylaxis: Hep SubQ

## 2021-11-08 NOTE — PROGRESS NOTE ADULT - ASSESSMENT
Impression:    Acute Hypoxemic and Hypercapnic Respiratory Failure SP intubation  Pulmonary Edema, Bilateral Effusions, improving  HFpEF exacerbation, moderate MR  Sepsis present on admission  MRSA PNA  NSTEMI  Hypothyroidism   Iron Deficiency Anemia, Anemia of chronic disease    PLAN:    CNS: t precedex sedation vacation   HEENT: oral care.     PULMONARY: Hob >45.try weaning trial   Gs x for trach by next week if not extubated  s/p reintubation   proceed with ct chest no contratst     CARDIOVASCULAR: keep i=o. daily weights.     GI: GI prophylaxis.  NG feed   follow LFT for now   ct abd recommended by ID     RENAL: fu lytes. keep connor    INFECTIOUS DISEASE:  cefepime   resume vanco when level less then 20 follow ID repeat level today     HEMATOLOGICAL:  DVT prophylaxis.    ENDOCRINE:  Follow up FS.  Insulin protocol if needed.    MUSCULOSKELETAL: bed to chair position    MICU monitoring. Impression:    Acute Hypoxemic and Hypercapnic Respiratory Failure SP intubation  Pulmonary Edema, Bilateral Effusions, improving  HFpEF exacerbation, moderate MR  Sepsis present on admission  MRSA PNA  NSTEMI  Hypothyroidism   Iron Deficiency Anemia, Anemia of chronic disease    PLAN:    CNS: precedex/propofol. sedation vacation    HEENT: oral care.     PULMONARY: Hob >45.   Gs x for trach by next week if not extubated  s/p reintubation     CARDIOVASCULAR: keep i=o. daily weights.     GI: GI prophylaxis.  NG feed   follow LFT for now   ct abd recommended by ID     RENAL: fu lytes. keep connor.     INFECTIOUS DISEASE:  cefepime . follow up ID     HEMATOLOGICAL:  DVT prophylaxis.    ENDOCRINE:  Follow up FS.  Insulin protocol if needed.    MUSCULOSKELETAL: bed to chair position    MICU monitoring.  dc old TLC and place new TLC  keep connor

## 2021-11-08 NOTE — PROGRESS NOTE ADULT - SUBJECTIVE AND OBJECTIVE BOX
Progress Note: General Surgery  Patient: LINDSAY ALBERT , 76y (1945)Female   MRN: [id number]  Location: [address] 1  Visit: 10-22-21 Inpatient  Date: 21 @ 12:46    Admit Diagnosis/Chief Complaint:   Consulted for tracheostomy. Failed extubation. Pulm following. recently bronched with very thick secretions. Spiking fevers this morning, patient is not stable for tracheostomy at this time.      Vitals: T(F): 99.5 (21 @ 11:00), Max: 102 (21 @ 17:00)  HR: 60 (21 @ 08:32)  BP: 95/54 (21 @ 07:00) (95/54 - 112/65)  RR: 16 (21 @ 11:00)  SpO2: 99% (21 @ 08:32)  RR (machine): 12, TV (machine): 350, FiO2: 35, PEEP: 5, PIP: 27  In:   21 @ 07:01  -  21 @ 07:00  --------------------------------------------------------  IN: 1048.5 mL    21 @ 07:01  -  21 @ 12:46  --------------------------------------------------------  IN: 55 mL      Out:   21 @ 07:01  -  21 @ 07:00  --------------------------------------------------------  OUT:    Indwelling Catheter - Urethral (mL): 660 mL    Norepinephrine: 0 mL  Total OUT: 660 mL      21 @ 07:01  -  21 @ 12:46  --------------------------------------------------------  OUT:    Indwelling Catheter - Urethral (mL): 140 mL  Total OUT: 140 mL        Net:   21 @ 07:01  -  21 @ 07:00  --------------------------------------------------------  NET: 388.5 mL    21 @ 07:01  -  21 @ 12:46  --------------------------------------------------------  NET: -85 mL        Diet: Diet, NPO (21 @ 00:53)    IV Fluids: dextrose 5%. 1000 milliLiter(s) (50 mL/Hr) IV Continuous <Continuous>  dextrose 5%. 1000 milliLiter(s) (100 mL/Hr) IV Continuous <Continuous>  folic acid 1 milliGRAM(s) Oral daily      Medications: [Standing]  albuterol/ipratropium for Nebulization 3 milliLiter(s) Nebulizer every 6 hours  aspirin  chewable 81 milliGRAM(s) Oral daily  atorvastatin 40 milliGRAM(s) Oral at bedtime  bisacodyl Suppository 10 milliGRAM(s) Rectal at bedtime  cefepime   IVPB 1000 milliGRAM(s) IV Intermittent every 24 hours  chlorhexidine 0.12% Liquid 15 milliLiter(s) Oral Mucosa two times a day  chlorhexidine 4% Liquid 1 Application(s) Topical <User Schedule>  dexMEDEtomidine Infusion 0.2 MICROgram(s)/kG/Hr (3.2 mL/Hr) IV Continuous <Continuous>  dextrose 40% Gel 15 Gram(s) Oral once  dextrose 5%. 1000 milliLiter(s) (50 mL/Hr) IV Continuous <Continuous>  dextrose 5%. 1000 milliLiter(s) (100 mL/Hr) IV Continuous <Continuous>  dextrose 50% Injectable 25 Gram(s) IV Push once  dextrose 50% Injectable 12.5 Gram(s) IV Push once  dextrose 50% Injectable 25 Gram(s) IV Push once  DULoxetine 60 milliGRAM(s) Oral daily  folic acid 1 milliGRAM(s) Oral daily  glucagon  Injectable 1 milliGRAM(s) IntraMuscular once  heparin   Injectable 5000 Unit(s) SubCutaneous every 8 hours  insulin regular Infusion 2 Unit(s)/Hr (2 mL/Hr) IV Continuous <Continuous>  levothyroxine 175 MICROGram(s) Oral daily  norepinephrine Infusion 0.05 MICROgram(s)/kG/Min (6 mL/Hr) IV Continuous <Continuous>  nystatin Ointment 1 Application(s) Topical two times a day  pantoprazole  Injectable 40 milliGRAM(s) IV Push daily  polyethylene glycol 3350 17 Gram(s) Oral daily  propofol Infusion 5 MICROgram(s)/kG/Min (1.92 mL/Hr) IV Continuous <Continuous>    DVT Prophylaxis: heparin   Injectable 5000 Unit(s) SubCutaneous every 8 hours    GI Prophylaxis: pantoprazole  Injectable 40 milliGRAM(s) IV Push daily    Antibiotics: cefepime   IVPB 1000 milliGRAM(s) IV Intermittent every 24 hours    Anticoagulation:   Medications:[PRN]  acetaminophen     Tablet .. 650 milliGRAM(s) Oral every 6 hours PRN  acetaminophen  Suppository .. 650 milliGRAM(s) Rectal every 4 hours PRN  sodium chloride 0.9% lock flush 10 milliLiter(s) IV Push every 1 hour PRN      Labs:                        7.8    7.88  )-----------( 312      ( 2021 05:25 )             26.2         137  |  100  |  79<HH>  ----------------------------<  221<H>  4.3   |  25  |  1.7<H>    Ca    8.2<L>      2021 05:25    TPro  5.7<L>  /  Alb  2.5<L>  /  TBili  0.4  /  DBili  x   /  AST  85<H>  /  ALT  177<H>  /  AlkPhos  209<H>  1108    LIVER FUNCTIONS - ( 2021 05:25 )  Alb: 2.5 g/dL / Pro: 5.7 g/dL / ALK PHOS: 209 U/L / ALT: 177 U/L / AST: 85 U/L / GGT: x             ABG - ( 2021 04:06 )  pH: 7.49  /  pCO2: 33    /  pO2: 93    / HCO3: 25    / Base Excess: 1.8   /  SaO2: 97.5            Urine/Micro:    Urinalysis Basic - ( 2021 17:15 )    Color: Yellow / Appearance: Slightly Cloudy / S.025 / pH: x  Gluc: x / Ketone: 15  / Bili: Negative / Urobili: 0.2 mg/dL   Blood: x / Protein: 100 mg/dL / Nitrite: Negative   Leuk Esterase: Small / RBC: 3-5 /HPF / WBC 10-25 /HPF   Sq Epi: x / Non Sq Epi: Few /HPF / Bacteria: x        Imaging:     CT Abdomen and Pelvis w/ Oral Cont:   EXAM:  CT ABDOMEN AND PELVIS OC        EXAM:  CT CHEST            PROCEDURE DATE:  2021            INTERPRETATION:  CLINICAL STATEMENT: Worsening sepsis    TECHNIQUE: Contiguous CT images were obtained of the chest, abdomen and pelvis.  Intravenous Contrast: None.  Oral contrast: was administered.    COMPARISON:  CT chest dated 2021 and CT abdomen and pelvis dated 2019    FINDINGS:    Endotracheal tube tip approximately 2.5 cm above clara.  Enteric tube tip in the stomach.  Partially imaged central venous catheter with tip in the SVC.  Wise catheter balloon within the urinary bladder.    CHEST:    LUNGS, PLEURA AND AIRWAYS: Moderate right and moderate to large left pleural effusions. Subjacent compressive atelectasis. Interlobular septal thickening and diffuse groundglass opacities of the visualized aerated lung. No pneumothorax    HEART AND VESSELS: Hypoattenuating blood pool compatible with anemia. No pericardial effusion. Normal caliber thoracic aorta. Diffuse coronary artery, mitral annular and aortic calcifications.    THORACIC INLET/MEDIASTINUM/LYMPH NODES: 1.1 cm calcified right thyroid gland nodule. Shotty mediastinal lymph nodes may be reactive.    ABDOMEN/PELVIS:    LIVER: Unremarkable.    SPLEEN: Unremarkable.    PANCREAS: Unremarkable.    GALLBLADDER AND BILIARY TREE: Cholelithiasis without CT evidence for pericholecystic inflammation.    ADRENALS: Unremarkable.    KIDNEYS: No hydronephrosis. Bilateral nonobstructing renal calculi versus vascular calcifications.    LYMPH NODES: There are no enlarged abdominal or pelvic lymph nodes.    VASCULATURE: Normal caliber abdominal aorta with atherosclerotic changes.    BOWEL: No evidence for bowel obstruction or bowel wall thickening.    PERITONEUM/RETROPERITONEUM/MESENTERY: Trace ascites in the right paracolic gutter. No pneumoperitoneum    PELVIC VISCERA: Underdistention limits evaluation of the urinary bladder.    BONES AND SOFT TISSUES: Anasarca. Degenerative changes of the spine.      IMPRESSION:    Moderate right and moderate to large left pleural effusions with subjacent compressive atelectasis. In addition there is interlobular septal thickening and diffuse ground glass opacities. Findings compatible with pulmonary edema.    No definite noncontrast CT evidence for acute infectious pathology in the abdomen or pelvis.    --- End of Report ---          ABILIO FRANKS MD; Attending Radiologist  This document has been electronically signed. 2021  1:09AM (21 @ 21:17)  CT Chest No Cont:   EXAM:  CT ABDOMEN AND PELVIS OC        EXAM:  CT CHEST            PROCEDURE DATE:  2021            INTERPRETATION:  CLINICAL STATEMENT: Worsening sepsis    TECHNIQUE: Contiguous CT images were obtained of the chest, abdomen and pelvis.  Intravenous Contrast: None.  Oral contrast: was administered.    COMPARISON:  CT chest dated 2021 and CT abdomen and pelvis dated 2019    FINDINGS:    Endotracheal tube tip approximately 2.5 cm above clara.  Enteric tube tip in the stomach.  Partially imaged central venous catheter with tip in the SVC.  Wise catheter balloon within the urinary bladder.    CHEST:    LUNGS, PLEURA AND AIRWAYS: Moderate right and moderate to large left pleural effusions. Subjacent compressive atelectasis. Interlobular septal thickening and diffuse groundglass opacities of the visualized aerated lung. No pneumothorax    HEART AND VESSELS: Hypoattenuating blood pool compatible with anemia. No pericardial effusion. Normal caliber thoracic aorta. Diffuse coronary artery, mitral annular and aortic calcifications.    THORACIC INLET/MEDIASTINUM/LYMPH NODES: 1.1 cm calcified right thyroid gland nodule. Shotty mediastinal lymph nodes may be reactive.    ABDOMEN/PELVIS:    LIVER: Unremarkable.    SPLEEN: Unremarkable.    PANCREAS: Unremarkable.    GALLBLADDER AND BILIARY TREE: Cholelithiasis without CT evidence for pericholecystic inflammation.    ADRENALS: Unremarkable.    KIDNEYS: No hydronephrosis. Bilateral nonobstructing renal calculi versus vascular calcifications.    LYMPH NODES: There are no enlarged abdominal or pelvic lymph nodes.    VASCULATURE: Normal caliber abdominal aorta with atherosclerotic changes.    BOWEL: No evidence for bowel obstruction or bowel wall thickening.    PERITONEUM/RETROPERITONEUM/MESENTERY: Trace ascites in the right paracolic gutter. No pneumoperitoneum    PELVIC VISCERA: Underdistention limits evaluation of the urinary bladder.    BONES AND SOFT TISSUES: Anasarca. Degenerative changes of the spine.      IMPRESSION:    Moderate right and moderate to large left pleural effusions with subjacent compressive atelectasis. In addition there is interlobular septal thickening and diffuse ground glass opacities. Findings compatible with pulmonary edema.    No definite noncontrast CT evidence for acute infectious pathology in the abdomen or pelvis.    --- End of Report ---            ABILIO FRANKS MD; Attending Radiologist  This document has been electronically signed. 2021  1:09AM (21 @ 21:17)

## 2021-11-08 NOTE — PROCEDURAL SAFETY CHECKLIST WITH OR WITHOUT SEDATION - NSPREPROCEDSEDAT_GEN_ALL_CORE
with sedation
Propofol already in progress/with sedation
patient under sedation after intubation
Patient currently on precedex and propofol/with sedation

## 2021-11-08 NOTE — PROGRESS NOTE ADULT - ATTENDING COMMENTS
Patient seen and examined with surgery PA in ICU and discussed management plans with ICU attending. Patient awake and follows commands. Pulmonary wants to wait for trach now. CXR reviewed. On minimal vent settings now. Will wait for pulmonary to make final determination then will discuss with family for consent if unable to be extubated.

## 2021-11-08 NOTE — PROGRESS NOTE ADULT - ASSESSMENT
76 yr F with SLE on Methotrexate, moderate MR, HFpEF, HTN, hypothyroid, HLD, T2DM, hx of CVA with no residual deficits discharged from hospital earlier this month on 10/2 s/p HF exacerbation and NSTEMI, DC'd on new home oxygen, refused in pt cardiac cath and is supposed to have outpt cardiac cath with Dr Kowalsik.   DC'd on 10/21 from [facility] s/p admission for resolved ANTONELLA, hypoglycemia and NSTEMI  returns to hospital with:    Acute respiratory failure secondary to sepsis from gram neg pneumonia and NSTEMI      - DC all antibiotics  as per ID - still febrile and re culture after 48hrs off antibiotivs   - central line changed today   - s/p bronchoscopy - follow BAL results    - ct chest / abdomen as above    - maintain even to negative fluid balance    - repeat procal now more elevated    - aspirin, Lipitor   - home meds   - DVT and GI prophylaxis

## 2021-11-08 NOTE — PROGRESS NOTE ADULT - SUBJECTIVE AND OBJECTIVE BOX
Patient is a 76y old  Female who presents with a chief complaint of SOB (2021 19:00)        Over Night Events:        ROS:  See HPI    PHYSICAL EXAM    ICU Vital Signs Last 24 Hrs  T(C): 36.8 (2021 07:01), Max: 38.9 (2021 17:00)  T(F): 98.2 (2021 07:01), Max: 102 (2021 17:00)  HR: 58 (2021 07:00) (58 - 91)  BP: 95/54 (2021 07:00) (95/54 - 112/65)  BP(mean): 69 (2021 07:) (69 - 83)  ABP: --  ABP(mean): --  RR: 20 (2021 07:01) (19 - 41)  SpO2: 100% (2021 07:00) (93% - 100%)      General:  HEENT: DAHLIA             Lymphatic system: No cervical LN   Lungs: Bilateral BS  Cardiovascular: Regular   Gastrointestinal: Soft, Positive BS  Extremities: No clubbing.  Moves extremities.  Full Range of motion   Skin: Warm, intact  Neurological: No motor or sensory deficit       21 @ 07:01  -  21 @ 07:00  --------------------------------------------------------  IN:    Dexmedetomidine: 540 mL    Glucerna: 275 mL    Insulin: 31.5 mL    Propofol: 202 mL  Total IN: 1048.5 mL    OUT:    Indwelling Catheter - Urethral (mL): 660 mL    Norepinephrine: 0 mL  Total OUT: 660 mL    Total NET: 388.5 mL      21 @ 07:01  -  21 @ 08:31  --------------------------------------------------------  IN:    Dexmedetomidine: 45 mL    Insulin: 2 mL    Propofol: 8 mL  Total IN: 55 mL    OUT:    Indwelling Catheter - Urethral (mL): 50 mL  Total OUT: 50 mL    Total NET: 5 mL          LABS:                            7.8    7.88  )-----------( 312      ( 2021 05:25 )             26.2                                                   137  |  100  |  79<HH>  ----------------------------<  221<H>  4.3   |  25  |  1.7<H>    Ca    8.2<L>      2021 05:25    TPro  5.7<L>  /  Alb  2.5<L>  /  TBili  0.4  /  DBili  x   /  AST  85<H>  /  ALT  177<H>  /  AlkPhos  209<H>                                               Urinalysis Basic - ( 2021 17:15 )    Color: Yellow / Appearance: Slightly Cloudy / S.025 / pH: x  Gluc: x / Ketone: 15  / Bili: Negative / Urobili: 0.2 mg/dL   Blood: x / Protein: 100 mg/dL / Nitrite: Negative   Leuk Esterase: Small / RBC: 3-5 /HPF / WBC 10-25 /HPF   Sq Epi: x / Non Sq Epi: Few /HPF / Bacteria: x                                                  LIVER FUNCTIONS - ( 2021 05:25 )  Alb: 2.5 g/dL / Pro: 5.7 g/dL / ALK PHOS: 209 U/L / ALT: 177 U/L / AST: 85 U/L / GGT: x                                                                                               Mode: AC/ CMV (Assist Control/ Continuous Mandatory Ventilation)  RR (machine): 12  TV (machine): 350  FiO2: 35  PEEP: 5  MAP: 10  PIP: 27                                      ABG - ( 2021 04:06 )  pH, Arterial: 7.49  pH, Blood: x     /  pCO2: 33    /  pO2: 93    / HCO3: 25    / Base Excess: 1.8   /  SaO2: 97.5                MEDICATIONS  (STANDING):  albuterol/ipratropium for Nebulization 3 milliLiter(s) Nebulizer every 6 hours  aspirin  chewable 81 milliGRAM(s) Oral daily  atorvastatin 40 milliGRAM(s) Oral at bedtime  bisacodyl Suppository 10 milliGRAM(s) Rectal at bedtime  cefepime   IVPB 1000 milliGRAM(s) IV Intermittent every 24 hours  chlorhexidine 0.12% Liquid 15 milliLiter(s) Oral Mucosa two times a day  chlorhexidine 4% Liquid 1 Application(s) Topical <User Schedule>  dexMEDEtomidine Infusion 0.2 MICROgram(s)/kG/Hr (3.2 mL/Hr) IV Continuous <Continuous>  dextrose 40% Gel 15 Gram(s) Oral once  dextrose 5%. 1000 milliLiter(s) (50 mL/Hr) IV Continuous <Continuous>  dextrose 5%. 1000 milliLiter(s) (100 mL/Hr) IV Continuous <Continuous>  dextrose 50% Injectable 25 Gram(s) IV Push once  dextrose 50% Injectable 12.5 Gram(s) IV Push once  dextrose 50% Injectable 25 Gram(s) IV Push once  DULoxetine 60 milliGRAM(s) Oral daily  folic acid 1 milliGRAM(s) Oral daily  glucagon  Injectable 1 milliGRAM(s) IntraMuscular once  heparin   Injectable 5000 Unit(s) SubCutaneous every 8 hours  insulin regular Infusion 2 Unit(s)/Hr (2 mL/Hr) IV Continuous <Continuous>  levothyroxine 175 MICROGram(s) Oral daily  norepinephrine Infusion 0.05 MICROgram(s)/kG/Min (6 mL/Hr) IV Continuous <Continuous>  nystatin Ointment 1 Application(s) Topical two times a day  pantoprazole  Injectable 40 milliGRAM(s) IV Push daily  polyethylene glycol 3350 17 Gram(s) Oral daily  propofol Infusion 5 MICROgram(s)/kG/Min (1.92 mL/Hr) IV Continuous <Continuous>    MEDICATIONS  (PRN):  acetaminophen     Tablet .. 650 milliGRAM(s) Oral every 6 hours PRN Temp greater or equal to 38C (100.4F)  acetaminophen  Suppository .. 650 milliGRAM(s) Rectal every 4 hours PRN Temp greater or equal to 38C (100.4F)  sodium chloride 0.9% lock flush 10 milliLiter(s) IV Push every 1 hour PRN Pre/post blood products, medications, blood draw, and to maintain line patency      Xrays:                                                                                     ECHO     Patient is a 76y old  Female who presents with a chief complaint of SOB (2021 19:00)        Over Night Events:    febrile. No other events. on propofol and precedex.     ROS:  See HPI    PHYSICAL EXAM    ICU Vital Signs Last 24 Hrs  T(C): 36.8 (2021 07:01), Max: 38.9 (2021 17:00)  T(F): 98.2 (2021 07:01), Max: 102 (2021 17:00)  HR: 58 (2021 07:00) (58 - 91)  BP: 95/54 (2021 07:00) (95/54 - 112/65)  BP(mean): 69 (2021 07:00) (69 - 83)  ABP: --  ABP(mean): --  RR: 20 (2021 07:01) (19 - 41)  SpO2: 100% (2021 07:00) (93% - 100%)      General: NAD  HEENT: DAHLIA             Lymphatic system: No cervical LN   Lungs: Bilateral BS  Cardiovascular: Regular   Gastrointestinal: Soft, Positive BS  Extremities: No clubbing.  Moves extremities.  Full Range of motion   Skin: Warm, intact  Neurological: No motor or sensory deficit       21 @ 07:01  -  21 @ 07:00  --------------------------------------------------------  IN:    Dexmedetomidine: 540 mL    Glucerna: 275 mL    Insulin: 31.5 mL    Propofol: 202 mL  Total IN: 1048.5 mL    OUT:    Indwelling Catheter - Urethral (mL): 660 mL    Norepinephrine: 0 mL  Total OUT: 660 mL    Total NET: 388.5 mL      21 @ 07:01  -  21 @ 08:31  --------------------------------------------------------  IN:    Dexmedetomidine: 45 mL    Insulin: 2 mL    Propofol: 8 mL  Total IN: 55 mL    OUT:    Indwelling Catheter - Urethral (mL): 50 mL  Total OUT: 50 mL    Total NET: 5 mL          LABS:                            7.8    7.88  )-----------( 312      ( 2021 05:25 )             26.2                                                   137  |  100  |  79<HH>  ----------------------------<  221<H>  4.3   |  25  |  1.7<H>    Ca    8.2<L>      2021 05:25    TPro  5.7<L>  /  Alb  2.5<L>  /  TBili  0.4  /  DBili  x   /  AST  85<H>  /  ALT  177<H>  /  AlkPhos  209<H>                                               Urinalysis Basic - ( 2021 17:15 )    Color: Yellow / Appearance: Slightly Cloudy / S.025 / pH: x  Gluc: x / Ketone: 15  / Bili: Negative / Urobili: 0.2 mg/dL   Blood: x / Protein: 100 mg/dL / Nitrite: Negative   Leuk Esterase: Small / RBC: 3-5 /HPF / WBC 10-25 /HPF   Sq Epi: x / Non Sq Epi: Few /HPF / Bacteria: x                                                  LIVER FUNCTIONS - ( 2021 05:25 )  Alb: 2.5 g/dL / Pro: 5.7 g/dL / ALK PHOS: 209 U/L / ALT: 177 U/L / AST: 85 U/L / GGT: x                                                                                               Mode: AC/ CMV (Assist Control/ Continuous Mandatory Ventilation)  RR (machine): 12  TV (machine): 350  FiO2: 35  PEEP: 5  MAP: 10  PIP: 27                                      ABG - ( 2021 04:06 )  pH, Arterial: 7.49  pH, Blood: x     /  pCO2: 33    /  pO2: 93    / HCO3: 25    / Base Excess: 1.8   /  SaO2: 97.5                MEDICATIONS  (STANDING):  albuterol/ipratropium for Nebulization 3 milliLiter(s) Nebulizer every 6 hours  aspirin  chewable 81 milliGRAM(s) Oral daily  atorvastatin 40 milliGRAM(s) Oral at bedtime  bisacodyl Suppository 10 milliGRAM(s) Rectal at bedtime  cefepime   IVPB 1000 milliGRAM(s) IV Intermittent every 24 hours  chlorhexidine 0.12% Liquid 15 milliLiter(s) Oral Mucosa two times a day  chlorhexidine 4% Liquid 1 Application(s) Topical <User Schedule>  dexMEDEtomidine Infusion 0.2 MICROgram(s)/kG/Hr (3.2 mL/Hr) IV Continuous <Continuous>  dextrose 40% Gel 15 Gram(s) Oral once  dextrose 5%. 1000 milliLiter(s) (50 mL/Hr) IV Continuous <Continuous>  dextrose 5%. 1000 milliLiter(s) (100 mL/Hr) IV Continuous <Continuous>  dextrose 50% Injectable 25 Gram(s) IV Push once  dextrose 50% Injectable 12.5 Gram(s) IV Push once  dextrose 50% Injectable 25 Gram(s) IV Push once  DULoxetine 60 milliGRAM(s) Oral daily  folic acid 1 milliGRAM(s) Oral daily  glucagon  Injectable 1 milliGRAM(s) IntraMuscular once  heparin   Injectable 5000 Unit(s) SubCutaneous every 8 hours  insulin regular Infusion 2 Unit(s)/Hr (2 mL/Hr) IV Continuous <Continuous>  levothyroxine 175 MICROGram(s) Oral daily  norepinephrine Infusion 0.05 MICROgram(s)/kG/Min (6 mL/Hr) IV Continuous <Continuous>  nystatin Ointment 1 Application(s) Topical two times a day  pantoprazole  Injectable 40 milliGRAM(s) IV Push daily  polyethylene glycol 3350 17 Gram(s) Oral daily  propofol Infusion 5 MICROgram(s)/kG/Min (1.92 mL/Hr) IV Continuous <Continuous>    MEDICATIONS  (PRN):  acetaminophen     Tablet .. 650 milliGRAM(s) Oral every 6 hours PRN Temp greater or equal to 38C (100.4F)  acetaminophen  Suppository .. 650 milliGRAM(s) Rectal every 4 hours PRN Temp greater or equal to 38C (100.4F)  sodium chloride 0.9% lock flush 10 milliLiter(s) IV Push every 1 hour PRN Pre/post blood products, medications, blood draw, and to maintain line patency

## 2021-11-08 NOTE — PROCEDURE NOTE - NSPOSTPRCRAD_GEN_A_CORE
central line located in the/no pneumothorax/post-procedure radiography performed
central line located in the superior vena cava/no pneumothorax/post-procedure radiography performed

## 2021-11-08 NOTE — PROGRESS NOTE ADULT - SUBJECTIVE AND OBJECTIVE BOX
Patient is a 76y old  Female who presents with a chief complaint of SOB (08 Nov 2021 12:45)      T(F): 100.4 (11-08-21 @ 15:00), Max: 102 (11-07-21 @ 17:00)  HR: 69 (11-08-21 @ 14:37)  BP: 95/54 (11-08-21 @ 07:00)  RR: 24 (11-08-21 @ 15:00)  SpO2: 92% (11-08-21 @ 14:37) (92% - 100%)    PHYSICAL EXAM:  GENERAL: NAD  HEAD:  Atraumatic, Normocephalic  EYES: EOMI, PERRLA, conjunctiva and sclera clear  NERVOUS SYSTEM:  Alert & Oriented X3, no focal deficits   CHEST/LUNG: Clear to percussion bilaterally; No rales, rhonchi, wheezing, or rubs  HEART: Regular rate and rhythm; No murmurs, rubs, or gallops  ABDOMEN: Soft, Nontender, Nondistended; Bowel sounds present  EXTREMITIES:  2+ Peripheral Pulses, No clubbing, cyanosis, or edema    LABS  11-08    137  |  100  |  79<HH>  ----------------------------<  221<H>  4.3   |  25  |  1.7<H>    Ca    8.2<L>      08 Nov 2021 05:25    TPro  5.7<L>  /  Alb  2.5<L>  /  TBili  0.4  /  DBili  x   /  AST  85<H>  /  ALT  177<H>  /  AlkPhos  209<H>  11-08                          7.8    7.88  )-----------( 312      ( 08 Nov 2021 05:25 )             26.2       Mode: AC/ CMV (Assist Control/ Continuous Mandatory Ventilation)  RR (machine): 12  TV (machine): 350  FiO2: 35  PEEP: 5  MAP: 10  PIP: 24    CARDIAC ENZYMES          Culture Results:   Normal Respiratory Asia present (11-04-21)  Culture Results:   Moderate Yeast (11-04-21)  Culture Results:   No growth to date. (11-03-21)  Culture Results:   No Growth Final (10-30-21)  Culture Results:   No Growth Final (10-29-21)  Culture Results:   No Growth Final (10-28-21)  Culture Results:   No Growth Final (10-27-21)  Culture Results:   Numerous Methicillin Resistant Staphylococcus aureus  Normal Respiratory Asia present (10-26-21)  Culture Results:   No Growth Final (10-26-21)  Culture Results:   Normal Respiratory Asia present (10-23-21)    RADIOLOGY  < from: Xray Chest 1 View-PORTABLE IMMEDIATE (Xray Chest 1 View-PORTABLE IMMEDIATE .) (11.08.21 @ 13:02) >  Impression:    Unchanged bilateral opacities/effusions.    < end of copied text >    MEDICATIONS  (STANDING):  albuterol/ipratropium for Nebulization 3 milliLiter(s) Nebulizer every 6 hours  aspirin  chewable 81 milliGRAM(s) Oral daily  atorvastatin 40 milliGRAM(s) Oral at bedtime  bisacodyl Suppository 10 milliGRAM(s) Rectal at bedtime  cefepime   IVPB 1000 milliGRAM(s) IV Intermittent every 24 hours  chlorhexidine 0.12% Liquid 15 milliLiter(s) Oral Mucosa two times a day  chlorhexidine 4% Liquid 1 Application(s) Topical <User Schedule>  dexMEDEtomidine Infusion 0.2 MICROgram(s)/kG/Hr (3.2 mL/Hr) IV Continuous <Continuous>  DULoxetine 60 milliGRAM(s) Oral daily  folic acid 1 milliGRAM(s) Oral daily  glucagon  Injectable 1 milliGRAM(s) IntraMuscular once  heparin   Injectable 5000 Unit(s) SubCutaneous every 8 hours  insulin regular Infusion 2 Unit(s)/Hr (2 mL/Hr) IV Continuous <Continuous>  levothyroxine 175 MICROGram(s) Oral daily  norepinephrine Infusion 0.05 MICROgram(s)/kG/Min (6 mL/Hr) IV Continuous <Continuous>  nystatin Ointment 1 Application(s) Topical two times a day  pantoprazole  Injectable 40 milliGRAM(s) IV Push daily  polyethylene glycol 3350 17 Gram(s) Oral daily  propofol Infusion 5 MICROgram(s)/kG/Min (1.92 mL/Hr) IV Continuous <Continuous>    MEDICATIONS  (PRN):  acetaminophen     Tablet .. 650 milliGRAM(s) Oral every 6 hours PRN Temp greater or equal to 38C (100.4F)  acetaminophen  Suppository .. 650 milliGRAM(s) Rectal every 4 hours PRN Temp greater or equal to 38C (100.4F)  sodium chloride 0.9% lock flush 10 milliLiter(s) IV Push every 1 hour PRN Pre/post blood products, medications, blood draw, and to maintain line patency      Patient seen and evaluated this am, comfortably sedated on propofol and Precedex      T(F): 100.4 (11-08-21 @ 15:00), Max: 102 (11-07-21 @ 17:00)  HR: 69 (11-08-21 @ 14:37)  BP: 95/54 (11-08-21 @ 07:00)  RR: 24 (11-08-21 @ 15:00)  SpO2: 92% (11-08-21 @ 14:37) (92% - 100%)    PHYSICAL EXAM:  GENERAL: NAD  HEAD:  Atraumatic, Normocephalic  EYES: EOMI, PERRLA, conjunctiva and sclera clear  NERVOUS SYSTEM:  Alert & Oriented X3, no focal deficits   CHEST/LUNG:  bilateral rhonchi  HEART: Regular rate and rhythm; No murmurs, rubs, or gallops  ABDOMEN: Soft, Nontender, Nondistended; Bowel sounds present  EXTREMITIES:  2+ Peripheral Pulses, No clubbing, cyanosis, or edema    LABS  11-08    137  |  100  |  79<HH>  ----------------------------<  221<H>  4.3   |  25  |  1.7<H>    Ca    8.2<L>      08 Nov 2021 05:25    TPro  5.7<L>  /  Alb  2.5<L>  /  TBili  0.4  /  DBili  x   /  AST  85<H>  /  ALT  177<H>  /  AlkPhos  209<H>  11-08                          7.8    7.88  )-----------( 312      ( 08 Nov 2021 05:25 )             26.2     Procalcitonin, Serum (11.04.21 @ 10:13)   Procalcitonin, Serum: 2.09  Mode: AC/ CMV (Assist Control/ Continuous Mandatory Ventilation)  RR (machine): 12  TV (machine): 350  FiO2: 35  PEEP: 5      Culture Results:   Normal Respiratory Asia present (11-04-21)  Culture Results:   Moderate Yeast (11-04-21)  Culture Results:   No growth to date. (11-03-21)  Culture Results:   No Growth Final (10-30-21)  Culture Results:   No Growth Final (10-29-21)  Culture Results:   No Growth Final (10-28-21)  Culture Results:   No Growth Final (10-27-21)  Culture Results:   Numerous Methicillin Resistant Staphylococcus aureus  Normal Respiratory Asia present (10-26-21)  Culture Results:   No Growth Final (10-26-21)  Culture Results:   Normal Respiratory Asia present (10-23-21)    RADIOLOGY  < from: Xray Chest 1 View-PORTABLE IMMEDIATE (Xray Chest 1 View-PORTABLE IMMEDIATE .) (11.08.21 @ 13:02) >  Impression:    Unchanged bilateral opacities/effusions.    < end of copied text >  < from: CT Chest No Cont (11.07.21 @ 21:17) >  IMPRESSION:    Moderate right and moderate to large left pleural effusions with subjacent compressive atelectasis. In addition there is interlobular septal thickening and diffuse ground glass opacities. Findings compatible with pulmonary edema.    No definite noncontrast CT evidence for acute infectious pathology in the abdomen or pelvis.    < end of copied text >    MEDICATIONS  (STANDING):  albuterol/ipratropium for Nebulization 3 milliLiter(s) Nebulizer every 6 hours  aspirin  chewable 81 milliGRAM(s) Oral daily  atorvastatin 40 milliGRAM(s) Oral at bedtime  bisacodyl Suppository 10 milliGRAM(s) Rectal at bedtime  cefepime   IVPB 1000 milliGRAM(s) IV Intermittent every 24 hours  chlorhexidine 0.12% Liquid 15 milliLiter(s) Oral Mucosa two times a day  chlorhexidine 4% Liquid 1 Application(s) Topical <User Schedule>  dexMEDEtomidine Infusion 0.2 MICROgram(s)/kG/Hr (3.2 mL/Hr) IV Continuous <Continuous>  DULoxetine 60 milliGRAM(s) Oral daily  folic acid 1 milliGRAM(s) Oral daily  glucagon  Injectable 1 milliGRAM(s) IntraMuscular once  heparin   Injectable 5000 Unit(s) SubCutaneous every 8 hours  insulin regular Infusion 2 Unit(s)/Hr (2 mL/Hr) IV Continuous <Continuous>  levothyroxine 175 MICROGram(s) Oral daily  norepinephrine Infusion 0.05 MICROgram(s)/kG/Min (6 mL/Hr) IV Continuous <Continuous>  nystatin Ointment 1 Application(s) Topical two times a day  pantoprazole  Injectable 40 milliGRAM(s) IV Push daily  polyethylene glycol 3350 17 Gram(s) Oral daily  propofol Infusion 5 MICROgram(s)/kG/Min (1.92 mL/Hr) IV Continuous <Continuous>    MEDICATIONS  (PRN):  acetaminophen     Tablet .. 650 milliGRAM(s) Oral every 6 hours PRN Temp greater or equal to 38C (100.4F)  acetaminophen  Suppository .. 650 milliGRAM(s) Rectal every 4 hours PRN Temp greater or equal to 38C (100.4F)  sodium chloride 0.9% lock flush 10 milliLiter(s) IV Push every 1 hour PRN Pre/post blood products, medications, blood draw, and to maintain line patency

## 2021-11-08 NOTE — PROCEDURE NOTE - NSINFORMCONSENT_GEN_A_CORE
Robin Bautista/Benefits, risks, and possible complications of procedure explained to patient/caregiver who verbalized understanding and gave verbal consent.
This was an emergent procedure.
Benefits, risks, and possible complications of procedure explained to patient/caregiver who verbalized understanding and gave verbal consent.

## 2021-11-08 NOTE — H&P ADULT - NSICDXPASTMEDICALHX_GEN_ALL_CORE_FT
Discussed recent findings on vibrant stool testing. We will proceed with ova and parasite microscopy as well as liver ultrasound. We will send for triclabendazole 625 mg by mouth every 12 hours for 2 doses.     Patient's mom understands that she should
PAST MEDICAL HISTORY:  DM (diabetes mellitus) insulin  fs achs    Fall, initial encounter as  hx    Herniated lumbar intervertebral disc as per hx    Hypercholesteremia statin    Hypertension hctz    Lupus as per hx    Seizure keppra    Stroke TIA 2002, no deficits

## 2021-11-08 NOTE — PROGRESS NOTE ADULT - ATTENDING COMMENTS
Attending Statement: I have personally performed a face to face diagnostic evaluation on this patient. The patient is suffering from:  Acute Hypoxemic and Hypercapnic Respiratory Failure SP intubation  Pulmonary Edema, Bilateral Effusions,  I have reviewed the above note and agree with the history, exam and suggestions for care, except as I have noted in the text. I have amended the treatment plans as necessary.

## 2021-11-08 NOTE — PROGRESS NOTE ADULT - SUBJECTIVE AND OBJECTIVE BOX
LINDSAY ALBERT  76y, Female  Allergy: No Known Allergies      LOS  17d    CHIEF COMPLAINT: SOB (2021 08:31)      INTERVAL EVENTS/HPI  - No acute events overnight  - T(F): , Max: 102 (21 @ 17:00)  - remains febrile yesterday   - WBC Count: 7.88 (21 @ 05:25)  WBC Count: 7.79 (21 @ 05:49)     - Creatinine, Serum: 1.7 (21 @ 05:25)  Creatinine, Serum: 1.6 (21 @ 05:49)       ROS  unable to obtain history secondary to patient's mental status     VITALS:  T(F): 98.2, Max: 102 (21 @ 17:00)  HR: 60  BP: 95/54  RR: 20Vital Signs Last 24 Hrs  T(C): 36.8 (2021 07:01), Max: 38.9 (2021 17:00)  T(F): 98.2 (2021 07:01), Max: 102 (2021 17:00)  HR: 60 (2021 08:32) (58 - 91)  BP: 95/54 (2021 07:00) (95/54 - 112/65)  BP(mean): 69 (2021 07:00) (69 - 83)  RR: 20 (2021 07:01) (19 - 41)  SpO2: 99% (2021 08:32) (93% - 100%)    PHYSICAL EXAM:  Gen: intubated  HEENT: Normocephalic, atraumatic  Neck: supple, no lymphadenopathy  CV: Regular rate & regular rhythm  Lungs: decreased BS at bases, no fremitus  Abdomen: Soft, BS present  Ext: Warm, well perfused  Neuro: non focal, sedated  Skin: no rash, no erythema  Lines: no phlebitis    FH: Non-contributory  Social Hx: Non-contributory    TESTS & MEASUREMENTS:                        7.8    7.88  )-----------( 312      ( 2021 05:25 )             26.2     11-    137  |  100  |  79<HH>  ----------------------------<  221<H>  4.3   |  25  |  1.7<H>    Ca    8.2<L>      2021 05:25    TPro  5.7<L>  /  Alb  2.5<L>  /  TBili  0.4  /  DBili  x   /  AST  85<H>  /  ALT  177<H>  /  AlkPhos  209<H>      eGFR if Non African American: 29 mL/min/1.73M2 (21 @ 05:25)  eGFR if African American: 34 mL/min/1.73M2 (21 @ 05:25)    LIVER FUNCTIONS - ( 2021 05:25 )  Alb: 2.5 g/dL / Pro: 5.7 g/dL / ALK PHOS: 209 U/L / ALT: 177 U/L / AST: 85 U/L / GGT: x           Urinalysis Basic - ( 2021 17:15 )    Color: Yellow / Appearance: Slightly Cloudy / S.025 / pH: x  Gluc: x / Ketone: 15  / Bili: Negative / Urobili: 0.2 mg/dL   Blood: x / Protein: 100 mg/dL / Nitrite: Negative   Leuk Esterase: Small / RBC: 3-5 /HPF / WBC 10-25 /HPF   Sq Epi: x / Non Sq Epi: Few /HPF / Bacteria: x        Culture - Fungal, Bronchial (collected 21 @ 11:30)  Source: .Bronchial None  Preliminary Report (21 @ 09:30):    Moderate Yeast    Culture - Acid Fast - Bronchial w/Smear (collected 21 @ 11:30)  Source: .Bronchial None    Culture - Bronchial (collected 21 @ 11:30)  Source: Lavage None  Gram Stain (21 @ 07:00):    Few polymorphonuclear leukocytes seen per low power field    No squamous epithelial cells seen per low power field    No organisms seen per oil power field  Final Report (21 @ 21:05):    Normal Respiratory Asia present    Culture - Blood (collected 21 @ 15:29)  Source: .Blood None  Preliminary Report (21 @ 23:02):    No growth to date.    Culture - Blood (collected 10-30-21 @ 05:26)  Source: .Blood None  Final Report (21 @ 19:01):    No Growth Final    Culture - Blood (collected 10-29-21 @ 05:13)  Source: .Blood None  Final Report (21 @ 18:00):    No Growth Final    Culture - Blood (collected 10-28-21 @ 05:35)  Source: .Blood None  Final Report (21 @ 14:00):    No Growth Final    Culture - Blood (collected 10-27-21 @ 05:54)  Source: .Blood None  Final Report (21 @ 20:00):    No Growth Final    Culture - Sputum (collected 10-26-21 @ 20:55)  Source: .Sputum Sputum  Gram Stain (10-27-21 @ 07:06):    Numerous polymorphonuclear leukocytes per low power field    No Squamous epithelial cells per low power field    Moderate Gram positive cocci in pairs seen per oil power field  Final Report (10-28-21 @ 16:02):    Numerous Methicillin Resistant Staphylococcus aureus    Normal Respiratory Asia present  Organism: Methicillin resistant Staphylococcus aureus (10-28-21 @ 16:02)  Organism: Methicillin resistant Staphylococcus aureus (10-28-21 @ 16:02)      -  Amoxicillin/Clavulanic Acid: R >4/2      -  Ampicillin: R >8      -  Ampicillin/Sulbactam: R 16/8      -  Cefazolin: R >16      -  Ceftriaxone: R >32      -  Ciprofloxacin: R >2      -  Clindamycin: R >4      -  Daptomycin: S <=0.25      -  Erythromycin: R >4      -  Gentamicin: S <=1 Should not be used as monotherapy      -  Levofloxacin: R >4      -  Linezolid: S 2      -  Meropenem: R >8      -  Moxifloxacin(Aerobic): R >4      -  Oxacillin: R >2      -  Penicillin: R >8      -  RIF- Rifampin: S <=1 Should not be used as monotherapy      -  Tetra/Doxy: S <=1      -  Trimethoprim/Sulfamethoxazole: S <=0.5/9.5      -  Vancomycin: S 1      Method Type: IVNNIE    Culture - Blood (collected 10-26-21 @ 05:37)  Source: .Blood None  Final Report (10-31-21 @ 18:01):    No Growth Final    Culture - Sputum (collected 10-23-21 @ 15:05)  Source: .Sputum Sputum  Gram Stain (10-24-21 @ 14:45):    Numerous polymorphonuclear leukocytes per low power field    Few Squamous epithelial cells per low power field    Numerous Gram Positive Cocci in Clusters per oil power field  Final Report (10-25-21 @ 17:03):    Normal Respiratory Asia present    Culture - Blood (collected 10-23-21 @ 11:45)  Source: .Blood Blood-Peripheral  Final Report (10-28-21 @ 20:00):    No Growth Final    Culture - Urine (collected 10-22-21 @ 11:20)  Source: Catheterized Catheterized  Final Report (10-23-21 @ 22:43):    No growth            INFECTIOUS DISEASES TESTING  Procalcitonin, Serum: 2.09 (21 @ 10:13)  COVID-19 PCR: NotDetec (21 @ 07:00)  Fungitell: <31 (10-29-21 @ 05:13)  Procalcitonin, Serum: 0.67 (10-28-21 @ 05:35)  Procalcitonin, Serum: 0.98 (10-26-21 @ 05:37)  Rapid RVP Result: NotDetec (10-25-21 @ 09:22)  MRSA PCR Result.: Negative (10-23-21 @ 15:16)  Procalcitonin, Serum: 1.82 (10-22-21 @ 19:36)  COVID-19 PCR: NotDetec (10-22-21 @ 09:30)  COVID-19 PCR: NotDetec (10-18-21 @ 22:25)  COVID-19 PCR: NotDetec (10-02-21 @ 15:35)  Procalcitonin, Serum: 0.07 (21 @ 11:17)  COVID-19 PCR: NotDetec (21 @ 21:43)  COVID-19 PCR: NotDetec (21 @ 15:30)      INFLAMMATORY MARKERS  C-Reactive Protein, Serum: 94 mg/L (10-29-21 @ 05:13)  C-Reactive Protein, Serum: 98 mg/L (10-28-21 @ 05:35)  Sedimentation Rate, Erythrocyte: 87 mm/Hr (10-28-21 @ 05:35)      RADIOLOGY & ADDITIONAL TESTS:  I have personally reviewed the last available Chest xray  CXR      CT  CT Chest No Cont:   EXAM:  CT ABDOMEN AND PELVIS OC        EXAM:  CT CHEST            PROCEDURE DATE:  2021            INTERPRETATION:  CLINICAL STATEMENT: Worsening sepsis    TECHNIQUE: Contiguous CT images were obtained of the chest, abdomen and pelvis.  Intravenous Contrast: None.  Oral contrast: was administered.    COMPARISON:  CT chest dated 2021 and CT abdomen and pelvis dated 2019    FINDINGS:    Endotracheal tube tip approximately 2.5 cm above clara.  Enteric tube tip in the stomach.  Partially imaged central venous catheter with tip in the SVC.  Wise catheter balloon within the urinary bladder.    CHEST:    LUNGS, PLEURA AND AIRWAYS: Moderate right and moderate to large left pleural effusions. Subjacent compressive atelectasis. Interlobular septal thickening and diffuse groundglass opacities of the visualized aerated lung. No pneumothorax    HEART AND VESSELS: Hypoattenuating blood pool compatible with anemia. No pericardial effusion. Normal caliber thoracic aorta. Diffuse coronary artery, mitral annular and aortic calcifications.    THORACIC INLET/MEDIASTINUM/LYMPH NODES: 1.1 cm calcified right thyroid gland nodule. Shotty mediastinal lymph nodes may be reactive.    ABDOMEN/PELVIS:    LIVER: Unremarkable.    SPLEEN: Unremarkable.    PANCREAS: Unremarkable.    GALLBLADDER AND BILIARY TREE: Cholelithiasis without CT evidence for pericholecystic inflammation.    ADRENALS: Unremarkable.    KIDNEYS: No hydronephrosis. Bilateral nonobstructing renal calculi versus vascular calcifications.    LYMPH NODES: There are no enlarged abdominal or pelvic lymph nodes.    VASCULATURE: Normal caliber abdominal aorta with atherosclerotic changes.    BOWEL: No evidence for bowel obstruction or bowel wall thickening.    PERITONEUM/RETROPERITONEUM/MESENTERY: Trace ascites in the right paracolic gutter. No pneumoperitoneum    PELVIC VISCERA: Underdistention limits evaluation of the urinary bladder.    BONES AND SOFT TISSUES: Anasarca. Degenerative changes of the spine.      IMPRESSION:    Moderate right and moderate to large left pleural effusions with subjacent compressive atelectasis. In addition there is interlobular septal thickening and diffuse ground glass opacities. Findings compatible with pulmonary edema.    No definite noncontrast CT evidence for acute infectious pathology in the abdomen or pelvis.    --- End of Report ---              ABILIO FRANKS MD; Attending Radiologist  This document has been electronically signed. 2021  1:09AM (21 @ 21:17)      CARDIOLOGY TESTING  12 Lead ECG:   Ventricular Rate 141 BPM    Atrial Rate 282 BPM    QRS Duration 112 ms    Q-T Interval 342 ms    QTC Calculation(Bazett) 523 ms    R Axis -45 degrees    T Axis 117 degrees    Diagnosis Line *** Poor data quality, interpretation may be adversely affected  Sinus tachycardia  Left axis deviation  Non-specific intra-ventricular conduction delay    Confirmed by LUAN YORK MD ([id number]) on 2021 3:07:56 PM (21 @ 12:42)      MEDICATIONS  albuterol/ipratropium for Nebulization 3 Nebulizer every 6 hours  aspirin  chewable 81 Oral daily  atorvastatin 40 Oral at bedtime  bisacodyl Suppository 10 Rectal at bedtime  cefepime   IVPB 1000 IV Intermittent every 24 hours  chlorhexidine 0.12% Liquid 15 Oral Mucosa two times a day  chlorhexidine 4% Liquid 1 Topical <User Schedule>  dexMEDEtomidine Infusion 0.2 IV Continuous <Continuous>  dextrose 40% Gel 15 Oral once  dextrose 5%. 1000 IV Continuous <Continuous>  dextrose 5%. 1000 IV Continuous <Continuous>  dextrose 50% Injectable 25 IV Push once  dextrose 50% Injectable 12.5 IV Push once  dextrose 50% Injectable 25 IV Push once  DULoxetine 60 Oral daily  folic acid 1 Oral daily  glucagon  Injectable 1 IntraMuscular once  heparin   Injectable 5000 SubCutaneous every 8 hours  insulin regular Infusion 2 IV Continuous <Continuous>  levothyroxine 175 Oral daily  norepinephrine Infusion 0.05 IV Continuous <Continuous>  nystatin Ointment 1 Topical two times a day  pantoprazole  Injectable 40 IV Push daily  polyethylene glycol 3350 17 Oral daily  propofol Infusion 5 IV Continuous <Continuous>      WEIGHT  Weight (kg): 64 (10-30-21 @ 17:00)  Creatinine, Serum: 1.7 mg/dL (21 @ 05:25)      ANTIBIOTICS:  cefepime   IVPB 1000 milliGRAM(s) IV Intermittent every 24 hours      All available historical records have been reviewed

## 2021-11-08 NOTE — PROGRESS NOTE ADULT - ASSESSMENT
Assessment:  76y Female patient consulted for tracheostomy  Patient seen and examined at bedside. NAD.     Plan:  Please contact surgery team when cleared by pulmonology for a tracheostomy. patient still unstable at this time, and recently spiked fever overnight.       Date/Time: 11-08-21 @ 12:46

## 2021-11-08 NOTE — PROGRESS NOTE ADULT - SUBJECTIVE AND OBJECTIVE BOX
24H events:    Patient is a 76y old Female who presents with a chief complaint of SOB (2021 10:51)    Primary diagnosis of Acute pulmonary edema       Today is hospital day 17d.     PAST MEDICAL & SURGICAL HISTORY  DM (diabetes mellitus)  insulin  fs achs    Hypercholesteremia  statin    Hypertension  hctz    Lupus  as per hx    Fall, initial encounter  as  hx    Stroke  TIA , no deficits    Herniated lumbar intervertebral disc  as per hx    Seizure  keppra    No significant past surgical history      SOCIAL HISTORY:  Negative for smoking/alcohol/drug use.     ALLERGIES:  No Known Allergies    MEDICATIONS:  STANDING MEDICATIONS  albuterol/ipratropium for Nebulization 3 milliLiter(s) Nebulizer every 6 hours  aspirin  chewable 81 milliGRAM(s) Oral daily  atorvastatin 40 milliGRAM(s) Oral at bedtime  bisacodyl Suppository 10 milliGRAM(s) Rectal at bedtime  cefepime   IVPB 1000 milliGRAM(s) IV Intermittent every 24 hours  chlorhexidine 0.12% Liquid 15 milliLiter(s) Oral Mucosa two times a day  chlorhexidine 4% Liquid 1 Application(s) Topical <User Schedule>  dexMEDEtomidine Infusion 0.2 MICROgram(s)/kG/Hr IV Continuous <Continuous>  dextrose 40% Gel 15 Gram(s) Oral once  dextrose 5%. 1000 milliLiter(s) IV Continuous <Continuous>  dextrose 5%. 1000 milliLiter(s) IV Continuous <Continuous>  dextrose 50% Injectable 25 Gram(s) IV Push once  dextrose 50% Injectable 12.5 Gram(s) IV Push once  dextrose 50% Injectable 25 Gram(s) IV Push once  DULoxetine 60 milliGRAM(s) Oral daily  folic acid 1 milliGRAM(s) Oral daily  glucagon  Injectable 1 milliGRAM(s) IntraMuscular once  heparin   Injectable 5000 Unit(s) SubCutaneous every 8 hours  insulin regular Infusion 2 Unit(s)/Hr IV Continuous <Continuous>  levothyroxine 175 MICROGram(s) Oral daily  norepinephrine Infusion 0.05 MICROgram(s)/kG/Min IV Continuous <Continuous>  nystatin Ointment 1 Application(s) Topical two times a day  pantoprazole  Injectable 40 milliGRAM(s) IV Push daily  polyethylene glycol 3350 17 Gram(s) Oral daily  propofol Infusion 5 MICROgram(s)/kG/Min IV Continuous <Continuous>    PRN MEDICATIONS  acetaminophen     Tablet .. 650 milliGRAM(s) Oral every 6 hours PRN  acetaminophen  Suppository .. 650 milliGRAM(s) Rectal every 4 hours PRN  sodium chloride 0.9% lock flush 10 milliLiter(s) IV Push every 1 hour PRN    VITALS:   T(F): 99.5  HR: 60  BP: 95/54  RR: 16  SpO2: 99%    LABS:                        7.8    7.88  )-----------( 312      ( 2021 05:25 )             26.2         137  |  100  |  79<HH>  ----------------------------<  221<H>  4.3   |  25  |  1.7<H>    Ca    8.2<L>      2021 05:25    TPro  5.7<L>  /  Alb  2.5<L>  /  TBili  0.4  /  DBili  x   /  AST  85<H>  /  ALT  177<H>  /  AlkPhos  209<H>        Urinalysis Basic - ( 2021 17:15 )    Color: Yellow / Appearance: Slightly Cloudy / S.025 / pH: x  Gluc: x / Ketone: 15  / Bili: Negative / Urobili: 0.2 mg/dL   Blood: x / Protein: 100 mg/dL / Nitrite: Negative   Leuk Esterase: Small / RBC: 3-5 /HPF / WBC 10-25 /HPF   Sq Epi: x / Non Sq Epi: Few /HPF / Bacteria: x      ABG - ( 2021 04:06 )  pH, Arterial: 7.49  pH, Blood: x     /  pCO2: 33    /  pO2: 93    / HCO3: 25    / Base Excess: 1.8   /  SaO2: 97.5            PHYSICAL EXAM:       24H events:    Patient is a 76y old Female who presents with a chief complaint of SOB (2021 10:51)    Primary diagnosis of Acute pulmonary edema       Today is hospital day 17d.     PAST MEDICAL & SURGICAL HISTORY  DM (diabetes mellitus)  insulin  fs achs    Hypercholesteremia  statin    Hypertension  hctz    Lupus  as per hx    Fall, initial encounter  as  hx    Stroke  TIA , no deficits    Herniated lumbar intervertebral disc  as per hx    Seizure  keppra    No significant past surgical history      SOCIAL HISTORY:  Negative for smoking/alcohol/drug use.     ALLERGIES:  No Known Allergies    MEDICATIONS:  STANDING MEDICATIONS  albuterol/ipratropium for Nebulization 3 milliLiter(s) Nebulizer every 6 hours  aspirin  chewable 81 milliGRAM(s) Oral daily  atorvastatin 40 milliGRAM(s) Oral at bedtime  bisacodyl Suppository 10 milliGRAM(s) Rectal at bedtime  cefepime   IVPB 1000 milliGRAM(s) IV Intermittent every 24 hours  chlorhexidine 0.12% Liquid 15 milliLiter(s) Oral Mucosa two times a day  chlorhexidine 4% Liquid 1 Application(s) Topical <User Schedule>  dexMEDEtomidine Infusion 0.2 MICROgram(s)/kG/Hr IV Continuous <Continuous>  dextrose 40% Gel 15 Gram(s) Oral once  dextrose 5%. 1000 milliLiter(s) IV Continuous <Continuous>  dextrose 5%. 1000 milliLiter(s) IV Continuous <Continuous>  dextrose 50% Injectable 25 Gram(s) IV Push once  dextrose 50% Injectable 12.5 Gram(s) IV Push once  dextrose 50% Injectable 25 Gram(s) IV Push once  DULoxetine 60 milliGRAM(s) Oral daily  folic acid 1 milliGRAM(s) Oral daily  glucagon  Injectable 1 milliGRAM(s) IntraMuscular once  heparin   Injectable 5000 Unit(s) SubCutaneous every 8 hours  insulin regular Infusion 2 Unit(s)/Hr IV Continuous <Continuous>  levothyroxine 175 MICROGram(s) Oral daily  norepinephrine Infusion 0.05 MICROgram(s)/kG/Min IV Continuous <Continuous>  nystatin Ointment 1 Application(s) Topical two times a day  pantoprazole  Injectable 40 milliGRAM(s) IV Push daily  polyethylene glycol 3350 17 Gram(s) Oral daily  propofol Infusion 5 MICROgram(s)/kG/Min IV Continuous <Continuous>    PRN MEDICATIONS  acetaminophen     Tablet .. 650 milliGRAM(s) Oral every 6 hours PRN  acetaminophen  Suppository .. 650 milliGRAM(s) Rectal every 4 hours PRN  sodium chloride 0.9% lock flush 10 milliLiter(s) IV Push every 1 hour PRN    VITALS:   T(F): 99.5  HR: 60  BP: 95/54  RR: 16  SpO2: 99%    LABS:                        7.8    7.88  )-----------( 312      ( 2021 05:25 )             26.2         137  |  100  |  79<HH>  ----------------------------<  221<H>  4.3   |  25  |  1.7<H>    Ca    8.2<L>      2021 05:25    TPro  5.7<L>  /  Alb  2.5<L>  /  TBili  0.4  /  DBili  x   /  AST  85<H>  /  ALT  177<H>  /  AlkPhos  209<H>  11      Urinalysis Basic - ( 2021 17:15 )    Color: Yellow / Appearance: Slightly Cloudy / S.025 / pH: x  Gluc: x / Ketone: 15  / Bili: Negative / Urobili: 0.2 mg/dL   Blood: x / Protein: 100 mg/dL / Nitrite: Negative   Leuk Esterase: Small / RBC: 3-5 /HPF / WBC 10-25 /HPF   Sq Epi: x / Non Sq Epi: Few /HPF / Bacteria: x      ABG - ( 2021 04:06 )  pH, Arterial: 7.49  pH, Blood: x     /  pCO2: 33    /  pO2: 93    / HCO3: 25    / Base Excess: 1.8   /  SaO2: 97.5            PHYSICAL EXAM:  Gen: intubated, awake off sedation   HEENT: Normocephalic, atraumatic  Neck: supple, no lymphadenopathy  CV: Regular rate & regular rhythm  Lungs: decreased BS at bases, no fremitus  Abdomen: Soft, BS present  Neuro: non focal, sedated  Skin: no rash, no erythema

## 2021-11-09 NOTE — PROGRESS NOTE ADULT - ASSESSMENT
ASSESSMENT/PLAN  - acute resp failure  - NSTEMI  - HFpEF  - DM      est REE by PSE [id number], est protein needs ~ 90-95 gm/d   Continue with Glucerna 1.2 to 50 nl/h and add 2 Prosource TF daily  check bmp/phos/mg and correct lytes  glycemic control   ASSESSMENT/PLAN  - acute resp failure  - NSTEMI  - HFpEF  - DM    d/w ICU team on am rounds  pt is alert and responsive but failed SBT   est REE by PSE [id number], est protein needs ~ 90-95 gm/d  Continue with Glucerna 1.2 to 50 ml/h and add 2 Prosource TF daily (please document the Prosource, as well)  consider transitioning to intermittent feeding (and pre-feed poc glucose and SC insulin regimen)  would check phos level p/t attempting extubation, keep it > 3.5  glycemic control  IF pt needs to be on propofol consistently, then will need to adjust enteral Rx due to the kcal of fat provided by the IVFE

## 2021-11-09 NOTE — PROGRESS NOTE ADULT - SUBJECTIVE AND OBJECTIVE BOX
24H events:    Patient is a 76y old Female who presents with a chief complaint of SOB (2021 10:29)    Primary diagnosis of Acute pulmonary edema       Today is hospital day 18d.     PAST MEDICAL & SURGICAL HISTORY  DM (diabetes mellitus)  insulin  fs achs    Hypercholesteremia  statin    Hypertension  hctz    Lupus  as per hx    Fall, initial encounter  as  hx    Stroke  TIA , no deficits    Herniated lumbar intervertebral disc  as per hx    Seizure  keppra    No significant past surgical history      SOCIAL HISTORY:  Negative for smoking/alcohol/drug use.     ALLERGIES:  No Known Allergies    MEDICATIONS:  STANDING MEDICATIONS  albuterol/ipratropium for Nebulization 3 milliLiter(s) Nebulizer every 6 hours  aspirin  chewable 81 milliGRAM(s) Oral daily  atorvastatin 40 milliGRAM(s) Oral at bedtime  bisacodyl Suppository 10 milliGRAM(s) Rectal at bedtime  chlorhexidine 0.12% Liquid 15 milliLiter(s) Oral Mucosa two times a day  chlorhexidine 4% Liquid 1 Application(s) Topical <User Schedule>  dexMEDEtomidine Infusion 0.2 MICROgram(s)/kG/Hr IV Continuous <Continuous>  dextrose 40% Gel 15 Gram(s) Oral once  dextrose 5%. 1000 milliLiter(s) IV Continuous <Continuous>  dextrose 5%. 1000 milliLiter(s) IV Continuous <Continuous>  dextrose 50% Injectable 25 Gram(s) IV Push once  dextrose 50% Injectable 12.5 Gram(s) IV Push once  dextrose 50% Injectable 25 Gram(s) IV Push once  DULoxetine 60 milliGRAM(s) Oral daily  folic acid 1 milliGRAM(s) Oral daily  furosemide   Injectable 40 milliGRAM(s) IV Push daily  glucagon  Injectable 1 milliGRAM(s) IntraMuscular once  heparin   Injectable 5000 Unit(s) SubCutaneous every 8 hours  insulin regular Infusion 2 Unit(s)/Hr IV Continuous <Continuous>  levothyroxine 175 MICROGram(s) Oral daily  norepinephrine Infusion 0.05 MICROgram(s)/kG/Min IV Continuous <Continuous>  nystatin Ointment 1 Application(s) Topical two times a day  pantoprazole  Injectable 40 milliGRAM(s) IV Push daily  polyethylene glycol 3350 17 Gram(s) Oral daily  propofol Infusion 5 MICROgram(s)/kG/Min IV Continuous <Continuous>    PRN MEDICATIONS  acetaminophen     Tablet .. 650 milliGRAM(s) Oral every 6 hours PRN  acetaminophen  Suppository .. 650 milliGRAM(s) Rectal every 4 hours PRN  sodium chloride 0.9% lock flush 10 milliLiter(s) IV Push every 1 hour PRN    VITALS:   T(F): 100.6  HR: 80  BP: 104/59  RR: 23  SpO2: 100%    LABS:                        7.5    8.74  )-----------( 296      ( 2021 06:39 )             24.5         136  |  101  |  83<HH>  ----------------------------<  167<H>  4.3   |  24  |  1.6<H>    Ca    7.9<L>      2021 06:39    TPro  5.3<L>  /  Alb  2.4<L>  /  TBili  0.4  /  DBili  x   /  AST  46<H>  /  ALT  126<H>  /  AlkPhos  191<H>        Urinalysis Basic - ( 2021 17:15 )    Color: Yellow / Appearance: Slightly Cloudy / S.025 / pH: x  Gluc: x / Ketone: 15  / Bili: Negative / Urobili: 0.2 mg/dL   Blood: x / Protein: 100 mg/dL / Nitrite: Negative   Leuk Esterase: Small / RBC: 3-5 /HPF / WBC 10-25 /HPF   Sq Epi: x / Non Sq Epi: Few /HPF / Bacteria: x      ABG - ( 2021 04:06 )  pH, Arterial: 7.49  pH, Blood: x     /  pCO2: 33    /  pO2: 93    / HCO3: 25    / Base Excess: 1.8   /  SaO2: 97.5                  Culture - Blood (collected 2021 19:37)  Source: .Blood None  Preliminary Report (2021 04:01):    No growth to date.        PHYSICAL EXAM:  Gen: intubated, awake off sedation   HEENT: Normocephalic, atraumatic  Neck: supple, no lymphadenopathy  CV: Regular rate & regular rhythm  Lungs: decreased BS at bases, no fremitus  Abdomen: Soft, BS present  Neuro: non focal, sedated  Skin: no rash, no erythema

## 2021-11-09 NOTE — PROGRESS NOTE ADULT - ASSESSMENT
76 yr F with SLE on Methotrexate, moderate MR, HFpEF, HTN, hypothyroid, HLD, T2DM, hx of CVA with no residual deficits discharged from hospital earlier this month on 10/2 s/p HF exacerbation and NSTEMI, DC'd on new home oxygen, refused in pt cardiac cath and is supposed to have outpt cardiac cath with Dr Kowalski.   DC'd on 10/21 from [facility] s/p admission for resolved ANTONELLA, hypoglycemia and NSTEMI  returns to hospital with:    Acute respiratory failure secondary to sepsis from gram neg pneumonia and NSTEMI      - off  antibiotics  as per ID -  re culture in 2 days   - central line changed yesterday   - s/p bronchoscopy and  ct chest / abdomen     - maintain even to negative fluid balance    - check repeat procal    - w/u and possible thora as per ID   - aspirin, Lipitor   - home meds   - DVT and GI prophylaxis

## 2021-11-09 NOTE — PROGRESS NOTE ADULT - ASSESSMENT
Impression:    Acute Hypoxemic and Hypercapnic Respiratory Failure SP intubation  Pulmonary Edema, Bilateral Effusions, improving  HFpEF exacerbation, moderate MR  Sepsis present on admission  MRSA PNA  NSTEMI  Hypothyroidism   Iron Deficiency Anemia, Anemia of chronic disease    PLAN:    CNS: precedex/propofol. sedation vacation    HEENT: oral care.     PULMONARY: Hob >45.   Gs x for trach by next week if not extubated  s/p reintubation     CARDIOVASCULAR: keep i=o. daily weights.     GI: GI prophylaxis.  NG feed   follow LFT for now   ct abd recommended by ID     RENAL: fu lytes. keep connor.     INFECTIOUS DISEASE:  cefepime . follow up ID     HEMATOLOGICAL:  DVT prophylaxis.    ENDOCRINE:  Follow up FS.  Insulin protocol if needed.    MUSCULOSKELETAL: bed to chair position    MICU monitoring.  dc old TLC and place new TLC  keep connor Impression:    Acute Hypoxemic and Hypercapnic Respiratory Failure SP intubation  Pulmonary Edema, Bilateral Effusions, improving  HFpEF exacerbation, moderate MR  Sepsis present on admission  MRSA PNA  NSTEMI  Hypothyroidism   Iron Deficiency Anemia, Anemia of chronic disease    PLAN:    CNS: wean off precedex. continue propofol.    HEENT: oral care.et tube care. follow up surgery for trach    PULMONARY: Hob >45.   Gs x for trach by next week if not extubated  s/p reintubation     CARDIOVASCULAR: keep i<o. daily weights. lasix 40mg iv qd for now     GI: GI prophylaxis.  NG feed   follow LFT for now     RENAL: fu lytes. keep connor.     INFECTIOUS DISEASE: antibx per ID    HEMATOLOGICAL:  DVT prophylaxis.    ENDOCRINE:  Follow up FS.  Insulin protocol if needed.    MUSCULOSKELETAL: bed to chair position    MICU monitoring.  Keep TLC  keep connor

## 2021-11-09 NOTE — PROGRESS NOTE ADULT - SUBJECTIVE AND OBJECTIVE BOX
Patient is a 76y old  Female who presents with a chief complaint of SOB (09 Nov 2021 09:14)  pt seen and evaluated   remain vented   awake   on OGT feed   off propofol    ICU Vital Signs Last 24 Hrs  T(C): 37.8 (09 Nov 2021 07:05), Max: 38.2 (09 Nov 2021 02:00)  T(F): 100 (09 Nov 2021 07:05), Max: 100.8 (09 Nov 2021 02:00)  HR: 80 (09 Nov 2021 09:00) (59 - 81)  BP: 104/59 (09 Nov 2021 09:00) (87/54 - 133/63)  BP(mean): 78 (09 Nov 2021 09:00) (66 - 90)  RR: 26 (09 Nov 2021 09:00) (16 - 29)  SpO2: 100% (09 Nov 2021 09:00) (90% - 100%)    Drug Dosing Weight  Height (cm): 154.9 (30 Oct 2021 17:00)  Weight (kg): 64 (30 Oct 2021 17:00)  BMI (kg/m2): 26.7 (30 Oct 2021 17:00)  BSA (m2): 1.63 (30 Oct 2021 17:00)  08 Nov 2021 07:01  -  09 Nov 2021 07:00  --------------------------------------------------------  IN:    Dexmedetomidine: 435 mL    Enteral Tube Flush: 120 mL    Glucerna: 1000 mL    Insulin: 64 mL    Norepinephrine: 15 mL    Propofol: 228 mL  Total IN: 1862 mL    OUT:    Indwelling Catheter - Urethral (mL): 775 mL  Total OUT: 775 mL  Total NET: 1087 mL  09 Nov 2021 07:01  -  09 Nov 2021 10:29  --------------------------------------------------------  IN:    Dexmedetomidine: 51 mL    Glucerna: 150 mL    Insulin: 10 mL    Propofol: 4 mL  Total IN: 215 mL  OUT:    Indwelling Catheter - Urethral (mL): 85 mL  Total OUT: 85 mL  Total NET: 130 mL    PHYSICAL EXAM:  Constitutional:  remain vented, awake, alert  OGT in place  Gastrointestinal:  soft n/d  MEDICATIONS  (STANDING):  albuterol/ipratropium for Nebulization 3 milliLiter(s) Nebulizer every 6 hours  aspirin  chewable 81 milliGRAM(s) Oral daily  atorvastatin 40 milliGRAM(s) Oral at bedtime  bisacodyl Suppository 10 milliGRAM(s) Rectal at bedtime  chlorhexidine 0.12% Liquid 15 milliLiter(s) Oral Mucosa two times a day  chlorhexidine 4% Liquid 1 Application(s) Topical <User Schedule>  dexMEDEtomidine Infusion 0.2 MICROgram(s)/kG/Hr (3.2 mL/Hr) IV Continuous <Continuous>  dextrose 40% Gel 15 Gram(s) Oral once  dextrose 5%. 1000 milliLiter(s) (50 mL/Hr) IV Continuous <Continuous>  dextrose 5%. 1000 milliLiter(s) (100 mL/Hr) IV Continuous <Continuous>  dextrose 50% Injectable 25 Gram(s) IV Push once  dextrose 50% Injectable 12.5 Gram(s) IV Push once  dextrose 50% Injectable 25 Gram(s) IV Push once  DULoxetine 60 milliGRAM(s) Oral daily  folic acid 1 milliGRAM(s) Oral daily  furosemide   Injectable 40 milliGRAM(s) IV Push daily  glucagon  Injectable 1 milliGRAM(s) IntraMuscular once  heparin   Injectable 5000 Unit(s) SubCutaneous every 8 hours  insulin regular Infusion 2 Unit(s)/Hr (2 mL/Hr) IV Continuous <Continuous>  levothyroxine 175 MICROGram(s) Oral daily  norepinephrine Infusion 0.05 MICROgram(s)/kG/Min (6 mL/Hr) IV Continuous <Continuous>  nystatin Ointment 1 Application(s) Topical two times a day  pantoprazole  Injectable 40 milliGRAM(s) IV Push daily  polyethylene glycol 3350 17 Gram(s) Oral daily  propofol Infusion 5 MICROgram(s)/kG/Min (1.92 mL/Hr) IV Continuous <Continuous>      Diet, NPO with Tube Feed:   Tube Feeding Modality: Orogastric  Glucerna 1.2 Ronak  Total Volume for 24 Hours (mL): 1200  Continuous  Until Goal Tube Feed Rate (mL per Hour): 50  Tube Feed Duration (in Hours): 24  Tube Feed Start Time: 11:45  No Carb Prosource TF     Qty per Day:  2 (11-08-21 @ 12:54)      LABS  11-09    136  |  101  |  83<HH>  ----------------------------<  167<H>  4.3   |  24  |  1.6<H>    Ca    7.9<L>      09 Nov 2021 06:39    TPro  5.3<L>  /  Alb  2.4<L>  /  TBili  0.4  /  DBili  x   /  AST  46<H>  /  ALT  126<H>  /  AlkPhos  191<H>  11-09                          7.5    8.74  )-----------( 296      ( 09 Nov 2021 06:39 )             24.5     CAPILLARY BLOOD GLUCOSE  POCT Blood Glucose.: 140 mg/dL (09 Nov 2021 09:21)  POCT Blood Glucose.: 183 mg/dL (09 Nov 2021 07:47)  POCT Blood Glucose.: 185 mg/dL (09 Nov 2021 06:19)  POCT Blood Glucose.: 257 mg/dL (09 Nov 2021 03:32)   RADIOLOGY STUDIES  < from: Xray Chest 1 View-PORTABLE IMMEDIATE (Xray Chest 1 View-PORTABLE IMMEDIATE .) (11.08.21 @ 13:02) >  Impression:  Unchanged bilateral opacities/effusions.  Support devices as above.   from: CT Abdomen and Pelvis w/ Oral Cont (11.07.21 @ 21:17) >  IMPRESSION:  Moderate right and moderate to large left pleural effusions with subjacent compressive atelectasis. In addition there is interlobular septal thickening and diffuse ground glass opacities. Findings compatible with pulmonary edema.  No definite noncontrast CT evidence for acute infectious pathology in the abdomen or pelvis.   Patient is a 76y old  Female who presents with a chief complaint of SOB   pt seen and evaluated   remains vented  awake, responsive, follows commands  on OGT feedings, abd soft, ND, NT  off propofol   this morning, has been on and off it   ICU Vital Signs Last 24 Hrs  T(C): 37.8 (09 Nov 2021 07:05), Max: 38.2 (09 Nov 2021 02:00)  T(F): 100 (09 Nov 2021 07:05), Max: 100.8 (09 Nov 2021 02:00)  HR: 80 (09 Nov 2021 09:00) (59 - 81)  BP: 104/59 (09 Nov 2021 09:00) (87/54 - 133/63)  BP(mean): 78 (09 Nov 2021 09:00) (66 - 90)  RR: 26 (09 Nov 2021 09:00) (16 - 29)  SpO2: 100% (09 Nov 2021 09:00) (90% - 100%)    Drug Dosing Weight  Height (cm): 154.9 (30 Oct 2021 17:00)  Weight (kg): 64 (30 Oct 2021 17:00)  BMI (kg/m2): 26.7 (30 Oct 2021 17:00)  BSA (m2): 1.63 (30 Oct 2021 17:00)  08 Nov 2021 07:01  -  09 Nov 2021 07:00  --------------------------------------------------------  IN:    Dexmedetomidine: 435 mL    Enteral Tube Flush: 120 mL    Glucerna: 1000 mL    Insulin: 64 mL    Norepinephrine: 15 mL    Propofol: 228 mL  Total IN: 1862 mL    OUT:    Indwelling Catheter - Urethral (mL): 775 mL  Total OUT: 775 mL  Total NET: 1087 mL  09 Nov 2021 07:01  -  09 Nov 2021 10:29  --------------------------------------------------------  IN:    Dexmedetomidine: 51 mL    Glucerna: 150 mL    Insulin: 10 mL    Propofol: 4 mL  Total IN: 215 mL  OUT:    Indwelling Catheter - Urethral (mL): 85 mL  Total OUT: 85 mL  Total NET: 130 mL    MEDICATIONS  (STANDING):  albuterol/ipratropium for Nebulization 3 milliLiter(s) Nebulizer every 6 hours  aspirin  chewable 81 milliGRAM(s) Oral daily  atorvastatin 40 milliGRAM(s) Oral at bedtime  bisacodyl Suppository 10 milliGRAM(s) Rectal at bedtime  chlorhexidine 0.12% Liquid 15 milliLiter(s) Oral Mucosa two times a day  chlorhexidine 4% Liquid 1 Application(s) Topical <User Schedule>  dexMEDEtomidine Infusion 0.2 MICROgram(s)/kG/Hr (3.2 mL/Hr) IV Continuous <Continuous>  DULoxetine 60 milliGRAM(s) Oral daily  folic acid 1 milliGRAM(s) Oral daily  furosemide   Injectable 40 milliGRAM(s) IV Push daily  heparin   Injectable 5000 Unit(s) SubCutaneous every 8 hours  insulin regular Infusion 2 Unit(s)/Hr (2 mL/Hr) IV Continuous <Continuous>  levothyroxine 175 MICROGram(s) Oral daily  norepinephrine Infusion 0.05 MICROgram(s)/kG/Min (6 mL/Hr) IV Continuous <Continuous>  nystatin Ointment 1 Application(s) Topical two times a day  pantoprazole  Injectable 40 milliGRAM(s) IV Push daily  polyethylene glycol 3350 17 Gram(s) Oral daily  propofol Infusion 5 MICROgram(s)/kG/Min (1.92 mL/Hr) IV Continuous <Continuous>    Diet, NPO with Tube Feed:   Tube Feeding Modality: Orogastric  Glucerna 1.2 Ronak  Total Volume for 24 Hours (mL): 1200  Continuous  Until Goal Tube Feed Rate (mL per Hour): 50  Tube Feed Duration (in Hours): 24  Tube Feed Start Time: 11:45  No Carb Prosource TF     Qty per Day:  2 (11-08-21 @ 12:54)      LABS  11-09    136  |  101  |  83<HH>  ----------------------------<  167<H>  4.3   |  24  |  1.6<H>    Ca    7.9<L>      09 Nov 2021 06:39  TPro  5.3<L>  /  Alb  2.4<L>  /  TBili  0.4  /  DBili  x   /  AST  46<H>  /  ALT  126<H>  /  AlkPhos  191<H>  11-09  no phos levels                        7.5    8.74  )-----------( 296      ( 09 Nov 2021 06:39 )             24.5     POCT Blood Glucose.: 140 mg/dL (09 Nov 2021 09:21)  POCT Blood Glucose.: 183 mg/dL (09 Nov 2021 07:47)  POCT Blood Glucose.: 185 mg/dL (09 Nov 2021 06:19)  POCT Blood Glucose.: 257 mg/dL (09 Nov 2021 03:32)   RADIOLOGY STUDIES  < from: Xray Chest 1 View-PORTABLE IMMEDIATE (Xray Chest 1 View-PORTABLE IMMEDIATE .) (11.08.21 @ 13:02) >  Impression:  Unchanged bilateral opacities/effusions.  Support devices as above.   from: CT Abdomen and Pelvis w/ Oral Cont (11.07.21 @ 21:17) >  IMPRESSION:  Moderate right and moderate to large left pleural effusions with subjacent compressive atelectasis. In addition there is interlobular septal thickening and diffuse ground glass opacities. Findings compatible with pulmonary edema.  No definite noncontrast CT evidence for acute infectious pathology in the abdomen or pelvis.

## 2021-11-09 NOTE — PROGRESS NOTE ADULT - SUBJECTIVE AND OBJECTIVE BOX
Patient is a 76y old  Female who presents with a chief complaint of SOB (2021 07:13)        Over Night Events:        ROS:  See HPI    PHYSICAL EXAM    ICU Vital Signs Last 24 Hrs  T(C): 37.8 (2021 07:05), Max: 38.2 (2021 02:00)  T(F): 100 (2021 07:05), Max: 100.8 (2021 02:00)  HR: 69 (2021 07:30) (59 - 77)  BP: 97/55 (2021 07:30) (87/54 - 133/63)  BP(mean): 73 (2021 07:30) (66 - 90)  ABP: --  ABP(mean): --  RR: 23 (2021 07:30) (16 - 29)  SpO2: 99% (2021 07:30) (90% - 100%)      General:  HEENT: DAHLIA             Lymphatic system: No cervical LN   Lungs: Bilateral BS  Cardiovascular: Regular   Gastrointestinal: Soft, Positive BS  Extremities: No clubbing.  Moves extremities.  Full Range of motion   Skin: Warm, intact  Neurological: No motor or sensory deficit     CAM-ICU:  SAT:  SBT:      21 @ 07:01  -  21 @ 07:00  --------------------------------------------------------  IN:    Dexmedetomidine: 435 mL    Enteral Tube Flush: 120 mL    Glucerna: 1000 mL    Insulin: 64 mL    Norepinephrine: 15 mL    Propofol: 228 mL  Total IN: 1862 mL    OUT:    Indwelling Catheter - Urethral (mL): 775 mL  Total OUT: 775 mL    Total NET: 1087 mL      21 @ 07:01  -  21 @ 08:20  --------------------------------------------------------  IN:    Dexmedetomidine: 36 mL    Glucerna: 50 mL    Insulin: 4 mL  Total IN: 90 mL    OUT:    Indwelling Catheter - Urethral (mL): 20 mL    Propofol: 0 mL  Total OUT: 20 mL    Total NET: 70 mL          LABS:                            7.5    8.74  )-----------( 296      ( 2021 06:39 )             24.5                                                   136  |  101  |  83<HH>  ----------------------------<  167<H>  4.3   |  24  |  1.6<H>    Ca    7.9<L>      2021 06:39    TPro  5.3<L>  /  Alb  2.4<L>  /  TBili  0.4  /  DBili  x   /  AST  46<H>  /  ALT  126<H>  /  AlkPhos  191<H>                                               Urinalysis Basic - ( 2021 17:15 )    Color: Yellow / Appearance: Slightly Cloudy / S.025 / pH: x  Gluc: x / Ketone: 15  / Bili: Negative / Urobili: 0.2 mg/dL   Blood: x / Protein: 100 mg/dL / Nitrite: Negative   Leuk Esterase: Small / RBC: 3-5 /HPF / WBC 10-25 /HPF   Sq Epi: x / Non Sq Epi: Few /HPF / Bacteria: x                                                  LIVER FUNCTIONS - ( 2021 06:39 )  Alb: 2.4 g/dL / Pro: 5.3 g/dL / ALK PHOS: 191 U/L / ALT: 126 U/L / AST: 46 U/L / GGT: x                                                  Culture - Blood (collected 2021 19:37)  Source: .Blood None  Preliminary Report (2021 04:01):    No growth to date.                                                   Mode: AC/ CMV (Assist Control/ Continuous Mandatory Ventilation)  RR (machine): 12  TV (machine): 350  FiO2: 35  PEEP: 5  MAP: 10  PIP: 24                                      ABG - ( 2021 04:06 )  pH, Arterial: 7.49  pH, Blood: x     /  pCO2: 33    /  pO2: 93    / HCO3: 25    / Base Excess: 1.8   /  SaO2: 97.5                MEDICATIONS  (STANDING):  albuterol/ipratropium for Nebulization 3 milliLiter(s) Nebulizer every 6 hours  aspirin  chewable 81 milliGRAM(s) Oral daily  atorvastatin 40 milliGRAM(s) Oral at bedtime  bisacodyl Suppository 10 milliGRAM(s) Rectal at bedtime  chlorhexidine 0.12% Liquid 15 milliLiter(s) Oral Mucosa two times a day  chlorhexidine 4% Liquid 1 Application(s) Topical <User Schedule>  dexMEDEtomidine Infusion 0.2 MICROgram(s)/kG/Hr (3.2 mL/Hr) IV Continuous <Continuous>  dextrose 40% Gel 15 Gram(s) Oral once  dextrose 5%. 1000 milliLiter(s) (50 mL/Hr) IV Continuous <Continuous>  dextrose 5%. 1000 milliLiter(s) (100 mL/Hr) IV Continuous <Continuous>  dextrose 50% Injectable 25 Gram(s) IV Push once  dextrose 50% Injectable 12.5 Gram(s) IV Push once  dextrose 50% Injectable 25 Gram(s) IV Push once  DULoxetine 60 milliGRAM(s) Oral daily  folic acid 1 milliGRAM(s) Oral daily  glucagon  Injectable 1 milliGRAM(s) IntraMuscular once  heparin   Injectable 5000 Unit(s) SubCutaneous every 8 hours  insulin regular Infusion 2 Unit(s)/Hr (2 mL/Hr) IV Continuous <Continuous>  levothyroxine 175 MICROGram(s) Oral daily  norepinephrine Infusion 0.05 MICROgram(s)/kG/Min (6 mL/Hr) IV Continuous <Continuous>  nystatin Ointment 1 Application(s) Topical two times a day  pantoprazole  Injectable 40 milliGRAM(s) IV Push daily  polyethylene glycol 3350 17 Gram(s) Oral daily  propofol Infusion 5 MICROgram(s)/kG/Min (1.92 mL/Hr) IV Continuous <Continuous>    MEDICATIONS  (PRN):  acetaminophen     Tablet .. 650 milliGRAM(s) Oral every 6 hours PRN Temp greater or equal to 38C (100.4F)  acetaminophen  Suppository .. 650 milliGRAM(s) Rectal every 4 hours PRN Temp greater or equal to 38C (100.4F)  sodium chloride 0.9% lock flush 10 milliLiter(s) IV Push every 1 hour PRN Pre/post blood products, medications, blood draw, and to maintain line patency      Xrays:                                                                                     ECHO     Patient is a 76y old  Female who presents with a chief complaint of SOB (2021 07:13)        Over Night Events:    no events. remains iintubated, sedated with precedex and propofol.   CAM-ICU: negative  SAT: passed today  SBT: failed today    ROS:  See HPI    PHYSICAL EXAM    ICU Vital Signs Last 24 Hrs  T(C): 37.8 (2021 07:05), Max: 38.2 (2021 02:00)  T(F): 100 (2021 07:05), Max: 100.8 (2021 02:00)  HR: 69 (2021 07:30) (59 - 77)  BP: 97/55 (2021 07:30) (87/54 - 133/63)  BP(mean): 73 (2021 07:30) (66 - 90)  ABP: --  ABP(mean): --  RR: 23 (2021 07:30) (16 - 29)  SpO2: 99% (2021 07:30) (90% - 100%)      General: NAD  HEENT: DAHLIA             Lymphatic system: No cervical LN   Lungs: Bilateral crackles  Cardiovascular: Regular   Gastrointestinal: Soft, Positive BS  Extremities: No clubbing.  Moves extremities.  Full Range of motion   Skin: Warm, intact  Neurological: No motor or sensory deficit           21 @ 07:01  -  21 @ 07:00  --------------------------------------------------------  IN:    Dexmedetomidine: 435 mL    Enteral Tube Flush: 120 mL    Glucerna: 1000 mL    Insulin: 64 mL    Norepinephrine: 15 mL    Propofol: 228 mL  Total IN: 1862 mL    OUT:    Indwelling Catheter - Urethral (mL): 775 mL  Total OUT: 775 mL    Total NET: 1087 mL      21 @ 07:01  -  21 @ 08:20  --------------------------------------------------------  IN:    Dexmedetomidine: 36 mL    Glucerna: 50 mL    Insulin: 4 mL  Total IN: 90 mL    OUT:    Indwelling Catheter - Urethral (mL): 20 mL    Propofol: 0 mL  Total OUT: 20 mL    Total NET: 70 mL          LABS:                            7.5    8.74  )-----------( 296      ( 2021 06:39 )             24.5                                                   136  |  101  |  83<HH>  ----------------------------<  167<H>  4.3   |  24  |  1.6<H>    Ca    7.9<L>      2021 06:39    TPro  5.3<L>  /  Alb  2.4<L>  /  TBili  0.4  /  DBili  x   /  AST  46<H>  /  ALT  126<H>  /  AlkPhos  191<H>                                               Urinalysis Basic - ( 2021 17:15 )    Color: Yellow / Appearance: Slightly Cloudy / S.025 / pH: x  Gluc: x / Ketone: 15  / Bili: Negative / Urobili: 0.2 mg/dL   Blood: x / Protein: 100 mg/dL / Nitrite: Negative   Leuk Esterase: Small / RBC: 3-5 /HPF / WBC 10-25 /HPF   Sq Epi: x / Non Sq Epi: Few /HPF / Bacteria: x                                                  LIVER FUNCTIONS - ( 2021 06:39 )  Alb: 2.4 g/dL / Pro: 5.3 g/dL / ALK PHOS: 191 U/L / ALT: 126 U/L / AST: 46 U/L / GGT: x                                                  Culture - Blood (collected 2021 19:37)  Source: .Blood None  Preliminary Report (2021 04:01):    No growth to date.                                                   Mode: AC/ CMV (Assist Control/ Continuous Mandatory Ventilation)  RR (machine): 12  TV (machine): 350  FiO2: 35  PEEP: 5  MAP: 10  PIP: 24                                      ABG - ( 2021 04:06 )  pH, Arterial: 7.49  pH, Blood: x     /  pCO2: 33    /  pO2: 93    / HCO3: 25    / Base Excess: 1.8   /  SaO2: 97.5                MEDICATIONS  (STANDING):  albuterol/ipratropium for Nebulization 3 milliLiter(s) Nebulizer every 6 hours  aspirin  chewable 81 milliGRAM(s) Oral daily  atorvastatin 40 milliGRAM(s) Oral at bedtime  bisacodyl Suppository 10 milliGRAM(s) Rectal at bedtime  chlorhexidine 0.12% Liquid 15 milliLiter(s) Oral Mucosa two times a day  chlorhexidine 4% Liquid 1 Application(s) Topical <User Schedule>  dexMEDEtomidine Infusion 0.2 MICROgram(s)/kG/Hr (3.2 mL/Hr) IV Continuous <Continuous>  dextrose 40% Gel 15 Gram(s) Oral once  dextrose 5%. 1000 milliLiter(s) (50 mL/Hr) IV Continuous <Continuous>  dextrose 5%. 1000 milliLiter(s) (100 mL/Hr) IV Continuous <Continuous>  dextrose 50% Injectable 25 Gram(s) IV Push once  dextrose 50% Injectable 12.5 Gram(s) IV Push once  dextrose 50% Injectable 25 Gram(s) IV Push once  DULoxetine 60 milliGRAM(s) Oral daily  folic acid 1 milliGRAM(s) Oral daily  glucagon  Injectable 1 milliGRAM(s) IntraMuscular once  heparin   Injectable 5000 Unit(s) SubCutaneous every 8 hours  insulin regular Infusion 2 Unit(s)/Hr (2 mL/Hr) IV Continuous <Continuous>  levothyroxine 175 MICROGram(s) Oral daily  norepinephrine Infusion 0.05 MICROgram(s)/kG/Min (6 mL/Hr) IV Continuous <Continuous>  nystatin Ointment 1 Application(s) Topical two times a day  pantoprazole  Injectable 40 milliGRAM(s) IV Push daily  polyethylene glycol 3350 17 Gram(s) Oral daily  propofol Infusion 5 MICROgram(s)/kG/Min (1.92 mL/Hr) IV Continuous <Continuous>    MEDICATIONS  (PRN):  acetaminophen     Tablet .. 650 milliGRAM(s) Oral every 6 hours PRN Temp greater or equal to 38C (100.4F)  acetaminophen  Suppository .. 650 milliGRAM(s) Rectal every 4 hours PRN Temp greater or equal to 38C (100.4F)  sodium chloride 0.9% lock flush 10 milliLiter(s) IV Push every 1 hour PRN Pre/post blood products, medications, blood draw, and to maintain line patency      Xrays:                                                                                     ECHO

## 2021-11-09 NOTE — PROGRESS NOTE ADULT - SUBJECTIVE AND OBJECTIVE BOX
Patient is sedated on propofol and Dexa      T(F): 100 (11-09-21 @ 07:05), Max: 100.8 (11-09-21 @ 02:00)  HR: 81 (11-09-21 @ 08:28)  BP: 97/55 (11-09-21 @ 07:30)  RR: 20  SpO2: 99% (11-09-21 @ 08:28) (90% - 100%)    PHYSICAL EXAM:  GENERAL: NAD  HEAD:  Atraumatic, Normocephalic  EYES: EOMI, PERRLA, conjunctiva and sclera clear  NERVOUS SYSTEM:   no focal deficits   CHEST/LUNG:  bilateral rhonchi  HEART: Regular rate and rhythm; No murmurs, rubs, or gallops  ABDOMEN: Soft, Nontender, Nondistended; Bowel sounds present  EXTREMITIES:  2+ Peripheral Pulses, No clubbing, cyanosis, or edema    LABS  11-09    136  |  101  |  83<HH>  ----------------------------<  167<H>  4.3   |  24  |  1.6<H>    Ca    7.9<L>      09 Nov 2021 06:39    TPro  5.3<L>  /  Alb  2.4<L>  /  TBili  0.4  /  DBili  x   /  AST  46<H>  /  ALT  126<H>  /  AlkPhos  191<H>  11-09                          7.5    8.74  )-----------( 296      ( 09 Nov 2021 06:39 )             24.5       Mode: AC/ CMV (Assist Control/ Continuous Mandatory Ventilation)  RR (machine): 12  TV (machine): 350  FiO2: 35  PEEP: 5    Culture Results:   No growth to date. (11-07-21)  Culture Results:   Normal Respiratory [place] present (11-04-21)  Culture Results:   Moderate Yeast (11-04-21)  Culture Results:   No Growth Final (11-03-21)  Culture Results:   No Growth Final (10-30-21)  Culture Results:   No Growth Final (10-29-21)  Culture Results:   No Growth Final (10-28-21)  Culture Results:   No Growth Final (10-27-21)  Culture Results:   Numerous Methicillin Resistant Staphylococcus aureus  Normal Respiratory [place] present (10-26-21)  Culture Results:   No Growth Final (10-26-21)    RADIOLOGY  < from: Xray Chest 1 View-PORTABLE IMMEDIATE (Xray Chest 1 View-PORTABLE IMMEDIATE .) (11.08.21 @ 13:02) >  Impression:    Unchanged bilateral opacities/effusions.    < end of copied text >    MEDICATIONS  (STANDING):  albuterol/ipratropium for Nebulization 3 milliLiter(s) Nebulizer every 6 hours  aspirin  chewable 81 milliGRAM(s) Oral daily  atorvastatin 40 milliGRAM(s) Oral at bedtime  bisacodyl Suppository 10 milliGRAM(s) Rectal at bedtime  chlorhexidine 0.12% Liquid 15 milliLiter(s) Oral Mucosa two times a day  chlorhexidine 4% Liquid 1 Application(s) Topical <User Schedule>  dexMEDEtomidine Infusion 0.2 MICROgram(s)/kG/Hr (3.2 mL/Hr) IV Continuous <Continuous>  DULoxetine 60 milliGRAM(s) Oral daily  folic acid 1 milliGRAM(s) Oral daily  glucagon  Injectable 1 milliGRAM(s) IntraMuscular once  heparin   Injectable 5000 Unit(s) SubCutaneous every 8 hours  insulin regular Infusion 2 Unit(s)/Hr (2 mL/Hr) IV Continuous <Continuous>  levothyroxine 175 MICROGram(s) Oral daily  norepinephrine Infusion 0.05 MICROgram(s)/kG/Min (6 mL/Hr) IV Continuous <Continuous>  nystatin Ointment 1 Application(s) Topical two times a day  pantoprazole  Injectable 40 milliGRAM(s) IV Push daily  polyethylene glycol 3350 17 Gram(s) Oral daily  propofol Infusion 5 MICROgram(s)/kG/Min (1.92 mL/Hr) IV Continuous <Continuous>    MEDICATIONS  (PRN):  acetaminophen     Tablet .. 650 milliGRAM(s) Oral every 6 hours PRN Temp greater or equal to 38C (100.4F)  acetaminophen  Suppository .. 650 milliGRAM(s) Rectal every 4 hours PRN Temp greater or equal to 38C (100.4F)  sodium chloride 0.9% lock flush 10 milliLiter(s) IV Push every 1 hour PRN Pre/post blood products, medications, blood draw, and to maintain line patency

## 2021-11-09 NOTE — PROGRESS NOTE ADULT - TIME BILLING
I have personally seen and examined this patient.    I have reviewed all pertinent clinical information and reviewed all relevant imaging and diagnostic studies personally.   I counseled the patient about diagnostic testing and treatment plan. All questions were answered.   I discussed recommendations with the primary team.
I have personally seen and examined this patient.    I have reviewed all pertinent clinical information and reviewed all relevant imaging and diagnostic studies personally.   I counseled the patient about diagnostic testing and treatment plan. All questions were answered.   I discussed recommendations with the primary team.
direct pt's care, chart review
I have personally seen and examined this patient.    I have reviewed all pertinent clinical information and reviewed all relevant imaging and diagnostic studies personally.   I counseled the patient about diagnostic testing and treatment plan. All questions were answered.   I discussed recommendations with the primary team.

## 2021-11-09 NOTE — PROGRESS NOTE ADULT - SUBJECTIVE AND OBJECTIVE BOX
LINDSAY ALBERT  76y, Female  Allergy: No Known Allergies      LOS  18d    CHIEF COMPLAINT: SOB (2021 15:56)      INTERVAL EVENTS/HPI  - No acute events overnight  - T(F): , Max: 100.8 (21 @ 02:00)  - Denies any worsening symptoms  - Tolerating medication  - WBC Count: 8.74 (21 @ 06:39)  WBC Count: 7.88 (21 @ 05:25)     - Creatinine, Serum: 1.7 (21 @ 05:25)       ROS  General: Denies rigors, nightsweats  HEENT: Denies headache, rhinorrhea, sore throat, eye pain  CV: Denies CP, palpitations  PULM: Denies wheezing, hemoptysis  GI: Denies hematemesis, hematochezia, melena  : Denies discharge, hematuria  MSK: Denies arthralgias, myalgias  SKIN: Denies rash, lesions  NEURO: Denies paresthesias, weakness  PSYCH: Denies depression, anxiety    VITALS:  T(F): 100, Max: 100.8 (21 @ 02:00)  HR: 68  BP: 100/53  RR: 24Vital Signs Last 24 Hrs  T(C): 37.8 (2021 05:00), Max: 38.2 (2021 02:00)  T(F): 100 (2021 05:00), Max: 100.8 (2021 02:00)  HR: 68 (2021 05:00) (59 - 77)  BP: 100/53 (2021 05:00) (87/54 - 133/63)  BP(mean): 73 (2021 05:00) (66 - 90)  RR: 24 (2021 05:00) (16 - 29)  SpO2: 99% (2021 05:00) (90% - 100%)    PHYSICAL EXAM:  Gen: NAD, resting in bed  HEENT: Normocephalic, atraumatic  Neck: supple, no lymphadenopathy  CV: Regular rate & regular rhythm  Lungs: decreased BS at bases, no fremitus  Abdomen: Soft, BS present  Ext: Warm, well perfused  Neuro: non focal, awake  Skin: no rash, no erythema  Lines: no phlebitis    FH: Non-contributory  Social Hx: Non-contributory    TESTS & MEASUREMENTS:                        7.5    8.74  )-----------( 296      ( 2021 06:39 )             24.5         137  |  100  |  79<HH>  ----------------------------<  221<H>  4.3   |  25  |  1.7<H>    Ca    8.2<L>      2021 05:25    TPro  5.7<L>  /  Alb  2.5<L>  /  TBili  0.4  /  DBili  x   /  AST  85<H>  /  ALT  177<H>  /  AlkPhos  209<H>        LIVER FUNCTIONS - ( 2021 05:25 )  Alb: 2.5 g/dL / Pro: 5.7 g/dL / ALK PHOS: 209 U/L / ALT: 177 U/L / AST: 85 U/L / GGT: x           Urinalysis Basic - ( 2021 17:15 )    Color: Yellow / Appearance: Slightly Cloudy / S.025 / pH: x  Gluc: x / Ketone: 15  / Bili: Negative / Urobili: 0.2 mg/dL   Blood: x / Protein: 100 mg/dL / Nitrite: Negative   Leuk Esterase: Small / RBC: 3-5 /HPF / WBC 10-25 /HPF   Sq Epi: x / Non Sq Epi: Few /HPF / Bacteria: x        Culture - Blood (collected 21 @ 19:37)  Source: .Blood None  Preliminary Report (21 @ 04:01):    No growth to date.    Culture - Fungal, Bronchial (collected 21 @ 11:30)  Source: .Bronchial None  Preliminary Report (21 @ 09:30):    Moderate Yeast    Culture - Acid Fast - Bronchial w/Smear (collected 21 @ 11:30)  Source: .Bronchial None    Culture - Bronchial (collected 21 @ 11:30)  Source: Lavage None  Gram Stain (21 @ 07:00):    Few polymorphonuclear leukocytes seen per low power field    No squamous epithelial cells seen per low power field    No organisms seen per oil power field  Final Report (21 @ 21:05):    Normal Respiratory Asia present    Culture - Blood (collected 21 @ 15:29)  Source: .Blood None  Final Report (21 @ 23:00):    No Growth Final    Culture - Blood (collected 10-30-21 @ 05:26)  Source: .Blood None  Final Report (21 @ 19:01):    No Growth Final    Culture - Blood (collected 10-29-21 @ 05:13)  Source: .Blood None  Final Report (21 @ 18:00):    No Growth Final    Culture - Blood (collected 10-28-21 @ 05:35)  Source: .Blood None  Final Report (21 @ 14:00):    No Growth Final    Culture - Blood (collected 10-27-21 @ 05:54)  Source: .Blood None  Final Report (21 @ 20:00):    No Growth Final    Culture - Sputum (collected 10-26-21 @ 20:55)  Source: .Sputum Sputum  Gram Stain (10-27-21 @ 07:06):    Numerous polymorphonuclear leukocytes per low power field    No Squamous epithelial cells per low power field    Moderate Gram positive cocci in pairs seen per oil power field  Final Report (10-28-21 @ 16:02):    Numerous Methicillin Resistant Staphylococcus aureus    Normal Respiratory Asia present  Organism: Methicillin resistant Staphylococcus aureus (10-28-21 @ 16:02)  Organism: Methicillin resistant Staphylococcus aureus (10-28-21 @ 16:02)      -  Amoxicillin/Clavulanic Acid: R >4/2      -  Ampicillin: R >8      -  Ampicillin/Sulbactam: R 16/8      -  Cefazolin: R >16      -  Ceftriaxone: R >32      -  Ciprofloxacin: R >2      -  Clindamycin: R >4      -  Daptomycin: S <=0.25      -  Erythromycin: R >4      -  Gentamicin: S <=1 Should not be used as monotherapy      -  Levofloxacin: R >4      -  Linezolid: S 2      -  Meropenem: R >8      -  Moxifloxacin(Aerobic): R >4      -  Oxacillin: R >2      -  Penicillin: R >8      -  RIF- Rifampin: S <=1 Should not be used as monotherapy      -  Tetra/Doxy: S <=1      -  Trimethoprim/Sulfamethoxazole: S <=0.5/9.5      -  Vancomycin: S 1      Method Type: VINNIE    Culture - Blood (collected 10-26-21 @ 05:37)  Source: .Blood None  Final Report (10-31-21 @ 18:01):    No Growth Final    Culture - Sputum (collected 10-23-21 @ 15:05)  Source: .Sputum Sputum  Gram Stain (10-24-21 @ 14:45):    Numerous polymorphonuclear leukocytes per low power field    Few Squamous epithelial cells per low power field    Numerous Gram Positive Cocci in Clusters per oil power field  Final Report (10-25-21 @ 17:03):    Normal Respiratory Asia present    Culture - Blood (collected 10-23-21 @ 11:45)  Source: .Blood Blood-Peripheral  Final Report (10-28-21 @ 20:00):    No Growth Final    Culture - Urine (collected 10-22-21 @ 11:20)  Source: Catheterized Catheterized  Final Report (10-23-21 @ 22:43):    No growth            INFECTIOUS DISEASES TESTING  Procalcitonin, Serum: 2.09 (21 @ 10:13)  COVID-19 PCR: NotDetec (21 @ 07:00)  Fungitell: <31 (10-29-21 @ 05:13)  Procalcitonin, Serum: 0.67 (10-28-21 @ 05:35)  Procalcitonin, Serum: 0.98 (10-26-21 @ 05:37)  Rapid RVP Result: NotDetec (10-25-21 @ 09:22)  MRSA PCR Result.: Negative (10-23-21 @ 15:16)  Procalcitonin, Serum: 1.82 (10-22-21 @ 19:36)  COVID-19 PCR: NotDetec (10-22-21 @ 09:30)  COVID-19 PCR: NotDetec (10-18-21 @ 22:25)  COVID-19 PCR: NotDetec (10-02-21 @ 15:35)  Procalcitonin, Serum: 0.07 (21 @ 11:17)  COVID-19 PCR: NotDetec (21 @ 21:43)  COVID-19 PCR: NotDetec (21 @ 15:30)      INFLAMMATORY MARKERS  C-Reactive Protein, Serum: 94 mg/L (10-29-21 @ 05:13)  C-Reactive Protein, Serum: 98 mg/L (10-28-21 @ 05:35)  Sedimentation Rate, Erythrocyte: 87 mm/Hr (10-28-21 @ 05:35)      RADIOLOGY & ADDITIONAL TESTS:  I have personally reviewed the last available Chest xray  CXR      CT  CT Chest No Cont:   EXAM:  CT ABDOMEN AND PELVIS OC        EXAM:  CT CHEST            PROCEDURE DATE:  2021            INTERPRETATION:  CLINICAL STATEMENT: Worsening sepsis    TECHNIQUE: Contiguous CT images were obtained of the chest, abdomen and pelvis.  Intravenous Contrast: None.  Oral contrast: was administered.    COMPARISON:  CT chest dated 2021 and CT abdomen and pelvis dated 2019    FINDINGS:    Endotracheal tube tip approximately 2.5 cm above clara.  Enteric tube tip in the stomach.  Partially imaged central venous catheter with tip in the SVC.  Wise catheter balloon within the urinary bladder.    CHEST:    LUNGS, PLEURA AND AIRWAYS: Moderate right and moderate to large left pleural effusions. Subjacent compressive atelectasis. Interlobular septal thickening and diffuse groundglass opacities of the visualized aerated lung. No pneumothorax    HEART AND VESSELS: Hypoattenuating blood pool compatible with anemia. No pericardial effusion. Normal caliber thoracic aorta. Diffuse coronary artery, mitral annular and aortic calcifications.    THORACIC INLET/MEDIASTINUM/LYMPH NODES: 1.1 cm calcified right thyroid gland nodule. Shotty mediastinal lymph nodes may be reactive.    ABDOMEN/PELVIS:    LIVER: Unremarkable.    SPLEEN: Unremarkable.    PANCREAS: Unremarkable.    GALLBLADDER AND BILIARY TREE: Cholelithiasis without CT evidence for pericholecystic inflammation.    ADRENALS: Unremarkable.    KIDNEYS: No hydronephrosis. Bilateral nonobstructing renal calculi versus vascular calcifications.    LYMPH NODES: There are no enlarged abdominal or pelvic lymph nodes.    VASCULATURE: Normal caliber abdominal aorta with atherosclerotic changes.    BOWEL: No evidence for bowel obstruction or bowel wall thickening.    PERITONEUM/RETROPERITONEUM/MESENTERY: Trace ascites in the right paracolic gutter. No pneumoperitoneum    PELVIC VISCERA: Underdistention limits evaluation of the urinary bladder.    BONES AND SOFT TISSUES: Anasarca. Degenerative changes of the spine.      IMPRESSION:    Moderate right and moderate to large left pleural effusions with subjacent compressive atelectasis. In addition there is interlobular septal thickening and diffuse ground glass opacities. Findings compatible with pulmonary edema.    No definite noncontrast CT evidence for acute infectious pathology in the abdomen or pelvis.    --- End of Report ---              ABILIO FRANKS MD; Attending Radiologist  This document has been electronically signed. 2021  1:09AM (21 @ 21:17)      CARDIOLOGY TESTING  12 Lead ECG:   Ventricular Rate 141 BPM    Atrial Rate 282 BPM    QRS Duration 112 ms    Q-T Interval 342 ms    QTC Calculation(Bazett) 523 ms    R Axis -45 degrees    T Axis 117 degrees    Diagnosis Line *** Poor data quality, interpretation may be adversely affected  Sinus tachycardia  Left axis deviation  Non-specific intra-ventricular conduction delay    Confirmed by LUAN YORK MD ([id number]) on 2021 3:07:56 PM (21 @ 12:42)      MEDICATIONS  albuterol/ipratropium for Nebulization 3 Nebulizer every 6 hours  aspirin  chewable 81 Oral daily  atorvastatin 40 Oral at bedtime  bisacodyl Suppository 10 Rectal at bedtime  chlorhexidine 0.12% Liquid 15 Oral Mucosa two times a day  chlorhexidine 4% Liquid 1 Topical <User Schedule>  dexMEDEtomidine Infusion 0.2 IV Continuous <Continuous>  dextrose 40% Gel 15 Oral once  dextrose 5%. 1000 IV Continuous <Continuous>  dextrose 5%. 1000 IV Continuous <Continuous>  dextrose 50% Injectable 25 IV Push once  dextrose 50% Injectable 12.5 IV Push once  dextrose 50% Injectable 25 IV Push once  DULoxetine 60 Oral daily  folic acid 1 Oral daily  glucagon  Injectable 1 IntraMuscular once  heparin   Injectable 5000 SubCutaneous every 8 hours  insulin regular Infusion 2 IV Continuous <Continuous>  levothyroxine 175 Oral daily  norepinephrine Infusion 0.05 IV Continuous <Continuous>  nystatin Ointment 1 Topical two times a day  pantoprazole  Injectable 40 IV Push daily  polyethylene glycol 3350 17 Oral daily  propofol Infusion 5 IV Continuous <Continuous>      WEIGHT  Weight (kg): 64 (10-30-21 @ 17:00)      ANTIBIOTICS:      All available historical records have been reviewed       LINDSAY ALBERT  76y, Female  Allergy: No Known Allergies      LOS  18d    CHIEF COMPLAINT: SOB (2021 15:56)      INTERVAL EVENTS/HPI  - No acute events overnight  - T(F): , Max: 100.8 (21 @ 02:00)  - thick tan secretions - lines changed yesterday   - WBC Count: 8.74 (21 @ 06:39)  WBC Count: 7.88 (21 @ 05:25)     - Creatinine, Serum: 1.7 (21 @ 05:25)       ROS  unable to obtain history secondary to patient's mental status     VITALS:  T(F): 100, Max: 100.8 (21 @ 02:00)  HR: 68  BP: 100/53  RR: 24Vital Signs Last 24 Hrs  T(C): 37.8 (2021 05:00), Max: 38.2 (2021 02:00)  T(F): 100 (2021 05:00), Max: 100.8 (2021 02:00)  HR: 68 (2021 05:00) (59 - 77)  BP: 100/53 (2021 05:00) (87/54 - 133/63)  BP(mean): 73 (2021 05:00) (66 - 90)  RR: 24 (2021 05:00) (16 - 29)  SpO2: 99% (2021 05:00) (90% - 100%)    PHYSICAL EXAM:  Gen: NAD, resting in bed  HEENT: Normocephalic, atraumatic  Neck: supple, no lymphadenopathy  CV: Regular rate & regular rhythm  Lungs: decreased BS at bases, no fremitus  Abdomen: Soft, BS present  Ext: Warm, well perfused  Neuro: non focal, awake  Skin: no rash, no erythema  Lines: no phlebitis    FH: Non-contributory  Social Hx: Non-contributory    TESTS & MEASUREMENTS:                        7.5    8.74  )-----------( 296      ( 2021 06:39 )             24.5     11    137  |  100  |  79<HH>  ----------------------------<  221<H>  4.3   |  25  |  1.7<H>    Ca    8.2<L>      2021 05:25    TPro  5.7<L>  /  Alb  2.5<L>  /  TBili  0.4  /  DBili  x   /  AST  85<H>  /  ALT  177<H>  /  AlkPhos  209<H>  11      LIVER FUNCTIONS - ( 2021 05:25 )  Alb: 2.5 g/dL / Pro: 5.7 g/dL / ALK PHOS: 209 U/L / ALT: 177 U/L / AST: 85 U/L / GGT: x           Urinalysis Basic - ( 2021 17:15 )    Color: Yellow / Appearance: Slightly Cloudy / S.025 / pH: x  Gluc: x / Ketone: 15  / Bili: Negative / Urobili: 0.2 mg/dL   Blood: x / Protein: 100 mg/dL / Nitrite: Negative   Leuk Esterase: Small / RBC: 3-5 /HPF / WBC 10-25 /HPF   Sq Epi: x / Non Sq Epi: Few /HPF / Bacteria: x        Culture - Blood (collected 21 @ 19:37)  Source: .Blood None  Preliminary Report (21 @ 04:01):    No growth to date.    Culture - Fungal, Bronchial (collected 21 @ 11:30)  Source: .Bronchial None  Preliminary Report (21 @ 09:30):    Moderate Yeast    Culture - Acid Fast - Bronchial w/Smear (collected 21 @ 11:30)  Source: .Bronchial None    Culture - Bronchial (collected 21 @ 11:30)  Source: Lavage None  Gram Stain (21 @ 07:00):    Few polymorphonuclear leukocytes seen per low power field    No squamous epithelial cells seen per low power field    No organisms seen per oil power field  Final Report (21 @ 21:05):    Normal Respiratory Asia present    Culture - Blood (collected 21 @ 15:29)  Source: .Blood None  Final Report (21 @ 23:00):    No Growth Final    Culture - Blood (collected 10-30-21 @ 05:26)  Source: .Blood None  Final Report (21 @ 19:01):    No Growth Final    Culture - Blood (collected 10-29-21 @ 05:13)  Source: .Blood None  Final Report (21 @ 18:00):    No Growth Final    Culture - Blood (collected 10-28-21 @ 05:35)  Source: .Blood None  Final Report (21 @ 14:00):    No Growth Final    Culture - Blood (collected 10-27-21 @ 05:54)  Source: .Blood None  Final Report (21 @ 20:00):    No Growth Final    Culture - Sputum (collected 10-26-21 @ 20:55)  Source: .Sputum Sputum  Gram Stain (10-27-21 @ 07:06):    Numerous polymorphonuclear leukocytes per low power field    No Squamous epithelial cells per low power field    Moderate Gram positive cocci in pairs seen per oil power field  Final Report (10-28-21 @ 16:02):    Numerous Methicillin Resistant Staphylococcus aureus    Normal Respiratory Asia present  Organism: Methicillin resistant Staphylococcus aureus (10-28-21 @ 16:02)  Organism: Methicillin resistant Staphylococcus aureus (10-28-21 @ 16:02)      -  Amoxicillin/Clavulanic Acid: R >4/2      -  Ampicillin: R >8      -  Ampicillin/Sulbactam: R 16/8      -  Cefazolin: R >16      -  Ceftriaxone: R >32      -  Ciprofloxacin: R >2      -  Clindamycin: R >4      -  Daptomycin: S <=0.25      -  Erythromycin: R >4      -  Gentamicin: S <=1 Should not be used as monotherapy      -  Levofloxacin: R >4      -  Linezolid: S 2      -  Meropenem: R >8      -  Moxifloxacin(Aerobic): R >4      -  Oxacillin: R >2      -  Penicillin: R >8      -  RIF- Rifampin: S <=1 Should not be used as monotherapy      -  Tetra/Doxy: S <=1      -  Trimethoprim/Sulfamethoxazole: S <=0.5/9.5      -  Vancomycin: S 1      Method Type: VINNIE    Culture - Blood (collected 10-26-21 @ 05:37)  Source: .Blood None  Final Report (10-31-21 @ 18:01):    No Growth Final    Culture - Sputum (collected 10-23-21 @ 15:05)  Source: .Sputum Sputum  Gram Stain (10-24-21 @ 14:45):    Numerous polymorphonuclear leukocytes per low power field    Few Squamous epithelial cells per low power field    Numerous Gram Positive Cocci in Clusters per oil power field  Final Report (10-25-21 @ 17:03):    Normal Respiratory Asia present    Culture - Blood (collected 10-23-21 @ 11:45)  Source: .Blood Blood-Peripheral  Final Report (10-28-21 @ 20:00):    No Growth Final    Culture - Urine (collected 10-22-21 @ 11:20)  Source: Catheterized Catheterized  Final Report (10-23-21 @ 22:43):    No growth            INFECTIOUS DISEASES TESTING  Procalcitonin, Serum: 2.09 (21 @ 10:13)  COVID-19 PCR: NotDetec (21 @ 07:00)  Fungitell: <31 (10-29-21 @ 05:13)  Procalcitonin, Serum: 0.67 (10-28-21 @ 05:35)  Procalcitonin, Serum: 0.98 (10-26-21 @ 05:37)  Rapid RVP Result: NotDetec (10-25-21 @ 09:22)  MRSA PCR Result.: Negative (10-23-21 @ 15:16)  Procalcitonin, Serum: 1.82 (10-22-21 @ 19:36)  COVID-19 PCR: NotDetec (10-22-21 @ 09:30)  COVID-19 PCR: NotDetec (10-18-21 @ 22:25)  COVID-19 PCR: NotDetec (10-02-21 @ 15:35)  Procalcitonin, Serum: 0.07 (21 @ 11:17)  COVID-19 PCR: NotDetec (21 @ 21:43)  COVID-19 PCR: NotDetec (21 @ 15:30)      INFLAMMATORY MARKERS  C-Reactive Protein, Serum: 94 mg/L (10-29-21 @ 05:13)  C-Reactive Protein, Serum: 98 mg/L (10-28-21 @ 05:35)  Sedimentation Rate, Erythrocyte: 87 mm/Hr (10-28-21 @ 05:35)      RADIOLOGY & ADDITIONAL TESTS:  I have personally reviewed the last available Chest xray  CXR      CT  CT Chest No Cont:   EXAM:  CT ABDOMEN AND PELVIS OC        EXAM:  CT CHEST            PROCEDURE DATE:  2021            INTERPRETATION:  CLINICAL STATEMENT: Worsening sepsis    TECHNIQUE: Contiguous CT images were obtained of the chest, abdomen and pelvis.  Intravenous Contrast: None.  Oral contrast: was administered.    COMPARISON:  CT chest dated 2021 and CT abdomen and pelvis dated 2019    FINDINGS:    Endotracheal tube tip approximately 2.5 cm above clara.  Enteric tube tip in the stomach.  Partially imaged central venous catheter with tip in the SVC.  Wise catheter balloon within the urinary bladder.    CHEST:    LUNGS, PLEURA AND AIRWAYS: Moderate right and moderate to large left pleural effusions. Subjacent compressive atelectasis. Interlobular septal thickening and diffuse groundglass opacities of the visualized aerated lung. No pneumothorax    HEART AND VESSELS: Hypoattenuating blood pool compatible with anemia. No pericardial effusion. Normal caliber thoracic aorta. Diffuse coronary artery, mitral annular and aortic calcifications.    THORACIC INLET/MEDIASTINUM/LYMPH NODES: 1.1 cm calcified right thyroid gland nodule. Shotty mediastinal lymph nodes may be reactive.    ABDOMEN/PELVIS:    LIVER: Unremarkable.    SPLEEN: Unremarkable.    PANCREAS: Unremarkable.    GALLBLADDER AND BILIARY TREE: Cholelithiasis without CT evidence for pericholecystic inflammation.    ADRENALS: Unremarkable.    KIDNEYS: No hydronephrosis. Bilateral nonobstructing renal calculi versus vascular calcifications.    LYMPH NODES: There are no enlarged abdominal or pelvic lymph nodes.    VASCULATURE: Normal caliber abdominal aorta with atherosclerotic changes.    BOWEL: No evidence for bowel obstruction or bowel wall thickening.    PERITONEUM/RETROPERITONEUM/MESENTERY: Trace ascites in the right paracolic gutter. No pneumoperitoneum    PELVIC VISCERA: Underdistention limits evaluation of the urinary bladder.    BONES AND SOFT TISSUES: Anasarca. Degenerative changes of the spine.      IMPRESSION:    Moderate right and moderate to large left pleural effusions with subjacent compressive atelectasis. In addition there is interlobular septal thickening and diffuse ground glass opacities. Findings compatible with pulmonary edema.    No definite noncontrast CT evidence for acute infectious pathology in the abdomen or pelvis.    --- End of Report ---              ABILIO FRANKS MD; Attending Radiologist  This document has been electronically signed. 2021  1:09AM (21 @ 21:17)      CARDIOLOGY TESTING  12 Lead ECG:   Ventricular Rate 141 BPM    Atrial Rate 282 BPM    QRS Duration 112 ms    Q-T Interval 342 ms    QTC Calculation(Bazett) 523 ms    R Axis -45 degrees    T Axis 117 degrees    Diagnosis Line *** Poor data quality, interpretation may be adversely affected  Sinus tachycardia  Left axis deviation  Non-specific intra-ventricular conduction delay    Confirmed by LUAN YORK MD ([id number]) on 2021 3:07:56 PM (21 @ 12:42)      MEDICATIONS  albuterol/ipratropium for Nebulization 3 Nebulizer every 6 hours  aspirin  chewable 81 Oral daily  atorvastatin 40 Oral at bedtime  bisacodyl Suppository 10 Rectal at bedtime  chlorhexidine 0.12% Liquid 15 Oral Mucosa two times a day  chlorhexidine 4% Liquid 1 Topical <User Schedule>  dexMEDEtomidine Infusion 0.2 IV Continuous <Continuous>  dextrose 40% Gel 15 Oral once  dextrose 5%. 1000 IV Continuous <Continuous>  dextrose 5%. 1000 IV Continuous <Continuous>  dextrose 50% Injectable 25 IV Push once  dextrose 50% Injectable 12.5 IV Push once  dextrose 50% Injectable 25 IV Push once  DULoxetine 60 Oral daily  folic acid 1 Oral daily  glucagon  Injectable 1 IntraMuscular once  heparin   Injectable 5000 SubCutaneous every 8 hours  insulin regular Infusion 2 IV Continuous <Continuous>  levothyroxine 175 Oral daily  norepinephrine Infusion 0.05 IV Continuous <Continuous>  nystatin Ointment 1 Topical two times a day  pantoprazole  Injectable 40 IV Push daily  polyethylene glycol 3350 17 Oral daily  propofol Infusion 5 IV Continuous <Continuous>      WEIGHT  Weight (kg): 64 (10-30-21 @ 17:00)      ANTIBIOTICS:      All available historical records have been reviewed

## 2021-11-09 NOTE — PROGRESS NOTE ADULT - ASSESSMENT
77 y/o F PMH heart failure, moderate severity mitral valve regurgitation, admitted in early October for heart failure and end stemi presents c/o SOB. Pt had a recent discharge from the hospital and XR during that admission showed increased markings in both lung fields consistent with CHF exacerbation    Patient found to have pneumonia likely MRSA and will continue with Vancomycin as per ID. Continues to be febrile despite being on antibiotics. Patient only on Precedex for sedation. SAT patient woke up but was tachypneic on the SBT.    # Acute hypoxemic respiratory failure 2/2 HAP  - Febrile to 102, hypoxic in ED s/p Intubation 10/23/2021, extubated 11/1/2021 and then intubation again 11/2/2021  - CXR showing bilateral opacities  - Sputum growing MRSA, Procal>1>0.9>0.67>2.09  - Patient was on Cefepime and Vanc  - Initial blood culture and repeat negative  - Bronched, very thick secretions.   - AS per ID recs, all Abx are on hold, will re-culture in 24 hours   -All deep line have been replaced   - Repeat Xray in AM  - Possible Trach? surgery on board    # Acute Exacerbation of HFpEF   - Was in [place] last admission due to overdiuresis and Lasix was reduced on discharge  - received IV lasix in ER, Holding Lasix  - geeta, I & O, daily weight  - ECHO on last discharge unchanged except for new, mild pulmonary hypertension   - F/u cardio outpatient     # Hypothyroidism   - Elevated TSH 12.24  - Continue synthroid 175mg  - TSH repeat outpatient in 2 months    # Iron Deficiency Anemia  # Anemia of chronic disease  - On lAst admission given Iron sucrose 200x2  - Baseline Hb 8   - Keep Hb >7  - F/u H&H     # Nocturnal Hypoxemia  - uses home O2    # DM II  - Will continue with Insulin regimen  - Continue Insulin drip    # Elevated LFTs  - RUQ US reviewed, no evidence of stone/inflammation in the gallbladder   - trending down    Status: FULL CODE  GI Prophylaxis: PPI  DVT Prophylaxis: Hep SubQ

## 2021-11-09 NOTE — PROGRESS NOTE ADULT - ASSESSMENT
75 yo female PMHx of HFpEF, HTN, HLD, DM,  SLE on Cellsept and methotrexate, CVA in 2002, recently admitted for Hypoglycemia and ANTONELLA who presents with shortness of breath  presenting for shortness of breath    IMPRESSION  #Acute Hypoxic respiratory failure- intubated/vented     # HAP- sputum cx grew MRSA, 10/26/21  - Procalcitonin, Serum: 1.82 (10.22.21 @ 19:36) -> Procalcitonin, Serum: 0.98 (10.26.21 @ 05:37) -- >Procalcitonin, Serum: 2.09: (11.04.21 @ 10:13)  - vancomycin 10/22-11/4, cefepime 11/4-11/8    # Persistent Fevers  - s/p bronch 11/4 - normal respiratory steven/yeast  - CT Chest No Cont (11.07.21 @ 21:17) >  Moderate right and moderate to large left pleural effusions with subjacent compressive atelectasis. In addition there is interlobular septal thickening and diffuse ground glass opacities. Findings compatible with pulmonary edema.  - CT Abdomen and Pelvis w/ Oral Cont (11.07.21 @ 21:17) >  No definite noncontrast CT evidence for acute infectious pathology in the abdomen or pelvis.    #Transaminitis     #Obesity BMI (kg/m2): 26.7, 30.2  #Abx allergy: NKDA    Recommendations  - Unclear source of fevers -- noted transaminitis and large pleural effusion - check MIR to see if underlying Lupus can be contributing   - check GGT and continue to trend LFTs   - Please obtain diff with next CBC   - consider diagnostic thoracentesis as fevers persisnt     This is a preliminary incomplete pended note, all final recommendations to follow after interview and examination of the patient.      Please call or message on [employer] Teams if with any questions.  Spectra [phone]     75 yo female PMHx of HFpEF, HTN, HLD, DM,  SLE on Cellsept and methotrexate, CVA in 2002, recently admitted for Hypoglycemia and ANTONELLA who presents with shortness of breath  presenting for shortness of breath    IMPRESSION  #Acute Hypoxic respiratory failure- intubated/vented     # HAP- sputum cx grew MRSA, 10/26/21  - Procalcitonin, Serum: 1.82 (10.22.21 @ 19:36) -> Procalcitonin, Serum: 0.98 (10.26.21 @ 05:37) -- >Procalcitonin, Serum: 2.09: (11.04.21 @ 10:13)  - vancomycin 10/22-11/4, cefepime 11/4-11/8    # Persistent Fevers  - s/p bronch 11/4 - normal respiratory steven/yeast  - CT Chest No Cont (11.07.21 @ 21:17) >  Moderate right and moderate to large left pleural effusions with subjacent compressive atelectasis. In addition there is interlobular septal thickening and diffuse ground glass opacities. Findings compatible with pulmonary edema.  - CT Abdomen and Pelvis w/ Oral Cont (11.07.21 @ 21:17) >  No definite noncontrast CT evidence for acute infectious pathology in the abdomen or pelvis.    #Transaminitis     #Obesity BMI (kg/m2): 26.7, 30.2  #Abx allergy: NKDA    Recommendations  - Unclear source of fevers -- noted transaminitis and large pleural effusion - check MIR to see if underlying Lupus can be contributing   - check GGT and continue to trend LFTs   - Please obtain diff with next CBC   - consider diagnostic thoracentesis as fevers persist    Please call or message on Microsoft Teams if with any questions.  Spectra [phone]     77 yo female PMHx of HFpEF, HTN, HLD, DM,  SLE on Cellsept and methotrexate, CVA in 2002, recently admitted for Hypoglycemia and ANTONELLA who presents with shortness of breath  presenting for shortness of breath    IMPRESSION  #Acute Hypoxic respiratory failure- intubated/vented     # HAP- sputum cx grew MRSA, 10/26/21  - Procalcitonin, Serum: 1.82 (10.22.21 @ 19:36) -> Procalcitonin, Serum: 0.98 (10.26.21 @ 05:37) -- >Procalcitonin, Serum: 2.09: (11.04.21 @ 10:13)  - vancomycin 10/22-11/4, cefepime 11/4-11/8    # Persistent Fevers  - s/p bronch 11/4 - normal respiratory steven/yeast  - CT Chest No Cont (11.07.21 @ 21:17) >  Moderate right and moderate to large left pleural effusions with subjacent compressive atelectasis. In addition there is interlobular septal thickening and diffuse ground glass opacities. Findings compatible with pulmonary edema.  - CT Abdomen and Pelvis w/ Oral Cont (11.07.21 @ 21:17) >  No definite noncontrast CT evidence for acute infectious pathology in the abdomen or pelvis.    #Transaminitis     #Obesity BMI (kg/m2): 26.7, 30.2  #Abx allergy: NKDA    Recommendations  - Unclear source of fevers -- noted transaminitis and large pleural effusion - check MIR to see if underlying Lupus can be contributing   - check GGT and continue to trend LFTs   - check fungitell   - Please obtain diff with next CBC   - consider diagnostic thoracentesis as fevers persist    Please call or message on Microsoft Teams if with any questions.  Spectra [phone]

## 2021-11-10 NOTE — PROGRESS NOTE ADULT - ASSESSMENT
76 yr F with SLE on Methotrexate, moderate MR, HFpEF, HTN, hypothyroid, HLD, T2DM, hx of CVA with no residual deficits discharged from hospital earlier this month on 10/2 s/p HF exacerbation and NSTEMI, DC'd on new home oxygen, refused in-pt cardiac cath and is supposed to have outpt cardiac cath with Dr Kowalski.   DC'd on 10/21 from [facility] s/p admission for resolved ANTONELLA, hypoglycemia and NSTEMI  returns to hospital with:    Acute respiratory failure secondary to sepsis from gram neg pneumonia and NSTEMI      - off  antibiotics  as per ID -  re culture at 48hrs off antibiotics    - s/p bronchoscopy and  ct chest / abdomen     - maintain even to negative fluid balance    -  repeat procal is elevated but improving    - w/u and possible thora as per ID   - aspirin, Lipitor   - home meds   - DVT and GI prophylaxis

## 2021-11-10 NOTE — PROGRESS NOTE ADULT - ASSESSMENT
77 y/o F PMH heart failure, moderate severity mitral valve regurgitation, admitted in early October for heart failure and end stemi presents c/o SOB. Pt had a recent discharge from the hospital and XR during that admission showed increased markings in both lung fields consistent with CHF exacerbation    Patient found to have pneumonia likely MRSA and will continue with Vancomycin as per ID. Continues to be febrile despite being on antibiotics. Patient only on Precedex for sedation. SAT patient woke up but was tachypneic on the SBT.    # Acute hypoxemic respiratory failure 2/2 HAP  - Febrile to 102, hypoxic in ED s/p Intubation 10/23/2021, extubated 11/1/2021 and then intubation again 11/2/2021  - CXR showing bilateral opacities  - Sputum growing MRSA, Procal>1>0.9>0.67>2.09  - Patient was on Cefepime and Vanc  - Initial blood culture and repeat negative  - Bronched, very thick secretions.   - AS per ID recs, all Abx are on hold, culture was re-sent today   -All deep line have been replaced   - Repeat Xray in AM  - Precedex was stopped for possible drug induced fever,     # Acute Exacerbation of HFpEF   - Was in [place] last admission due to overdiuresis and Lasix was reduced on discharge  - received IV lasix in ER, Holding Lasix  - geeta, I & O, daily weight  - ECHO on last discharge unchanged except for new, mild pulmonary hypertension   - F/u cardio outpatient     # Hypothyroidism   - Elevated TSH 12.24  - Continue synthroid 175mg  - TSH repeat outpatient in 2 months    # Iron Deficiency Anemia  # Anemia of chronic disease  - On lAst admission given Iron sucrose 200x2  - Baseline Hb 8   - Keep Hb >7  - F/u H&H     # Nocturnal Hypoxemia  - uses home O2    # DM II  - Will continue with Insulin regimen  - Continue Insulin drip    # Elevated LFTs  - RUQ US reviewed, no evidence of stone/inflammation in the gallbladder   - trending down    Status: FULL CODE  GI Prophylaxis: PPI  DVT Prophylaxis: Hep SubQ       77 y/o F PMH heart failure, moderate severity mitral valve regurgitation, admitted in early October for heart failure and end stemi presents c/o SOB. Pt had a recent discharge from the hospital and XR during that admission showed increased markings in both lung fields consistent with CHF exacerbation    Patient found to have pneumonia likely MRSA and will continue with Vancomycin as per ID. Continues to be febrile despite being on antibiotics. Patient only on Precedex for sedation. SAT patient woke up but was tachypneic on the SBT.    # Acute hypoxemic respiratory failure 2/2 HAP  - Febrile to 102, hypoxic in ED s/p Intubation 10/23/2021, extubated 11/1/2021 and then intubation again 11/2/2021  - CXR showing bilateral opacities  - Sputum growing MRSA, Procal>1>0.9>0.67>2.09  - Patient was on Cefepime and Vanc  - Initial blood culture and repeat negative  - Bronched, very thick secretions.   - AS per ID recs, all Abx are on hold, culture was re-sent today   -All deep line have been replaced   - Repeat Xray in AM  - Precedex was stopped for possible drug induced fever, continue with propofol and add fentanyl as needed for agitation    # Acute Exacerbation of HFpEF   - Was in [place] last admission due to overdiuresis and Lasix was reduced on discharge  - received IV lasix in ER, Holding Lasix  - geeta, I & O, daily weight  - ECHO on last discharge unchanged except for new, mild pulmonary hypertension   - F/u cardio outpatient     # Hypothyroidism   - Elevated TSH 12.24  - Continue synthroid 175mg  - TSH repeat outpatient in 2 months    # Iron Deficiency Anemia  # Anemia of chronic disease  - On lAst admission given Iron sucrose 200x2  - Baseline Hb 8   - Keep Hb >7  - F/u H&H     # Nocturnal Hypoxemia  - uses home O2    # DM II  - Will continue with Insulin regimen  - Continue Insulin drip    # Elevated LFTs  - RUQ US reviewed, no evidence of stone/inflammation in the gallbladder   - trending down    Status: FULL CODE  GI Prophylaxis: PPI  DVT Prophylaxis: Hep SubQ

## 2021-11-10 NOTE — PROGRESS NOTE ADULT - SUBJECTIVE AND OBJECTIVE BOX
Patient is a 76y old  Female who presents with a chief complaint of SOB (10 Nov 2021 09:24)        Over Night Events:    no events. remains intubated on vent and on propofol.     CAM-ICU: negative  SAT: passed  SBT: failed    ROS:  See HPI    PHYSICAL EXAM    ICU Vital Signs Last 24 Hrs  T(C): 37 (10 Nov 2021 09:38), Max: 38.1 (09 Nov 2021 11:00)  T(F): 98.6 (10 Nov 2021 09:38), Max: 100.6 (09 Nov 2021 11:00)  HR: 99 (10 Nov 2021 09:00) (76 - 111)  BP: 112/55 (10 Nov 2021 09:00) (104/58 - 122/60)  BP(mean): 79 (10 Nov 2021 09:00) (75 - 87)  ABP: --  ABP(mean): --  RR: 32 (10 Nov 2021 09:00) (19 - 35)  SpO2: 97% (10 Nov 2021 09:00) (97% - 100%)      General:  HEENT: DAHLIA             Lymphatic system: No cervical LN   Lungs: Bilateral BS  Cardiovascular: Regular   Gastrointestinal: Soft, Positive BS  Extremities: No clubbing.  Moves extremities.  Full Range of motion   Skin: Warm, intact  Neurological: No motor or sensory deficit           11-09-21 @ 07:01  -  11-10-21 @ 07:00  --------------------------------------------------------  IN:    Dexmedetomidine: 86 mL    Enteral Tube Flush: 120 mL    Glucerna: 1200 mL    Insulin: 57 mL    Propofol: 296 mL  Total IN: 1759 mL    OUT:    Indwelling Catheter - Urethral (mL): 2260 mL  Total OUT: 2260 mL    Total NET: -501 mL      11-10-21 @ 07:01  -  11-10-21 @ 10:15  --------------------------------------------------------  IN:    Insulin: 5 mL    Propofol: 39.1 mL  Total IN: 44.1 mL    OUT:    Indwelling Catheter - Urethral (mL): 120 mL  Total OUT: 120 mL    Total NET: -75.9 mL          LABS:                            7.8    10.30 )-----------( 324      ( 10 Nov 2021 05:48 )             25.9                                               11-10    144  |  106  |  72<HH>  ----------------------------<  112<H>  3.7   |  26  |  1.6<H>    Ca    7.6<L>      10 Nov 2021 05:48    TPro  5.4<L>  /  Alb  2.5<L>  /  TBili  0.5  /  DBili  x   /  AST  54<H>  /  ALT  109<H>  /  AlkPhos  206<H>  11-10                                                                                           LIVER FUNCTIONS - ( 10 Nov 2021 05:48 )  Alb: 2.5 g/dL / Pro: 5.4 g/dL / ALK PHOS: 206 U/L / ALT: 109 U/L / AST: 54 U/L / GGT: x                                                  Culture - Blood (collected 07 Nov 2021 19:37)  Source: .Blood None  Preliminary Report (09 Nov 2021 04:01):    No growth to date.                                                   Mode: AC/ CMV (Assist Control/ Continuous Mandatory Ventilation)  RR (machine): 12  TV (machine): 350  FiO2: 30  PEEP: 5  MAP: 15  PIP: 24                                      ABG - ( 10 Nov 2021 04:26 )  pH, Arterial: 7.48  pH, Blood: x     /  pCO2: 36    /  pO2: 149   / HCO3: 27    / Base Excess: 3.4   /  SaO2: 98.0                MEDICATIONS  (STANDING):  albuterol/ipratropium for Nebulization 3 milliLiter(s) Nebulizer every 6 hours  aspirin  chewable 81 milliGRAM(s) Oral daily  atorvastatin 40 milliGRAM(s) Oral at bedtime  bisacodyl Suppository 10 milliGRAM(s) Rectal at bedtime  chlorhexidine 0.12% Liquid 15 milliLiter(s) Oral Mucosa two times a day  chlorhexidine 4% Liquid 1 Application(s) Topical <User Schedule>  dexMEDEtomidine Infusion 0.2 MICROgram(s)/kG/Hr (3.2 mL/Hr) IV Continuous <Continuous>  dextrose 40% Gel 15 Gram(s) Oral once  dextrose 5%. 1000 milliLiter(s) (50 mL/Hr) IV Continuous <Continuous>  dextrose 5%. 1000 milliLiter(s) (100 mL/Hr) IV Continuous <Continuous>  dextrose 50% Injectable 25 Gram(s) IV Push once  dextrose 50% Injectable 12.5 Gram(s) IV Push once  dextrose 50% Injectable 25 Gram(s) IV Push once  DULoxetine 60 milliGRAM(s) Oral daily  folic acid 1 milliGRAM(s) Oral daily  furosemide   Injectable 40 milliGRAM(s) IV Push daily  glucagon  Injectable 1 milliGRAM(s) IntraMuscular once  heparin   Injectable 5000 Unit(s) SubCutaneous every 8 hours  insulin regular Infusion 2 Unit(s)/Hr (2 mL/Hr) IV Continuous <Continuous>  levothyroxine 175 MICROGram(s) Oral daily  norepinephrine Infusion 0.05 MICROgram(s)/kG/Min (6 mL/Hr) IV Continuous <Continuous>  nystatin Ointment 1 Application(s) Topical two times a day  pantoprazole  Injectable 40 milliGRAM(s) IV Push daily  polyethylene glycol 3350 17 Gram(s) Oral daily  propofol Infusion 5 MICROgram(s)/kG/Min (1.92 mL/Hr) IV Continuous <Continuous>    MEDICATIONS  (PRN):  acetaminophen     Tablet .. 650 milliGRAM(s) Oral every 6 hours PRN Temp greater or equal to 38C (100.4F)  acetaminophen  Suppository .. 650 milliGRAM(s) Rectal every 4 hours PRN Temp greater or equal to 38C (100.4F)  sodium chloride 0.9% lock flush 10 milliLiter(s) IV Push every 1 hour PRN Pre/post blood products, medications, blood draw, and to maintain line patency      Xrays:                                                                                     ECHO

## 2021-11-10 NOTE — PROGRESS NOTE ADULT - ASSESSMENT
Impression:    Acute Hypoxemic and Hypercapnic Respiratory Failure SP intubation  Pulmonary Edema, Bilateral Effusions, improving  HFpEF exacerbation, moderate MR  Sepsis present on admission  MRSA PNA  NSTEMI  Hypothyroidism   Iron Deficiency Anemia, Anemia of chronic disease    PLAN:    CNS: SAT. continue propofol.    HEENT: oral care.et tube care. follow up surgery for trach    PULMONARY: Hob >45.   Gs x for trach by next week if not extubated  s/p reintubation     CARDIOVASCULAR: keep i<o. daily weights. lasix 40mg iv qd for now     GI: GI prophylaxis.  NG feed   follow LFT for now     RENAL: fu lytes. keep connor.     INFECTIOUS DISEASE: antibx per ID. follow up cultures    HEMATOLOGICAL:  DVT prophylaxis.    ENDOCRINE:  Follow up FS.  Insulin protocol if needed.    MUSCULOSKELETAL: bed to chair position    MICU monitoring.  Keep TLC  keep connor

## 2021-11-10 NOTE — PROGRESS NOTE ADULT - SUBJECTIVE AND OBJECTIVE BOX
Patient is lightly sedated on ventilator       T(F): 98.3 (11-10-21 @ 07:01), Max: 100.6 (11-09-21 @ 11:00)  HR: 111 (11-10-21 @ 08:00)  BP: 117/56 (11-10-21 @ 07:01)  RR: 35 (11-10-21 @ 07:45)  SpO2: 97% (11-10-21 @ 08:00) (97% - 100%)    PHYSICAL EXAM:  GENERAL: NAD  HEAD:  Atraumatic, Normocephalic  NERVOUS SYSTEM:  no focal deficits   CHEST/LUNG:  bilateral rhonchi  HEART: Regular rate and rhythm; No murmurs, rubs, or gallops  ABDOMEN: Soft, Nontender, Nondistended; Bowel sounds present  EXTREMITIES:  2+ Peripheral Pulses, No clubbing, cyanosis, or edema    LABS  11-10    144  |  106  |  72<HH>  ----------------------------<  112<H>  3.7   |  26  |  1.6<H>    Ca    7.6<L>      10 Nov 2021 05:48    TPro  5.4<L>  /  Alb  2.5<L>  /  TBili  0.5  /  DBili  x   /  AST  54<H>  /  ALT  109<H>  /  AlkPhos  206<H>  11-10                          7.8    10.30 )-----------( 324      ( 10 Nov 2021 05:48 )             25.9     Procalcitonin, Serum (11.09.21 @ 06:39)   Procalcitonin, Serum: 1.10  Mode: AC/ CMV (Assist Control/ Continuous Mandatory Ventilation)  RR (machine): 12  TV (machine): 350  FiO2: 30  PEEP: 5    < from: TTE Echo Complete w/o Contrast w/ Doppler (10.19.21 @ 14:47) >    Summary:   1. Left ventricular ejection fraction, by visual estimation, is 50 to 55%.   2. Mildly enlarged left atrium.   3. Mild mitral annular calcification.   4. Moderate mitral valve regurgitation.   5. Moderate tricuspid regurgitation.   6. Mild aortic regurgitation.   7. Sclerotic aortic valve with normal opening.  8. Estimated pulmonary artery systolic pressure is 37.3 mmHg assuming a right atrial pressure of 3 mmHg, which is consistent with borderline pulmonary hypertension.    < end of copied text >    Culture Results:   No growth to date. (11-07-21)  Culture Results:   Normal Respiratory Asia present (11-04-21)  Culture Results:   Moderate Yeast (11-04-21)  Culture Results:   No Growth Final (11-03-21)  Culture Results:   No Growth Final (10-30-21)  Culture Results:   No Growth Final (10-29-21)  Culture Results:   No Growth Final (10-28-21)  Culture Results:   No Growth Final (10-27-21)  Culture Results:   Numerous Methicillin Resistant Staphylococcus aureus  Normal Respiratory Asia present (10-26-21)  Culture Results:   No Growth Final (10-26-21)    RADIOLOGY  < from: Xray Chest 1 View- PORTABLE-Routine (Xray Chest 1 View- PORTABLE-Routine in AM.) (11.09.21 @ 06:33) >  Impression:    Increasing bilateral lung opacities      < end of copied text >    MEDICATIONS  (STANDING):  albuterol/ipratropium for Nebulization 3 milliLiter(s) Nebulizer every 6 hours  aspirin  chewable 81 milliGRAM(s) Oral daily  atorvastatin 40 milliGRAM(s) Oral at bedtime  bisacodyl Suppository 10 milliGRAM(s) Rectal at bedtime  chlorhexidine 0.12% Liquid 15 milliLiter(s) Oral Mucosa two times a day  chlorhexidine 4% Liquid 1 Application(s) Topical <User Schedule>  dexMEDEtomidine Infusion 0.2 MICROgram(s)/kG/Hr (3.2 mL/Hr) IV Continuous <Continuous>  DULoxetine 60 milliGRAM(s) Oral daily  folic acid 1 milliGRAM(s) Oral daily  furosemide   Injectable 40 milliGRAM(s) IV Push daily  glucagon  Injectable 1 milliGRAM(s) IntraMuscular once  heparin   Injectable 5000 Unit(s) SubCutaneous every 8 hours  insulin regular Infusion 2 Unit(s)/Hr (2 mL/Hr) IV Continuous <Continuous>  levothyroxine 175 MICROGram(s) Oral daily  norepinephrine Infusion 0.05 MICROgram(s)/kG/Min (6 mL/Hr) IV Continuous <Continuous>  nystatin Ointment 1 Application(s) Topical two times a day  pantoprazole  Injectable 40 milliGRAM(s) IV Push daily  polyethylene glycol 3350 17 Gram(s) Oral daily  propofol Infusion 5 MICROgram(s)/kG/Min (1.92 mL/Hr) IV Continuous <Continuous>    MEDICATIONS  (PRN):  acetaminophen     Tablet .. 650 milliGRAM(s) Oral every 6 hours PRN Temp greater or equal to 38C (100.4F)  acetaminophen  Suppository .. 650 milliGRAM(s) Rectal every 4 hours PRN Temp greater or equal to 38C (100.4F)  sodium chloride 0.9% lock flush 10 milliLiter(s) IV Push every 1 hour PRN Pre/post blood products, medications, blood draw, and to maintain line patency

## 2021-11-10 NOTE — PROGRESS NOTE ADULT - SUBJECTIVE AND OBJECTIVE BOX
24H events:    Patient is a 76y old Female who presents with a chief complaint of SOB (10 Nov 2021 10:14)    Primary diagnosis of Acute pulmonary edema       Today is hospital day 19d.     PAST MEDICAL & SURGICAL HISTORY  DM (diabetes mellitus)  insulin  fs achs    Hypercholesteremia  statin    Hypertension  hctz    Lupus  as per hx    Fall, initial encounter  as  hx    Stroke  TIA 2002, no deficits    Herniated lumbar intervertebral disc  as per hx    Seizure  keppra    No significant past surgical history      ALLERGIES:  No Known Allergies    MEDICATIONS:  STANDING MEDICATIONS  albuterol/ipratropium for Nebulization 3 milliLiter(s) Nebulizer every 6 hours  aspirin  chewable 81 milliGRAM(s) Oral daily  atorvastatin 40 milliGRAM(s) Oral at bedtime  bisacodyl Suppository 10 milliGRAM(s) Rectal at bedtime  chlorhexidine 0.12% Liquid 15 milliLiter(s) Oral Mucosa two times a day  chlorhexidine 4% Liquid 1 Application(s) Topical <User Schedule>  dexMEDEtomidine Infusion 0.2 MICROgram(s)/kG/Hr IV Continuous <Continuous>  dextrose 40% Gel 15 Gram(s) Oral once  dextrose 5%. 1000 milliLiter(s) IV Continuous <Continuous>  dextrose 5%. 1000 milliLiter(s) IV Continuous <Continuous>  dextrose 50% Injectable 25 Gram(s) IV Push once  dextrose 50% Injectable 12.5 Gram(s) IV Push once  dextrose 50% Injectable 25 Gram(s) IV Push once  DULoxetine 60 milliGRAM(s) Oral daily  fentaNYL   Infusion. 0.5 MICROgram(s)/kG/Hr IV Continuous <Continuous>  folic acid 1 milliGRAM(s) Oral daily  furosemide   Injectable 40 milliGRAM(s) IV Push daily  glucagon  Injectable 1 milliGRAM(s) IntraMuscular once  heparin   Injectable 5000 Unit(s) SubCutaneous every 8 hours  insulin regular Infusion 2 Unit(s)/Hr IV Continuous <Continuous>  levothyroxine 175 MICROGram(s) Oral daily  norepinephrine Infusion 0.05 MICROgram(s)/kG/Min IV Continuous <Continuous>  nystatin Ointment 1 Application(s) Topical two times a day  pantoprazole  Injectable 40 milliGRAM(s) IV Push daily  polyethylene glycol 3350 17 Gram(s) Oral daily  propofol Infusion 5 MICROgram(s)/kG/Min IV Continuous <Continuous>    PRN MEDICATIONS  acetaminophen     Tablet .. 650 milliGRAM(s) Oral every 6 hours PRN  acetaminophen  Suppository .. 650 milliGRAM(s) Rectal every 4 hours PRN  sodium chloride 0.9% lock flush 10 milliLiter(s) IV Push every 1 hour PRN    VITALS:   T(F): 98.6  HR: 99  BP: 112/55  RR: 32  SpO2: 97%    LABS:                        7.8    10.30 )-----------( 324      ( 10 Nov 2021 05:48 )             25.9     11-10    144  |  106  |  72<HH>  ----------------------------<  112<H>  3.7   |  26  |  1.6<H>    Ca    7.6<L>      10 Nov 2021 05:48    TPro  5.4<L>  /  Alb  2.5<L>  /  TBili  0.5  /  DBili  x   /  AST  54<H>  /  ALT  109<H>  /  AlkPhos  206<H>  11-10        ABG - ( 10 Nov 2021 04:26 )  pH, Arterial: 7.48  pH, Blood: x     /  pCO2: 36    /  pO2: 149   / HCO3: 27    / Base Excess: 3.4   /  SaO2: 98.0                  Culture - Blood (collected 07 Nov 2021 19:37)  Source: .Blood None  Preliminary Report (09 Nov 2021 04:01):    No growth to date.          PHYSICAL EXAM:  Gen: intubated, awake off sedation   HEENT: Normocephalic, atraumatic  Neck: supple, no lymphadenopathy  CV: Regular rate & regular rhythm  Lungs: decreased BS at bases, no fremitus  Abdomen: Soft, BS present  Neuro: non focal, sedated  Skin: no rash, no erythema

## 2021-11-11 NOTE — PROGRESS NOTE ADULT - ASSESSMENT
77 y/o F PMH heart failure, moderate severity mitral valve regurgitation, admitted in early October for heart failure and end stemi presents c/o SOB. Pt had a recent discharge from the hospital and XR during that admission showed increased markings in both lung fields consistent with CHF exacerbation    Patient found to have pneumonia likely MRSA and will continue with Vancomycin as per ID. Continues to be febrile despite being on antibiotics. Patient only on Precedex for sedation. SAT patient woke up but was tachypneic on the SBT.    # Acute hypoxemic respiratory failure 2/2 HAP  - Febrile to 102, hypoxic in ED s/p Intubation 10/23/2021, extubated 11/1/2021 and then intubation again 11/2/2021  - CXR showing bilateral opacities  - Sputum growing MRSA, Procal>1>0.9>0.67>2.09  - Patient was on Cefepime and Vanc  - Initial blood culture and repeat negative  - Abx have been on hold for 72 hours, no fever, cultures were negative   -All deep line have been replaced   - Possible trach this friday or Early next week. Son is consented and on board     # Acute Exacerbation of HFpEF   - Was in [place] last admission due to overdiuresis and Lasix was reduced on discharge  - received IV lasix in ER, Holding Lasix  - connor, I & O, daily weight  - ECHO on last discharge unchanged except for new, mild pulmonary hypertension   - F/u cardio outpatient     # Hypothyroidism   - Elevated TSH 12.24  - Continue synthroid 175mg  - TSH repeat outpatient in 2 months    # Iron Deficiency Anemia  # Anemia of chronic disease  - On lAst admission given Iron sucrose 200x2  - Baseline Hb 8   - Keep Hb >7  - F/u H&H     # Nocturnal Hypoxemia  - uses home O2    # DM II  - Will continue with Insulin regimen  - Continue Insulin drip    # Elevated LFTs  - RUQ US reviewed, no evidence of stone/inflammation in the gallbladder   - trending down    Status: FULL CODE  GI Prophylaxis: PPI  DVT Prophylaxis: Hep SubQ

## 2021-11-11 NOTE — PROGRESS NOTE ADULT - ASSESSMENT
Impression:    Acute Hypoxemic and Hypercapnic Respiratory Failure SP intubation  Pulmonary Edema, Bilateral Effusions, improving  HFpEF exacerbation, moderate MR  Sepsis present on admission  MRSA PNA  NSTEMI  Hypothyroidism   Iron Deficiency Anemia, Anemia of chronic disease    PLAN:    CNS: SAT. continue propofol.    HEENT: oral care.et tube care. follow up surgery for trach    PULMONARY: Hob >45.   Gs x for trach by next week if not extubated  s/p reintubation     CARDIOVASCULAR: keep i<o. daily weights. lasix 40mg iv qd for now     GI: GI prophylaxis.  NG feed   follow LFT for now     RENAL: fu lytes. keep connor.     INFECTIOUS DISEASE: antibx per ID. follow up cultures    HEMATOLOGICAL:  DVT prophylaxis.    ENDOCRINE:  Follow up FS.  Insulin protocol if needed.    MUSCULOSKELETAL: bed to chair position    MICU monitoring.  Keep TLC  keep connor Impression:    Acute Hypoxemic and Hypercapnic Respiratory Failure SP intubation  Pulmonary Edema, Bilateral Effusions, improving  HFpEF exacerbation, moderate MR  Sepsis present on admission  MRSA PNA  NSTEMI  Hypothyroidism   Iron Deficiency Anemia, Anemia of chronic disease    PLAN:    CNS: SAT. continue propofol.    HEENT: oral care.et tube care. follow up surgery for trach    PULMONARY: Hob >45. no vent changes  Gs x for trach by next week if not extubated  s/p reintubation     CARDIOVASCULAR: keep i<o. daily weights. lasix 40mg iv qd for now     GI: GI prophylaxis.  NG feed   follow LFT for now     RENAL: fu lytes. keep connor.     INFECTIOUS DISEASE: antibx per ID. follow up cultures    HEMATOLOGICAL:  DVT prophylaxis.    ENDOCRINE:  Follow up FS.  Insulin protocol if needed.    MUSCULOSKELETAL: bed to chair position    MICU monitoring.  Keep TLC  keep connor

## 2021-11-11 NOTE — PROGRESS NOTE ADULT - SUBJECTIVE AND OBJECTIVE BOX
HPI:  75 yo female PMHx of HFpEF, HTN, HLD, DM,  SLE on Cellsept and methotrexate, CVA in 2002, recently admitted for Hypoglycemia and ANTONELLA, discharged yesterday, presenting for shortness of breath. Patient was found hypoxic in ER and was put on bipap. Her blood sugars were also high presumably from non compliance since discharge. Patient has no other acute complaints.     Of note patient was discharged yesterday on reduced dose of Lasix which she did not take at home.      T(C): 35.6 (22 Oct 2021 14:13), Max: 37.3 (22 Oct 2021 10:12)  T(F): 96 (22 Oct 2021 14:13), Max: 99.1 (22 Oct 2021 10:12)  HR: 91 (22 Oct 2021 14:24) (86 - 116)  BP: 127/60 (22 Oct 2021 14:13) (105/51 - 168/87)  BP(mean): --  RR: 23 (22 Oct 2021 14:13) (17 - 50)  SpO2: 98% (22 Oct 2021 14:24) (91% - 100%)    Patient found hypoxic in ER. Blood work reviewed, lactate , Beta hydroxy butyrate and anion gap is elevated, UA Positive. CXR showing bilateral infiltrates. Patient is being admitted for hypoxic resp failure and hyperglycemia  (22 Oct 2021 15:03)        Over Night Events:  Patient seen and examined.   I was called to the bedside to evaluate patient for hypotension and labored respirations, patient given fluid bolus total 750ml, and started on levophed, vasopressin, neosynephrine.       ROS:  See HPI    PHYSICAL EXAM    ICU Vital Signs Last 24 Hrs  T(C): 37.6 (11 Nov 2021 19:00), Max: 37.6 (11 Nov 2021 19:00)  T(F): 99.7 (11 Nov 2021 19:00), Max: 99.7 (11 Nov 2021 19:00)  HR: 123 (11 Nov 2021 21:00) (73 - 133)  BP: 90/54 (11 Nov 2021 21:00) (82/46 - 120/60)  BP(mean): 66 (11 Nov 2021 21:00) (59 - 85)  ABP: --  ABP(mean): --  RR: 18 (11 Nov 2021 21:01) (10 - 32)  SpO2: 91% (11 Nov 2021 21:00) (84% - 100%)      General:  HEENT:        ETT in place        Lungs: Bilateral BS, + diffuse ronchi  Cardiovascular: Regular S1S2 + tachycardia  Abdomen: Soft, Positive BS  Extremities: + digital cyanosis  Skin: cold, diaphoretic  Neurological: sedated    I&O's Detail    10 Nov 2021 07:01  -  11 Nov 2021 07:00  --------------------------------------------------------  IN:    Enteral Tube Flush: 60 mL    FentaNYL: 232.2 mL    Glucerna: 950 mL    Insulin: 41 mL    Propofol: 370.5 mL  Total IN: 1653.7 mL    OUT:    Dexmedetomidine: 0 mL    Indwelling Catheter - Urethral (mL): 1070 mL    Norepinephrine: 0 mL    Rectal Tube (mL): 550 mL  Total OUT: 1620 mL    Total NET: 33.7 mL      11 Nov 2021 07:01  -  11 Nov 2021 23:04  --------------------------------------------------------  IN:    FentaNYL: 136 mL    Insulin: 44 mL    Phenylephrine: 6.8 mL    Propofol: 183 mL    Sodium Chloride 0.9% Bolus: 750 mL  Total IN: 1119.8 mL    OUT:    Indwelling Catheter - Urethral (mL): 645 mL    Rectal Tube (mL): 0 mL  Total OUT: 645 mL    Total NET: 474.8 mL          LABS:                          8.9    14.63 )-----------( 390      ( 11 Nov 2021 19:24 )             29.9         11 Nov 2021 19:24    142    |  101    |  62     ----------------------------<  293    4.2     |  21     |  1.6      Ca    8.2        11 Nov 2021 19:24  Mg     2.6       11 Nov 2021 19:24    TPro  6.1    /  Alb  2.6    /  TBili  0.7    /  DBili  x      /  AST  42     /  ALT  87     /  AlkPhos  175    11 Nov 2021 19:24  Amylase x     lipase x                                                                                              CARDIAC MARKERS ( 11 Nov 2021 19:24 )  x     / 0.70 ng/mL / x     / x     / x                                                            Culture - Blood (collected 10 Nov 2021 15:35)  Source: .Blood None  Preliminary Report (11 Nov 2021 23:01):    No growth to date.                                                   Mode: AC/ CMV (Assist Control/ Continuous Mandatory Ventilation)  RR (machine): 12  TV (machine): 350  FiO2: 100  PEEP: 5  MAP: 9  PIP: 25                                      ABG - ( 11 Nov 2021 19:24 )  pH, Arterial: 7.32  pH, Blood: x     /  pCO2: 39    /  pO2: 107   / HCO3: 20    / Base Excess: -5.6  /  SaO2: 97.4                MEDICATIONS  (STANDING):  albuterol/ipratropium for Nebulization 3 milliLiter(s) Nebulizer every 6 hours  aspirin  chewable 81 milliGRAM(s) Oral daily  atorvastatin 40 milliGRAM(s) Oral at bedtime  bisacodyl Suppository 10 milliGRAM(s) Rectal at bedtime  chlorhexidine 0.12% Liquid 15 milliLiter(s) Oral Mucosa two times a day  chlorhexidine 4% Liquid 1 Application(s) Topical <User Schedule>  dexMEDEtomidine Infusion 0.2 MICROgram(s)/kG/Hr (3.2 mL/Hr) IV Continuous <Continuous>  dextrose 40% Gel 15 Gram(s) Oral once  dextrose 5%. 1000 milliLiter(s) (50 mL/Hr) IV Continuous <Continuous>  dextrose 5%. 1000 milliLiter(s) (100 mL/Hr) IV Continuous <Continuous>  dextrose 50% Injectable 25 Gram(s) IV Push once  dextrose 50% Injectable 12.5 Gram(s) IV Push once  dextrose 50% Injectable 25 Gram(s) IV Push once  DULoxetine 60 milliGRAM(s) Oral daily  fentaNYL   Infusion. 0.5 MICROgram(s)/kG/Hr (3.2 mL/Hr) IV Continuous <Continuous>  folic acid 1 milliGRAM(s) Oral daily  furosemide   Injectable 40 milliGRAM(s) IV Push daily  glucagon  Injectable 1 milliGRAM(s) IntraMuscular once  heparin   Injectable 5000 Unit(s) SubCutaneous every 8 hours  hydrocortisone sodium succinate Injectable 100 milliGRAM(s) IV Push every 8 hours  insulin regular Infusion 2 Unit(s)/Hr (2 mL/Hr) IV Continuous <Continuous>  levothyroxine 175 MICROGram(s) Oral daily  meropenem  IVPB      meropenem  IVPB 1000 milliGRAM(s) IV Intermittent once  norepinephrine Infusion 0.05 MICROgram(s)/kG/Min (6 mL/Hr) IV Continuous <Continuous>  nystatin Ointment 1 Application(s) Topical two times a day  pantoprazole  Injectable 40 milliGRAM(s) IV Push daily  phenylephrine    Infusion 0.1 MICROgram(s)/kG/Min (1.2 mL/Hr) IV Continuous <Continuous>  polyethylene glycol 3350 17 Gram(s) Oral daily  propofol Infusion 5 MICROgram(s)/kG/Min (1.92 mL/Hr) IV Continuous <Continuous>  sodium bicarbonate  Injectable 50 milliEquivalent(s) IV Push once  vancomycin  IVPB 1000 milliGRAM(s) IV Intermittent once  vancomycin  IVPB      vasopressin Infusion 0.04 Unit(s)/Min (2.4 mL/Hr) IV Continuous <Continuous>    MEDICATIONS  (PRN):  acetaminophen     Tablet .. 650 milliGRAM(s) Oral every 6 hours PRN Temp greater or equal to 38C (100.4F)  acetaminophen  Suppository .. 650 milliGRAM(s) Rectal every 4 hours PRN Temp greater or equal to 38C (100.4F)  sodium chloride 0.9% lock flush 10 milliLiter(s) IV Push every 1 hour PRN Pre/post blood products, medications, blood draw, and to maintain line patency          Xrays:  worsening 4 quadrants infiltrates  TLC: left IJ good position  OG: below diaphragm  ET tube:         acceptable distance above lcara                                                                                  IMPRESSION:  acute hypoxemic respiratory failure on ventilator 100% FiO2 - ARDS  BL pneumonia, worsening leukocytosis + lactic acidosis and hypotension consistent with septic shock  NSTEMI: troponin trending down    PLAN:    CNS: sedation    HEENT: oral care    PULMONARY: continue mechanical ventilation, FU ABG at midnight    CARDIOVASCULAR: CVP is 9. Patient on pressors LEVOPHED + Vasopressin +/- Neosynephrine.     GI: GI prophylaxis.  Feeding     RENAL: sodium bicarb one amp IV push. got total , avoiding further IV fluids due to worsening CXR findings and FiO2 requirement    INFECTIOUS DISEASE: IV ABX meropenem and vancomycin    HEMATOLOGICAL:  DVT prophylaxis.     ENDOCRINE:  Follow up FS.  Insulin protocol if needed. Sepsis dose steroids indicated for severe refractory shock.    MUSCULOSKELETAL: bed rest      60 minutes of critical care time spent providing medical care for patient's acute illness/conditions that impairs at least one vital organ system and/or poses a high risk of imminent or life threatening deterioration in the patient's condition. It includes time spent evaluating and treating the patient's acute illness as well as time spent reviewing labs, radiology, discussing goals of care with patient and/or patient's family, and discussing the case with a multidisciplinary team in an effort to prevent further life threatening deterioration or end organ damage. This time is independent of any procedures performed.    Patient condition is rapidly deteriorating despite maximal therapy rendering prognosis extremely poor with very high probability of progressing towards cardiac arrest. Health care proxy contacted by phone and wishes patient to remain full code.

## 2021-11-11 NOTE — PROGRESS NOTE ADULT - ATTENDING COMMENTS
Patient seen and examined independently. I agree with the resident's note, physical exam, and plan except as below.  ICU Vital Signs Last 24 Hrs  T(C): 36.9 (11 Nov 2021 15:00), Max: 36.9 (11 Nov 2021 11:00)  T(F): 98.4 (11 Nov 2021 15:00), Max: 98.5 (11 Nov 2021 11:00)  HR: 108 (11 Nov 2021 16:01) (71 - 110)  BP: 87/51 (11 Nov 2021 16:01) (87/51 - 120/60)  BP(mean): 65 (11 Nov 2021 16:01) (65 - 85)  ABP: --  ABP(mean): --  RR: 21 (11 Nov 2021 16:01) (10 - 29)  SpO2: 97% (11 Nov 2021 15:01) (84% - 100%)      #acute hypoxic resp failure - vent support - plan for TRACH soon, check covid swab  #failed weaning trials - check phosphorus level  #MRSa pna - off abx  #acute on chronic diastolic chf on lV lasix  poor prognosis  discussed with team

## 2021-11-11 NOTE — PROGRESS NOTE ADULT - SUBJECTIVE AND OBJECTIVE BOX
Patient is a 76y old  Female who presents with a chief complaint of SOB (10 Nov 2021 10:52)        Over Night Events:    none. remains vent dependant. afebrile.    CAM-ICU: negative  SAT: passed  SBT: failed    ROS:  See HPI    PHYSICAL EXAM    ICU Vital Signs Last 24 Hrs  T(C): 36.5 (11 Nov 2021 07:01), Max: 37.2 (10 Nov 2021 13:39)  T(F): 97.7 (11 Nov 2021 07:01), Max: 98.9 (10 Nov 2021 13:39)  HR: 110 (11 Nov 2021 07:41) (71 - 110)  BP: 104/57 (11 Nov 2021 07:02) (91/52 - 112/55)  BP(mean): 77 (11 Nov 2021 07:02) (67 - 79)  ABP: --  ABP(mean): --  RR: 26 (11 Nov 2021 07:02) (17 - 32)  SpO2: 91% (11 Nov 2021 07:41) (91% - 100%)      General: NAD  HEENT: DAHLIA              Lymphatic system: No cervical LN   Lungs: Bilateral BS  Cardiovascular: Regular   Gastrointestinal: Soft, Positive BS  Extremities: No clubbing.  Moves extremities.  Full Range of motion   Skin: Warm, intact  Neurological: No motor or sensory deficit           11-10-21 @ 07:01  -  11-11-21 @ 07:00  --------------------------------------------------------  IN:    Enteral Tube Flush: 60 mL    FentaNYL: 232.2 mL    Glucerna: 950 mL    Insulin: 41 mL    Propofol: 370.5 mL  Total IN: 1653.7 mL    OUT:    Dexmedetomidine: 0 mL    Indwelling Catheter - Urethral (mL): 1070 mL    Norepinephrine: 0 mL    Rectal Tube (mL): 550 mL  Total OUT: 1620 mL    Total NET: 33.7 mL      11-11-21 @ 07:01  -  11-11-21 @ 08:48  --------------------------------------------------------  IN:    Insulin: 4 mL  Total IN: 4 mL    OUT:    Indwelling Catheter - Urethral (mL): 90 mL  Total OUT: 90 mL    Total NET: -86 mL          LABS:                            7.7    6.22  )-----------( 271      ( 11 Nov 2021 05:51 )             25.7                                               11-11    141  |  103  |  63<HH>  ----------------------------<  233<H>  3.9   |  26  |  1.3    Ca    7.7<L>      11 Nov 2021 05:51    TPro  5.3<L>  /  Alb  2.4<L>  /  TBili  0.4  /  DBili  x   /  AST  31  /  ALT  85<H>  /  AlkPhos  167<H>  11-11                                                                                           LIVER FUNCTIONS - ( 11 Nov 2021 05:51 )  Alb: 2.4 g/dL / Pro: 5.3 g/dL / ALK PHOS: 167 U/L / ALT: 85 U/L / AST: 31 U/L / GGT: x                                                                                               Mode: AC/ CMV (Assist Control/ Continuous Mandatory Ventilation)  RR (machine): 12  TV (machine): 350  FiO2: 35  PEEP: 5  MAP: 8  PIP: 18                                      ABG - ( 11 Nov 2021 04:27 )  pH, Arterial: 7.47  pH, Blood: x     /  pCO2: 42    /  pO2: 111   / HCO3: 31    / Base Excess: 6.2   /  SaO2: 97.8                MEDICATIONS  (STANDING):  albuterol/ipratropium for Nebulization 3 milliLiter(s) Nebulizer every 6 hours  aspirin  chewable 81 milliGRAM(s) Oral daily  atorvastatin 40 milliGRAM(s) Oral at bedtime  bisacodyl Suppository 10 milliGRAM(s) Rectal at bedtime  chlorhexidine 0.12% Liquid 15 milliLiter(s) Oral Mucosa two times a day  chlorhexidine 4% Liquid 1 Application(s) Topical <User Schedule>  dexMEDEtomidine Infusion 0.2 MICROgram(s)/kG/Hr (3.2 mL/Hr) IV Continuous <Continuous>  dextrose 40% Gel 15 Gram(s) Oral once  dextrose 5%. 1000 milliLiter(s) (50 mL/Hr) IV Continuous <Continuous>  dextrose 5%. 1000 milliLiter(s) (100 mL/Hr) IV Continuous <Continuous>  dextrose 50% Injectable 25 Gram(s) IV Push once  dextrose 50% Injectable 12.5 Gram(s) IV Push once  dextrose 50% Injectable 25 Gram(s) IV Push once  DULoxetine 60 milliGRAM(s) Oral daily  fentaNYL   Infusion. 0.5 MICROgram(s)/kG/Hr (3.2 mL/Hr) IV Continuous <Continuous>  folic acid 1 milliGRAM(s) Oral daily  furosemide   Injectable 40 milliGRAM(s) IV Push daily  glucagon  Injectable 1 milliGRAM(s) IntraMuscular once  heparin   Injectable 5000 Unit(s) SubCutaneous every 8 hours  insulin regular Infusion 2 Unit(s)/Hr (2 mL/Hr) IV Continuous <Continuous>  levothyroxine 175 MICROGram(s) Oral daily  norepinephrine Infusion 0.05 MICROgram(s)/kG/Min (6 mL/Hr) IV Continuous <Continuous>  nystatin Ointment 1 Application(s) Topical two times a day  pantoprazole  Injectable 40 milliGRAM(s) IV Push daily  polyethylene glycol 3350 17 Gram(s) Oral daily  propofol Infusion 5 MICROgram(s)/kG/Min (1.92 mL/Hr) IV Continuous <Continuous>    MEDICATIONS  (PRN):  acetaminophen     Tablet .. 650 milliGRAM(s) Oral every 6 hours PRN Temp greater or equal to 38C (100.4F)  acetaminophen  Suppository .. 650 milliGRAM(s) Rectal every 4 hours PRN Temp greater or equal to 38C (100.4F)  sodium chloride 0.9% lock flush 10 milliLiter(s) IV Push every 1 hour PRN Pre/post blood products, medications, blood draw, and to maintain line patency      Xrays:                                                                                     ECHO     Patient is a 76y old  Female who presents with a chief complaint of SOB (10 Nov 2021 10:52)        Over Night Events:    none. remains vent dependant. afebrile.    CAM-ICU: negative  SAT: passed  SBT: failed    ROS:  See HPI    PHYSICAL EXAM    ICU Vital Signs Last 24 Hrs  T(C): 36.5 (11 Nov 2021 07:01), Max: 37.2 (10 Nov 2021 13:39)  T(F): 97.7 (11 Nov 2021 07:01), Max: 98.9 (10 Nov 2021 13:39)  HR: 110 (11 Nov 2021 07:41) (71 - 110)  BP: 104/57 (11 Nov 2021 07:02) (91/52 - 112/55)  BP(mean): 77 (11 Nov 2021 07:02) (67 - 79)  ABP: --  ABP(mean): --  RR: 26 (11 Nov 2021 07:02) (17 - 32)  SpO2: 91% (11 Nov 2021 07:41) (91% - 100%)      General: NAD  HEENT: DAHLIA              Lymphatic system: No cervical LN   Lungs: decreased Bilateral BS  Cardiovascular: Regular   Gastrointestinal: Soft, Positive BS  Extremities: No clubbing.  Moves extremities.  Full Range of motion   Skin: Warm, intact  Neurological: No motor or sensory deficit           11-10-21 @ 07:01  -  11-11-21 @ 07:00  --------------------------------------------------------  IN:    Enteral Tube Flush: 60 mL    FentaNYL: 232.2 mL    Glucerna: 950 mL    Insulin: 41 mL    Propofol: 370.5 mL  Total IN: 1653.7 mL    OUT:    Dexmedetomidine: 0 mL    Indwelling Catheter - Urethral (mL): 1070 mL    Norepinephrine: 0 mL    Rectal Tube (mL): 550 mL  Total OUT: 1620 mL    Total NET: 33.7 mL      11-11-21 @ 07:01  -  11-11-21 @ 08:48  --------------------------------------------------------  IN:    Insulin: 4 mL  Total IN: 4 mL    OUT:    Indwelling Catheter - Urethral (mL): 90 mL  Total OUT: 90 mL    Total NET: -86 mL          LABS:                            7.7    6.22  )-----------( 271      ( 11 Nov 2021 05:51 )             25.7                                               11-11    141  |  103  |  63<HH>  ----------------------------<  233<H>  3.9   |  26  |  1.3    Ca    7.7<L>      11 Nov 2021 05:51    TPro  5.3<L>  /  Alb  2.4<L>  /  TBili  0.4  /  DBili  x   /  AST  31  /  ALT  85<H>  /  AlkPhos  167<H>  11-11                                                                                           LIVER FUNCTIONS - ( 11 Nov 2021 05:51 )  Alb: 2.4 g/dL / Pro: 5.3 g/dL / ALK PHOS: 167 U/L / ALT: 85 U/L / AST: 31 U/L / GGT: x                                                                                               Mode: AC/ CMV (Assist Control/ Continuous Mandatory Ventilation)  RR (machine): 12  TV (machine): 350  FiO2: 35  PEEP: 5  MAP: 8  PIP: 18                                      ABG - ( 11 Nov 2021 04:27 )  pH, Arterial: 7.47  pH, Blood: x     /  pCO2: 42    /  pO2: 111   / HCO3: 31    / Base Excess: 6.2   /  SaO2: 97.8                MEDICATIONS  (STANDING):  albuterol/ipratropium for Nebulization 3 milliLiter(s) Nebulizer every 6 hours  aspirin  chewable 81 milliGRAM(s) Oral daily  atorvastatin 40 milliGRAM(s) Oral at bedtime  bisacodyl Suppository 10 milliGRAM(s) Rectal at bedtime  chlorhexidine 0.12% Liquid 15 milliLiter(s) Oral Mucosa two times a day  chlorhexidine 4% Liquid 1 Application(s) Topical <User Schedule>  dexMEDEtomidine Infusion 0.2 MICROgram(s)/kG/Hr (3.2 mL/Hr) IV Continuous <Continuous>  dextrose 40% Gel 15 Gram(s) Oral once  dextrose 5%. 1000 milliLiter(s) (50 mL/Hr) IV Continuous <Continuous>  dextrose 5%. 1000 milliLiter(s) (100 mL/Hr) IV Continuous <Continuous>  dextrose 50% Injectable 25 Gram(s) IV Push once  dextrose 50% Injectable 12.5 Gram(s) IV Push once  dextrose 50% Injectable 25 Gram(s) IV Push once  DULoxetine 60 milliGRAM(s) Oral daily  fentaNYL   Infusion. 0.5 MICROgram(s)/kG/Hr (3.2 mL/Hr) IV Continuous <Continuous>  folic acid 1 milliGRAM(s) Oral daily  furosemide   Injectable 40 milliGRAM(s) IV Push daily  glucagon  Injectable 1 milliGRAM(s) IntraMuscular once  heparin   Injectable 5000 Unit(s) SubCutaneous every 8 hours  insulin regular Infusion 2 Unit(s)/Hr (2 mL/Hr) IV Continuous <Continuous>  levothyroxine 175 MICROGram(s) Oral daily  norepinephrine Infusion 0.05 MICROgram(s)/kG/Min (6 mL/Hr) IV Continuous <Continuous>  nystatin Ointment 1 Application(s) Topical two times a day  pantoprazole  Injectable 40 milliGRAM(s) IV Push daily  polyethylene glycol 3350 17 Gram(s) Oral daily  propofol Infusion 5 MICROgram(s)/kG/Min (1.92 mL/Hr) IV Continuous <Continuous>    MEDICATIONS  (PRN):  acetaminophen     Tablet .. 650 milliGRAM(s) Oral every 6 hours PRN Temp greater or equal to 38C (100.4F)  acetaminophen  Suppository .. 650 milliGRAM(s) Rectal every 4 hours PRN Temp greater or equal to 38C (100.4F)  sodium chloride 0.9% lock flush 10 milliLiter(s) IV Push every 1 hour PRN Pre/post blood products, medications, blood draw, and to maintain line patency      Xrays:                                                                                     ECHO

## 2021-11-11 NOTE — PROGRESS NOTE ADULT - SUBJECTIVE AND OBJECTIVE BOX
24H events:    Patient is a 76y old Female who presents with a chief complaint of SOB (11 Nov 2021 08:48)    Primary diagnosis of Acute pulmonary edema       Today is hospital day 20d.     PAST MEDICAL & SURGICAL HISTORY  DM (diabetes mellitus)  insulin  fs achs    Hypercholesteremia  statin    Hypertension  hctz    Lupus  as per hx    Fall, initial encounter  as  hx    Stroke  TIA 2002, no deficits    Herniated lumbar intervertebral disc  as per hx    Seizure  keppra    No significant past surgical history      SOCIAL HISTORY:  Negative for smoking/alcohol/drug use.     ALLERGIES:  No Known Allergies    MEDICATIONS:  STANDING MEDICATIONS  albuterol/ipratropium for Nebulization 3 milliLiter(s) Nebulizer every 6 hours  aspirin  chewable 81 milliGRAM(s) Oral daily  atorvastatin 40 milliGRAM(s) Oral at bedtime  bisacodyl Suppository 10 milliGRAM(s) Rectal at bedtime  chlorhexidine 0.12% Liquid 15 milliLiter(s) Oral Mucosa two times a day  chlorhexidine 4% Liquid 1 Application(s) Topical <User Schedule>  dexMEDEtomidine Infusion 0.2 MICROgram(s)/kG/Hr IV Continuous <Continuous>  dextrose 40% Gel 15 Gram(s) Oral once  dextrose 5%. 1000 milliLiter(s) IV Continuous <Continuous>  dextrose 5%. 1000 milliLiter(s) IV Continuous <Continuous>  dextrose 50% Injectable 25 Gram(s) IV Push once  dextrose 50% Injectable 12.5 Gram(s) IV Push once  dextrose 50% Injectable 25 Gram(s) IV Push once  DULoxetine 60 milliGRAM(s) Oral daily  fentaNYL   Infusion. 0.5 MICROgram(s)/kG/Hr IV Continuous <Continuous>  folic acid 1 milliGRAM(s) Oral daily  furosemide   Injectable 40 milliGRAM(s) IV Push daily  glucagon  Injectable 1 milliGRAM(s) IntraMuscular once  heparin   Injectable 5000 Unit(s) SubCutaneous every 8 hours  insulin regular Infusion 2 Unit(s)/Hr IV Continuous <Continuous>  levothyroxine 175 MICROGram(s) Oral daily  norepinephrine Infusion 0.05 MICROgram(s)/kG/Min IV Continuous <Continuous>  nystatin Ointment 1 Application(s) Topical two times a day  pantoprazole  Injectable 40 milliGRAM(s) IV Push daily  polyethylene glycol 3350 17 Gram(s) Oral daily  propofol Infusion 5 MICROgram(s)/kG/Min IV Continuous <Continuous>    PRN MEDICATIONS  acetaminophen     Tablet .. 650 milliGRAM(s) Oral every 6 hours PRN  acetaminophen  Suppository .. 650 milliGRAM(s) Rectal every 4 hours PRN  sodium chloride 0.9% lock flush 10 milliLiter(s) IV Push every 1 hour PRN    VITALS:   T(F): 98.5  HR: 107  BP: 120/60  RR: 25  SpO2: 85%    LABS:                        7.7    6.22  )-----------( 271      ( 11 Nov 2021 05:51 )             25.7     11-11    141  |  103  |  63<HH>  ----------------------------<  233<H>  3.9   |  26  |  1.3    Ca    7.7<L>      11 Nov 2021 05:51    TPro  5.3<L>  /  Alb  2.4<L>  /  TBili  0.4  /  DBili  x   /  AST  31  /  ALT  85<H>  /  AlkPhos  167<H>  11-11        ABG - ( 11 Nov 2021 04:27 )  pH, Arterial: 7.47  pH, Blood: x     /  pCO2: 42    /  pO2: 111   / HCO3: 31    / Base Excess: 6.2   /  SaO2: 97.8          PHYSICAL EXAM:  Gen: intubated, awake off sedation   HEENT: Normocephalic, atraumatic  Neck: supple, no lymphadenopathy  CV: Regular rate & regular rhythm  Lungs: decreased BS at bases, no fremitus  Abdomen: Soft, BS present  Neuro: non focal, sedated  Skin: no rash, no erythema

## 2021-11-11 NOTE — PROGRESS NOTE ADULT - ASSESSMENT
75 y/o F PMH heart failure, moderate severity mitral valve regurgitation, admitted in early October for heart failure and end stemi presents c/o SOB. Pt had a recent discharge from the hospital and XR during that admission showed increased markings in both lung fields consistent with CHF exacerbation    Patient found to have pneumonia likely MRSA and will continue with Vancomycin as per ID. Continues to be febrile despite being on antibiotics. Patient only on Precedex for sedation. SAT patient woke up but was tachypneic on the SBT.    # Acute hypoxemic respiratory failure 2/2 HAP  - Febrile to 102, hypoxic in ED s/p Intubation 10/23/2021, extubated 11/1/2021 and then intubation again 11/2/2021  - CXR showing bilateral opacities  - Sputum growing MRSA, Procal>1>0.9>0.67>2.09  - Patient was on Cefepime and Vanc  - Initial blood culture and repeat negative  - All Abx are on hold ( 72 hours now ), afebrile, cultures are negative so far   -All deep line have been replaced   - Repeat Xray in AM  - Plan is for tracheostomy by surgery, probably this Friday or early next week     # Acute Exacerbation of HFpEF   - Was in [place] last admission due to overdiuresis and Lasix was reduced on discharge  - received IV lasix in ER, Holding Lasix  - geeta, I & O, daily weight  - ECHO on last discharge unchanged except for new, mild pulmonary hypertension   - F/u cardio outpatient     # Hypothyroidism   - Elevated TSH 12.24  - Continue synthroid 175mg  - TSH repeat outpatient in 2 months    # Iron Deficiency Anemia  # Anemia of chronic disease  - On lAst admission given Iron sucrose 200x2  - Baseline Hb 8   - Keep Hb >7  - F/u H&H     # Nocturnal Hypoxemia  - uses home O2    # DM II  - Will continue with Insulin regimen  - Continue Insulin drip    # Elevated LFTs  - RUQ US reviewed, no evidence of stone/inflammation in the gallbladder   - trending down    Status: FULL CODE  GI Prophylaxis: PPI  DVT Prophylaxis: Hep SubQ

## 2021-11-11 NOTE — PROGRESS NOTE ADULT - SUBJECTIVE AND OBJECTIVE BOX
Patient is seen and examined at the bed side, is afebrile.  The Leukocytosis trended down to normal.      REVIEW OF SYSTEMS: Unable to obtain due to mental status       ALLERGIES: No Known Allergies      ICU Vital Signs Last 24 Hrs  T(C): 37.6 (11 Nov 2021 19:00), Max: 37.6 (11 Nov 2021 19:00)  T(F): 99.7 (11 Nov 2021 19:00), Max: 99.7 (11 Nov 2021 19:00)  HR: 123 (11 Nov 2021 18:00) (71 - 123)  BP: 82/46 (11 Nov 2021 18:00) (82/46 - 120/60)  BP(mean): 59 (11 Nov 2021 18:00) (59 - 85)  ABP: --  ABP(mean): --  RR: 32 (11 Nov 2021 19:00) (10 - 32)  SpO2: 98% (11 Nov 2021 19:00) (84% - 100%)      PHYSICAL EXAM:  GENERAL: Intubated/vented   CHEST/LUNG: Not using accessory muscles   HEART: s1 and s2 present  ABDOMEN:  Nontender and  Nondistended  EXTREMITIES: No pedal  edema  CNS: Intubated/vented       LABS:                      7.7    6.22  )-----------( 271      ( 11 Nov 2021 05:51 )             25.7                          7.5    14.77 )-----------( 392      ( 04 Nov 2021 06:00 )             24.1       11-11    141  |  103  |  63<HH>  ----------------------------<  233<H>  3.9   |  26  |  1.3    Ca    7.7<L>      11 Nov 2021 05:51    TPro  5.3<L>  /  Alb  2.4<L>  /  TBili  0.4  /  DBili  x   /  AST  31  /  ALT  85<H>  /  AlkPhos  167<H>  11-11 11-07    139  |  100  |  73<HH>  ----------------------------<  148<H>  4.5   |  27  |  1.6<H>    Ca    8.1<L>      07 Nov 2021 05:49    TPro  5.8<L>  /  Alb  2.7<L>  /  TBili  0.5  /  DBili  x   /  AST  72<H>  /  ALT  201<H>  /  AlkPhos  197<H>  11-07      CAPILLARY BLOOD GLUCOSE  233 (28 Oct 2021 07:00)      POCT Blood Glucose.: 156 mg/dL (28 Oct 2021 16:18)  POCT Blood Glucose.: 252 mg/dL (28 Oct 2021 14:13)  POCT Blood Glucose.: 259 mg/dL (28 Oct 2021 12:05)  POCT Blood Glucose.: 92 mg/dL (28 Oct 2021 10:25)  POCT Blood Glucose.: 77 mg/dL (28 Oct 2021 09:13)  POCT Blood Glucose.: 233 mg/dL (28 Oct 2021 06:51    ABG - ( 28 Oct 2021 16:19 )  pH, Arterial: 7.51  pH, Blood: x     /  pCO2: 44    /  pO2: 108   / HCO3: 35    / Base Excess: 11.0  /  SaO2: 97.5        Vancomycin Level, Random (11.04.21 @ 10:13) : 23.6:   Vancomycin Level, Trough (11.01.21 @ 00:25): 40.9  Vancomycin Level, Random (10.30.21 @ 05:55) : 28.2:   Vancomycin Level, Random (10.28.21 @ 05:35)   Vancomycin Level, Random: 17.1      MEDICATIONS  (STANDING):    albuterol/ipratropium for Nebulization 3 milliLiter(s) Nebulizer every 6 hours  aspirin  chewable 81 milliGRAM(s) Oral daily  atorvastatin 40 milliGRAM(s) Oral at bedtime  bisacodyl Suppository 10 milliGRAM(s) Rectal at bedtime  chlorhexidine 0.12% Liquid 15 milliLiter(s) Oral Mucosa two times a day  chlorhexidine 4% Liquid 1 Application(s) Topical <User Schedule>  dexMEDEtomidine Infusion 0.2 MICROgram(s)/kG/Hr (3.2 mL/Hr) IV Continuous <Continuous>  DULoxetine 60 milliGRAM(s) Oral daily  fentaNYL   Infusion. 0.5 MICROgram(s)/kG/Hr (3.2 mL/Hr) IV Continuous <Continuous>  folic acid 1 milliGRAM(s) Oral daily  furosemide   Injectable 40 milliGRAM(s) IV Push daily  glucagon  Injectable 1 milliGRAM(s) IntraMuscular once  heparin   Injectable 5000 Unit(s) SubCutaneous every 8 hours  insulin regular Infusion 2 Unit(s)/Hr (2 mL/Hr) IV Continuous <Continuous>  levothyroxine 175 MICROGram(s) Oral daily  norepinephrine Infusion 0.05 MICROgram(s)/kG/Min (6 mL/Hr) IV Continuous <Continuous>  nystatin Ointment 1 Application(s) Topical two times a day  pantoprazole  Injectable 40 milliGRAM(s) IV Push daily  polyethylene glycol 3350 17 Gram(s) Oral daily  propofol Infusion 5 MICROgram(s)/kG/Min (1.92 mL/Hr) IV Continuous <Continuous>  sodium chloride 0.9% Bolus 500 milliLiter(s) IV Bolus once        RADIOLOGY & ADDITIONAL TESTS:    < from: Xray Kidney Ureter Bladder (11.11.21 @ 11:12) >  IMPRESSION: Normal bowel gas pattern    < end of copied text >    < from: Xray Chest 1 View- PORTABLE-Routine (Xray Chest 1 View- PORTABLE-Routine in AM.) (11.11.21 @ 05:35) >  Bilateral opacities and left pleural effusion unchanged. No air leak.    < end of copied text >  < from: CT Chest No Cont (11.07.21 @ 21:17) >  Moderate right and moderate to large left pleural effusions with subjacent compressive atelectasis. In addition there is interlobular septal thickening and diffuse ground glass opacities. Findings compatible with pulmonary edema.    No definite noncontrast CT evidence for acute infectious pathology in the abdomen or pelvis.    < end of copied text >  < from: CT Abdomen and Pelvis w/ Oral Cont (11.07.21 @ 21:17) >      < end of copied text >      < from: Xray Chest 1 View-PORTABLE IMMEDIATE (Xray Chest 1 View-PORTABLE IMMEDIATE .) (11.07.21 @ 11:54) >  Support devices: Right IJ central venous line, endotracheal and enteric tubes.    Cardiac/mediastinum/hilum: No significant change    Lung parenchyma/Pleura: Stable bilateral opacities. No definite pneumothorax.    Skeleton/soft tissues: No significant change.    Impression:Stable bilateral opacities. No definite pneumothorax.      < from: Xray Chest 1 View- PORTABLE-Routine (Xray Chest 1 View- PORTABLE-Routine in AM.) (11.06.21 @ 06:41) >  Stable bilateral pleural effusion/opacities.  Stable support devices.    < from: Xray Chest 1 View- PORTABLE-Routine (Xray Chest 1 View- PORTABLE-Routine in AM.) (10.28.21 @ 07:09) >  Tip of endotracheal tube is at the clara and retraction is recommended.    Stable bilateral lung opacities      < from: Xray Chest 1 View- PORTABLE-Routine (Xray Chest 1 View- PORTABLE-Routine in AM.) (10.26.21 @ 06:04) >  Supportdevices: Right IJ line endotracheal tube and enteric tube are in satisfactory position.    Cardiac/mediastinum/hilum: Unremarkable.    Lung parenchyma/Pleura: Hazy bilateral opacities/effusions left greater than right are unchanged. No pneumothorax.        MICROBIOLOGY DATA:    Culture - Blood (11.07.21 @ 19:37)   Specimen Source: .Blood None   Culture Results: No growth to date.     Culture - Fungal, Bronchial (11.04.21 @ 11:30)   Specimen Source: .Bronchial None   Culture Results: Moderate Yeast     Culture - Acid Fast - Bronchial w/Smear (11.04.21 @ 11:30)   Specimen Source: .Bronchial None   Acid Fast Bacilli Smear: No acid fast bacilli seen by fluorochrome stain   Culture Results: Culture is being performed.     Clostridium difficile Toxin by PCR (11.04.21 @ 08:45)   Clostridium difficile Toxin by PCR: RESULT INTERPRETATION: Not Detected -     COVID-19 PCR (11.04.21 @ 07:00)   COVID-19 PCR: NotDetec:     Culture - Blood (11.03.21 @ 15:29)   Specimen Source: .Blood None   Culture Results: No Growth Final     Culture - Sputum . (10.26.21 @ 20:55)   - Amoxicillin/Clavulanic Acid: R >4/2   - Ampicillin: R >8   - Ampicillin/Sulbactam: R 16/8   - Cefazolin: R >16   - Ceftriaxone: R >32   - Ciprofloxacin: R >2   - Clindamycin: R >4   - Daptomycin: S <=0.25   - Erythromycin: R >4   - Gentamicin: S <=1 Should not be used as monotherapy   - Levofloxacin: R >4   - Linezolid: S 2   - Meropenem: R >8   - Moxifloxacin(Aerobic): R >4   - Oxacillin: R >2   - Penicillin: R >8   - RIF- Rifampin: S <=1 Should not be used as monotherapy   - Tetra/Doxy: S <=1   - Trimethoprim/Sulfamethoxazole: S <=0.5/9.5   - Vancomycin: S 1   Gram Stain:   Numerous polymorphonuclear leukocytes per low power field   No Squamous epithelial cells per low power field   Moderate Gram positive cocci in pairs seen per oil power field   Specimen Source: .Sputum Sputum   Culture Results: Numerous Methicillin Resistant Staphylococcus aureus

## 2021-11-11 NOTE — PROGRESS NOTE ADULT - ASSESSMENT
75 yo female PMHx of HFpEF, HTN, HLD, DM,  SLE on Cellsept and methotrexate, CVA in 2002, recently admitted for Hypoglycemia and ANTONELLA who presents with shortness of breath  presenting for shortness of breath    IMPRESSION  #Acute Hypoxic respiratory failure- intubated/vented   #Fevers - presumed HAP- sputum cx grew MRSA, 10/26/21  - Blood Cx 10/23 NG  - Sputum cx 10/23 NG   - MRSA Nares 10/23 negative   - RVP negative  - Procalcitonin, Serum: 1.82 (10.22.21 @ 19:36) -> Procalcitonin, Serum: 0.98 (10.26.21 @ 05:37)  # Large Left pleural effussion- on CT chest 11/7/21  #Abx allergy: NKDA    would recommend:    1. Monitor OFf  Abx  2. Monitor Temp. and c/w supportive care  3. Management of Vent as per ICU protocol  4. Aspiration precaution  5. Consider Left thoracentesis     d/w ICU team     Attending Attestation:    Spent more than 35 minutes on total encounter, more than 50 % of the visit was spent counseling and/or coordinating care by the Attending physician. 77 yo female PMHx of HFpEF, HTN, HLD, DM,  SLE on Cellsept and methotrexate, CVA in 2002, recently admitted for Hypoglycemia and ANTONELLA who presents with shortness of breath  presenting for shortness of breath    IMPRESSION  #Acute Hypoxic respiratory failure- intubated/vented   #Fevers - presumed HAP- sputum cx grew MRSA, 10/26/21  - Blood Cx 10/23 NG  - Sputum cx 10/23 NG   - MRSA Nares 10/23 negative   - RVP negative  - Procalcitonin, Serum: 1.82 (10.22.21 @ 19:36) -> Procalcitonin, Serum: 0.98 (10.26.21 @ 05:37)  # Large Left pleural effussion- on CT chest 11/7/21  #Abx allergy: NKDA    would recommend:    1. Monitor OFF  Abx  2. Monitor Temp. and c/w supportive care  3. Management of Vent as per ICU protocol  4. Aspiration precaution  5. Consider Left thoracentesis     d/w ICU team     Attending Attestation:    Spent more than 35 minutes on total encounter, more than 50 % of the visit was spent counseling and/or coordinating care by the Attending physician.

## 2021-11-11 NOTE — PROGRESS NOTE ADULT - SUBJECTIVE AND OBJECTIVE BOX
24H events:    Patient is a 76y old Female who presents with a chief complaint of SOB (11 Nov 2021 11:21)    Primary diagnosis of Acute pulmonary edema       Today is hospital day 20d.     PAST MEDICAL & SURGICAL HISTORY  DM (diabetes mellitus)  insulin  fs achs    Hypercholesteremia  statin    Hypertension  hctz    Lupus  as per hx    Fall, initial encounter  as  hx    Stroke  TIA 2002, no deficits    Herniated lumbar intervertebral disc  as per hx    Seizure  keppra    No significant past surgical history      SOCIAL HISTORY:  Negative for smoking/alcohol/drug use.     ALLERGIES:  No Known Allergies    MEDICATIONS:  STANDING MEDICATIONS  albuterol/ipratropium for Nebulization 3 milliLiter(s) Nebulizer every 6 hours  aspirin  chewable 81 milliGRAM(s) Oral daily  atorvastatin 40 milliGRAM(s) Oral at bedtime  bisacodyl Suppository 10 milliGRAM(s) Rectal at bedtime  chlorhexidine 0.12% Liquid 15 milliLiter(s) Oral Mucosa two times a day  chlorhexidine 4% Liquid 1 Application(s) Topical <User Schedule>  dexMEDEtomidine Infusion 0.2 MICROgram(s)/kG/Hr IV Continuous <Continuous>  dextrose 40% Gel 15 Gram(s) Oral once  dextrose 5%. 1000 milliLiter(s) IV Continuous <Continuous>  dextrose 5%. 1000 milliLiter(s) IV Continuous <Continuous>  dextrose 50% Injectable 25 Gram(s) IV Push once  dextrose 50% Injectable 12.5 Gram(s) IV Push once  dextrose 50% Injectable 25 Gram(s) IV Push once  DULoxetine 60 milliGRAM(s) Oral daily  fentaNYL   Infusion. 0.5 MICROgram(s)/kG/Hr IV Continuous <Continuous>  folic acid 1 milliGRAM(s) Oral daily  furosemide   Injectable 40 milliGRAM(s) IV Push daily  glucagon  Injectable 1 milliGRAM(s) IntraMuscular once  heparin   Injectable 5000 Unit(s) SubCutaneous every 8 hours  insulin regular Infusion 2 Unit(s)/Hr IV Continuous <Continuous>  levothyroxine 175 MICROGram(s) Oral daily  norepinephrine Infusion 0.05 MICROgram(s)/kG/Min IV Continuous <Continuous>  nystatin Ointment 1 Application(s) Topical two times a day  pantoprazole  Injectable 40 milliGRAM(s) IV Push daily  polyethylene glycol 3350 17 Gram(s) Oral daily  propofol Infusion 5 MICROgram(s)/kG/Min IV Continuous <Continuous>    PRN MEDICATIONS  acetaminophen     Tablet .. 650 milliGRAM(s) Oral every 6 hours PRN  acetaminophen  Suppository .. 650 milliGRAM(s) Rectal every 4 hours PRN  sodium chloride 0.9% lock flush 10 milliLiter(s) IV Push every 1 hour PRN    VITALS:   T(F): 98.5  HR: 107  BP: 120/60  RR: 25  SpO2: 85%    LABS:                        7.7    6.22  )-----------( 271      ( 11 Nov 2021 05:51 )             25.7     11-11    141  |  103  |  63<HH>  ----------------------------<  233<H>  3.9   |  26  |  1.3    Ca    7.7<L>      11 Nov 2021 05:51    TPro  5.3<L>  /  Alb  2.4<L>  /  TBili  0.4  /  DBili  x   /  AST  31  /  ALT  85<H>  /  AlkPhos  167<H>  11-11        ABG - ( 11 Nov 2021 04:27 )  pH, Arterial: 7.47  pH, Blood: x     /  pCO2: 42    /  pO2: 111   / HCO3: 31    / Base Excess: 6.2   /  SaO2: 97.8                      RADIOLOGY:    PHYSICAL EXAM:  GENERAL: NAD, well-groomed, well-developed  HEAD:  Atraumatic, Normocephalic  EYES: EOMI, PERRLA, conjunctiva and sclera clear  ENMT: No tonsillar erythema, exudates, or enlargement; Moist mucous membranes, Good dentition, No lesions  NECK: Supple, No JVD, Normal thyroid  NERVOUS SYSTEM:  Alert & Oriented X3, Good concentration; Motor Strength 5/5 B/L upper and lower extremities; DTRs 2+ intact and symmetric  CHEST/LUNG: Clear to percussion bilaterally; No rales, rhonchi, wheezing, or rubs  HEART: Regular rate and rhythm; No murmurs, rubs, or gallops  ABDOMEN: Soft, Nontender, Nondistended; Bowel sounds present  EXTREMITIES:  2+ Peripheral Pulses, No clubbing, cyanosis, or edema  LYMPH: No lymphadenopathy noted  SKIN: No rashes or lesions

## 2021-11-12 NOTE — CHART NOTE - NSCHARTNOTEFT_GEN_A_CORE
code blue called @ 6 am . CPR/ACLS performed , after 25 mins ROSC achieved.  Pt's family was called and I spoke w/ son Pablo who maintains full code status.  pt was on triple pressors.  will now add IV epinephrine, IC calcium gluconate , IV Bicarb drip.  pt's condition is v. poor.

## 2021-11-12 NOTE — PROGRESS NOTE ADULT - ATTENDING COMMENTS
s/p code blue. w/ ROSC   acute hypoxemic respiratory failure  severe acidosis with elevated lactate  b/l pneumonia    Plan  pt started on IV epinephrine, IV calcium gluconate, IV sodium bicarb,   cont: IV levophed, IV neosynephrine, IV vasopressin  bicarb drip  prognosis is extremely poor   family notified by phone    critical care 45 minutes  excluding 20 minutes of CPR

## 2021-11-12 NOTE — PROGRESS NOTE ADULT - PROVIDER SPECIALTY LIST ADULT
Cardiology
Critical Care
Critical Care
Hospitalist
Infectious Disease
Internal Medicine
Pulmonology
Critical Care
Hospitalist
Infectious Disease
Internal Medicine
Internal Medicine
Nutrition Support
Pulmonology
Cardiology
Critical Care
Hospitalist
Infectious Disease
Internal Medicine
Pulmonology
Surgery
Cardiology
Critical Care
Hospitalist
Infectious Disease
Infectious Disease
Internal Medicine
Pulmonology
Infectious Disease
Hospitalist
Infectious Disease

## 2021-11-12 NOTE — PROGRESS NOTE ADULT - SUBJECTIVE AND OBJECTIVE BOX
77 yo W female adm, originally w/ CHF. requiring intubation.  Pt was extubated but later required intubation.  Pt developed pneumonia and condition slowly deteroriated .  Last night. pt was noted to have increased work of breathing and hypotension levophed restarted.  Pt's family was called regarding pt's worsening status.  They maintained full code status.  This am pt went into asystole and ACLS/CPR was performed. ROSC was achieved, family was called again regarding events and maintains pt to be full code.    GENERAL:  75y/o Female pt intubated in agonal breathing  HEAD:  Atraumatic, Normocephalic  EYES: EOMI, PERRLA, conjunctiva and sclera clear pupils fixed dilated  NECK: Supple, No JVD, no cervical lymphadenopathy, non-tender  CHEST/LUNG: Clear to auscultation bilaterally; No wheeze, rhonchi, or rales  HEART: simus tachy  rhythm; S1&S2  ABDOMEN: Soft, Nontender, Nondistended x 4 quadrants; Bowel sounds absent  EXTREMITIES:   thready Pulses Present, extremities cold  PSYCH: unresponsive  NEUROLOGY: unresponsive  SKIN: WNL                            8.9    14.63 )-----------( 390      ( 11 Nov 2021 19:24 )             29.9       11-11    142  |  101  |  62<HH>  ----------------------------<  293<H>  4.2   |  21  |  1.6<H>    Ca    8.2<L>      11 Nov 2021 19:24  Mg     2.6     11-11    TPro  6.1  /  Alb  2.6<L>  /  TBili  0.7  /  DBili  x   /  AST  42<H>  /  ALT  87<H>  /  AlkPhos  175<H>  11-11          ABG - ( 12 Nov 2021 03:51 )  pH, Arterial: 7.10  pH, Blood: x     /  pCO2: 57    /  pO2: 66    / HCO3: 18    / Base Excess: -11.9 /  SaO2: 88.5                        Lactate Trend      CARDIAC MARKERS ( 11 Nov 2021 19:24 )  x     / 0.70 ng/mL / x     / x     / x            CAPILLARY BLOOD GLUCOSE      POCT Blood Glucose.: 83 mg/dL (12 Nov 2021 05:11)        Culture Results:   No growth to date. (11-10 @ 15:35)  Culture Results:   No growth to date. (11-07 @ 19:37)  Culture Results:   Normal Respiratory [place] present (11-04 @ 11:30)  Culture Results:   Culture is being performed. (11-04 @ 11:30)  Culture Results:   Moderate Yeast (11-04 @ 11:30)  Culture Results:   No Growth Final (11-03 @ 15:29)  Culture Results:   No Growth Final (10-30 @ 05:26)  Culture Results:   No Growth Final (10-29 @ 05:13)  Culture Results:   No Growth Final (10-28 @ 05:35)  Culture Results:   No Growth Final (10-27 @ 05:54)  Culture Results:   Numerous Methicillin Resistant Staphylococcus aureus  Normal Respiratory [place] present (10-26 @ 20:55)  Culture Results:   No Growth Final (10-26 @ 05:37)  Culture Results:   Normal Respiratory [place] present (10-23 @ 15:05)  Culture Results:   No Growth Final (10-23 @ 11:45)  Culture Results:   No growth (10-22 @ 11:20)

## 2021-11-12 NOTE — DISCHARGE NOTE FOR THE EXPIRED PATIENT - HOSPITAL COURSE
77 y/o F PMH heart failure, moderate severity mitral valve regurgitation, admitted in early October for heart failure and end stemi presents c/o SOB. Pt had a recent discharge from the hospital and XR during that admission showed increased markings in both lung fields consistent with CHF exacerbation    Patient found to have pneumonia likely MRSA and will continue with Vancomycin as per ID. Continues to be febrile despite being on antibiotics. Patient only on Precedex for sedation. SAT patient woke up but was tachypneic on the SBT.    # Acute hypoxemic respiratory failure 2/2 HAP  - Febrile to 102, hypoxic in ED s/p Intubation 10/23/2021, extubated 11/1/2021 and then intubation again 11/2/2021  - CXR showing bilateral opacities  - Sputum growing MRSA, Procal>1>0.9>0.67>2.09  - Patient was on Cefepime and Vanc  - Initial blood culture and repeat negative  - Bronched, very thick secretions.   - AS per ID recs, all Abx are on hold, culture was re-sent today   -All deep line have been replaced   - Repeat Xray in AM  - Precedex was stopped for possible drug induced fever, continue with propofol and add fentanyl as needed for agitation    # Acute Exacerbation of HFpEF   - Was in [place] last admission due to overdiuresis and Lasix was reduced on discharge  - received IV lasix in ER, Holding Lasix  - geeta, I & O, daily weight  - ECHO on last discharge unchanged except for new, mild pulmonary hypertension   - F/u cardio outpatient     # Hypothyroidism   - Elevated TSH 12.24  - Continue synthroid 175mg  - TSH repeat outpatient in 2 months    # Iron Deficiency Anemia  # Anemia of chronic disease  - On lAst admission given Iron sucrose 200x2  - Baseline Hb 8   - Keep Hb >7  - F/u H&H     # Nocturnal Hypoxemia  - uses home O2    # DM II  - Will continue with Insulin regimen  - Continue Insulin drip    # Elevated LFTs  - RUQ US reviewed, no evidence of stone/inflammation in the gallbladder     Patient was a code blue earlier today around 6 am, Family was contacted at that time and they decided to go with full measures, ACLS/CPR were performed for 25 min and then ROSC was achieved. Patient was started on multiple drips and 4 different pressors. Around 7:40 am another code blue was called for the patient. CPR/ACLS was immediately started and the patient was consistently in PEA/Asystole. the time of death was called at 7:57 am. Family and the attending were informed

## 2021-11-12 NOTE — PROGRESS NOTE ADULT - ASSESSMENT
1  s/p code blue. w/ ROSC / b/l pneumonia    Plan  pt started on IV epinephrine, IV calcium gluconate, IV sodium bicarb,   cont: IV levophed, IV neosynephrine, IV vasopressin  restarted IV anti bx (vanco,merrem) last night  started stress dose IV steroids  prognosis is v poor  famliy from [facility] is coming this am

## 2021-11-13 LAB
CULTURE RESULTS: SIGNIFICANT CHANGE UP
SPECIMEN SOURCE: SIGNIFICANT CHANGE UP

## 2021-11-15 LAB
CULTURE RESULTS: SIGNIFICANT CHANGE UP
SPECIMEN SOURCE: SIGNIFICANT CHANGE UP

## 2021-11-23 NOTE — ED ADULT NURSE NOTE - PSH
No significant past surgical history Risks/benefits discussed with patient/surrogate/Vaccine Information Sheet (VIS) provided-VIS date: 8/6/21

## 2022-09-26 NOTE — ED ADULT TRIAGE NOTE - BP NONINVASIVE DIASTOLIC (MM HG)
91 Peng Advancement Flap Text: The defect edges were debeveled with a #15 scalpel blade.  Given the location of the defect, shape of the defect and the proximity to free margins a Peng advancement flap was deemed most appropriate.  Using a sterile surgical marker, an appropriate advancement flap was drawn incorporating the defect and placing the expected incisions within the relaxed skin tension lines where possible. The area thus outlined was incised deep to adipose tissue with a #15 scalpel blade.  The skin margins were undermined to an appropriate distance in all directions utilizing iris scissors.

## 2022-12-20 NOTE — ED ADULT NURSE NOTE - NSFALLRSKHRMRISKTYPE_ED_ALL_ED
21-Dec-2022 22-Dec-2022 16-Nov-2022 09-Nov-2022 23-Nov-2022 09-Nov-2022 16-Nov-2022 23-Nov-2022 07-Dec-2022 14-Dec-2022 09-Nov-2022 22-Dec-2022 16-Nov-2022 23-Nov-2022 30-Nov-2022 30-Nov-2022 09-Nov-2022 09-Nov-2022 09-Nov-2022 30-Nov-2022 30-Nov-2022 14-Dec-2022 14-Dec-2022 other 21-Dec-2022 23-Nov-2022 09-Nov-2022 16-Nov-2022 22-Dec-2022 07-Dec-2022 14-Dec-2022 14-Dec-2022 07-Dec-2022 16-Nov-2022 23-Nov-2022 07-Dec-2022

## 2023-01-16 NOTE — H&P ADULT - NSICDXPASTMEDICALHX_GEN_ALL_CORE_FT
Pt requesting PCP opinion on effectiveness of Gemtesa vs suggested alternative of tolterodine (see also encounter from pharmacy requesting change).     PAST MEDICAL HISTORY:  DM (diabetes mellitus) insulin  fs achs    Fall, initial encounter as  hx    Herniated lumbar intervertebral disc as per hx    Hypercholesteremia statin    Hypertension hctz    Lupus as per hx    Stroke TIA 2002, no deficits PAST MEDICAL HISTORY:  DM (diabetes mellitus) insulin  fs achs    Fall, initial encounter as  hx    Herniated lumbar intervertebral disc as per hx    Hypercholesteremia statin    Hypertension hctz    Lupus as per hx    Seizure keppra    Stroke TIA 2002, no deficits

## 2023-03-07 NOTE — DISCHARGE NOTE NURSING/CASE MANAGEMENT/SOCIAL WORK - NSPROEXTENSIONSOFSELF_GEN_A_NUR
[Dear  ___] : Dear  [unfilled], [Please see my note below.] : Please see my note below. [Sincerely,] : Sincerely, [FreeTextEntry3] : Ashley Alcaraz MD\par , [facility] at [facility]\par Division of Rheumatology\par  none

## 2023-08-03 NOTE — PATIENT PROFILE ADULT - DO YOU FEEL LIKE HURTING OTHERS?
no
No events overnight. On interview this morning, patient is very calm, pleasant, smiling, with polite demeanor. She denies any concerns and reports she would like to go home. Says she has a history of depression but currently is feeling "happy." She is somewhat circumstantial, at times perseverative on various details like one of her health aids, or about her medications, but not overtly paranoid. She is oddly related, slightly labile as well shifting from smiling widely to being a little tearful. Denies thoughts of self-harm or violence, denies anxiety, IoR.    On MMSE, patient is fully oriented, can spell 'world' backwards as well as say days of week backwards, registers 2/3 words but still is able to recall 3/3.    I spoke to patient's NP/PCP Ernie Hernandez from [facility] [phone] who is very familiar with patient and very involved in care as [facility] provides all services including medications for patient. EMS was activated yesterday because patient initially did not [facility] a [facility], so visiting nurse (usually goes once a month) went for check in and thought patient was "delusional" and disorganized, and found her BP to be elevated to 200/150. They were also concerned a few days' worth of pt's medications were still in pill box so she may not have taken them. I described patient's presentation currently to Ernie including her not being very coherent but being very calm and in behavioral control, and he relates this is her baseline behavior. He says their concerns was more about the elevated BP, and they can f/u with patient to ensure her compliance with meds going forward, so as long as she is medically stable and psychiatrically stable now, he feels very safe with patient being discharged home. He would also refer patient back to ER if any further concern for psychosis or other acute behavioral change.

## 2023-08-23 NOTE — ED PROVIDER NOTE - CADM POA PRESS ULCER
Detail Level: Zone Photo Preface (Leave Blank If You Do Not Want): - Photographs were obtained today. No

## 2023-09-12 NOTE — ED PROVIDER NOTE - NS ED MD DISPO ADMITTING SERVICE
MED Olumiant Pregnancy And Lactation Text: Based on animal studies, Olumiant may cause embryo-fetal harm when administered to pregnant women.  The medication should not be used in pregnancy.  Breastfeeding is not recommended during treatment.

## 2023-11-01 NOTE — ED PROVIDER NOTE - CARE PLAN
Spiritual Care Visit    Clinical Encounter Type  Visited With: Patient and family together  Routine Visit: Introduction  Continue Visiting: Yes  Surgical Visit: Post-op  Referral From:   Referral To:     Shinto Encounters  Shinto Needs: Prayer    Patient received the Sacrament of the Anointing of the Sick by Fr. Riccardo Pettit, [facility] .    Family members were preset.       Within the Gnosticist Confucianism the Sacrament of the Sick, also known as the Anointing of the Sick, is a prayer in which we invoke the healing power of God.  Along with Eucharist and Reconciliation it is a repeatable sacrament and should be received by anyone about to undergo surgery, those in recovery and the elderly.  This IS NOT 'Last Rites' nor does the Uatsdin have such a ritual.  Those who are actively dying should receive the Anointing of the Sick for physical and spiritual healing.                                           Principal Discharge DX:	Weakness  Secondary Diagnosis:	UTI (urinary tract infection) Principal Discharge DX:	Weakness  Secondary Diagnosis:	UTI (urinary tract infection)  Secondary Diagnosis:	Altered mental status

## 2024-01-10 NOTE — ED PROVIDER NOTE - DISPOSITION TYPE
Lab Results   Component Value Date    HGBA1C 4.9 04/18/2023   Now appears diet controlled, no further insulin use.   No notable extremes of hyperglycemia or hypoglycemia captured.  Discussed possible need for reintroduction of insulin should BG trends change. Patient is comfortable monitoring BG 3 times daily to ensure stability of trends. Of note, he is hopeful renal transplantation to occur in the next several months. Provided anticipatory guidance that perioperative steroids,  pain, stress may adversely influence BG trends. He will notify us should this occur.   RTC -   Despite reaching A1c target, stability of BG without pharmacotherapy, he has multiple comorbidities. Prefers to continue 6 month endocrinology follow up, which is not unreasonable.   ADMIT

## 2024-01-14 NOTE — PHARMACOTHERAPY INTERVENTION NOTE - COMMENTS
Day 11 of vanco for MRSA growing in sputum. Rec to clarify duration of therapy; vanco levels been high. MD would like to treat for 14 days.
Notified MD that pt has h/o taking methadone recently 9/2021. May need some background opioid or pain meds
Pt on insulin drip, now extubated no longer tube feeds.  Will evaluate speech and swallow to see if pt can eat.    MD would like to transition from drip to SQ insulin; since diet is not clear, rec small dose of long-acting insulin with sliding scale.  Pt was on glargine 20-30 units with sliding scale in previous admission
Rec to obtain CK and TG, pt been on propofol for awhile
True vanco level on 11/1 was high 39, rec to obtain random level daily and give 750mg dose x1 if level <20
Vanco level came back at 9.6 - despite it was the EARLY trough, it was low. Rec to go up to 750 mg IV q12hr starting STAT
Notified MD that day 7 of vanco; consider dc as MRSA was neg. MD will wait until recent cx finalized. Vanco trough appropriate
English

## 2024-02-28 NOTE — ED ADULT NURSE NOTE - NURSING NEURO ORIENTATION
Pt was last seen 10/19/23 and told to follow up in 2 months. Pt no showed the 12/21/23 appt.     I called the pts mobile number and it isn't in service. I called the home number and it isn't in service. I will send a portal message as she was recently logged in on 1/29/24 and Ill send a letter.      Certified letter:  [phone]          
oriented to person, place and time

## 2024-03-05 NOTE — PROVIDER CONTACT NOTE (OTHER) - RECOMMENDATIONS
Rejection coming up on test claim for cost exceeds maximum due to cost of medication.  Representative was able to put in override for life of PA.  Processed and was able to get paid claim with zero copay.  Please send updated prescription to be released to pharmacy.  Current script ended 03/03/2024.   Patient is scheduled for 20 units of lantus as well as on a sliding scale.

## 2024-04-09 NOTE — PHYSICAL THERAPY INITIAL EVALUATION ADULT - ACTIVE RANGE OF MOTION EXAMINATION, REHAB EVAL
Elementary (Primary) School/Middle School Both upper extremities/Both lower extremities joints within functional limits and painfree [employer]

## 2024-08-22 NOTE — PATIENT PROFILE ADULT - ARRIVAL FROM
"History and Physical - Replaced by [facility]    Shi Bermudez 51 y.o. female MRN: [id number]      HPI: Shi Bermudez is a 51 y.o. female who presents for colon cancer screening.     No Known Allergies      REVIEW OF SYSTEMS: Per the HPI, and otherwise unremarkable.    Historical Information     Past Medical History:   Diagnosis Date     2 para 2     Occlusion of breast duct     right    Papanicolaou smear 2018    neg     Past Surgical History:   Procedure Laterality Date    ABDOMINOPLASTY       SECTION  ,    HERNIA REPAIR      x2    MAMMO (HISTORICAL) Bilateral      Social History   Social History     Substance and Sexual Activity   Alcohol Use Yes    Comment: occasionally     Social History     Substance and Sexual Activity   Drug Use Never    Comment: No use     Social History     Tobacco Use   Smoking Status Never   Smokeless Tobacco Never     Family History   Problem Relation Age of Onset    No Known Problems Mother     No Known Problems Father     Breast cancer Neg Hx     Colon cancer Neg Hx     Ovarian cancer Neg Hx     Hypertension Neg Hx     Thyroid disease Neg Hx     Colon polyps Neg Hx        Meds/Allergies       Current Outpatient Medications:     Norethin-Eth Estrad-Fe Biphas (Lo Loestrin Fe) 1 MG-10 MCG / 10 MCG TABS    Current Facility-Administered Medications:     lactated ringers infusion, 50 mL/hr, Intravenous, Continuous, 50 mL/hr at 24 0930        Objective     /73   Pulse 94   Temp 98 °F (36.7 °C) (Temporal)   Resp 16   Ht 5' 3\" (1.6 m)   Wt 56.7 kg (125 lb)   SpO2 100%   BMI 22.14 kg/m²       PHYSICAL EXAM    Gen: NAD AAOx3  Head: Normocephalic, Atraumatic  CV: S1S2 RRR no m/r/g  CHEST: Clear b/l no c/r/w  ABD: soft, +BS NT/ND no masses  EXT: no edema      ASSESSMENT/PLAN:  This is a 51 y.o. year old female here for colonoscopy, and she is stable and optimized for her procedure.        " Home

## 2024-08-29 NOTE — ED ADULT NURSE NOTE - GENITOURINARY WDL
Detail Level: Detailed Detail Level: Generalized Bladder non-tender and non-distended. Urine clear yellow.

## 2025-02-24 NOTE — PROGRESS NOTE ADULT - ASSESSMENT
77 yo female PMHx of HFpEF, HTN, HLD, DM,  SLE on Cellsept and methotrexate, CVA in 2002, recently admitted for Hypoglycemia and ANTONELLA who presents with shortness of breath  presenting for shortness of breath    IMPRESSION  #Acute Hypoxic respiratory failure- intubated/vented   #Fevers - presumed HAP- sputum cx grew MRSA, 10/26/21  - Procalcitonin, Serum: 1.82 (10.22.21 @ 19:36) -> Procalcitonin, Serum: 0.98 (10.26.21 @ 05:37) -- >Procalcitonin, Serum: 2.09: (11.04.21 @ 10:13)  - s/p bronch 11/4 - normal respiratory steven/yeast  - CT Chest No Cont (11.07.21 @ 21:17) >  Moderate right and moderate to large left pleural effusions with subjacent compressive atelectasis. In addition there is interlobular septal thickening and diffuse ground glass opacities. Findings compatible with pulmonary edema.  - CT Abdomen and Pelvis w/ Oral Cont (11.07.21 @ 21:17) >  No definite noncontrast CT evidence for acute infectious pathology in the abdomen or pelvis.    #Obesity BMI (kg/m2): 26.7, 30.2  #Abx allergy: NKDA    Recommendations  - CT Chest reviewed with large pleural effusions   - stop all antibiotics at this point and repeat blood cultures once 48 hours off antibiotics  - consider diagnostic thoracentesis     Please call or message on Microsoft Teams if with any questions.  Spectra [phone]     [Time Spent: ___ minutes] : I have spent [unfilled] minutes of time on the encounter which excludes teaching and separately reported services.

## 2025-04-22 NOTE — PATIENT PROFILE ADULT - PATIENT REPRESENTATIVE: ( YOU CAN CHOOSE ANY PERSON THAT CAN ASSIST YOU WITH YOUR HEALTH CARE PREFERENCES, DOES NOT HAVE TO BE A SPOUSE, IMMEDIATE FAMILY OR SIGNIFICANT OTHER/PARTNER)
Patient would like a referral to a Hematologist - - patient met with Endocrinologist, and the Hematologist information could not be obtained
